# Patient Record
Sex: FEMALE | Race: WHITE | NOT HISPANIC OR LATINO | Employment: OTHER | URBAN - METROPOLITAN AREA
[De-identification: names, ages, dates, MRNs, and addresses within clinical notes are randomized per-mention and may not be internally consistent; named-entity substitution may affect disease eponyms.]

---

## 2017-02-04 ENCOUNTER — HOSPITAL ENCOUNTER (EMERGENCY)
Facility: HOSPITAL | Age: 52
Discharge: HOME | End: 2017-02-04
Attending: EMERGENCY MEDICINE
Payer: MEDICARE

## 2017-02-04 VITALS
TEMPERATURE: 98 F | RESPIRATION RATE: 16 BRPM | OXYGEN SATURATION: 99 % | HEART RATE: 87 BPM | HEIGHT: 68 IN | BODY MASS INDEX: 37.74 KG/M2 | SYSTOLIC BLOOD PRESSURE: 137 MMHG | DIASTOLIC BLOOD PRESSURE: 85 MMHG | WEIGHT: 249 LBS

## 2017-02-04 DIAGNOSIS — F41.9 ANXIETY: Primary | ICD-10-CM

## 2017-02-04 DIAGNOSIS — R00.2 PALPITATIONS: ICD-10-CM

## 2017-02-04 PROCEDURE — 99283 EMERGENCY DEPT VISIT LOW MDM: CPT

## 2017-02-04 PROCEDURE — 25000003 PHARM REV CODE 250: Performed by: EMERGENCY MEDICINE

## 2017-02-04 PROCEDURE — 93010 ELECTROCARDIOGRAM REPORT: CPT | Mod: ,,, | Performed by: INTERNAL MEDICINE

## 2017-02-04 RX ORDER — CLONAZEPAM 1 MG/1
2 TABLET ORAL
Status: COMPLETED | OUTPATIENT
Start: 2017-02-04 | End: 2017-02-04

## 2017-02-04 RX ORDER — CLONAZEPAM 2 MG/1
2 TABLET ORAL 2 TIMES DAILY PRN
Qty: 15 TABLET | Refills: 0 | Status: SHIPPED | OUTPATIENT
Start: 2017-02-04 | End: 2017-02-24 | Stop reason: DRUGHIGH

## 2017-02-04 RX ADMIN — CLONAZEPAM 2 MG: 1 TABLET ORAL at 05:02

## 2017-02-04 NOTE — ED PROVIDER NOTES
"SCRIBE #1 NOTE: I, Nessa Webero, am scribing for, and in the presence of, Gulshan Brown MD. I have scribed the entire note.      History      Chief Complaint   Patient presents with    Chest Pain     reports heart is racing x 1 1/2 hr, reports issues with blood pressure the last several days        Review of patient's allergies indicates:   Allergen Reactions    Morphine Nausea And Vomiting        HPI   HPI    2/4/2017, 5:03 AM   History obtained from the patient      History of Present Illness: Genny Calixto is a 51 y.o. female patient who presents to the Emergency Department for palpitations which onset gradually 3 hours ago. Pt states her blood pressure has been high the past few days. Symptoms are constant and moderate in severity. The patient describes being able to "feel it in her lips and cheeks". No mitigating or exacerbating factors reported. Associated sxs include anxiety. Patient denies any fever, chills, CP, SOB, cough, leg swelling, N/V, and all other sxs at this time. No further complaints or concerns at this time.     Arrival mode: Personal vehicle      PCP: Primary Doctor No       Past Medical History:  Past Medical History   Diagnosis Date    Fibromyalgia     Reflex sympathetic dystrophy     Vertigo        Past Surgical History:  Past Surgical History   Procedure Laterality Date    Cholecystectomy      Hysterectomy      Knee surgery      Bladder surgery      Tonsillectomy           Family History:  Family History   Problem Relation Age of Onset    Stroke Mother     Hypertension Mother     COPD Father     Drug abuse Brother        Social History:  Social History     Social History Main Topics    Smoking status: Former Smoker    Smokeless tobacco: Never Used    Alcohol use No    Drug use: No    Sexual activity: No       ROS   Review of Systems   Constitutional: Negative for chills and fever.   HENT: Negative for sore throat.    Respiratory: Negative for cough and shortness " "of breath.    Cardiovascular: Positive for palpitations. Negative for chest pain and leg swelling.   Gastrointestinal: Negative for nausea and vomiting.   Genitourinary: Negative for dysuria.   Musculoskeletal: Negative for back pain.   Skin: Negative for rash.   Neurological: Negative for weakness.   Hematological: Does not bruise/bleed easily.   Psychiatric/Behavioral: The patient is nervous/anxious.    All other systems reviewed and are negative.      Physical Exam    Initial Vitals   BP Pulse Resp Temp SpO2   02/04/17 0435 02/04/17 0435 02/04/17 0435 02/04/17 0435 02/04/17 0435   140/84 93 20 98.4 °F (36.9 °C) 100 %      Physical Exam  Nursing Notes and Vital Signs Reviewed.  Constitutional: Patient is in no acute distress. Awake and alert. Well-developed and well-nourished.  Head: Atraumatic. Normocephalic.  Eyes: PERRL. EOM intact. Conjunctivae are not pale. No scleral icterus.  ENT: Mucous membranes are moist. Oropharynx is clear and symmetric.    Neck: Supple. Full ROM. No lymphadenopathy.  Cardiovascular: Regular rate. Regular rhythm. No murmurs, rubs, or gallops. Distal pulses are 2+ and symmetric.  Pulmonary/Chest: No respiratory distress. Clear to auscultation bilaterally. No wheezing, rales, or rhonchi.  Abdominal: Soft and non-distended.  There is no tenderness.  No rebound, guarding, or rigidity.  Good bowel sounds.    Genitourinary: No CVA tenderness  Musculoskeletal: Moves all extremities. No obvious deformities. No edema. No calf tenderness.  Skin: Warm and dry.  Neurological:  Alert, awake, and appropriate.  Normal speech.  No acute focal neurological deficits are appreciated.  Psychiatric: Anxious. Good eye contact. Appropriate in content.    ED Course    Procedures  ED Vital Signs:  Vitals:    02/04/17 0435 02/04/17 0520   BP: (!) 140/84 137/85   Pulse: 93 87   Resp: 20 16   Temp: 98.4 °F (36.9 °C)    TempSrc: Oral    SpO2: 100% 99%   Weight: 112.9 kg (249 lb)    Height: 5' 8" (1.727 m)         "   The EKG was ordered, reviewed, and independently interpreted by the ED provider.  Interpretation time: 0445  Rate: 89 BPM  Rhythm: normal sinus rhythm  Interpretation: Normal ECG. No STEMI.      The Emergency Provider reviewed the vital signs and test results, which are outlined above.    ED Discussion     5:16 AM: Reassessed pt at this time. Discussed with pt all pertinent ED information and results. Discussed pt dx of anxiety and plan of tx. Gave pt all f/u and return to the ED instructions. All questions and concerns were addressed at this time. Pt expresses understanding of information and instructions, and is comfortable with plan to discharge. Pt is stable for discharge.    I discussed with patient and/or family/caretaker that evaluation in the ED does not suggest any emergent or life threatening medical conditions requiring immediate intervention beyond what was provided in the ED, and I believe patient is safe for discharge.  Regardless, an unremarkable evaluation in the ED does not preclude the development or presence of a serious of life threatening condition. As such, patient was instructed to return immediately for any worsening or change in current symptoms.    ED Medication(s):  Medications   clonazePAM tablet 2 mg (2 mg Oral Given 2/4/17 0520)       Discharge Medication List as of 2/4/2017  5:16 AM      START taking these medications    Details   clonazePAM (KLONOPIN) 2 MG Tab Take 1 tablet (2 mg total) by mouth 2 (two) times daily as needed for Anxiety., Starting 2/4/2017, Until Sun 2/4/18, Print             Follow-up Information     Follow up with PCP In 2 days.        Follow up with [facility].    Specialty:  Emergency Medicine    Why:  If symptoms worsen    Contact information:    [address]  [facility] [address] [phone]            Medical Decision Making              Scribe Attestation:   Scribe #1: I performed the above scribed service and the  documentation accurately describes the services I performed. I attest to the accuracy of the note.    Attending:   Physician Attestation Statement for Scribe #1: I, Gulshan Brown MD, personally performed the services described in this documentation, as scribed by Nessa Reyes, in my presence, and it is both accurate and complete.          Clinical Impression       ICD-10-CM ICD-9-CM   1. Anxiety F41.9 300.00   2. Palpitations R00.2 785.1       Disposition:   Disposition: Discharged  Condition: Stable         Gulshan Brown MD  02/04/17 0551

## 2017-02-04 NOTE — DISCHARGE INSTRUCTIONS
"  Heart Palpitations    Palpitations are the feeling that your heart is beating hard, fast, or irregular. Some describe it as "pounding" or "skipped beats." Palpitations may occur in someone with heart disease, but can also occur in a healthy person.  Heart-related causes:  · Arrhythmia (a change from the heart's normal rhythm)  · Heart valve disease  · Disease of the heart muscle  · Coronary artery disease  · High blood pressure  Non-heart-related causes:  · Certain medicines (such as asthma inhalers and decongestants)  · Some herbal supplements, energy drinks and pills, and weight loss pills  · Illegal stimulant drugs (such as cocaine, crank, methamphetamine, PCP, bath salts, ecstasy)  · Caffeine, alcohol, and tobacco  · Medical conditions such as thyroid disease, anemia, anxiety, and panic disorder  Sometimes the cause can't be found.  Home care  Follow these home care tips:  · Avoid excess caffeine, alcohol, tobacco, and any stimulant drugs.  · Tell your doctor about any prescription or over-the-counter or herbal medicines you take.  Follow-up care  · Follow up with your doctor, or as advised.  Call 911  This is the fastest and safest way to get to the emergency department. The paramedics can also begin treatment on the way to the hospital, if needed.  Don't wait until your symptoms are severe to call 911. These are reasons to call 911:  · Chest pain  · Shortness of breath  · Feeling lightheaded, faint, or dizzy  · Fainting or loss of consciousness  · Very irregular heartbeat  · Rapid heartbeat that makes you uncomfortable  · Slower than usual heart rate associated with symptoms  · Slower than usual heart rate  · Chest pain with weakness, dizziness, heavy sweating, nausea, or vomiting  · Extreme drowsiness or confusion  · Weakness of an arm or leg, or on 1 side of the face  · Difficulty with speech or vision  When to seek medical advice  Get prompt medical attention if you have palpitations and any of the " following:  · Weakness  · Dizziness  · Lightheadedness  · Fainting  Date Last Reviewed: 4/27/2016  © 3586-4468 [employer] [address] [place] [address], [place] [address]. All rights reserved. This information is not intended as a substitute for professional medical care. Always follow your healthcare professional's instructions.

## 2017-02-16 ENCOUNTER — HOSPITAL ENCOUNTER (EMERGENCY)
Facility: HOSPITAL | Age: 52
Discharge: HOME | End: 2017-02-16
Attending: EMERGENCY MEDICINE
Payer: MEDICARE

## 2017-02-16 VITALS
DIASTOLIC BLOOD PRESSURE: 87 MMHG | TEMPERATURE: 99 F | OXYGEN SATURATION: 100 % | HEART RATE: 72 BPM | RESPIRATION RATE: 20 BRPM | HEIGHT: 68 IN | WEIGHT: 236 LBS | SYSTOLIC BLOOD PRESSURE: 143 MMHG | BODY MASS INDEX: 35.77 KG/M2

## 2017-02-16 DIAGNOSIS — R50.9 FEVER: ICD-10-CM

## 2017-02-16 LAB
ALBUMIN SERPL BCP-MCNC: 4.2 G/DL
ALP SERPL-CCNC: 100 U/L
ALT SERPL W/O P-5'-P-CCNC: 20 U/L
AMORPH CRY URNS QL MICRO: NORMAL
ANION GAP SERPL CALC-SCNC: 14 MMOL/L
AST SERPL-CCNC: 17 U/L
BASOPHILS # BLD AUTO: 0.03 K/UL
BASOPHILS NFR BLD: 0.2 %
BILIRUB SERPL-MCNC: 0.4 MG/DL
BILIRUB UR QL STRIP: NEGATIVE
BUN SERPL-MCNC: 15 MG/DL
CALCIUM SERPL-MCNC: 9.7 MG/DL
CHLORIDE SERPL-SCNC: 105 MMOL/L
CLARITY UR: ABNORMAL
CO2 SERPL-SCNC: 23 MMOL/L
COLOR UR: YELLOW
CREAT SERPL-MCNC: 0.8 MG/DL
DIFFERENTIAL METHOD: ABNORMAL
EOSINOPHIL # BLD AUTO: 0 K/UL
EOSINOPHIL NFR BLD: 0.2 %
ERYTHROCYTE [DISTWIDTH] IN BLOOD BY AUTOMATED COUNT: 13.4 %
EST. GFR  (AFRICAN AMERICAN): >60 ML/MIN/1.73 M^2
EST. GFR  (NON AFRICAN AMERICAN): >60 ML/MIN/1.73 M^2
FLUAV AG SPEC QL IA: NEGATIVE
FLUBV AG SPEC QL IA: NEGATIVE
GLUCOSE SERPL-MCNC: 131 MG/DL
GLUCOSE UR QL STRIP: NEGATIVE
HCT VFR BLD AUTO: 42.1 %
HGB BLD-MCNC: 14.6 G/DL
HGB UR QL STRIP: NEGATIVE
KETONES UR QL STRIP: NEGATIVE
LACTATE SERPL-SCNC: 1.4 MMOL/L
LEUKOCYTE ESTERASE UR QL STRIP: ABNORMAL
LYMPHOCYTES # BLD AUTO: 2.6 K/UL
LYMPHOCYTES NFR BLD: 21.9 %
MCH RBC QN AUTO: 33.6 PG
MCHC RBC AUTO-ENTMCNC: 34.7 %
MCV RBC AUTO: 97 FL
MICROSCOPIC COMMENT: NORMAL
MONOCYTES # BLD AUTO: 0.6 K/UL
MONOCYTES NFR BLD: 5 %
NEUTROPHILS # BLD AUTO: 8.8 K/UL
NEUTROPHILS NFR BLD: 72.7 %
NITRITE UR QL STRIP: NEGATIVE
PH UR STRIP: 7 [PH] (ref 5–8)
PLATELET # BLD AUTO: 257 K/UL
PMV BLD AUTO: 12.2 FL
POTASSIUM SERPL-SCNC: 3.8 MMOL/L
PROT SERPL-MCNC: 8.2 G/DL
PROT UR QL STRIP: NEGATIVE
RBC # BLD AUTO: 4.35 M/UL
SODIUM SERPL-SCNC: 142 MMOL/L
SP GR UR STRIP: 1.02 (ref 1–1.03)
SPECIMEN SOURCE: NORMAL
URN SPEC COLLECT METH UR: ABNORMAL
UROBILINOGEN UR STRIP-ACNC: NEGATIVE EU/DL
WBC # BLD AUTO: 12.07 K/UL
WBC #/AREA URNS HPF: 1 /HPF (ref 0–5)

## 2017-02-16 PROCEDURE — 87040 BLOOD CULTURE FOR BACTERIA: CPT

## 2017-02-16 PROCEDURE — 80053 COMPREHEN METABOLIC PANEL: CPT

## 2017-02-16 PROCEDURE — 96375 TX/PRO/DX INJ NEW DRUG ADDON: CPT

## 2017-02-16 PROCEDURE — 85025 COMPLETE CBC W/AUTO DIFF WBC: CPT

## 2017-02-16 PROCEDURE — 99284 EMERGENCY DEPT VISIT MOD MDM: CPT | Mod: 25

## 2017-02-16 PROCEDURE — 96365 THER/PROPH/DIAG IV INF INIT: CPT

## 2017-02-16 PROCEDURE — 25000003 PHARM REV CODE 250: Performed by: EMERGENCY MEDICINE

## 2017-02-16 PROCEDURE — 87400 INFLUENZA A/B EACH AG IA: CPT | Mod: 59

## 2017-02-16 PROCEDURE — 63600175 PHARM REV CODE 636 W HCPCS: Performed by: EMERGENCY MEDICINE

## 2017-02-16 PROCEDURE — 81000 URINALYSIS NONAUTO W/SCOPE: CPT

## 2017-02-16 PROCEDURE — 83605 ASSAY OF LACTIC ACID: CPT

## 2017-02-16 RX ORDER — SULFAMETHOXAZOLE AND TRIMETHOPRIM 800; 160 MG/1; MG/1
1 TABLET ORAL 3 TIMES DAILY
Qty: 21 TABLET | Refills: 0 | Status: SHIPPED | OUTPATIENT
Start: 2017-02-16 | End: 2017-02-24 | Stop reason: ALTCHOICE

## 2017-02-16 RX ORDER — MEPERIDINE HYDROCHLORIDE 50 MG/ML
25 INJECTION INTRAMUSCULAR; INTRAVENOUS; SUBCUTANEOUS
Status: COMPLETED | OUTPATIENT
Start: 2017-02-16 | End: 2017-02-16

## 2017-02-16 RX ADMIN — MEPERIDINE HYDROCHLORIDE 25 MG: 50 INJECTION INTRAMUSCULAR; INTRAVENOUS; SUBCUTANEOUS at 02:02

## 2017-02-16 RX ADMIN — PROMETHAZINE HYDROCHLORIDE 6.25 MG: 25 INJECTION, SOLUTION INTRAMUSCULAR; INTRAVENOUS at 02:02

## 2017-02-16 NOTE — ED PROVIDER NOTES
SCRIBE #1 NOTE: I, Jung Padgett, am scribing for, and in the presence of, Carmine Whalen MD. I have scribed the entire note.      History      Chief Complaint   Patient presents with    Fever     post op x 1 day, bilateral facet injections, tmax 101.4 alternating motrin and tylernol, reports post op pain similiar to previous injections        Review of patient's allergies indicates:   Allergen Reactions    Morphine Nausea And Vomiting        HPI   HPI    2/16/2017, 1:55 AM   History obtained from the brother and patient       History of Present Illness: Genny Calixto is a 51 y.o. female patient with a PMHx of fibromyalgia, who presents to the Emergency Department for fever (Tmax 101.4) which onset gradually yesterday status post L3-L5 and SI facet injection which occurred 2 days ago. Sxs are constant and moderate in severity. There are no mitigating or exacerbating factors noted. No associated sxs. Pt denies any seizure, numbness, weakness, N/V/D, HA, SOB, abd pain,  and all other sxs at this time. Prior tx includes tylenol and NSAID without relief. No further complaints or concerns at this time.         Arrival mode: Personal vehicle      PCP: Primary Doctor No       Past Medical History:  Past Medical History   Diagnosis Date    Fibromyalgia     Reflex sympathetic dystrophy     Vertigo        Past Surgical History:  Past Surgical History   Procedure Laterality Date    Cholecystectomy      Hysterectomy      Knee surgery      Bladder surgery      Tonsillectomy           Family History:  Family History   Problem Relation Age of Onset    Stroke Mother     Hypertension Mother     COPD Father     Drug abuse Brother        Social History:  Social History     Social History Main Topics    Smoking status: Former Smoker    Smokeless tobacco: Never Used    Alcohol use No    Drug use: No    Sexual activity: No       ROS   Review of Systems   Constitutional: Positive for fever. Negative for  chills and diaphoresis.   HENT: Negative for sore throat.    Respiratory: Negative for shortness of breath.    Cardiovascular: Negative for chest pain.   Gastrointestinal: Negative for abdominal pain, blood in stool, constipation, diarrhea, nausea and vomiting.   Genitourinary: Negative for dysuria.   Musculoskeletal: Negative for back pain.   Skin: Negative for rash.   Neurological: Negative for dizziness, syncope, weakness, numbness and headaches.   Hematological: Does not bruise/bleed easily.       Physical Exam    Initial Vitals   BP Pulse Resp Temp SpO2   02/16/17 0127 02/16/17 0127 02/16/17 0127 02/16/17 0127 02/16/17 0127   184/83 90 16 99.2 °F (37.3 °C) 97 %      Physical Exam  Nursing Notes and Vital Signs Reviewed.  Constitutional: Patient is in no acute distress. Awake and alert. Well-developed and well-nourished.  Head: Atraumatic. Normocephalic.  Eyes: PERRL. EOM intact. Conjunctivae are not pale. No scleral icterus.  ENT: Mucous membranes are moist. Oropharynx is clear and symmetric.    Neck: Supple. Full ROM. No lymphadenopathy.  Cardiovascular: Regular rate. Regular rhythm. No murmurs, rubs, or gallops. Distal pulses are 2+ and symmetric.  Pulmonary/Chest: No respiratory distress. Clear to auscultation bilaterally. No wheezing, rales, or rhonchi.  Abdominal: Soft and non-distended.  There is no tenderness.  No rebound, guarding, or rigidity. Good bowel sounds.  Genitourinary: No CVA tenderness  Musculoskeletal: Moves all extremities. No obvious deformities. No edema. No calf tenderness.  Back: No tenderness. No midline bony tenderness, deformities, or step-offs of the T-spine or L-spine. Skin appears normal without abrasions or bruising. No erythema, induration, or fluctuance.   Skin: Warm and dry.  Neurological:  Alert, awake, and appropriate.  Normal speech.  No acute focal neurological deficits are appreciated.  Psychiatric: Normal affect. Good eye contact. Appropriate in content.    ED Course   "  Procedures  ED Vital Signs:  Vitals:    02/16/17 0127   BP: (!) 184/83   Pulse: 90   Resp: 16   Temp: 99.2 °F (37.3 °C)   TempSrc: Oral   SpO2: 97%   Weight: 107 kg (236 lb)   Height: 5' 8" (1.727 m)       Abnormal Lab Results:  Labs Reviewed   CBC W/ AUTO DIFFERENTIAL - Abnormal; Notable for the following:        Result Value    MCH 33.6 (*)     Gran # 8.8 (*)     All other components within normal limits   COMPREHENSIVE METABOLIC PANEL - Abnormal; Notable for the following:     Glucose 131 (*)     All other components within normal limits   URINALYSIS - Abnormal; Notable for the following:     Appearance, UA Cloudy (*)     Leukocytes, UA Trace (*)     All other components within normal limits   CULTURE, BLOOD   CULTURE, BLOOD   INFLUENZA A AND B ANTIGEN   LACTIC ACID, PLASMA   URINALYSIS MICROSCOPIC        All Lab Results:  Results for orders placed or performed during the hospital encounter of 02/16/17   CBC auto differential   Result Value Ref Range    WBC 12.07 3.90 - 12.70 K/uL    RBC 4.35 4.00 - 5.40 M/uL    Hemoglobin 14.6 12.0 - 16.0 g/dL    Hematocrit 42.1 37.0 - 48.5 %    MCV 97 82 - 98 fL    MCH 33.6 (H) 27.0 - 31.0 pg    MCHC 34.7 32.0 - 36.0 %    RDW 13.4 11.5 - 14.5 %    Platelets 257 150 - 350 K/uL    MPV 12.2 9.2 - 12.9 fL    Gran # 8.8 (H) 1.8 - 7.7 K/uL    Lymph # 2.6 1.0 - 4.8 K/uL    Mono # 0.6 0.3 - 1.0 K/uL    Eos # 0.0 0.0 - 0.5 K/uL    Baso # 0.03 0.00 - 0.20 K/uL    Gran% 72.7 38.0 - 73.0 %    Lymph% 21.9 18.0 - 48.0 %    Mono% 5.0 4.0 - 15.0 %    Eosinophil% 0.2 0.0 - 8.0 %    Basophil% 0.2 0.0 - 1.9 %    Differential Method Automated    Comprehensive metabolic panel   Result Value Ref Range    Sodium 142 136 - 145 mmol/L    Potassium 3.8 3.5 - 5.1 mmol/L    Chloride 105 95 - 110 mmol/L    CO2 23 23 - 29 mmol/L    Glucose 131 (H) 70 - 110 mg/dL    BUN, Bld 15 6 - 20 mg/dL    Creatinine 0.8 0.5 - 1.4 mg/dL    Calcium 9.7 8.7 - 10.5 mg/dL    Total Protein 8.2 6.0 - 8.4 g/dL    Albumin 4.2 " 3.5 - 5.2 g/dL    Total Bilirubin 0.4 0.1 - 1.0 mg/dL    Alkaline Phosphatase 100 55 - 135 U/L    AST 17 10 - 40 U/L    ALT 20 10 - 44 U/L    Anion Gap 14 8 - 16 mmol/L    eGFR if African American >60 >60 mL/min/1.73 m^2    eGFR if non African American >60 >60 mL/min/1.73 m^2   Urinalysis   Result Value Ref Range    Specimen UA Urine, Clean Catch     Color, UA Yellow Yellow, Straw, Hilda    Appearance, UA Cloudy (A) Clear    pH, UA 7.0 5.0 - 8.0    Specific Gravity, UA 1.020 1.005 - 1.030    Protein, UA Negative Negative    Glucose, UA Negative Negative    Ketones, UA Negative Negative    Bilirubin (UA) Negative Negative    Occult Blood UA Negative Negative    Nitrite, UA Negative Negative    Urobilinogen, UA Negative <2.0 EU/dL    Leukocytes, UA Trace (A) Negative   Influenza antigen Nasopharyngeal Swab   Result Value Ref Range    Influenza A Ag, EIA Negative Negative    Influenza B Ag, EIA Negative Negative    Flu A & B Source Nasopharyngeal Swab    Lactic acid, plasma   Result Value Ref Range    Lactate (Lactic Acid) 1.4 0.5 - 2.2 mmol/L   Urinalysis Microscopic   Result Value Ref Range    WBC, UA 1 0 - 5 /hpf    Amorphous, UA Few None-Moderate    Microscopic Comment SEE COMMENT          Imaging Results:  Imaging Results         X-Ray Chest PA And Lateral (In process) Per ED physician, pt's CXR results NAF.                  The Emergency Provider reviewed the vital signs and test results, which are outlined above.    ED Discussion     4:22 AM: Reassessed pt. Discussed with pt all pertinent ED information and results. Discussed plan of treatment with pt. Gave pt all f/u and return to the ED instructions. All questions and concerns were addressed at this time. Pt understands and agrees to plan as discussed. Pt is stable for discharge.     Pre-hypertension/Hypertension: The pt has been informed that they may have pre-hypertension or hypertension based on a blood pressure reading in the ED. I recommend that the pt  call the PCP listed on their discharge instructions or a physician of their choice this week to arrange f/u for further evaluation of possible pre-hypertension or hypertension.       ED Medication(s):  Medications   meperidine 50 mg/mL injection 25 mg (25 mg Intravenous Given 2/16/17 0214)   promethazine (PHENERGAN) 6.25 mg in dextrose 5 % 50 mL IVPB (0 mg Intravenous Stopped 2/16/17 0308)       New Prescriptions    No medications on file       Follow-up Information     Follow up with Camila Kauffman DO In 4 days.    Specialty:  Internal Medicine    Contact information:    [address] [place] [address] [phone]          Follow up with [facility].    Specialty:  Emergency Medicine    Why:  As needed, If symptoms worsen    Contact information:    [address] [facility] [address] [phone]            Medical Decision Making    Medical Decision Making:   Clinical Tests:   Lab Tests: Ordered and Reviewed  Radiological Study: Reviewed and Ordered           Scribe Attestation:   Scribe #1: I performed the above scribed service and the documentation accurately describes the services I performed. I attest to the accuracy of the note.    Attending:   Physician Attestation Statement for Scribe #1: I, Carmine Whalen MD, personally performed the services described in this documentation, as scribed by Jung Padgett, in my presence, and it is both accurate and complete.          Clinical Impression       ICD-10-CM ICD-9-CM   1. Fever R50.9 780.60       Disposition:   Disposition: Discharged  Condition: Stable         Carmine Whalen MD  02/16/17 9173

## 2017-02-16 NOTE — ED NOTES
"Patient had a panic attack, stated she "couldn't breathe", began to hyperventilate, started coughing repeatedly, was rocking back and forth in bed, stating she "wasn't okay".  2L NC oxygen placed on patient.  Continuous pulse ox is reading 98%.  Patient's cough has subsided.  Therapeutic speech used to calm patient.  Patient's breathing has returned back to normal.  Patient states she "feels much better now" and "doesn't know what happened".  I spoke with Bashir in respiratory, he is on standby in case we need him in the future.  Patient is now resting in bed, no acute distress noted at this time.  Side rails up x 2. Call light within reach.  Patient's brother is at the beside with her.  Will continue to monitor.  "

## 2017-02-16 NOTE — DISCHARGE INSTRUCTIONS
Febrile Illness, Uncertain Cause (Adult)  You have a fever, but the cause is not certain. A fever is a natural reaction of the body to an illness such as infection due to a virus or bacteria. In most cases, the temperature itself is not harmful. It actually helps the body fight infections. A fever does not need to be treated unless you feel very uncomfortable.  Sometimes a fever can be an early sign of a more serious infection, so make sure to follow up if your condition worsens.  Home care  Unless given other instructions by your healthcare provider, follow these guidelines when caring for yourself at home.  General care  · If your symptoms are severe, rest at home for the first 2 to 3 days. When you resume activity, don't let yourself get too tired.  · Do not smoke. Also avoid being exposed to secondhand smoke.  · Your appetite may be poor, so a light diet is fine. Avoid dehydration by drinking 6 to 8 glasses of fluids per day (such as water, soft drinks, sports drinks, juices, tea, or soup). Extra fluids will help loosen secretions in the nose and lungs.  Medicines  · You can take acetaminophen or ibuprofen for pain, unless you were given a different fever-reducing/pain medicine to use. (Note: If you have chronic liver or kidney disease or have ever had a stomach ulcer or gastrointestinal bleeding, talk with your healthcare provider before using these medicines. Also talk to your provider if you are taking medicine to prevent blood clots.) Aspirin should never be given to anyone younger than 18 years of age who is ill with a viral infection or fever. It may cause severe liver or brain damage.  · If you were given antibiotics, take them until they are used up, or your healthcare provider tells you to stop. It is important to finish the antibiotics even though you feel better. This is to make sure the infection has cleared. Be aware that antibiotics are not usually given for a fever with an unknown  cause.  · Over-the-counter medicines will not shorten the duration of the illness. However, they may be helpful for the following symptoms: cough, sore throat, or nasal and sinus congestion. Ask your pharmacist for product suggestions. (Note: Do not use decongestants if you have high blood pressure.)  Follow-up care  Follow up with your healthcare provider, or as advised.  · If a culture was done, you will be notified if your treatment needs to be changed. You can call as directed for the results.  · If X-rays, a CT, or an ultrasound were done, a specialist will review them. You will be notified of any findings that may affect your care.  Call 911  Contact emergency services right away if any of these occur:  · Trouble breathing or swallowing, or wheezing  · Chest pain  · Confusion  · Extreme drowsiness or trouble awakening  · Fainting or loss of consciousness  · Rapid heart rate  · Low blood pressure  · Vomiting blood, or large amounts of blood in stool  · Seizure  When to seek medical advice  Call your healthcare provider right away if any of these occur:  · Cough with lots of colored sputum (mucus) or blood in your sputum  · Severe headache  · Face, neck, throat, or ear pain  · Feeling drowsy  · Abdominal pain  · Repeated vomiting or diarrhea  · Joint pain or a new rash  · Burning when urinating  · Fever of 100.4°F (38°C) or higher, that does not get better after taking fever-reducing medicine  · Feeling weak or dizzy  Date Last Reviewed: 7/30/2015  © 8292-4330 [employer]. [address] [place] [address], [place] [address]. All rights reserved. This information is not intended as a substitute for professional medical care. Always follow your healthcare professional's instructions.

## 2017-02-16 NOTE — ED NOTES
Pt resting in bed with brother at bedside. No acute distress, RR equal and non labored, VSS. Bed in low, locked, and call light in reach. Side rails up X 2. Will continue to monitor.

## 2017-02-16 NOTE — ED AVS SNAPSHOT
[facility]  [address] [facility] [address]               Genny Calixto   2017  1:34 AM   ED    Description:  Female : 1965   Department:  [facility]           Your Care was Coordinated By:     Provider Role From To    Carmine Whalen MD Attending Provider 17 0142 --      Reason for Visit     Fever           Diagnoses this Visit        Comments    Fever           ED Disposition     ED Disposition Condition Comment    Discharge             To Do List           Follow-up Information     Follow up with Camila Kauffman DO In 4 days.    Specialty:  Internal Medicine    Contact information:    [address] [place] [address] [address] [place]ina [place] [address]  [phone]          Follow up with [facility].    Specialty:  Emergency Medicine    Why:  As needed, If symptoms worsen    Contact information:    [id number] [place] [address]  [phone]      [facility]     [facility] Nurse Care [facility] -  Assistance  Registered nurses in the [facility] provide clinical advisement, health education, appointment booking, and other advisory services.  Call for this free service at [phone].             Medications           Message regarding Medications     Verify the changes and/or additions to your medication regime listed below are the same as discussed with your clinician today.  If any of these changes or additions are incorrect, please notify your healthcare provider.        These medications were administered today        Dose Freq    meperidine 50 mg/mL injection 25 mg 25 mg ED 1 Time    Sig: Inject 0.5 mLs (25 mg total) into the vein ED 1 Time.    Class: Normal    Route: Intravenous    promethazine (PHENERGAN) 6.25 mg in dextrose 5 % 50 mL IVPB 6.25 mg ED 1 Time    Sig: Inject 6.25 mg into the vein ED 1 Time.    Class: Normal    Route: Intravenous      STOP taking these  "medications     naproxen (NAPROSYN) 500 MG tablet Take 1 tablet (500 mg total) by mouth 2 (two) times daily with meals.    promethazine (PHENERGAN) 12.5 MG Tab Take 1 tablet (12.5 mg total) by mouth every 4 (four) hours as needed.    alprazolam (XANAX) 1 MG tablet Take 1 mg by mouth 4 (four) times daily as needed.    diazepam (VALIUM) 5 MG tablet Take 10 mg by mouth nightly as needed.    dicyclomine (BENTYL) 10 MG capsule Take 10 mg by mouth 3 (three) times daily.    oxycodone (OXYCONTIN) 30 MG Tb12 Take 30 mg by mouth 4 (four) times daily.    pregabalin (LYRICA) 50 MG capsule Take 50 mg by mouth 4 (four) times daily.           Verify that the below list of medications is an accurate representation of the medications you are currently taking.  If none reported, the list may be blank. If incorrect, please contact your healthcare provider. Carry this list with you in case of emergency.           Current Medications     clonazePAM (KLONOPIN) 2 MG Tab Take 1 tablet (2 mg total) by mouth 2 (two) times daily as needed for Anxiety.    ondansetron (ZOFRAN) 8 MG tablet Take 8 mg by mouth every 8 (eight) hours.    sumatriptan (IMITREX) 100 MG tablet Take 100 mg by mouth every 2 (two) hours as needed.           Clinical Reference Information           Your Vitals Were     BP Pulse Temp Resp Height Weight    184/83 (BP Location: Right arm, Patient Position: Sitting) 90 99.2 °F (37.3 °C) (Oral) 16 5' 8" (1.727 m) 107 kg (236 lb)    SpO2 BMI             97% 35.88 kg/m2         Allergies as of 2/16/2017        Reactions    Morphine Nausea And Vomiting      Immunizations Administered on Date of Encounter - 2/16/2017     None      ED Micro, Lab, POCT     Start Ordered       Status Ordering Provider    02/16/17 0159 02/16/17 0158  CBC auto differential  STAT      Final result     02/16/17 0159 02/16/17 0158  Comprehensive metabolic panel  STAT      Final result     02/16/17 0159 02/16/17 0158  Urinalysis  STAT      Final result     " 02/16/17 0159 02/16/17 0158  Influenza antigen Nasopharyngeal Swab  Once      Final result     02/16/17 0159 02/16/17 0158  Lactic acid, plasma  STAT      Final result     02/16/17 0158 02/16/17 0158  Urinalysis Microscopic  Once      Final result       ED Imaging Orders     Start Ordered       Status Ordering Provider    02/16/17 0159 02/16/17 0158  X-Ray Chest PA And Lateral  1 time imaging      In process         Discharge Instructions         Febrile Illness, Uncertain Cause (Adult)  You have a fever, but the cause is not certain. A fever is a natural reaction of the body to an illness such as infection due to a virus or bacteria. In most cases, the temperature itself is not harmful. It actually helps the body fight infections. A fever does not need to be treated unless you feel very uncomfortable.  Sometimes a fever can be an early sign of a more serious infection, so make sure to follow up if your condition worsens.  Home care  Unless given other instructions by your healthcare provider, follow these guidelines when caring for yourself at home.  General care  · If your symptoms are severe, rest at home for the first 2 to 3 days. When you resume activity, don't let yourself get too tired.  · Do not smoke. Also avoid being exposed to secondhand smoke.  · Your appetite may be poor, so a light diet is fine. Avoid dehydration by drinking 6 to 8 glasses of fluids per day (such as water, soft drinks, sports drinks, juices, tea, or soup). Extra fluids will help loosen secretions in the nose and lungs.  Medicines  · You can take acetaminophen or ibuprofen for pain, unless you were given a different fever-reducing/pain medicine to use. (Note: If you have chronic liver or kidney disease or have ever had a stomach ulcer or gastrointestinal bleeding, talk with your healthcare provider before using these medicines. Also talk to your provider if you are taking medicine to prevent blood clots.) Aspirin should never be given  to anyone younger than 18 years of age who is ill with a viral infection or fever. It may cause severe liver or brain damage.  · If you were given antibiotics, take them until they are used up, or your healthcare provider tells you to stop. It is important to finish the antibiotics even though you feel better. This is to make sure the infection has cleared. Be aware that antibiotics are not usually given for a fever with an unknown cause.  · Over-the-counter medicines will not shorten the duration of the illness. However, they may be helpful for the following symptoms: cough, sore throat, or nasal and sinus congestion. Ask your pharmacist for product suggestions. (Note: Do not use decongestants if you have high blood pressure.)  Follow-up care  Follow up with your healthcare provider, or as advised.  · If a culture was done, you will be notified if your treatment needs to be changed. You can call as directed for the results.  · If X-rays, a CT, or an ultrasound were done, a specialist will review them. You will be notified of any findings that may affect your care.  Call 911  Contact emergency services right away if any of these occur:  · Trouble breathing or swallowing, or wheezing  · Chest pain  · Confusion  · Extreme drowsiness or trouble awakening  · Fainting or loss of consciousness  · Rapid heart rate  · Low blood pressure  · Vomiting blood, or large amounts of blood in stool  · Seizure  When to seek medical advice  Call your healthcare provider right away if any of these occur:  · Cough with lots of colored sputum (mucus) or blood in your sputum  · Severe headache  · Face, neck, throat, or ear pain  · Feeling drowsy  · Abdominal pain  · Repeated vomiting or diarrhea  · Joint pain or a new rash  · Burning when urinating  · Fever of 100.4°F (38°C) or higher, that does not get better after taking fever-reducing medicine  · Feeling weak or dizzy  Date Last Reviewed: 7/30/2015  © 8073-9060 [employer],  [employer]. [address] [place] [address]. All rights reserved. This information is not intended as a substitute for professional medical care. Always follow your healthcare professional's instructions.          Your Scheduled Appointments     Feb 22, 2017  1:20 PM CST   New Patient with MD [place] [facility] - Internal Medicine ([place])    [address] [facility] [address]  [place] [address] [phone]              [facility] Sign-Up     Activating your [facility] account is as easy as 1-2-3!     1) Visit [email or web address], select Sign Up Now, enter this activation code and your date of birth, then select Next.  [email or web address]  Expires: 3/21/2017  5:16 AM      2) Create a username and password to use when you visit [facility] in the future and select a security question in case you lose your password and select Next.    3) Enter your e-mail address and click Sign Up!    Additional Information  If you have questions, please e-mail [email or web address] or call [phone] to talk to our [facility] staff. Remember, [facility] is NOT to be used for urgent needs. For medical emergencies, dial 911.         Smoking Cessation     If you would like to quit smoking:   You may be eligible for free services if you are a [place] resident and started smoking cigarettes before September 1, 1988.  Call the Smoking Cessation Trust ([facility]) toll free at (548) 690-3092 or (426) 387-6066.   Call 1-800-QUIT-NOW if you do not meet the above criteria.             [facility] complies with applicable Federal civil rights laws and does not discriminate on the basis of race, color, national origin, age, disability, or sex.        Language Assistance Services     ATTENTION: Language assistance services are available, free of charge. Please call [phone].      ATENCIÓN: Si habla español, tiene a ornelas disposición servicios gratuitos de asistencia lingüística. Llame al [phone].     CHÚ Ý: N?u b?n nói Ti?ng  Vi?t, có các d?ch v? h? tr? ngôn ng? mi?n phí dành cho b?n. G?i s? [phone].

## 2017-02-21 LAB
BACTERIA BLD CULT: NORMAL
BACTERIA BLD CULT: NORMAL

## 2017-02-24 ENCOUNTER — OFFICE VISIT (OUTPATIENT)
Dept: INTERNAL MEDICINE | Facility: CLINIC | Age: 52
End: 2017-02-24
Payer: MEDICARE

## 2017-02-24 VITALS
RESPIRATION RATE: 16 BRPM | SYSTOLIC BLOOD PRESSURE: 140 MMHG | WEIGHT: 239.19 LBS | HEART RATE: 88 BPM | TEMPERATURE: 99 F | HEIGHT: 66 IN | DIASTOLIC BLOOD PRESSURE: 82 MMHG | BODY MASS INDEX: 38.44 KG/M2 | OXYGEN SATURATION: 98 %

## 2017-02-24 DIAGNOSIS — R73.9 HYPERGLYCEMIA: ICD-10-CM

## 2017-02-24 DIAGNOSIS — G89.29 CHRONIC LOW BACK PAIN WITHOUT SCIATICA, UNSPECIFIED BACK PAIN LATERALITY: ICD-10-CM

## 2017-02-24 DIAGNOSIS — G47.00 INSOMNIA, UNSPECIFIED TYPE: ICD-10-CM

## 2017-02-24 DIAGNOSIS — Z71.89 COUNSELING ON HEALTH PROMOTION AND DISEASE PREVENTION: ICD-10-CM

## 2017-02-24 DIAGNOSIS — M54.50 CHRONIC LOW BACK PAIN WITHOUT SCIATICA, UNSPECIFIED BACK PAIN LATERALITY: ICD-10-CM

## 2017-02-24 DIAGNOSIS — E66.9 OBESITY (BMI 35.0-39.9 WITHOUT COMORBIDITY): ICD-10-CM

## 2017-02-24 DIAGNOSIS — F41.9 ANXIETY: Primary | ICD-10-CM

## 2017-02-24 PROCEDURE — 99214 OFFICE O/P EST MOD 30 MIN: CPT | Mod: PBBFAC,PN | Performed by: INTERNAL MEDICINE

## 2017-02-24 PROCEDURE — 99204 OFFICE O/P NEW MOD 45 MIN: CPT | Mod: S$PBB,,, | Performed by: INTERNAL MEDICINE

## 2017-02-24 PROCEDURE — 99999 PR PBB SHADOW E&M-EST. PATIENT-LVL IV: CPT | Mod: PBBFAC,,, | Performed by: INTERNAL MEDICINE

## 2017-02-24 RX ORDER — DIPHENHYDRAMINE HCL 25 MG
CAPSULE ORAL
COMMUNITY
End: 2020-10-20

## 2017-02-24 RX ORDER — CYCLOBENZAPRINE HCL 10 MG
TABLET ORAL
COMMUNITY
End: 2017-03-03 | Stop reason: SINTOL

## 2017-02-24 RX ORDER — DIAZEPAM 10 MG/1
TABLET ORAL
Qty: 30 TABLET | Refills: 3 | Status: SHIPPED | OUTPATIENT
Start: 2017-02-24 | End: 2017-06-29 | Stop reason: SDUPTHER

## 2017-02-24 RX ORDER — DIAZEPAM 10 MG/1
TABLET ORAL
COMMUNITY
End: 2017-02-24 | Stop reason: SDUPTHER

## 2017-02-24 RX ORDER — DOXEPIN HYDROCHLORIDE 10 MG/1
CAPSULE ORAL
COMMUNITY
End: 2017-03-03 | Stop reason: ALTCHOICE

## 2017-02-24 RX ORDER — PSEUDOEPHEDRINE HCL 120 MG/1
TABLET, FILM COATED, EXTENDED RELEASE ORAL
COMMUNITY
End: 2020-10-20

## 2017-02-24 RX ORDER — SERTRALINE HYDROCHLORIDE 25 MG/1
25 TABLET, FILM COATED ORAL DAILY
Qty: 30 TABLET | Refills: 11 | Status: SHIPPED | OUTPATIENT
Start: 2017-02-24 | End: 2017-05-18 | Stop reason: ALTCHOICE

## 2017-02-24 RX ORDER — DICYCLOMINE HYDROCHLORIDE 10 MG/1
CAPSULE ORAL
COMMUNITY
End: 2017-03-03 | Stop reason: ALTCHOICE

## 2017-02-24 RX ORDER — IBUPROFEN 600 MG/1
TABLET ORAL
COMMUNITY
End: 2020-10-20

## 2017-02-24 RX ORDER — OXYCODONE HYDROCHLORIDE 30 MG/1
TABLET ORAL
COMMUNITY
End: 2017-02-24

## 2017-02-24 RX ORDER — DIAZEPAM 10 MG/1
TABLET ORAL
Qty: 30 TABLET | Refills: 3 | Status: SHIPPED | OUTPATIENT
Start: 2017-02-24 | End: 2017-02-24 | Stop reason: SDUPTHER

## 2017-02-24 RX ORDER — MECLIZINE HYDROCHLORIDE 25 MG/1
TABLET ORAL
COMMUNITY
End: 2017-05-18

## 2017-02-24 RX ORDER — LORATADINE 10 MG/1
TABLET ORAL
COMMUNITY
End: 2023-04-30 | Stop reason: ALTCHOICE

## 2017-02-24 RX ORDER — MELATONIN 5 MG
CAPSULE ORAL
COMMUNITY
End: 2017-05-18

## 2017-02-24 NOTE — MR AVS SNAPSHOT
[facility] Internal Medicine  [address] [place] [address] D  [place] [address]  Phone: [phone]                  Genny Calixto   2017 10:20 AM   Office Visit    Description:  Female : 1965   Provider:  Xavier Arellano MD   Department:  [facility] Internal Medicine           Reason for Visit     Annual Exam     Panic Attack     Establish Care     Insomnia           Diagnoses this Visit        Comments    Anxiety    -  Primary     Obesity (BMI 35.0-39.9 without comorbidity)         Insomnia, unspecified type         Hyperglycemia         Counseling on health promotion and disease prevention         Chronic low back pain without sciatica, unspecified back pain laterality                To Do List           Future Appointments        Provider Department Dept Phone    2017 2:40 PM MD Vitaly Gomez - Interventional Pain [phone]    3/15/2017 2:45 PM MD Vitaly Hutchison - OB/ [phone]    3/23/2017 3:00 PM Xavier Arellano MD [facility] Internal Medicine [phone]      Goals (5 Years of Data)     None      Follow-Up and Disposition     Return in about 4 weeks (around 3/24/2017), or if symptoms worsen or fail to improve, for F/U on anxiety.    Follow-up and Disposition History       These Medications        Disp Refills Start End    diazePAM (VALIUM) 10 MG Tab 30 tablet 3 2017     Take 10 mg by mouth every 24 hours at night as needed  for anxiety or insomnia    Pharmacy: [facility]/pharmacy #[id number] [place] - [address] [address] [facility] Ph #: [phone]       sertraline (ZOLOFT) 25 MG tablet 30 tablet 11 2017    Take 1 tablet (25 mg total) by mouth once daily. - Oral    Pharmacy: [facility]/pharmacy #[id number] [facility] [place] - [address] [address] [facility] Ph #: [phone]         [facility]     [facility] -  Assistance  Registered nurses in the [facility] provide clinical advisement, health education, appointment booking, and  other advisory services.  Call for this free service at [phone].             Medications           Message regarding Medications     Verify the changes and/or additions to your medication regime listed below are the same as discussed with your clinician today.  If any of these changes or additions are incorrect, please notify your healthcare provider.        START taking these NEW medications        Refills    diazePAM (VALIUM) 10 MG Tab 3    Sig: Take 10 mg by mouth every 24 hours at night as needed  for anxiety or insomnia    Class: Normal    sertraline (ZOLOFT) 25 MG tablet 11    Sig: Take 1 tablet (25 mg total) by mouth once daily.    Class: Normal    Route: Oral      STOP taking these medications     oxycodone (ROXICODONE) 30 MG Tab Take 30 mg by mouth every 4 (four) hours as needed  for Pain    clonazePAM (KLONOPIN) 2 MG Tab Take 1 tablet (2 mg total) by mouth 2 (two) times daily as needed for Anxiety.    sulfamethoxazole-trimethoprim 800-160mg (BACTRIM DS) 800-160 mg Tab Take 1 tablet by mouth 3 (three) times daily.           Verify that the below list of medications is an accurate representation of the medications you are currently taking.  If none reported, the list may be blank. If incorrect, please contact your healthcare provider. Carry this list with you in case of emergency.           Current Medications     cyclobenzaprine (FLEXERIL) 10 MG tablet Take 10 mg by mouth 2 (two) times daily as needed  for Muscle spasms    diazePAM (VALIUM) 10 MG Tab Take 10 mg by mouth every 24 hours at night as needed  for anxiety or insomnia    dicyclomine (BENTYL) 10 MG capsule Take 10 mg by mouth 3 (three) times daily    diphenhydrAMINE (BENADRYL) 25 mg capsule Take 25 mg by mouth every 6 (six) hours as needed  for Itching    doxepin (SINEQUAN) 10 MG capsule Take 10 mg by mouth nightly    ibuprofen (ADVIL,MOTRIN) 600 MG tablet Take 600 mg by mouth 2 (two) times daily    loratadine (CLARITIN) 10 mg tablet Take 10  mg by mouth daily    meclizine (ANTIVERT) 25 mg tablet Take 25 mg by mouth 3 (three) times daily as needed    melatonin 5 mg Cap Take by mouth    ondansetron (ZOFRAN) 8 MG tablet Take 8 mg by mouth every 8 (eight) hours.    pseudoephedrine (SUDAFED) 120 mg 12 hr tablet Take 120 mg by mouth every 12 (twelve) hours    sennosides 15 mg Tab Take by mouth    sumatriptan (IMITREX) 100 MG tablet Take 100 mg by mouth every 2 (two) hours as needed.    sertraline (ZOLOFT) 25 MG tablet Take 1 tablet (25 mg total) by mouth once daily.           Clinical Reference Information           Your Vitals Were     BP                   140/82 (BP Location: Right arm, Patient Position: Sitting, BP Method: Manual)           Blood Pressure          Most Recent Value    BP  (!)  140/82      Allergies as of 2/24/2017     Erythromycin    Morphine    Opioids - Morphine Analogues      Immunizations Administered on Date of Encounter - 2/24/2017     None      Orders Placed During Today's Visit      Normal Orders This Visit    Ambulatory consult to Obstetrics / Gynecology     Ambulatory consult to Pain Clinic     Case request GI: COLONOSCOPY     Future Labs/Procedures Expected by Expires    Hemoglobin A1c  2/24/2017 4/25/2018    Lipid panel  2/24/2017 4/25/2018    T4, free  2/24/2017 4/25/2018    TSH  2/24/2017 4/25/2018      [facility] Sign-Up     Activating your [facility] account is as easy as 1-2-3!     1) Visit [email or web address], select Sign Up Now, enter this activation code and your date of birth, then select Next.  [email or web address]  Expires: 3/21/2017  5:16 AM      2) Create a username and password to use when you visit [facility] in the future and select a security question in case you lose your password and select Next.    3) Enter your e-mail address and click Sign Up!    Additional Information  If you have questions, please e-mail [email or web address] or call [phone] to talk to our [facility] staff. Remember, [facility] is NOT to be used  for urgent needs. For medical emergencies, dial 911.         Language Assistance Services     ATTENTION: Language assistance services are available, free of charge. Please call [phone].      ATENCIÓN: Si habla jose, tiene a ornelas disposición servicios gratuitos de asistencia lingüística. Llame al [phone].     CHÚ Ý: N?u b?n nói Ti?ng Vi?t, có các d?ch v? h? tr? ngôn ng? mi?n phí dành cho b?n. G?i s? [phone].         [facility] complies with applicable Federal civil rights laws and does not discriminate on the basis of race, color, national origin, age, disability, or sex.

## 2017-02-24 NOTE — PROGRESS NOTES
Subjective:      Patient ID: Genny Calixto is a 51 y.o. female.    Chief Complaint: Annual Exam; Panic Attack; Establish Care; and Insomnia    HPI  Ms. Calixto presents to [facility] primary care.    She reports going to the ER for panic attack 2 weeks ago. She was prescribed clonazepam at that time. She requests only diazepam, which she feels she will be able to wean if off sooner than the clonazepam.     She is concerned about her periodically elevated BP and tachycardia.     She is concerned why her weight is not coming off as soon as fast as expected.     Past Medical History:   Diagnosis Date    Reflex sympathetic dystrophy     Vertigo      Past Surgical History:   Procedure Laterality Date    BLADDER SURGERY      CHOLECYSTECTOMY      facet injections  02/14/2017    L3, L4, L5, S1 Done by Dr. Lara    HYSTERECTOMY      KNEE SURGERY      TONSILLECTOMY       Social History     Social History    Marital status:      Spouse name: N/A    Number of children: N/A    Years of education: N/A     Occupational History    Not on file.     Social History Main Topics    Smoking status: Former Smoker    Smokeless tobacco: Never Used    Alcohol use No    Drug use: No    Sexual activity: No     Other Topics Concern    Not on file     Social History Narrative    No narrative on file     Family History   Problem Relation Age of Onset    Stroke Mother     Hypertension Mother     COPD Father     Drug abuse Brother        Current Outpatient Prescriptions:     cyclobenzaprine (FLEXERIL) 10 MG tablet, Take 10 mg by mouth 2 (two) times daily as needed  for Muscle spasms, Disp: , Rfl:     diazePAM (VALIUM) 10 MG Tab, Take 10 mg by mouth every 24 hours at night as needed  for anxiety or insomnia, Disp: 30 tablet, Rfl: 3    dicyclomine (BENTYL) 10 MG capsule, Take 10 mg by mouth 3 (three) times daily, Disp: , Rfl:     diphenhydrAMINE (BENADRYL) 25 mg capsule, Take 25 mg by mouth every 6  "(six) hours as needed  for Itching, Disp: , Rfl:     doxepin (SINEQUAN) 10 MG capsule, Take 10 mg by mouth nightly, Disp: , Rfl:     ibuprofen (ADVIL,MOTRIN) 600 MG tablet, Take 600 mg by mouth 2 (two) times daily, Disp: , Rfl:     loratadine (CLARITIN) 10 mg tablet, Take 10 mg by mouth daily, Disp: , Rfl:     meclizine (ANTIVERT) 25 mg tablet, Take 25 mg by mouth 3 (three) times daily as needed, Disp: , Rfl:     melatonin 5 mg Cap, Take by mouth, Disp: , Rfl:     ondansetron (ZOFRAN) 8 MG tablet, Take 8 mg by mouth every 8 (eight) hours., Disp: , Rfl:     pseudoephedrine (SUDAFED) 120 mg 12 hr tablet, Take 120 mg by mouth every 12 (twelve) hours, Disp: , Rfl:     sennosides 15 mg Tab, Take by mouth, Disp: , Rfl:     sumatriptan (IMITREX) 100 MG tablet, Take 100 mg by mouth every 2 (two) hours as needed., Disp: , Rfl:     Review of patient's allergies indicates:   Allergen Reactions    Erythromycin     Morphine Nausea And Vomiting    Opioids - morphine analogues      Lab Results   Component Value Date    WBC 12.07 02/16/2017    HGB 14.6 02/16/2017    HCT 42.1 02/16/2017     02/16/2017    CHOL 167 08/07/2009    TRIG 211 (H) 08/07/2009    HDL 35 (L) 08/07/2009    ALT 20 02/16/2017    AST 17 02/16/2017     02/16/2017    K 3.8 02/16/2017     02/16/2017    CREATININE 0.8 02/16/2017    BUN 15 02/16/2017    CO2 23 02/16/2017    TSH 1.41 08/07/2009    INR 1.0 01/12/2015     BP (!) 140/82 (BP Location: Right arm, Patient Position: Sitting, BP Method: Manual)  Pulse 88  Temp 98.6 °F (37 °C) (Tympanic)   Resp 16  Ht 5' 6" (1.676 m)  Wt 108.5 kg (239 lb 3.2 oz)  SpO2 98%  BMI 38.61 kg/m2    Review of Systems   Constitutional: Negative, except for recent viral illness, which has resolved.   Cardiovascular: Negative, except for heart palpitations.   Respiratory: Negative.   Gastrointestinal: Negative.   Genitourinary: Negative.   Musculoskeletal: Negative.   Derm: " Negative.  Allergic/Immunologic: Negative.   Endocrine: Negative.   Hematological: Negative.   Neurological: Negative.   Psychiatric/Behavioral: +Anxiety; as in HPI.     Objective:     Physical Exam  GEN: A&O fully, NAD  PSYC: Normal affect  HEENT: OP: Clear, no LAD, no thyroid masses  CV: RRR, no M/G/R  PULM: CTA bilaterally, no wheezes, rales  GI: S/NT/ND, normal bowel sounds  EXT: No C/C/E    No results found for: HGBA1C    Assessment:      1. Anxiety: Longstanding. Will tx with sertraline with plan to wean off diazepam.   2. Obesity (BMI 35.0-39.9 without comorbidity): Congratualated on recent 20 lb wt loss from 2/4/17-2/24/17, which she attributes to eating more green leafy vegetables.    3. Insomnia, unspecified type    4. Hyperglycemia: Will check A1c.   5.      LBP: s/p L3-L5 & S1 facet injections: LBP improved from 10/10 to 6/10 now. Will refer to Dr. Valladares for f/u.    Plan:   Anxiety  -     T4, free; Future; Expected date: 2/24/17  -     TSH; Future; Expected date: 2/24/17  -     diazePAM (VALIUM) 10 MG Tab; Take 10 mg by mouth every 24 hours at night as needed  for anxiety or insomnia  Dispense: 30 tablet; Refill: 3    Obesity (BMI 35.0-39.9 without comorbidity)  -     Lipid panel; Future; Expected date: 2/24/17    Insomnia, unspecified type  -     diazePAM (VALIUM) 10 MG Tab; Take 10 mg by mouth every 24 hours at night as needed  for anxiety or insomnia  Dispense: 30 tablet; Refill: 3    Hyperglycemia  -     Hemoglobin A1c; Future; Expected date: 2/24/17    Counseling on health promotion and disease prevention  -     Ambulatory consult to Obstetrics / Gynecology  -     Case request GI: COLONOSCOPY    Chronic low back pain without sciatica, unspecified back pain laterality  -     Ambulatory consult to Pain Clinic       RTC in 1 months for f/u on anxiety or sooner as needed.

## 2017-03-02 ENCOUNTER — TELEPHONE (OUTPATIENT)
Dept: INTERNAL MEDICINE | Facility: CLINIC | Age: 52
End: 2017-03-02

## 2017-03-02 NOTE — TELEPHONE ENCOUNTER
----- Message from Daysi White sent at 3/2/2017  3:21 PM CST -----  Contact: Pt  Pt is requesting to speak to the nurse regarding her medication and a referral. Pls call pt back at [phone].

## 2017-03-02 NOTE — TELEPHONE ENCOUNTER
Pt states that she had an apt with Dr Valladares but dr Valladares's staff called to verify apt and informed the pt that he doesn't write prescription medications that they are just just interventional pain management. Pt states that you advised that you would write a pain medication until she gets established with a new pain doctor. Please advise

## 2017-03-03 DIAGNOSIS — G89.29 CHRONIC LOW BACK PAIN WITHOUT SCIATICA, UNSPECIFIED BACK PAIN LATERALITY: Primary | ICD-10-CM

## 2017-03-03 DIAGNOSIS — M54.50 CHRONIC LOW BACK PAIN WITHOUT SCIATICA, UNSPECIFIED BACK PAIN LATERALITY: Primary | ICD-10-CM

## 2017-03-03 RX ORDER — HYDROCODONE BITARTRATE AND ACETAMINOPHEN 7.5; 325 MG/1; MG/1
1 TABLET ORAL EVERY 12 HOURS PRN
Qty: 42 TABLET | Refills: 0 | Status: SHIPPED | OUTPATIENT
Start: 2017-03-03 | End: 2017-03-24

## 2017-03-04 ENCOUNTER — NURSE TRIAGE (OUTPATIENT)
Dept: ADMINISTRATIVE | Facility: CLINIC | Age: 52
End: 2017-03-04

## 2017-03-04 NOTE — TELEPHONE ENCOUNTER
"    Reason for Disposition   [1] SEVERE headache (e.g., excruciating) AND [2] not improved after 2 hours of pain medicine    Answer Assessment - Initial Assessment Questions  1. LOCATION: "Where does it hurt?"       Top of head  2. ONSET: "When did the headache start?" (Minutes, hours or days)   Woke her up at 5am  3. PATTERN: "Does the pain come and go, or has it been constant since it started?"      constant  4. SEVERITY: "How bad is the pain?" and "What does it keep you from doing?"  (e.g., Scale 1-10; mild, moderate, or severe)    - MILD (1-3): doesn't interfere with normal activities     - MODERATE (4-7): interferes with normal activities or awakens from sleep     - SEVERE (8-10): excruciating pain, unable to do any normal activities         9  5. RECURRENT SYMPTOM: "Have you ever had headaches before?" If so, ask: "When was the last time?" and "What happened that time?"       Yes-  Since age 13, able 1 week imitrex  6. CAUSE: "What do you think is causing the headache?"      unknown migraine  7. MIGRAINE: "Have you been diagnosed with migraine headaches?" If so, ask: "Is this headache similar?"       Yes-- similar  8. HEAD INJURY: "Has there been any recent injury to the head?"       no  9. OTHER SYMPTOMS: "Do you have any other symptoms?" (fever, stiff neck, eye pain, sore throat, cold symptoms)      no  10. PREGNANCY: "Is there any chance you are pregnant?" "When was your last menstrual period?"        no    [facility]  request refill on imitrex for migraine.  Has not established care with Xavier Arellano MD yet.  Has an appt this month.  States she spoke with him over the phone yesterday and she thought she had a refill on imitrex.  Advised on call providers do not typically refill medications, zack if not seen and documented,  Will call on call and ask.  Spoke with Dr. Barakat.  He is not able to fill medication if she has not yet established care.  Called patient back and advised to go " to local urgent care.  Voiced understanding.

## 2017-03-06 RX ORDER — SUMATRIPTAN SUCCINATE 100 MG/1
TABLET ORAL
Qty: 10 TABLET | Refills: 2 | Status: SHIPPED | OUTPATIENT
Start: 2017-03-06 | End: 2017-06-15 | Stop reason: SDUPTHER

## 2017-03-07 ENCOUNTER — LAB VISIT (OUTPATIENT)
Dept: LAB | Facility: HOSPITAL | Age: 52
End: 2017-03-07
Attending: INTERNAL MEDICINE
Payer: MEDICARE

## 2017-03-07 DIAGNOSIS — F41.9 ANXIETY: ICD-10-CM

## 2017-03-07 DIAGNOSIS — R73.9 HYPERGLYCEMIA: ICD-10-CM

## 2017-03-07 DIAGNOSIS — E66.9 OBESITY (BMI 35.0-39.9 WITHOUT COMORBIDITY): ICD-10-CM

## 2017-03-07 LAB
CHOLEST/HDLC SERPL: 3.4 {RATIO}
HDL/CHOLESTEROL RATIO: 29.3 %
HDLC SERPL-MCNC: 164 MG/DL
HDLC SERPL-MCNC: 48 MG/DL
LDLC SERPL CALC-MCNC: 94.6 MG/DL
NONHDLC SERPL-MCNC: 116 MG/DL
T4 FREE SERPL-MCNC: 1.11 NG/DL
TRIGL SERPL-MCNC: 107 MG/DL
TSH SERPL DL<=0.005 MIU/L-ACNC: 2.41 UIU/ML

## 2017-03-07 PROCEDURE — 84439 ASSAY OF FREE THYROXINE: CPT

## 2017-03-07 PROCEDURE — 84443 ASSAY THYROID STIM HORMONE: CPT

## 2017-03-07 PROCEDURE — 83036 HEMOGLOBIN GLYCOSYLATED A1C: CPT

## 2017-03-07 PROCEDURE — 80061 LIPID PANEL: CPT

## 2017-03-07 PROCEDURE — 36415 COLL VENOUS BLD VENIPUNCTURE: CPT | Mod: PO

## 2017-03-08 LAB
ESTIMATED AVG GLUCOSE: 120 MG/DL
HBA1C MFR BLD HPLC: 5.8 %

## 2017-03-10 ENCOUNTER — PATIENT OUTREACH (OUTPATIENT)
Dept: ADMINISTRATIVE | Facility: HOSPITAL | Age: 52
End: 2017-03-10

## 2017-03-10 DIAGNOSIS — Z13.6 SCREENING FOR CARDIOVASCULAR CONDITION: Primary | ICD-10-CM

## 2017-03-10 NOTE — LETTER
March 10, 2017    Genny Calixto  [address] [address]             [facility]  [address]  [place] [address]  Phone: [phone] Dear MsJanel Marily:    [facility] is committed to your overall health.  To help you get the most out of each of your visits, we will review your information to make sure you are up to date on all of your recommended tests and/or procedures.      Xavier Arellano MD has found that you may be due for   Health Maintenance Due   Topic    Hepatitis C Screening     TETANUS VACCINE     Pap Smear     Mammogram     Colonoscopy         If you have had any of the above done at another facility, please bring the records or information with you so that your record at [facility] will be complete.    If you are currently taking medication, please bring it with you to your appointment for review.    We will be happy to assist you with scheduling any necessary appointments or you may contact the [facility] appointment desk at [phone] to schedule at your convenience.     Thank you for choosing [facility] for your healthcare needs.  If you have any questions or concerns, please don't hesitate to call.    Sincerely,  Nancy CARVER LPN Care Coordinator  [facility]

## 2017-03-24 ENCOUNTER — TELEPHONE (OUTPATIENT)
Dept: INTERNAL MEDICINE | Facility: CLINIC | Age: 52
End: 2017-03-24

## 2017-03-24 NOTE — TELEPHONE ENCOUNTER
----- Message from Everett Naranjo sent at 3/24/2017 11:34 AM CDT -----  Contact: Patient  Pt wanted to inform that she did not keep her appt yesterday due to family  she had to attend. Pt can be reached @ [phone]. Thank you/NH

## 2017-03-27 ENCOUNTER — TELEPHONE (OUTPATIENT)
Dept: URGENT CARE | Facility: CLINIC | Age: 52
End: 2017-03-27

## 2017-03-27 NOTE — TELEPHONE ENCOUNTER
----- Message from Savannah Mckeon sent at 3/27/2017 11:24 AM CDT -----  Contact: rebel/dr nicholson office  Is returning nurse call. Please call back at [phone] ext [phone]. Thanks//cdb

## 2017-03-27 NOTE — TELEPHONE ENCOUNTER
Spoke with rebel from pain management in regards from pt getting pain meds from us and them informed her that if pt comes for that pain management apt to le us know at the office and we can go from there rebel was also informed that pt is coming for an apt with us tomorrow. Rebel voiced understanding

## 2017-03-27 NOTE — TELEPHONE ENCOUNTER
Returned Sharon from Dr Solorio's office in regards to pt , there was no answer LM to return phone call at the office./db**

## 2017-03-27 NOTE — TELEPHONE ENCOUNTER
----- Message from Valencia Feng sent at 3/27/2017  9:52 AM CDT -----  Contact: Sharon-  Dr. Irving Solorio's office-  [phone]   ext [phone]  Has been a pt of Dr. Solorio's since 2009.  Has a pain contract with him.  Should not be getting pain medications from any other provider.

## 2017-03-28 ENCOUNTER — OFFICE VISIT (OUTPATIENT)
Dept: INTERNAL MEDICINE | Facility: CLINIC | Age: 52
End: 2017-03-28
Payer: MEDICARE

## 2017-03-28 VITALS
BODY MASS INDEX: 35.92 KG/M2 | SYSTOLIC BLOOD PRESSURE: 128 MMHG | TEMPERATURE: 98 F | WEIGHT: 237 LBS | OXYGEN SATURATION: 96 % | RESPIRATION RATE: 16 BRPM | DIASTOLIC BLOOD PRESSURE: 72 MMHG | HEIGHT: 68 IN | HEART RATE: 102 BPM

## 2017-03-28 DIAGNOSIS — F41.9 ANXIETY: ICD-10-CM

## 2017-03-28 DIAGNOSIS — E66.9 OBESITY (BMI 35.0-39.9 WITHOUT COMORBIDITY): Primary | ICD-10-CM

## 2017-03-28 PROCEDURE — 99214 OFFICE O/P EST MOD 30 MIN: CPT | Mod: S$PBB,,, | Performed by: INTERNAL MEDICINE

## 2017-03-28 PROCEDURE — 99213 OFFICE O/P EST LOW 20 MIN: CPT | Mod: PBBFAC,PN | Performed by: INTERNAL MEDICINE

## 2017-03-28 PROCEDURE — 99999 PR PBB SHADOW E&M-EST. PATIENT-LVL III: CPT | Mod: PBBFAC,,, | Performed by: INTERNAL MEDICINE

## 2017-03-28 NOTE — PROGRESS NOTES
Subjective:      Patient ID: Genny Calixto is a 51 y.o. female.    Chief Complaint: Follow-up (1 month ) and Weight Loss    HPI   Ms. Calixto is a patient of mine, who presents for FU on obesity.     She reports losing 3 lbs, which she attributes to improved diet and notes has already noticed an improvement in her fit into clothes and the way she feels despite multiple family-related stresses.      Labs reviewed with Ms. Calixto.    No complaints.  She reports still having 33 of her 45 tablets of her pain medications.     She reports having her migraines every 2 weeks, which is mitigated by Imitrex.    Past Medical History:   Diagnosis Date    Reflex sympathetic dystrophy     Vertigo      Past Surgical History:   Procedure Laterality Date    BLADDER SURGERY      CHOLECYSTECTOMY      facet injections  02/14/2017    L3, L4, L5, S1 Done by Dr. Lara    HYSTERECTOMY      KNEE SURGERY      TONSILLECTOMY       Social History     Social History    Marital status:      Spouse name: N/A    Number of children: N/A    Years of education: N/A     Occupational History    Not on file.     Social History Main Topics    Smoking status: Former Smoker    Smokeless tobacco: Never Used    Alcohol use No    Drug use: No    Sexual activity: No     Other Topics Concern    Not on file     Social History Narrative     Family History   Problem Relation Age of Onset    Stroke Mother     Hypertension Mother     COPD Father     Drug abuse Brother        Current Outpatient Prescriptions:     diazePAM (VALIUM) 10 MG Tab, Take 10 mg by mouth every 24 hours at night as needed  for anxiety or insomnia, Disp: 30 tablet, Rfl: 3    diphenhydrAMINE (BENADRYL) 25 mg capsule, Take 25 mg by mouth every 6 (six) hours as needed  for Itching, Disp: , Rfl:     ibuprofen (ADVIL,MOTRIN) 600 MG tablet, Take 600 mg by mouth 2 (two) times daily, Disp: , Rfl:     loratadine (CLARITIN) 10 mg tablet, Take 10 mg by  "mouth daily, Disp: , Rfl:     meclizine (ANTIVERT) 25 mg tablet, Take 25 mg by mouth 3 (three) times daily as needed, Disp: , Rfl:     melatonin 5 mg Cap, Take by mouth, Disp: , Rfl:     ondansetron (ZOFRAN) 8 MG tablet, Take 8 mg by mouth every 8 (eight) hours., Disp: , Rfl:     pseudoephedrine (SUDAFED) 120 mg 12 hr tablet, Take 120 mg by mouth every 12 (twelve) hours, Disp: , Rfl:     sennosides 15 mg Tab, Take by mouth, Disp: , Rfl:     sertraline (ZOLOFT) 25 MG tablet, Take 1 tablet (25 mg total) by mouth once daily., Disp: 30 tablet, Rfl: 11    sumatriptan (IMITREX) 100 MG tablet, TAKE 1 TABLET IF NEEDED, MAY REPEAT ONCE IN 1 HOUR. MAX 2 TABLETS IN 24 HOURS, Disp: 10 tablet, Rfl: 2    Review of patient's allergies indicates:   Allergen Reactions    Erythromycin     Morphine Nausea And Vomiting    Opioids - morphine analogues      Lab Results   Component Value Date    WBC 12.07 02/16/2017    HGB 14.6 02/16/2017    HCT 42.1 02/16/2017     02/16/2017    CHOL 164 03/07/2017    TRIG 107 03/07/2017    HDL 48 03/07/2017    ALT 20 02/16/2017    AST 17 02/16/2017     02/16/2017    K 3.8 02/16/2017     02/16/2017    CREATININE 0.8 02/16/2017    BUN 15 02/16/2017    CO2 23 02/16/2017    TSH 2.406 03/07/2017    INR 1.0 01/12/2015    HGBA1C 5.8 03/07/2017     /72 (BP Location: Right arm, Patient Position: Sitting, BP Method: Manual)  Pulse (!) 112  Temp 98.1 °F (36.7 °C) (Tympanic)   Resp 16  Ht 5' 8" (1.727 m)  Wt 107.5 kg (236 lb 15.9 oz)  SpO2 96%  BMI 36.03 kg/m2    Review of Systems   Negative    Objective:     Physical Exam  GEN: A&O fully, NAD  PSYC: Normal affect    Lab Results   Component Value Date    HGBA1C 5.8 03/07/2017       Assessment/Plan:   1. Obesity: Congratulated her on her 3 lb weight loss from 2/24/17-3/28/17, which she attributes to       Increasing water to 6-7 17-oz bottles per day, increased yogurt & blue berries. Discussed       strategies for lifestyle " modifications, including systematically increasing water, yogurt, whole fruits,       unsalted nuts, non-starchy vegetables, beans, weight logging and physical activity; and avoiding       potatoes, red/processed meats, sugar sweetened beverages, and fast food.   2. Anxiety: Resolved.     RTC in 3 months on obesity or sooner as needed.

## 2017-03-28 NOTE — MR AVS SNAPSHOT
[facility]  [address]  [place] [address]  Phone: [phone]                  Genny Calixto   3/28/2017 2:00 PM   Office Visit    Description:  Female : 1965   Provider:  Xavier Arellano MD   Department:  [facility]           Reason for Visit     Follow-up     Weight Loss           Diagnoses this Visit        Comments    Obesity (BMI 35.0-39.9 without comorbidity)    -  Primary     Anxiety                To Do List           Future Appointments        Provider Department Dept Phone    2017 2:00 PM Xavier Arellano MD [facility] [phone]      Goals (5 Years of Data)     None      Follow-Up and Disposition     Return in about 3 months (around 2017), or if symptoms worsen or fail to improve, for Annual.    Follow-up and Disposition History      [facility]     [facility] Nurse Care Line -  Assistance  Registered nurses in the [facility] provide clinical advisement, health education, appointment booking, and other advisory services.  Call for this free service at [phone].             Medications           Message regarding Medications     Verify the changes and/or additions to your medication regime listed below are the same as discussed with your clinician today.  If any of these changes or additions are incorrect, please notify your healthcare provider.             Verify that the below list of medications is an accurate representation of the medications you are currently taking.  If none reported, the list may be blank. If incorrect, please contact your healthcare provider. Carry this list with you in case of emergency.           Current Medications     diazePAM (VALIUM) 10 MG Tab Take 10 mg by mouth every 24 hours at night as needed  for anxiety or insomnia    diphenhydrAMINE (BENADRYL) 25 mg capsule Take 25 mg by mouth every 6 (six) hours as needed  for Itching    ibuprofen  "(ADVIL,MOTRIN) 600 MG tablet Take 600 mg by mouth 2 (two) times daily    loratadine (CLARITIN) 10 mg tablet Take 10 mg by mouth daily    meclizine (ANTIVERT) 25 mg tablet Take 25 mg by mouth 3 (three) times daily as needed    melatonin 5 mg Cap Take by mouth    ondansetron (ZOFRAN) 8 MG tablet Take 8 mg by mouth every 8 (eight) hours.    pseudoephedrine (SUDAFED) 120 mg 12 hr tablet Take 120 mg by mouth every 12 (twelve) hours    sennosides 15 mg Tab Take by mouth    sertraline (ZOLOFT) 25 MG tablet Take 1 tablet (25 mg total) by mouth once daily.    sumatriptan (IMITREX) 100 MG tablet TAKE 1 TABLET IF NEEDED, MAY REPEAT ONCE IN 1 HOUR. MAX 2 TABLETS IN 24 HOURS           Clinical Reference Information           Your Vitals Were     BP Pulse Temp Resp    128/72 (BP Location: Right arm, Patient Position: Sitting, BP Method: Manual) 102 98.1 °F (36.7 °C) (Tympanic) 16    Height Weight SpO2 BMI    5' 8" (1.727 m) 107.5 kg (236 lb 15.9 oz) 96% 36.03 kg/m2      Blood Pressure          Most Recent Value    BP  128/72      Allergies as of 3/28/2017     Erythromycin    Morphine    Opioids - Morphine Analogues      Immunizations Administered on Date of Encounter - 3/28/2017     None      [facility] Sign-Up     Activating your [facility] account is as easy as 1-2-3!     1) Visit [email or web address], select Sign Up Now, enter this activation code and your date of birth, then select Next.  [email or web address]  Expires: 5/12/2017  4:31 PM      2) Create a username and password to use when you visit [facility] in the future and select a security question in case you lose your password and select Next.    3) Enter your e-mail address and click Sign Up!    Additional Information  If you have questions, please e-mail [email or web address] or call [phone] to talk to our [facility] staff. Remember, [facility] is NOT to be used for urgent needs. For medical emergencies, dial 911.         Language Assistance Services     ATTENTION: Language " assistance services are available, free of charge. Please call [phone].      ATENCIÓN: Si habla jose, tiene a ornelas disposición servicios gratuitos de asistencia lingüística. Llame al [phone].     CHÚ Ý: N?u b?n nói Ti?ng Vi?t, có các d?ch v? h? tr? ngôn ng? mi?n phí dành cho b?n. G?i s? [phone].         [facility] complies with applicable Federal civil rights laws and does not discriminate on the basis of race, color, national origin, age, disability, or sex.

## 2017-04-06 ENCOUNTER — TELEPHONE (OUTPATIENT)
Dept: INTERNAL MEDICINE | Facility: CLINIC | Age: 52
End: 2017-04-06

## 2017-04-06 NOTE — TELEPHONE ENCOUNTER
----- Message from Liban Vilchis sent at 4/6/2017 10:46 AM CDT -----  Contact: pt  Pt is calling nurse staff regarding some questions about a referred speicalist Doctor to make sure that this is the Doctor that can help patient. Pt call back to be advise [phone] cell thanks asap. Pt does have an appt today at 1:00 pm. Thanks

## 2017-04-06 NOTE — TELEPHONE ENCOUNTER
Pt is wondering if  writes monthly rxs. Let her know I was unsure of this and I would send the message to his staff. Pt stated she has an appt for 1 today. Pt verbalized understanding.

## 2017-04-24 ENCOUNTER — TELEPHONE (OUTPATIENT)
Dept: URGENT CARE | Facility: CLINIC | Age: 52
End: 2017-04-24

## 2017-04-26 ENCOUNTER — TELEPHONE (OUTPATIENT)
Dept: INTERNAL MEDICINE | Facility: CLINIC | Age: 52
End: 2017-04-26

## 2017-04-26 NOTE — TELEPHONE ENCOUNTER
----- Message from Sarai Shine sent at 4/26/2017  2:19 PM CDT -----  Contact: pt  Pt requests Dr Arellano to call her at [phone] or [phone] regarding medication and to let him know she hit her andria for losing weight.

## 2017-04-27 NOTE — TELEPHONE ENCOUNTER
----- Message from Kalli Raygoza sent at 4/27/2017 11:08 AM CDT -----  returned call...[phone]

## 2017-04-28 ENCOUNTER — TELEPHONE (OUTPATIENT)
Dept: INTERNAL MEDICINE | Facility: CLINIC | Age: 52
End: 2017-04-28

## 2017-04-28 NOTE — TELEPHONE ENCOUNTER
----- Message from Riya West sent at 4/28/2017 11:10 AM CDT -----  Pt at [phone]///states she has been trying to get in touch with your office//she has called at least 6 times//and no one has returned her calls//regarding one of her meds//Dr had told her to check with him when she needed a refill//med is Hydrocodone//the Pain Management Dr//Dr Valladares that dr referred her too does not prescribe meds//she has an appt scheduled with another pain med dr//Dr Ledesma//in 2 weeks///would like to know if possible for Dr Arellano to fill again until her appt//she uses//[facility] [address]/[phone]//please call//thanks/sanjay

## 2017-04-28 NOTE — TELEPHONE ENCOUNTER
Called # listed, no answer. LMOM requesting return call. Pt states that she has an appt set up with  in two weeks? Pt requesting refill on hydrocodone until her appt with pain management.     Review and advise. I will try to contact pt again.

## 2017-04-29 RX ORDER — HYDROCODONE BITARTRATE AND ACETAMINOPHEN 7.5; 325 MG/1; MG/1
1 TABLET ORAL EVERY 12 HOURS PRN
Qty: 28 TABLET | Refills: 0 | Status: SHIPPED | OUTPATIENT
Start: 2017-04-29 | End: 2017-05-13

## 2017-04-29 NOTE — PROGRESS NOTES
Refilled a 2 week supply of Norco 7.5 mg po BID prn chronic low back pain without sciatica for a 14 day supply (#28) so she can make it to her next pain management appointment with Dr. Ledesma.

## 2017-05-03 ENCOUNTER — PATIENT OUTREACH (OUTPATIENT)
Dept: ADMINISTRATIVE | Facility: HOSPITAL | Age: 52
End: 2017-05-03
Payer: MEDICARE

## 2017-05-03 DIAGNOSIS — Z12.31 SCREENING MAMMOGRAM, ENCOUNTER FOR: ICD-10-CM

## 2017-05-03 DIAGNOSIS — Z11.59 NEED FOR HEPATITIS C SCREENING TEST: Primary | ICD-10-CM

## 2017-05-16 ENCOUNTER — OFFICE VISIT (OUTPATIENT)
Dept: INTERNAL MEDICINE | Facility: CLINIC | Age: 52
DRG: 603 | End: 2017-05-16
Payer: MEDICARE

## 2017-05-16 VITALS
BODY MASS INDEX: 32.84 KG/M2 | SYSTOLIC BLOOD PRESSURE: 158 MMHG | DIASTOLIC BLOOD PRESSURE: 78 MMHG | WEIGHT: 216 LBS | HEART RATE: 100 BPM | OXYGEN SATURATION: 96 %

## 2017-05-16 DIAGNOSIS — M25.562 ACUTE PAIN OF LEFT KNEE: Primary | ICD-10-CM

## 2017-05-16 PROCEDURE — 20610 DRAIN/INJ JOINT/BURSA W/O US: CPT | Mod: S$PBB,,, | Performed by: INTERNAL MEDICINE

## 2017-05-16 PROCEDURE — 99999 PR PBB SHADOW E&M-EST. PATIENT-LVL III: CPT | Mod: PBBFAC,,, | Performed by: INTERNAL MEDICINE

## 2017-05-16 PROCEDURE — 99213 OFFICE O/P EST LOW 20 MIN: CPT | Mod: S$PBB,25,, | Performed by: INTERNAL MEDICINE

## 2017-05-16 RX ORDER — TRIAMCINOLONE ACETONIDE 40 MG/ML
40 INJECTION, SUSPENSION INTRA-ARTICULAR; INTRAMUSCULAR
Status: DISCONTINUED | OUTPATIENT
Start: 2017-05-16 | End: 2020-10-20

## 2017-05-16 RX ORDER — LIDOCAINE HYDROCHLORIDE 10 MG/ML
1 INJECTION, SOLUTION EPIDURAL; INFILTRATION; INTRACAUDAL; PERINEURAL
Status: DISCONTINUED | OUTPATIENT
Start: 2017-05-16 | End: 2020-10-20

## 2017-05-16 NOTE — PROGRESS NOTES
Lidocaine 1%  NDC#12971-440-80  Lot#[id number]  Exp:08/20    Kenalog 40 mg  NDC#1711-2653-07  Lot#[id number]  Exp:03/18

## 2017-05-16 NOTE — MR AVS SNAPSHOT
[facility]  [address] [place] [address] [address]  [place] [address]  Phone: [phone]                  Genny Calixto   2017 4:00 PM   Office Visit    Description:  Female : 1965   Provider:  Xavier Arellano MD   Department:  [facility]           Reason for Visit     Knee Pain           Diagnoses this Visit        Comments    Acute pain of left knee    -  Primary            To Do List           Future Appointments        Provider Department Dept Phone    2017 2:00 PM Xavier Arellano MD [facility] [phone]      Goals (5 Years of Data)     None      [facility]     [facility] On Call Nurse Care Line -  Assistance  Unless otherwise directed by your provider, please contact [facility], our nurse care line that is available for  assistance.     Registered nurses in the [facility] On Call Center provide: appointment scheduling, clinical advisement, health education, and other advisory services.  Call: [phone] (toll free)               Medications           Message regarding Medications     Verify the changes and/or additions to your medication regime listed below are the same as discussed with your clinician today.  If any of these changes or additions are incorrect, please notify your healthcare provider.        These medications were administered today        Dose Freq    lidocaine (PF) 10 mg/ml (1%) injection 10 mg 1 mL Clinic/HOD 1 time    Si mL (10 mg total) by Other route one time.    Class: Normal    Route: Other    triamcinolone acetonide injection 40 mg 40 mg Clinic/HOD 1 time    Sig: Inject 1 mL (40 mg total) into the articular space one time.    Class: Normal    Route: Intra-articular           Verify that the below list of medications is an accurate representation of the medications you are currently taking.  If none reported, the list may be blank. If incorrect, please contact your healthcare provider.  Carry this list with you in case of emergency.           Current Medications     diazePAM (VALIUM) 10 MG Tab Take 10 mg by mouth every 24 hours at night as needed  for anxiety or insomnia    diphenhydrAMINE (BENADRYL) 25 mg capsule Take 25 mg by mouth every 6 (six) hours as needed  for Itching    ibuprofen (ADVIL,MOTRIN) 600 MG tablet Take 600 mg by mouth 2 (two) times daily    loratadine (CLARITIN) 10 mg tablet Take 10 mg by mouth daily    meclizine (ANTIVERT) 25 mg tablet Take 25 mg by mouth 3 (three) times daily as needed    melatonin 5 mg Cap Take by mouth    ondansetron (ZOFRAN) 8 MG tablet Take 8 mg by mouth every 8 (eight) hours.    pseudoephedrine (SUDAFED) 120 mg 12 hr tablet Take 120 mg by mouth every 12 (twelve) hours    sennosides 15 mg Tab Take by mouth    sertraline (ZOLOFT) 25 MG tablet Take 1 tablet (25 mg total) by mouth once daily.    sumatriptan (IMITREX) 100 MG tablet TAKE 1 TABLET IF NEEDED, MAY REPEAT ONCE IN 1 HOUR. MAX 2 TABLETS IN 24 HOURS           Clinical Reference Information           Your Vitals Were     BP Pulse Weight SpO2 BMI    158/78 (BP Location: Left arm, Patient Position: Sitting, BP Method: Manual) 100 98 kg (216 lb) 96% 32.84 kg/m2      Blood Pressure          Most Recent Value    BP  (!)  158/78      Allergies as of 5/16/2017     Erythromycin    Morphine    Opioids - Morphine Analogues      Immunizations Administered on Date of Encounter - 5/16/2017     None      [facility] Sign-Up     Activating your [facility] account is as easy as 1-2-3!     1) Visit [email or web address], select Sign Up Now, enter this activation code and your date of birth, then select Next.  [email or web address]  Expires: 6/30/2017  5:10 PM      2) Create a username and password to use when you visit [facility] in the future and select a security question in case you lose your password and select Next.    3) Enter your e-mail address and click Sign Up!    Additional Information  If you have questions, please e-mail  [email or web address] or call [phone] to talk to our [facility] staff. Remember, [facility] is NOT to be used for urgent needs. For medical emergencies, dial 911.         Language Assistance Services     ATTENTION: Language assistance services are available, free of charge. Please call [phone].      ATENCIÓN: Si habla español, tiene a ornelas disposición servicios gratuitos de asistencia lingüística. Llame al [phone].     CHÚ Ý: N?u b?n nói Ti?ng Vi?t, có các d?ch v? h? tr? ngôn ng? mi?n phí dành cho b?n. G?i s? [phone].         [facility] complies with applicable Federal civil rights laws and does not discriminate on the basis of race, color, national origin, age, disability, or sex.

## 2017-05-16 NOTE — PROGRESS NOTES
Subjective:      Patient ID: Genny Calixto is a 51 y.o. female.    Chief Complaint: Knee Pain      HPI  Ms. Calixto is a patient of mine, who presents for left knee pain.    She reports noting left knee pain since Sunday, which worsened yesterday. She has tried ibuprofen, which didn't help. She reports having relief from steroid injection in the past. Of note, she reports going to the gym 6 out of 7 days of the week in an effort to bring her weight down. She feels reports having multiple steroid injections and surgeries of her left knee due to traumas/OA.    Past Medical History:   Diagnosis Date    Reflex sympathetic dystrophy     Vertigo      Past Surgical History:   Procedure Laterality Date    BLADDER SURGERY      CHOLECYSTECTOMY      facet injections  02/14/2017    L3, L4, L5, S1 Done by Dr. Lara    HYSTERECTOMY      KNEE SURGERY      TONSILLECTOMY       Social History     Social History    Marital status:      Spouse name: N/A    Number of children: N/A    Years of education: N/A     Occupational History    Not on file.     Social History Main Topics    Smoking status: Former Smoker    Smokeless tobacco: Never Used    Alcohol use No    Drug use: No    Sexual activity: No     Other Topics Concern    Not on file     Social History Narrative     Family History   Problem Relation Age of Onset    Stroke Mother     Hypertension Mother     COPD Father     Drug abuse Brother        Current Outpatient Prescriptions:     diazePAM (VALIUM) 10 MG Tab, Take 10 mg by mouth every 24 hours at night as needed  for anxiety or insomnia, Disp: 30 tablet, Rfl: 3    diphenhydrAMINE (BENADRYL) 25 mg capsule, Take 25 mg by mouth every 6 (six) hours as needed  for Itching, Disp: , Rfl:     ibuprofen (ADVIL,MOTRIN) 600 MG tablet, Take 600 mg by mouth 2 (two) times daily, Disp: , Rfl:     loratadine (CLARITIN) 10 mg tablet, Take 10 mg by mouth daily, Disp: , Rfl:     meclizine  "(ANTIVERT) 25 mg tablet, Take 25 mg by mouth 3 (three) times daily as needed, Disp: , Rfl:     melatonin 5 mg Cap, Take by mouth, Disp: , Rfl:     ondansetron (ZOFRAN) 8 MG tablet, Take 8 mg by mouth every 8 (eight) hours., Disp: , Rfl:     pseudoephedrine (SUDAFED) 120 mg 12 hr tablet, Take 120 mg by mouth every 12 (twelve) hours, Disp: , Rfl:     sennosides 15 mg Tab, Take by mouth, Disp: , Rfl:     sertraline (ZOLOFT) 25 MG tablet, Take 1 tablet (25 mg total) by mouth once daily., Disp: 30 tablet, Rfl: 11    sumatriptan (IMITREX) 100 MG tablet, TAKE 1 TABLET IF NEEDED, MAY REPEAT ONCE IN 1 HOUR. MAX 2 TABLETS IN 24 HOURS, Disp: 10 tablet, Rfl: 2    Current Facility-Administered Medications:     lidocaine (PF) 10 mg/ml (1%) injection 10 mg, 1 mL, Other, 1 time in Clinic/HOD, Xavier Arellano MD    triamcinolone acetonide injection 40 mg, 40 mg, Intra-articular, 1 time in Clinic/HOD, Xavier Arellano MD    Review of patient's allergies indicates:   Allergen Reactions    Erythromycin     Morphine Nausea And Vomiting    Opioids - morphine analogues      Review of Systems   Negative    Objective:     BP (!) 158/78 (BP Location: Left arm, Patient Position: Sitting, BP Method: Manual)  Pulse 100  Wt 98 kg (216 lb)  SpO2 96%  BMI 32.84 kg/m2    Physical Exam  GEN: A&O fully, NAD  PSYC: Normal affect  CV: RRR, no M/G/R  PULM: CTA bilaterally, no wheezes, rales  EXT: No C/C/E, +pain with extension of left knee    Procedure Type: Left knee steroid injection    Risks and benefits of procedure were reviewed with patient, including pain, infection, and death  Time-out was performed prior to procedure with MA, including patient name, site and procedure  Left knee prepped with betadine with sterile technique, including gloves and concentrically larger circles of sterilization  3 cc lidocaine 1% + 40 mg triamcinolone acetonide injected into left knee using a 25 G 1.5" needle in a 10 mL syringe by advancing needle " to the left knee joint 1 cm lateral from the superior-lateral border of the patella  <1 cc blood loss  Dried betadine wiped off with alcohol pad  Band aid applied  Procedure tolerated well  Patient reports less pain with ambulation after procedure.     Lab Results   Component Value Date    WBC 12.07 02/16/2017    HGB 14.6 02/16/2017    HCT 42.1 02/16/2017     02/16/2017    CHOL 164 03/07/2017    TRIG 107 03/07/2017    HDL 48 03/07/2017    ALT 20 02/16/2017    AST 17 02/16/2017     02/16/2017    K 3.8 02/16/2017     02/16/2017    CREATININE 0.8 02/16/2017    BUN 15 02/16/2017    CO2 23 02/16/2017    TSH 2.406 03/07/2017    INR 1.0 01/12/2015    HGBA1C 5.8 03/07/2017     Lab Results   Component Value Date    HGBA1C 5.8 03/07/2017       Assessment:     1. Acute pain of left knee: Likely exacerbated her known OA of her left knee from rigorous resistance training in the gym.     Plan:   Acute pain of left knee  -     lidocaine (PF) 10 mg/ml (1%) injection 10 mg; 1 mL (10 mg total) by Other route one time.  -     triamcinolone acetonide injection 40 mg; Inject 1 mL (40 mg total) into the articular space one time.      She is scheduled to follow up in July 2017

## 2017-05-18 ENCOUNTER — HOSPITAL ENCOUNTER (INPATIENT)
Facility: HOSPITAL | Age: 52
LOS: 4 days | Discharge: HOME | DRG: 603 | End: 2017-05-22
Attending: INTERNAL MEDICINE | Admitting: INTERNAL MEDICINE
Payer: MEDICARE

## 2017-05-18 DIAGNOSIS — L03.116 CELLULITIS OF LEFT KNEE: Primary | ICD-10-CM

## 2017-05-18 LAB
ALBUMIN SERPL BCP-MCNC: 4.3 G/DL
ALP SERPL-CCNC: 86 U/L
ALT SERPL W/O P-5'-P-CCNC: 18 U/L
ANION GAP SERPL CALC-SCNC: 10 MMOL/L
AST SERPL-CCNC: 21 U/L
BASOPHILS # BLD AUTO: 0.04 K/UL
BASOPHILS NFR BLD: 0.4 %
BILIRUB SERPL-MCNC: 0.8 MG/DL
BUN SERPL-MCNC: 14 MG/DL
CALCIUM SERPL-MCNC: 10.1 MG/DL
CHLORIDE SERPL-SCNC: 100 MMOL/L
CO2 SERPL-SCNC: 28 MMOL/L
CREAT SERPL-MCNC: 0.9 MG/DL
DIFFERENTIAL METHOD: ABNORMAL
EOSINOPHIL # BLD AUTO: 0.1 K/UL
EOSINOPHIL NFR BLD: 0.8 %
ERYTHROCYTE [DISTWIDTH] IN BLOOD BY AUTOMATED COUNT: 14.2 %
EST. GFR  (AFRICAN AMERICAN): >60 ML/MIN/1.73 M^2
EST. GFR  (NON AFRICAN AMERICAN): >60 ML/MIN/1.73 M^2
GLUCOSE SERPL-MCNC: 122 MG/DL
HCT VFR BLD AUTO: 44 %
HGB BLD-MCNC: 15.7 G/DL
LYMPHOCYTES # BLD AUTO: 3.2 K/UL
LYMPHOCYTES NFR BLD: 30.3 %
MCH RBC QN AUTO: 34.2 PG
MCHC RBC AUTO-ENTMCNC: 35.7 %
MCV RBC AUTO: 96 FL
MONOCYTES # BLD AUTO: 0.6 K/UL
MONOCYTES NFR BLD: 5.6 %
NEUTROPHILS # BLD AUTO: 6.6 K/UL
NEUTROPHILS NFR BLD: 62.9 %
PLATELET # BLD AUTO: 271 K/UL
PMV BLD AUTO: 12.5 FL
POTASSIUM SERPL-SCNC: 3.9 MMOL/L
PROT SERPL-MCNC: 8.3 G/DL
RBC # BLD AUTO: 4.59 M/UL
SODIUM SERPL-SCNC: 138 MMOL/L
WBC # BLD AUTO: 10.49 K/UL

## 2017-05-18 PROCEDURE — 87040 BLOOD CULTURE FOR BACTERIA: CPT | Mod: 59

## 2017-05-18 PROCEDURE — 85025 COMPLETE CBC W/AUTO DIFF WBC: CPT

## 2017-05-18 PROCEDURE — 96366 THER/PROPH/DIAG IV INF ADDON: CPT

## 2017-05-18 PROCEDURE — 11000001 HC ACUTE MED/SURG PRIVATE ROOM

## 2017-05-18 PROCEDURE — 80053 COMPREHEN METABOLIC PANEL: CPT

## 2017-05-18 PROCEDURE — 25000003 PHARM REV CODE 250: Performed by: INTERNAL MEDICINE

## 2017-05-18 PROCEDURE — 87070 CULTURE OTHR SPECIMN AEROBIC: CPT

## 2017-05-18 PROCEDURE — 96365 THER/PROPH/DIAG IV INF INIT: CPT

## 2017-05-18 PROCEDURE — 25000003 PHARM REV CODE 250: Performed by: PHYSICIAN ASSISTANT

## 2017-05-18 PROCEDURE — 63600175 PHARM REV CODE 636 W HCPCS: Performed by: INTERNAL MEDICINE

## 2017-05-18 PROCEDURE — 99285 EMERGENCY DEPT VISIT HI MDM: CPT | Mod: 25

## 2017-05-18 PROCEDURE — 63600175 PHARM REV CODE 636 W HCPCS: Performed by: PHYSICIAN ASSISTANT

## 2017-05-18 PROCEDURE — 25500020 PHARM REV CODE 255: Performed by: PHYSICIAN ASSISTANT

## 2017-05-18 RX ORDER — DIPHENHYDRAMINE HCL 25 MG
25 CAPSULE ORAL EVERY 6 HOURS PRN
Status: DISCONTINUED | OUTPATIENT
Start: 2017-05-18 | End: 2017-05-22 | Stop reason: HOSPADM

## 2017-05-18 RX ORDER — CEFEPIME HYDROCHLORIDE 1 G/50ML
1 INJECTION, SOLUTION INTRAVENOUS
Status: DISCONTINUED | OUTPATIENT
Start: 2017-05-19 | End: 2017-05-21

## 2017-05-18 RX ORDER — ENOXAPARIN SODIUM 100 MG/ML
40 INJECTION SUBCUTANEOUS EVERY 24 HOURS
Status: DISCONTINUED | OUTPATIENT
Start: 2017-05-18 | End: 2017-05-22 | Stop reason: HOSPADM

## 2017-05-18 RX ORDER — HYDROCODONE BITARTRATE AND ACETAMINOPHEN 7.5; 325 MG/1; MG/1
1 TABLET ORAL EVERY 6 HOURS PRN
COMMUNITY
End: 2017-05-26 | Stop reason: DRUGHIGH

## 2017-05-18 RX ORDER — HYDROCODONE BITARTRATE AND ACETAMINOPHEN 10; 325 MG/1; MG/1
1 TABLET ORAL
Status: COMPLETED | OUTPATIENT
Start: 2017-05-18 | End: 2017-05-18

## 2017-05-18 RX ORDER — ONDANSETRON 2 MG/ML
4 INJECTION INTRAMUSCULAR; INTRAVENOUS EVERY 8 HOURS PRN
Status: DISCONTINUED | OUTPATIENT
Start: 2017-05-18 | End: 2017-05-22 | Stop reason: HOSPADM

## 2017-05-18 RX ORDER — CETIRIZINE HYDROCHLORIDE 10 MG/1
10 TABLET ORAL DAILY
Status: DISCONTINUED | OUTPATIENT
Start: 2017-05-19 | End: 2017-05-22 | Stop reason: HOSPADM

## 2017-05-18 RX ORDER — IPRATROPIUM BROMIDE AND ALBUTEROL SULFATE 2.5; .5 MG/3ML; MG/3ML
3 SOLUTION RESPIRATORY (INHALATION) EVERY 4 HOURS PRN
Status: DISCONTINUED | OUTPATIENT
Start: 2017-05-18 | End: 2017-05-22 | Stop reason: HOSPADM

## 2017-05-18 RX ORDER — HYDROCODONE BITARTRATE AND ACETAMINOPHEN 5; 325 MG/1; MG/1
1 TABLET ORAL EVERY 4 HOURS PRN
Status: DISCONTINUED | OUTPATIENT
Start: 2017-05-18 | End: 2017-05-22 | Stop reason: HOSPADM

## 2017-05-18 RX ORDER — CEFEPIME HYDROCHLORIDE 1 G/50ML
1 INJECTION, SOLUTION INTRAVENOUS
Status: COMPLETED | OUTPATIENT
Start: 2017-05-18 | End: 2017-05-18

## 2017-05-18 RX ORDER — DIAZEPAM 5 MG/1
10 TABLET ORAL NIGHTLY PRN
Status: DISCONTINUED | OUTPATIENT
Start: 2017-05-18 | End: 2017-05-19

## 2017-05-18 RX ORDER — MAG HYDROX/ALUMINUM HYD/SIMETH 200-200-20
30 SUSPENSION, ORAL (FINAL DOSE FORM) ORAL EVERY 6 HOURS PRN
Status: DISCONTINUED | OUTPATIENT
Start: 2017-05-18 | End: 2017-05-22 | Stop reason: HOSPADM

## 2017-05-18 RX ORDER — GUAIFENESIN 100 MG/5ML
200 SOLUTION ORAL EVERY 4 HOURS PRN
Status: DISCONTINUED | OUTPATIENT
Start: 2017-05-18 | End: 2017-05-22 | Stop reason: HOSPADM

## 2017-05-18 RX ORDER — ACETAMINOPHEN 325 MG/1
650 TABLET ORAL EVERY 6 HOURS PRN
Status: DISCONTINUED | OUTPATIENT
Start: 2017-05-18 | End: 2017-05-22 | Stop reason: HOSPADM

## 2017-05-18 RX ORDER — HYDRALAZINE HYDROCHLORIDE 20 MG/ML
10 INJECTION INTRAMUSCULAR; INTRAVENOUS EVERY 6 HOURS PRN
Status: DISCONTINUED | OUTPATIENT
Start: 2017-05-18 | End: 2017-05-22 | Stop reason: HOSPADM

## 2017-05-18 RX ADMIN — HYDROCODONE BITARTRATE AND ACETAMINOPHEN 1 TABLET: 5; 325 TABLET ORAL at 11:05

## 2017-05-18 RX ADMIN — IOHEXOL 75 ML: 350 INJECTION, SOLUTION INTRAVENOUS at 07:05

## 2017-05-18 RX ADMIN — CEFEPIME 1 G: 1 INJECTION, POWDER, FOR SOLUTION INTRAMUSCULAR; INTRAVENOUS at 09:05

## 2017-05-18 RX ADMIN — ENOXAPARIN SODIUM 40 MG: 100 INJECTION SUBCUTANEOUS at 11:05

## 2017-05-18 RX ADMIN — DIAZEPAM 10 MG: 5 TABLET ORAL at 11:05

## 2017-05-18 RX ADMIN — HYDROCODONE BITARTRATE AND ACETAMINOPHEN 1 TABLET: 10; 325 TABLET ORAL at 06:05

## 2017-05-18 RX ADMIN — VANCOMYCIN HYDROCHLORIDE 1000 MG: 1 INJECTION, POWDER, LYOPHILIZED, FOR SOLUTION INTRAVENOUS at 05:05

## 2017-05-18 NOTE — ED PROVIDER NOTES
SCRIBE #1 NOTE: I, Billy Benavidez, am scribing for, and in the presence of, BON Mitchell. I have scribed the entire note.      History      Chief Complaint   Patient presents with    Knee Pain     L knee pain and pt has a reddened rash with a large blister to the knee, hx of RSD       Review of patient's allergies indicates:   Allergen Reactions    Erythromycin     Morphine Nausea And Vomiting    Opioids - morphine analogues         HPI   HPI    5/18/2017, 4:56 PM   History obtained from the patient      History of Present Illness: Genny Calixto is a 51 y.o. female patient who presents to the Emergency Department for an evaluation of left knee pain which onset gradually a few days ago. Pt state she has been suffering from knee pain over the past few days. Pt state she contacted her pain management doctor Sunday after her knee pain increased. This morning pt reports she woke up with a rash to her left knee. Symptoms are constant and moderate in severity. Exacerbated by noting and relieved by nothing. Associated sxs include rash to knee. Patient denies any HA, numbness, weakness, dizziness, back pain, neck pain, hip pain, abd pain, CP, SOB, and all other sxs at this time. No further complaints or concerns at this time.     Arrival mode: Personal vehicle    PCP: Xavier Arellano MD       Past Medical History:  Past Medical History:   Diagnosis Date    Reflex sympathetic dystrophy     Vertigo        Past Surgical History:  Past Surgical History:   Procedure Laterality Date    BLADDER SURGERY      CHOLECYSTECTOMY      facet injections  02/14/2017    L3, L4, L5, S1 Done by Dr. Lara    HYSTERECTOMY      KNEE SURGERY      TONSILLECTOMY           Family History:  Family History   Problem Relation Age of Onset    Stroke Mother     Hypertension Mother     COPD Father     Drug abuse Brother        Social History:  Social History     Social History Main Topics    Smoking status: Former Smoker     Smokeless tobacco: Never Used    Alcohol use No    Drug use: No    Sexual activity: No       ROS   Review of Systems   Constitutional: Negative for chills, diaphoresis and fever.   HENT: Negative for sore throat.    Respiratory: Negative for shortness of breath.    Cardiovascular: Negative for chest pain.   Gastrointestinal: Negative for abdominal pain, nausea and vomiting.   Genitourinary: Negative for dysuria and hematuria.   Musculoskeletal: Negative for back pain, neck pain and neck stiffness.        (+) Left knee pain   Skin: Positive for rash. Negative for wound.   Neurological: Negative for dizziness, weakness, light-headedness and headaches.   Hematological: Does not bruise/bleed easily.     Physical Exam    Initial Vitals   BP Pulse Resp Temp SpO2   05/18/17 1649 05/18/17 1649 05/18/17 1649 05/18/17 1649 05/18/17 1649   164/93 99 18 98.5 °F (36.9 °C) 95 %      Physical Exam  Nursing Notes and Vital Signs Reviewed.  Constitutional: Patient is in no acute distress. Well-developed and well-nourished.  Head: Atraumatic. Normocephalic.  Eyes: PERRL. EOM intact. Conjunctivae are not pale. No scleral icterus.  ENT: Mucous membranes are moist.  Neck: Supple. Full ROM. No lymphadenopathy.  Cardiovascular: Regular rate. Regular rhythm.  Pulmonary/Chest: No respiratory distress.   Abdominal: Soft and non-distended.    Musculoskeletal: Moves all extremities.  Left Knee:  No obvious deformity. [id number] area of erythema and induration to medial left knee with a 2cm bullous lesion filled with serosanguinous fluid. Area is non tender with no fluctuance noted. Full ROM of knee.  Distal sensation is intact.  Skin: Warm and dry.  Neurological:  Alert, awake, and appropriate.  Normal speech.  No acute focal neurological deficits are appreciated.  Psychiatric: Normal affect. Good eye contact. Appropriate in content.              ED Course    Procedures  ED Vital Signs:  Vitals:    05/18/17 1649 05/18/17 1920 05/18/17 2048  "  BP: (!) 164/93 (!) 141/84 (!) 141/93   Pulse: 99 89 86   Resp: 18 18 20   Temp: 98.5 °F (36.9 °C)  99.1 °F (37.3 °C)   TempSrc: Oral     SpO2: 95%     Weight: 98 kg (216 lb)     Height: 5' 8" (1.727 m)         Abnormal Lab Results:  Labs Reviewed   CBC W/ AUTO DIFFERENTIAL - Abnormal; Notable for the following:        Result Value    MCH 34.2 (*)     All other components within normal limits   COMPREHENSIVE METABOLIC PANEL - Abnormal; Notable for the following:     Glucose 122 (*)     All other components within normal limits   CULTURE, AEROBIC  (SPECIFY SOURCE)   CULTURE, BLOOD   CULTURE, BLOOD        All Lab Results:  Results for orders placed or performed during the hospital encounter of 05/18/17   CBC auto differential   Result Value Ref Range    WBC 10.49 3.90 - 12.70 K/uL    RBC 4.59 4.00 - 5.40 M/uL    Hemoglobin 15.7 12.0 - 16.0 g/dL    Hematocrit 44.0 37.0 - 48.5 %    MCV 96 82 - 98 fL    MCH 34.2 (H) 27.0 - 31.0 pg    MCHC 35.7 32.0 - 36.0 %    RDW 14.2 11.5 - 14.5 %    Platelets 271 150 - 350 K/uL    MPV 12.5 9.2 - 12.9 fL    Gran # 6.6 1.8 - 7.7 K/uL    Lymph # 3.2 1.0 - 4.8 K/uL    Mono # 0.6 0.3 - 1.0 K/uL    Eos # 0.1 0.0 - 0.5 K/uL    Baso # 0.04 0.00 - 0.20 K/uL    Gran% 62.9 38.0 - 73.0 %    Lymph% 30.3 18.0 - 48.0 %    Mono% 5.6 4.0 - 15.0 %    Eosinophil% 0.8 0.0 - 8.0 %    Basophil% 0.4 0.0 - 1.9 %    Differential Method Automated    Comprehensive metabolic panel   Result Value Ref Range    Sodium 138 136 - 145 mmol/L    Potassium 3.9 3.5 - 5.1 mmol/L    Chloride 100 95 - 110 mmol/L    CO2 28 23 - 29 mmol/L    Glucose 122 (H) 70 - 110 mg/dL    BUN, Bld 14 6 - 20 mg/dL    Creatinine 0.9 0.5 - 1.4 mg/dL    Calcium 10.1 8.7 - 10.5 mg/dL    Total Protein 8.3 6.0 - 8.4 g/dL    Albumin 4.3 3.5 - 5.2 g/dL    Total Bilirubin 0.8 0.1 - 1.0 mg/dL    Alkaline Phosphatase 86 55 - 135 U/L    AST 21 10 - 40 U/L    ALT 18 10 - 44 U/L    Anion Gap 10 8 - 16 mmol/L    eGFR if African American >60 >60 " mL/min/1.73 m^2    eGFR if non African American >60 >60 mL/min/1.73 m^2         Imaging Results:  Imaging Results         CT Lower Extremity With Contrast Left (Final result) Result time:  05/18/17 20:04:18    Final result by REBA Carranza Sr., MD (05/18/17 20:04:18)    Impression:      1.  There is a mild amount of edema in the subcutaneous fat in the anteromedial aspect of the left lower extremity.  This is consistent with the patient's history and characteristic of cellulitis.  2.  There is a normal amount of fluid within the knee.    3.  There is no evidence of osteomyelitis.  4.  There are surgical changes in the proximal portion of the tibia.       All CT scans at this facility use dose modulation, iterative reconstruction, and/or weight base dosing when appropriate to reduce radiation dose when appropriate to reduce radiation dose to as low as reasonably achievable.      Electronically signed by: REBA CARRANZA MD  Date:     05/18/17  Time:    20:04     Narrative:    CT of the left knee with IV contrast    Clinical History: Cellulitis; possible septic joint    Technique: Standard knee CT protocol with IV contrast material was performed.  75 mL of Omnipaque 350 contrast material was used for this examination.    Finding: There are surgical changes in the proximal portion of the tibia.  There is no acute fracture visualized.  There is no dislocation.  There is a normal amount of fluid within the knee.  There is a mild amount of edema in the subcutaneous fat in the anteromedial aspect of the left lower extremity.                      The Emergency Provider reviewed the vital signs and test results, which are outlined above.    ED Discussion     8:58 PM: BON Mitchell discussed the pt's case with Dr. Monson ([facility]) who recommends admitting pt to med/surg telemetry.    9:21 PM: Discussed case with Antionette ([facility]).  Dr. Monson agrees with current care and management of pt and accepts  admission.   Admitting Service: Hospital medicine   Admitting Physician: Dr. Monson  Admit to: Med/surg    9:21 PM: Re-evaluated pt. I have discussed test results, shared treatment plan, and the need for admission with patient. Pt expresses understanding at this time and agree with all information. All questions answered. Pt has no further questions or concerns at this time. Pt is ready for admit.    ED Medication(s):  Medications   cefepime in dextrose 5 % 1 gram/50 mL IVPB 1 g (not administered)   vancomycin 1 g in dextrose 5 % 250 mL IVPB (ready to mix system) (0 mg Intravenous Stopped 5/18/17 1940)   hydrocodone-acetaminophen 10-325mg per tablet 1 tablet (1 tablet Oral Given 5/18/17 1826)   omnipaque 350 iohexol 75 mL (75 mLs Intravenous Given 5/18/17 1910)       New Prescriptions    No medications on file             Medical Decision Making    Medical Decision Making:   Clinical Tests:   Lab Tests: Ordered and Reviewed  Radiological Study: Reviewed and Ordered           Scribe Attestation:   Scribe #1: I performed the above scribed service and the documentation accurately describes the services I performed. I attest to the accuracy of the note.    Attending:   Physician Attestation Statement for Scribe #1: I, BON Mitchell, personally performed the services described in this documentation, as scribed by Billy Benavidez, in my presence, and it is both accurate and complete.          Clinical Impression       ICD-10-CM ICD-9-CM   1. Cellulitis of left knee L03.116 682.6       Disposition:   Disposition: Admitted  Condition: Fair         BON Trujillo  05/23/17 1251

## 2017-05-19 LAB
ANION GAP SERPL CALC-SCNC: 8 MMOL/L
BASOPHILS # BLD AUTO: 0.05 K/UL
BASOPHILS NFR BLD: 0.5 %
BUN SERPL-MCNC: 15 MG/DL
CALCIUM SERPL-MCNC: 9.9 MG/DL
CHLORIDE SERPL-SCNC: 99 MMOL/L
CO2 SERPL-SCNC: 32 MMOL/L
CREAT SERPL-MCNC: 0.8 MG/DL
DIFFERENTIAL METHOD: ABNORMAL
EOSINOPHIL # BLD AUTO: 0.1 K/UL
EOSINOPHIL NFR BLD: 1.3 %
ERYTHROCYTE [DISTWIDTH] IN BLOOD BY AUTOMATED COUNT: 14.4 %
EST. GFR  (AFRICAN AMERICAN): >60 ML/MIN/1.73 M^2
EST. GFR  (NON AFRICAN AMERICAN): >60 ML/MIN/1.73 M^2
GLUCOSE SERPL-MCNC: 106 MG/DL
HCT VFR BLD AUTO: 42.4 %
HGB BLD-MCNC: 14.4 G/DL
LYMPHOCYTES # BLD AUTO: 4 K/UL
LYMPHOCYTES NFR BLD: 39.4 %
MAGNESIUM SERPL-MCNC: 2.4 MG/DL
MCH RBC QN AUTO: 33.3 PG
MCHC RBC AUTO-ENTMCNC: 34 %
MCV RBC AUTO: 98 FL
MONOCYTES # BLD AUTO: 0.6 K/UL
MONOCYTES NFR BLD: 5.9 %
NEUTROPHILS # BLD AUTO: 5.3 K/UL
NEUTROPHILS NFR BLD: 53.2 %
PHOSPHATE SERPL-MCNC: 4.8 MG/DL
PLATELET # BLD AUTO: 246 K/UL
PMV BLD AUTO: 12.7 FL
POTASSIUM SERPL-SCNC: 4.2 MMOL/L
RBC # BLD AUTO: 4.33 M/UL
SODIUM SERPL-SCNC: 139 MMOL/L
WBC # BLD AUTO: 10.07 K/UL

## 2017-05-19 PROCEDURE — 80048 BASIC METABOLIC PNL TOTAL CA: CPT

## 2017-05-19 PROCEDURE — 96372 THER/PROPH/DIAG INJ SC/IM: CPT

## 2017-05-19 PROCEDURE — 25000003 PHARM REV CODE 250: Performed by: INTERNAL MEDICINE

## 2017-05-19 PROCEDURE — 63600175 PHARM REV CODE 636 W HCPCS: Performed by: PHYSICIAN ASSISTANT

## 2017-05-19 PROCEDURE — 36415 COLL VENOUS BLD VENIPUNCTURE: CPT

## 2017-05-19 PROCEDURE — 63600175 PHARM REV CODE 636 W HCPCS: Performed by: INTERNAL MEDICINE

## 2017-05-19 PROCEDURE — 11000001 HC ACUTE MED/SURG PRIVATE ROOM

## 2017-05-19 PROCEDURE — 25000003 PHARM REV CODE 250: Performed by: PHYSICIAN ASSISTANT

## 2017-05-19 PROCEDURE — 83735 ASSAY OF MAGNESIUM: CPT

## 2017-05-19 PROCEDURE — 84100 ASSAY OF PHOSPHORUS: CPT

## 2017-05-19 PROCEDURE — 85025 COMPLETE CBC W/AUTO DIFF WBC: CPT

## 2017-05-19 RX ORDER — ALPRAZOLAM 0.5 MG/1
0.5 TABLET ORAL 3 TIMES DAILY PRN
Status: DISCONTINUED | OUTPATIENT
Start: 2017-05-19 | End: 2017-05-22 | Stop reason: HOSPADM

## 2017-05-19 RX ADMIN — VANCOMYCIN HYDROCHLORIDE 1250 MG: 100 INJECTION, POWDER, LYOPHILIZED, FOR SOLUTION INTRAVENOUS at 09:05

## 2017-05-19 RX ADMIN — Medication 1000 MG: at 07:05

## 2017-05-19 RX ADMIN — HYDROCODONE BITARTRATE AND ACETAMINOPHEN 1 TABLET: 5; 325 TABLET ORAL at 12:05

## 2017-05-19 RX ADMIN — HYDROCODONE BITARTRATE AND ACETAMINOPHEN 1 TABLET: 5; 325 TABLET ORAL at 03:05

## 2017-05-19 RX ADMIN — CEFEPIME 1 G: 1 INJECTION, POWDER, FOR SOLUTION INTRAMUSCULAR; INTRAVENOUS at 05:05

## 2017-05-19 RX ADMIN — CEFEPIME 1 G: 1 INJECTION, POWDER, FOR SOLUTION INTRAMUSCULAR; INTRAVENOUS at 01:05

## 2017-05-19 RX ADMIN — ALPRAZOLAM 0.5 MG: 0.5 TABLET ORAL at 01:05

## 2017-05-19 RX ADMIN — HYDROCODONE BITARTRATE AND ACETAMINOPHEN 1 TABLET: 5; 325 TABLET ORAL at 06:05

## 2017-05-19 RX ADMIN — CEFEPIME 1 G: 1 INJECTION, POWDER, FOR SOLUTION INTRAMUSCULAR; INTRAVENOUS at 11:05

## 2017-05-19 RX ADMIN — CETIRIZINE HYDROCHLORIDE 10 MG: 10 TABLET, FILM COATED ORAL at 09:05

## 2017-05-19 NOTE — CONSULTS
Clinical Pharmacy Progress Note    Pharmacy consulted to dose vancomycin for dr Beauchamp.     51 y.o. female  admitted for cellulitis.     The patient has the following labs:     Date Creatinine (mg/dl)    BUN WBC Count   5/19/2017 Estimated Creatinine Clearance: 101.8 mL/min (based on Cr of 0.8). Lab Results   Component Value Date    BUN 15 05/19/2017     Lab Results   Component Value Date    WBC 10.07 05/19/2017        Ideal BW: 63.4  Actual BW: 98  Adjusted (Dosing) BW: 77.2    Tmax/24h 99.1  Cultures:   pend    Vancomycin (day 1 of therapy)   Other anti-infectives: Cefepime (day 1 of therapy)    Based on Estimated Creatinine Clearance: 101.8 mL/min (based on Cr of 0.8). and weight of 77.2, pharmacy will initiate vancomycin 1250 mg every 12 hours and draw a trough prior to 4th dose. Trough due 5/21 at 07:00am. Pharmacy will continue to follow patients clinical status, renal function, C&S, and adjust dose as necessary to maintain trough levels between 10 and 15.     Thank you for allowing us to participate in this patient's care.     Yasemin Morel   5/19/2017 11:41 AM

## 2017-05-19 NOTE — ASSESSMENT & PLAN NOTE
- Angry looking cellulitis and due to location, will benefit from IV antibiotics rather than oral meds.  - F/u blood cultures  - IV Vanc and Cefepime empirically  - No evidence of sepsis

## 2017-05-19 NOTE — H&P
[facility] Hospital Medicine  History & Physical    Patient Name: Genny Calixto  MRN: [id number]  Admission Date: 5/18/2017  Attending Physician: Diego Monson MD  Primary Care Provider: Xavier Arellano MD         Patient information was obtained from patient and ER records.     Subjective:     Principal Problem:Cellulitis of left knee    Chief Complaint:   Chief Complaint   Patient presents with    Knee Pain     L knee pain and pt has a reddened rash with a large blister to the knee, hx of RSD        HPI: Ms. Calixto is a 52 y/o obese  female with h/o anxiety,chronic pain syndrome followed by a pain management specialist, presents to the ED c/o left knee redness, pain for past couple of days, worsened since early this morning. Denies fever or chills. CT left leg shows mild amount of edema in the subcutaneous fat in the anteromedial aspect of the left lower extremity consistent with cellulitis. No evidence of spread to the bone or knee joint. See image of left knee in chart. Blood cultures obtained. Patient received Vanc and Cefepime in the ED. Admitted for angry looking left knee cellulitis.    Past Medical History:   Diagnosis Date    Reflex sympathetic dystrophy     Vertigo        Past Surgical History:   Procedure Laterality Date    BLADDER SURGERY      CHOLECYSTECTOMY      facet injections  02/14/2017    L3, L4, L5, S1 Done by Dr. Lara    HYSTERECTOMY      KNEE SURGERY      TONSILLECTOMY         Review of patient's allergies indicates:   Allergen Reactions    Erythromycin     Morphine Nausea And Vomiting    Opioids - morphine analogues        Current Facility-Administered Medications on File Prior to Encounter   Medication    lidocaine (PF) 10 mg/ml (1%) injection 10 mg    triamcinolone acetonide injection 40 mg     Current Outpatient Prescriptions on File Prior to Encounter   Medication Sig    diazePAM (VALIUM) 10 MG Tab Take 10 mg by mouth every 24 hours  at night as needed  for anxiety or insomnia    diphenhydrAMINE (BENADRYL) 25 mg capsule Take 25 mg by mouth every 6 (six) hours as needed  for Itching    ibuprofen (ADVIL,MOTRIN) 600 MG tablet Take 600 mg by mouth 2 (two) times daily    loratadine (CLARITIN) 10 mg tablet Take 10 mg by mouth daily    pseudoephedrine (SUDAFED) 120 mg 12 hr tablet Take 120 mg by mouth every 12 (twelve) hours    sumatriptan (IMITREX) 100 MG tablet TAKE 1 TABLET IF NEEDED, MAY REPEAT ONCE IN 1 HOUR. MAX 2 TABLETS IN 24 HOURS    ondansetron (ZOFRAN) 8 MG tablet Take 8 mg by mouth every 8 (eight) hours.    [DISCONTINUED] meclizine (ANTIVERT) 25 mg tablet Take 25 mg by mouth 3 (three) times daily as needed    [DISCONTINUED] melatonin 5 mg Cap Take by mouth    [DISCONTINUED] sennosides 15 mg Tab Take by mouth    [DISCONTINUED] sertraline (ZOLOFT) 25 MG tablet Take 1 tablet (25 mg total) by mouth once daily.     Family History     Problem Relation (Age of Onset)    COPD Father    Drug abuse Brother    Hypertension Mother    Stroke Mother        Social History Main Topics    Smoking status: Former Smoker    Smokeless tobacco: Never Used    Alcohol use No    Drug use: No    Sexual activity: No     Review of Systems   Constitutional: Negative.  Negative for chills, diaphoresis, fatigue and fever.   HENT: Negative.  Negative for congestion, nosebleeds and sinus pressure.    Eyes: Negative.  Negative for photophobia, redness and visual disturbance.   Respiratory: Negative.  Negative for cough, chest tightness, shortness of breath and wheezing.    Cardiovascular: Negative.  Negative for chest pain, palpitations and leg swelling.   Gastrointestinal: Negative.  Negative for abdominal pain, diarrhea, nausea and vomiting.   Endocrine: Negative.  Negative for polydipsia, polyphagia and polyuria.   Genitourinary: Negative.  Negative for dysuria, flank pain, frequency and urgency.   Musculoskeletal: Positive for back pain (chronic).  Negative for joint swelling and neck stiffness.   Skin: Positive for color change (left knee redness, pain). Negative for pallor and rash.   Allergic/Immunologic: Negative.  Negative for environmental allergies, food allergies and immunocompromised state.   Neurological: Negative.  Negative for dizziness, syncope, speech difficulty, numbness and headaches.   Hematological: Negative.  Negative for adenopathy. Does not bruise/bleed easily.   Psychiatric/Behavioral: Negative.  Negative for confusion, decreased concentration and hallucinations. The patient is not nervous/anxious.    All other systems reviewed and are negative.    Objective:     Vital Signs (Most Recent):  Temp: 99.1 °F (37.3 °C) (05/18/17 2048)  Pulse: 86 (05/18/17 2048)  Resp: 20 (05/18/17 2048)  BP: (!) 141/93 (05/18/17 2048)  SpO2: 95 % (05/18/17 1649) Vital Signs (24h Range):  Temp:  [98.5 °F (36.9 °C)-99.1 °F (37.3 °C)] 99.1 °F (37.3 °C)  Pulse:  [86-99] 86  Resp:  [18-20] 20  SpO2:  [95 %] 95 %  BP: (141-164)/(84-93) 141/93     Weight: 98 kg (216 lb)  Body mass index is 32.84 kg/(m^2).    Physical Exam   Constitutional: She is oriented to person, place, and time. She appears well-developed and well-nourished. No distress.   HENT:   Head: Normocephalic and atraumatic.   Eyes: Conjunctivae and EOM are normal. No scleral icterus.   Neck: Normal range of motion. Neck supple. No JVD present. No tracheal deviation present. No thyromegaly present.   Cardiovascular: Normal rate, regular rhythm, normal heart sounds and intact distal pulses.    No murmur heard.  Pulmonary/Chest: Effort normal and breath sounds normal. No respiratory distress. She has no wheezes. She has no rales. She exhibits no tenderness.   Abdominal: Soft. Bowel sounds are normal. She exhibits no distension. There is no tenderness.   Musculoskeletal: Normal range of motion. She exhibits no edema or tenderness.   Lymphadenopathy:     She has no cervical adenopathy.   Neurological: She is  alert and oriented to person, place, and time. No cranial nerve deficit. She exhibits normal muscle tone. Coordination normal.   Skin: She is not diaphoretic. There is erythema (large area of erythema, with mild blistering and tenderness of left knee, ROM intact).   Psychiatric: She has a normal mood and affect. Her behavior is normal. Judgment and thought content normal.   Nursing note and vitals reviewed.       Significant Labs:   Blood Culture: No results for input(s): LABBLOO in the last 48 hours.  BMP:   Recent Labs  Lab 05/18/17  1720   *      K 3.9      CO2 28   BUN 14   CREATININE 0.9   CALCIUM 10.1     CBC:   Recent Labs  Lab 05/18/17  1720   WBC 10.49   HGB 15.7   HCT 44.0        CMP:   Recent Labs  Lab 05/18/17  1720      K 3.9      CO2 28   *   BUN 14   CREATININE 0.9   CALCIUM 10.1   PROT 8.3   ALBUMIN 4.3   BILITOT 0.8   ALKPHOS 86   AST 21   ALT 18   ANIONGAP 10   EGFRNONAA >60     All pertinent labs within the past 24 hours have been reviewed.    Significant Imaging: I have reviewed and interpreted all pertinent imaging results/findings within the past 24 hours.     Imaging Results         CT Lower Extremity With Contrast Left (Final result) Result time:  05/18/17 20:04:18    Final result by REBA Carranza Sr., MD (05/18/17 20:04:18)    Impression:      1.  There is a mild amount of edema in the subcutaneous fat in the anteromedial aspect of the left lower extremity.  This is consistent with the patient's history and characteristic of cellulitis.  2.  There is a normal amount of fluid within the knee.    3.  There is no evidence of osteomyelitis.  4.  There are surgical changes in the proximal portion of the tibia.       All CT scans at this facility use dose modulation, iterative reconstruction, and/or weight base dosing when appropriate to reduce radiation dose when appropriate to reduce radiation dose to as low as reasonably  achievable.      Electronically signed by: REBA BOYER MD  Date:     05/18/17  Time:    20:04     Narrative:    CT of the left knee with IV contrast    Clinical History: Cellulitis; possible septic joint    Technique: Standard knee CT protocol with IV contrast material was performed.  75 mL of Omnipaque 350 contrast material was used for this examination.    Finding: There are surgical changes in the proximal portion of the tibia.  There is no acute fracture visualized.  There is no dislocation.  There is a normal amount of fluid within the knee.  There is a mild amount of edema in the subcutaneous fat in the anteromedial aspect of the left lower extremity.                Assessment/Plan:     * Cellulitis of left knee  - Angry looking cellulitis and due to location, will benefit from IV antibiotics rather than oral meds.  - F/u blood cultures  - IV Vanc and Cefepime empirically  - No evidence of sepsis      Anxiety  - Resume valium, home medication      Chronic low back pain without sciatica  - Followed by pain management  - Resume home medications      Obesity (BMI 35.0-39.9 without comorbidity)         VTE Risk Mitigation     SCDs        Diego Monson MD  Department of Hospital Medicine   [facility]

## 2017-05-19 NOTE — SUBJECTIVE & OBJECTIVE
Interval History: Pt was seen and examined at bedside . She states feel better today .  No fver overnight . No acute event overnight .     Review of Systems   Constitutional: Negative.  Negative for chills, diaphoresis, fatigue and fever.   HENT: Negative.  Negative for congestion, nosebleeds and sinus pressure.    Eyes: Negative.  Negative for photophobia, redness and visual disturbance.   Respiratory: Negative.  Negative for cough, chest tightness, shortness of breath and wheezing.    Cardiovascular: Negative.  Negative for chest pain, palpitations and leg swelling.   Gastrointestinal: Negative.  Negative for abdominal pain, diarrhea, nausea and vomiting.   Endocrine: Negative.  Negative for polydipsia, polyphagia and polyuria.   Genitourinary: Negative.  Negative for dysuria, flank pain, frequency and urgency.   Musculoskeletal: Positive for back pain (chronic). Negative for joint swelling and neck stiffness.   Skin: Positive for color change (left knee redness, pain). Negative for pallor and rash.   Allergic/Immunologic: Negative.  Negative for environmental allergies, food allergies and immunocompromised state.   Neurological: Negative.  Negative for dizziness, syncope, speech difficulty, numbness and headaches.   Hematological: Negative.  Negative for adenopathy. Does not bruise/bleed easily.   Psychiatric/Behavioral: Negative.  Negative for confusion, decreased concentration and hallucinations. The patient is not nervous/anxious.    All other systems reviewed and are negative.    Objective:     Vital Signs (Most Recent):  Temp: 97.4 °F (36.3 °C) (05/19/17 1604)  Pulse: 76 (05/19/17 1604)  Resp: 18 (05/19/17 1604)  BP: 132/78 (05/19/17 1604)  SpO2: 97 % (05/19/17 1604) Vital Signs (24h Range):  Temp:  [97.4 °F (36.3 °C)-99.1 °F (37.3 °C)] 97.4 °F (36.3 °C)  Pulse:  [74-98] 76  Resp:  [18-20] 18  SpO2:  [93 %-98 %] 97 %  BP: (129-157)/(74-93) 132/78     Weight: 98 kg (216 lb)  Body mass index is 32.84  kg/(m^2).    Intake/Output Summary (Last 24 hours) at 05/19/17 1810  Last data filed at 05/19/17 1410   Gross per 24 hour   Intake              700 ml   Output                0 ml   Net              700 ml      Physical Exam   Constitutional: She is oriented to person, place, and time. She appears well-developed and well-nourished. No distress.   HENT:   Head: Normocephalic and atraumatic.   Eyes: Conjunctivae and EOM are normal. No scleral icterus.   Neck: Normal range of motion. Neck supple. No JVD present. No tracheal deviation present. No thyromegaly present.   Cardiovascular: Normal rate, regular rhythm, normal heart sounds and intact distal pulses.    No murmur heard.  Pulmonary/Chest: Effort normal and breath sounds normal. No respiratory distress. She has no wheezes. She has no rales. She exhibits no tenderness.   Abdominal: Soft. Bowel sounds are normal. She exhibits no distension. There is no tenderness.   Musculoskeletal: Normal range of motion. She exhibits no edema or tenderness.   Lymphadenopathy:     She has no cervical adenopathy.   Neurological: She is alert and oriented to person, place, and time. No cranial nerve deficit. She exhibits normal muscle tone. Coordination normal.   Skin: She is not diaphoretic. There is erythema (large area of erythema, with mild blistering and tenderness of left knee, ROM intact).   Psychiatric: She has a normal mood and affect. Her behavior is normal. Judgment and thought content normal.   Nursing note and vitals reviewed.      Significant Labs:   Blood Culture:   Recent Labs  Lab 05/18/17  1720 05/18/17  1730   LABBLOO No Growth to date No Growth to date     BMP:   Recent Labs  Lab 05/19/17  0501         K 4.2   CL 99   CO2 32*   BUN 15   CREATININE 0.8   CALCIUM 9.9   MG 2.4     CBC:   Recent Labs  Lab 05/18/17  1720 05/19/17  0501   WBC 10.49 10.07   HGB 15.7 14.4   HCT 44.0 42.4    246       Significant Imaging: I have reviewed all pertinent  imaging results/findings within the past 24 hours.

## 2017-05-19 NOTE — PLAN OF CARE
Initial assessment completed. Met with patient and her brother in room. Patient is independent and denies any post hospital needs or services. Discharge Planning Begins on Admission pamphlet along with contact information given to the patient and instructed her to call me with any questions or concerns.       05/19/17 1121   Discharge Assessment   Assessment Type Discharge Planning Assessment   Confirmed/corrected address and phone number on facesheet? Yes   Assessment information obtained from? Patient;Caregiver;Medical Record   Expected Length of Stay (days) (tbd)   Communicated expected length of stay with patient/caregiver yes   Type of Healthcare Directive Received Other (Comment);Advance Directive;Living will  (stated that she has one already on file)   Prior to hospitilization cognitive status: Alert/Oriented   Prior to hospitalization functional status: Independent   Current cognitive status: Alert/Oriented   Current Functional Status: Independent   Arrived From home or self-care   Lives With sibling(s)   Able to Return to Prior Arrangements yes   Is patient able to care for self after discharge? Yes   How many people do you have in your home that can help with your care after discharge? 1   Who are your caregiver(s) and their phone number(s)? Jose Darnell ( brother ) [phone]   Patient's perception of discharge disposition home or selfcare   Readmission Within The Last 30 Days no previous admission in last 30 days   Patient currently being followed by outpatient case management? No   Patient currently receives home health services? No   Does the patient currently use HME? No   Patient currently receives private duty nursing? No   Patient currently receives any other outside agency services? No   Equipment Currently Used at Home none   Do you have any problems affording any of your prescribed medications? No   Is the patient taking medications as prescribed? yes   Do you have any financial concerns  preventing you from receiving the healthcare you need? No   Does the patient have transportation to healthcare appointments? Yes   Transportation Available family or friend will provide;car   On Dialysis? No   Does the patient receive services at the Coumadin Clinic? No   Are there any open cases? No   Discharge Plan A Home;Home with family   Discharge Plan B Home;Home with family;Home Health   Patient/Family In Agreement With Plan yes

## 2017-05-19 NOTE — PROGRESS NOTES
[facility] Hospital Medicine  Progress Note    Patient Name: Genny Calixto  MRN: [id number]  Patient Class: IP- Inpatient   Admission Date: 5/18/2017  Length of Stay: 1 days  Attending Physician: Emmanuel Diaz, *  Primary Care Provider: Xavier Arellano MD        Subjective:     Principal Problem:Cellulitis of left knee    HPI:  Ms. Calixto is a 52 y/o obese  female with h/o anxiety,chronic pain syndrome followed by a pain management specialist, presents to the ED c/o left knee redness, pain for past couple of days, worsened since early this morning. Denies fever or chills. CT left leg shows mild amount of edema in the subcutaneous fat in the anteromedial aspect of the left lower extremity consistent with cellulitis. No evidence of spread to the bone or knee joint. See image of left knee in chart. Blood cultures obtained. Patient received Vanc and Cefepime in the ED. Admitted for angry looking left knee cellulitis.    Hospital Course:       Interval History: Pt was seen and examined at bedside . She states feel better today .  No fver overnight . No acute event overnight .     Review of Systems   Constitutional: Negative.  Negative for chills, diaphoresis, fatigue and fever.   HENT: Negative.  Negative for congestion, nosebleeds and sinus pressure.    Eyes: Negative.  Negative for photophobia, redness and visual disturbance.   Respiratory: Negative.  Negative for cough, chest tightness, shortness of breath and wheezing.    Cardiovascular: Negative.  Negative for chest pain, palpitations and leg swelling.   Gastrointestinal: Negative.  Negative for abdominal pain, diarrhea, nausea and vomiting.   Endocrine: Negative.  Negative for polydipsia, polyphagia and polyuria.   Genitourinary: Negative.  Negative for dysuria, flank pain, frequency and urgency.   Musculoskeletal: Positive for back pain (chronic). Negative for joint swelling and neck stiffness.   Skin: Positive for color  change (left knee redness, pain). Negative for pallor and rash.   Allergic/Immunologic: Negative.  Negative for environmental allergies, food allergies and immunocompromised state.   Neurological: Negative.  Negative for dizziness, syncope, speech difficulty, numbness and headaches.   Hematological: Negative.  Negative for adenopathy. Does not bruise/bleed easily.   Psychiatric/Behavioral: Negative.  Negative for confusion, decreased concentration and hallucinations. The patient is not nervous/anxious.    All other systems reviewed and are negative.    Objective:     Vital Signs (Most Recent):  Temp: 97.4 °F (36.3 °C) (05/19/17 1604)  Pulse: 76 (05/19/17 1604)  Resp: 18 (05/19/17 1604)  BP: 132/78 (05/19/17 1604)  SpO2: 97 % (05/19/17 1604) Vital Signs (24h Range):  Temp:  [97.4 °F (36.3 °C)-99.1 °F (37.3 °C)] 97.4 °F (36.3 °C)  Pulse:  [74-98] 76  Resp:  [18-20] 18  SpO2:  [93 %-98 %] 97 %  BP: (129-157)/(74-93) 132/78     Weight: 98 kg (216 lb)  Body mass index is 32.84 kg/(m^2).    Intake/Output Summary (Last 24 hours) at 05/19/17 1810  Last data filed at 05/19/17 1410   Gross per 24 hour   Intake              700 ml   Output                0 ml   Net              700 ml      Physical Exam   Constitutional: She is oriented to person, place, and time. She appears well-developed and well-nourished. No distress.   HENT:   Head: Normocephalic and atraumatic.   Eyes: Conjunctivae and EOM are normal. No scleral icterus.   Neck: Normal range of motion. Neck supple. No JVD present. No tracheal deviation present. No thyromegaly present.   Cardiovascular: Normal rate, regular rhythm, normal heart sounds and intact distal pulses.    No murmur heard.  Pulmonary/Chest: Effort normal and breath sounds normal. No respiratory distress. She has no wheezes. She has no rales. She exhibits no tenderness.   Abdominal: Soft. Bowel sounds are normal. She exhibits no distension. There is no tenderness.   Musculoskeletal: Normal range of  motion. She exhibits no edema or tenderness.   Lymphadenopathy:     She has no cervical adenopathy.   Neurological: She is alert and oriented to person, place, and time. No cranial nerve deficit. She exhibits normal muscle tone. Coordination normal.   Skin: She is not diaphoretic. There is erythema (large area of erythema, with mild blistering and tenderness of left knee, ROM intact).   Psychiatric: She has a normal mood and affect. Her behavior is normal. Judgment and thought content normal.   Nursing note and vitals reviewed.      Significant Labs:   Blood Culture:   Recent Labs  Lab 05/18/17  1720 05/18/17  1730   LABBLOO No Growth to date No Growth to date     BMP:   Recent Labs  Lab 05/19/17  0501         K 4.2   CL 99   CO2 32*   BUN 15   CREATININE 0.8   CALCIUM 9.9   MG 2.4     CBC:   Recent Labs  Lab 05/18/17  1720 05/19/17  0501   WBC 10.49 10.07   HGB 15.7 14.4   HCT 44.0 42.4    246       Significant Imaging: I have reviewed all pertinent imaging results/findings within the past 24 hours.    Assessment/Plan:      * Cellulitis of left knee  - F/u blood cultures  - IV Vanc and Cefepime empirically  - improving       Anxiety  -  Xanax 0.5 mg po tid prn       Obesity (BMI 35.0-39.9 without comorbidity)  Counseled   About low calorie diet        Chronic low back pain without sciatica  - Followed by pain management  - Resume home medications      VTE Risk Mitigation         Ordered     enoxaparin injection 40 mg  Daily     Route:  Subcutaneous        05/18/17 2256     Medium Risk of VTE  Once      05/18/17 2256     Place sequential compression device  Until discontinued      05/18/17 2256          Emmanuel Diaz MD  Department of Hospital Medicine   [facility]

## 2017-05-20 PROBLEM — R03.0 TRANSIENT ELEVATED BLOOD PRESSURE: Status: ACTIVE | Noted: 2017-05-20

## 2017-05-20 PROCEDURE — 25000003 PHARM REV CODE 250: Performed by: PHYSICIAN ASSISTANT

## 2017-05-20 PROCEDURE — 63600175 PHARM REV CODE 636 W HCPCS: Performed by: INTERNAL MEDICINE

## 2017-05-20 PROCEDURE — 63600175 PHARM REV CODE 636 W HCPCS: Performed by: PHYSICIAN ASSISTANT

## 2017-05-20 PROCEDURE — 11000001 HC ACUTE MED/SURG PRIVATE ROOM

## 2017-05-20 PROCEDURE — 25000003 PHARM REV CODE 250: Performed by: INTERNAL MEDICINE

## 2017-05-20 PROCEDURE — 96372 THER/PROPH/DIAG INJ SC/IM: CPT

## 2017-05-20 RX ADMIN — HYDROCODONE BITARTRATE AND ACETAMINOPHEN 1 TABLET: 5; 325 TABLET ORAL at 01:05

## 2017-05-20 RX ADMIN — HYDROCODONE BITARTRATE AND ACETAMINOPHEN 1 TABLET: 5; 325 TABLET ORAL at 10:05

## 2017-05-20 RX ADMIN — HYDROCODONE BITARTRATE AND ACETAMINOPHEN 1 TABLET: 5; 325 TABLET ORAL at 06:05

## 2017-05-20 RX ADMIN — VANCOMYCIN HYDROCHLORIDE 1250 MG: 100 INJECTION, POWDER, LYOPHILIZED, FOR SOLUTION INTRAVENOUS at 08:05

## 2017-05-20 RX ADMIN — CEFEPIME 1 G: 1 INJECTION, POWDER, FOR SOLUTION INTRAMUSCULAR; INTRAVENOUS at 05:05

## 2017-05-20 RX ADMIN — CEFEPIME 1 G: 1 INJECTION, POWDER, FOR SOLUTION INTRAMUSCULAR; INTRAVENOUS at 01:05

## 2017-05-20 RX ADMIN — ALPRAZOLAM 0.5 MG: 0.5 TABLET ORAL at 01:05

## 2017-05-20 RX ADMIN — CEFEPIME 1 G: 1 INJECTION, POWDER, FOR SOLUTION INTRAMUSCULAR; INTRAVENOUS at 09:05

## 2017-05-20 RX ADMIN — CETIRIZINE HYDROCHLORIDE 10 MG: 10 TABLET, FILM COATED ORAL at 08:05

## 2017-05-20 RX ADMIN — ENOXAPARIN SODIUM 40 MG: 100 INJECTION SUBCUTANEOUS at 04:05

## 2017-05-20 NOTE — PLAN OF CARE
Problem: Patient Care Overview  Goal: Plan of Care Review  Outcome: Ongoing (interventions implemented as appropriate)  Pt verbalized understanding of plan of care. SE of antibiotics reviewed with pt. Pain adequately controlled with PRN pain meds. IVAB admin as ordered. Hand washing/ hand sanitizers encouraged.

## 2017-05-20 NOTE — PROGRESS NOTES
54[facility] Hospital Medicine  Progress Note    Patient Name: Genny Calixto  MRN: [id number]  Patient Class: IP- Inpatient   Admission Date: 5/18/2017  Length of Stay: 2 days  Attending Physician: Jonny Mccloud MD  Primary Care Provider: Xavier Arellano MD        Subjective:     Principal Problem:Cellulitis of left knee    HPI:  Ms. Calixto is a 52 y/o obese  female with h/o anxiety,chronic pain syndrome followed by a pain management specialist, presents to the ED c/o left knee redness, pain for past couple of days, worsened since early this morning. Denies fever or chills. CT left leg shows mild amount of edema in the subcutaneous fat in the anteromedial aspect of the left lower extremity consistent with cellulitis. No evidence of spread to the bone or knee joint. See image of left knee in chart. Blood cultures obtained. Patient received Vanc and Cefepime in the ED. Admitted for angry looking left knee cellulitis.    Hospital Course:  51 year old female admitted with left knee cellulitis. CT with contrast consistent with cellulitis without evidence of increased fluid or osteomyelitis. She was treated with Vancomycin and Cefepime. Blood cultures are currently negative. She remained afebrile. Labs also remained stable.     Interval History: Afebrile. Reports erythema is getting better. Complains of pain 8/10. No problems with ROM.      Review of Systems   Constitutional: Negative.  Negative for chills, diaphoresis, fatigue and fever.   HENT: Negative for nosebleeds and sinus pressure.    Eyes: Negative for photophobia, redness and visual disturbance.   Respiratory: Negative for cough, chest tightness, shortness of breath and wheezing.    Cardiovascular: Negative for chest pain, palpitations and leg swelling.   Gastrointestinal: Negative for abdominal pain, diarrhea, nausea and vomiting.   Genitourinary: Negative.  Negative for dysuria, flank pain, frequency and urgency.    Musculoskeletal: Positive for back pain (chronic). Negative for joint swelling and neck stiffness.   Skin: Positive for color change (left knee redness, pain). Negative for pallor and rash.   Neurological: Negative for dizziness, syncope and headaches.   All other systems reviewed and are negative.    Objective:     Vital Signs (Most Recent):  Temp: 98.7 °F (37.1 °C) (05/20/17 1204)  Pulse: 83 (05/20/17 1204)  Resp: 18 (05/20/17 1204)  BP: (!) 142/77 (05/20/17 1204)  SpO2: 95 % (05/20/17 1204) Vital Signs (24h Range):  Temp:  [97.4 °F (36.3 °C)-99.2 °F (37.3 °C)] 98.7 °F (37.1 °C)  Pulse:  [76-95] 83  Resp:  [16-18] 18  SpO2:  [91 %-98 %] 95 %  BP: (126-148)/(72-92) 142/77     Weight: 98 kg (216 lb)  Body mass index is 32.84 kg/(m^2).    Intake/Output Summary (Last 24 hours) at 05/20/17 1207  Last data filed at 05/20/17 1015   Gross per 24 hour   Intake             1130 ml   Output                0 ml   Net             1130 ml      Physical Exam   Constitutional: She is oriented to person, place, and time. She appears well-developed and well-nourished. No distress.   HENT:   Head: Normocephalic and atraumatic.   Eyes: Conjunctivae and EOM are normal. No scleral icterus.   Neck: Normal range of motion. Neck supple. No JVD present. No tracheal deviation present. No thyromegaly present.   Cardiovascular: Normal rate, regular rhythm, normal heart sounds and intact distal pulses.    No murmur heard.  Pulmonary/Chest: Effort normal and breath sounds normal. No respiratory distress. She has no wheezes. She has no rales. She exhibits no tenderness.   Abdominal: Soft. Bowel sounds are normal. She exhibits no distension. There is no tenderness.   Musculoskeletal: Normal range of motion. She exhibits no edema or tenderness.   Swelling or effusion not appreciated to anterior aspect of knee.    Lymphadenopathy:     She has no cervical adenopathy.   Neurological: She is alert and oriented to person, place, and time. No cranial  nerve deficit. She exhibits normal muscle tone. Coordination normal.   Skin: She is not diaphoretic. There is erythema (large area of erythema,  and tenderness of left knee, ROM intact).   Blister no longer present. Skin without evidence of abscess, or drainage.    Psychiatric: She has a normal mood and affect.   Nursing note and vitals reviewed.      5/18/17 5/20/17    Significant Labs:   CBC:   Recent Labs  Lab 05/18/17  1720 05/19/17  0501   WBC 10.49 10.07   HGB 15.7 14.4   HCT 44.0 42.4    246     CMP:   Recent Labs  Lab 05/18/17  1720 05/19/17  0501    139   K 3.9 4.2    99   CO2 28 32*   * 106   BUN 14 15   CREATININE 0.9 0.8   CALCIUM 10.1 9.9   PROT 8.3  --    ALBUMIN 4.3  --    BILITOT 0.8  --    ALKPHOS 86  --    AST 21  --    ALT 18  --    ANIONGAP 10 8   EGFRNONAA >60 >60       Significant Imaging:   Imaging Results         CT Lower Extremity With Contrast Left (Final result) Result time:  05/18/17 20:04:18    Final result by REBA Carranza Sr., MD (05/18/17 20:04:18)    Impression:      1.  There is a mild amount of edema in the subcutaneous fat in the anteromedial aspect of the left lower extremity.  This is consistent with the patient's history and characteristic of cellulitis.  2.  There is a normal amount of fluid within the knee.    3.  There is no evidence of osteomyelitis.  4.  There are surgical changes in the proximal portion of the tibia.       All CT scans at this facility use dose modulation, iterative reconstruction, and/or weight base dosing when appropriate to reduce radiation dose when appropriate to reduce radiation dose to as low as reasonably achievable.      Electronically signed by: REBA CARRANZA MD  Date:     05/18/17  Time:    20:04     Narrative:    CT of the left knee with IV contrast    Clinical History: Cellulitis; possible septic joint    Technique: Standard knee CT protocol with IV contrast material was performed.  75 mL of Omnipaque 350  contrast material was used for this examination.    Finding: There are surgical changes in the proximal portion of the tibia.  There is no acute fracture visualized.  There is no dislocation.  There is a normal amount of fluid within the knee.  There is a mild amount of edema in the subcutaneous fat in the anteromedial aspect of the left lower extremity.                Assessment/Plan:      * Cellulitis of left knee  -patient reports slowly improving. No effusion or swelling to anterior knee to suspect effusion/septic joint.   -Continue IV Vancomycin and Cefepime  -check CRP and ESR in AM.   -blood cultures are negative.       Transient elevated blood pressure  -systolic 130-150's  -could be related to pain.   -Monitor  -hydralazine if SBP greater than 160.       Anxiety  -  Xanax 0.5 mg po tid prn       Chronic low back pain without sciatica  - Followed by pain management  - Resume home medications      VTE Risk Mitigation         Ordered     enoxaparin injection 40 mg  Daily     Route:  Subcutaneous        05/18/17 2256     Medium Risk of VTE  Once      05/18/17 2256     Place sequential compression device  Until discontinued      05/18/17 2256          Marcos Hernandez NP  Department of Hospital Medicine   [facility]

## 2017-05-20 NOTE — PLAN OF CARE
Pt remained free of injury/ falls. Fall precautions in place. Denies pain at present. Pt tolerating regular diet. IV antibiotics given as ordered. Gauze on left knee clean, dry, and intact. Pt able to verbalize needs and wants. Bed in low, locked position, call light and personal items within reach of pt. No acute distress noted. Brother at bedside. VSS. Will continue to monitor.

## 2017-05-20 NOTE — PLAN OF CARE
Problem: Patient Care Overview  Goal: Plan of Care Review  Outcome: Ongoing (interventions implemented as appropriate)  Pt remained free of injury/ falls. Fall precautions in place. Denies pain at present. Pt tolerating regular diet. IV antibiotics given as ordered. Gauze on left knee clean, dry, and intact. Pt able to verbalize needs and wants. Bed in low, locked position, call light and personal items within reach of pt. No acute distress noted. VSS. Will continue to monitor.

## 2017-05-20 NOTE — ASSESSMENT & PLAN NOTE
-patient reports slowly improving. No effusion or swelling to anterior knee to suspect effusion/septic joint.   -Continue IV Vancomycin and Cefepime  -check CRP and ESR in AM.   -blood cultures are negative.

## 2017-05-20 NOTE — SUBJECTIVE & OBJECTIVE
Interval History: Afebrile. Reports erythema is getting better. Complains of pain 8/10. No problems with ROM.      Review of Systems   Constitutional: Negative.  Negative for chills, diaphoresis, fatigue and fever.   HENT: Negative for nosebleeds and sinus pressure.    Eyes: Negative for photophobia, redness and visual disturbance.   Respiratory: Negative for cough, chest tightness, shortness of breath and wheezing.    Cardiovascular: Negative for chest pain, palpitations and leg swelling.   Gastrointestinal: Negative for abdominal pain, diarrhea, nausea and vomiting.   Genitourinary: Negative.  Negative for dysuria, flank pain, frequency and urgency.   Musculoskeletal: Positive for back pain (chronic). Negative for joint swelling and neck stiffness.   Skin: Positive for color change (left knee redness, pain). Negative for pallor and rash.   Neurological: Negative for dizziness, syncope and headaches.   All other systems reviewed and are negative.    Objective:     Vital Signs (Most Recent):  Temp: 98.7 °F (37.1 °C) (05/20/17 1204)  Pulse: 83 (05/20/17 1204)  Resp: 18 (05/20/17 1204)  BP: (!) 142/77 (05/20/17 1204)  SpO2: 95 % (05/20/17 1204) Vital Signs (24h Range):  Temp:  [97.4 °F (36.3 °C)-99.2 °F (37.3 °C)] 98.7 °F (37.1 °C)  Pulse:  [76-95] 83  Resp:  [16-18] 18  SpO2:  [91 %-98 %] 95 %  BP: (126-148)/(72-92) 142/77     Weight: 98 kg (216 lb)  Body mass index is 32.84 kg/(m^2).    Intake/Output Summary (Last 24 hours) at 05/20/17 1207  Last data filed at 05/20/17 1015   Gross per 24 hour   Intake             1130 ml   Output                0 ml   Net             1130 ml      Physical Exam   Constitutional: She is oriented to person, place, and time. She appears well-developed and well-nourished. No distress.   HENT:   Head: Normocephalic and atraumatic.   Eyes: Conjunctivae and EOM are normal. No scleral icterus.   Neck: Normal range of motion. Neck supple. No JVD present. No tracheal deviation present. No  thyromegaly present.   Cardiovascular: Normal rate, regular rhythm, normal heart sounds and intact distal pulses.    No murmur heard.  Pulmonary/Chest: Effort normal and breath sounds normal. No respiratory distress. She has no wheezes. She has no rales. She exhibits no tenderness.   Abdominal: Soft. Bowel sounds are normal. She exhibits no distension. There is no tenderness.   Musculoskeletal: Normal range of motion. She exhibits no edema or tenderness.   Swelling or effusion not appreciated to anterior aspect of knee.    Lymphadenopathy:     She has no cervical adenopathy.   Neurological: She is alert and oriented to person, place, and time. No cranial nerve deficit. She exhibits normal muscle tone. Coordination normal.   Skin: She is not diaphoretic. There is erythema (large area of erythema,  and tenderness of left knee, ROM intact).   Blister no longer present. Skin without evidence of abscess, or drainage.    Psychiatric: She has a normal mood and affect.   Nursing note and vitals reviewed.      5/18/17 5/20/17    Significant Labs:   CBC:   Recent Labs  Lab 05/18/17  1720 05/19/17  0501   WBC 10.49 10.07   HGB 15.7 14.4   HCT 44.0 42.4    246     CMP:   Recent Labs  Lab 05/18/17  1720 05/19/17  0501    139   K 3.9 4.2    99   CO2 28 32*   * 106   BUN 14 15   CREATININE 0.9 0.8   CALCIUM 10.1 9.9   PROT 8.3  --    ALBUMIN 4.3  --    BILITOT 0.8  --    ALKPHOS 86  --    AST 21  --    ALT 18  --    ANIONGAP 10 8   EGFRNONAA >60 >60       Significant Imaging:   Imaging Results         CT Lower Extremity With Contrast Left (Final result) Result time:  05/18/17 20:04:18    Final result by REBA Carranza Sr., MD (05/18/17 20:04:18)    Impression:      1.  There is a mild amount of edema in the subcutaneous fat in the anteromedial aspect of the left lower extremity.  This is consistent with the patient's history and characteristic of cellulitis.  2.  There is a normal amount of fluid  within the knee.    3.  There is no evidence of osteomyelitis.  4.  There are surgical changes in the proximal portion of the tibia.       All CT scans at this facility use dose modulation, iterative reconstruction, and/or weight base dosing when appropriate to reduce radiation dose when appropriate to reduce radiation dose to as low as reasonably achievable.      Electronically signed by: REBA BOYER MD  Date:     05/18/17  Time:    20:04     Narrative:    CT of the left knee with IV contrast    Clinical History: Cellulitis; possible septic joint    Technique: Standard knee CT protocol with IV contrast material was performed.  75 mL of Omnipaque 350 contrast material was used for this examination.    Finding: There are surgical changes in the proximal portion of the tibia.  There is no acute fracture visualized.  There is no dislocation.  There is a normal amount of fluid within the knee.  There is a mild amount of edema in the subcutaneous fat in the anteromedial aspect of the left lower extremity.

## 2017-05-20 NOTE — ASSESSMENT & PLAN NOTE
-systolic 130-150's  -could be related to pain.   -Monitor  -hydralazine if SBP greater than 160.

## 2017-05-21 LAB
ANION GAP SERPL CALC-SCNC: 9 MMOL/L
BASOPHILS # BLD AUTO: 0.03 K/UL
BASOPHILS NFR BLD: 0.4 %
BUN SERPL-MCNC: 13 MG/DL
CALCIUM SERPL-MCNC: 9.5 MG/DL
CHLORIDE SERPL-SCNC: 105 MMOL/L
CO2 SERPL-SCNC: 25 MMOL/L
CREAT SERPL-MCNC: 0.7 MG/DL
CRP SERPL-MCNC: 28.1 MG/L
DIFFERENTIAL METHOD: ABNORMAL
EOSINOPHIL # BLD AUTO: 0.1 K/UL
EOSINOPHIL NFR BLD: 1.6 %
ERYTHROCYTE [DISTWIDTH] IN BLOOD BY AUTOMATED COUNT: 14.3 %
ERYTHROCYTE [SEDIMENTATION RATE] IN BLOOD BY WESTERGREN METHOD: 28 MM/HR
EST. GFR  (AFRICAN AMERICAN): >60 ML/MIN/1.73 M^2
EST. GFR  (NON AFRICAN AMERICAN): >60 ML/MIN/1.73 M^2
GLUCOSE SERPL-MCNC: 108 MG/DL
HCT VFR BLD AUTO: 38.2 %
HGB BLD-MCNC: 13.2 G/DL
LYMPHOCYTES # BLD AUTO: 3 K/UL
LYMPHOCYTES NFR BLD: 35 %
MCH RBC QN AUTO: 33.4 PG
MCHC RBC AUTO-ENTMCNC: 34.6 %
MCV RBC AUTO: 97 FL
MONOCYTES # BLD AUTO: 0.5 K/UL
MONOCYTES NFR BLD: 5.6 %
NEUTROPHILS # BLD AUTO: 4.9 K/UL
NEUTROPHILS NFR BLD: 57.4 %
PLATELET # BLD AUTO: 216 K/UL
PMV BLD AUTO: 12.3 FL
POTASSIUM SERPL-SCNC: 4.2 MMOL/L
RBC # BLD AUTO: 3.95 M/UL
SODIUM SERPL-SCNC: 139 MMOL/L
VANCOMYCIN TROUGH SERPL-MCNC: 10.7 UG/ML
WBC # BLD AUTO: 8.55 K/UL

## 2017-05-21 PROCEDURE — 86140 C-REACTIVE PROTEIN: CPT

## 2017-05-21 PROCEDURE — 80202 ASSAY OF VANCOMYCIN: CPT

## 2017-05-21 PROCEDURE — 36415 COLL VENOUS BLD VENIPUNCTURE: CPT

## 2017-05-21 PROCEDURE — 11000001 HC ACUTE MED/SURG PRIVATE ROOM

## 2017-05-21 PROCEDURE — 85651 RBC SED RATE NONAUTOMATED: CPT

## 2017-05-21 PROCEDURE — 25000003 PHARM REV CODE 250: Performed by: INTERNAL MEDICINE

## 2017-05-21 PROCEDURE — 80048 BASIC METABOLIC PNL TOTAL CA: CPT

## 2017-05-21 PROCEDURE — 85025 COMPLETE CBC W/AUTO DIFF WBC: CPT

## 2017-05-21 PROCEDURE — 63600175 PHARM REV CODE 636 W HCPCS: Performed by: INTERNAL MEDICINE

## 2017-05-21 PROCEDURE — 25000003 PHARM REV CODE 250: Performed by: PHYSICIAN ASSISTANT

## 2017-05-21 PROCEDURE — 25000003 PHARM REV CODE 250

## 2017-05-21 PROCEDURE — 25000003 PHARM REV CODE 250: Performed by: NURSE PRACTITIONER

## 2017-05-21 PROCEDURE — 96372 THER/PROPH/DIAG INJ SC/IM: CPT

## 2017-05-21 PROCEDURE — 63600175 PHARM REV CODE 636 W HCPCS: Performed by: PHYSICIAN ASSISTANT

## 2017-05-21 RX ORDER — ALPRAZOLAM 1 MG/1
TABLET ORAL
Status: COMPLETED
Start: 2017-05-21 | End: 2017-05-21

## 2017-05-21 RX ORDER — SULFAMETHOXAZOLE AND TRIMETHOPRIM 800; 160 MG/1; MG/1
1 TABLET ORAL 2 TIMES DAILY
Status: DISCONTINUED | OUTPATIENT
Start: 2017-05-21 | End: 2017-05-22 | Stop reason: HOSPADM

## 2017-05-21 RX ADMIN — ALPRAZOLAM 1 MG: 1 TABLET ORAL at 12:05

## 2017-05-21 RX ADMIN — SULFAMETHOXAZOLE AND TRIMETHOPRIM 1 TABLET: 800; 160 TABLET ORAL at 08:05

## 2017-05-21 RX ADMIN — HYDROCODONE BITARTRATE AND ACETAMINOPHEN 1 TABLET: 5; 325 TABLET ORAL at 12:05

## 2017-05-21 RX ADMIN — CETIRIZINE HYDROCHLORIDE 10 MG: 10 TABLET, FILM COATED ORAL at 08:05

## 2017-05-21 RX ADMIN — HYDROCODONE BITARTRATE AND ACETAMINOPHEN 1 TABLET: 5; 325 TABLET ORAL at 11:05

## 2017-05-21 RX ADMIN — CEFEPIME 1 G: 1 INJECTION, POWDER, FOR SOLUTION INTRAMUSCULAR; INTRAVENOUS at 05:05

## 2017-05-21 RX ADMIN — VANCOMYCIN HYDROCHLORIDE 1250 MG: 100 INJECTION, POWDER, LYOPHILIZED, FOR SOLUTION INTRAVENOUS at 12:05

## 2017-05-21 RX ADMIN — HYDROCODONE BITARTRATE AND ACETAMINOPHEN 1 TABLET: 5; 325 TABLET ORAL at 08:05

## 2017-05-21 RX ADMIN — ENOXAPARIN SODIUM 40 MG: 100 INJECTION SUBCUTANEOUS at 04:05

## 2017-05-21 NOTE — PROGRESS NOTES
[facility] Hospital Medicine  Progress Note    Patient Name: Genny Calixto  MRN: [id number]  Patient Class: IP- Inpatient   Admission Date: 5/18/2017  Length of Stay: 3 days  Attending Physician: Antionette Auguste MD  Primary Care Provider: Xavier Arellano MD        Subjective:     Principal Problem:Cellulitis of left knee    HPI:  Ms. Calixto is a 50 y/o obese  female with h/o anxiety,chronic pain syndrome followed by a pain management specialist, presents to the ED c/o left knee redness, pain for past couple of days, worsened since early this morning. Denies fever or chills. CT left leg shows mild amount of edema in the subcutaneous fat in the anteromedial aspect of the left lower extremity consistent with cellulitis. No evidence of spread to the bone or knee joint. See image of left knee in chart. Blood cultures obtained. Patient received Vanc and Cefepime in the ED. Admitted for angry looking left knee cellulitis.      Hospital Course:  51 year old female admitted with left knee cellulitis. CT with contrast consistent with cellulitis without evidence of increased fluid or osteomyelitis. She was treated with Vancomycin and Cefepime. Blood cultures are currently negative. She remained afebrile. Labs also remained stable.       Interval History:Denies complaints. Ambulated around room today.     Review of Systems   Constitutional: Negative.  Negative for chills, diaphoresis, fatigue and fever.   HENT: Negative for nosebleeds and sinus pressure.    Eyes: Negative for photophobia, redness and visual disturbance.   Respiratory: Negative for cough, chest tightness, shortness of breath and wheezing.    Cardiovascular: Negative for chest pain, palpitations and leg swelling.   Gastrointestinal: Negative for abdominal pain, diarrhea, nausea and vomiting.   Genitourinary: Negative.  Negative for dysuria, flank pain, frequency and urgency.   Musculoskeletal: Positive for back pain (chronic).  Negative for joint swelling and neck stiffness.   Skin: Positive for color change (left knee redness, pain). Negative for pallor and rash.   Neurological: Negative for dizziness, syncope and headaches.   All other systems reviewed and are negative.  Objective:     Vital Signs (Most Recent):  Temp: 98.2 °F (36.8 °C) (05/21/17 1230)  Pulse: 90 (05/21/17 1230)  Resp: 18 (05/21/17 1230)  BP: (!) 144/82 (05/21/17 1230)  SpO2: 98 % (05/21/17 1230) Vital Signs (24h Range):  Temp:  [97.7 °F (36.5 °C)-99.2 °F (37.3 °C)] 98.2 °F (36.8 °C)  Pulse:  [73-92] 90  Resp:  [18] 18  SpO2:  [92 %-98 %] 98 %  BP: (111-162)/(66-87) 144/82     Weight: 98 kg (216 lb)  Body mass index is 32.84 kg/m².    Intake/Output Summary (Last 24 hours) at 05/21/17 1619  Last data filed at 05/21/17 1400   Gross per 24 hour   Intake             1450 ml   Output                0 ml   Net             1450 ml      Physical Exam  Constitutional: She is oriented to person, place, and time. She appears well-developed and well-nourished. No distress.   HENT:   Head: Normocephalic and atraumatic.   Eyes: Conjunctivae and EOM are normal. No scleral icterus.   Neck: Normal range of motion. Neck supple. No JVD present. No tracheal deviation present. No thyromegaly present.   Cardiovascular: Normal rate, regular rhythm, normal heart sounds and intact distal pulses.    No murmur heard.  Pulmonary/Chest: Effort normal and breath sounds normal. No respiratory distress. She has no wheezes. She has no rales. She exhibits no tenderness.   Abdominal: Soft. Bowel sounds are normal. She exhibits no distension. There is no tenderness.   Musculoskeletal: Normal range of motion. She exhibits no edema or tenderness.   Swelling or effusion not appreciated to anterior aspect of knee.    Lymphadenopathy:     She has no cervical adenopathy.   Neurological: She is alert and oriented to person, place, and time. No cranial nerve deficit. She exhibits normal muscle tone. Coordination  normal.   Skin: She is not diaphoretic. There is erythema (large area of erythema,  and tenderness of left knee, ROM intact).   Blister no longer present. Skin without evidence of abscess, or drainage.    Psychiatric: She has a normal mood and affect.   Nursing note and vitals reviewed.          Significant Labs:   CBC:   Recent Labs  Lab 05/21/17  0659   WBC 8.55   HGB 13.2   HCT 38.2        CMP:   Recent Labs  Lab 05/21/17  0659      K 4.2      CO2 25      BUN 13   CREATININE 0.7   CALCIUM 9.5   ANIONGAP 9   EGFRNONAA >60       Significant Imaging:  Imaging Results          CT Lower Extremity With Contrast Left (Final result)  Result time 05/18/17 20:04:18    Final result by REBA Carranza Sr., MD (05/18/17 20:04:18)                 Impression:      1.  There is a mild amount of edema in the subcutaneous fat in the anteromedial aspect of the left lower extremity.  This is consistent with the patient's history and characteristic of cellulitis.  2.  There is a normal amount of fluid within the knee.    3.  There is no evidence of osteomyelitis.  4.  There are surgical changes in the proximal portion of the tibia.       All CT scans at this facility use dose modulation, iterative reconstruction, and/or weight base dosing when appropriate to reduce radiation dose when appropriate to reduce radiation dose to as low as reasonably achievable.      Electronically signed by: REBA CARRANZA MD  Date:     05/18/17  Time:    20:04              Narrative:    CT of the left knee with IV contrast    Clinical History: Cellulitis; possible septic joint    Technique: Standard knee CT protocol with IV contrast material was performed.  75 mL of Omnipaque 350 contrast material was used for this examination.    Finding: There are surgical changes in the proximal portion of the tibia.  There is no acute fracture visualized.  There is no dislocation.  There is a normal amount of fluid within the knee.  There is a  mild amount of edema in the subcutaneous fat in the anteromedial aspect of the left lower extremity.                                Assessment/Plan:      * Cellulitis of left knee    -patient reports slowly improving. No effusion or swelling to anterior knee to suspect effusion/septic joint.   -Stop IV abx; transition to oral Bactrim  -ESR and CRP are mildly elevated  -blood cultures are negative.   -will need orthopedic follow up outpatient          Transient elevated blood pressure    -systolic 130-150's  -could be related to pain.   -Monitor  -hydralazine if SBP greater than 160.           Chronic low back pain without sciatica    - Followed by pain management  - Resume home medications          Obesity (BMI 35.0-39.9 without comorbidity)    Counseled   About low calorie diet            Anxiety    -  Xanax 0.5 mg po tid prn             VTE Risk Mitigation         Ordered     enoxaparin injection 40 mg  Daily     Route:  Subcutaneous        05/18/17 2256     Medium Risk of VTE  Once      05/18/17 2256     Place sequential compression device  Until discontinued      05/18/17 2256          Marcos Hernandez NP  Department of Hospital Medicine   [facility]

## 2017-05-21 NOTE — NURSING
Pt remained free of injury/ falls. Fall precautions in place. Pain controlled with PRN pain med. Pt tolerating regular diet. IV antibiotics given as ordered. Gauze on left knee clean, dry, and intact. Pt able to verbalize needs and wants. Bed in low, locked position, call light and personal items within reach of pt. No acute distress noted. VSS. Will continue to monitor.

## 2017-05-21 NOTE — HOSPITAL COURSE
51 year old female admitted with left knee cellulitis. CT with contrast consistent with cellulitis without evidence of increased fluid or osteomyelitis. She was treated with Vancomycin and Cefepime. Blood cultures are currently negative. She remained afebrile. Labs also remained stable. She was seen by Wound care services and recommendations were made for dressing changes. She will continue Bactrim x 7 days. She requested orthopedic evaluation due to acute infection and history of knee surgeries. Appt has been made with Dr. Fontaine. Patient seen and examined on date of discharge and deemed suitable.

## 2017-05-21 NOTE — PLAN OF CARE
Problem: Patient Care Overview  Goal: Plan of Care Review  Outcome: Ongoing (interventions implemented as appropriate)  Pt remained free of injury/ falls. Fall precautions in place. Pain controlled with PRN pain med. Pt tolerating regular diet. IV antibiotics given as ordered. Gauze on left knee clean, dry, and intact. Pt able to verbalize needs and wants. Bed in low, locked position, call light and personal items within reach of pt. No acute distress noted. Wound care consult ordered today by NP. VSS. Will continue to monitor.

## 2017-05-21 NOTE — PLAN OF CARE
Problem: Patient Care Overview  Goal: Plan of Care Review  Outcome: Ongoing (interventions implemented as appropriate)  Pt verbalized understanding of plan of care. Fall precautions maintained.  Bed locked and low.  Call light and personal items within reach. SR up x 2. IVAB admin as ordered. Pain adequately controlled with PRN pain meds.

## 2017-05-21 NOTE — SUBJECTIVE & OBJECTIVE
Interval History:Denies complaints. Ambulated around room today.     Review of Systems   Constitutional: Negative.  Negative for chills, diaphoresis, fatigue and fever.   HENT: Negative for nosebleeds and sinus pressure.    Eyes: Negative for photophobia, redness and visual disturbance.   Respiratory: Negative for cough, chest tightness, shortness of breath and wheezing.    Cardiovascular: Negative for chest pain, palpitations and leg swelling.   Gastrointestinal: Negative for abdominal pain, diarrhea, nausea and vomiting.   Genitourinary: Negative.  Negative for dysuria, flank pain, frequency and urgency.   Musculoskeletal: Positive for back pain (chronic). Negative for joint swelling and neck stiffness.   Skin: Positive for color change (left knee redness, pain). Negative for pallor and rash.   Neurological: Negative for dizziness, syncope and headaches.   All other systems reviewed and are negative.  Objective:     Vital Signs (Most Recent):  Temp: 98.2 °F (36.8 °C) (05/21/17 1230)  Pulse: 90 (05/21/17 1230)  Resp: 18 (05/21/17 1230)  BP: (!) 144/82 (05/21/17 1230)  SpO2: 98 % (05/21/17 1230) Vital Signs (24h Range):  Temp:  [97.7 °F (36.5 °C)-99.2 °F (37.3 °C)] 98.2 °F (36.8 °C)  Pulse:  [73-92] 90  Resp:  [18] 18  SpO2:  [92 %-98 %] 98 %  BP: (111-162)/(66-87) 144/82     Weight: 98 kg (216 lb)  Body mass index is 32.84 kg/m².    Intake/Output Summary (Last 24 hours) at 05/21/17 1619  Last data filed at 05/21/17 1400   Gross per 24 hour   Intake             1450 ml   Output                0 ml   Net             1450 ml      Physical Exam  Constitutional: She is oriented to person, place, and time. She appears well-developed and well-nourished. No distress.   HENT:   Head: Normocephalic and atraumatic.   Eyes: Conjunctivae and EOM are normal. No scleral icterus.   Neck: Normal range of motion. Neck supple. No JVD present. No tracheal deviation present. No thyromegaly present.   Cardiovascular: Normal rate,  regular rhythm, normal heart sounds and intact distal pulses.    No murmur heard.  Pulmonary/Chest: Effort normal and breath sounds normal. No respiratory distress. She has no wheezes. She has no rales. She exhibits no tenderness.   Abdominal: Soft. Bowel sounds are normal. She exhibits no distension. There is no tenderness.   Musculoskeletal: Normal range of motion. She exhibits no edema or tenderness.   Swelling or effusion not appreciated to anterior aspect of knee.    Lymphadenopathy:     She has no cervical adenopathy.   Neurological: She is alert and oriented to person, place, and time. No cranial nerve deficit. She exhibits normal muscle tone. Coordination normal.   Skin: She is not diaphoretic. There is erythema (large area of erythema,  and tenderness of left knee, ROM intact).   Blister no longer present. Skin without evidence of abscess, or drainage.    Psychiatric: She has a normal mood and affect.   Nursing note and vitals reviewed.         Significant Labs:   CBC:   Recent Labs  Lab 05/21/17  0659   WBC 8.55   HGB 13.2   HCT 38.2        CMP:   Recent Labs  Lab 05/21/17  0659      K 4.2      CO2 25      BUN 13   CREATININE 0.7   CALCIUM 9.5   ANIONGAP 9   EGFRNONAA >60       Significant Imaging:  Imaging Results          CT Lower Extremity With Contrast Left (Final result)  Result time 05/18/17 20:04:18    Final result by REBA Carranza Sr., MD (05/18/17 20:04:18)                 Impression:      1.  There is a mild amount of edema in the subcutaneous fat in the anteromedial aspect of the left lower extremity.  This is consistent with the patient's history and characteristic of cellulitis.  2.  There is a normal amount of fluid within the knee.    3.  There is no evidence of osteomyelitis.  4.  There are surgical changes in the proximal portion of the tibia.       All CT scans at this facility use dose modulation, iterative reconstruction, and/or weight base dosing when  appropriate to reduce radiation dose when appropriate to reduce radiation dose to as low as reasonably achievable.      Electronically signed by: REBA BOYER MD  Date:     05/18/17  Time:    20:04              Narrative:    CT of the left knee with IV contrast    Clinical History: Cellulitis; possible septic joint    Technique: Standard knee CT protocol with IV contrast material was performed.  75 mL of Omnipaque 350 contrast material was used for this examination.    Finding: There are surgical changes in the proximal portion of the tibia.  There is no acute fracture visualized.  There is no dislocation.  There is a normal amount of fluid within the knee.  There is a mild amount of edema in the subcutaneous fat in the anteromedial aspect of the left lower extremity.

## 2017-05-21 NOTE — PROGRESS NOTES
Clinical Pharmacy: Vancomycin Progress Note:     WBC=8.55 (trending down)  Tmax=99.2  SCr= 0.7 (stable)    Dx: Cellulitis  Goal Trough: 10-15 mcg/ml  Cultures: NGTD    Trough from 1210= 10.7 mcg/mL   We will continue patient's current dose of 1250mg every 12 hours and draw another trough prior to the 4th dose.   Next trough due: 5/23 @ 0000.    Thank you for allowing us to participate in this patient's care.   Yumiko Vincent, PharmD 5/21/2017 2:41 PM

## 2017-05-21 NOTE — PLAN OF CARE
Problem: Patient Care Overview  Goal: Plan of Care Review  Outcome: Ongoing (interventions implemented as appropriate)  Pt remained free of injury/ falls. Fall precautions in place. Pain controlled with PRN medication. Pt tolerating regular diet. IV ABX given as scheduled. Dressing to left knee clean dry intact. Pt turns herself independently in bed. Pt able to verbalize needs and wants. Bed in low, locked position, call light and personal items within reach of pt. No acute distress noted. Wound care consult ordered. VSS. Will continue to monitor.

## 2017-05-21 NOTE — HPI
Ms. Calixto is a 50 y/o obese  female with h/o anxiety,chronic pain syndrome followed by a pain management specialist, presents to the ED c/o left knee redness, pain for past couple of days, worsened since early this morning. Denies fever or chills. CT left leg shows mild amount of edema in the subcutaneous fat in the anteromedial aspect of the left lower extremity consistent with cellulitis. No evidence of spread to the bone or knee joint. See image of left knee in chart. Blood cultures obtained. Patient received Vanc and Cefepime in the ED. Admitted for angry looking left knee cellulitis.

## 2017-05-21 NOTE — ASSESSMENT & PLAN NOTE
-patient reports slowly improving. No effusion or swelling to anterior knee to suspect effusion/septic joint.   -Stop IV abx; transition to oral Bactrim  -ESR and CRP are mildly elevated  -blood cultures are negative.   -will need orthopedic follow up outpatient

## 2017-05-22 VITALS
HEIGHT: 68 IN | BODY MASS INDEX: 32.74 KG/M2 | OXYGEN SATURATION: 99 % | TEMPERATURE: 98 F | RESPIRATION RATE: 18 BRPM | DIASTOLIC BLOOD PRESSURE: 81 MMHG | SYSTOLIC BLOOD PRESSURE: 141 MMHG | WEIGHT: 216 LBS | HEART RATE: 74 BPM

## 2017-05-22 LAB
BACTERIA SPEC AEROBE CULT: NO GROWTH
BASOPHILS # BLD AUTO: 0.07 K/UL
BASOPHILS NFR BLD: 0.9 %
DIFFERENTIAL METHOD: ABNORMAL
EOSINOPHIL # BLD AUTO: 0.2 K/UL
EOSINOPHIL NFR BLD: 1.9 %
ERYTHROCYTE [DISTWIDTH] IN BLOOD BY AUTOMATED COUNT: 13.9 %
HCT VFR BLD AUTO: 38.4 %
HGB BLD-MCNC: 13.1 G/DL
LYMPHOCYTES # BLD AUTO: 3.2 K/UL
LYMPHOCYTES NFR BLD: 39.5 %
MCH RBC QN AUTO: 32.8 PG
MCHC RBC AUTO-ENTMCNC: 34.1 %
MCV RBC AUTO: 96 FL
MONOCYTES # BLD AUTO: 0.5 K/UL
MONOCYTES NFR BLD: 6.6 %
NEUTROPHILS # BLD AUTO: 4.1 K/UL
NEUTROPHILS NFR BLD: 51.1 %
PLATELET # BLD AUTO: 220 K/UL
PMV BLD AUTO: 12 FL
RBC # BLD AUTO: 3.99 M/UL
WBC # BLD AUTO: 8.05 K/UL

## 2017-05-22 PROCEDURE — 25000003 PHARM REV CODE 250: Performed by: NURSE PRACTITIONER

## 2017-05-22 PROCEDURE — 25000003 PHARM REV CODE 250: Performed by: INTERNAL MEDICINE

## 2017-05-22 PROCEDURE — 36415 COLL VENOUS BLD VENIPUNCTURE: CPT

## 2017-05-22 PROCEDURE — 85025 COMPLETE CBC W/AUTO DIFF WBC: CPT

## 2017-05-22 PROCEDURE — 25000003 PHARM REV CODE 250: Performed by: PHYSICIAN ASSISTANT

## 2017-05-22 RX ORDER — SULFAMETHOXAZOLE AND TRIMETHOPRIM 800; 160 MG/1; MG/1
1 TABLET ORAL 2 TIMES DAILY
Qty: 12 TABLET | Refills: 0 | Status: SHIPPED | OUTPATIENT
Start: 2017-05-22 | End: 2017-05-28

## 2017-05-22 RX ADMIN — SULFAMETHOXAZOLE AND TRIMETHOPRIM 1 TABLET: 800; 160 TABLET ORAL at 09:05

## 2017-05-22 RX ADMIN — HYDROCODONE BITARTRATE AND ACETAMINOPHEN 1 TABLET: 5; 325 TABLET ORAL at 09:05

## 2017-05-22 RX ADMIN — CETIRIZINE HYDROCHLORIDE 10 MG: 10 TABLET, FILM COATED ORAL at 09:05

## 2017-05-22 RX ADMIN — ALPRAZOLAM 0.5 MG: 0.5 TABLET ORAL at 01:05

## 2017-05-22 RX ADMIN — HYDROCODONE BITARTRATE AND ACETAMINOPHEN 1 TABLET: 5; 325 TABLET ORAL at 01:05

## 2017-05-22 NOTE — PLAN OF CARE
Problem: Patient Care Overview  Goal: Plan of Care Review  Outcome: Ongoing (interventions implemented as appropriate)  Wound POC reviewed with patient including home wound care needs

## 2017-05-22 NOTE — DISCHARGE INSTRUCTIONS
Left Knee wound care:    1. Cleanse with gentle soap and water  2. Pat dry  3. Cut Melgisorb AG to size and place over open wound  4. Cover with Mepilex foam  5. Secure with Kerlex gauze wrap.  6. Change every 3 days or when soiled with drainage.  7. Follow up with PCP for further care instructions or if wound worsens and redness increases.

## 2017-05-22 NOTE — PLAN OF CARE
Problem: Patient Care Overview  Goal: Plan of Care Review  Outcome: Ongoing (interventions implemented as appropriate)  Patient discharged. Discharge instructions given. Verbalized understanding. IV removed. Cath tip intact. Off floor via wheel chair accompanied by staff.

## 2017-05-22 NOTE — PLAN OF CARE
Problem: Patient Care Overview  Goal: Plan of Care Review  Outcome: Ongoing (interventions implemented as appropriate)  Educated pt on POC in regards to transitioning from IV ABX to PO ABX.   Fall precautions in place. Side rails up. Bed locked and low in position. Call light and personal items within reach. 24 hour order chart check completed. Pt educated on medication's side effects. Pt verbalized understanding.   Will continue to monitor.

## 2017-05-22 NOTE — CONSULTS
05/22/17 0959   WOCN Assessment   WOCN Total Time (mins) 35   Visit Date 05/22/17   Visit Time 0920   Consult Type New   WOCN Speciality Wound   Wound cellulitis   Number of Wounds 1   Intervention assessed;changed;team conference;orders   Teaching on-going;complication;discharge   Marked yes     Consulted on this 50 y/o F for cellulitis of left knee. PMH includes chronic pain, reflex sypathetic dystophy, vertigo, cholecystectomy, and knee surgery.  Patient states knee pain and redness started Thursday of last week, she was seen in clinic, and within less than 1 hour, large serous fluid filled blister appeared.  Blister has since opened, and knee has been covered with gauze and foam tape dressing over the weekend.  Dressing removed (mod amount serous drainage noted to dressing) and knee cleansed with easi cleanse foam wipes and patted dry.  Large area of erythema receding, currently measures 11x8 cm encompassing open blister partial thickness tissue loss measuring 5x6 cm with moist pink wound bed.  Intact libby wound skin painted with cavilon.  Melgisorb AG cut to size and placed in wound bed.  Covered with Mepilex non bordered foam, and secured with SofForm gauze.  Patient tolerated wound care well.  She received pain medication from primary nurse OMID Jefferson LPN prior to wound care.  JENNIFER Hernandez NP at bedside at end of wound care and patient will be discharged today.  Patient educated using teach back method wound care required at home, and provided with Melgisorb AG, Mepilex, and Kerlex to use for care.  Instructed to follow up with PCP if wound worsens or when supplies run out for further instructions.  Please see below for wound care recommendations and picture:    Left Knee Wound Care:  1. Cleanse with Easi Cleanse foam wipes (or gentle soap and water at home)  2. Pat dry  3. Paint intact periwound skin with cavilon skin barrier  4. Cut Melgisorb AG to size and place on open wound bed  5. Cover with Mepilex non  bordered foam  6. Secure dressing with Kerlex gauze.  7. Change every 3 days and prn if soiled

## 2017-05-22 NOTE — DISCHARGE SUMMARY
[facility] Hospital Medicine  Discharge Summary      Patient Name: Genny Calixto  MRN: [id number]  Admission Date: 5/18/2017  Hospital Length of Stay: 4 days  Discharge Date and Time:  05/22/2017 1:59 PM  Attending Physician: Antionette Auguste MD   Discharging Provider: Marcos Hernandez NP  Primary Care Provider: Xavier Arellano MD      HPI:   Ms. Calixto is a 52 y/o obese  female with h/o anxiety,chronic pain syndrome followed by a pain management specialist, presents to the ED c/o left knee redness, pain for past couple of days, worsened since early this morning. Denies fever or chills. CT left leg shows mild amount of edema in the subcutaneous fat in the anteromedial aspect of the left lower extremity consistent with cellulitis. No evidence of spread to the bone or knee joint. See image of left knee in chart. Blood cultures obtained. Patient received Vanc and Cefepime in the ED. Admitted for angry looking left knee cellulitis.      * No surgery found *      Indwelling Lines/Drains at time of discharge:   Lines/Drains/Airways          No matching active lines, drains, or airways        Hospital Course:   51 year old female admitted with left knee cellulitis. CT with contrast consistent with cellulitis without evidence of increased fluid or osteomyelitis. She was treated with Vancomycin and Cefepime. Blood cultures are currently negative. She remained afebrile. Labs also remained stable. She was seen by Wound care services and recommendations were made for dressing changes. She will continue Bactrim x 7 days. She requested orthopedic evaluation due to acute infection and history of knee surgeries. Appt has been made with Dr. Fontaine. Patient seen and examined on date of discharge and deemed suitable.        Consults:   Consults         Status Ordering Provider     Pharmacy to dose Vancomycin consult  Once     Provider:  (Not yet assigned)    Acknowledged ABDIAZIZ SCHULTZ           Significant Diagnostic Studies: Labs:   CMP   Recent Labs  Lab 05/21/17  0659      K 4.2      CO2 25      BUN 13   CREATININE 0.7   CALCIUM 9.5   ANIONGAP 9   ESTGFRAFRICA >60   EGFRNONAA >60    and CBC   Recent Labs  Lab 05/21/17  0659 05/22/17  0512   WBC 8.55 8.05   HGB 13.2 13.1   HCT 38.2 38.4    220       Pending Diagnostic Studies:     None        Final Active Diagnoses:    Diagnosis Date Noted POA    PRINCIPAL PROBLEM:  Cellulitis of left knee [L03.116] 05/18/2017 Yes    Transient elevated blood pressure [R03.0] 05/20/2017 Yes    Chronic low back pain without sciatica [M54.5, G89.29] 03/03/2017 Yes    Anxiety [F41.9] 02/24/2017 Yes    Obesity (BMI 35.0-39.9 without comorbidity) [E66.9] 02/24/2017 Yes      Problems Resolved During this Admission:    Diagnosis Date Noted Date Resolved POA      No new Assessment & Plan notes have been filed under this hospital service since the last note was generated.  Service: Hospital Medicine      Discharged Condition: stable    Disposition: Home or Self Care    Follow Up:    Follow-up Information     Pepe Fontaine MD. Go on 6/7/2017.    Specialty:  Orthopedic Surgery  Why:  Your appointment with orthopedic surgeon, Dr. Fontaine, has been scheduled for Wednesday, 06/07/17 at 1:00 pm  Contact information:  [address] [place] [address] [address]ina [place] [id number]  [phone]                 Patient Instructions:     Diet general     Activity as tolerated       Medications:  Reconciled Home Medications:   Current Discharge Medication List      START taking these medications    Details   sulfamethoxazole-trimethoprim 800-160mg (BACTRIM DS) 800-160 mg Tab Take 1 tablet by mouth 2 (two) times daily.  Qty: 12 tablet, Refills: 0         CONTINUE these medications which have NOT CHANGED    Details   diazePAM (VALIUM) 10 MG Tab Take 10 mg by mouth every 24 hours at night as needed  for anxiety or insomnia  Qty: 30 tablet, Refills: 3    Associated Diagnoses:  Anxiety; Insomnia, unspecified type      diphenhydrAMINE (BENADRYL) 25 mg capsule Take 25 mg by mouth every 6 (six) hours as needed  for Itching      hydrocodone-acetaminophen 7.5-325mg (NORCO) 7.5-325 mg per tablet Take 1 tablet by mouth every 6 (six) hours as needed for Pain.      ibuprofen (ADVIL,MOTRIN) 600 MG tablet Take 600 mg by mouth 2 (two) times daily      loratadine (CLARITIN) 10 mg tablet Take 10 mg by mouth daily      pseudoephedrine (SUDAFED) 120 mg 12 hr tablet Take 120 mg by mouth every 12 (twelve) hours      sumatriptan (IMITREX) 100 MG tablet TAKE 1 TABLET IF NEEDED, MAY REPEAT ONCE IN 1 HOUR. MAX 2 TABLETS IN 24 HOURS  Qty: 10 tablet, Refills: 2      ondansetron (ZOFRAN) 8 MG tablet Take 8 mg by mouth every 8 (eight) hours.           Time spent on the discharge of patient: >35 minutes    Marcos Hernandez NP  Department of Hospital Medicine  [facility]

## 2017-05-23 LAB
BACTERIA BLD CULT: NORMAL
BACTERIA BLD CULT: NORMAL

## 2017-05-24 ENCOUNTER — PATIENT OUTREACH (OUTPATIENT)
Dept: ADMINISTRATIVE | Facility: CLINIC | Age: 52
End: 2017-05-24
Payer: MEDICARE

## 2017-05-24 NOTE — PATIENT INSTRUCTIONS
Discharge Instructions for Cellulitis  You have been diagnosed with cellulitis. This is an infection in the deepest layer of the skin. In some cases, the infection also affects the muscle. Cellulitis is caused by bacteria. The bacteria can enter the body through broken skin. This can happen with a cut, scratch, animal bite, or an insect bite that has been scratched. You may have been treated in the hospital with antibiotics and fluids. You will likely be given a prescription for antibiotics to take at home. This sheet will help you take care of yourself at home.  Home care  When you are home:  · Take the prescribed antibiotic medicine you are given as directed until it is gone. Take it even if you feel better. It treats the infection and stops it from returning. Not taking all the medicine can make future infections hard to treat.  · Keep the infected area clean.  · When possible, raise the infected area above the level of your heart. This helps keep swelling down.  · Talk with your healthcare provider if you are in pain. Ask what kind of over-the-counter medicine you can take for pain.  · Apply clean bandages as advised.  · Take your temperature once a day for a week.  · Wash your hands often to prevent spreading the infection.  In the future, wash your hands before and after you touch cuts, scratches, or bandages. This will help prevent infection.   When to call your healthcare provider  Call your healthcare provider immediately if you have any of the following:  · Difficulty or pain when moving the joints above or below the infected area  · Discharge or pus draining from the area  · Fever of 100.4°F (38°C) or higher, or as directed by your healthcare provider  · Pain that gets worse in or around the infected   · Redness that gets worse in or around the infected area, particularly if the area of redness expands to a wider area  · Shaking chills  · Swelling of the infected area  · Vomiting   Date Last Reviewed:  8/1/2016  © 2316-9314 [employer]. [address] [place] [address], [place] [address]. All rights reserved. This information is not intended as a substitute for professional medical care. Always follow your healthcare professional's instructions.

## 2017-05-26 ENCOUNTER — OFFICE VISIT (OUTPATIENT)
Dept: INTERNAL MEDICINE | Facility: CLINIC | Age: 52
End: 2017-05-26
Payer: MEDICARE

## 2017-05-26 VITALS
RESPIRATION RATE: 16 BRPM | HEART RATE: 82 BPM | DIASTOLIC BLOOD PRESSURE: 72 MMHG | HEIGHT: 68 IN | SYSTOLIC BLOOD PRESSURE: 106 MMHG | OXYGEN SATURATION: 98 % | TEMPERATURE: 99 F

## 2017-05-26 DIAGNOSIS — M25.562 LEFT KNEE PAIN, UNSPECIFIED CHRONICITY: Primary | ICD-10-CM

## 2017-05-26 DIAGNOSIS — Z71.89 COUNSELING ON HEALTH PROMOTION AND DISEASE PREVENTION: ICD-10-CM

## 2017-05-26 PROCEDURE — 99495 TRANSJ CARE MGMT MOD F2F 14D: CPT | Mod: PBBFAC,PN | Performed by: INTERNAL MEDICINE

## 2017-05-26 PROCEDURE — 99999 PR PBB SHADOW E&M-EST. PATIENT-LVL IV: CPT | Mod: PBBFAC,,, | Performed by: INTERNAL MEDICINE

## 2017-05-26 PROCEDURE — 99214 OFFICE O/P EST MOD 30 MIN: CPT | Mod: PBBFAC,PN | Performed by: INTERNAL MEDICINE

## 2017-05-26 RX ORDER — HYDROCODONE BITARTRATE AND ACETAMINOPHEN 5; 325 MG/1; MG/1
1 TABLET ORAL EVERY 8 HOURS PRN
Qty: 30 TABLET | Refills: 0 | Status: SHIPPED | OUTPATIENT
Start: 2017-05-26 | End: 2017-06-05

## 2017-05-26 NOTE — PROGRESS NOTES
Transitional Care Note  Subjective:       Patient ID: Genny Calixto is a 51 y.o. female.  Chief Complaint: Hospital Follow Up and Recurrent Skin Infections    Family and/or Caretaker present at visit?  Yes  Diagnostic tests reviewed/disposition: I have reviewed all completed as well as pending diagnostic tests at the time of discharge.  Disease/illness education:   Home health/community services discussion/referrals: Patient does not have home health established from hospital visit.  They do not need home health.  If needed, we will set up home health for the patient.   Establishment or re-establishment of referral orders for community resources: No other necessary community resources.   Discussion with other health care providers: No discussion with other health care providers necessary.     HPI   Ms. Calixto is a patient of mine, who presents for follow up from hospitalization.    She was seen here on 5/16/17 for left knee pain, which she attributed to working out daily, including resistance training, at which time she was treated with steroid injection. Subsequently, she presented to the ED on 5/18/17 for worsening left knee pain, at which time she was dx'd with cellulitis of her left knee. There, ESR and CRP were found to be mildly elevated. No effusion or swelling to anterior knee to suspect effusion/septic joint was noted. Her IV abx were transitioned to oral Bactrim. Blood cultures were negative several hours later and she was discharged to home.     Review of Systems    Constitutional: Negative, chills last night.   Cardiovascular: Negative.   Respiratory: Negative.   Gastrointestinal: Negative.   Genitourinary: Negative.   Musculoskeletal: Negative.   Derm: +as in HPI.  Hematological: Negative, except bruises from IV sites.    Objective:      Physical Exam    GEN: A&O fully, NAD  PSYC: Normal affect  DERM: Medial left knee with healing healthy granulation tissue in a 3x4 cm region. No pus. No  bleeding.     LABS:  Blood Culture, Routine  No growth after 5 days.   Resulting Agency [facility]      Specimen Collected: 05/18/17 17:20 Last Resulted: 05/23/17 22:12 Order Details Lab and Collection Details Routing Result History                Lab Inquiry View Complete Results   Blood Culture #2  Order: [id number]   Status:  Final result   Visible to patient:  No (Not Released) Next appt:  06/07/2017 at 01:00 PM in Orthopedics (Pepe Fontaine MD)    8d ago   Blood Culture, Routine  No growth after 5 days.   Resulting Agency [facility]      Specimen Collected: 05/18/17 17:30                Assessment:       1. Left knee pain, unspecified chronicity: Controlled. She is scheduled for evaluation with Dr. Fontaine for her OA.    2.     Cellulitis, left knee: Improving. Counseled to complete Bactrim for full 7 day course, which she is doing.    Plan:   1.     Left knee pain: Will tx with Narco 5 for 10 days.     RTC in 1 month or sooner as needed.

## 2017-06-15 RX ORDER — SUMATRIPTAN SUCCINATE 100 MG/1
TABLET ORAL
Qty: 10 TABLET | Refills: 2 | Status: SHIPPED | OUTPATIENT
Start: 2017-06-15 | End: 2017-09-15 | Stop reason: SDUPTHER

## 2017-06-16 DIAGNOSIS — M25.562 LEFT KNEE PAIN, UNSPECIFIED CHRONICITY: Primary | ICD-10-CM

## 2017-06-20 PROCEDURE — 99495 TRANSJ CARE MGMT MOD F2F 14D: CPT | Mod: S$PBB,,, | Performed by: INTERNAL MEDICINE

## 2017-06-23 ENCOUNTER — TELEPHONE (OUTPATIENT)
Dept: INTERNAL MEDICINE | Facility: CLINIC | Age: 52
End: 2017-06-23

## 2017-06-23 NOTE — TELEPHONE ENCOUNTER
"Pt stated new pain management doctor does not want to prescribe valium or Doxapin- so pt stated she is now looking for a new pain management doctor and wants to know if  will cover the rxs in the mean time since "he's been doing it for me since I left my last one." I let her know  is out until Thursday the 29th, Pt stated that is okay and she would like a phone call back that Thursday or Friday!    "

## 2017-06-23 NOTE — TELEPHONE ENCOUNTER
----- Message from Clotilde Stout sent at 6/22/2017  2:31 PM CDT -----  Contact: pt  Please call pt @ [phone], pt states she have some questions and she will discuss with nurse, pt would like to hear from nurse before tomorrow evening.

## 2017-06-29 DIAGNOSIS — F41.9 ANXIETY: ICD-10-CM

## 2017-06-29 DIAGNOSIS — G47.00 INSOMNIA, UNSPECIFIED TYPE: ICD-10-CM

## 2017-06-29 RX ORDER — DIAZEPAM 10 MG/1
TABLET ORAL
Qty: 30 TABLET | Refills: 0 | Status: SHIPPED | OUTPATIENT
Start: 2017-06-29 | End: 2017-11-22 | Stop reason: SDUPTHER

## 2017-06-29 RX ORDER — DOXEPIN HYDROCHLORIDE 10 MG/1
10 CAPSULE ORAL NIGHTLY
Qty: 30 CAPSULE | Refills: 0 | Status: SHIPPED | OUTPATIENT
Start: 2017-06-29 | End: 2017-08-16 | Stop reason: SDUPTHER

## 2017-07-12 ENCOUNTER — PATIENT OUTREACH (OUTPATIENT)
Dept: ADMINISTRATIVE | Facility: HOSPITAL | Age: 52
End: 2017-07-12

## 2017-07-12 NOTE — LETTER
July 12, 2017    Genny Calixto  [address] [facility] [address]             [facility]  [address]  [place] [address]  Phone: [phone] Dear MsJanel Kennethzekelizett:    [facility] is committed to your overall health.  To help you get the most out of each of your visits, we will review your information to make sure you are up to date on all of your recommended tests and/or procedures.      Xavier Arellano MD has found that you may be due for   Health Maintenance Due   Topic    Hepatitis C Screening     TETANUS VACCINE     Mammogram     Colonoscopy         If you have had any of the above done at another facility, please bring the records or information with you so that your record at [facility] will be complete.    If you are currently taking medication, please bring it with you to your appointment for review.    We will be happy to assist you with scheduling any necessary appointments or you may contact the [facility] appointment desk at [phone] to schedule at your convenience.     Thank you for choosing [facility] for your healthcare needs,    Nancy CARVER LPN Care Coordinator  [facility]  [phone]

## 2017-08-16 RX ORDER — DOXEPIN HYDROCHLORIDE 10 MG/1
10 CAPSULE ORAL NIGHTLY
Qty: 30 CAPSULE | Refills: 0 | Status: SHIPPED | OUTPATIENT
Start: 2017-08-16 | End: 2017-10-05 | Stop reason: SDUPTHER

## 2017-09-15 RX ORDER — SUMATRIPTAN SUCCINATE 100 MG/1
TABLET ORAL
Qty: 10 TABLET | Refills: 2 | Status: SHIPPED | OUTPATIENT
Start: 2017-09-15 | End: 2017-11-21 | Stop reason: SDUPTHER

## 2017-10-05 RX ORDER — DOXEPIN HYDROCHLORIDE 10 MG/1
CAPSULE ORAL
Qty: 30 CAPSULE | Refills: 0 | Status: SHIPPED | OUTPATIENT
Start: 2017-10-05 | End: 2017-11-28 | Stop reason: SDUPTHER

## 2017-10-31 ENCOUNTER — TELEPHONE (OUTPATIENT)
Dept: INTERNAL MEDICINE | Facility: CLINIC | Age: 52
End: 2017-10-31

## 2017-10-31 DIAGNOSIS — G47.00 INSOMNIA, UNSPECIFIED TYPE: ICD-10-CM

## 2017-10-31 DIAGNOSIS — F41.9 ANXIETY: ICD-10-CM

## 2017-10-31 NOTE — TELEPHONE ENCOUNTER
----- Message from Liban Vilchis sent at 10/31/2017  3:57 PM CDT -----  Contact: pt  Pt is calling nurse staff regarding an authorization refill RX Diazepman 10 mg, and a 30 day. Pt call back [phone] to be advised. Thanks    [facility]/pharmacy #[id number] - [place] - [address] [address] [address]  [address] [address] [facility] [address]  Phone: [phone] Fax: [phone]

## 2017-11-01 RX ORDER — DIAZEPAM 10 MG/1
TABLET ORAL
Qty: 30 TABLET | Refills: 0 | OUTPATIENT
Start: 2017-11-01

## 2017-11-02 DIAGNOSIS — F41.9 ANXIETY: ICD-10-CM

## 2017-11-02 DIAGNOSIS — G47.00 INSOMNIA, UNSPECIFIED TYPE: ICD-10-CM

## 2017-11-03 RX ORDER — DIAZEPAM 10 MG/1
TABLET ORAL
Qty: 30 TABLET | Refills: 0 | OUTPATIENT
Start: 2017-11-03

## 2017-11-21 DIAGNOSIS — G47.00 INSOMNIA, UNSPECIFIED TYPE: ICD-10-CM

## 2017-11-21 DIAGNOSIS — F41.9 ANXIETY: ICD-10-CM

## 2017-11-21 RX ORDER — SUMATRIPTAN SUCCINATE 100 MG/1
TABLET ORAL
Qty: 10 TABLET | Refills: 2 | Status: SHIPPED | OUTPATIENT
Start: 2017-11-21 | End: 2018-01-17 | Stop reason: SDUPTHER

## 2017-11-22 RX ORDER — DIAZEPAM 10 MG/1
TABLET ORAL
Qty: 30 TABLET | Refills: 0 | Status: SHIPPED | OUTPATIENT
Start: 2017-11-22 | End: 2017-11-28 | Stop reason: SDUPTHER

## 2017-11-28 ENCOUNTER — LAB VISIT (OUTPATIENT)
Dept: LAB | Facility: HOSPITAL | Age: 52
End: 2017-11-28
Attending: INTERNAL MEDICINE
Payer: MEDICARE

## 2017-11-28 ENCOUNTER — OFFICE VISIT (OUTPATIENT)
Dept: INTERNAL MEDICINE | Facility: CLINIC | Age: 52
End: 2017-11-28
Payer: MEDICARE

## 2017-11-28 VITALS
RESPIRATION RATE: 18 BRPM | TEMPERATURE: 98 F | OXYGEN SATURATION: 99 % | BODY MASS INDEX: 29.77 KG/M2 | SYSTOLIC BLOOD PRESSURE: 132 MMHG | HEART RATE: 97 BPM | WEIGHT: 196.44 LBS | HEIGHT: 68 IN | DIASTOLIC BLOOD PRESSURE: 82 MMHG

## 2017-11-28 DIAGNOSIS — Z71.89 COUNSELING ON HEALTH PROMOTION AND DISEASE PREVENTION: Primary | ICD-10-CM

## 2017-11-28 DIAGNOSIS — Z12.39 BREAST CANCER SCREENING: ICD-10-CM

## 2017-11-28 DIAGNOSIS — Z71.89 COUNSELING ON HEALTH PROMOTION AND DISEASE PREVENTION: ICD-10-CM

## 2017-11-28 DIAGNOSIS — E55.9 VITAMIN D DEFICIENCY: ICD-10-CM

## 2017-11-28 DIAGNOSIS — G47.00 INSOMNIA, UNSPECIFIED TYPE: ICD-10-CM

## 2017-11-28 DIAGNOSIS — F41.9 ANXIETY: ICD-10-CM

## 2017-11-28 DIAGNOSIS — F34.1 DYSTHYMIA: ICD-10-CM

## 2017-11-28 LAB — 25(OH)D3+25(OH)D2 SERPL-MCNC: 16 NG/ML

## 2017-11-28 PROCEDURE — G0008 ADMIN INFLUENZA VIRUS VAC: HCPCS | Mod: PBBFAC,PN

## 2017-11-28 PROCEDURE — 99214 OFFICE O/P EST MOD 30 MIN: CPT | Mod: S$PBB,,, | Performed by: INTERNAL MEDICINE

## 2017-11-28 PROCEDURE — 36415 COLL VENOUS BLD VENIPUNCTURE: CPT | Mod: PO

## 2017-11-28 PROCEDURE — 99999 PR PBB SHADOW E&M-EST. PATIENT-LVL V: CPT | Mod: PBBFAC,,, | Performed by: INTERNAL MEDICINE

## 2017-11-28 PROCEDURE — 86803 HEPATITIS C AB TEST: CPT

## 2017-11-28 PROCEDURE — 82306 VITAMIN D 25 HYDROXY: CPT

## 2017-11-28 PROCEDURE — 99215 OFFICE O/P EST HI 40 MIN: CPT | Mod: PBBFAC,PN,25 | Performed by: INTERNAL MEDICINE

## 2017-11-28 RX ORDER — DIAZEPAM 5 MG/1
TABLET ORAL
Qty: 30 TABLET | Refills: 0 | Status: SHIPPED | OUTPATIENT
Start: 2017-11-28 | End: 2017-11-29 | Stop reason: SDUPTHER

## 2017-11-28 RX ORDER — DOXEPIN HYDROCHLORIDE 10 MG/1
10 CAPSULE ORAL NIGHTLY
Qty: 90 CAPSULE | Refills: 3 | Status: SHIPPED | OUTPATIENT
Start: 2017-11-28 | End: 2018-11-18 | Stop reason: SDUPTHER

## 2017-11-28 NOTE — PROGRESS NOTES
Subjective:      Patient ID: Genny Calixto is a 52 y.o. female.    Chief Complaint: Annual Exam and Medication Refill      HPI  Ms. Calixto is a patient of mine, who presents for annual and rx refill. No problems. She reports continued weight loss, which she attributes to [employer], including gym 5-6 d/wk.    Past Medical History:   Diagnosis Date    Reflex sympathetic dystrophy     Vertigo      Past Surgical History:   Procedure Laterality Date    BLADDER SURGERY      CHOLECYSTECTOMY      facet injections  02/14/2017    L3, L4, L5, S1 Done by Dr. Lara    HYSTERECTOMY      KNEE SURGERY      TONSILLECTOMY       Social History     Social History    Marital status:      Spouse name: N/A    Number of children: N/A    Years of education: N/A     Occupational History    Not on file.     Social History Main Topics    Smoking status: Former Smoker    Smokeless tobacco: Never Used    Alcohol use No    Drug use: No    Sexual activity: No     Other Topics Concern    Not on file     Social History Narrative    Breakfast: Fruit & organic oatmeal; water or tea w/ lemon    Lunch: Salads: spinach leaves, iceberg lettuce, tomatoes, avaccado, light dressing or protein smoothie    Dinner: Grilled or broiled chicken or fish (no pork since 3/2017; red meat only 5 times since 3/2017)    Snacks: Fruit    Eats out: 1x/wk    Water: 96 oz/d    PA: 5-6 d/wk; basketball    Goal weight is 160 lb         Family History   Problem Relation Age of Onset    Stroke Mother     Hypertension Mother     COPD Father     Drug abuse Brother        Current Outpatient Prescriptions:     diazePAM (VALIUM) 5 MG tablet, Take 10 mg by mouth every 24 hours at night as needed  for anxiety or insomnia, Disp: 30 tablet, Rfl: 0    diphenhydrAMINE (BENADRYL) 25 mg capsule, Take 25 mg by mouth every 6 (six) hours as needed  for Itching, Disp: , Rfl:     doxepin (SINEQUAN) 10 MG capsule, TAKE ONE CAPSULE BY MOUTH EVERY EVENING,  "Disp: 30 capsule, Rfl: 0    ibuprofen (ADVIL,MOTRIN) 600 MG tablet, Take 600 mg by mouth 2 (two) times daily, Disp: , Rfl:     LACTOBACILLUS COMBO NO.6 (PROBIOTIC COMPLEX ORAL), Take by mouth once daily at 6am., Disp: , Rfl:     loratadine (CLARITIN) 10 mg tablet, Take 10 mg by mouth daily, Disp: , Rfl:     ondansetron (ZOFRAN) 8 MG tablet, Take 8 mg by mouth every 8 (eight) hours., Disp: , Rfl:     pseudoephedrine (SUDAFED) 120 mg 12 hr tablet, Take 120 mg by mouth every 12 (twelve) hours, Disp: , Rfl:     sumatriptan (IMITREX) 100 MG tablet, TAKE 1 TABLET IF NEEDED, MAY REPEAT ONCE IN 1 HOUR. MAX 2 TABLETS IN 24 HOURS, Disp: 10 tablet, Rfl: 2    Current Facility-Administered Medications:     lidocaine (PF) 10 mg/ml (1%) injection 10 mg, 1 mL, Other, 1 time in Clinic/HOD, Xavier Arellano MD    triamcinolone acetonide injection 40 mg, 40 mg, Intra-articular, 1 time in Clinic/HOD, aXvier Arellano MD    Review of patient's allergies indicates:   Allergen Reactions    Erythromycin     Morphine Nausea And Vomiting    Opioids - morphine analogues         Review of Systems   Constitutional: Negative.   Cardiovascular: Negative.   Respiratory: Negative.   Gastrointestinal: Negative.   Genitourinary: Negative.   Musculoskeletal: Negative.   Derm: Negative.  Allergic/Immunologic: Negative.   Endocrine: Negative.   Hematological: Negative.   Neurological: Negative.   Psychiatric/Behavioral: Negative.     Objective:     /82   Pulse 97   Temp 98.1 °F (36.7 °C) (Tympanic)   Resp 18   Ht 5' 8" (1.727 m)   Wt 89.1 kg (196 lb 6.9 oz)   SpO2 99%   BMI 29.87 kg/m²     Physical Exam  GEN: A&O fully, NAD  PSYC: Normal affect  HEENT: OP: Clear, no LAD, no thyroid masses  CV: RRR, no M/G/R  PULM: CTA bilaterally, no wheezes, rales  GI: S/NT/ND, normal bowel sounds  EXT: No C/C/E  NEURO: CN II-XII intact, 5/5 strength globally, no sensory losses, normal tandem gait, normal Romberg, 2+ DTRs globally    Lab Results "   Component Value Date    WBC 8.05 05/22/2017    HGB 13.1 05/22/2017    HCT 38.4 05/22/2017     05/22/2017    CHOL 164 03/07/2017    TRIG 107 03/07/2017    HDL 48 03/07/2017    ALT 18 05/18/2017    AST 21 05/18/2017     05/21/2017    K 4.2 05/21/2017     05/21/2017    CREATININE 0.7 05/21/2017    BUN 13 05/21/2017    CO2 25 05/21/2017    TSH 2.406 03/07/2017    INR 1.0 01/12/2015    HGBA1C 5.8 03/07/2017       Assessment:     1. Counseling on health promotion and disease prevention    2. Breast cancer screening    3. Anxiety    4. Insomnia, unspecified type      Plan:   Counseling on health promotion and disease prevention  -     Ambulatory consult to Ophthalmology  -     Mammo Digital Screening Bilat with CAD; Future; Expected date: 11/28/2017  -     Case request GI: COLONOSCOPY    Breast cancer screening  -     Mammo Digital Screening Bilat with CAD; Future; Expected date: 11/28/2017    Anxiety  -     diazePAM (VALIUM) 5 MG tablet; Take 10 mg by mouth every 24 hours at night as needed  for anxiety or insomnia  Dispense: 30 tablet; Refill: 0    Insomnia, unspecified type  -     diazePAM (VALIUM) 5 MG tablet; Take 10 mg by mouth every 24 hours at night as needed  for anxiety or insomnia  Dispense: 30 tablet; Refill: 0        RTC in 3 months RE overweight, anxiety, depression or sooner as needed

## 2017-11-29 ENCOUNTER — PATIENT MESSAGE (OUTPATIENT)
Dept: INTERNAL MEDICINE | Facility: CLINIC | Age: 52
End: 2017-11-29

## 2017-11-29 DIAGNOSIS — E55.9 VITAMIN D DEFICIENCY: Primary | ICD-10-CM

## 2017-11-29 LAB — HCV AB SERPL QL IA: NEGATIVE

## 2017-11-29 RX ORDER — ERGOCALCIFEROL 1.25 MG/1
50000 CAPSULE ORAL
Qty: 12 CAPSULE | Refills: 3 | Status: SHIPPED | OUTPATIENT
Start: 2017-11-29 | End: 2022-07-01

## 2017-11-29 RX ORDER — DIAZEPAM 5 MG/1
TABLET ORAL
Qty: 30 TABLET | Refills: 0 | Status: SHIPPED | OUTPATIENT
Start: 2017-11-29 | End: 2017-11-30 | Stop reason: DRUGHIGH

## 2017-11-30 RX ORDER — DIAZEPAM 5 MG/1
5 TABLET ORAL
Qty: 30 TABLET | Refills: 0 | Status: SHIPPED | OUTPATIENT
Start: 2017-11-30 | End: 2017-12-29 | Stop reason: SDUPTHER

## 2017-11-30 NOTE — TELEPHONE ENCOUNTER
Please see pt's my chart message. She stated [facility] in [place] is trying to give her a lower dosed medication then  prescribed so she would like the rx sent to [facility] in [place] and cancel the rx from [facility]. Please review and advise?

## 2017-12-05 ENCOUNTER — PATIENT MESSAGE (OUTPATIENT)
Dept: INTERNAL MEDICINE | Facility: CLINIC | Age: 52
End: 2017-12-05

## 2017-12-12 ENCOUNTER — PATIENT OUTREACH (OUTPATIENT)
Dept: ADMINISTRATIVE | Facility: HOSPITAL | Age: 52
End: 2017-12-12

## 2017-12-12 NOTE — PROGRESS NOTES
Spoke with pt re scheduling annual mammogram. Pt stated will call back to schedule appt. (1ST ATTEMPT)

## 2017-12-29 RX ORDER — DIAZEPAM 5 MG/1
TABLET ORAL
Qty: 30 TABLET | Refills: 0 | Status: SHIPPED | OUTPATIENT
Start: 2017-12-29 | End: 2018-01-02 | Stop reason: SDUPTHER

## 2018-01-02 RX ORDER — DIAZEPAM 5 MG/1
TABLET ORAL
Qty: 30 TABLET | Refills: 0 | Status: SHIPPED | OUTPATIENT
Start: 2018-01-02 | End: 2018-03-01 | Stop reason: SDUPTHER

## 2018-01-17 DIAGNOSIS — G47.00 INSOMNIA, UNSPECIFIED TYPE: ICD-10-CM

## 2018-01-17 DIAGNOSIS — F41.9 ANXIETY: ICD-10-CM

## 2018-01-17 RX ORDER — DIAZEPAM 10 MG/1
TABLET ORAL
Qty: 30 TABLET | Refills: 0 | Status: SHIPPED | OUTPATIENT
Start: 2018-01-17 | End: 2018-04-27 | Stop reason: SDUPTHER

## 2018-01-17 RX ORDER — SUMATRIPTAN SUCCINATE 100 MG/1
TABLET ORAL
Qty: 10 TABLET | Refills: 2 | Status: SHIPPED | OUTPATIENT
Start: 2018-01-17 | End: 2018-03-28 | Stop reason: SDUPTHER

## 2018-03-01 ENCOUNTER — PATIENT OUTREACH (OUTPATIENT)
Dept: ADMINISTRATIVE | Facility: HOSPITAL | Age: 53
End: 2018-03-01

## 2018-03-01 DIAGNOSIS — G47.00 INSOMNIA, UNSPECIFIED TYPE: ICD-10-CM

## 2018-03-01 DIAGNOSIS — F41.9 ANXIETY: ICD-10-CM

## 2018-03-02 NOTE — TELEPHONE ENCOUNTER
Received refill request from [facility] for Diazepam 5mg. DAFNE 11/28/2017. Please review and advise.

## 2018-03-06 RX ORDER — DIAZEPAM 5 MG/1
TABLET ORAL
Qty: 30 TABLET | Refills: 0 | Status: SHIPPED | OUTPATIENT
Start: 2018-03-06 | End: 2018-04-29 | Stop reason: SDUPTHER

## 2018-03-06 RX ORDER — DIAZEPAM 10 MG/1
TABLET ORAL
Qty: 30 TABLET | Refills: 0 | OUTPATIENT
Start: 2018-03-06

## 2018-03-29 DIAGNOSIS — F41.9 ANXIETY: ICD-10-CM

## 2018-03-29 DIAGNOSIS — G47.00 INSOMNIA, UNSPECIFIED TYPE: ICD-10-CM

## 2018-03-29 RX ORDER — DIAZEPAM 10 MG/1
TABLET ORAL
Qty: 30 TABLET | Refills: 0 | OUTPATIENT
Start: 2018-03-29

## 2018-03-29 RX ORDER — SUMATRIPTAN SUCCINATE 100 MG/1
TABLET ORAL
Qty: 10 TABLET | Refills: 11 | Status: SHIPPED | OUTPATIENT
Start: 2018-03-29 | End: 2019-03-19 | Stop reason: SDUPTHER

## 2018-04-27 DIAGNOSIS — G47.00 INSOMNIA, UNSPECIFIED TYPE: ICD-10-CM

## 2018-04-27 DIAGNOSIS — F41.9 ANXIETY: ICD-10-CM

## 2018-04-27 RX ORDER — DIAZEPAM 10 MG/1
TABLET ORAL
Qty: 30 TABLET | Refills: 0 | Status: SHIPPED | OUTPATIENT
Start: 2018-04-27 | End: 2018-05-25 | Stop reason: SDUPTHER

## 2018-04-27 RX ORDER — DIAZEPAM 5 MG/1
TABLET ORAL
Qty: 30 TABLET | Refills: 0 | OUTPATIENT
Start: 2018-04-27

## 2018-04-30 RX ORDER — DIAZEPAM 5 MG/1
TABLET ORAL
Qty: 30 TABLET | Refills: 0 | Status: SHIPPED | OUTPATIENT
Start: 2018-04-30 | End: 2018-06-26 | Stop reason: SDUPTHER

## 2018-05-07 ENCOUNTER — PATIENT OUTREACH (OUTPATIENT)
Dept: ADMINISTRATIVE | Facility: HOSPITAL | Age: 53
End: 2018-05-07

## 2018-05-07 NOTE — PROGRESS NOTES
Spoke with pt re scheduling mammogram. Mammogram scheduled 05/23/18 at 3:45. Instructed pt on appt. Pt verbalized understanding.

## 2018-05-22 DIAGNOSIS — G47.00 INSOMNIA, UNSPECIFIED TYPE: ICD-10-CM

## 2018-05-22 DIAGNOSIS — F41.9 ANXIETY: ICD-10-CM

## 2018-05-22 RX ORDER — DIAZEPAM 10 MG/1
TABLET ORAL
Qty: 30 TABLET | Refills: 0 | OUTPATIENT
Start: 2018-05-22

## 2018-05-22 NOTE — TELEPHONE ENCOUNTER
Ms. Almaguer needs to be seen in clinic for follow up. She usually refills her 30 tabs of diazepam 10 mg 1 po q24h prn anxiety or insomnia every 2-4 months, but this request is only 3 weeks since her last.

## 2018-05-24 ENCOUNTER — HOSPITAL ENCOUNTER (OUTPATIENT)
Dept: RADIOLOGY | Facility: HOSPITAL | Age: 53
Discharge: HOME | End: 2018-05-24
Attending: INTERNAL MEDICINE
Payer: MEDICARE

## 2018-05-24 VITALS — WEIGHT: 196 LBS | HEIGHT: 68 IN | BODY MASS INDEX: 29.7 KG/M2

## 2018-05-24 DIAGNOSIS — Z12.39 BREAST CANCER SCREENING: ICD-10-CM

## 2018-05-24 DIAGNOSIS — Z71.89 COUNSELING ON HEALTH PROMOTION AND DISEASE PREVENTION: ICD-10-CM

## 2018-05-24 PROCEDURE — 77063 BREAST TOMOSYNTHESIS BI: CPT | Mod: 26,,, | Performed by: RADIOLOGY

## 2018-05-24 PROCEDURE — 77067 SCR MAMMO BI INCL CAD: CPT | Mod: 26,,, | Performed by: RADIOLOGY

## 2018-05-24 PROCEDURE — 77067 SCR MAMMO BI INCL CAD: CPT | Mod: TC,PO

## 2018-05-25 DIAGNOSIS — F41.9 ANXIETY: ICD-10-CM

## 2018-05-25 DIAGNOSIS — G47.00 INSOMNIA, UNSPECIFIED TYPE: ICD-10-CM

## 2018-05-25 RX ORDER — DIAZEPAM 10 MG/1
TABLET ORAL
Qty: 30 TABLET | Refills: 0 | Status: SHIPPED | OUTPATIENT
Start: 2018-05-25 | End: 2018-06-26 | Stop reason: SDUPTHER

## 2018-06-26 DIAGNOSIS — F41.9 ANXIETY: ICD-10-CM

## 2018-06-26 DIAGNOSIS — G47.00 INSOMNIA, UNSPECIFIED TYPE: ICD-10-CM

## 2018-06-26 RX ORDER — DIAZEPAM 10 MG/1
TABLET ORAL
Qty: 30 TABLET | Refills: 0 | Status: SHIPPED | OUTPATIENT
Start: 2018-06-26 | End: 2018-07-27 | Stop reason: SDUPTHER

## 2018-07-01 RX ORDER — DIAZEPAM 5 MG/1
TABLET ORAL
Qty: 30 TABLET | Refills: 0 | Status: SHIPPED | OUTPATIENT
Start: 2018-07-01 | End: 2018-08-02 | Stop reason: SDUPTHER

## 2018-07-27 DIAGNOSIS — G47.00 INSOMNIA, UNSPECIFIED TYPE: ICD-10-CM

## 2018-07-27 DIAGNOSIS — F41.9 ANXIETY: ICD-10-CM

## 2018-07-27 RX ORDER — DIAZEPAM 10 MG/1
TABLET ORAL
Qty: 30 TABLET | Refills: 0 | Status: SHIPPED | OUTPATIENT
Start: 2018-07-27 | End: 2018-08-24 | Stop reason: SDUPTHER

## 2018-08-02 RX ORDER — DIAZEPAM 5 MG/1
TABLET ORAL
Qty: 30 TABLET | Refills: 0 | Status: SHIPPED | OUTPATIENT
Start: 2018-08-02 | End: 2018-08-31 | Stop reason: SDUPTHER

## 2018-08-24 DIAGNOSIS — G47.00 INSOMNIA, UNSPECIFIED TYPE: ICD-10-CM

## 2018-08-24 DIAGNOSIS — F41.9 ANXIETY: ICD-10-CM

## 2018-08-24 RX ORDER — DIAZEPAM 10 MG/1
TABLET ORAL
Qty: 30 TABLET | Refills: 0 | Status: SHIPPED | OUTPATIENT
Start: 2018-08-24 | End: 2018-08-31 | Stop reason: SDUPTHER

## 2018-08-30 ENCOUNTER — PATIENT OUTREACH (OUTPATIENT)
Dept: ADMINISTRATIVE | Facility: HOSPITAL | Age: 53
End: 2018-08-30

## 2018-08-31 RX ORDER — DIAZEPAM 5 MG/1
TABLET ORAL
Qty: 30 TABLET | Refills: 0 | Status: SHIPPED | OUTPATIENT
Start: 2018-08-31 | End: 2018-10-02 | Stop reason: SDUPTHER

## 2018-09-24 DIAGNOSIS — G47.00 INSOMNIA, UNSPECIFIED TYPE: ICD-10-CM

## 2018-09-24 DIAGNOSIS — F41.9 ANXIETY: ICD-10-CM

## 2018-09-24 RX ORDER — DIAZEPAM 10 MG/1
TABLET ORAL
Qty: 30 TABLET | Refills: 0 | Status: SHIPPED | OUTPATIENT
Start: 2018-09-24 | End: 2018-10-23 | Stop reason: SDUPTHER

## 2018-10-02 RX ORDER — DIAZEPAM 5 MG/1
TABLET ORAL
Qty: 30 TABLET | Refills: 0 | Status: SHIPPED | OUTPATIENT
Start: 2018-10-02 | End: 2018-11-01 | Stop reason: SDUPTHER

## 2018-10-23 DIAGNOSIS — F41.9 ANXIETY: ICD-10-CM

## 2018-10-23 DIAGNOSIS — G47.00 INSOMNIA, UNSPECIFIED TYPE: ICD-10-CM

## 2018-10-23 RX ORDER — DIAZEPAM 10 MG/1
TABLET ORAL
Qty: 30 TABLET | Refills: 0 | Status: SHIPPED | OUTPATIENT
Start: 2018-10-23 | End: 2018-11-21 | Stop reason: SDUPTHER

## 2018-11-01 RX ORDER — DIAZEPAM 5 MG/1
TABLET ORAL
Qty: 30 TABLET | Refills: 0 | Status: SHIPPED | OUTPATIENT
Start: 2018-11-01 | End: 2018-12-07 | Stop reason: SDUPTHER

## 2018-11-15 ENCOUNTER — PATIENT OUTREACH (OUTPATIENT)
Dept: ADMINISTRATIVE | Facility: HOSPITAL | Age: 53
End: 2018-11-15

## 2018-11-15 NOTE — PROGRESS NOTES
Contacted patient to follow up on overdue Colonoscopy. Patient states her brother has appointment on Tuesday and she will discuss it with the doctor then. The fit kit was also offered.

## 2018-11-18 DIAGNOSIS — F34.1 DYSTHYMIA: ICD-10-CM

## 2018-11-19 RX ORDER — DOXEPIN HYDROCHLORIDE 10 MG/1
10 CAPSULE ORAL NIGHTLY
Qty: 90 CAPSULE | Refills: 3 | Status: SHIPPED | OUTPATIENT
Start: 2018-11-19 | End: 2019-11-14 | Stop reason: SDUPTHER

## 2018-11-21 DIAGNOSIS — F41.9 ANXIETY: ICD-10-CM

## 2018-11-21 DIAGNOSIS — G47.00 INSOMNIA, UNSPECIFIED TYPE: ICD-10-CM

## 2018-11-21 RX ORDER — DIAZEPAM 10 MG/1
TABLET ORAL
Qty: 30 TABLET | Refills: 0 | Status: SHIPPED | OUTPATIENT
Start: 2018-11-21 | End: 2018-12-07 | Stop reason: SDUPTHER

## 2018-12-04 RX ORDER — DIAZEPAM 5 MG/1
TABLET ORAL
Qty: 30 TABLET | Refills: 0 | OUTPATIENT
Start: 2018-12-04

## 2018-12-04 NOTE — TELEPHONE ENCOUNTER
Spoke with patient. She stated she needs to be tested for all possible STD's. Made an appointment for tomorrow at 1:40pm with the patient. Patient verbalized understanding.

## 2018-12-04 NOTE — TELEPHONE ENCOUNTER
----- Message from Eric Hill sent at 12/4/2018 12:40 PM CST -----  Contact: pt  Pt is requesting a call back from the nurse in regards to the pt getting testing for STD's  [phone]

## 2018-12-05 ENCOUNTER — OFFICE VISIT (OUTPATIENT)
Dept: INTERNAL MEDICINE | Facility: CLINIC | Age: 53
End: 2018-12-05
Payer: MEDICARE

## 2018-12-05 ENCOUNTER — LAB VISIT (OUTPATIENT)
Dept: LAB | Facility: HOSPITAL | Age: 53
End: 2018-12-05
Attending: INTERNAL MEDICINE
Payer: MEDICARE

## 2018-12-05 VITALS
OXYGEN SATURATION: 98 % | HEART RATE: 96 BPM | DIASTOLIC BLOOD PRESSURE: 80 MMHG | WEIGHT: 155.44 LBS | BODY MASS INDEX: 23.56 KG/M2 | SYSTOLIC BLOOD PRESSURE: 128 MMHG | RESPIRATION RATE: 18 BRPM | HEIGHT: 68 IN | TEMPERATURE: 99 F

## 2018-12-05 DIAGNOSIS — Z71.89 COUNSELING ON HEALTH PROMOTION AND DISEASE PREVENTION: ICD-10-CM

## 2018-12-05 DIAGNOSIS — Z11.4 ENCOUNTER FOR SCREENING FOR HIV: ICD-10-CM

## 2018-12-05 DIAGNOSIS — R82.90 MALODOROUS URINE: ICD-10-CM

## 2018-12-05 DIAGNOSIS — Z20.2 POSSIBLE EXPOSURE TO STD: Primary | ICD-10-CM

## 2018-12-05 DIAGNOSIS — R39.16 STRAINING TO VOID: ICD-10-CM

## 2018-12-05 DIAGNOSIS — Z12.11 SCREENING FOR COLON CANCER: ICD-10-CM

## 2018-12-05 DIAGNOSIS — Z20.2 POSSIBLE EXPOSURE TO STD: ICD-10-CM

## 2018-12-05 LAB
BASOPHILS # BLD AUTO: 0.06 K/UL
BASOPHILS NFR BLD: 0.7 %
BILIRUB SERPL-MCNC: NEGATIVE MG/DL
BLOOD URINE, POC: 250
COLOR, POC UA: ABNORMAL
DIFFERENTIAL METHOD: ABNORMAL
EOSINOPHIL # BLD AUTO: 0.1 K/UL
EOSINOPHIL NFR BLD: 1 %
ERYTHROCYTE [DISTWIDTH] IN BLOOD BY AUTOMATED COUNT: 12.8 %
GLUCOSE UR QL STRIP: NORMAL
HCT VFR BLD AUTO: 44.8 %
HGB BLD-MCNC: 14.8 G/DL
IMM GRANULOCYTES # BLD AUTO: 0.02 K/UL
IMM GRANULOCYTES NFR BLD AUTO: 0.2 %
KETONES UR QL STRIP: NEGATIVE
LEUKOCYTE ESTERASE URINE, POC: ABNORMAL
LYMPHOCYTES # BLD AUTO: 3.1 K/UL
LYMPHOCYTES NFR BLD: 33.6 %
MCH RBC QN AUTO: 32.8 PG
MCHC RBC AUTO-ENTMCNC: 33 G/DL
MCV RBC AUTO: 99 FL
MONOCYTES # BLD AUTO: 0.5 K/UL
MONOCYTES NFR BLD: 5.8 %
NEUTROPHILS # BLD AUTO: 5.4 K/UL
NEUTROPHILS NFR BLD: 58.7 %
NITRITE, POC UA: POSITIVE
NRBC BLD-RTO: 0 /100 WBC
PH, POC UA: 5
PLATELET # BLD AUTO: 263 K/UL
PMV BLD AUTO: 13.4 FL
PROTEIN, POC: 30
RBC # BLD AUTO: 4.51 M/UL
SPECIFIC GRAVITY, POC UA: 1.02
UROBILINOGEN, POC UA: NORMAL
WBC # BLD AUTO: 9.12 K/UL

## 2018-12-05 PROCEDURE — 99215 OFFICE O/P EST HI 40 MIN: CPT | Mod: PBBFAC,PN | Performed by: INTERNAL MEDICINE

## 2018-12-05 PROCEDURE — 87186 SC STD MICRODIL/AGAR DIL: CPT

## 2018-12-05 PROCEDURE — 87086 URINE CULTURE/COLONY COUNT: CPT

## 2018-12-05 PROCEDURE — 90686 IIV4 VACC NO PRSV 0.5 ML IM: CPT | Mod: PBBFAC,PN

## 2018-12-05 PROCEDURE — 99213 OFFICE O/P EST LOW 20 MIN: CPT | Mod: S$PBB,,, | Performed by: INTERNAL MEDICINE

## 2018-12-05 PROCEDURE — 81001 URINALYSIS AUTO W/SCOPE: CPT | Mod: PBBFAC,PN | Performed by: INTERNAL MEDICINE

## 2018-12-05 PROCEDURE — 87491 CHLMYD TRACH DNA AMP PROBE: CPT

## 2018-12-05 PROCEDURE — 86592 SYPHILIS TEST NON-TREP QUAL: CPT

## 2018-12-05 PROCEDURE — 99999 PR PBB SHADOW E&M-EST. PATIENT-LVL V: CPT | Mod: PBBFAC,,, | Performed by: INTERNAL MEDICINE

## 2018-12-05 PROCEDURE — 87088 URINE BACTERIA CULTURE: CPT

## 2018-12-05 PROCEDURE — 86703 HIV-1/HIV-2 1 RESULT ANTBDY: CPT

## 2018-12-05 PROCEDURE — 85025 COMPLETE CBC W/AUTO DIFF WBC: CPT

## 2018-12-05 PROCEDURE — 87077 CULTURE AEROBIC IDENTIFY: CPT

## 2018-12-05 PROCEDURE — 36415 COLL VENOUS BLD VENIPUNCTURE: CPT | Mod: PO

## 2018-12-05 RX ORDER — SULFAMETHOXAZOLE AND TRIMETHOPRIM 800; 160 MG/1; MG/1
1 TABLET ORAL 2 TIMES DAILY
Qty: 6 TABLET | Refills: 0 | Status: SHIPPED | OUTPATIENT
Start: 2018-12-05 | End: 2018-12-08

## 2018-12-05 NOTE — PROGRESS NOTES
Subjective:      Patient ID: Genny Calixto is a 53 y.o. female.    Chief Complaint: Exposure to STD      HPI     Ms. Genny Calixto is a patient of Xavier Arellano MD, who presents for exposure to STD.     She reports malodorous urine and some straining to urinate over the past 3 weeks. No f/c. +nausea, which she attributes to increased stress from ending of long-term relationship. No rashes.       Past Medical History:   Diagnosis Date    Anxiety     Takes 5-10 mg of valium qd prn anxiety (prescriptions for both on file, one from [facility] & other from )    Insomnia     Takes 5-10 mg of valium qhs prn insomnia (prescriptions for both on file, one from [facility] & other from )    Reflex sympathetic dystrophy     Vertigo      Past Surgical History:   Procedure Laterality Date    BLADDER SURGERY      CHOLECYSTECTOMY      facet injections  02/14/2017    L3, L4, L5, S1 Done by Dr. Lara    HYSTERECTOMY      KNEE SURGERY      TONSILLECTOMY       Social History     Socioeconomic History    Marital status:      Spouse name: Not on file    Number of children: Not on file    Years of education: Not on file    Highest education level: Not on file   Social Needs    Financial resource strain: Not on file    Food insecurity - worry: Not on file    Food insecurity - inability: Not on file    Transportation needs - medical: Not on file    Transportation needs - non-medical: Not on file   Occupational History    Not on file   Tobacco Use    Smoking status: Former Smoker    Smokeless tobacco: Never Used   Substance and Sexual Activity    Alcohol use: No    Drug use: No    Sexual activity: No   Other Topics Concern    Not on file   Social History Narrative    Breakfast: Fruit & organic oatmeal; water or tea w/ lemon    Lunch: Salads: spinach leaves, iceberg lettuce, tomatoes, avaccado, light dressing or protein smoothie    Dinner: Grilled or broiled chicken or fish (no pork since 3/2017;  red meat only 5 times since 3/2017)    Snacks: Fruit    Eats out: 1x/wk    Water: 96 oz/d    PA: 5-6 d/wk; basketball    Goal weight is 160 lb     Family History   Problem Relation Age of Onset    Stroke Mother     Hypertension Mother     COPD Father     Drug abuse Brother        Current Outpatient Medications:     diazePAM (VALIUM) 10 MG Tab, TAKE 1 TABLET BY MOUTH EVERY 24 HOURS AT NIGHT AS NEEDED FOR ANXIETY OR INSOMNIA, Disp: 30 tablet, Rfl: 0    diazePAM (VALIUM) 5 MG tablet, TAKE 1 TABLET BY MOUTH EVERY 24 HOURS AS NEEDED FOR ANXIETY OR INSOMNIA, Disp: 30 tablet, Rfl: 0    diphenhydrAMINE (BENADRYL) 25 mg capsule, Take 25 mg by mouth every 6 (six) hours as needed  for Itching, Disp: , Rfl:     doxepin (SINEQUAN) 10 MG capsule, TAKE 1 CAPSULE (10 MG TOTAL) BY MOUTH EVERY EVENING., Disp: 90 capsule, Rfl: 3    ergocalciferol (ERGOCALCIFEROL) 50,000 unit Cap, Take 1 capsule (50,000 Units total) by mouth every 7 days., Disp: 12 capsule, Rfl: 3    ibuprofen (ADVIL,MOTRIN) 600 MG tablet, Take 600 mg by mouth 2 (two) times daily, Disp: , Rfl:     LACTOBACILLUS COMBO NO.6 (PROBIOTIC COMPLEX ORAL), Take by mouth once daily at 6am., Disp: , Rfl:     loratadine (CLARITIN) 10 mg tablet, Take 10 mg by mouth daily, Disp: , Rfl:     ondansetron (ZOFRAN) 8 MG tablet, Take 8 mg by mouth every 8 (eight) hours., Disp: , Rfl:     pseudoephedrine (SUDAFED) 120 mg 12 hr tablet, Take 120 mg by mouth every 12 (twelve) hours, Disp: , Rfl:     sumatriptan (IMITREX) 100 MG tablet, TAKE 1 TABLET IF NEEDED, MAY REPEAT ONCE IN 1 HOUR. MAX 2 TABLETS IN 24 HOURS, Disp: 10 tablet, Rfl: 11    Current Facility-Administered Medications:     lidocaine (PF) 10 mg/ml (1%) injection 10 mg, 1 mL, Other, 1 time in Clinic/HOD, Xavier Arellano MD    triamcinolone acetonide injection 40 mg, 40 mg, Intra-articular, 1 time in Clinic/HOD, Xavier Arellano MD    Review of patient's allergies indicates:   Allergen Reactions    Erythromycin   "   Morphine Nausea And Vomiting    Opioids - morphine analogues         Review of Systems   All remaining systems negative    Objective:     /80 (BP Location: Left arm, Patient Position: Sitting, BP Method: Medium (Manual))   Pulse 96   Temp 98.5 °F (36.9 °C) (Tympanic)   Resp 18   Ht 5' 8" (1.727 m)   Wt 70.5 kg (155 lb 6.8 oz)   SpO2 98%   BMI 23.63 kg/m²     Physical Exam  GEN: A&O fully, NAD  PSYC: Normal affect      Lab Results   Component Value Date    WBC 8.05 05/22/2017    HGB 13.1 05/22/2017    HCT 38.4 05/22/2017     05/22/2017    CHOL 164 03/07/2017    TRIG 107 03/07/2017    HDL 48 03/07/2017    LDLCALC 94.6 03/07/2017    ALT 18 05/18/2017    AST 21 05/18/2017     05/21/2017    K 4.2 05/21/2017     05/21/2017    CREATININE 0.7 05/21/2017    BUN 13 05/21/2017    CO2 25 05/21/2017    CALCIUM 9.5 05/21/2017    INR 1.0 01/12/2015    HGBA1C 5.8 03/07/2017       Assessment:      1. Possible exposure to STD: She recently found out her male partner has had multiple partners.    2. Encounter for screening for HIV   3. Straining to void     4. Malodorous urine: Will r/o UTI.   5. Screening for colon cancer    6. Counseling on health promotion and disease prevention        Plan:   Possible exposure to STD  -     CBC auto differential; Future; Expected date: 12/05/2018  -     HIV 1/2 Ag/Ab (4th Gen); Future; Expected date: 12/05/2018  -     RPR; Future; Expected date: 12/05/2018  -     C. trachomatis/N. gonorrhoeae by AMP DNA [facility]; Urine    Encounter for screening for HIV   -     HIV 1/2 Ag/Ab (4th Gen); Future; Expected date: 12/05/2018    Straining to void   -     C. trachomatis/N. gonorrhoeae by AMP DNA [facility]; Urine    Malodorous urine  -     POCT URINE DIPSTICK WITH MICROSCOPE, AUTOMATED  -     Urine culture    Screening for colon cancer  -     Case request GI: COLONOSCOPY  -     Fecal Immunochemical Test (iFOBT); Future; Expected date: 12/05/2018    Counseling on health " promotion and disease prevention  -     Influenza - Quadrivalent (3 years & older)        Follow-up in about 7 months (around 7/5/2019), or if symptoms worsen or fail to improve, for annual.    I spent 25 minutes of time with patient 50% or more of which was discussing labs and plans of care.

## 2018-12-06 LAB
C TRACH DNA SPEC QL NAA+PROBE: NOT DETECTED
HIV 1+2 AB+HIV1 P24 AG SERPL QL IA: NEGATIVE
N GONORRHOEA DNA SPEC QL NAA+PROBE: NOT DETECTED
RPR SER QL: NORMAL

## 2018-12-07 RX ORDER — DIAZEPAM 5 MG/1
TABLET ORAL
Qty: 30 TABLET | Refills: 0 | Status: SHIPPED | OUTPATIENT
Start: 2018-12-07 | End: 2019-01-10 | Stop reason: SDUPTHER

## 2018-12-08 LAB — BACTERIA UR CULT: NORMAL

## 2018-12-20 DIAGNOSIS — F41.9 ANXIETY: ICD-10-CM

## 2018-12-20 DIAGNOSIS — G47.00 INSOMNIA, UNSPECIFIED TYPE: ICD-10-CM

## 2018-12-20 RX ORDER — DIAZEPAM 10 MG/1
TABLET ORAL
Qty: 30 TABLET | Refills: 0 | Status: SHIPPED | OUTPATIENT
Start: 2018-12-20 | End: 2019-01-10 | Stop reason: SDUPTHER

## 2018-12-24 ENCOUNTER — DOCUMENTATION ONLY (OUTPATIENT)
Dept: ENDOSCOPY | Facility: HOSPITAL | Age: 53
End: 2018-12-24

## 2018-12-31 ENCOUNTER — PATIENT OUTREACH (OUTPATIENT)
Dept: ADMINISTRATIVE | Facility: HOSPITAL | Age: 53
End: 2018-12-31

## 2018-12-31 NOTE — PROGRESS NOTES
Patient has not returned FITKIT at this time. Portal outreach sent to patient to remind them to return FITKIT.

## 2019-01-03 ENCOUNTER — PATIENT MESSAGE (OUTPATIENT)
Dept: ADMINISTRATIVE | Facility: HOSPITAL | Age: 54
End: 2019-01-03

## 2019-01-10 RX ORDER — DIAZEPAM 5 MG/1
TABLET ORAL
Qty: 30 TABLET | Refills: 0 | Status: SHIPPED | OUTPATIENT
Start: 2019-01-10 | End: 2019-01-23 | Stop reason: SDUPTHER

## 2019-01-23 DIAGNOSIS — G47.00 INSOMNIA, UNSPECIFIED TYPE: ICD-10-CM

## 2019-01-23 DIAGNOSIS — F41.9 ANXIETY: ICD-10-CM

## 2019-01-23 RX ORDER — DIAZEPAM 10 MG/1
TABLET ORAL
Qty: 30 TABLET | Refills: 0 | Status: SHIPPED | OUTPATIENT
Start: 2019-01-23 | End: 2019-02-12 | Stop reason: SDUPTHER

## 2019-01-24 ENCOUNTER — OFFICE VISIT (OUTPATIENT)
Dept: INTERNAL MEDICINE | Facility: CLINIC | Age: 54
End: 2019-01-24
Payer: MEDICARE

## 2019-01-24 VITALS
SYSTOLIC BLOOD PRESSURE: 136 MMHG | OXYGEN SATURATION: 98 % | HEIGHT: 68 IN | TEMPERATURE: 99 F | DIASTOLIC BLOOD PRESSURE: 82 MMHG | HEART RATE: 84 BPM | BODY MASS INDEX: 24.89 KG/M2 | WEIGHT: 164.25 LBS

## 2019-01-24 DIAGNOSIS — L08.9 INFECTED SKIN LESION: Primary | ICD-10-CM

## 2019-01-24 PROCEDURE — 99999 PR PBB SHADOW E&M-EST. PATIENT-LVL IV: ICD-10-PCS | Mod: PBBFAC,,, | Performed by: NURSE PRACTITIONER

## 2019-01-24 PROCEDURE — 99213 OFFICE O/P EST LOW 20 MIN: CPT | Mod: S$PBB,,, | Performed by: NURSE PRACTITIONER

## 2019-01-24 PROCEDURE — 99214 OFFICE O/P EST MOD 30 MIN: CPT | Mod: PBBFAC,PO | Performed by: NURSE PRACTITIONER

## 2019-01-24 PROCEDURE — 99999 PR PBB SHADOW E&M-EST. PATIENT-LVL IV: CPT | Mod: PBBFAC,,, | Performed by: NURSE PRACTITIONER

## 2019-01-24 PROCEDURE — 99213 PR OFFICE/OUTPT VISIT, EST, LEVL III, 20-29 MIN: ICD-10-PCS | Mod: S$PBB,,, | Performed by: NURSE PRACTITIONER

## 2019-01-24 RX ORDER — SULFAMETHOXAZOLE AND TRIMETHOPRIM 800; 160 MG/1; MG/1
1 TABLET ORAL 2 TIMES DAILY
Qty: 20 TABLET | Refills: 0 | Status: SHIPPED | OUTPATIENT
Start: 2019-01-24 | End: 2019-07-01

## 2019-01-24 NOTE — PROGRESS NOTES
"Subjective:      Patient ID: Genny Calixto is a 53 y.o. female.    Chief Complaint: Recurrent Skin Infections (lower back, started last night. Warm to touch and painful HX: staph )    HPI:  Patient states last night she noticed a pea size skin lesion to her right lower back, it has grown some over night.  It is tender, red.  Says she was hospitalized for staph in the past.  She attends a gym regularly, says she meticulously cleans the equipment before using.  No fever     Past Medical History:   Diagnosis Date    Anxiety     Takes 5-10 mg of valium qd prn anxiety (prescriptions for both on file, one from [facility] & other from )    Insomnia     Takes 5-10 mg of valium qhs prn insomnia (prescriptions for both on file, one from [facility] & other from )    Reflex sympathetic dystrophy     Vertigo        Past Surgical History:   Procedure Laterality Date    BLADDER SURGERY      CHOLECYSTECTOMY      facet injections  02/14/2017    L3, L4, L5, S1 Done by Dr. Lara    HYSTERECTOMY      KNEE SURGERY      TONSILLECTOMY         Lab Results   Component Value Date    WBC 9.12 12/05/2018    HGB 14.8 12/05/2018    HCT 44.8 12/05/2018     12/05/2018    CHOL 164 03/07/2017    TRIG 107 03/07/2017    HDL 48 03/07/2017    ALT 18 05/18/2017    AST 21 05/18/2017     05/21/2017    K 4.2 05/21/2017     05/21/2017    CREATININE 0.7 05/21/2017    BUN 13 05/21/2017    CO2 25 05/21/2017    TSH 2.406 03/07/2017    INR 1.0 01/12/2015    HGBA1C 5.8 03/07/2017       /82 (BP Location: Left arm, Patient Position: Sitting, BP Method: Large (Manual))   Pulse 84   Temp 98.6 °F (37 °C) (Tympanic)   Ht 5' 8" (1.727 m)   Wt 74.5 kg (164 lb 3.9 oz)   SpO2 98%   BMI 24.97 kg/m²       Review of Systems   Constitutional: Negative for appetite change, fatigue and unexpected weight change.   HENT: Negative for congestion, ear pain, postnasal drip, rhinorrhea, sinus pressure, sneezing, sore throat, tinnitus, trouble " swallowing and voice change.    Eyes: Negative for pain, discharge, redness, itching and visual disturbance.   Respiratory: Negative for cough, chest tightness, shortness of breath and wheezing.    Cardiovascular: Negative for chest pain, palpitations and leg swelling.   Gastrointestinal: Negative for abdominal distention, abdominal pain, blood in stool, constipation, diarrhea, nausea and vomiting.        No reflux.   Genitourinary: Negative for difficulty urinating, dyspareunia, flank pain, menstrual problem and pelvic pain.   Musculoskeletal: Negative for arthralgias, back pain, myalgias and neck stiffness.   Skin: Negative for color change and rash.   Neurological: Negative for dizziness and headaches.   Psychiatric/Behavioral: Negative for confusion and sleep disturbance. The patient is not nervous/anxious.       Objective:     Physical Exam   Constitutional: She is oriented to person, place, and time. She appears well-developed and well-nourished. No distress.   Musculoskeletal:   Normal gait   Neurological: She is alert and oriented to person, place, and time.   Skin: Skin is warm and dry.   approx 2 cm red, raised, tender, firm skin lesion to the right lower back.     Psychiatric: She has a normal mood and affect. Her behavior is normal.     Assessment:      1. Infected skin lesion      Plan:   Infected skin lesion    Other orders  -     sulfamethoxazole-trimethoprim 800-160mg (BACTRIM DS) 800-160 mg Tab; Take 1 tablet by mouth 2 (two) times daily.  Dispense: 20 tablet; Refill: 0    warm compresses.  If needs to be drained will be seen by Monday. Needs to be seen if gets worse      Current Outpatient Medications:     diazePAM (VALIUM) 10 MG Tab, TAKE 1 TABLET BY MOUTH EVERY 24 HOURS AT NIGHT AS NEEDED FOR ANXIETY OR INSOMNIA, Disp: 30 tablet, Rfl: 0    doxepin (SINEQUAN) 10 MG capsule, TAKE 1 CAPSULE (10 MG TOTAL) BY MOUTH EVERY EVENING., Disp: 90 capsule, Rfl: 3    loratadine (CLARITIN) 10 mg tablet, Take  10 mg by mouth daily, Disp: , Rfl:     pseudoephedrine (SUDAFED) 120 mg 12 hr tablet, Take 120 mg by mouth every 12 (twelve) hours, Disp: , Rfl:     diphenhydrAMINE (BENADRYL) 25 mg capsule, Take 25 mg by mouth every 6 (six) hours as needed  for Itching, Disp: , Rfl:     ergocalciferol (ERGOCALCIFEROL) 50,000 unit Cap, Take 1 capsule (50,000 Units total) by mouth every 7 days., Disp: 12 capsule, Rfl: 3    ibuprofen (ADVIL,MOTRIN) 600 MG tablet, Take 600 mg by mouth 2 (two) times daily, Disp: , Rfl:     LACTOBACILLUS COMBO NO.6 (PROBIOTIC COMPLEX ORAL), Take by mouth once daily at 6am., Disp: , Rfl:     ondansetron (ZOFRAN) 8 MG tablet, Take 8 mg by mouth every 8 (eight) hours., Disp: , Rfl:     sulfamethoxazole-trimethoprim 800-160mg (BACTRIM DS) 800-160 mg Tab, Take 1 tablet by mouth 2 (two) times daily., Disp: 20 tablet, Rfl: 0    sumatriptan (IMITREX) 100 MG tablet, TAKE 1 TABLET IF NEEDED, MAY REPEAT ONCE IN 1 HOUR. MAX 2 TABLETS IN 24 HOURS, Disp: 10 tablet, Rfl: 11    Current Facility-Administered Medications:     lidocaine (PF) 10 mg/ml (1%) injection 10 mg, 1 mL, Other, 1 time in Clinic/HOD, Xavier Arellano MD    triamcinolone acetonide injection 40 mg, 40 mg, Intra-articular, 1 time in Clinic/HOD, Xavier Arellano MD

## 2019-01-25 ENCOUNTER — HOSPITAL ENCOUNTER (EMERGENCY)
Facility: HOSPITAL | Age: 54
Discharge: HOME | End: 2019-01-25
Attending: EMERGENCY MEDICINE
Payer: MEDICARE

## 2019-01-25 VITALS
OXYGEN SATURATION: 97 % | HEART RATE: 83 BPM | SYSTOLIC BLOOD PRESSURE: 150 MMHG | BODY MASS INDEX: 25.16 KG/M2 | HEIGHT: 68 IN | DIASTOLIC BLOOD PRESSURE: 72 MMHG | WEIGHT: 166 LBS | RESPIRATION RATE: 20 BRPM | TEMPERATURE: 99 F

## 2019-01-25 DIAGNOSIS — L02.212 ABSCESS OF LOWER BACK: Primary | ICD-10-CM

## 2019-01-25 PROCEDURE — 10060 I&D ABSCESS SIMPLE/SINGLE: CPT

## 2019-01-25 PROCEDURE — 99283 EMERGENCY DEPT VISIT LOW MDM: CPT | Mod: 25

## 2019-01-25 PROCEDURE — 25000003 PHARM REV CODE 250: Performed by: EMERGENCY MEDICINE

## 2019-01-25 RX ORDER — LIDOCAINE HYDROCHLORIDE 10 MG/ML
10 INJECTION, SOLUTION EPIDURAL; INFILTRATION; INTRACAUDAL; PERINEURAL
Status: COMPLETED | OUTPATIENT
Start: 2019-01-25 | End: 2019-01-25

## 2019-01-25 RX ORDER — KETOROLAC TROMETHAMINE 10 MG/1
10 TABLET, FILM COATED ORAL
Status: COMPLETED | OUTPATIENT
Start: 2019-01-25 | End: 2019-01-25

## 2019-01-25 RX ADMIN — KETOROLAC TROMETHAMINE 10 MG: 10 TABLET, FILM COATED ORAL at 10:01

## 2019-01-25 RX ADMIN — LIDOCAINE HYDROCHLORIDE 50 MG: 10 INJECTION, SOLUTION EPIDURAL; INFILTRATION; INTRACAUDAL; PERINEURAL at 10:01

## 2019-01-26 NOTE — ED PROVIDER NOTES
SCRIBE #1 NOTE: I, Pascual Banda, am scribing for, and in the presence of, Bigg Crews Jr., MD. I have scribed the entire note.      History      Chief Complaint   Patient presents with    Abscess     abscess to R lower back       Review of patient's allergies indicates:   Allergen Reactions    Erythromycin     Morphine Nausea And Vomiting    Opioids - morphine analogues         HPI   HPI    1/25/2019, 10:11 PM   History obtained from the patient      History of Present Illness: Genny Calixto is a 53 y.o. female patient who presents to the Emergency Department for an abscess to the right lower hip which onset 2-3 days ago. Pt was already seen at clinic yesterday and dx with staph and Rx'd bactrim. Symptoms are constant and moderate in severity. No mitigating or exacerbating factors reported. Associated sxs include pain. Patient denies any fever, chills, n/v/d, numbness, weakness, and all other sxs at this time. No further complaints or concerns at this time.         Arrival mode: Personal vehicle     PCP: Xavier Arellano MD       Past Medical History:  Past Medical History:   Diagnosis Date    Anxiety     Takes 5-10 mg of valium qd prn anxiety (prescriptions for both on file, one from [facility] & other from )    Insomnia     Takes 5-10 mg of valium qhs prn insomnia (prescriptions for both on file, one from [facility] & other from )    Reflex sympathetic dystrophy     Vertigo        Past Surgical History:  Past Surgical History:   Procedure Laterality Date    BLADDER SURGERY      CHOLECYSTECTOMY      facet injections  02/14/2017    L3, L4, L5, S1 Done by Dr. Lara    HYSTERECTOMY      KNEE SURGERY      TONSILLECTOMY           Family History:  Family History   Problem Relation Age of Onset    Stroke Mother     Hypertension Mother     COPD Father     Drug abuse Brother        Social History:  Social History     Tobacco Use    Smoking status: Former Smoker    Smokeless tobacco: Never Used    Substance and Sexual Activity    Alcohol use: No    Drug use: No    Sexual activity: No       ROS   Review of Systems   Constitutional: Negative for chills and fever.   HENT: Negative for sore throat.    Respiratory: Negative for shortness of breath.    Cardiovascular: Negative for chest pain.   Gastrointestinal: Negative for abdominal pain, diarrhea, nausea and vomiting.   Genitourinary: Negative for dysuria.   Musculoskeletal: Negative for back pain.   Skin: Negative for rash.        (+) Abscess to right lower back w/ pain   Neurological: Negative for dizziness, weakness, light-headedness, numbness and headaches.   Hematological: Does not bruise/bleed easily.   All other systems reviewed and are negative.    Physical Exam      Initial Vitals [01/25/19 2043]   BP Pulse Resp Temp SpO2   (!) 150/72 83 20 98.7 °F (37.1 °C) 97 %      MAP       --          Physical Exam  Nursing Notes and Vital Signs Reviewed.  Constitutional: Patient is in no acute distress. Well-developed and well-nourished.  Head: Atraumatic. Normocephalic.  Eyes: PERRL. EOM intact. Conjunctivae are not pale. No scleral icterus.  ENT: Mucous membranes are moist. Oropharynx is clear and symmetric.    Neck: Supple. Full ROM. No lymphadenopathy.  Cardiovascular: Regular rate. Regular rhythm. No murmurs, rubs, or gallops. Distal pulses are 2+ and symmetric.  Pulmonary/Chest: No respiratory distress. Clear to auscultation bilaterally. No wheezing or rales.  Abdominal: Soft and non-distended.  There is no tenderness.  No rebound, guarding, or rigidity.   Musculoskeletal: Moves all extremities. No obvious deformities. No edema. No calf tenderness.  Skin: Otherwise warm and dry. 1 cm abscess to the right lower back in a skin fold.  This is tender with mild fluctuance.  No surrounding erythema.  Neurological:  Alert, awake, and appropriate.  Normal speech.  No acute focal neurological deficits are appreciated.  Psychiatric: Normal affect. Good eye  "contact. Appropriate in content.    ED Course    I & D - Incision and Drainage  Date/Time: 2019 10:49 PM  Performed by: Bigg Crews Jr., MD  Authorized by: Bigg Crews Jr., MD   Consent Done: Yes  Consent: Verbal consent obtained.  Risks and benefits: risks, benefits and alternatives were discussed  Consent given by: patient  Patient understanding: patient states understanding of the procedure being performed  Patient identity confirmed: name, MRN, verbally with patient and   Type: abscess  Location: Right lower back/upper hip.  Anesthesia: local infiltration    Anesthesia:  Local Anesthetic: lidocaine 1% without epinephrine  Patient sedated: no  Scalpel size: 11  Incision type: single straight  Complexity: simple  Drainage: pus  Wound treatment: incision and  wound left open  Complications: No  Specimens: No  Implants: No  Patient tolerance: Patient tolerated the procedure well with no immediate complications        ED Vital Signs:  Vitals:    19 2043   BP: (!) 150/72   Pulse: 83   Resp: 20   Temp: 98.7 °F (37.1 °C)   TempSrc: Oral   SpO2: 97%   Weight: 75.3 kg (166 lb 0.1 oz)   Height: 5' 8" (1.727 m)       Abnormal Lab Results:  Labs Reviewed - No data to display     All Lab Results:  None    Imaging Results:  Imaging Results    None                 The Emergency Provider reviewed the vital signs and test results, which are outlined above.    ED Discussion     10:53 PM: Reassessed pt at this time.  Pt states her condition has improved at this time. Discussed with pt all pertinent ED information and results. Discussed pt dx and plan of tx. Gave pt all f/u and return to the ED instructions. All questions and concerns were addressed at this time. Pt expresses understanding of information and instructions, and is comfortable with plan to discharge. Pt is stable for discharge.    I discussed with patient and/or family/caretaker that evaluation in the ED does not suggest any emergent or life " threatening medical conditions requiring immediate intervention beyond what was provided in the ED, and I believe patient is safe for discharge.  Regardless, an unremarkable evaluation in the ED does not preclude the development or presence of a serious of life threatening condition. As such, patient was instructed to return immediately for any worsening or change in current symptoms.          ED Medication(s):  Medications   ketorolac tablet 10 mg (10 mg Oral Given 1/25/19 2242)   lidocaine (PF) 10 mg/ml (1%) injection 100 mg (50 mg Infiltration Given by Other 1/25/19 2241)       Follow-up Information     Xavier Arellano MD In 3 days.    Specialty:  Internal Medicine  Contact information:  [address] [place] [address]  [place] [address] [phone]                     Medical Decision Making              Scribe Attestation:   Scribe #1: I performed the above scribed service and the documentation accurately describes the services I performed. I attest to the accuracy of the note.    Attending:   Physician Attestation Statement for Scribe #1: I, Bigg Crews Jr., MD, personally performed the services described in this documentation, as scribed by Pascual Banda, in my presence, and it is both accurate and complete.          Clinical Impression       ICD-10-CM ICD-9-CM   1. Abscess of lower back L02.212 682.2       Disposition:   Disposition: Discharged  Condition: Stable         Bigg Crews Jr., MD  01/25/19 2183

## 2019-01-26 NOTE — DISCHARGE INSTRUCTIONS
Warm compresses 4 times daily.  Continue her Bactrim at home for infection.  Ibuprofen for pain.  Follow up with her doctor in 2-3 days for re-evaluation.  Return as needed for any worsening symptoms, problems, questions or concerns.

## 2019-01-26 NOTE — ED NOTES
Pt c/o abscess to her back. Pt c/o pain to the abscessed area. No other complaints at this time    Patient identifiers verified and correct for Genny Kirstin [place].    LOC: The patient is awake, alert and aware of environment with an appropriate affect, the patient is oriented x 3 and speaking appropriately.  APPEARANCE: Patient resting comfortably and in no acute distress, patient is clean and well groomed, patient's clothing is properly fastened.  SKIN: The skin is warm and dry, color consistent with ethnicity, patient has normal skin turgor and moist mucus membranes, skin intact, no breakdown or bruising noted.  MUSCULOSKELETAL: Patient moving all extremities spontaneously.  RESPIRATORY: Airway is open and patent, respirations are spontaneous.  CARDIAC: Patient has a normal rate, no periphreal edema noted, capillary refill < 3 seconds.  ABDOMEN: Soft and non tender to palpation.

## 2019-01-30 DIAGNOSIS — M25.511 RIGHT SHOULDER PAIN, UNSPECIFIED CHRONICITY: Primary | ICD-10-CM

## 2019-01-31 ENCOUNTER — OFFICE VISIT (OUTPATIENT)
Dept: ORTHOPEDICS | Facility: CLINIC | Age: 54
End: 2019-01-31
Payer: MEDICARE

## 2019-01-31 ENCOUNTER — HOSPITAL ENCOUNTER (OUTPATIENT)
Dept: RADIOLOGY | Facility: HOSPITAL | Age: 54
Discharge: HOME | End: 2019-01-31
Attending: FAMILY MEDICINE
Payer: MEDICARE

## 2019-01-31 VITALS
HEART RATE: 82 BPM | SYSTOLIC BLOOD PRESSURE: 125 MMHG | WEIGHT: 160 LBS | DIASTOLIC BLOOD PRESSURE: 79 MMHG | BODY MASS INDEX: 24.25 KG/M2 | HEIGHT: 68 IN

## 2019-01-31 DIAGNOSIS — S46.311A STRAIN OF RIGHT TRICEPS, INITIAL ENCOUNTER: Primary | ICD-10-CM

## 2019-01-31 DIAGNOSIS — M25.511 RIGHT SHOULDER PAIN, UNSPECIFIED CHRONICITY: ICD-10-CM

## 2019-01-31 PROCEDURE — 73030 XR SHOULDER COMPLETE 2 OR MORE VIEWS RIGHT: ICD-10-PCS | Mod: 26,RT,, | Performed by: RADIOLOGY

## 2019-01-31 PROCEDURE — 99213 OFFICE O/P EST LOW 20 MIN: CPT | Mod: PBBFAC,25 | Performed by: FAMILY MEDICINE

## 2019-01-31 PROCEDURE — 73030 X-RAY EXAM OF SHOULDER: CPT | Mod: TC,RT

## 2019-01-31 PROCEDURE — 73030 X-RAY EXAM OF SHOULDER: CPT | Mod: 26,RT,, | Performed by: RADIOLOGY

## 2019-01-31 PROCEDURE — 99999 PR PBB SHADOW E&M-EST. PATIENT-LVL III: CPT | Mod: PBBFAC,,, | Performed by: FAMILY MEDICINE

## 2019-01-31 PROCEDURE — 99214 OFFICE O/P EST MOD 30 MIN: CPT | Mod: S$PBB,,, | Performed by: FAMILY MEDICINE

## 2019-01-31 PROCEDURE — 99999 PR PBB SHADOW E&M-EST. PATIENT-LVL III: ICD-10-PCS | Mod: PBBFAC,,, | Performed by: FAMILY MEDICINE

## 2019-01-31 PROCEDURE — 99214 PR OFFICE/OUTPT VISIT, EST, LEVL IV, 30-39 MIN: ICD-10-PCS | Mod: S$PBB,,, | Performed by: FAMILY MEDICINE

## 2019-01-31 NOTE — PROGRESS NOTES
Subjective:     Patient ID: Genny Calixto is a 53 y.o. female.    Chief Complaint: Pain of the Right Shoulder    Patient is a 53-year-old female presents clinic today complaining of right mid forearm pain for the past several weeks.  Patient states that she has recently lost a significant large amount of weight through diet and exercise.  Patient states that she has been having this pain when doing certain lifts and workouts, particularly triceps and bench press.  Patient denies any known injuries, falls, swelling, bruising, skin changes, physical therapy, MRIs, or surgeries.        Past Medical History:   Diagnosis Date    Anxiety     Takes 5-10 mg of valium qd prn anxiety (prescriptions for both on file, one from [facility] & other from )    Insomnia     Takes 5-10 mg of valium qhs prn insomnia (prescriptions for both on file, one from [facility] & other from )    Reflex sympathetic dystrophy     Vertigo      Past Surgical History:   Procedure Laterality Date    BLADDER SURGERY      CHOLECYSTECTOMY      facet injections  02/14/2017    L3, L4, L5, S1 Done by Dr. Lara    HYSTERECTOMY      KNEE SURGERY      TONSILLECTOMY       Family History   Problem Relation Age of Onset    Stroke Mother     Hypertension Mother     COPD Father     Drug abuse Brother      Social History     Socioeconomic History    Marital status:      Spouse name: Not on file    Number of children: Not on file    Years of education: Not on file    Highest education level: Not on file   Social Needs    Financial resource strain: Not on file    Food insecurity - worry: Not on file    Food insecurity - inability: Not on file    Transportation needs - medical: Not on file    Transportation needs - non-medical: Not on file   Occupational History    Not on file   Tobacco Use    Smoking status: Never Smoker    Smokeless tobacco: Never Used   Substance and Sexual Activity    Alcohol use: No    Drug use: No    Sexual  activity: No   Other Topics Concern    Not on file   Social History Narrative    Breakfast: Fruit & organic oatmeal; water or tea w/ lemon    Lunch: Salads: spinach leaves, iceberg lettuce, tomatoes, avaccado, light dressing or protein smoothie    Dinner: Grilled or broiled chicken or fish (no pork since 3/2017; red meat only 5 times since 3/2017)    Snacks: Fruit    Eats out: 1x/wk    Water: 96 oz/d    PA: 5-6 d/wk; basketball    Goal weight is 160 lb        Medication List           Accurate as of 1/31/19  2:49 PM. If you have any questions, ask your nurse or doctor.               CONTINUE taking these medications    diazePAM 10 MG Tab  Commonly known as:  VALIUM  TAKE 1 TABLET BY MOUTH EVERY 24 HOURS AT NIGHT AS NEEDED FOR ANXIETY OR INSOMNIA     diphenhydrAMINE 25 mg capsule  Commonly known as:  BENADRYL     doxepin 10 MG capsule  Commonly known as:  SINEQUAN  TAKE 1 CAPSULE (10 MG TOTAL) BY MOUTH EVERY EVENING.     ergocalciferol 50,000 unit Cap  Commonly known as:  ERGOCALCIFEROL  Take 1 capsule (50,000 Units total) by mouth every 7 days.     ibuprofen 600 MG tablet  Commonly known as:  ADVIL,MOTRIN     loratadine 10 mg tablet  Commonly known as:  CLARITIN     ondansetron 8 MG tablet  Commonly known as:  ZOFRAN     PROBIOTIC COMPLEX ORAL     pseudoephedrine 120 mg 12 hr tablet  Commonly known as:  SUDAFED     sulfamethoxazole-trimethoprim 800-160mg 800-160 mg Tab  Commonly known as:  BACTRIM DS  Take 1 tablet by mouth 2 (two) times daily.     sumatriptan 100 MG tablet  Commonly known as:  IMITREX  TAKE 1 TABLET IF NEEDED, MAY REPEAT ONCE IN 1 HOUR. MAX 2 TABLETS IN 24 HOURS          Review of patient's allergies indicates:   Allergen Reactions    Erythromycin     Morphine Nausea And Vomiting    Opioids - morphine analogues      Review of Systems   Constitutional: Negative for chills and fever.   Cardiovascular: Negative for leg swelling.   Gastrointestinal: Negative for nausea and vomiting.  "  Musculoskeletal: Positive for myalgias. Negative for back pain, falls and joint pain.   Skin: Negative for rash.   Neurological: Negative for tingling, sensory change, focal weakness and weakness.        Objective:   Body mass index is 24.33 kg/m².  Vitals:    01/31/19 1417   BP: 125/79   Pulse: 82   Weight: 72.6 kg (160 lb)   Height: 5' 8" (1.727 m)   PainSc:   4   PainLoc: Shoulder           General    Nursing note and vitals reviewed.  Constitutional: She is oriented to person, place, and time. She appears well-developed and well-nourished. No distress.   Eyes: Conjunctivae are normal. No scleral icterus.   Pulmonary/Chest: Effort normal.   Neurological: She is alert and oriented to person, place, and time.   Psychiatric: She has a normal mood and affect. Her behavior is normal. Judgment and thought content normal.         Right Shoulder Exam   Right shoulder exam is normal.    Inspection/Observation   Swelling: absent  Deformity: absent  Scapular Winging: absent  Scapular Dyskinesia: negative  Atrophy: absent    Tenderness   The patient is experiencing no tenderness.    Range of Motion   Active abduction: normal   Passive abduction: normal   Extension: normal   Forward Flexion: normal   Forward Elevation: normal  Adduction: normal    Tests & Signs   Antunez test: negative  Impingement: negative  Active Compression Test ([place]'s Sign): negative    Other   Sensation: normal    Comments:  Patient localizes her pain to her lateral and posterior right tricep.  No obvious deformities, skin changes, or tenderness to palpation.  Shoulder exam was within normal limits    Left Shoulder Exam   Left shoulder exam is normal.    Inspection/Observation   Swelling: absent  Deformity: absent  Scapular Winging: absent  Scapular Dyskinesia: negative  Atrophy: absent    Tenderness   The patient is experiencing no tenderness.     Range of Motion   Active abduction: normal   Passive abduction: normal   Extension: normal   Forward " Flexion: normal   Forward Elevation: normal  Adduction: normal    Tests & Signs   Antunez test: negative  Impingement: negative  Active Compression test ([place]'s Sign): negative    Other   Sensation: normal       Muscle Strength   Right Upper Extremity   Shoulder Abduction: 5/5   Shoulder Internal Rotation: 5/5   Shoulder External Rotation: 5/5   Supraspinatus: 5/5/5   Subscapularis: 5/5/5   Biceps: 5/5/5   Left Upper Extremity  Shoulder Abduction: 5/5   Shoulder Internal Rotation: 5/5   Shoulder External Rotation: 5/5   Supraspinatus: 5/5/5   Subscapularis: 5/5/5   Biceps: 5/5/5       EXAMINATION:  XR SHOULDER COMPLETE 2 OR MORE VIEWS RIGHT    CLINICAL HISTORY:  Pain in right shoulder    TECHNIQUE:  Two or three views of the right shoulder were performed.    COMPARISON:  None    FINDINGS:  No acute fracture dislocation.  Degenerative changes present the acromioclavicular joint and superolateral humeral head.  Lung parenchyma clear.      Impression       As above      Electronically signed by: Brent Conde MD  Date: 01/31/2019  Time: 14:20           Genny Fulton was seen today for pain.    Diagnoses and all orders for this visit:    Strain of right triceps, initial encounter  -     Ambulatory Referral to Physical Therapy    -no obvious sign to patient's pain.  Likely suspect that this is an overuse or strain of her triceps.  Will do some physical therapy and reassess in 1 month.  -right shoulder Xray images were independently viewed and read by me showing mild degenerative changes noted at the AC joint.  No significant changes noted glenohumeral joint.  No acute fractures or dislocations.  -Formal read by radiologist is as described above  -Discussed findings with patient  -Treatment options and alternatives were discussed with the patient. Patient expressed understanding. Patient was given the opportunity to ask questions and be an active participant in their medical care. Patient had no further questions or  concerns at this time.   -Patient is an overall moderate risk for health complications from their medical conditions.

## 2019-01-31 NOTE — LETTER
February 1, 2019      Xavier Arellano MD  [address] [facility]  [address]   [place] [address]           [facility]  [address] [place] [address]  Phone: [phone]  Fax: [phone]          Patient: Genny Calixto   MR Number: [id number]   YOB: 1965   Date of Visit: 1/31/2019       Dear Dr. Xavier Arellano:    Thank you for referring Genny Calixto to me for evaluation. Attached you will find relevant portions of my assessment and plan of care.    If you have questions, please do not hesitate to call me. I look forward to following Genny Calixto along with you.    Sincerely,    Remigio Rosen MD    Enclosure  CC:  No Recipients    If you would like to receive this communication electronically, please contact [email or web address] or [phone] to request more information on [facility] access.    For providers and/or their staff who would like to refer a patient to [facility], please contact us through our one-stop-shop provider referral line, [facility], at [phone].    If you feel you have received this communication in error or would no longer like to receive these types of communications, please e-mail [email or web address]

## 2019-02-12 RX ORDER — DIAZEPAM 5 MG/1
TABLET ORAL
Qty: 30 TABLET | Refills: 0 | Status: SHIPPED | OUTPATIENT
Start: 2019-02-12 | End: 2019-03-27 | Stop reason: SDUPTHER

## 2019-02-13 ENCOUNTER — PATIENT MESSAGE (OUTPATIENT)
Dept: ORTHOPEDICS | Facility: CLINIC | Age: 54
End: 2019-02-13

## 2019-02-14 PROCEDURE — 99283 EMERGENCY DEPT VISIT LOW MDM: CPT

## 2019-02-15 ENCOUNTER — HOSPITAL ENCOUNTER (EMERGENCY)
Facility: HOSPITAL | Age: 54
Discharge: HOME | End: 2019-02-15
Attending: EMERGENCY MEDICINE
Payer: MEDICARE

## 2019-02-15 VITALS
WEIGHT: 153 LBS | RESPIRATION RATE: 18 BRPM | SYSTOLIC BLOOD PRESSURE: 145 MMHG | DIASTOLIC BLOOD PRESSURE: 80 MMHG | HEIGHT: 68 IN | OXYGEN SATURATION: 100 % | HEART RATE: 78 BPM | TEMPERATURE: 98 F | BODY MASS INDEX: 23.19 KG/M2

## 2019-02-15 DIAGNOSIS — S90.32XA CONTUSION OF LEFT FOOT, INITIAL ENCOUNTER: Primary | ICD-10-CM

## 2019-02-15 DIAGNOSIS — S99.922A FOOT INJURY, LEFT, INITIAL ENCOUNTER: ICD-10-CM

## 2019-02-15 DIAGNOSIS — G47.00 INSOMNIA, UNSPECIFIED TYPE: ICD-10-CM

## 2019-02-15 DIAGNOSIS — R03.0 ELEVATED BLOOD PRESSURE READING: ICD-10-CM

## 2019-02-15 DIAGNOSIS — F41.9 ANXIETY: ICD-10-CM

## 2019-02-15 PROCEDURE — 25000003 PHARM REV CODE 250: Performed by: REGISTERED NURSE

## 2019-02-15 RX ORDER — TRAMADOL HYDROCHLORIDE 50 MG/1
50 TABLET ORAL EVERY 6 HOURS PRN
Qty: 12 TABLET | Refills: 0 | Status: SHIPPED | OUTPATIENT
Start: 2019-02-15 | End: 2020-10-20

## 2019-02-15 RX ORDER — IBUPROFEN 600 MG/1
600 TABLET ORAL EVERY 6 HOURS PRN
Qty: 20 TABLET | Refills: 0 | Status: SHIPPED | OUTPATIENT
Start: 2019-02-15 | End: 2020-10-20

## 2019-02-15 RX ORDER — DIAZEPAM 10 MG/1
TABLET ORAL
Qty: 30 TABLET | Refills: 0 | OUTPATIENT
Start: 2019-02-15

## 2019-02-15 RX ORDER — IBUPROFEN 600 MG/1
600 TABLET ORAL
Status: COMPLETED | OUTPATIENT
Start: 2019-02-15 | End: 2019-02-15

## 2019-02-15 RX ADMIN — IBUPROFEN 600 MG: 600 TABLET ORAL at 01:02

## 2019-02-15 NOTE — ED PROVIDER NOTES
HISTORY     Chief Complaint   Patient presents with    Foot Injury     45lb weight dropped on her left foot      Review of patient's allergies indicates:   Allergen Reactions    Erythromycin     Morphine Nausea And Vomiting    Opioids - morphine analogues         HPI   The history is provided by the patient.   Foot Injury    The incident occurred at the gym. The injury mechanism was a direct blow. The incident occurred just prior to arrival. The pain is present in the left foot and left toes. The quality of the pain is described as aching. The pain is at a severity of 7/10. The pain has been constant since onset. Pertinent negatives include no inability to bear weight. She reports no foreign bodies present. The symptoms are aggravated by activity and bearing weight. She has tried nothing for the symptoms.        PCP: Xavier Arellano MD     Past Medical History:  Past Medical History:   Diagnosis Date    Anxiety     Takes 5-10 mg of valium qd prn anxiety (prescriptions for both on file, one from [facility] & other from )    Insomnia     Takes 5-10 mg of valium qhs prn insomnia (prescriptions for both on file, one from [facility] & other from )    Reflex sympathetic dystrophy     Vertigo         Past Surgical History:  Past Surgical History:   Procedure Laterality Date    BLADDER SURGERY      CHOLECYSTECTOMY      facet injections  02/14/2017    L3, L4, L5, S1 Done by Dr. Lara    HYSTERECTOMY      KNEE SURGERY      TONSILLECTOMY          Family History:  Family History   Problem Relation Age of Onset    Stroke Mother     Hypertension Mother     COPD Father     Drug abuse Brother         Social History:  Social History     Tobacco Use    Smoking status: Never Smoker    Smokeless tobacco: Never Used   Substance and Sexual Activity    Alcohol use: No    Drug use: No    Sexual activity: No         ROS   Review of Systems   Constitutional: Negative for fever.   HENT: Negative for sore throat.     Respiratory: Negative for shortness of breath.    Cardiovascular: Negative for chest pain.   Gastrointestinal: Negative for nausea.   Genitourinary: Negative for dysuria.   Musculoskeletal: Negative for back pain.        + L foot pain and swelling   Skin: Negative for rash.   Neurological: Negative for weakness.   Hematological: Does not bruise/bleed easily.   All other systems reviewed and are negative.      PHYSICAL EXAM     Initial Vitals [02/15/19 0028]   BP Pulse Resp Temp SpO2   (!) 161/79 82 17 98.9 °F (37.2 °C) 99 %      MAP       --           Physical Exam    Constitutional: She appears well-developed and well-nourished. She is not diaphoretic. No distress.   HENT:   Head: Normocephalic and atraumatic.   Eyes: Conjunctivae and EOM are normal. Pupils are equal, round, and reactive to light.   Neck: Normal range of motion. Neck supple.   Cardiovascular: Normal rate, regular rhythm and normal heart sounds.   No murmur heard.  Pulmonary/Chest: Breath sounds normal. No respiratory distress. She has no wheezes. She has no rales.   Abdominal: Soft. Bowel sounds are normal. There is no tenderness. There is no rebound and no guarding.   Musculoskeletal: Normal range of motion. She exhibits no edema.        Left foot: There is tenderness, bony tenderness and swelling. There is no deformity and no laceration.        Feet:    Neurological: She is alert and oriented to person, place, and time. No cranial nerve deficit. GCS score is 15. GCS eye subscore is 4. GCS verbal subscore is 5. GCS motor subscore is 6.   Skin: Skin is warm and dry. Capillary refill takes less than 2 seconds.   Psychiatric: She has a normal mood and affect. Thought content normal.          ED COURSE   Procedures  ED ONGOING VITALS:  Vitals:    02/15/19 0028 02/15/19 0119   BP: (!) 161/79 (!) 145/80   Pulse: 82 78   Resp: 17 18   Temp: 98.9 °F (37.2 °C) 98.2 °F (36.8 °C)   TempSrc: Oral Oral   SpO2: 99% 100%   Weight: 69.4 kg (153 lb)    Height:  "5' 8" (1.727 m)          ABNORMAL LAB VALUES:  Labs Reviewed - No data to display      ALL LAB VALUES:        RADIOLOGY STUDIES:  Imaging Results          X-Ray Foot Complete Left (Final result)  Result time 02/15/19 06:35:55    Final result by Jose Estrada MD (02/15/19 06:35:55)                 Impression:      No acute fracture or dislocation.      Electronically signed by: Jose Estrada MD  Date:    02/15/2019  Time:    06:35             Narrative:    EXAMINATION:  XR FOOT COMPLETE 3 VIEW LEFT    CLINICAL HISTORY:  XR FOOT COMPLETE 3 VIEW LEFTUnspecified injury of left foot, initial encounter    COMPARISON:  None    FINDINGS:  Three views of the left foot were obtained.    No evidence of acute fracture or dislocation.  Mild scattered degenerative changes.  Bony mineralization is normal.  Mild soft tissue swelling.  Small plantar calcaneal spur.                              Type of Interpretation: ED Physician (Independently Interpreted).  Radiology Procedure Done: Left Foot.  Interpretation: No acute fracture identified                The above vital signs and test results have been reviewed by the emergency provider.     ED Medications:  Current Discharge Medication List        Discharge Medications:  Discharge Medication List as of 2/15/2019  1:03 AM      START taking these medications    Details   !! ibuprofen (ADVIL,MOTRIN) 600 MG tablet Take 1 tablet (600 mg total) by mouth every 6 (six) hours as needed for Pain., Starting Fri 2/15/2019, Normal      traMADol (ULTRAM) 50 mg tablet Take 1 tablet (50 mg total) by mouth every 6 (six) hours as needed for Pain., Starting Fri 2/15/2019, Print       !! - Potential duplicate medications found. Please discuss with provider.         Follow-up Information     Xavier Arellano MD.    Specialty:  Internal Medicine  Why:  As needed  Contact information:  [address] [place] [address]  [place] [address]  [phone]                1:01 AM: Reassessed pt at this time.  Pt states " her condition has improved at this time. Discussed with pt all pertinent ED information and results. Discussed pt dx and plan of tx. Gave pt all f/u and return to the ED instructions. All questions and concerns were addressed at this time. Pt expresses understanding of information and instructions, and is comfortable with plan to discharge. Pt is stable for discharge.    I discussed with patient and/or family/caretaker that negative X-ray does not rule out occult fracture or other soft tissue injury.  Persistent pain greater than 7-10 days or increased pain requires follow up, specifically with orthopedics.     Pre-hypertension/Hypertension: The pt has been informed that they may have pre-hypertension or hypertension based on a blood pressure reading in the ED. I recommend that the pt call the PCP listed on their discharge instructions or a physician of their choice this week to arrange f/u for further evaluation of possible pre-hypertension or hypertension.       MEDICAL DECISION MAKING                 CLINICAL IMPRESSION       ICD-10-CM ICD-9-CM   1. Contusion of left foot, initial encounter S90.32XA 924.20   2. Foot injury, left, initial encounter S99.922A 959.7   3. Elevated blood pressure reading R03.0 796.2               Alexi Blackwood Jr., P  02/15/19 5182

## 2019-02-19 ENCOUNTER — CLINICAL SUPPORT (OUTPATIENT)
Dept: REHABILITATION | Facility: HOSPITAL | Age: 54
End: 2019-02-19
Payer: MEDICARE

## 2019-02-19 DIAGNOSIS — S46.311A STRAIN OF RIGHT TRICEPS, INITIAL ENCOUNTER: Primary | ICD-10-CM

## 2019-02-19 DIAGNOSIS — S46.811A STRAIN OF RIGHT DELTOID MUSCLE, INITIAL ENCOUNTER: ICD-10-CM

## 2019-02-19 PROCEDURE — 97161 PT EVAL LOW COMPLEX 20 MIN: CPT

## 2019-02-19 PROCEDURE — 97110 THERAPEUTIC EXERCISES: CPT

## 2019-02-19 NOTE — PROGRESS NOTES
PHYSICAL THERAPY INITIAL OUTPATIENT EVALUATION    Referring Provider:  Dr. Remigio Pulido*    Diagnosis:       ICD-10-CM ICD-9-CM    1. Strain of right triceps, initial encounter S46.311A 840.8    2. 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 Patient states she thinks she injured her arm about 6 weeks ago when she was working out at the gym when she hanging in arm straps to work out calves. Patient states no swelling/redness noted. Patient has history of RSD which flares up mainly in the (L)UE but occasionanly her (R) side. Patient states she was  for 19 years and was injured a lot on the job. Patient had significant weight loss in the last year a half (152lbs) from workouts and diet. Patient has cut down on her weights lifting for triceps/chest by more the 50% of what she would normally perform. Patient can work out back and biceps without pain; chest and triceps causes pain. Patient pain level: 1/10 currently; working triceps 5/10 pain. No numbness/tingling in arm noted. Patient pain is alleviated with ibuprofen/ice/heat; made worse with lifting heavier weight. Painis primarily on the triceps belly and deltoid insertion. Patient has no recent systemic history. Patient takes sudafed/claritin daily but no pain medication. Patient is able to perform all household activities without pain. Patient likes to participate in physical activity, shooting pool, and bowling but has not attempted bowling since injury.  Addition visit to ER for a #45 plate dropped on her (L) foot at the gym but x-rays came back negative for any fractures.     OBJECTIVE:  Posture: Pt noted to present with slight forward head/rounded shoulder posture    Scapular AROM:  WNL     Shoulder ROM:   Active/Passive Joint Range Right Left   Flexion WNL WNL   ABDuction WNL WNL   External Rotation WNL WNL   Internal Rotation WNL WNL   Extension WNL WNL     Elbow ROM:   Flexion:  WNL bilateral   Extension:  WNL bilateral      Strength:     Muscle (Myotome) Right Left   Shoulder Flex 5/5 5/5   Shoulder Abduction 4/5 5/5   Elbow Flexors (C5) 4+/5 5/5   Wrist Extensors (C6) 5/5 5/5   Elbow Extensors (C7) 4/5 5/5   Mid trap 4/5 4/5   Rhomboid 4/5 4/5   Low trap 4/5 4/5         Neuro/Sensation/Reflexes: Intact for all    Special Test:  Antunez -  Neers  -     Empty Can +  Drop Arm -     Speeds -  Yergasons -     Infraspinatus +  Hornblowers -     Biceps LoadII -  Crank  -     Apprehension -  Relocation -     Sulcus  -  AC crossover -     Lift off  -  Belly jean paul +    Function: Patient reports 8.75% disability based on score of the Upper Extremity Functional Scale.        Push up un able to perform without pain.    Tenderness to palpation:  Pt with tenderness along deltoid tuberosity and triceps muscle belly with moderate depth of palpation.    Other:     ASSESSMENT:  The patient is a 53 y.o. year old female referred to physical therapy with complaints of pain along posterolateral (R) upper arm. Patient presents with signs and symptoms of deltoid and/or triceps strain and rotator cuff weakness. Patient's presents with the following objective findings: decreased rotator cuff/scapular stabilizer weakness, pain along posterolateral upper arm, weakness of triceps.  These impairments are limiting patient's ability to complete full workout at the gym pain free.  Patient's prognosis is excellent for stated goals. Patient will benefit from skilled physical therapy intervention to address impairments and be able to return patient to full workout routine with normal resistive weights.    Short Term Goals (3 weeks):    1.I with HEP.  2.Improve strength to 4+/5 for mid trap, rhomboids, low trap to be able to  perform workout routine pain free  3.Decrease pain to 1 on VAS.  4.Patient to score 0% on the UEFS.    Long Term Goals (6 weeks):  1.Pt to improve strength to 5/5 for mid trap, rhomboids, low trap to be able to participate in bowling activity.   2. Patient able to perform 10 push ups on ground pain free  3. Patient able to perform 10 tricep dips with feet on ground pain free using body weight.    TREATMENT PROVIDED:  -Manual Therapy: 0 min  -Therapeutic Exercise: 15 min   Prone rhomboid (2 x10)   Prone mid trap (2 x 10)   Deltoid raise (#0 30x)   Full can (20x)   Tricep extension (G band 20x)  -Modalities: 0 min  -Evaluation: 55 mintues  -Education: Education on condition, home exercise, gym workout and exercises she can/cannot perform or should modify.     PLAN:  Patient will benefit from physical therapy (2) x/week for (5) weeks including manual therapy, therapeutic exercise, functional activities, modalities, and patient education.    Thank you for this referral.    These services are reasonable and necessary for the conditions set forth above while under my care.    Tony Roblero, PT, DPT

## 2019-02-26 DIAGNOSIS — G47.00 INSOMNIA, UNSPECIFIED TYPE: ICD-10-CM

## 2019-02-26 DIAGNOSIS — F41.9 ANXIETY: ICD-10-CM

## 2019-02-26 RX ORDER — DIAZEPAM 10 MG/1
TABLET ORAL
Qty: 30 TABLET | Refills: 0 | Status: SHIPPED | OUTPATIENT
Start: 2019-02-26 | End: 2019-04-04 | Stop reason: SDUPTHER

## 2019-03-19 RX ORDER — SUMATRIPTAN SUCCINATE 100 MG/1
TABLET ORAL
Qty: 10 TABLET | Refills: 5 | Status: SHIPPED | OUTPATIENT
Start: 2019-03-19 | End: 2019-11-21 | Stop reason: SDUPTHER

## 2019-03-27 RX ORDER — DIAZEPAM 5 MG/1
TABLET ORAL
Qty: 30 TABLET | Refills: 0 | Status: SHIPPED | OUTPATIENT
Start: 2019-03-27 | End: 2019-04-26 | Stop reason: SDUPTHER

## 2019-04-04 DIAGNOSIS — F41.9 ANXIETY: ICD-10-CM

## 2019-04-04 DIAGNOSIS — G47.00 INSOMNIA, UNSPECIFIED TYPE: ICD-10-CM

## 2019-04-04 RX ORDER — DIAZEPAM 10 MG/1
TABLET ORAL
Qty: 30 TABLET | Refills: 0 | Status: SHIPPED | OUTPATIENT
Start: 2019-04-04 | End: 2019-05-02 | Stop reason: SDUPTHER

## 2019-04-15 ENCOUNTER — DOCUMENTATION ONLY (OUTPATIENT)
Dept: REHABILITATION | Facility: HOSPITAL | Age: 54
End: 2019-04-15

## 2019-04-26 RX ORDER — DIAZEPAM 5 MG/1
TABLET ORAL
Qty: 30 TABLET | Refills: 0 | Status: SHIPPED | OUTPATIENT
Start: 2019-04-26 | End: 2019-05-30 | Stop reason: SDUPTHER

## 2019-05-02 DIAGNOSIS — F41.9 ANXIETY: ICD-10-CM

## 2019-05-02 DIAGNOSIS — G47.00 INSOMNIA, UNSPECIFIED TYPE: ICD-10-CM

## 2019-05-02 RX ORDER — DIAZEPAM 10 MG/1
TABLET ORAL
Qty: 30 TABLET | Refills: 0 | Status: SHIPPED | OUTPATIENT
Start: 2019-05-02 | End: 2019-06-06 | Stop reason: SDUPTHER

## 2019-05-30 RX ORDER — DIAZEPAM 5 MG/1
TABLET ORAL
Qty: 30 TABLET | Refills: 0 | Status: SHIPPED | OUTPATIENT
Start: 2019-05-30 | End: 2019-06-27 | Stop reason: SDUPTHER

## 2019-05-30 NOTE — TELEPHONE ENCOUNTER
----- Message from Kathy Spears sent at 5/30/2019  8:38 AM CDT -----  Contact: Dominique with [facility]  Caller would like nurse to contact her regarding pt. Caller needs more information on the medications pt is currently taking. Please contact Dominique with [facility] at ([phone]) ext:[phone]

## 2019-06-06 DIAGNOSIS — G47.00 INSOMNIA, UNSPECIFIED TYPE: ICD-10-CM

## 2019-06-06 DIAGNOSIS — F41.9 ANXIETY: ICD-10-CM

## 2019-06-06 RX ORDER — DIAZEPAM 10 MG/1
TABLET ORAL
Qty: 30 TABLET | Refills: 0 | Status: SHIPPED | OUTPATIENT
Start: 2019-06-06 | End: 2019-07-03 | Stop reason: SDUPTHER

## 2019-06-28 RX ORDER — DIAZEPAM 5 MG/1
TABLET ORAL
Qty: 30 TABLET | Refills: 0 | Status: SHIPPED | OUTPATIENT
Start: 2019-06-28 | End: 2019-07-25 | Stop reason: SDUPTHER

## 2019-06-30 ENCOUNTER — HOSPITAL ENCOUNTER (EMERGENCY)
Facility: HOSPITAL | Age: 54
Discharge: HOME | End: 2019-07-01
Attending: EMERGENCY MEDICINE
Payer: MEDICARE

## 2019-06-30 DIAGNOSIS — N12 PYELONEPHRITIS: Primary | ICD-10-CM

## 2019-06-30 LAB
BACTERIA #/AREA URNS HPF: ABNORMAL /HPF
BILIRUB UR QL STRIP: NEGATIVE
CAOX CRY URNS QL MICRO: ABNORMAL
CLARITY UR: CLEAR
COLOR UR: YELLOW
GLUCOSE UR QL STRIP: NEGATIVE
HGB UR QL STRIP: ABNORMAL
HYALINE CASTS #/AREA URNS LPF: 1 /LPF
KETONES UR QL STRIP: NEGATIVE
LEUKOCYTE ESTERASE UR QL STRIP: ABNORMAL
MICROSCOPIC COMMENT: ABNORMAL
NITRITE UR QL STRIP: POSITIVE
PH UR STRIP: 6 [PH] (ref 5–8)
PROT UR QL STRIP: ABNORMAL
RBC #/AREA URNS HPF: 10 /HPF (ref 0–4)
SP GR UR STRIP: 1.02 (ref 1–1.03)
URN SPEC COLLECT METH UR: ABNORMAL
UROBILINOGEN UR STRIP-ACNC: NEGATIVE EU/DL
WBC #/AREA URNS HPF: >100 /HPF (ref 0–5)
WBC CLUMPS URNS QL MICRO: ABNORMAL
YEAST URNS QL MICRO: ABNORMAL

## 2019-06-30 PROCEDURE — 25000003 PHARM REV CODE 250: Performed by: EMERGENCY MEDICINE

## 2019-06-30 PROCEDURE — 81000 URINALYSIS NONAUTO W/SCOPE: CPT

## 2019-06-30 PROCEDURE — 87186 SC STD MICRODIL/AGAR DIL: CPT

## 2019-06-30 PROCEDURE — 87086 URINE CULTURE/COLONY COUNT: CPT

## 2019-06-30 PROCEDURE — 80053 COMPREHEN METABOLIC PANEL: CPT

## 2019-06-30 PROCEDURE — 96361 HYDRATE IV INFUSION ADD-ON: CPT

## 2019-06-30 PROCEDURE — 99285 EMERGENCY DEPT VISIT HI MDM: CPT | Mod: 25

## 2019-06-30 PROCEDURE — 87077 CULTURE AEROBIC IDENTIFY: CPT

## 2019-06-30 PROCEDURE — 87040 BLOOD CULTURE FOR BACTERIA: CPT | Mod: 59

## 2019-06-30 PROCEDURE — 83605 ASSAY OF LACTIC ACID: CPT

## 2019-06-30 PROCEDURE — 87088 URINE BACTERIA CULTURE: CPT

## 2019-06-30 PROCEDURE — 85025 COMPLETE CBC W/AUTO DIFF WBC: CPT

## 2019-06-30 RX ORDER — ONDANSETRON 2 MG/ML
4 INJECTION INTRAMUSCULAR; INTRAVENOUS
Status: COMPLETED | OUTPATIENT
Start: 2019-06-30 | End: 2019-07-01

## 2019-06-30 RX ORDER — ACETAMINOPHEN 325 MG/1
650 TABLET ORAL
Status: COMPLETED | OUTPATIENT
Start: 2019-06-30 | End: 2019-07-01

## 2019-06-30 RX ADMIN — SODIUM CHLORIDE, SODIUM LACTATE, POTASSIUM CHLORIDE, AND CALCIUM CHLORIDE 2250 ML: .6; .31; .03; .02 INJECTION, SOLUTION INTRAVENOUS at 11:06

## 2019-07-01 VITALS
OXYGEN SATURATION: 92 % | HEIGHT: 68 IN | WEIGHT: 164.81 LBS | RESPIRATION RATE: 26 BRPM | DIASTOLIC BLOOD PRESSURE: 51 MMHG | HEART RATE: 97 BPM | BODY MASS INDEX: 24.98 KG/M2 | SYSTOLIC BLOOD PRESSURE: 108 MMHG | TEMPERATURE: 102 F

## 2019-07-01 LAB
ALBUMIN SERPL BCP-MCNC: 3.5 G/DL (ref 3.5–5.2)
ALP SERPL-CCNC: 75 U/L (ref 55–135)
ALT SERPL W/O P-5'-P-CCNC: 25 U/L (ref 10–44)
ANION GAP SERPL CALC-SCNC: 11 MMOL/L (ref 8–16)
AST SERPL-CCNC: 27 U/L (ref 10–40)
BASOPHILS # BLD AUTO: 0.02 K/UL (ref 0–0.2)
BASOPHILS NFR BLD: 0.2 % (ref 0–1.9)
BILIRUB SERPL-MCNC: 1.3 MG/DL (ref 0.1–1)
BUN SERPL-MCNC: 9 MG/DL (ref 6–20)
CALCIUM SERPL-MCNC: 8.9 MG/DL (ref 8.7–10.5)
CHLORIDE SERPL-SCNC: 104 MMOL/L (ref 95–110)
CO2 SERPL-SCNC: 25 MMOL/L (ref 23–29)
CREAT SERPL-MCNC: 0.8 MG/DL (ref 0.5–1.4)
DIFFERENTIAL METHOD: ABNORMAL
EOSINOPHIL # BLD AUTO: 0 K/UL (ref 0–0.5)
EOSINOPHIL NFR BLD: 0.1 % (ref 0–8)
ERYTHROCYTE [DISTWIDTH] IN BLOOD BY AUTOMATED COUNT: 13 % (ref 11.5–14.5)
EST. GFR  (AFRICAN AMERICAN): >60 ML/MIN/1.73 M^2
EST. GFR  (NON AFRICAN AMERICAN): >60 ML/MIN/1.73 M^2
GLUCOSE SERPL-MCNC: 90 MG/DL (ref 70–110)
HCT VFR BLD AUTO: 39.3 % (ref 37–48.5)
HGB BLD-MCNC: 13.2 G/DL (ref 12–16)
LACTATE SERPL-SCNC: 1 MMOL/L (ref 0.5–2.2)
LYMPHOCYTES # BLD AUTO: 1.8 K/UL (ref 1–4.8)
LYMPHOCYTES NFR BLD: 16.3 % (ref 18–48)
MCH RBC QN AUTO: 33.4 PG (ref 27–31)
MCHC RBC AUTO-ENTMCNC: 33.6 G/DL (ref 32–36)
MCV RBC AUTO: 100 FL (ref 82–98)
MONOCYTES # BLD AUTO: 1.4 K/UL (ref 0.3–1)
MONOCYTES NFR BLD: 12.6 % (ref 4–15)
NEUTROPHILS # BLD AUTO: 7.8 K/UL (ref 1.8–7.7)
NEUTROPHILS NFR BLD: 70.8 % (ref 38–73)
PLATELET # BLD AUTO: 144 K/UL (ref 150–350)
PMV BLD AUTO: 11.8 FL (ref 9.2–12.9)
POTASSIUM SERPL-SCNC: 3.9 MMOL/L (ref 3.5–5.1)
PROT SERPL-MCNC: 6.7 G/DL (ref 6–8.4)
RBC # BLD AUTO: 3.95 M/UL (ref 4–5.4)
SODIUM SERPL-SCNC: 140 MMOL/L (ref 136–145)
WBC # BLD AUTO: 11.01 K/UL (ref 3.9–12.7)

## 2019-07-01 PROCEDURE — 63600175 PHARM REV CODE 636 W HCPCS: Performed by: EMERGENCY MEDICINE

## 2019-07-01 PROCEDURE — 96365 THER/PROPH/DIAG IV INF INIT: CPT

## 2019-07-01 PROCEDURE — 25000003 PHARM REV CODE 250: Performed by: EMERGENCY MEDICINE

## 2019-07-01 PROCEDURE — 96375 TX/PRO/DX INJ NEW DRUG ADDON: CPT

## 2019-07-01 RX ORDER — ONDANSETRON 2 MG/ML
4 INJECTION INTRAMUSCULAR; INTRAVENOUS
Status: COMPLETED | OUTPATIENT
Start: 2019-07-01 | End: 2019-07-01

## 2019-07-01 RX ORDER — SULFAMETHOXAZOLE AND TRIMETHOPRIM 800; 160 MG/1; MG/1
1 TABLET ORAL 2 TIMES DAILY
Qty: 14 TABLET | Refills: 0 | Status: SHIPPED | OUTPATIENT
Start: 2019-07-01 | End: 2019-07-08

## 2019-07-01 RX ORDER — ONDANSETRON 4 MG/1
4 TABLET, ORALLY DISINTEGRATING ORAL EVERY 8 HOURS PRN
Qty: 12 TABLET | Refills: 0 | Status: SHIPPED | OUTPATIENT
Start: 2019-07-01 | End: 2020-12-10

## 2019-07-01 RX ORDER — MEPERIDINE HYDROCHLORIDE 25 MG/ML
12.5 INJECTION INTRAMUSCULAR; INTRAVENOUS; SUBCUTANEOUS
Status: COMPLETED | OUTPATIENT
Start: 2019-07-01 | End: 2019-07-01

## 2019-07-01 RX ORDER — SULFAMETHOXAZOLE AND TRIMETHOPRIM 800; 160 MG/1; MG/1
1 TABLET ORAL
Status: COMPLETED | OUTPATIENT
Start: 2019-07-01 | End: 2019-07-01

## 2019-07-01 RX ORDER — IBUPROFEN 400 MG/1
400 TABLET ORAL
Status: COMPLETED | OUTPATIENT
Start: 2019-07-01 | End: 2019-07-01

## 2019-07-01 RX ADMIN — SULFAMETHOXAZOLE AND TRIMETHOPRIM 1 TABLET: 800; 160 TABLET ORAL at 02:07

## 2019-07-01 RX ADMIN — IBUPROFEN 400 MG: 400 TABLET, FILM COATED ORAL at 12:07

## 2019-07-01 RX ADMIN — MEPERIDINE HYDROCHLORIDE 12.5 MG: 25 INJECTION INTRAMUSCULAR; INTRAVENOUS; SUBCUTANEOUS at 12:07

## 2019-07-01 RX ADMIN — ACETAMINOPHEN 650 MG: 325 TABLET ORAL at 12:07

## 2019-07-01 RX ADMIN — ONDANSETRON 4 MG: 2 INJECTION, SOLUTION INTRAMUSCULAR; INTRAVENOUS at 02:07

## 2019-07-01 RX ADMIN — CEFTRIAXONE 1 G: 1 INJECTION, SOLUTION INTRAVENOUS at 12:07

## 2019-07-01 RX ADMIN — ONDANSETRON 4 MG: 2 INJECTION INTRAMUSCULAR; INTRAVENOUS at 12:07

## 2019-07-01 NOTE — ED PROVIDER NOTES
SCRIBE #1 NOTE: I, Irving Howard, am scribing for, and in the presence of, Jose Balbuena MD. I have scribed the entire note.      History      Chief Complaint   Patient presents with    Abdominal Pain     generalized abd pain and nausea with one watery stool today; fever that began around 2am; odorous urine       Review of patient's allergies indicates:   Allergen Reactions    Erythromycin     Morphine Nausea And Vomiting    Opioids - morphine analogues         HPI   HPI    6/30/2019, 11:33 PM   History obtained from the patient      History of Present Illness: Genny Calixto is a 54 y.o. female patient who presents to the Emergency Department for fever which onset gradually at 2 AM. Pt states her temperature fluctuates between 101F - 104F. Pt reports having slight lower back pain which onsets whenever she gets a fever throughout her life. Pt states her abdomen is sore and feels like she did 200 crunches. Pt's urine has a foul odor. Pt reports one watery bowel movement. Symptoms are constant and moderate in severity. No mitigating or exacerbating factors reported. Associated sxs include nausea, abdominal pain, and dry mouth. Patient denies any CP, SOB, vomiting, flank pain, frequency, cough, rhinorrhea, congestion, and all other sxs at this time. No prior Tx reported. No further complaints or concerns at this time.         Arrival mode: Personal vehicle    PCP: Xavier Arellano MD       Past Medical History:  Past Medical History:   Diagnosis Date    Anxiety     Takes 5-10 mg of valium qd prn anxiety (prescriptions for both on file, one from [facility] & other from )    Insomnia     Takes 5-10 mg of valium qhs prn insomnia (prescriptions for both on file, one from [facility] & other from )    Reflex sympathetic dystrophy     Vertigo        Past Surgical History:  Past Surgical History:   Procedure Laterality Date    BLADDER SURGERY      CHOLECYSTECTOMY      facet injections  02/14/2017    L3, L4, L5, S1  Done by Dr. Lara    HYSTERECTOMY      KNEE SURGERY      TONSILLECTOMY           Family History:  Family History   Problem Relation Age of Onset    Stroke Mother     Hypertension Mother     COPD Father     Drug abuse Brother        Social History:  Social History     Tobacco Use    Smoking status: Never Smoker    Smokeless tobacco: Never Used   Substance and Sexual Activity    Alcohol use: No    Drug use: No    Sexual activity: Never       ROS   Review of Systems   Constitutional: Positive for fever (101F - 104F).        (+) dry mouth   HENT: Negative for congestion, ear pain, rhinorrhea and sore throat.    Respiratory: Negative for cough and shortness of breath.    Cardiovascular: Negative for chest pain.   Gastrointestinal: Positive for abdominal pain (soreness), diarrhea and nausea. Negative for abdominal distention, constipation and vomiting.   Genitourinary: Negative for dysuria, flank pain and frequency.        Urinary foul odor   Musculoskeletal: Positive for back pain.   Skin: Negative for rash.   Neurological: Negative for dizziness, syncope, weakness, numbness and headaches.   Hematological: Does not bruise/bleed easily.   All other systems reviewed and are negative.      Physical Exam      Initial Vitals [06/30/19 2155]   BP Pulse Resp Temp SpO2   (!) 150/69 110 18 (!) 103.1 °F (39.5 °C) 96 %      MAP       --          Physical Exam  Nursing Notes and Vital Signs Reviewed.  Constitutional: Patient is in no acute distress. Well-developed and well-nourished.  Head: Atraumatic. Normocephalic.  Eyes: PERRL. EOM intact. Conjunctivae are not pale. No scleral icterus.  ENT: Mucous membranes are dry. Oropharynx is clear and symmetric.    Neck: Supple. Full ROM. No lymphadenopathy.  Cardiovascular: Regular rate. Regular rhythm. No murmurs, rubs, or gallops. Distal pulses are 2+ and symmetric.  Pulmonary/Chest: No respiratory distress. Clear to auscultation bilaterally. No wheezing or  "rales.  Abdominal: Soft and non-distended.  There is no tenderness.  No rebound, guarding, or rigidity.   Genitourinary: Bilateral CVA tenderness.  Musculoskeletal: Moves all extremities. No obvious deformities. No edema. No calf tenderness.  Skin: Warm and dry.  Neurological:  Alert, awake, and appropriate.  Normal speech.  No acute focal neurological deficits are appreciated.  Psychiatric: Normal affect. Good eye contact. Appropriate in content.    ED Course    Procedures  ED Vital Signs:  Vitals:    06/30/19 2155 06/30/19 2307 06/30/19 2341 07/01/19 0046   BP: (!) 150/69  (!) 141/72    Pulse: 110  102    Resp: 18  (!) 35    Temp: (!) 103.1 °F (39.5 °C)   (!) 103.1 °F (39.5 °C)   TempSrc: Oral      SpO2: 96%  96%    Weight:  74.8 kg (164 lb 12.8 oz)     Height: 5' 8" (1.727 m)       07/01/19 0101 07/01/19 0132 07/01/19 0135 07/01/19 0216   BP: (!) 125/59 (!) 119/56     Pulse: 110 109     Resp: (!) 29 (!) 25     Temp:   (!) 103.3 °F (39.6 °C) (!) 101.8 °F (38.8 °C)   TempSrc:   Oral Oral   SpO2: (!) 94% (!) 94%     Weight:       Height:        07/01/19 0232   BP: (!) 108/51   Pulse: 97   Resp: (!) 26   Temp:    TempSrc:    SpO2: (!) 92%   Weight:    Height:        Abnormal Lab Results:  Labs Reviewed   URINALYSIS, REFLEX TO URINE CULTURE - Abnormal; Notable for the following components:       Result Value    Protein, UA 2+ (*)     Occult Blood UA 3+ (*)     Nitrite, UA Positive (*)     Leukocytes, UA 2+ (*)     All other components within normal limits    Narrative:     Preferred Collection Type->Urine, Clean Catch   URINALYSIS MICROSCOPIC - Abnormal; Notable for the following components:    RBC, UA 10 (*)     WBC, UA >100 (*)     Bacteria Many (*)     Yeast, UA Rare (*)     All other components within normal limits    Narrative:     Preferred Collection Type->Urine, Clean Catch   COMPREHENSIVE METABOLIC PANEL - Abnormal; Notable for the following components:    Total Bilirubin 1.3 (*)     All other components " within normal limits   CBC W/ AUTO DIFFERENTIAL - Abnormal; Notable for the following components:    RBC 3.95 (*)     Mean Corpuscular Volume 100 (*)     Mean Corpuscular Hemoglobin 33.4 (*)     Platelets 144 (*)     Gran # (ANC) 7.8 (*)     Mono # 1.4 (*)     Lymph% 16.3 (*)     All other components within normal limits   CULTURE, BLOOD   CULTURE, BLOOD   CULTURE, URINE   LACTIC ACID, PLASMA        All Lab Results:  Results for orders placed or performed during the hospital encounter of 06/30/19   Urinalysis, Reflex to Urine Culture Urine, Clean Catch   Result Value Ref Range    Specimen UA Urine, Clean Catch     Color, UA Yellow Yellow, Straw, Hilda    Appearance, UA Clear Clear    pH, UA 6.0 5.0 - 8.0    Specific Gravity, UA 1.020 1.005 - 1.030    Protein, UA 2+ (A) Negative    Glucose, UA Negative Negative    Ketones, UA Negative Negative    Bilirubin (UA) Negative Negative    Occult Blood UA 3+ (A) Negative    Nitrite, UA Positive (A) Negative    Urobilinogen, UA Negative <2.0 EU/dL    Leukocytes, UA 2+ (A) Negative   Urinalysis Microscopic   Result Value Ref Range    RBC, UA 10 (H) 0 - 4 /hpf    WBC, UA >100 (H) 0 - 5 /hpf    WBC Clumps, UA Rare None-Rare    Bacteria Many (A) None-Occ /hpf    Yeast, UA Rare (A) None    Hyaline Casts, UA 1 0-1/lpf /lpf    Ca Oxalate Mandy, UA Rare None-Moderate    Microscopic Comment SEE COMMENT    Lactic acid, plasma   Result Value Ref Range    Lactate (Lactic Acid) 1.0 0.5 - 2.2 mmol/L   Comprehensive metabolic panel   Result Value Ref Range    Sodium 140 136 - 145 mmol/L    Potassium 3.9 3.5 - 5.1 mmol/L    Chloride 104 95 - 110 mmol/L    CO2 25 23 - 29 mmol/L    Glucose 90 70 - 110 mg/dL    BUN, Bld 9 6 - 20 mg/dL    Creatinine 0.8 0.5 - 1.4 mg/dL    Calcium 8.9 8.7 - 10.5 mg/dL    Total Protein 6.7 6.0 - 8.4 g/dL    Albumin 3.5 3.5 - 5.2 g/dL    Total Bilirubin 1.3 (H) 0.1 - 1.0 mg/dL    Alkaline Phosphatase 75 55 - 135 U/L    AST 27 10 - 40 U/L    ALT 25 10 - 44 U/L     Anion Gap 11 8 - 16 mmol/L    eGFR if African American >60 >60 mL/min/1.73 m^2    eGFR if non African American >60 >60 mL/min/1.73 m^2   CBC auto differential   Result Value Ref Range    WBC 11.01 3.90 - 12.70 K/uL    RBC 3.95 (L) 4.00 - 5.40 M/uL    Hemoglobin 13.2 12.0 - 16.0 g/dL    Hematocrit 39.3 37.0 - 48.5 %    Mean Corpuscular Volume 100 (H) 82 - 98 fL    Mean Corpuscular Hemoglobin 33.4 (H) 27.0 - 31.0 pg    Mean Corpuscular Hemoglobin Conc 33.6 32.0 - 36.0 g/dL    RDW 13.0 11.5 - 14.5 %    Platelets 144 (L) 150 - 350 K/uL    MPV 11.8 9.2 - 12.9 fL    Gran # (ANC) 7.8 (H) 1.8 - 7.7 K/uL    Lymph # 1.8 1.0 - 4.8 K/uL    Mono # 1.4 (H) 0.3 - 1.0 K/uL    Eos # 0.0 0.0 - 0.5 K/uL    Baso # 0.02 0.00 - 0.20 K/uL    Gran% 70.8 38.0 - 73.0 %    Lymph% 16.3 (L) 18.0 - 48.0 %    Mono% 12.6 4.0 - 15.0 %    Eosinophil% 0.1 0.0 - 8.0 %    Basophil% 0.2 0.0 - 1.9 %    Differential Method Automated          Imaging Results:  Imaging Results          X-Ray Chest AP Portable (Final result)  Result time 07/01/19 00:10:22    Final result by Irving Kauffman MD (07/01/19 00:10:22)                 Impression:      Small right pleural effusion.  No acute infiltrates      Electronically signed by: Irving Kauffman MD  Date:    07/01/2019  Time:    00:10             Narrative:    EXAMINATION:  XR CHEST AP PORTABLE    CLINICAL HISTORY:  Sepsis;    TECHNIQUE:  Single frontal view of the chest was performed.    COMPARISON:  February 16, 2017    FINDINGS:  Shallow inspiration.  Heart size accentuated appearing top normal.    The lungs are clear.    Minimal blunting right costophrenic sulcus.                                        The Emergency Provider reviewed the vital signs and test results, which are outlined above.    ED Discussion     12:35 AM: pt requesting Demerol by name, she said she has had it in the past and it has worked. Pt states morphine makes her vomit.    2:24 AM: Reassessed pt at this time.  Pt states her condition has  improved at this time. Discussed with pt all pertinent ED information and results. Discussed pt dx and plan of tx. Gave pt all f/u and return to the ED instructions. All questions and concerns were addressed at this time. Pt expresses understanding of information and instructions, and is comfortable with plan to discharge. Pt is stable for discharge.    I discussed with patient and/or family/caretaker that evaluation in the ED does not suggest any emergent or life threatening medical conditions requiring immediate intervention beyond what was provided in the ED, and I believe patient is safe for discharge.  Regardless, an unremarkable evaluation in the ED does not preclude the development or presence of a serious of life threatening condition. As such, patient was instructed to return immediately for any worsening or change in current symptoms.      ED Medication(s):  Medications   lactated ringers bolus 2,250 mL (0 mLs Intravenous Stopped 7/1/19 0156)   ondansetron injection 4 mg (4 mg Intravenous Given 7/1/19 0046)   acetaminophen tablet 650 mg (650 mg Oral Given 7/1/19 0046)   ibuprofen tablet 400 mg (400 mg Oral Given 7/1/19 0046)   meperidine (PF) injection 12.5 mg (12.5 mg Intravenous Given 7/1/19 0046)   cefTRIAXone (ROCEPHIN) 1 g in dextrose 5 % 50 mL IVPB (0 g Intravenous Stopped 7/1/19 0127)   ondansetron injection 4 mg (4 mg Intravenous Given 7/1/19 0200)   sulfamethoxazole-trimethoprim 800-160mg per tablet 1 tablet (1 tablet Oral Given 7/1/19 0233)     Discharge Medication List as of 7/1/2019  2:24 AM      START taking these medications    Details   ondansetron (ZOFRAN-ODT) 4 MG TbDL Take 1 tablet (4 mg total) by mouth every 8 (eight) hours as needed (nausea)., Starting Mon 7/1/2019, Print      sulfamethoxazole-trimethoprim 800-160mg (BACTRIM DS) 800-160 mg Tab Take 1 tablet by mouth 2 (two) times daily. for 7 days, Starting Mon 7/1/2019, Until Mon 7/8/2019, Print             Follow-up Information      Call  Xavier Arellano MD.    Specialty:  Internal Medicine  Contact information:  [address]  [place] [id number] [phone]             [facility] - .    Specialty:  Emergency Medicine  Why:  As needed, If symptoms worsen  Contact information:  [address]  [facility] [address] [phone]                   Medical Decision Making    Medical Decision Making:   Clinical Tests:   Lab Tests: Ordered and Reviewed  Radiological Study: Ordered and Reviewed  Pyelonephritis; antipyretics, antibiotics, and pain medicine given here in the emergency department with improvement; patient given a prescription for antibiotics and advised close PCP follow-up; ER return precautions provided           Scribe Attestation:   Scribe #1: I performed the above scribed service and the documentation accurately describes the services I performed. I attest to the accuracy of the note.    Attending:   Physician Attestation Statement for Scribe #1: I, Jose Balbuena MD, personally performed the services described in this documentation, as scribed by Irving Howard, in my presence, and it is both accurate and complete.          Clinical Impression       ICD-10-CM ICD-9-CM   1. Pyelonephritis N12 590.80       Disposition:   Disposition: Discharged  Condition: Stable         Jose Balbuena MD  07/01/19 0400

## 2019-07-03 DIAGNOSIS — G47.00 INSOMNIA, UNSPECIFIED TYPE: ICD-10-CM

## 2019-07-03 DIAGNOSIS — F41.9 ANXIETY: ICD-10-CM

## 2019-07-03 LAB — BACTERIA UR CULT: ABNORMAL

## 2019-07-03 RX ORDER — DIAZEPAM 10 MG/1
TABLET ORAL
Qty: 30 TABLET | Refills: 0 | Status: SHIPPED | OUTPATIENT
Start: 2019-07-03 | End: 2019-08-01 | Stop reason: SDUPTHER

## 2019-07-06 LAB
BACTERIA BLD CULT: NORMAL
BACTERIA BLD CULT: NORMAL

## 2019-07-25 RX ORDER — DIAZEPAM 5 MG/1
TABLET ORAL
Qty: 30 TABLET | Refills: 0 | Status: SHIPPED | OUTPATIENT
Start: 2019-07-25 | End: 2019-08-26 | Stop reason: SDUPTHER

## 2019-08-01 DIAGNOSIS — F41.9 ANXIETY: ICD-10-CM

## 2019-08-01 DIAGNOSIS — G47.00 INSOMNIA, UNSPECIFIED TYPE: ICD-10-CM

## 2019-08-01 RX ORDER — DIAZEPAM 10 MG/1
TABLET ORAL
Qty: 30 TABLET | Refills: 0 | Status: SHIPPED | OUTPATIENT
Start: 2019-08-01 | End: 2019-09-03 | Stop reason: SDUPTHER

## 2019-08-03 ENCOUNTER — HOSPITAL ENCOUNTER (EMERGENCY)
Facility: HOSPITAL | Age: 54
Discharge: HOME | End: 2019-08-04
Attending: EMERGENCY MEDICINE
Payer: MEDICARE

## 2019-08-03 DIAGNOSIS — A41.9 SEPSIS, DUE TO UNSPECIFIED ORGANISM: Primary | ICD-10-CM

## 2019-08-03 DIAGNOSIS — E87.6 HYPOKALEMIA: ICD-10-CM

## 2019-08-03 DIAGNOSIS — A41.9 SEPSIS: ICD-10-CM

## 2019-08-03 DIAGNOSIS — N13.2 HYDRONEPHROSIS WITH URINARY OBSTRUCTION DUE TO URETERAL CALCULUS: ICD-10-CM

## 2019-08-03 DIAGNOSIS — N20.1 CALCULUS OF RIGHT URETER: ICD-10-CM

## 2019-08-03 DIAGNOSIS — N10 PYELONEPHRITIS, ACUTE: ICD-10-CM

## 2019-08-03 LAB
ALBUMIN SERPL BCP-MCNC: 3.2 G/DL (ref 3.5–5.2)
ALP SERPL-CCNC: 68 U/L (ref 55–135)
ALT SERPL W/O P-5'-P-CCNC: 25 U/L (ref 10–44)
ANION GAP SERPL CALC-SCNC: 10 MMOL/L (ref 8–16)
AST SERPL-CCNC: 30 U/L (ref 10–40)
BACTERIA #/AREA URNS HPF: ABNORMAL /HPF
BASOPHILS # BLD AUTO: 0.01 K/UL (ref 0–0.2)
BASOPHILS NFR BLD: 0.1 % (ref 0–1.9)
BILIRUB SERPL-MCNC: 1.2 MG/DL (ref 0.1–1)
BILIRUB UR QL STRIP: NEGATIVE
BUN SERPL-MCNC: 10 MG/DL (ref 6–20)
CALCIUM SERPL-MCNC: 8.5 MG/DL (ref 8.7–10.5)
CHLORIDE SERPL-SCNC: 102 MMOL/L (ref 95–110)
CLARITY UR: ABNORMAL
CO2 SERPL-SCNC: 25 MMOL/L (ref 23–29)
COLOR UR: YELLOW
CREAT SERPL-MCNC: 0.8 MG/DL (ref 0.5–1.4)
DIFFERENTIAL METHOD: ABNORMAL
EOSINOPHIL # BLD AUTO: 0 K/UL (ref 0–0.5)
EOSINOPHIL NFR BLD: 0.1 % (ref 0–8)
ERYTHROCYTE [DISTWIDTH] IN BLOOD BY AUTOMATED COUNT: 13.7 % (ref 11.5–14.5)
EST. GFR  (AFRICAN AMERICAN): >60 ML/MIN/1.73 M^2
EST. GFR  (NON AFRICAN AMERICAN): >60 ML/MIN/1.73 M^2
GLUCOSE SERPL-MCNC: 151 MG/DL (ref 70–110)
GLUCOSE UR QL STRIP: NEGATIVE
HCT VFR BLD AUTO: 33.4 % (ref 37–48.5)
HGB BLD-MCNC: 11.8 G/DL (ref 12–16)
HGB UR QL STRIP: ABNORMAL
HYALINE CASTS #/AREA URNS LPF: 0 /LPF
KETONES UR QL STRIP: NEGATIVE
LACTATE SERPL-SCNC: 1.2 MMOL/L (ref 0.5–2.2)
LEUKOCYTE ESTERASE UR QL STRIP: ABNORMAL
LYMPHOCYTES # BLD AUTO: 1.1 K/UL (ref 1–4.8)
LYMPHOCYTES NFR BLD: 12.1 % (ref 18–48)
MCH RBC QN AUTO: 33.1 PG (ref 27–31)
MCHC RBC AUTO-ENTMCNC: 35.3 G/DL (ref 32–36)
MCV RBC AUTO: 94 FL (ref 82–98)
MICROSCOPIC COMMENT: ABNORMAL
MONOCYTES # BLD AUTO: 0.9 K/UL (ref 0.3–1)
MONOCYTES NFR BLD: 9.3 % (ref 4–15)
NEUTROPHILS # BLD AUTO: 7.4 K/UL (ref 1.8–7.7)
NEUTROPHILS NFR BLD: 78.6 % (ref 38–73)
NITRITE UR QL STRIP: POSITIVE
PH UR STRIP: 6 [PH] (ref 5–8)
PLATELET # BLD AUTO: 152 K/UL (ref 150–350)
PMV BLD AUTO: 11.8 FL (ref 9.2–12.9)
POTASSIUM SERPL-SCNC: 2.9 MMOL/L (ref 3.5–5.1)
PROCALCITONIN SERPL IA-MCNC: 0.21 NG/ML
PROT SERPL-MCNC: 6.2 G/DL (ref 6–8.4)
PROT UR QL STRIP: ABNORMAL
RBC # BLD AUTO: 3.56 M/UL (ref 4–5.4)
RBC #/AREA URNS HPF: 0 /HPF (ref 0–4)
SODIUM SERPL-SCNC: 137 MMOL/L (ref 136–145)
SP GR UR STRIP: 1.02 (ref 1–1.03)
URN SPEC COLLECT METH UR: ABNORMAL
UROBILINOGEN UR STRIP-ACNC: NEGATIVE EU/DL
WBC # BLD AUTO: 9.4 K/UL (ref 3.9–12.7)
WBC #/AREA URNS HPF: >100 /HPF (ref 0–5)

## 2019-08-03 PROCEDURE — 87040 BLOOD CULTURE FOR BACTERIA: CPT

## 2019-08-03 PROCEDURE — 87077 CULTURE AEROBIC IDENTIFY: CPT

## 2019-08-03 PROCEDURE — 93010 EKG 12-LEAD: ICD-10-PCS | Mod: ,,, | Performed by: INTERNAL MEDICINE

## 2019-08-03 PROCEDURE — 87186 SC STD MICRODIL/AGAR DIL: CPT

## 2019-08-03 PROCEDURE — 25000003 PHARM REV CODE 250: Performed by: EMERGENCY MEDICINE

## 2019-08-03 PROCEDURE — 93010 ELECTROCARDIOGRAM REPORT: CPT | Mod: ,,, | Performed by: INTERNAL MEDICINE

## 2019-08-03 PROCEDURE — 93005 ELECTROCARDIOGRAM TRACING: CPT

## 2019-08-03 PROCEDURE — 84145 PROCALCITONIN (PCT): CPT

## 2019-08-03 PROCEDURE — 83605 ASSAY OF LACTIC ACID: CPT

## 2019-08-03 PROCEDURE — 80053 COMPREHEN METABOLIC PANEL: CPT

## 2019-08-03 PROCEDURE — 87086 URINE CULTURE/COLONY COUNT: CPT

## 2019-08-03 PROCEDURE — 96375 TX/PRO/DX INJ NEW DRUG ADDON: CPT

## 2019-08-03 PROCEDURE — 63600175 PHARM REV CODE 636 W HCPCS: Performed by: EMERGENCY MEDICINE

## 2019-08-03 PROCEDURE — 85025 COMPLETE CBC W/AUTO DIFF WBC: CPT

## 2019-08-03 PROCEDURE — 81000 URINALYSIS NONAUTO W/SCOPE: CPT

## 2019-08-03 PROCEDURE — 87088 URINE BACTERIA CULTURE: CPT

## 2019-08-03 PROCEDURE — 99291 CRITICAL CARE FIRST HOUR: CPT | Mod: 25

## 2019-08-03 PROCEDURE — 96361 HYDRATE IV INFUSION ADD-ON: CPT

## 2019-08-03 PROCEDURE — 96365 THER/PROPH/DIAG IV INF INIT: CPT

## 2019-08-03 RX ORDER — POTASSIUM CHLORIDE 20 MEQ/1
40 TABLET, EXTENDED RELEASE ORAL
Status: COMPLETED | OUTPATIENT
Start: 2019-08-03 | End: 2019-08-03

## 2019-08-03 RX ORDER — MELATONIN 5 MG
CAPSULE ORAL
COMMUNITY
End: 2022-05-09

## 2019-08-03 RX ORDER — MECLIZINE HYDROCHLORIDE 25 MG/1
25 TABLET ORAL
COMMUNITY
End: 2021-04-08

## 2019-08-03 RX ORDER — KETOROLAC TROMETHAMINE 30 MG/ML
30 INJECTION, SOLUTION INTRAMUSCULAR; INTRAVENOUS
Status: COMPLETED | OUTPATIENT
Start: 2019-08-03 | End: 2019-08-03

## 2019-08-03 RX ADMIN — SODIUM CHLORIDE 2208 ML: 0.9 INJECTION, SOLUTION INTRAVENOUS at 10:08

## 2019-08-03 RX ADMIN — CEFTRIAXONE SODIUM 2 G: 2 INJECTION, POWDER, FOR SOLUTION INTRAMUSCULAR; INTRAVENOUS at 10:08

## 2019-08-03 RX ADMIN — KETOROLAC TROMETHAMINE 30 MG: 30 INJECTION, SOLUTION INTRAMUSCULAR at 11:08

## 2019-08-03 RX ADMIN — POTASSIUM CHLORIDE 40 MEQ: 1500 TABLET, EXTENDED RELEASE ORAL at 11:08

## 2019-08-04 VITALS
SYSTOLIC BLOOD PRESSURE: 101 MMHG | BODY MASS INDEX: 25.47 KG/M2 | DIASTOLIC BLOOD PRESSURE: 57 MMHG | RESPIRATION RATE: 16 BRPM | HEIGHT: 67 IN | TEMPERATURE: 100 F | WEIGHT: 162.25 LBS | OXYGEN SATURATION: 96 % | HEART RATE: 88 BPM

## 2019-08-04 PROCEDURE — 63600175 PHARM REV CODE 636 W HCPCS: Performed by: EMERGENCY MEDICINE

## 2019-08-04 RX ORDER — ONDANSETRON 2 MG/ML
4 INJECTION INTRAMUSCULAR; INTRAVENOUS
Status: COMPLETED | OUTPATIENT
Start: 2019-08-04 | End: 2019-08-04

## 2019-08-04 RX ORDER — FENTANYL CITRATE 50 UG/ML
25 INJECTION, SOLUTION INTRAMUSCULAR; INTRAVENOUS
Status: COMPLETED | OUTPATIENT
Start: 2019-08-04 | End: 2019-08-04

## 2019-08-04 RX ADMIN — FENTANYL CITRATE 25 MCG: 50 INJECTION INTRAMUSCULAR; INTRAVENOUS at 12:08

## 2019-08-04 RX ADMIN — ONDANSETRON 4 MG: 2 INJECTION INTRAMUSCULAR; INTRAVENOUS at 12:08

## 2019-08-04 NOTE — ED PROVIDER NOTES
"SCRIBE #1 NOTE: I, Remigio Dong, am scribing for, and in the presence of, Bigg Crews Jr., MD. I have scribed the entire note.       History     Chief Complaint   Patient presents with    Fever     Pt diagnosed with UTI recently, states that she does not feel like meds helped. Pt states that she has had fever times 2 days (max 105.2) at home.     Flank Pain     pt c/o right sided flank pain/lower abdominal pain.      Review of patient's allergies indicates:   Allergen Reactions    Erythromycin     Morphine Nausea And Vomiting    Opioids - morphine analogues          History of Present Illness     HPI    8/3/2019, 10:12 PM  History obtained from the patient      History of Present Illness: Genny Calixto is a 54 y.o. female patient with a PSHx of cholecystectomy and hysterectomy who presents to the Emergency Department for evaluation of a fever (Tmax 104.6) which onset gradually 2 days ago. Symptoms are constant and moderate in severity. No mitigating or exacerbating factors reported. Associated sxs include right sided abd pain, right flank pain, n/v, SOB, HA, and "almond" smelling urine. Patient denies any dysuria, diarrhea, cough, runny nose, sore throat, CP, chills, hematuria, urinary urgency/frequency, constipation, hematochezia,  and all other sxs at this time. No prior Tx reported. Pt was evaluated for pyelonephritis at this facility on 6/30. No further complaints or concerns at this time.          Arrival mode: Personal vehicle    PCP: Xavier Arellano MD        Past Medical History:  Past Medical History:   Diagnosis Date    Anxiety     Takes 5-10 mg of valium qd prn anxiety (prescriptions for both on file, one from [facility] & other from )    Insomnia     Takes 5-10 mg of valium qhs prn insomnia (prescriptions for both on file, one from [facility] & other from )    Reflex sympathetic dystrophy     Vertigo        Past Surgical History:  Past Surgical History:   Procedure Laterality Date    " "BLADDER SURGERY      CHOLECYSTECTOMY      facet injections  02/14/2017    L3, L4, L5, S1 Done by Dr. Lara    HYSTERECTOMY      KNEE SURGERY      TONSILLECTOMY           Family History:  Family History   Problem Relation Age of Onset    Stroke Mother     Hypertension Mother     COPD Father     Drug abuse Brother        Social History:  Social History     Tobacco Use    Smoking status: Never Smoker    Smokeless tobacco: Never Used   Substance and Sexual Activity    Alcohol use: No     Frequency: Never    Drug use: No    Sexual activity: Never        Review of Systems     Review of Systems   Constitutional: Positive for fever (Tmax 104.6). Negative for chills.   HENT: Negative for rhinorrhea and sore throat.    Respiratory: Positive for shortness of breath. Negative for cough.    Cardiovascular: Negative for chest pain.   Gastrointestinal: Positive for abdominal pain (right sided), nausea and vomiting. Negative for blood in stool, constipation and diarrhea.   Genitourinary: Positive for flank pain (right). Negative for dysuria, frequency, hematuria and urgency.        (+) "almond" smelling urine     Musculoskeletal: Negative for back pain.   Skin: Negative for rash.   Neurological: Positive for headaches. Negative for weakness.   Hematological: Does not bruise/bleed easily.   All other systems reviewed and are negative.       Physical Exam     Initial Vitals [08/03/19 2155]   BP Pulse Resp Temp SpO2   (!) 105/57 107 18 (!) 103.1 °F (39.5 °C) 95 %      MAP       --          Physical Exam  Nursing Notes and Vital Signs Reviewed.  Constitutional: Patient is in no acute distress. Well-developed and well-nourished.  Head: Atraumatic. Normocephalic.  Eyes: PERRL. EOM intact. Conjunctivae are not pale. No scleral icterus.  ENT: Mucous membranes are moist. Oropharynx is clear and symmetric.    Neck: Supple. Full ROM. No lymphadenopathy.  Cardiovascular: Regular rate. Regular rhythm. No murmurs, rubs, or " gallops. Distal pulses are 2+ and symmetric.  Pulmonary/Chest: No respiratory distress. Clear to auscultation bilaterally. No wheezing or rales.  Abdominal:  Mild mid right abdomen tenderness.  There is no guarding or rebound.  Negative Germain sign.   Genitourinary:  Right CVA and right mid abdomen tenderness present.  Mild suprapubic tenderness.  Musculoskeletal: Moves all extremities. No obvious deformities. No edema. No calf tenderness.  Skin: Warm and dry. Non mottled  Neurological:  Alert, awake, and appropriate.  Normal speech.  No acute focal neurological deficits are appreciated.  Psychiatric: Normal affect. Good eye contact. Appropriate in content.     ED Course   Critical Care  Date/Time: 8/4/2019 12:03 AM  Performed by: Bigg Crews Jr., MD  Authorized by: Bigg Crews Jr., MD   Direct patient critical care time: 13 minutes  Additional history critical care time: 8 minutes  Ordering / reviewing critical care time: 15 minutes  Documentation critical care time: 7 minutes  Total critical care time (exclusive of procedural time) : 43 minutes  Critical care time was exclusive of separately billable procedures and treating other patients and teaching time.  Critical care was necessary to treat or prevent imminent or life-threatening deterioration of the following conditions: sepsis.  Critical care was time spent personally by me on the following activities: blood draw for specimens, development of treatment plan with patient or surrogate, interpretation of cardiac output measurements, evaluation of patient's response to treatment, examination of patient, obtaining history from patient or surrogate, ordering and performing treatments and interventions, ordering and review of laboratory studies, ordering and review of radiographic studies, pulse oximetry, re-evaluation of patient's condition and review of old charts.        ED Vital Signs:  Vitals:    08/03/19 2155 08/03/19 2226 08/03/19 2228 08/03/19  "2231   BP: (!) 105/57   (!) 104/53   Pulse: 107  97 97   Resp: 18   17   Temp: (!) 103.1 °F (39.5 °C)      TempSrc: Oral      SpO2: 95%   (!) 93%   Weight:  73.6 kg (162 lb 4.1 oz)     Height: 5' 7" (1.702 m)       08/03/19 2307 08/04/19 0000   BP: (!) 113/56 111/61   Pulse: 91 96   Resp: 16 17   Temp:     TempSrc:     SpO2: 96%    Weight:     Height:         Abnormal Lab Results:  Labs Reviewed   CBC W/ AUTO DIFFERENTIAL - Abnormal; Notable for the following components:       Result Value    RBC 3.56 (*)     Hemoglobin 11.8 (*)     Hematocrit 33.4 (*)     Mean Corpuscular Hemoglobin 33.1 (*)     Gran% 78.6 (*)     Lymph% 12.1 (*)     All other components within normal limits   COMPREHENSIVE METABOLIC PANEL - Abnormal; Notable for the following components:    Potassium 2.9 (*)     Glucose 151 (*)     Calcium 8.5 (*)     Albumin 3.2 (*)     Total Bilirubin 1.2 (*)     All other components within normal limits   URINALYSIS, REFLEX TO URINE CULTURE - Abnormal; Notable for the following components:    Appearance, UA Hazy (*)     Protein, UA 2+ (*)     Occult Blood UA 2+ (*)     Nitrite, UA Positive (*)     Leukocytes, UA 2+ (*)     All other components within normal limits    Narrative:     Preferred Collection Type->Urine, Clean Catch   URINALYSIS MICROSCOPIC - Abnormal; Notable for the following components:    WBC, UA >100 (*)     Bacteria Few (*)     All other components within normal limits    Narrative:     Preferred Collection Type->Urine, Clean Catch   CULTURE, BLOOD   CULTURE, BLOOD   CULTURE, URINE   LACTIC ACID, PLASMA   PROCALCITONIN        All Lab Results:  Results for orders placed or performed during the hospital encounter of 08/03/19   CBC auto differential   Result Value Ref Range    WBC 9.40 3.90 - 12.70 K/uL    RBC 3.56 (L) 4.00 - 5.40 M/uL    Hemoglobin 11.8 (L) 12.0 - 16.0 g/dL    Hematocrit 33.4 (L) 37.0 - 48.5 %    Mean Corpuscular Volume 94 82 - 98 fL    Mean Corpuscular Hemoglobin 33.1 (H) 27.0 - " 31.0 pg    Mean Corpuscular Hemoglobin Conc 35.3 32.0 - 36.0 g/dL    RDW 13.7 11.5 - 14.5 %    Platelets 152 150 - 350 K/uL    MPV 11.8 9.2 - 12.9 fL    Gran # (ANC) 7.4 1.8 - 7.7 K/uL    Lymph # 1.1 1.0 - 4.8 K/uL    Mono # 0.9 0.3 - 1.0 K/uL    Eos # 0.0 0.0 - 0.5 K/uL    Baso # 0.01 0.00 - 0.20 K/uL    Gran% 78.6 (H) 38.0 - 73.0 %    Lymph% 12.1 (L) 18.0 - 48.0 %    Mono% 9.3 4.0 - 15.0 %    Eosinophil% 0.1 0.0 - 8.0 %    Basophil% 0.1 0.0 - 1.9 %    Differential Method Automated    Comprehensive metabolic panel   Result Value Ref Range    Sodium 137 136 - 145 mmol/L    Potassium 2.9 (L) 3.5 - 5.1 mmol/L    Chloride 102 95 - 110 mmol/L    CO2 25 23 - 29 mmol/L    Glucose 151 (H) 70 - 110 mg/dL    BUN, Bld 10 6 - 20 mg/dL    Creatinine 0.8 0.5 - 1.4 mg/dL    Calcium 8.5 (L) 8.7 - 10.5 mg/dL    Total Protein 6.2 6.0 - 8.4 g/dL    Albumin 3.2 (L) 3.5 - 5.2 g/dL    Total Bilirubin 1.2 (H) 0.1 - 1.0 mg/dL    Alkaline Phosphatase 68 55 - 135 U/L    AST 30 10 - 40 U/L    ALT 25 10 - 44 U/L    Anion Gap 10 8 - 16 mmol/L    eGFR if African American >60 >60 mL/min/1.73 m^2    eGFR if non African American >60 >60 mL/min/1.73 m^2   Lactic acid, plasma #1   Result Value Ref Range    Lactate (Lactic Acid) 1.2 0.5 - 2.2 mmol/L   Urinalysis, Reflex to Urine Culture Urine, Clean Catch   Result Value Ref Range    Specimen UA Urine, Clean Catch     Color, UA Yellow Yellow, Straw, Hilda    Appearance, UA Hazy (A) Clear    pH, UA 6.0 5.0 - 8.0    Specific Gravity, UA 1.020 1.005 - 1.030    Protein, UA 2+ (A) Negative    Glucose, UA Negative Negative    Ketones, UA Negative Negative    Bilirubin (UA) Negative Negative    Occult Blood UA 2+ (A) Negative    Nitrite, UA Positive (A) Negative    Urobilinogen, UA Negative <2.0 EU/dL    Leukocytes, UA 2+ (A) Negative   Procalcitonin   Result Value Ref Range    Procalcitonin 0.21 <0.25 ng/mL   Urinalysis Microscopic   Result Value Ref Range    RBC, UA 0 0 - 4 /hpf    WBC, UA >100 (H) 0 - 5  /hpf    Bacteria Few (A) None-Occ /hpf    Hyaline Casts, UA 0 0-1/lpf /lpf    Microscopic Comment SEE COMMENT          Imaging Results:  Imaging Results          CT Renal Stone Study ABD Pelvis WO (Final result)  Result time 08/03/19 23:06:58    Final result by Sarath Bailey MD (08/03/19 23:06:58)                 Impression:      There are 2 right-sided ureteral stones with a 0.8 cm stone at the ureteral vesicular junction on the right.  There is associated moderately severe hydronephrosis.  Nonobstructing stone is seen in the left kidney.    Some patchy consolidation and atelectasis is present in the right lower lobe.    All CT scans at this location or performed using dose modulation techniques as is appropriate to perform the exam including the following: Automated exposure control, adjustment of the mA and/or kv according to patient size, or the use of iterative reconstruction technique.      Electronically signed by: Sarath Bailey  Date:    08/03/2019  Time:    23:06             Narrative:    EXAMINATION:  CT RENAL STONE STUDY ABD PELVIS WO    CLINICAL HISTORY:  Flank pain, recurrent stone disease suspected;Flank pain, stone disease suspected;    TECHNIQUE:  Low dose axial images, sagittal and coronal reformations were obtained from the lung bases to the pubic symphysis.  Contrast was not administered.    COMPARISON:  11/06/2015    FINDINGS:  Heart: Normal in size. No pericardial effusion.    Lung Bases: There is some patchy consolidation atelectasis in the right lower lobe.  Left lung base is clear    Liver: Normal in size and attenuation, with no focal hepatic lesions.    Gallbladder: The gallbladder surgically absent.    Bile Ducts: No evidence of dilated ducts.    Pancreas: No mass or peripancreatic fat stranding.    Spleen: Unremarkable.    Adrenals: Unremarkable.    Kidneys/ Ureters: Nonobstructing stone in the lower pole of the left kidney measures 0.7 cm.  There is moderately severe right-sided  hydronephrosis with distention of the right ureter down to the level of a stone at pelvic inlet which measures 0.8 cm on series 2, image 110.  The more distal portion of the right ureter contains an additional stone at ureterovesicular junction measuring 0.8 cm on series 2, image 130.  Phleboliths are present in the pelvis as well.  Streaky change in the perirenal fat is present on the right.    The left ureter appears free of obstructive change.    Bladder: No evidence of wall thickening.    Reproductive organs: Unremarkable.    GI Tract/Mesentery: There is some diverticulosis of the colon but no acute inflammatory changes demonstrated.  The appendix is normal in appearance.  The small bowel and stomach appear free of acute abnormality.    Peritoneal Space: No ascites. No free air.    Retroperitoneum: No significant adenopathy.    Abdominal wall: Unremarkable.    Vasculature: No significant atherosclerosis or aneurysm.    Bones: No acute fracture.                               X-Ray Chest AP Portable (Final result)  Result time 08/03/19 22:40:56    Final result by Sarath Bailey MD (08/03/19 22:40:56)                 Impression:      Stable chest x-ray.      Electronically signed by: Sarath Bailey  Date:    08/03/2019  Time:    22:40             Narrative:    EXAMINATION:  XR CHEST AP PORTABLE    CLINICAL HISTORY:  Sepsis;    TECHNIQUE:  Single frontal view of the chest was performed.    COMPARISON:  June 30, 2019    FINDINGS:  Heart is normal in size.  The lungs are clear and well expanded.                                 The EKG was ordered, reviewed, and independently interpreted by the ED provider.  Interpretation time: 2230  Rate: 98 BPM  Rhythm: normal sinus rhythm  Interpretation: No acute ST changes. No STEMI.           The Emergency Provider reviewed the vital signs and test results, which are outlined above.     ED Discussion     11:22 PM  Sepsis Focused Exam:  Vital signs have been reviewed.   Patient remains stable nontoxic.  Cardiopulmonary exam is unchanged.  Pulses remain less than 2 sec with normal capillary refill.  Skin remains non mottled.    11:23 PM  I discussed with the patient the findings of 8 mm stones x2 in the right ureter as well as the concern for infected stones.  The patient is an established patient with  with Urology.  The patient states that if she requires transfer that she would prefer to stay in the [facility].  Her urologist status both [facility] and [facility]    12:01 AM: Re-evaluated pt. Informed pt and family that there are no genitourinary services available at this time. I have discussed test results, shared treatment plan, and the need for transfer with patient and family at bedside. All historical, clinical, radiographic, and laboratory findings were reviewed with the patient/family in detail. [facility] required en route. Patient understands that there could be unforeseen motor vehicle accidents or loss of vital signs that could result in potential death or permanent disability. Pt and family express understanding at this time and agree with all information. All questions answered. Pt and family have no further questions or concerns at this time. Pt is ready for transfer.     12:06 AM  Patient meets SIRS criteria, has 103 fever, has greater than 100 white cells in the urine with positive nitrite, and x2 8 mm stones in the right ureter with obstruction.  After [facility] urology services tonight.  The patient is established with .  She is requesting to be be kept in [place] if possible as opposed to transfer to [place].  [facility] facilitated transfer request has been sent.  Patient is aware of the plan of care.    12:33 AM: Consult with Dr. Meadows (Emergency Medicine) at [facility] concerning pt. There are no genitourinary services, which the patient requires, offered  at [facility] at this time. Dr. Meadows expresses understanding and will accept transfer for genitourinary services.  Accepting Facility: [facility]  Accepting Physician: Dr. Meadows    ED Medication(s):  Medications   sodium chloride 0.9% bolus 2,208 mL (2,208 mLs Intravenous New Bag 8/3/19 2255)   cefTRIAXone (ROCEPHIN) 2 g in dextrose 5 % 50 mL IVPB (0 g Intravenous Stopped 8/4/19 0005)   ketorolac injection 30 mg (30 mg Intravenous Given 8/3/19 2300)   potassium chloride SA CR tablet 40 mEq (40 mEq Oral Given 8/3/19 2343)   fentaNYL injection 25 mcg (25 mcg Intravenous Given 8/4/19 0011)   ondansetron injection 4 mg (4 mg Intravenous Given 8/4/19 0011)       New Prescriptions    No medications on file                 Medical Decision Making:   Clinical Tests:   Lab Tests: Ordered and Reviewed  Radiological Study: Reviewed and Ordered  Medical Tests: Ordered and Reviewed             Scribe Attestation:   Scribe #1: I performed the above scribed service and the documentation accurately describes the services I performed. I attest to the accuracy of the note.     Attending:   Physician Attestation Statement for Scribe #1: I, Bigg Crews Jr., MD, personally performed the services described in this documentation, as scribed by Remigio Umana, in my presence, and it is both accurate and complete.           Clinical Impression       ICD-10-CM ICD-9-CM   1. Sepsis, due to unspecified organism A41.9 038.9     995.91   2. Sepsis A41.9 038.9     995.91   3. Pyelonephritis, acute N10 590.10   4. Calculus of right ureter N20.1 592.1   5. Hydronephrosis with urinary obstruction due to ureteral calculus N13.2 592.1     591   6. Hypokalemia E87.6 276.8       Disposition:   Disposition: Transferred  Condition: Stable         Bigg Crews Jr., MD  08/04/19 0037

## 2019-08-06 LAB — BACTERIA UR CULT: ABNORMAL

## 2019-08-08 ENCOUNTER — PATIENT MESSAGE (OUTPATIENT)
Dept: INTERNAL MEDICINE | Facility: CLINIC | Age: 54
End: 2019-08-08

## 2019-08-09 DIAGNOSIS — N20.0 BILATERAL NEPHROLITHIASIS: Primary | ICD-10-CM

## 2019-08-09 LAB
BACTERIA BLD CULT: NORMAL
BACTERIA BLD CULT: NORMAL

## 2019-08-12 ENCOUNTER — PATIENT MESSAGE (OUTPATIENT)
Dept: INTERNAL MEDICINE | Facility: CLINIC | Age: 54
End: 2019-08-12

## 2019-08-12 ENCOUNTER — TELEPHONE (OUTPATIENT)
Dept: UROLOGY | Facility: CLINIC | Age: 54
End: 2019-08-12

## 2019-08-12 NOTE — TELEPHONE ENCOUNTER
Returned call to pt; states she has (2) 10mm stones stuck in her right ureter and she has 3 more in the left kidney.  Went to [facility] and has a stent now.  Pt has Medicare and Medicaid.  Should I schedule an appt?

## 2019-08-13 ENCOUNTER — TELEPHONE (OUTPATIENT)
Dept: UROLOGY | Facility: CLINIC | Age: 54
End: 2019-08-13

## 2019-08-26 RX ORDER — DIAZEPAM 5 MG/1
TABLET ORAL
Qty: 30 TABLET | Refills: 0 | Status: SHIPPED | OUTPATIENT
Start: 2019-08-26 | End: 2019-11-01 | Stop reason: SDUPTHER

## 2019-09-03 DIAGNOSIS — F41.9 ANXIETY: ICD-10-CM

## 2019-09-03 DIAGNOSIS — G47.00 INSOMNIA, UNSPECIFIED TYPE: ICD-10-CM

## 2019-09-03 RX ORDER — DIAZEPAM 10 MG/1
TABLET ORAL
Qty: 30 TABLET | Refills: 0 | Status: SHIPPED | OUTPATIENT
Start: 2019-09-03 | End: 2019-10-07 | Stop reason: SDUPTHER

## 2019-09-04 ENCOUNTER — HOSPITAL ENCOUNTER (EMERGENCY)
Facility: HOSPITAL | Age: 54
Discharge: HOME | End: 2019-09-04
Attending: EMERGENCY MEDICINE
Payer: MEDICARE

## 2019-09-04 VITALS
WEIGHT: 157.88 LBS | HEART RATE: 90 BPM | BODY MASS INDEX: 23.93 KG/M2 | HEIGHT: 68 IN | SYSTOLIC BLOOD PRESSURE: 138 MMHG | DIASTOLIC BLOOD PRESSURE: 82 MMHG | OXYGEN SATURATION: 100 % | TEMPERATURE: 98 F | RESPIRATION RATE: 18 BRPM

## 2019-09-04 DIAGNOSIS — M25.512 LEFT SHOULDER PAIN: ICD-10-CM

## 2019-09-04 DIAGNOSIS — S40.012A CONTUSION OF LEFT SHOULDER, INITIAL ENCOUNTER: Primary | ICD-10-CM

## 2019-09-04 DIAGNOSIS — S41.102A OPEN WOUND OF LEFT UPPER ARM, INITIAL ENCOUNTER: ICD-10-CM

## 2019-09-04 PROCEDURE — 99284 EMERGENCY DEPT VISIT MOD MDM: CPT | Mod: 25

## 2019-09-04 PROCEDURE — 96372 THER/PROPH/DIAG INJ SC/IM: CPT

## 2019-09-04 PROCEDURE — 63600175 PHARM REV CODE 636 W HCPCS: Performed by: NURSE PRACTITIONER

## 2019-09-04 RX ORDER — DEXAMETHASONE 4 MG/1
4 TABLET ORAL DAILY
Qty: 5 TABLET | Refills: 0 | Status: SHIPPED | OUTPATIENT
Start: 2019-09-04 | End: 2019-09-09

## 2019-09-04 RX ORDER — KETOROLAC TROMETHAMINE 30 MG/ML
60 INJECTION, SOLUTION INTRAMUSCULAR; INTRAVENOUS
Status: COMPLETED | OUTPATIENT
Start: 2019-09-04 | End: 2019-09-04

## 2019-09-04 RX ORDER — ORPHENADRINE CITRATE 100 MG/1
100 TABLET, EXTENDED RELEASE ORAL 2 TIMES DAILY PRN
Qty: 20 TABLET | Refills: 0 | Status: SHIPPED | OUTPATIENT
Start: 2019-09-04 | End: 2019-09-14

## 2019-09-04 RX ORDER — DICLOFENAC SODIUM 50 MG/1
50 TABLET, DELAYED RELEASE ORAL 3 TIMES DAILY PRN
Qty: 15 TABLET | Refills: 0 | Status: SHIPPED | OUTPATIENT
Start: 2019-09-04 | End: 2020-10-20

## 2019-09-04 RX ORDER — MUPIROCIN 20 MG/G
OINTMENT TOPICAL 3 TIMES DAILY
Qty: 1 TUBE | Refills: 0 | Status: SHIPPED | OUTPATIENT
Start: 2019-09-04 | End: 2020-10-20

## 2019-09-04 RX ADMIN — KETOROLAC TROMETHAMINE 60 MG: 30 INJECTION, SOLUTION INTRAMUSCULAR at 06:09
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
## 2019-09-05 ENCOUNTER — TELEPHONE (OUTPATIENT)
Dept: ORTHOPEDICS | Facility: CLINIC | Age: 54
End: 2019-09-05

## 2019-09-05 DIAGNOSIS — M25.512 LEFT SHOULDER PAIN, UNSPECIFIED CHRONICITY: Primary | ICD-10-CM

## 2019-09-05 NOTE — TELEPHONE ENCOUNTER
Spoke c pt. Informed pt that x-rays need to be taken prior to appt. Advised pt to arrive for x-ray appt 30 minutes prior to scheduled appt c Dr. Luciano. Pt expressed understanding & was thankful.

## 2019-09-10 ENCOUNTER — TELEPHONE (OUTPATIENT)
Dept: ORTHOPEDICS | Facility: CLINIC | Age: 54
End: 2019-09-10

## 2019-09-10 ENCOUNTER — PATIENT OUTREACH (OUTPATIENT)
Dept: ADMINISTRATIVE | Facility: HOSPITAL | Age: 54
End: 2019-09-10

## 2019-09-10 DIAGNOSIS — Z12.31 VISIT FOR SCREENING MAMMOGRAM: Primary | ICD-10-CM

## 2019-09-10 NOTE — TELEPHONE ENCOUNTER
Spoke c pt. Asked if she was still coming to her appt today. Pt stated that she called & r/s appt for tomorrow due to a family emergency. Informed pt that she was not r/s for appt tomorrow. Scheduled appt c XR 09/11/19. Pt expressed understanding & was thankful.

## 2019-09-18 ENCOUNTER — TELEPHONE (OUTPATIENT)
Dept: ORTHOPEDICS | Facility: CLINIC | Age: 54
End: 2019-09-18

## 2019-09-19 ENCOUNTER — TELEPHONE (OUTPATIENT)
Dept: ORTHOPEDICS | Facility: CLINIC | Age: 54
End: 2019-09-19

## 2019-09-19 NOTE — TELEPHONE ENCOUNTER
I spoke to Ms. Royal regarding her appointment scheduled at 3:40 pm. on 2019.  She had an unexpected death in the family.  She was busy making  arrangements.  She also told me her shoulder was about 90% better than it was when she was seen in the emergency department.  I have advised that we would cancel the appointment and she could call us back to follow-up if needed. She verbalized understanding and agreed.

## 2019-09-24 ENCOUNTER — PES CALL (OUTPATIENT)
Dept: ADMINISTRATIVE | Facility: CLINIC | Age: 54
End: 2019-09-24

## 2019-10-04 RX ORDER — DIAZEPAM 5 MG/1
TABLET ORAL
Qty: 30 TABLET | Refills: 0 | OUTPATIENT
Start: 2019-10-04

## 2019-10-07 DIAGNOSIS — G47.00 INSOMNIA, UNSPECIFIED TYPE: ICD-10-CM

## 2019-10-07 DIAGNOSIS — F41.9 ANXIETY: ICD-10-CM

## 2019-10-07 RX ORDER — DIAZEPAM 10 MG/1
TABLET ORAL
Qty: 30 TABLET | Refills: 1 | Status: SHIPPED | OUTPATIENT
Start: 2019-10-07 | End: 2019-12-03 | Stop reason: SDUPTHER

## 2019-11-04 RX ORDER — DIAZEPAM 5 MG/1
TABLET ORAL
Qty: 15 TABLET | Refills: 0 | Status: SHIPPED | OUTPATIENT
Start: 2019-11-04 | End: 2019-12-06 | Stop reason: SDUPTHER

## 2019-11-14 DIAGNOSIS — F34.1 DYSTHYMIA: ICD-10-CM

## 2019-11-14 RX ORDER — DOXEPIN HYDROCHLORIDE 10 MG/1
10 CAPSULE ORAL NIGHTLY
Qty: 90 CAPSULE | Refills: 3 | Status: SHIPPED | OUTPATIENT
Start: 2019-11-14 | End: 2020-10-20

## 2019-11-21 RX ORDER — SUMATRIPTAN SUCCINATE 100 MG/1
TABLET ORAL
Qty: 10 TABLET | Refills: 5 | Status: SHIPPED | OUTPATIENT
Start: 2019-11-21 | End: 2020-08-05 | Stop reason: SDUPTHER

## 2019-12-03 DIAGNOSIS — F41.9 ANXIETY: ICD-10-CM

## 2019-12-03 DIAGNOSIS — G47.00 INSOMNIA, UNSPECIFIED TYPE: ICD-10-CM

## 2019-12-03 RX ORDER — DIAZEPAM 10 MG/1
TABLET ORAL
Qty: 30 TABLET | Refills: 1 | Status: SHIPPED | OUTPATIENT
Start: 2019-12-03 | End: 2020-02-03

## 2019-12-06 RX ORDER — DIAZEPAM 5 MG/1
TABLET ORAL
Qty: 15 TABLET | Refills: 3 | Status: SHIPPED | OUTPATIENT
Start: 2019-12-06 | End: 2020-02-18

## 2020-02-03 DIAGNOSIS — F41.9 ANXIETY: ICD-10-CM

## 2020-02-03 DIAGNOSIS — G47.00 INSOMNIA, UNSPECIFIED TYPE: ICD-10-CM

## 2020-02-03 RX ORDER — DIAZEPAM 10 MG/1
TABLET ORAL
Qty: 30 TABLET | Refills: 1 | Status: SHIPPED | OUTPATIENT
Start: 2020-02-03 | End: 2020-03-11 | Stop reason: DRUGHIGH

## 2020-02-19 RX ORDER — DIAZEPAM 5 MG/1
TABLET ORAL
Qty: 15 TABLET | Refills: 0 | Status: SHIPPED | OUTPATIENT
Start: 2020-02-19 | End: 2020-03-11

## 2020-03-11 RX ORDER — DIAZEPAM 5 MG/1
TABLET ORAL
Qty: 15 TABLET | Refills: 0 | Status: SHIPPED | OUTPATIENT
Start: 2020-03-11 | End: 2020-03-24

## 2020-03-24 DIAGNOSIS — G47.00 INSOMNIA, UNSPECIFIED TYPE: ICD-10-CM

## 2020-03-24 DIAGNOSIS — F41.9 ANXIETY: ICD-10-CM

## 2020-03-24 RX ORDER — DIAZEPAM 5 MG/1
TABLET ORAL
Qty: 28 TABLET | Refills: 0 | Status: SHIPPED | OUTPATIENT
Start: 2020-03-24 | End: 2020-04-23

## 2020-03-25 RX ORDER — DIAZEPAM 10 MG/1
TABLET ORAL
Qty: 30 TABLET | Refills: 1 | Status: SHIPPED | OUTPATIENT
Start: 2020-03-25 | End: 2020-04-23

## 2020-04-23 RX ORDER — DIAZEPAM 5 MG/1
TABLET ORAL
Qty: 28 TABLET | Refills: 0 | Status: SHIPPED | OUTPATIENT
Start: 2020-04-23 | End: 2020-05-20

## 2020-05-20 RX ORDER — DIAZEPAM 5 MG/1
TABLET ORAL
Qty: 28 TABLET | Refills: 0 | Status: SHIPPED | OUTPATIENT
Start: 2020-05-20 | End: 2020-07-14 | Stop reason: SDUPTHER

## 2020-05-21 NOTE — TELEPHONE ENCOUNTER
Spoke with pt she would like a new RX for sleep medication. Regarding appointments, she stated she does not have the best service for virtual and would rather stay away from the clinics at this time until COVID numbers drop more.   She said her current med just isn't working anymore she first thought it may have been stress but now doesn't think so. And would like to know if a visit is absolutly necessary or if something could be called in for her. Please advise, thank you.

## 2020-05-21 NOTE — TELEPHONE ENCOUNTER
----- Message from Ty Abdul sent at 5/21/2020  1:31 PM CDT -----  Contact: pt [phone]  Would like to consult with nurse regarding a new sleep medication.  Please call patient @ [phone].  Thanks/as

## 2020-05-22 ENCOUNTER — TELEPHONE (OUTPATIENT)
Dept: INTERNAL MEDICINE | Facility: CLINIC | Age: 55
End: 2020-05-22

## 2020-05-22 ENCOUNTER — OFFICE VISIT (OUTPATIENT)
Dept: INTERNAL MEDICINE | Facility: CLINIC | Age: 55
End: 2020-05-22
Payer: MEDICARE

## 2020-05-22 DIAGNOSIS — G47.00 INSOMNIA, UNSPECIFIED TYPE: Primary | ICD-10-CM

## 2020-05-22 PROCEDURE — 99441 PR PHYSICIAN TELEPHONE EVALUATION 5-10 MIN: CPT | Mod: 95,,, | Performed by: NURSE PRACTITIONER

## 2020-05-22 PROCEDURE — 99441 PR PHYSICIAN TELEPHONE EVALUATION 5-10 MIN: ICD-10-PCS | Mod: 95,,, | Performed by: NURSE PRACTITIONER

## 2020-05-22 RX ORDER — ZOLPIDEM TARTRATE 5 MG/1
TABLET ORAL
Qty: 20 TABLET | Refills: 0 | Status: SHIPPED | OUTPATIENT
Start: 2020-05-22 | End: 2020-10-20

## 2020-05-22 RX ORDER — DIAZEPAM 5 MG/1
TABLET ORAL
Qty: 28 TABLET | Refills: 0 | OUTPATIENT
Start: 2020-05-22

## 2020-05-22 NOTE — TELEPHONE ENCOUNTER
----- Message from Shelbie Sumner NP sent at 5/22/2020  3:56 PM CDT -----  Schedule 2 week f/u audio only

## 2020-05-22 NOTE — TELEPHONE ENCOUNTER
"Per Shelbie Sumner: "Last visit with Dr. Arellano was 2018.  Would need a visit.  Can schedule audio only for patient to discuss further"       LM for pt to return call    "

## 2020-05-22 NOTE — PROGRESS NOTES
Established Patient - Audio Only Telehealth Visit     The patient location is: home  The chief complaint leading to consultation is: insomnia  Visit type: Virtual visit with audio only (telephone)  Total time spent with patient: 10 min        The reason for the audio only service rather than synchronous audio and video virtual visit was related to technical difficulties or patient preference/necessity.     Each patient to whom I provide medical services by telemedicine is:  (1) informed of the relationship between the physician and patient and the respective role of any other health care provider with respect to management of the patient; and (2) notified that they may decline to receive medical services by telemedicine and may withdraw from such care at any time. Patient verbally consented to receive this service via voice-only telephone call.       HPI:  Patient presents with concerns of insomnia.  Has been taking doxepin but no longer working.  Her anxiety is controlled with Valium.  Does not help with insomnia.  Tried her daughter's Ambien 10 mg and it helped her one night.      Has tried Restoril melatonin and then moved to Doxepin.       Assessment and plan:      Insomnia.    Will give short term dose/prescription of Ambien 5 mg.  Advised side effects to patient.  Do no recommend to take long term.                          This service was not originating from a related E/M service provided within the previous 7 days nor will  to an E/M service or procedure within the next 24 hours or my soonest available appointment.  Prevailing standard of care was able to be met in this audio-only visit.

## 2020-07-14 DIAGNOSIS — F41.9 ANXIETY: Primary | ICD-10-CM

## 2020-07-17 DIAGNOSIS — Z71.89 COMPLEX CARE COORDINATION: ICD-10-CM

## 2020-07-22 RX ORDER — DIAZEPAM 5 MG/1
5 TABLET ORAL EVERY 12 HOURS PRN
Qty: 28 TABLET | Refills: 0 | Status: SHIPPED | OUTPATIENT
Start: 2020-07-22 | End: 2020-10-20 | Stop reason: SDUPTHER

## 2020-08-05 RX ORDER — SUMATRIPTAN SUCCINATE 100 MG/1
TABLET ORAL
Qty: 10 TABLET | Refills: 5 | Status: SHIPPED | OUTPATIENT
Start: 2020-08-05 | End: 2021-08-18

## 2020-08-05 NOTE — TELEPHONE ENCOUNTER
Last OV: 05/22/2020 with david Johansen. Refill of Sumatriptan [facility]mg [facility]n [facility] [address] [facility]. Please advise.

## 2020-08-28 ENCOUNTER — PATIENT OUTREACH (OUTPATIENT)
Dept: ADMINISTRATIVE | Facility: HOSPITAL | Age: 55
End: 2020-08-28

## 2020-10-07 DIAGNOSIS — Z12.11 COLON CANCER SCREENING: Primary | ICD-10-CM

## 2020-10-20 ENCOUNTER — OFFICE VISIT (OUTPATIENT)
Dept: INTERNAL MEDICINE | Facility: CLINIC | Age: 55
End: 2020-10-20
Payer: MEDICARE

## 2020-10-20 ENCOUNTER — LAB VISIT (OUTPATIENT)
Dept: LAB | Facility: HOSPITAL | Age: 55
End: 2020-10-20
Attending: INTERNAL MEDICINE
Payer: MEDICARE

## 2020-10-20 VITALS
DIASTOLIC BLOOD PRESSURE: 80 MMHG | OXYGEN SATURATION: 98 % | HEIGHT: 67 IN | SYSTOLIC BLOOD PRESSURE: 132 MMHG | BODY MASS INDEX: 23.7 KG/M2 | HEART RATE: 83 BPM | TEMPERATURE: 98 F | WEIGHT: 151 LBS

## 2020-10-20 DIAGNOSIS — F41.9 ANXIETY: ICD-10-CM

## 2020-10-20 DIAGNOSIS — E78.5 HYPERLIPIDEMIA, UNSPECIFIED HYPERLIPIDEMIA TYPE: ICD-10-CM

## 2020-10-20 DIAGNOSIS — Z00.00 ROUTINE GENERAL MEDICAL EXAMINATION AT A HEALTH CARE FACILITY: Primary | ICD-10-CM

## 2020-10-20 DIAGNOSIS — Z00.00 ROUTINE GENERAL MEDICAL EXAMINATION AT A HEALTH CARE FACILITY: ICD-10-CM

## 2020-10-20 DIAGNOSIS — Z12.11 SCREEN FOR COLON CANCER: ICD-10-CM

## 2020-10-20 DIAGNOSIS — R53.83 FATIGUE, UNSPECIFIED TYPE: ICD-10-CM

## 2020-10-20 DIAGNOSIS — Z12.31 BREAST CANCER SCREENING BY MAMMOGRAM: ICD-10-CM

## 2020-10-20 LAB
ALBUMIN SERPL BCP-MCNC: 4.1 G/DL (ref 3.5–5.2)
ALP SERPL-CCNC: 59 U/L (ref 55–135)
ALT SERPL W/O P-5'-P-CCNC: 15 U/L (ref 10–44)
ANION GAP SERPL CALC-SCNC: 9 MMOL/L (ref 8–16)
AST SERPL-CCNC: 20 U/L (ref 10–40)
BASOPHILS # BLD AUTO: 0.06 K/UL (ref 0–0.2)
BASOPHILS NFR BLD: 1 % (ref 0–1.9)
BILIRUB SERPL-MCNC: 1 MG/DL (ref 0.1–1)
BUN SERPL-MCNC: 12 MG/DL (ref 6–20)
CALCIUM SERPL-MCNC: 9.6 MG/DL (ref 8.7–10.5)
CHLORIDE SERPL-SCNC: 104 MMOL/L (ref 95–110)
CHOLEST SERPL-MCNC: 140 MG/DL (ref 120–199)
CHOLEST/HDLC SERPL: 2.5 {RATIO} (ref 2–5)
CO2 SERPL-SCNC: 29 MMOL/L (ref 23–29)
CREAT SERPL-MCNC: 0.8 MG/DL (ref 0.5–1.4)
DIFFERENTIAL METHOD: ABNORMAL
EOSINOPHIL # BLD AUTO: 0.2 K/UL (ref 0–0.5)
EOSINOPHIL NFR BLD: 2.5 % (ref 0–8)
ERYTHROCYTE [DISTWIDTH] IN BLOOD BY AUTOMATED COUNT: 12.5 % (ref 11.5–14.5)
EST. GFR  (AFRICAN AMERICAN): >60 ML/MIN/1.73 M^2
EST. GFR  (NON AFRICAN AMERICAN): >60 ML/MIN/1.73 M^2
GLUCOSE SERPL-MCNC: 91 MG/DL (ref 70–110)
HCT VFR BLD AUTO: 40.8 % (ref 37–48.5)
HDLC SERPL-MCNC: 55 MG/DL (ref 40–75)
HDLC SERPL: 39.3 % (ref 20–50)
HGB BLD-MCNC: 13.7 G/DL (ref 12–16)
IMM GRANULOCYTES # BLD AUTO: 0.01 K/UL (ref 0–0.04)
IMM GRANULOCYTES NFR BLD AUTO: 0.2 % (ref 0–0.5)
LDLC SERPL CALC-MCNC: 64.4 MG/DL (ref 63–159)
LYMPHOCYTES # BLD AUTO: 2.5 K/UL (ref 1–4.8)
LYMPHOCYTES NFR BLD: 41 % (ref 18–48)
MCH RBC QN AUTO: 33.4 PG (ref 27–31)
MCHC RBC AUTO-ENTMCNC: 33.6 G/DL (ref 32–36)
MCV RBC AUTO: 100 FL (ref 82–98)
MONOCYTES # BLD AUTO: 0.5 K/UL (ref 0.3–1)
MONOCYTES NFR BLD: 8.4 % (ref 4–15)
NEUTROPHILS # BLD AUTO: 2.9 K/UL (ref 1.8–7.7)
NEUTROPHILS NFR BLD: 46.9 % (ref 38–73)
NONHDLC SERPL-MCNC: 85 MG/DL
NRBC BLD-RTO: 0 /100 WBC
PLATELET # BLD AUTO: 217 K/UL (ref 150–350)
PMV BLD AUTO: 12.7 FL (ref 9.2–12.9)
POTASSIUM SERPL-SCNC: 4.6 MMOL/L (ref 3.5–5.1)
PROT SERPL-MCNC: 6.9 G/DL (ref 6–8.4)
RBC # BLD AUTO: 4.1 M/UL (ref 4–5.4)
SODIUM SERPL-SCNC: 142 MMOL/L (ref 136–145)
TRIGL SERPL-MCNC: 103 MG/DL (ref 30–150)
TSH SERPL DL<=0.005 MIU/L-ACNC: 1.24 UIU/ML (ref 0.4–4)
WBC # BLD AUTO: 6.07 K/UL (ref 3.9–12.7)

## 2020-10-20 PROCEDURE — 85025 COMPLETE CBC W/AUTO DIFF WBC: CPT

## 2020-10-20 PROCEDURE — 36415 COLL VENOUS BLD VENIPUNCTURE: CPT | Mod: PO

## 2020-10-20 PROCEDURE — 99999 PR PBB SHADOW E&M-EST. PATIENT-LVL III: ICD-10-PCS | Mod: PBBFAC,,, | Performed by: INTERNAL MEDICINE

## 2020-10-20 PROCEDURE — 80053 COMPREHEN METABOLIC PANEL: CPT

## 2020-10-20 PROCEDURE — 99214 PR OFFICE/OUTPT VISIT, EST, LEVL IV, 30-39 MIN: ICD-10-PCS | Mod: S$PBB,,, | Performed by: INTERNAL MEDICINE

## 2020-10-20 PROCEDURE — 80061 LIPID PANEL: CPT

## 2020-10-20 PROCEDURE — 99999 PR PBB SHADOW E&M-EST. PATIENT-LVL III: CPT | Mod: PBBFAC,,, | Performed by: INTERNAL MEDICINE

## 2020-10-20 PROCEDURE — 99213 OFFICE O/P EST LOW 20 MIN: CPT | Mod: PBBFAC,PO | Performed by: INTERNAL MEDICINE

## 2020-10-20 PROCEDURE — 99214 OFFICE O/P EST MOD 30 MIN: CPT | Mod: S$PBB,,, | Performed by: INTERNAL MEDICINE

## 2020-10-20 PROCEDURE — 84443 ASSAY THYROID STIM HORMONE: CPT

## 2020-10-20 RX ORDER — DIAZEPAM 5 MG/1
5 TABLET ORAL EVERY 12 HOURS PRN
Qty: 28 TABLET | Refills: 5 | Status: SHIPPED | OUTPATIENT
Start: 2020-10-20 | End: 2021-01-28

## 2020-10-20 RX ORDER — DOXEPIN HYDROCHLORIDE 10 MG/1
10 CAPSULE ORAL NIGHTLY
Qty: 30 CAPSULE | Refills: 11 | Status: SHIPPED | OUTPATIENT
Start: 2020-10-20 | End: 2021-10-11

## 2020-10-20 RX ORDER — METHYLPREDNISOLONE 4 MG/1
TABLET ORAL
Qty: 1 PACKAGE | Refills: 0 | Status: ON HOLD | OUTPATIENT
Start: 2020-10-20 | End: 2020-11-26 | Stop reason: HOSPADM

## 2020-10-20 RX ORDER — OXYCODONE AND ACETAMINOPHEN 10; 325 MG/1; MG/1
TABLET ORAL
COMMUNITY
Start: 2020-08-13 | End: 2020-12-10

## 2020-10-20 NOTE — PROGRESS NOTES
"HPI:  Patient is a 55-year-old female comes today for her initial visit to establish care.  Patient this time only complains of pain in her right shoulder over the deltoid region for the last 3 days.  She denies any trauma or injury.  She denies any overuse of the arm.  She is not taking anything for this time.  She has no other complaints at this time.  She would like to get refills of her doxepin and Valium that she uses sparingly.    Current MEDS: medcard review, verified and update  Allergies: Per the electronic medical record    Past Medical History:   Diagnosis Date    Anxiety     Takes 5-10 mg of valium qd prn anxiety (prescriptions for both on file, one from [facility] & other from )    Insomnia     Takes 5-10 mg of valium qhs prn insomnia (prescriptions for both on file, one from [facility] & other from )    Nephrolithiasis 08/03/2019    Left ureteral stone -> UTI c/b pyelonephritis and urosepsis w/ hypokalemia -> transfered to [facility] urology service from [facility] after CT abn dx, s/p 2 stents to allow infx to drain, IV Vanc/Rocephin, fever free since yesterday    Reflex sympathetic dystrophy     Vertigo        Past Surgical History:   Procedure Laterality Date    BLADDER SURGERY      CHOLECYSTECTOMY      facet injections  02/14/2017    L3, L4, L5, S1 Done by Dr. Lara    HYSTERECTOMY      KNEE SURGERY      TONSILLECTOMY         SHx: per the electronic medical record    FHx: recorded in the electronic medical record    ROS:    denies any chest pains or shortness of breath. Denies any nausea, vomiting or diarrhea. Denies any fever, chills or sweats. Denies any change in weight, voice, stool, skin or hair. Denies any dysuria, dyspepsia or dysphagia. Denies any change in vision, hearing or headaches. Denies any swollen lymph nodes or loss of memory.    PE:  /80 (BP Location: Left arm)   Pulse 83   Temp 98.1 °F (36.7 °C) (Tympanic)   Ht 5' 7" (1.702 m)   Wt 68.5 kg (151 lb 0.2 oz)   SpO2 " 98%   BMI 23.65 kg/m²   Gen: Well-developed, well-nourished, female, in no acute distress, oriented x3  HEENT: neck is supple, no adenopathy, carotids 2+ equal without bruits, thyroid exam normal size without nodules.  CHEST: clear to auscultation and percussion  CVS: regular rate and rhythm without significant murmur, gallop, or rubs  ABD: soft, benign, no rebound no guarding, no distention. Bowel sounds are normal.     Nontender,  no palpable masses, no organomegaly and no audible bruits.  BREAST: no masses.  No nipple inversion, retraction, or deviation.  EXT: no clubbing, cyanosis, or edema  LYMPH: no cervical, inguinal, or axillary adenopathy  FEET: no loss of sensation.  No ulcers or pressure sores.  NEURO: gait normal.  Cranial nerves II- XII intact. No nystagmus.  Speech normal.   Gross motor and sensory unremarkable.  PELVIC: deferred  Her right deltoid area is slightly warm to touch and tender on palpation.  She has full range of motion of the shoulder.    Lab Results   Component Value Date    WBC 9.40 08/03/2019    HGB 11.8 (L) 08/03/2019    HCT 33.4 (L) 08/03/2019     08/03/2019    CHOL 164 03/07/2017    TRIG 107 03/07/2017    HDL 48 03/07/2017    ALT 25 08/03/2019    AST 30 08/03/2019     08/03/2019    K 2.9 (L) 08/03/2019     08/03/2019    CREATININE 0.8 08/03/2019    BUN 10 08/03/2019    CO2 25 08/03/2019    TSH 2.406 03/07/2017    INR 1.0 01/12/2015    HGBA1C 5.8 03/07/2017       Impression:  Deltoid bursitis  Other medical problems below, stable  Patient Active Problem List   Diagnosis    Anxiety    Insomnia    Chronic low back pain without sciatica    Transient elevated blood pressure    Vertigo    Bilateral nephrolithiasis       Plan:   Orders Placed This Encounter    Mammo Digital Screening Bilat w/ Scott    CBC auto differential    Comprehensive Metabolic Panel    Lipid Panel    TSH    doxepin (SINEQUAN) 10 MG capsule    diazePAM (VALIUM) 5 MG tablet     methylPREDNISolone (MEDROL DOSEPACK) 4 mg tablet    Case request GI: COLONOSCOPY    Was given a Medrol Dosepak and told to take ibuprofen 600 mg 3 times a day for the next 5-7 days.  If it is not resolved she needs to let me know.  She is due for her mammogram, lab work, and colonoscopy.      This note is generated with speech recognition software and is subject to transcription error and sound alike phrases that may be missed by proofreading.

## 2020-10-23 ENCOUNTER — TELEPHONE (OUTPATIENT)
Dept: INTERNAL MEDICINE | Facility: CLINIC | Age: 55
End: 2020-10-23

## 2020-10-23 NOTE — TELEPHONE ENCOUNTER
----- Message from Tashia Chávez sent at 10/23/2020 10:38 AM CDT -----  Pt is requesting a call back from nurse regarding an e-mail about her test results . Please call back at [phone]

## 2020-10-28 ENCOUNTER — OFFICE VISIT (OUTPATIENT)
Dept: INTERNAL MEDICINE | Facility: CLINIC | Age: 55
End: 2020-10-28
Payer: MEDICARE

## 2020-10-28 VITALS
TEMPERATURE: 98 F | HEART RATE: 82 BPM | DIASTOLIC BLOOD PRESSURE: 80 MMHG | OXYGEN SATURATION: 99 % | BODY MASS INDEX: 23.29 KG/M2 | HEIGHT: 67 IN | WEIGHT: 148.38 LBS | SYSTOLIC BLOOD PRESSURE: 124 MMHG

## 2020-10-28 DIAGNOSIS — M79.601 RIGHT ARM PAIN: Primary | ICD-10-CM

## 2020-10-28 PROCEDURE — 99214 OFFICE O/P EST MOD 30 MIN: CPT | Mod: PBBFAC,PO | Performed by: INTERNAL MEDICINE

## 2020-10-28 PROCEDURE — 99999 PR PBB SHADOW E&M-EST. PATIENT-LVL IV: CPT | Mod: PBBFAC,,, | Performed by: INTERNAL MEDICINE

## 2020-10-28 PROCEDURE — 99999 PR PBB SHADOW E&M-EST. PATIENT-LVL IV: ICD-10-PCS | Mod: PBBFAC,,, | Performed by: INTERNAL MEDICINE

## 2020-10-28 PROCEDURE — 99213 PR OFFICE/OUTPT VISIT, EST, LEVL III, 20-29 MIN: ICD-10-PCS | Mod: S$PBB,,, | Performed by: INTERNAL MEDICINE

## 2020-10-28 PROCEDURE — 99213 OFFICE O/P EST LOW 20 MIN: CPT | Mod: S$PBB,,, | Performed by: INTERNAL MEDICINE

## 2020-10-28 RX ORDER — DICLOFENAC SODIUM 75 MG/1
75 TABLET, DELAYED RELEASE ORAL 2 TIMES DAILY
Qty: 60 TABLET | Refills: 1 | Status: SHIPPED | OUTPATIENT
Start: 2020-10-28 | End: 2020-12-21

## 2020-10-28 NOTE — PROGRESS NOTES
"HPI:  Patient is a 55-year-old female comes today with continued problems with pain in her right shoulder.  The pain is located at the very distal in the where the deltoid inserts into the upper arm.  She denies again any trauma or history of trauma.  Is been going on for about 2 weeks.  She has been taking ibuprofen consistently for the last week without benefit    Current meds have been verified and updated per the EMR  Exam:/80 (BP Location: Right arm)   Pulse 82   Temp 97.7 °F (36.5 °C) (Tympanic)   Ht 5' 7" (1.702 m)   Wt 67.3 kg (148 lb 5.9 oz)   SpO2 99%   BMI 23.24 kg/m²   She has point tenderness over the distal insertion of the deltoid muscle on the humerus.  She has full range of motion of the shoulder itself    Lab Results   Component Value Date    WBC 6.07 10/20/2020    HGB 13.7 10/20/2020    HCT 40.8 10/20/2020     10/20/2020    CHOL 140 10/20/2020    TRIG 103 10/20/2020    HDL 55 10/20/2020    ALT 15 10/20/2020    AST 20 10/20/2020     10/20/2020    K 4.6 10/20/2020     10/20/2020    CREATININE 0.8 10/20/2020    BUN 12 10/20/2020    CO2 29 10/20/2020    TSH 1.240 10/20/2020    INR 1.0 01/12/2015    HGBA1C 5.8 03/07/2017       Impression:  Deltoid bursitis  Patient Active Problem List   Diagnosis    Anxiety    Insomnia    Chronic low back pain without sciatica    Transient elevated blood pressure    Vertigo    Bilateral nephrolithiasis       Plan:  Orders Placed This Encounter    Ambulatory referral/consult to Physical/Occupational Therapy    diclofenac (VOLTAREN) 75 MG EC tablet     She will change to diclofenac twice a day and also was referred to see physical therapy    This note is generated with speech recognition software and is subject to transcription error and sound alike phrases that may be missed by proofreading.      "

## 2020-11-10 ENCOUNTER — HOSPITAL ENCOUNTER (INPATIENT)
Facility: HOSPITAL | Age: 55
LOS: 16 days | Discharge: HOME | DRG: 329 | End: 2020-11-26
Attending: EMERGENCY MEDICINE | Admitting: INTERNAL MEDICINE
Payer: MEDICARE

## 2020-11-10 DIAGNOSIS — K56.609 SMALL BOWEL OBSTRUCTION: Primary | ICD-10-CM

## 2020-11-10 DIAGNOSIS — R07.9 CHEST PAIN: ICD-10-CM

## 2020-11-10 LAB
ALBUMIN SERPL BCP-MCNC: 4.4 G/DL (ref 3.5–5.2)
ALP SERPL-CCNC: 81 U/L (ref 55–135)
ALT SERPL W/O P-5'-P-CCNC: 26 U/L (ref 10–44)
ANION GAP SERPL CALC-SCNC: 13 MMOL/L (ref 8–16)
AST SERPL-CCNC: 30 U/L (ref 10–40)
BASOPHILS # BLD AUTO: 0.09 K/UL (ref 0–0.2)
BASOPHILS NFR BLD: 0.4 % (ref 0–1.9)
BILIRUB SERPL-MCNC: 1.1 MG/DL (ref 0.1–1)
BUN SERPL-MCNC: 12 MG/DL (ref 6–20)
CALCIUM SERPL-MCNC: 9.7 MG/DL (ref 8.7–10.5)
CHLORIDE SERPL-SCNC: 102 MMOL/L (ref 95–110)
CO2 SERPL-SCNC: 25 MMOL/L (ref 23–29)
CREAT SERPL-MCNC: 0.8 MG/DL (ref 0.5–1.4)
DIFFERENTIAL METHOD: ABNORMAL
EOSINOPHIL # BLD AUTO: 0 K/UL (ref 0–0.5)
EOSINOPHIL NFR BLD: 0.1 % (ref 0–8)
ERYTHROCYTE [DISTWIDTH] IN BLOOD BY AUTOMATED COUNT: 12.2 % (ref 11.5–14.5)
EST. GFR  (AFRICAN AMERICAN): >60 ML/MIN/1.73 M^2
EST. GFR  (NON AFRICAN AMERICAN): >60 ML/MIN/1.73 M^2
GLUCOSE SERPL-MCNC: 147 MG/DL (ref 70–110)
HCT VFR BLD AUTO: 53.5 % (ref 37–48.5)
HCV AB SERPL QL IA: NEGATIVE
HGB BLD-MCNC: 18.3 G/DL (ref 12–16)
HIV 1+2 AB+HIV1 P24 AG SERPL QL IA: NEGATIVE
IMM GRANULOCYTES # BLD AUTO: 0.12 K/UL (ref 0–0.04)
IMM GRANULOCYTES NFR BLD AUTO: 0.5 % (ref 0–0.5)
LIPASE SERPL-CCNC: 7 U/L (ref 4–60)
LYMPHOCYTES # BLD AUTO: 1.3 K/UL (ref 1–4.8)
LYMPHOCYTES NFR BLD: 6.1 % (ref 18–48)
MCH RBC QN AUTO: 33.3 PG (ref 27–31)
MCHC RBC AUTO-ENTMCNC: 34.2 G/DL (ref 32–36)
MCV RBC AUTO: 97 FL (ref 82–98)
MONOCYTES # BLD AUTO: 1 K/UL (ref 0.3–1)
MONOCYTES NFR BLD: 4.6 % (ref 4–15)
NEUTROPHILS # BLD AUTO: 19.5 K/UL (ref 1.8–7.7)
NEUTROPHILS NFR BLD: 88.3 % (ref 38–73)
NRBC BLD-RTO: 0 /100 WBC
PLATELET # BLD AUTO: 347 K/UL (ref 150–350)
PMV BLD AUTO: 11.9 FL (ref 9.2–12.9)
POTASSIUM SERPL-SCNC: 4.3 MMOL/L (ref 3.5–5.1)
PROT SERPL-MCNC: 7.8 G/DL (ref 6–8.4)
RBC # BLD AUTO: 5.5 M/UL (ref 4–5.4)
SARS-COV-2 RDRP RESP QL NAA+PROBE: NEGATIVE
SODIUM SERPL-SCNC: 140 MMOL/L (ref 136–145)
WBC # BLD AUTO: 22.06 K/UL (ref 3.9–12.7)

## 2020-11-10 PROCEDURE — 63600175 PHARM REV CODE 636 W HCPCS: Performed by: EMERGENCY MEDICINE

## 2020-11-10 PROCEDURE — 25500020 PHARM REV CODE 255: Performed by: EMERGENCY MEDICINE

## 2020-11-10 PROCEDURE — 86803 HEPATITIS C AB TEST: CPT

## 2020-11-10 PROCEDURE — 85730 THROMBOPLASTIN TIME PARTIAL: CPT

## 2020-11-10 PROCEDURE — 96365 THER/PROPH/DIAG IV INF INIT: CPT

## 2020-11-10 PROCEDURE — U0002 COVID-19 LAB TEST NON-CDC: HCPCS

## 2020-11-10 PROCEDURE — 85025 COMPLETE CBC W/AUTO DIFF WBC: CPT

## 2020-11-10 PROCEDURE — 96375 TX/PRO/DX INJ NEW DRUG ADDON: CPT

## 2020-11-10 PROCEDURE — 25000003 PHARM REV CODE 250: Performed by: EMERGENCY MEDICINE

## 2020-11-10 PROCEDURE — 83690 ASSAY OF LIPASE: CPT

## 2020-11-10 PROCEDURE — 85610 PROTHROMBIN TIME: CPT

## 2020-11-10 PROCEDURE — 80053 COMPREHEN METABOLIC PANEL: CPT

## 2020-11-10 PROCEDURE — 93010 EKG 12-LEAD: ICD-10-PCS | Mod: ,,, | Performed by: INTERNAL MEDICINE

## 2020-11-10 PROCEDURE — 93010 ELECTROCARDIOGRAM REPORT: CPT | Mod: ,,, | Performed by: INTERNAL MEDICINE

## 2020-11-10 PROCEDURE — 93005 ELECTROCARDIOGRAM TRACING: CPT

## 2020-11-10 PROCEDURE — 86703 HIV-1/HIV-2 1 RESULT ANTBDY: CPT

## 2020-11-10 PROCEDURE — 99291 CRITICAL CARE FIRST HOUR: CPT | Mod: 25

## 2020-11-10 PROCEDURE — 25000003 PHARM REV CODE 250: Performed by: PHYSICIAN ASSISTANT

## 2020-11-10 PROCEDURE — 63600175 PHARM REV CODE 636 W HCPCS: Performed by: PHYSICIAN ASSISTANT

## 2020-11-10 PROCEDURE — 11000001 HC ACUTE MED/SURG PRIVATE ROOM

## 2020-11-10 PROCEDURE — 36415 COLL VENOUS BLD VENIPUNCTURE: CPT

## 2020-11-10 RX ORDER — LORAZEPAM 2 MG/ML
1 INJECTION INTRAMUSCULAR
Status: COMPLETED | OUTPATIENT
Start: 2020-11-10 | End: 2020-11-10

## 2020-11-10 RX ORDER — HYDROMORPHONE HYDROCHLORIDE 2 MG/ML
1 INJECTION, SOLUTION INTRAMUSCULAR; INTRAVENOUS; SUBCUTANEOUS
Status: COMPLETED | OUTPATIENT
Start: 2020-11-10 | End: 2020-11-10

## 2020-11-10 RX ADMIN — HYDROMORPHONE HYDROCHLORIDE 1 MG: 2 INJECTION INTRAMUSCULAR; INTRAVENOUS; SUBCUTANEOUS at 11:11

## 2020-11-10 RX ADMIN — PIPERACILLIN AND TAZOBACTAM 4.5 G: 4; .5 INJECTION, POWDER, LYOPHILIZED, FOR SOLUTION INTRAVENOUS; PARENTERAL at 11:11

## 2020-11-10 RX ADMIN — IOHEXOL 100 ML: 350 INJECTION, SOLUTION INTRAVENOUS at 10:11

## 2020-11-10 RX ADMIN — PROMETHAZINE HYDROCHLORIDE 12.5 MG: 25 INJECTION INTRAMUSCULAR; INTRAVENOUS at 11:11

## 2020-11-10 RX ADMIN — LORAZEPAM 1 MG: 2 INJECTION INTRAMUSCULAR; INTRAVENOUS at 11:11

## 2020-11-10 RX ADMIN — SODIUM CHLORIDE 1000 ML: 0.9 INJECTION, SOLUTION INTRAVENOUS at 10:11

## 2020-11-11 ENCOUNTER — ANESTHESIA EVENT (OUTPATIENT)
Dept: SURGERY | Facility: HOSPITAL | Age: 55
DRG: 329 | End: 2020-11-11
Payer: MEDICARE

## 2020-11-11 ENCOUNTER — ANESTHESIA (OUTPATIENT)
Dept: SURGERY | Facility: HOSPITAL | Age: 55
DRG: 329 | End: 2020-11-11
Payer: MEDICARE

## 2020-11-11 PROBLEM — D72.829 LEUKOCYTOSIS: Status: ACTIVE | Noted: 2020-11-11

## 2020-11-11 LAB
ALBUMIN SERPL BCP-MCNC: 3.6 G/DL (ref 3.5–5.2)
ALP SERPL-CCNC: 60 U/L (ref 55–135)
ALT SERPL W/O P-5'-P-CCNC: 22 U/L (ref 10–44)
ANION GAP SERPL CALC-SCNC: 9 MMOL/L (ref 8–16)
APTT BLDCRRT: 25.9 SEC (ref 21–32)
AST SERPL-CCNC: 22 U/L (ref 10–40)
BASOPHILS # BLD AUTO: 0.06 K/UL (ref 0–0.2)
BASOPHILS NFR BLD: 0.4 % (ref 0–1.9)
BILIRUB SERPL-MCNC: 1.2 MG/DL (ref 0.1–1)
BUN SERPL-MCNC: 14 MG/DL (ref 6–20)
CALCIUM SERPL-MCNC: 8.9 MG/DL (ref 8.7–10.5)
CHLORIDE SERPL-SCNC: 106 MMOL/L (ref 95–110)
CO2 SERPL-SCNC: 24 MMOL/L (ref 23–29)
CREAT SERPL-MCNC: 0.8 MG/DL (ref 0.5–1.4)
DIFFERENTIAL METHOD: ABNORMAL
EOSINOPHIL # BLD AUTO: 0 K/UL (ref 0–0.5)
EOSINOPHIL NFR BLD: 0.1 % (ref 0–8)
ERYTHROCYTE [DISTWIDTH] IN BLOOD BY AUTOMATED COUNT: 12.7 % (ref 11.5–14.5)
EST. GFR  (AFRICAN AMERICAN): >60 ML/MIN/1.73 M^2
EST. GFR  (NON AFRICAN AMERICAN): >60 ML/MIN/1.73 M^2
GLUCOSE SERPL-MCNC: 110 MG/DL (ref 70–110)
HCT VFR BLD AUTO: 44.1 % (ref 37–48.5)
HGB BLD-MCNC: 15 G/DL (ref 12–16)
IMM GRANULOCYTES # BLD AUTO: 0.06 K/UL (ref 0–0.04)
IMM GRANULOCYTES NFR BLD AUTO: 0.4 % (ref 0–0.5)
INR PPP: 1 (ref 0.8–1.2)
LYMPHOCYTES # BLD AUTO: 2.6 K/UL (ref 1–4.8)
LYMPHOCYTES NFR BLD: 18.6 % (ref 18–48)
MAGNESIUM SERPL-MCNC: 2 MG/DL (ref 1.6–2.6)
MCH RBC QN AUTO: 32.8 PG (ref 27–31)
MCHC RBC AUTO-ENTMCNC: 34 G/DL (ref 32–36)
MCV RBC AUTO: 97 FL (ref 82–98)
MONOCYTES # BLD AUTO: 1 K/UL (ref 0.3–1)
MONOCYTES NFR BLD: 7.3 % (ref 4–15)
NEUTROPHILS # BLD AUTO: 10.4 K/UL (ref 1.8–7.7)
NEUTROPHILS NFR BLD: 73.2 % (ref 38–73)
NRBC BLD-RTO: 0 /100 WBC
PHOSPHATE SERPL-MCNC: 3.6 MG/DL (ref 2.7–4.5)
PLATELET # BLD AUTO: 295 K/UL (ref 150–350)
PMV BLD AUTO: 11.9 FL (ref 9.2–12.9)
POTASSIUM SERPL-SCNC: 4.2 MMOL/L (ref 3.5–5.1)
PROCALCITONIN SERPL IA-MCNC: 0.06 NG/ML
PROT SERPL-MCNC: 6.4 G/DL (ref 6–8.4)
PROTHROMBIN TIME: 10.5 SEC (ref 9–12.5)
RBC # BLD AUTO: 4.57 M/UL (ref 4–5.4)
SODIUM SERPL-SCNC: 139 MMOL/L (ref 136–145)
TROPONIN I SERPL DL<=0.01 NG/ML-MCNC: <0.006 NG/ML (ref 0–0.03)
WBC # BLD AUTO: 14.17 K/UL (ref 3.9–12.7)

## 2020-11-11 PROCEDURE — 63600175 PHARM REV CODE 636 W HCPCS: Performed by: NURSE PRACTITIONER

## 2020-11-11 PROCEDURE — 25000003 PHARM REV CODE 250: Performed by: EMERGENCY MEDICINE

## 2020-11-11 PROCEDURE — 99223 1ST HOSP IP/OBS HIGH 75: CPT | Mod: 57,,, | Performed by: COLON & RECTAL SURGERY

## 2020-11-11 PROCEDURE — 88307 PR  SURG PATH,LEVEL V: ICD-10-PCS | Mod: 26,,, | Performed by: PATHOLOGY

## 2020-11-11 PROCEDURE — 85025 COMPLETE CBC W/AUTO DIFF WBC: CPT

## 2020-11-11 PROCEDURE — C1765 ADHESION BARRIER: HCPCS | Performed by: SURGERY

## 2020-11-11 PROCEDURE — 36415 COLL VENOUS BLD VENIPUNCTURE: CPT

## 2020-11-11 PROCEDURE — 37000008 HC ANESTHESIA 1ST 15 MINUTES: Performed by: SURGERY

## 2020-11-11 PROCEDURE — C9290 INJ, BUPIVACAINE LIPOSOME: HCPCS | Performed by: SURGERY

## 2020-11-11 PROCEDURE — 63600175 PHARM REV CODE 636 W HCPCS: Performed by: SURGERY

## 2020-11-11 PROCEDURE — 83735 ASSAY OF MAGNESIUM: CPT

## 2020-11-11 PROCEDURE — 88307 TISSUE EXAM BY PATHOLOGIST: CPT | Mod: 26,,, | Performed by: PATHOLOGY

## 2020-11-11 PROCEDURE — 99223 PR INITIAL HOSPITAL CARE,LEVL III: ICD-10-PCS | Mod: 57,,, | Performed by: COLON & RECTAL SURGERY

## 2020-11-11 PROCEDURE — 63600175 PHARM REV CODE 636 W HCPCS: Performed by: STUDENT IN AN ORGANIZED HEALTH CARE EDUCATION/TRAINING PROGRAM

## 2020-11-11 PROCEDURE — S0028 INJECTION, FAMOTIDINE, 20 MG: HCPCS | Performed by: NURSE PRACTITIONER

## 2020-11-11 PROCEDURE — 84145 PROCALCITONIN (PCT): CPT

## 2020-11-11 PROCEDURE — 94770 HC EXHALED C02 TEST: CPT

## 2020-11-11 PROCEDURE — 80053 COMPREHEN METABOLIC PANEL: CPT

## 2020-11-11 PROCEDURE — 71000039 HC RECOVERY, EACH ADD'L HOUR: Performed by: SURGERY

## 2020-11-11 PROCEDURE — 71000033 HC RECOVERY, INTIAL HOUR: Performed by: SURGERY

## 2020-11-11 PROCEDURE — 27000221 HC OXYGEN, UP TO 24 HOURS

## 2020-11-11 PROCEDURE — 25000003 PHARM REV CODE 250: Performed by: NURSE PRACTITIONER

## 2020-11-11 PROCEDURE — 37000009 HC ANESTHESIA EA ADD 15 MINS: Performed by: SURGERY

## 2020-11-11 PROCEDURE — 44120 PR RESECT SMALL INTEST,SINGL RESEC/ANAS: ICD-10-PCS | Mod: 80,,, | Performed by: SURGERY

## 2020-11-11 PROCEDURE — 36000709 HC OR TIME LEV III EA ADD 15 MIN: Performed by: SURGERY

## 2020-11-11 PROCEDURE — 63600175 PHARM REV CODE 636 W HCPCS: Performed by: INTERNAL MEDICINE

## 2020-11-11 PROCEDURE — 87040 BLOOD CULTURE FOR BACTERIA: CPT

## 2020-11-11 PROCEDURE — 44120 REMOVAL OF SMALL INTESTINE: CPT | Mod: ,,, | Performed by: SURGERY

## 2020-11-11 PROCEDURE — 11000001 HC ACUTE MED/SURG PRIVATE ROOM

## 2020-11-11 PROCEDURE — 94799 UNLISTED PULMONARY SVC/PX: CPT

## 2020-11-11 PROCEDURE — 25000003 PHARM REV CODE 250: Performed by: SURGERY

## 2020-11-11 PROCEDURE — 99900035 HC TECH TIME PER 15 MIN (STAT)

## 2020-11-11 PROCEDURE — S0030 INJECTION, METRONIDAZOLE: HCPCS | Performed by: EMERGENCY MEDICINE

## 2020-11-11 PROCEDURE — 44120 REMOVAL OF SMALL INTESTINE: CPT | Mod: 80,,, | Performed by: SURGERY

## 2020-11-11 PROCEDURE — 25000003 PHARM REV CODE 250: Performed by: STUDENT IN AN ORGANIZED HEALTH CARE EDUCATION/TRAINING PROGRAM

## 2020-11-11 PROCEDURE — 36000708 HC OR TIME LEV III 1ST 15 MIN: Performed by: SURGERY

## 2020-11-11 PROCEDURE — 63600175 PHARM REV CODE 636 W HCPCS: Performed by: ANESTHESIOLOGY

## 2020-11-11 PROCEDURE — 63600175 PHARM REV CODE 636 W HCPCS: Performed by: EMERGENCY MEDICINE

## 2020-11-11 PROCEDURE — 88307 TISSUE EXAM BY PATHOLOGIST: CPT | Mod: 59 | Performed by: PATHOLOGY

## 2020-11-11 PROCEDURE — 84484 ASSAY OF TROPONIN QUANT: CPT

## 2020-11-11 PROCEDURE — 84100 ASSAY OF PHOSPHORUS: CPT

## 2020-11-11 PROCEDURE — 44120 PR RESECT SMALL INTEST,SINGL RESEC/ANAS: ICD-10-PCS | Mod: ,,, | Performed by: SURGERY

## 2020-11-11 PROCEDURE — 27201423 OPTIME MED/SURG SUP & DEVICES STERILE SUPPLY: Performed by: SURGERY

## 2020-11-11 DEVICE — MEMBRANE SEPRAFILM 5 X 6: Type: IMPLANTABLE DEVICE | Site: ABDOMEN | Status: FUNCTIONAL

## 2020-11-11 RX ORDER — HYDROMORPHONE HYDROCHLORIDE 2 MG/ML
0.2 INJECTION, SOLUTION INTRAMUSCULAR; INTRAVENOUS; SUBCUTANEOUS EVERY 5 MIN PRN
Status: DISCONTINUED | OUTPATIENT
Start: 2020-11-11 | End: 2020-11-11 | Stop reason: HOSPADM

## 2020-11-11 RX ORDER — HYDROMORPHONE HYDROCHLORIDE 2 MG/ML
0.5 INJECTION, SOLUTION INTRAMUSCULAR; INTRAVENOUS; SUBCUTANEOUS EVERY 6 HOURS PRN
Status: DISCONTINUED | OUTPATIENT
Start: 2020-11-11 | End: 2020-11-11

## 2020-11-11 RX ORDER — ONDANSETRON 2 MG/ML
4 INJECTION INTRAMUSCULAR; INTRAVENOUS ONCE
Status: COMPLETED | OUTPATIENT
Start: 2020-11-11 | End: 2020-11-11

## 2020-11-11 RX ORDER — ACETAMINOPHEN 650 MG/20.3ML
650 LIQUID ORAL EVERY 6 HOURS PRN
Status: DISCONTINUED | OUTPATIENT
Start: 2020-11-11 | End: 2020-11-12

## 2020-11-11 RX ORDER — BUPIVACAINE HYDROCHLORIDE 2.5 MG/ML
INJECTION, SOLUTION EPIDURAL; INFILTRATION; INTRACAUDAL
Status: DISCONTINUED | OUTPATIENT
Start: 2020-11-11 | End: 2020-11-11 | Stop reason: HOSPADM

## 2020-11-11 RX ORDER — VANCOMYCIN HCL IN 5 % DEXTROSE 1G/250ML
1000 PLASTIC BAG, INJECTION (ML) INTRAVENOUS
Status: DISCONTINUED | OUTPATIENT
Start: 2020-11-11 | End: 2020-11-11

## 2020-11-11 RX ORDER — FAMOTIDINE 20 MG/50ML
20 INJECTION, SOLUTION INTRAVENOUS 2 TIMES DAILY
Status: DISCONTINUED | OUTPATIENT
Start: 2020-11-11 | End: 2020-11-14

## 2020-11-11 RX ORDER — NEOSTIGMINE METHYLSULFATE 1 MG/ML
INJECTION, SOLUTION INTRAVENOUS
Status: DISCONTINUED | OUTPATIENT
Start: 2020-11-11 | End: 2020-11-11

## 2020-11-11 RX ORDER — HYDROMORPHONE HCL IN 0.9% NACL 6 MG/30 ML
PATIENT CONTROLLED ANALGESIA SYRINGE INTRAVENOUS CONTINUOUS
Status: DISCONTINUED | OUTPATIENT
Start: 2020-11-11 | End: 2020-11-13

## 2020-11-11 RX ORDER — METRONIDAZOLE 500 MG/100ML
500 INJECTION, SOLUTION INTRAVENOUS ONCE
Status: DISCONTINUED | OUTPATIENT
Start: 2020-11-11 | End: 2020-11-11 | Stop reason: HOSPADM

## 2020-11-11 RX ORDER — ONDANSETRON 2 MG/ML
4 INJECTION INTRAMUSCULAR; INTRAVENOUS DAILY PRN
Status: DISCONTINUED | OUTPATIENT
Start: 2020-11-11 | End: 2020-11-11 | Stop reason: HOSPADM

## 2020-11-11 RX ORDER — METRONIDAZOLE 500 MG/100ML
500 INJECTION, SOLUTION INTRAVENOUS
Status: DISCONTINUED | OUTPATIENT
Start: 2020-11-11 | End: 2020-11-11

## 2020-11-11 RX ORDER — PHENYLEPHRINE HYDROCHLORIDE 10 MG/ML
INJECTION INTRAVENOUS
Status: DISCONTINUED | OUTPATIENT
Start: 2020-11-11 | End: 2020-11-11

## 2020-11-11 RX ORDER — ROCURONIUM BROMIDE 10 MG/ML
INJECTION, SOLUTION INTRAVENOUS
Status: DISCONTINUED | OUTPATIENT
Start: 2020-11-11 | End: 2020-11-11

## 2020-11-11 RX ORDER — HYDROMORPHONE HYDROCHLORIDE 2 MG/ML
2 INJECTION, SOLUTION INTRAMUSCULAR; INTRAVENOUS; SUBCUTANEOUS
Status: COMPLETED | OUTPATIENT
Start: 2020-11-11 | End: 2020-11-11

## 2020-11-11 RX ORDER — ONDANSETRON 2 MG/ML
4 INJECTION INTRAMUSCULAR; INTRAVENOUS EVERY 6 HOURS PRN
Status: DISCONTINUED | OUTPATIENT
Start: 2020-11-11 | End: 2020-11-26 | Stop reason: HOSPADM

## 2020-11-11 RX ORDER — PROPOFOL 10 MG/ML
VIAL (ML) INTRAVENOUS
Status: DISCONTINUED | OUTPATIENT
Start: 2020-11-11 | End: 2020-11-11

## 2020-11-11 RX ORDER — HYDROMORPHONE HYDROCHLORIDE 2 MG/ML
1 INJECTION, SOLUTION INTRAMUSCULAR; INTRAVENOUS; SUBCUTANEOUS EVERY 6 HOURS PRN
Status: DISCONTINUED | OUTPATIENT
Start: 2020-11-11 | End: 2020-11-11

## 2020-11-11 RX ORDER — METOCLOPRAMIDE HYDROCHLORIDE 5 MG/ML
5 INJECTION INTRAMUSCULAR; INTRAVENOUS EVERY 6 HOURS PRN
Status: DISCONTINUED | OUTPATIENT
Start: 2020-11-11 | End: 2020-11-18

## 2020-11-11 RX ORDER — FENTANYL CITRATE 50 UG/ML
INJECTION, SOLUTION INTRAMUSCULAR; INTRAVENOUS
Status: DISCONTINUED | OUTPATIENT
Start: 2020-11-11 | End: 2020-11-11

## 2020-11-11 RX ORDER — LORAZEPAM 2 MG/ML
0.5 INJECTION INTRAMUSCULAR EVERY 6 HOURS PRN
Status: DISCONTINUED | OUTPATIENT
Start: 2020-11-11 | End: 2020-11-12 | Stop reason: SDUPTHER

## 2020-11-11 RX ORDER — NALOXONE HCL 0.4 MG/ML
0.02 VIAL (ML) INJECTION
Status: DISCONTINUED | OUTPATIENT
Start: 2020-11-11 | End: 2020-11-23 | Stop reason: SDUPTHER

## 2020-11-11 RX ORDER — MIDAZOLAM HYDROCHLORIDE 1 MG/ML
INJECTION, SOLUTION INTRAMUSCULAR; INTRAVENOUS
Status: DISCONTINUED | OUTPATIENT
Start: 2020-11-11 | End: 2020-11-11

## 2020-11-11 RX ORDER — SUCCINYLCHOLINE CHLORIDE 20 MG/ML
INJECTION INTRAMUSCULAR; INTRAVENOUS
Status: DISCONTINUED | OUTPATIENT
Start: 2020-11-11 | End: 2020-11-11

## 2020-11-11 RX ORDER — SODIUM CHLORIDE, SODIUM LACTATE, POTASSIUM CHLORIDE, CALCIUM CHLORIDE 600; 310; 30; 20 MG/100ML; MG/100ML; MG/100ML; MG/100ML
INJECTION, SOLUTION INTRAVENOUS CONTINUOUS
Status: DISCONTINUED | OUTPATIENT
Start: 2020-11-11 | End: 2020-11-12

## 2020-11-11 RX ORDER — LIDOCAINE HYDROCHLORIDE 10 MG/ML
INJECTION, SOLUTION EPIDURAL; INFILTRATION; INTRACAUDAL; PERINEURAL
Status: DISCONTINUED | OUTPATIENT
Start: 2020-11-11 | End: 2020-11-11

## 2020-11-11 RX ADMIN — FENTANYL CITRATE 50 MCG: 50 INJECTION, SOLUTION INTRAMUSCULAR; INTRAVENOUS at 01:11

## 2020-11-11 RX ADMIN — HYDROMORPHONE HYDROCHLORIDE 2 MG: 2 INJECTION INTRAMUSCULAR; INTRAVENOUS; SUBCUTANEOUS at 02:11

## 2020-11-11 RX ADMIN — SODIUM CHLORIDE, SODIUM LACTATE, POTASSIUM CHLORIDE, AND CALCIUM CHLORIDE 1000 ML: 600; 310; 30; 20 INJECTION, SOLUTION INTRAVENOUS at 04:11

## 2020-11-11 RX ADMIN — PHENYLEPHRINE HYDROCHLORIDE 200 MCG: 10 INJECTION INTRAVENOUS at 12:11

## 2020-11-11 RX ADMIN — ROCURONIUM BROMIDE 45 MG: 10 INJECTION, SOLUTION INTRAVENOUS at 11:11

## 2020-11-11 RX ADMIN — PROPOFOL 150 MG: 10 INJECTION, EMULSION INTRAVENOUS at 11:11

## 2020-11-11 RX ADMIN — GLYCOPYRROLATE 0.8 MG: 0.2 INJECTION, SOLUTION INTRAMUSCULAR; INTRAVENOUS at 01:11

## 2020-11-11 RX ADMIN — CEFTRIAXONE 1 G: 1 INJECTION, SOLUTION INTRAVENOUS at 10:11

## 2020-11-11 RX ADMIN — METRONIDAZOLE 500 MG: 500 SOLUTION INTRAVENOUS at 12:11

## 2020-11-11 RX ADMIN — ONDANSETRON 4 MG: 2 INJECTION INTRAMUSCULAR; INTRAVENOUS at 09:11

## 2020-11-11 RX ADMIN — Medication: at 03:11

## 2020-11-11 RX ADMIN — SODIUM CHLORIDE, SODIUM LACTATE, POTASSIUM CHLORIDE, AND CALCIUM CHLORIDE: 600; 310; 30; 20 INJECTION, SOLUTION INTRAVENOUS at 11:11

## 2020-11-11 RX ADMIN — HYDROMORPHONE HYDROCHLORIDE 1 MG: 2 INJECTION INTRAMUSCULAR; INTRAVENOUS; SUBCUTANEOUS at 08:11

## 2020-11-11 RX ADMIN — LIDOCAINE HYDROCHLORIDE 50 MG: 10 INJECTION, SOLUTION EPIDURAL; INFILTRATION; INTRACAUDAL; PERINEURAL at 11:11

## 2020-11-11 RX ADMIN — PHENYLEPHRINE HYDROCHLORIDE 100 MCG: 10 INJECTION INTRAVENOUS at 12:11

## 2020-11-11 RX ADMIN — FENTANYL CITRATE 50 MCG: 50 INJECTION, SOLUTION INTRAMUSCULAR; INTRAVENOUS at 12:11

## 2020-11-11 RX ADMIN — SODIUM CHLORIDE, SODIUM LACTATE, POTASSIUM CHLORIDE, AND CALCIUM CHLORIDE: 600; 310; 30; 20 INJECTION, SOLUTION INTRAVENOUS at 02:11

## 2020-11-11 RX ADMIN — FENTANYL CITRATE 50 MCG: 50 INJECTION, SOLUTION INTRAMUSCULAR; INTRAVENOUS at 11:11

## 2020-11-11 RX ADMIN — ROCURONIUM BROMIDE 20 MG: 10 INJECTION, SOLUTION INTRAVENOUS at 12:11

## 2020-11-11 RX ADMIN — LORAZEPAM 0.5 MG: 2 INJECTION INTRAMUSCULAR; INTRAVENOUS at 08:11

## 2020-11-11 RX ADMIN — LORAZEPAM 0.5 MG: 2 INJECTION INTRAMUSCULAR; INTRAVENOUS at 10:11

## 2020-11-11 RX ADMIN — SUCCINYLCHOLINE CHLORIDE 160 MG: 20 INJECTION, SOLUTION INTRAMUSCULAR; INTRAVENOUS at 11:11

## 2020-11-11 RX ADMIN — NEOSTIGMINE METHYLSULFATE 4 MG: 1 INJECTION INTRAVENOUS at 01:11

## 2020-11-11 RX ADMIN — ONDANSETRON 4 MG: 2 INJECTION INTRAMUSCULAR; INTRAVENOUS at 01:11

## 2020-11-11 RX ADMIN — CEFTRIAXONE 1 G: 2 INJECTION, SOLUTION INTRAVENOUS at 12:11

## 2020-11-11 RX ADMIN — ACETAMINOPHEN 650 MG: 160 SOLUTION ORAL at 07:11

## 2020-11-11 RX ADMIN — MIDAZOLAM 2 MG: 1 INJECTION INTRAMUSCULAR; INTRAVENOUS at 11:11

## 2020-11-11 RX ADMIN — ONDANSETRON 4 MG: 2 INJECTION INTRAMUSCULAR; INTRAVENOUS at 10:11

## 2020-11-11 RX ADMIN — FAMOTIDINE 20 MG: 20 INJECTION, SOLUTION INTRAVENOUS at 08:11

## 2020-11-11 RX ADMIN — DEXTROSE 1750 MG: 5 SOLUTION INTRAVENOUS at 04:11

## 2020-11-11 RX ADMIN — ROCURONIUM BROMIDE 5 MG: 10 INJECTION, SOLUTION INTRAVENOUS at 11:11

## 2020-11-11 NOTE — CONSULTS
[facility] -   General Surgery  Consult Note    Patient Name: Genny Calixto  MRN: [id number]  Code Status: Full Code  Admission Date: 11/10/2020  Hospital Length of Stay: 1 days  Attending Physician: Nikolai Holguin MD  Primary Care Provider: Tay Duff MD    Patient information was obtained from patient, past medical records and ER records.     Inpatient consult to General Surgery  Consult performed by: Remigio Herron MD  Consult ordered by: Christine Proctor NP        Subjective:     Principal Problem: Small bowel obstruction    History of Present Illness: 54yo F with a PMHx significant for nephrolithiasis who presented to the ED for evaluation of epigastric abdominal pain. Reports a one day history of this pain which is sharp and moderate in nature. Has associated nausea and vomiting x24hrs. Denies any associated fever, chills, diarrhea or constipation. Workup in ED worrisome for small bowel obstruction. Surgery consulted for evaluation and pt admitted to hospital medicine. Pt reports minimal abdominal surgical history, just a hysterectomy and bladder repair. Last BM yesterday. NGT placed in ED and still with moderate abdominal pain. Denies fevers, chills, diarrhea or constipation.    No current facility-administered medications on file prior to encounter.      Current Outpatient Medications on File Prior to Encounter   Medication Sig    diazePAM (VALIUM) 5 MG tablet Take 1 tablet (5 mg total) by mouth every 12 (twelve) hours as needed for Anxiety.    diclofenac (VOLTAREN) 75 MG EC tablet Take 1 tablet (75 mg total) by mouth 2 (two) times daily.    doxepin (SINEQUAN) 10 MG capsule Take 1 capsule (10 mg total) by mouth every evening.    ergocalciferol (ERGOCALCIFEROL) 50,000 unit Cap Take 1 capsule (50,000 Units total) by mouth every 7 days.    LACTOBACILLUS COMBO NO.6 (PROBIOTIC COMPLEX ORAL) Take by mouth once daily at 6am.    loratadine (CLARITIN) 10 mg tablet Take 10 mg by  mouth daily    meclizine (ANTIVERT) 25 mg tablet Take 25 mg by mouth.    melatonin 5 mg Cap Take by mouth.    [] methylPREDNISolone (MEDROL DOSEPACK) 4 mg tablet use as directed    ondansetron (ZOFRAN) 8 MG tablet Take 8 mg by mouth every 8 (eight) hours.    ondansetron (ZOFRAN-ODT) 4 MG TbDL Take 1 tablet (4 mg total) by mouth every 8 (eight) hours as needed (nausea).    oxyCODONE-acetaminophen (PERCOCET)  mg per tablet TK 1 T PO  Q 8 H PRF PAIN    sumatriptan (IMITREX) 100 MG tablet TAKE 1 TABLET IF NEEDED, MAY REPEAT ONCE IN 1 HOUR. MAX 2 TABLETS IN 24 HOURS       Review of patient's allergies indicates:   Allergen Reactions    Erythromycin     Morphine Nausea And Vomiting    Opioids - morphine analogues        Past Medical History:   Diagnosis Date    Anxiety     Takes 5-10 mg of valium qd prn anxiety (prescriptions for both on file, one from [facility] & other from )    Insomnia     Takes 5-10 mg of valium qhs prn insomnia (prescriptions for both on file, one from [facility] & other from )    Nephrolithiasis 2019    Left ureteral stone -> UTI c/b pyelonephritis and urosepsis w/ hypokalemia -> transfered to [facility]rology service from [facility] after CT abn dx, s/p 2 stents to allow infx to drain, IV Vanc/Rocephin, fever free since yesterday    Reflex sympathetic dystrophy     Vertigo      Past Surgical History:   Procedure Laterality Date    BLADDER SURGERY      CHOLECYSTECTOMY      facet injections  2017    L3, L4, L5, S1 Done by Dr. Lara    HYSTERECTOMY      KNEE SURGERY      TONSILLECTOMY       Family History     Problem Relation (Age of Onset)    COPD Father    Drug abuse Brother    Hypertension Mother    Stroke Mother        Tobacco Use    Smoking status: Never Smoker    Smokeless tobacco: Never Used   Substance and Sexual Activity    Alcohol use: No     Frequency: Never    Drug use: No    Sexual activity: Never     Review of Systems   Constitutional:  Negative for appetite change, chills, diaphoresis, fatigue and fever.   HENT: Negative for congestion, postnasal drip, rhinorrhea, sinus pressure and sore throat.    Respiratory: Negative for cough, shortness of breath and wheezing.    Cardiovascular: Negative for chest pain, palpitations and leg swelling.   Gastrointestinal: Positive for abdominal pain, nausea and vomiting. Negative for abdominal distention, anal bleeding, blood in stool, constipation, diarrhea and rectal pain.   Genitourinary: Negative for dysuria, hematuria and urgency.   Musculoskeletal: Negative for arthralgias, back pain and myalgias.   Skin: Negative for pallor and rash.   Neurological: Negative for dizziness, syncope, weakness, light-headedness, numbness and headaches.   Psychiatric/Behavioral: The patient is not nervous/anxious.    All other systems reviewed and are negative.    Objective:     Vital Signs (Most Recent):  Temp: 98.6 °F (37 °C) (11/10/20 2040)  Pulse: 97 (11/10/20 2307)  Resp: 18 (11/11/20 0215)  BP: 118/73 (11/10/20 2307)  SpO2: 100 % (11/10/20 2307) Vital Signs (24h Range):  Temp:  [98.6 °F (37 °C)] 98.6 °F (37 °C)  Pulse:  [76-97] 97  Resp:  [18] 18  SpO2:  [98 %-100 %] 100 %  BP: (107-118)/(64-73) 118/73     Weight: 74.9 kg (165 lb 2 oz)  Body mass index is 25.86 kg/m².    Physical Exam  Constitutional:       Appearance: She is well-developed.   HENT:      Head: Normocephalic and atraumatic.   Eyes:      Conjunctiva/sclera: Conjunctivae normal.   Neck:      Musculoskeletal: Normal range of motion.      Thyroid: No thyromegaly.   Cardiovascular:      Rate and Rhythm: Normal rate and regular rhythm.   Pulmonary:      Effort: Pulmonary effort is normal. No respiratory distress.   Abdominal:      Comments: Soft, mild distention; +global TTP, no rebound or guarding; no large scars   Musculoskeletal: Normal range of motion.         General: No tenderness.   Skin:     General: Skin is warm and dry.      Capillary Refill:  Capillary refill takes less than 2 seconds.      Findings: No rash.   Neurological:      Mental Status: She is alert and oriented to person, place, and time.         Significant Labs:  CBC:   Recent Labs   Lab 11/10/20  2211   WBC 22.06*   RBC 5.50*   HGB 18.3*   HCT 53.5*      MCV 97   MCH 33.3*   MCHC 34.2     BMP:   Recent Labs   Lab 11/10/20  2211   *      K 4.3      CO2 25   BUN 12   CREATININE 0.8   CALCIUM 9.7     CMP:   Recent Labs   Lab 11/10/20  2211   *   CALCIUM 9.7   ALBUMIN 4.4   PROT 7.8      K 4.3   CO2 25      BUN 12   CREATININE 0.8   ALKPHOS 81   ALT 26   AST 30   BILITOT 1.1*       Significant Diagnostics:  I have reviewed all pertinent imaging results/findings within the past 24 hours.     CT:  FINDINGS:  Mild ascites.  Cholecystectomy clips noted.  There is a dilated loop of bowel in the mid hemiabdomen.  Remainder of the small bowel loops and colon are nondilated.  The dilated small bowel loop measures up to 3.8 cm with suggestion of some bowel wall mucosa edema.  Normal renal parenchymal enhancement with left renal cyst and nonobstructing punctate bilateral renal calculi suspected mild sigmoid diverticulosis.  Normal appendix.     Impression:     Mild ascites     Interval development of dilated segment of small bowel in the mid right hemiabdomen with bowel diameter measuring up to 3.8 cm.  This is consistent with at least moderate grade partial small bowel obstruction, worrisome for closed loop bowel obstruction.  Consider surgical consultation    Assessment/Plan:     * Small bowel obstruction  54yo F with small bowel obstruction with history, exam and imaging worrisome for closed loop obstruction    - Discussed with patient that her exam is worrisome for a closed loop bowel obstruction and this would require urgent surgical evaluation with laparoscopy, possible laparotomy and possible bowel resection. Pt is agreeable to this. Discussed that this  may be performed by either myself or one of my surgical partners, whoever has OR availability first and she is okay with this  - to OR today for diagnostic lap, possible laparotomy, possible bowel resection and any other indicated procedures  - NPO  - NGT to cLWS  - All risks, benefits and alternatives fully explained to patient. Risks include, but are not limited to, bleeding, infection, anastomotic leak, damage to ureter, damage to other intra-abdominal organs such as colon, rectum, small bowel, stomach, liver, bladder, reproductive organs, sexual dysfunction, urinary dysfunction, postoperative abscess, conversion to open operation, perioperative MI, CVA and death.  All questions field and appropriately answered to patient's satisfaction.  Consent signed and placed on chart.        VTE Risk Mitigation (From admission, onward)         Ordered     IP VTE LOW RISK PATIENT  Once      11/11/20 0223     Place sequential compression device  Until discontinued      11/11/20 0223                Thank you for your consult. I will follow-up with patient. Please contact us if you have any additional questions.    Remigio Herron MD  General Surgery  [facility]

## 2020-11-11 NOTE — ANESTHESIA PROCEDURE NOTES
Intubation  Performed by: Urbano Parisi CRNA  Authorized by: Urbano Parisi CRNA     Intubation:     Induction:  Intravenous    Intubated:  Postinduction    Mask Ventilation:  Easy mask    Attempts:  1    Attempted By:  CRNA    Method of Intubation:  Direct    Blade:  Eric 3    Laryngeal View Grade: Grade IIA - cords partially seen      Difficult Airway Encountered?: No      Complications:  None    Airway Device:  Oral endotracheal tube    Airway Device Size:  7.0    Style/Cuff Inflation:  Cuffed    Tube secured:  20    Secured at:  The lips    Placement Verified By:  Capnometry    Complicating Factors:  Anterior larynx    Findings Post-Intubation:  BS equal bilateral

## 2020-11-11 NOTE — ASSESSMENT & PLAN NOTE
- General Surgery consult.  - NPO status.  - IV hydration.  - NG tube placed to intermittent low wall suction.  - Analgesics and antiemetics as needed.  - Follow labs.

## 2020-11-11 NOTE — ASSESSMENT & PLAN NOTE
56yo F with small bowel obstruction with history, exam and imaging worrisome for closed loop obstruction    - Discussed with patient that her exam is worrisome for a closed loop bowel obstruction and this would require urgent surgical evaluation with laparoscopy, possible laparotomy and possible bowel resection. Pt is agreeable to this. Discussed that this may be performed by either myself or one of my surgical partners, whoever has OR availability first and she is okay with this  - to OR today for diagnostic lap, possible laparotomy, possible bowel resection and any other indicated procedures  - NPO  - NGT to cLWS  - All risks, benefits and alternatives fully explained to patient. Risks include, but are not limited to, bleeding, infection, anastomotic leak, damage to ureter, damage to other intra-abdominal organs such as colon, rectum, small bowel, stomach, liver, bladder, reproductive organs, sexual dysfunction, urinary dysfunction, postoperative abscess, conversion to open operation, perioperative MI, CVA and death.  All questions field and appropriately answered to patient's satisfaction.  Consent signed and placed on chart.

## 2020-11-11 NOTE — PROGRESS NOTES
Pharmacokinetic Initial Assessment: IV Vancomycin    Assessment/Plan:    Initiate intravenous vancomycin with loading dose of 1750 mg once followed by a maintenance dose of vancomycin 1000 mg IV every 12 hours  Desired empiric serum trough concentration is 15 to 20 mcg/mL  Draw vancomycin trough level 60 min prior to fourth dose on 11/12 at approximately 15:45  Pharmacy will continue to follow and monitor vancomycin.      Please contact pharmacy at extension [phone] with any questions regarding this assessment.     Thank you for the consult,   Andrew Braun       Patient brief summary:  Genny Calixto is a 55 y.o. female initiated on antimicrobial therapy with IV Vancomycin for treatment of suspected bacteremia    Drug Allergies:   Review of patient's allergies indicates:   Allergen Reactions    Erythromycin     Morphine Nausea And Vomiting    Opioids - morphine analogues        Actual Body Weight:   74.9kg    Renal Function:   Estimated Creatinine Clearance: 83.9 mL/min (based on SCr of 0.8 mg/dL).,     Dialysis Method (if applicable):  N/A    CBC (last 72 hours):  Recent Labs   Lab Result Units 11/10/20  2211   WBC K/uL 22.06*   Hemoglobin g/dL 18.3*   Hematocrit % 53.5*   Platelets K/uL 347   Gran % % 88.3*   Lymph % % 6.1*   Mono % % 4.6   Eosinophil % % 0.1   Basophil % % 0.4   Differential Method  Automated       Metabolic Panel (last 72 hours):  Recent Labs   Lab Result Units 11/10/20  2211   Sodium mmol/L 140   Potassium mmol/L 4.3   Chloride mmol/L 102   CO2 mmol/L 25   Glucose mg/dL 147*   BUN mg/dL 12   Creatinine mg/dL 0.8   Albumin g/dL 4.4   Total Bilirubin mg/dL 1.1*   Alkaline Phosphatase U/L 81   AST U/L 30   ALT U/L 26       Drug levels (last 3 results):  No results for input(s): VANCOMYCINRA, VANCOMYCINPE, VANCOMYCINTR in the last 72 hours.    Microbiologic Results:  Microbiology Results (last 7 days)       Procedure Component Value Units Date/Time    Blood culture [[id number]] Collected:  11/11/20 0255    Order Status: Sent Specimen: Blood from Peripheral, Antecubital, Left Updated: 11/11/20 0301    Blood culture [[id number]] Collected: 11/11/20 0257    Order Status: Sent Specimen: Blood from Peripheral, Antecubital, Right Updated: 11/11/20 0301

## 2020-11-11 NOTE — HPI
Genny Calixto is a 55 y.o. female patient with a PMHx of anxiety, depression, and nephrolithiasis who presents to the Emergency Department for evaluation of epigastric abdominal pain which onset this AM. Symptoms are constant and moderate in severity. No mitigating or exacerbating factors reported. Associated sxs include n/v. Patient denies any diarrhea, fever, chills, CP, SOB, cough, congestion, loss of smell or taste, and all other sxs at this time. Work-up in the ED showed: WBC 22, 0% bands, stable CMP, lipase 7, COVID negative. CT of the ABD/pelvis with contrast showed mild ascites, interval development of dilated segment of small bowel in the mid right hemiabdomen with bowel diameter measuring up to 3.8 cm which is consistent with at least moderate grade partial small bowel obstruction but worrisome for closed loop bowel obstruction. Blood cultures were obtained in the ED and 1L IV fluids, vanc, Zosyn, IV Dilaudid and IV Phenergan given. Case discussed with General Surgery, who recommends NG tube placement and will see patient in consult. Hospital Medicine was contacted for admission.   Patient is a Full Code. Her brother is SDM.

## 2020-11-11 NOTE — PLAN OF CARE
"PATIENT UPDATED REGARDING CARE. VERBALIZED "FEELING BETTER" AFTER RECEIVING ZOFRAN. PATIENT OFFERED TO GO THE BATHROOM. WILL CONT TO MONITOR.  "

## 2020-11-11 NOTE — SUBJECTIVE & OBJECTIVE
No current facility-administered medications on file prior to encounter.      Current Outpatient Medications on File Prior to Encounter   Medication Sig    diazePAM (VALIUM) 5 MG tablet Take 1 tablet (5 mg total) by mouth every 12 (twelve) hours as needed for Anxiety.    diclofenac (VOLTAREN) 75 MG EC tablet Take 1 tablet (75 mg total) by mouth 2 (two) times daily.    doxepin (SINEQUAN) 10 MG capsule Take 1 capsule (10 mg total) by mouth every evening.    ergocalciferol (ERGOCALCIFEROL) 50,000 unit Cap Take 1 capsule (50,000 Units total) by mouth every 7 days.    LACTOBACILLUS COMBO NO.6 (PROBIOTIC COMPLEX ORAL) Take by mouth once daily at 6am.    loratadine (CLARITIN) 10 mg tablet Take 10 mg by mouth daily    meclizine (ANTIVERT) 25 mg tablet Take 25 mg by mouth.    melatonin 5 mg Cap Take by mouth.    [] methylPREDNISolone (MEDROL DOSEPACK) 4 mg tablet use as directed    ondansetron (ZOFRAN) 8 MG tablet Take 8 mg by mouth every 8 (eight) hours.    ondansetron (ZOFRAN-ODT) 4 MG TbDL Take 1 tablet (4 mg total) by mouth every 8 (eight) hours as needed (nausea).    oxyCODONE-acetaminophen (PERCOCET)  mg per tablet TK 1 T PO  Q 8 H PRF PAIN    sumatriptan (IMITREX) 100 MG tablet TAKE 1 TABLET IF NEEDED, MAY REPEAT ONCE IN 1 HOUR. MAX 2 TABLETS IN 24 HOURS       Review of patient's allergies indicates:   Allergen Reactions    Erythromycin     Morphine Nausea And Vomiting    Opioids - morphine analogues        Past Medical History:   Diagnosis Date    Anxiety     Takes 5-10 mg of valium qd prn anxiety (prescriptions for both on file, one from [facility] & other from )    Insomnia     Takes 5-10 mg of valium qhs prn insomnia (prescriptions for both on file, one from [facility] & other from )    Nephrolithiasis 2019    Left ureteral stone -> UTI c/b pyelonephritis and urosepsis w/ hypokalemia -> transfered to [facility]rology service from [facility] after CT abn dx, s/p 2 stents to allow infx  to drain, IV Vanc/Rocephin, fever free since yesterday    Reflex sympathetic dystrophy     Vertigo      Past Surgical History:   Procedure Laterality Date    BLADDER SURGERY      CHOLECYSTECTOMY      facet injections  02/14/2017    L3, L4, L5, S1 Done by Dr. Lara    HYSTERECTOMY      KNEE SURGERY      TONSILLECTOMY       Family History     Problem Relation (Age of Onset)    COPD Father    Drug abuse Brother    Hypertension Mother    Stroke Mother        Tobacco Use    Smoking status: Never Smoker    Smokeless tobacco: Never Used   Substance and Sexual Activity    Alcohol use: No     Frequency: Never    Drug use: No    Sexual activity: Never     Review of Systems   Constitutional: Negative for appetite change, chills, diaphoresis, fatigue and fever.   HENT: Negative for congestion, postnasal drip, rhinorrhea, sinus pressure and sore throat.    Respiratory: Negative for cough, shortness of breath and wheezing.    Cardiovascular: Negative for chest pain, palpitations and leg swelling.   Gastrointestinal: Positive for abdominal pain, nausea and vomiting. Negative for abdominal distention, anal bleeding, blood in stool, constipation, diarrhea and rectal pain.   Genitourinary: Negative for dysuria, hematuria and urgency.   Musculoskeletal: Negative for arthralgias, back pain and myalgias.   Skin: Negative for pallor and rash.   Neurological: Negative for dizziness, syncope, weakness, light-headedness, numbness and headaches.   Psychiatric/Behavioral: The patient is not nervous/anxious.    All other systems reviewed and are negative.    Objective:     Vital Signs (Most Recent):  Temp: 98.6 °F (37 °C) (11/10/20 2040)  Pulse: 97 (11/10/20 2307)  Resp: 18 (11/11/20 0215)  BP: 118/73 (11/10/20 2307)  SpO2: 100 % (11/10/20 2307) Vital Signs (24h Range):  Temp:  [98.6 °F (37 °C)] 98.6 °F (37 °C)  Pulse:  [76-97] 97  Resp:  [18] 18  SpO2:  [98 %-100 %] 100 %  BP: (107-118)/(64-73) 118/73     Weight: 74.9 kg (165  lb 2 oz)  Body mass index is 25.86 kg/m².    Physical Exam  Constitutional:       Appearance: She is well-developed.   HENT:      Head: Normocephalic and atraumatic.   Eyes:      Conjunctiva/sclera: Conjunctivae normal.   Neck:      Musculoskeletal: Normal range of motion.      Thyroid: No thyromegaly.   Cardiovascular:      Rate and Rhythm: Normal rate and regular rhythm.   Pulmonary:      Effort: Pulmonary effort is normal. No respiratory distress.   Abdominal:      Comments: Soft, mild distention; +global TTP, no rebound or guarding; no large scars   Musculoskeletal: Normal range of motion.         General: No tenderness.   Skin:     General: Skin is warm and dry.      Capillary Refill: Capillary refill takes less than 2 seconds.      Findings: No rash.   Neurological:      Mental Status: She is alert and oriented to person, place, and time.         Significant Labs:  CBC:   Recent Labs   Lab 11/10/20  2211   WBC 22.06*   RBC 5.50*   HGB 18.3*   HCT 53.5*      MCV 97   MCH 33.3*   MCHC 34.2     BMP:   Recent Labs   Lab 11/10/20  2211   *      K 4.3      CO2 25   BUN 12   CREATININE 0.8   CALCIUM 9.7     CMP:   Recent Labs   Lab 11/10/20  2211   *   CALCIUM 9.7   ALBUMIN 4.4   PROT 7.8      K 4.3   CO2 25      BUN 12   CREATININE 0.8   ALKPHOS 81   ALT 26   AST 30   BILITOT 1.1*       Significant Diagnostics:  I have reviewed all pertinent imaging results/findings within the past 24 hours.     CT:  FINDINGS:  Mild ascites.  Cholecystectomy clips noted.  There is a dilated loop of bowel in the mid hemiabdomen.  Remainder of the small bowel loops and colon are nondilated.  The dilated small bowel loop measures up to 3.8 cm with suggestion of some bowel wall mucosa edema.  Normal renal parenchymal enhancement with left renal cyst and nonobstructing punctate bilateral renal calculi suspected mild sigmoid diverticulosis.  Normal appendix.     Impression:     Mild  ascites     Interval development of dilated segment of small bowel in the mid right hemiabdomen with bowel diameter measuring up to 3.8 cm.  This is consistent with at least moderate grade partial small bowel obstruction, worrisome for closed loop bowel obstruction.  Consider surgical consultation

## 2020-11-11 NOTE — OP NOTE
[facility]  Surgery Department  Operative Note    SUMMARY     Date of Procedure: 11/11/2020     Procedure: Procedure(s) (LRB):  LAPAROSCOPY, DIAGNOSTIC, possible laparotomy, possible bowel resection (N/A)  LYSIS, ADHESIONS, LAPAROSCOPIC (N/A)     Surgeon(s) and Role:     * Tee Segura MD - Primary     * Jasiel Mendez MD - Assisting        Pre-Operative Diagnosis: Small bowel obstruction [K56.609]    Post-Operative Diagnosis: Post-Op Diagnosis Codes:     * Small bowel obstruction [K56.609]    Anesthesia: General    Technical Procedures Used:  Laparoscopic lysis of adhesions converted to open lysis of adhesions, small-bowel resection and abdominal wall tap block      Description of the Findings of the Procedure:     Patient was brought into the operative room placed on the operative table supine position.  General endotracheal anesthesia was induced.  Antibiotics were administered.  She had an NG catheter in place.  A Rosales catheter was inserted.  Pneumatic compression devices on the lower extremity.  The abdomen was prepped and draped in standard manner.    A time-out was performed.    Small vertical supraumbilical incision was made.  The fascia was identified and elevated with Kocher clamps.  A pneumoperitoneum was established 50 mm of mercury.  A 5 mm XL trocar was placed using visualized in technique.  There was no evidence of visceral or vascular injury.  There was noted to be adhesions of the omentum to the abdominal wall in bloody fluid in the right upper quadrant and pelvis.    Two left-sided 5 mm trocars were placed.    We then lysed adhesions laparoscopically for approximately 20 min.  Most of the superior adhesions were lysed a more pelvis adhesions were left in place.    We were able to identify the terminal ileum and traced the bowel in a retrograde fashion.  After tracing the bowel for several minutes there appeared to be an internal hernia where the bowel would not reduced.    At  this point the decision was made to complete the procedure with an open technique.    The laparoscopic instruments were removed.  The abdomen was desufflated.  A midline incision was made.  The fascia was opened for approximately 10 cm.  The perineum was open.    Kocher clamps were then used to elevate the abdominal wall and further lyse adhesions for the pelvis where the omentum was attached really did have to extend the incision inferiorly to a lysed additional adhesions.    Dr. Mendez joined the operation at this point.  We finished lysing adhesions from the omentum from the sigmoid colon and pelvis wall.  Dividing portion of it with the bipolar device.  Once we freed the omentum we could clearly for identify the area of the small bowel that was involved in a closed loop obstruction due to an internal hernia and it was frankly ischemic.    We then  the remainder of the adhesions of the small bowel to itself, the small bowel to the colon, the small bowel to the abdominal wall in the small bowel to the omentum.    The ischemic area of small bowel was removed by dividing it proximally and distally with the ALONZO 75 stapler.  The mesentery was take down with EnSeal bipolar device.  A side-to-side anastomosis was created by opening the anti mesenteric borders of the small intestine approximate with the ALONZO 75 stapler.  The opening created was closed with the ALONZO 75 stapler.  The suture lines were reinforced with 3 0 silk in a Lembert fashion.  The opening the mesentery was closed with 3 0 Vicryl in a running fashion.    The small bowel was then inspected from proximal distal and distal proximal there was no evidence of any serosal tears or enterotomies.    An ischemic portion of omentum was removed with the EnSeal bipolar device.    The abdominal cavity was irrigated with gentle amounts of saline.    Separate for l was placed along the area of the ischial tuberosity and where the omentum was freed from the pelvic  wall.      The abdominal wall was elevated and a mixture of Exparel and Marcaine was infiltrated into the preperitoneal space in between the muscular layers.    Seprafilm was placed along the omentum which had been laid over the bowel.    The midline fascia was closed with looped PDS in a running fashion.  The skin was closed with staples.    Patient tolerated procedure well     counts reported to be correct    Significant Surgical Tasks Conducted by the Assistant(s), if Applicable:  Assistance with lysis of adhesions    Complications: No    Estimated Blood Loss (EBL): 150 mL  IV fluids 1600 mL  Exparel 266 mg  Marcaine 0.25%           Implants:   Implant Name Type Inv. Item Serial No.  Lot No. LRB No. Used Action   MEMBRANE SEPRAFILM 5 X 6 - SN/A  MEMBRANE SEPRAFILM 5 X 6 N/A [employer] [id number] N/A 2 Implanted       Specimens:   Specimen (12h ago, onward)    None                  Condition: Stable    Disposition: PACU - hemodynamically stable.    Attestation: I performed the procedure.

## 2020-11-11 NOTE — PROGRESS NOTES
Pharmacy Vancomycin Consult Note    Therapy with Vancomycin complete and/or consult discontinued by provider.  Pharmacy will sign off, please re-consult as needed.    Benitez Casper D 11/11/2020 4:40 PM

## 2020-11-11 NOTE — TRANSFER OF CARE
"Anesthesia Transfer of Care Note    Patient: Genny Calixto    Procedure(s) Performed: Procedure(s) (LRB):  LAPAROSCOPY, DIAGNOSTIC (N/A)  LYSIS, ADHESIONS, LAPAROSCOPIC (N/A)  LYSIS, ADHESIONS (N/A)  EXCISION, SMALL INTESTINE (N/A)    Patient location: PACU    Anesthesia Type: general    Transport from OR: Transported from OR on room air with adequate spontaneous ventilation    Post pain: adequate analgesia    Post assessment: no apparent anesthetic complications    Post vital signs: stable    Level of consciousness: awake, alert and responds to stimulation    Nausea/Vomiting: no nausea/vomiting    Complications: none    Transfer of care protocol was followed      Last vitals:   Visit Vitals  BP (!) 104/51 (BP Location: Right arm, Patient Position: Lying)   Pulse 86   Temp 36.9 °C (98.5 °F) (Temporal)   Resp 11   Ht 5' 7" (1.702 m)   Wt 74.9 kg (165 lb 2 oz)   SpO2 97%   Breastfeeding No   BMI 25.86 kg/m²     "

## 2020-11-11 NOTE — ED NOTES
Pt asked for assistance, I asked if she could give me one moment as I was assisting another pt in the same room, pt then pilled her IV out and disconnected her NG tube from suction

## 2020-11-11 NOTE — ANESTHESIA PREPROCEDURE EVALUATION
11/11/2020  Genny Calixto is a 55 y.o., female.    Anesthesia Evaluation    I have reviewed the Patient Summary Reports.    I have reviewed the Nursing Notes. I have reviewed the NPO Status.   I have reviewed the Medications.     Review of Systems  Anesthesia Hx:  No problems with previous Anesthesia Denies Hx of Anesthetic complications  Denies Family Hx of Anesthesia complications.   Denies Personal Hx of Anesthesia complications.   Social:  Non-Smoker, No Alcohol Use    Cardiovascular:  Cardiovascular Normal  ECG has been reviewed.    Pulmonary:  Pulmonary Normal    Renal/:   Chronic Renal Disease    Hepatic/GI:  Hepatic/GI Normal    Neurological:   Neuromuscular Disease,    Endocrine:  Endocrine Normal    Psych:  Psychiatric Normal         Patient Active Problem List   Diagnosis    Anxiety    Insomnia    Chronic low back pain without sciatica    Transient elevated blood pressure    Vertigo    Bilateral nephrolithiasis    Small bowel obstruction    Leukocytosis     Past Surgical History:   Procedure Laterality Date    BLADDER SURGERY      CHOLECYSTECTOMY      facet injections  02/14/2017    L3, L4, L5, S1 Done by Dr. Lara    HYSTERECTOMY      KNEE SURGERY      TONSILLECTOMY           Physical Exam  General:  Well nourished    Airway/Jaw/Neck:  Airway Findings: Mouth Opening: Normal Tongue: Normal  General Airway Assessment: Adult  Mallampati: II  TM Distance: 4 - 6 cm  Jaw/Neck Findings:  Neck ROM: Normal ROM      Dental:  Dental Findings: In tact   Chest/Lungs:  Chest/Lungs Findings: Clear to auscultation, Normal Respiratory Rate     Heart/Vascular:  Heart Findings: Rate: Normal  Rhythm: Regular Rhythm  Sounds: Normal        Mental Status:  Mental Status Findings:  Cooperative, Alert and Oriented       Lab Results   Component Value Date    WBC 14.17 (H) 11/11/2020    HGB 15.0  11/11/2020    HCT 44.1 11/11/2020    MCV 97 11/11/2020     11/11/2020         Chemistry        Component Value Date/Time     11/10/2020 2211    K 4.3 11/10/2020 2211     11/10/2020 2211    CO2 25 11/10/2020 2211    BUN 12 11/10/2020 2211    CREATININE 0.8 11/10/2020 2211     (H) 11/10/2020 2211        Component Value Date/Time    CALCIUM 9.7 11/10/2020 2211    ALKPHOS 81 11/10/2020 2211    AST 30 11/10/2020 2211    ALT 26 11/10/2020 2211    BILITOT 1.1 (H) 11/10/2020 2211    ESTGFRAFRICA >60 11/10/2020 2211    EGFRNONAA >60 11/10/2020 2211            Anesthesia Plan  Type of Anesthesia, risks & benefits discussed:  Anesthesia Type:  general  Patient's Preference:   Intra-op Monitoring Plan: standard ASA monitors  Intra-op Monitoring Plan Comments:   Post Op Pain Control Plan: per primary service following discharge from PACU  Post Op Pain Control Plan Comments:   Induction:   IV  Beta Blocker:  Patient is not currently on a Beta-Blocker (No further documentation required).       Informed Consent: Patient understands risks and agrees with Anesthesia plan.  Questions answered. Anesthesia consent signed with patient.  ASA Score: 2     Day of Surgery Review of History & Physical: I have interviewed and examined the patient. I have reviewed the patient's H&P dated:  There are no significant changes.  H&P update referred to the surgeon.         Ready For Surgery From Anesthesia Perspective.

## 2020-11-11 NOTE — HPI
56yo F with a PMHx significant for nephrolithiasis who presented to the ED for evaluation of epigastric abdominal pain. Reports a one day history of this pain which is sharp and moderate in nature. Has associated nausea and vomiting x24hrs. Denies any associated fever, chills, diarrhea or constipation. Workup in ED worrisome for small bowel obstruction. Surgery consulted for evaluation and pt admitted to hospital medicine. Pt reports minimal abdominal surgical history, just a hysterectomy and bladder repair. Last BM yesterday. NGT placed in ED and still with moderate abdominal pain. Denies fevers, chills, diarrhea or constipation.

## 2020-11-11 NOTE — PLAN OF CARE
Patient updated by PAULINA Mosquera. Informed surgery would be in at least an hour and reinforced NPO importance.

## 2020-11-11 NOTE — ED PROVIDER NOTES
SCRIBE #1 NOTE: I, Dian Machado, am scribing for, and in the presence of, Kristie Garcia Do, MD. I have scribed the entire note.       History     Chief Complaint   Patient presents with    Abdominal Pain     States vomiting all day. States epigastric abdominal pain.  States began this AM when she woke up.     Review of patient's allergies indicates:   Allergen Reactions    Erythromycin     Morphine Nausea And Vomiting    Opioids - morphine analogues          History of Present Illness     HPI    11/10/2020, 10:20 PM  History obtained from the patient      History of Present Illness: Genny Calixto is a 55 y.o. female patient with a PMHx of nephrolithiasis who presents to the Emergency Department for evaluation of epigastric abdominal pain which onset this AM. Symptoms are constant and moderate in severity. No mitigating or exacerbating factors reported. Associated sxs include n/v. Patient denies any diarrhea, fever, chills, CP, SOB, cough, congestion, loss of smell or taste, and all other sxs at this time. No prior tx reported. No further complaints or concerns at this time.       Arrival mode: Personal vehicle     PCP: Tay Duff MD        Past Medical History:  Past Medical History:   Diagnosis Date    Anxiety     Takes 5-10 mg of valium qd prn anxiety (prescriptions for both on file, one from [facility] & other from )    Insomnia     Takes 5-10 mg of valium qhs prn insomnia (prescriptions for both on file, one from [facility] & other from )    Nephrolithiasis 08/03/2019    Left ureteral stone -> UTI c/b pyelonephritis and urosepsis w/ hypokalemia -> transfered to [facility] from [facility] after CT abn dx, s/p 2 stents to allow infx to drain, IV Vanc/Rocephin, fever free since yesterday    Reflex sympathetic dystrophy     Vertigo        Past Surgical History:  Past Surgical History:   Procedure Laterality Date    BLADDER SURGERY      CHOLECYSTECTOMY      facet injections   02/14/2017    L3, L4, L5, S1 Done by Dr. Lara    HYSTERECTOMY      KNEE SURGERY      TONSILLECTOMY           Family History:  Family History   Problem Relation Age of Onset    Stroke Mother     Hypertension Mother     COPD Father     Drug abuse Brother        Social History:  Social History     Tobacco Use    Smoking status: Never Smoker    Smokeless tobacco: Never Used   Substance and Sexual Activity    Alcohol use: No     Frequency: Never    Drug use: No    Sexual activity: Never        Review of Systems     Review of Systems   Constitutional: Negative for chills and fever.   HENT: Negative for congestion and sore throat.         (-) loss of taste  (-) loss of smell   Respiratory: Negative for cough and shortness of breath.    Cardiovascular: Negative for chest pain.   Gastrointestinal: Positive for abdominal pain, nausea and vomiting. Negative for diarrhea.   Genitourinary: Negative for dysuria.   Musculoskeletal: Negative for back pain.   Skin: Negative for rash.   Neurological: Negative for weakness.   Hematological: Does not bruise/bleed easily.   All other systems reviewed and are negative.       Physical Exam     Initial Vitals [11/10/20 2040]   BP Pulse Resp Temp SpO2   107/64 76 18 98.6 °F (37 °C) 98 %      MAP       --          Physical Exam  Nursing Notes and Vital Signs Reviewed.  Constitutional: Patient is in mild distress. Well-developed and well-nourished.  Head: Atraumatic. Normocephalic.  Eyes: PERRL. EOM intact. Conjunctivae are not pale. No scleral icterus.  ENT: Mucous membranes are moist. Oropharynx is clear and symmetric.    Neck: Supple. Full ROM. No lymphadenopathy.  Cardiovascular: Regular rate. Regular rhythm. No murmurs, rubs, or gallops. Distal pulses are 2+ and symmetric.  Pulmonary/Chest: No respiratory distress. Clear to auscultation bilaterally. No wheezing or rales.  Abdominal: Soft and non-distended.  There is epigastric tenderness.  Guarding but No rebound. Good  "bowel sounds.  Genitourinary: No CVA tenderness  Musculoskeletal: Moves all extremities. No obvious deformities. No edema. No calf tenderness.  Skin: Warm and dry.  Neurological:  Alert, awake, and appropriate.  Normal speech.  No acute focal neurological deficits are appreciated.  Psychiatric: Normal affect. Good eye contact. Appropriate in content.     ED Course   Critical Care    Date/Time: 11/10/2020 11:19 PM  Performed by: Kristie Garcia Do, MD  Authorized by: Kristie Garcia Do, MD   Direct patient critical care time: 10 minutes  Additional history critical care time: 10 minutes  Ordering / reviewing critical care time: 10 minutes  Documentation critical care time: 5 minutes  Consulting other physicians critical care time: 10 minutes  Total critical care time (exclusive of procedural time) : 45 minutes  Critical care time was exclusive of separately billable procedures and treating other patients and teaching time.  Critical care was necessary to treat or prevent imminent or life-threatening deterioration of the following conditions: small bowel obstruction.  Critical care was time spent personally by me on the following activities: blood draw for specimens, development of treatment plan with patient or surrogate, interpretation of cardiac output measurements, evaluation of patient's response to treatment, examination of patient, obtaining history from patient or surrogate, ordering and performing treatments and interventions, ordering and review of laboratory studies, ordering and review of radiographic studies, pulse oximetry, re-evaluation of patient's condition, review of old charts and discussions with consultants.        ED Vital Signs:  Vitals:    11/10/20 2040 11/10/20 2300 11/10/20 2307   BP: 107/64  118/73   Pulse: 76  97   Resp: 18 18    Temp: 98.6 °F (37 °C)     TempSrc: Oral     SpO2: 98%  100%   Height: 5' 7" (1.702 m)         Abnormal Lab Results:  Labs Reviewed   CBC W/ AUTO DIFFERENTIAL - " Abnormal; Notable for the following components:       Result Value    WBC 22.06 (*)     RBC 5.50 (*)     Hemoglobin 18.3 (*)     Hematocrit 53.5 (*)     MCH 33.3 (*)     Gran # (ANC) 19.5 (*)     Immature Grans (Abs) 0.12 (*)     Gran % 88.3 (*)     Lymph % 6.1 (*)     All other components within normal limits   COMPREHENSIVE METABOLIC PANEL - Abnormal; Notable for the following components:    Glucose 147 (*)     Total Bilirubin 1.1 (*)     All other components within normal limits   HIV 1 / 2 ANTIBODY   HEPATITIS C ANTIBODY   LIPASE   SARS-COV-2 RNA AMPLIFICATION, QUAL   APTT   PROTIME-INR   APTT   PROTIME-INR   URINALYSIS, REFLEX TO URINE CULTURE   TROPONIN I        All Lab Results:  Results for orders placed or performed during the hospital encounter of 11/10/20   HIV 1/2 Ag/Ab (4th Gen)   Result Value Ref Range    HIV 1/2 Ag/Ab Negative Negative   Hepatitis C antibody   Result Value Ref Range    Hepatitis C Ab Negative Negative   CBC W/ AUTO DIFFERENTIAL   Result Value Ref Range    WBC 22.06 (H) 3.90 - 12.70 K/uL    RBC 5.50 (H) 4.00 - 5.40 M/uL    Hemoglobin 18.3 (H) 12.0 - 16.0 g/dL    Hematocrit 53.5 (H) 37.0 - 48.5 %    MCV 97 82 - 98 fL    MCH 33.3 (H) 27.0 - 31.0 pg    MCHC 34.2 32.0 - 36.0 g/dL    RDW 12.2 11.5 - 14.5 %    Platelets 347 150 - 350 K/uL    MPV 11.9 9.2 - 12.9 fL    Immature Granulocytes 0.5 0.0 - 0.5 %    Gran # (ANC) 19.5 (H) 1.8 - 7.7 K/uL    Immature Grans (Abs) 0.12 (H) 0.00 - 0.04 K/uL    Lymph # 1.3 1.0 - 4.8 K/uL    Mono # 1.0 0.3 - 1.0 K/uL    Eos # 0.0 0.0 - 0.5 K/uL    Baso # 0.09 0.00 - 0.20 K/uL    nRBC 0 0 /100 WBC    Gran % 88.3 (H) 38.0 - 73.0 %    Lymph % 6.1 (L) 18.0 - 48.0 %    Mono % 4.6 4.0 - 15.0 %    Eosinophil % 0.1 0.0 - 8.0 %    Basophil % 0.4 0.0 - 1.9 %    Differential Method Automated    Comp. Metabolic Panel   Result Value Ref Range    Sodium 140 136 - 145 mmol/L    Potassium 4.3 3.5 - 5.1 mmol/L    Chloride 102 95 - 110 mmol/L    CO2 25 23 - 29 mmol/L     Glucose 147 (H) 70 - 110 mg/dL    BUN 12 6 - 20 mg/dL    Creatinine 0.8 0.5 - 1.4 mg/dL    Calcium 9.7 8.7 - 10.5 mg/dL    Total Protein 7.8 6.0 - 8.4 g/dL    Albumin 4.4 3.5 - 5.2 g/dL    Total Bilirubin 1.1 (H) 0.1 - 1.0 mg/dL    Alkaline Phosphatase 81 55 - 135 U/L    AST 30 10 - 40 U/L    ALT 26 10 - 44 U/L    Anion Gap 13 8 - 16 mmol/L    eGFR if African American >60 >60 mL/min/1.73 m^2    eGFR if non African American >60 >60 mL/min/1.73 m^2   Lipase   Result Value Ref Range    Lipase 7 4 - 60 U/L   COVID-19 Rapid Screening   Result Value Ref Range    SARS-CoV-2 RNA, Amplification, Qual Negative Negative   APTT   Result Value Ref Range    aPTT 25.9 21.0 - 32.0 sec   Protime-INR   Result Value Ref Range    Prothrombin Time 10.5 9.0 - 12.5 sec    INR 1.0 0.8 - 1.2         Imaging Results:  Imaging Results          X-Ray Chest AP Portable (In process)                CT Abdomen Pelvis With Contrast (Final result)  Result time 11/10/20 22:24:47    Final result by Jacqueline Mukherjee MD (11/10/20 22:24:47)                 Impression:      Mild ascites    Interval development of dilated segment of small bowel in the mid right hemiabdomen with bowel diameter measuring up to 3.8 cm.  This is consistent with at least moderate grade partial small bowel obstruction, worrisome for closed loop bowel obstruction.  Consider surgical consultation      Electronically signed by: Lonny Phillips  Date:    11/10/2020  Time:    22:24             Narrative:    EXAMINATION:  CT ABDOMEN PELVIS WITH CONTRAST    CLINICAL HISTORY:  Epigastric pain;    TECHNIQUE:  Low dose axial images, sagittal and coronal reformations were obtained from the lung bases to the pubic symphysis following the IV administration of 100 mL of Omnipaque 350 .  Oral contrast was not given.    COMPARISON:  Prior    FINDINGS:  Mild ascites.  Cholecystectomy clips noted.  There is a dilated loop of bowel in the mid hemiabdomen.  Remainder of the small bowel loops and colon  are nondilated.  The dilated small bowel loop measures up to 3.8 cm with suggestion of some bowel wall mucosa edema.  Normal renal parenchymal enhancement with left renal cyst and nonobstructing punctate bilateral renal calculi suspected mild sigmoid diverticulosis.  Normal appendix.                               The EKG was ordered, reviewed, and independently interpreted by the ED provider.  Interpretation time: 23:28  Rate: 106 BPM  Rhythm: sinus tachycardia  Interpretation: ST elevation, consider inferolateral injury or acute infarct. ACUTE MI/STEMI. Consider right ventricular involvement in acute inferior infarct.    Repeat EKG #1  The EKG was ordered, reviewed, and independently interpreted by the ED provider.  Interpretation time: 23:48  Rate: 99 BPM  Rhythm: normal sinus rhythm  Interpretation: Inferior infarct, possibly acute. ACUTE MI/STEMI. Consider right ventricular involvement in acute inferior infarct.    Repeat EKG #2  The EKG was ordered, reviewed, and independently interpreted by the ED provider.  Interpretation time: 0:46  Rate: 91 BPM  Rhythm: normal sinus rhythm  Interpretation: No acute ST changes. No STEMI.           The Emergency Provider reviewed the vital signs and test results, which are outlined above.     ED Discussion     10:50 PM: Discussed pt's case with Dr. Herron (Colon and Rectal surgery) who recommends NG tube and admit to HM.    11:19 PM:  Nurse is putting in NG tube at this time.    11:21 PM: Discussed case with Christine Proctor NP (Hospital Medicine). Dr. Cortes agrees with current care and management of pt and accepts admission.   Admitting Service: Hospital Medicine  Admitting Physician: Dr. Cortes  Admit to: Inpatient standard bed    11:21 PM: Re-evaluated pt. I have discussed test results, shared treatment plan, and the need for admission with patient and family at bedside. Pt and family express understanding at this time and agree with all information. All questions  answered. Pt and family have no further questions or concerns at this time. Pt is ready for admit.         Medical Decision Making:   Clinical Tests:   Lab Tests: Ordered and Reviewed  Radiological Study: Ordered and Reviewed  Medical Tests: Ordered and Reviewed           ED Medication(s):  Medications   promethazine (PHENERGAN) 12.5 mg in dextrose 5 % 50 mL IVPB (0 mg Intravenous Stopped 11/10/20 2319)   sodium chloride 0.9% bolus 1,000 mL (1,000 mLs Intravenous New Bag 11/10/20 2258)   iohexoL (OMNIPAQUE 350) injection 100 mL (100 mLs Intravenous Given 11/10/20 2216)   hydromorphone (PF) injection 1 mg (1 mg Intravenous Given 11/10/20 2300)   piperacillin-tazobactam 4.5 g in dextrose 5 % 100 mL IVPB (ready to mix system) (4.5 g Intravenous New Bag 11/10/20 2320)   lorazepam injection 1 mg (1 mg Intravenous Given 11/10/20 2315)       New Prescriptions    No medications on file               Scribe Attestation:   Scribe #1: I performed the above scribed service and the documentation accurately describes the services I performed. I attest to the accuracy of the note.     Attending:   Physician Attestation Statement for Scribe #1: I, Kristie Garcia Do, MD, personally performed the services described in this documentation, as scribed by Dian Machado, in my presence, and it is both accurate and complete.           Clinical Impression       ICD-10-CM ICD-9-CM   1. Small bowel obstruction  K56.609 560.9   2. Chest pain  R07.9 786.50       Disposition:   Disposition: Admitted  Condition: Serious         Kristie Garcia Do, MD  11/11/20 4706

## 2020-11-11 NOTE — PROGRESS NOTES
Patient seen and examined.  Chart reviewed.    Bowel obstruction likely closed loop.    Need for surgery was discussed.    Will proceed with laparoscopy possible open laparotomy for treatment of a bowel obstruction.    The risks of surgery include infection, bleeding, bowel injury, wound infection, hernia, and anastomotic leak or stricture.    Patient has agreed to proceed

## 2020-11-11 NOTE — ANESTHESIA RELEASE NOTE
"Anesthesia Release from PACU Note    Patient: Genny Calixto    Procedure(s) Performed: Procedure(s) (LRB):  LAPAROSCOPY, DIAGNOSTIC (N/A)  LYSIS, ADHESIONS, LAPAROSCOPIC (N/A)  LYSIS, ADHESIONS (N/A)  EXCISION, SMALL INTESTINE (N/A)    Anesthesia type: {PROCEDURES; ANE POST ANESTHESIA TYPE:}    Post pain: {FINDINGS; ANE POST PAIN:}    Post assessment: {ASSESSMENT; ANE POST:}    Last Vitals:   Visit Vitals  BP (!) 146/67   Pulse 83   Temp 36.9 °C (98.5 °F) (Temporal)   Resp 10   Ht 5' 7" (1.702 m)   Wt 74.9 kg (165 lb 2 oz)   SpO2 99%   Breastfeeding No   BMI 25.86 kg/m²       Post vital signs: {DESC; ANE POST VITALS:::"stable"}    Level of consciousness: {FINDINGS; ANE POST LEVEL OF CONSCIOUSNESS:}    Nausea/Vomiting: {Nausea/vomitin}    Complications: {FINDINGS; ANE POST COMPLICATIONS:}    Airway Patency: {OHS FINDINGS; AN POST AIRWAY PATENCY:39298::"patent"}    Respiratory: {OHS ASSESSMENT; AN RESPIRATORY:11813::"unassisted"}    Cardiovascular: {OHS ASSESSMENT; AN CARDIOVASCULAR:32915::"stable","blood pressure at baseline"}    Hydration: {OHS FINDINGS; AN POST HYDRATION:63403::"euvolemic"}  "

## 2020-11-11 NOTE — PLAN OF CARE
PT UPDATED. NG TUBE PLACED ON LOW CONT SUCTION. PT C/O NAUSEA. NOTIFIED ANESTHESIOLOGIST. ORDERS RECEIVED FOR ZOFRAN. DENIES ANY OTHER NEEDS.

## 2020-11-11 NOTE — PLAN OF CARE
AAOx4, VSS, NADN, IV infusing, RA.   NG tube to LIS  Rosales to be removed PO day 2  PCA for pain control  LR @ 100 and IV abx given.  NPO except ice chips  Fall and safety precautions in place, no further complaints.    Will continue to monitor pt

## 2020-11-11 NOTE — ASSESSMENT & PLAN NOTE
- Initial WBC 22, 0% bands. Patient afebrile. No infectious process seen on CT of ABD/pelvis.  - Will obtain CXR and UA.  - Blood cultures obtained in the ED. Empiric vanc and Rocephin. Pharmacy consult for vanc dosing.  - IV hydration.  - Follow labs.

## 2020-11-11 NOTE — SUBJECTIVE & OBJECTIVE
Past Medical History:   Diagnosis Date    Anxiety     Takes 5-10 mg of valium qd prn anxiety (prescriptions for both on file, one from [facility] & other from )    Insomnia     Takes 5-10 mg of valium qhs prn insomnia (prescriptions for both on file, one from [facility] & other from )    Nephrolithiasis 2019    Left ureteral stone -> UTI c/b pyelonephritis and urosepsis w/ hypokalemia -> transfered to [facility] urology service from [facility] after CT abn dx, s/p 2 stents to allow infx to drain, IV Vanc/Rocephin, fever free since yesterday    Reflex sympathetic dystrophy     Vertigo        Past Surgical History:   Procedure Laterality Date    BLADDER SURGERY      CHOLECYSTECTOMY      facet injections  2017    L3, L4, L5, S1 Done by Dr. Lara    HYSTERECTOMY      KNEE SURGERY      TONSILLECTOMY         Review of patient's allergies indicates:   Allergen Reactions    Erythromycin     Morphine Nausea And Vomiting    Opioids - morphine analogues        No current facility-administered medications on file prior to encounter.      Current Outpatient Medications on File Prior to Encounter   Medication Sig    diazePAM (VALIUM) 5 MG tablet Take 1 tablet (5 mg total) by mouth every 12 (twelve) hours as needed for Anxiety.    diclofenac (VOLTAREN) 75 MG EC tablet Take 1 tablet (75 mg total) by mouth 2 (two) times daily.    doxepin (SINEQUAN) 10 MG capsule Take 1 capsule (10 mg total) by mouth every evening.    ergocalciferol (ERGOCALCIFEROL) 50,000 unit Cap Take 1 capsule (50,000 Units total) by mouth every 7 days.    LACTOBACILLUS COMBO NO.6 (PROBIOTIC COMPLEX ORAL) Take by mouth once daily at 6am.    loratadine (CLARITIN) 10 mg tablet Take 10 mg by mouth daily    meclizine (ANTIVERT) 25 mg tablet Take 25 mg by mouth.    melatonin 5 mg Cap Take by mouth.    [] methylPREDNISolone (MEDROL DOSEPACK) 4 mg tablet use as directed    ondansetron (ZOFRAN) 8 MG tablet Take 8 mg by mouth every 8  (eight) hours.    ondansetron (ZOFRAN-ODT) 4 MG TbDL Take 1 tablet (4 mg total) by mouth every 8 (eight) hours as needed (nausea).    oxyCODONE-acetaminophen (PERCOCET)  mg per tablet TK 1 T PO  Q 8 H PRF PAIN    sumatriptan (IMITREX) 100 MG tablet TAKE 1 TABLET IF NEEDED, MAY REPEAT ONCE IN 1 HOUR. MAX 2 TABLETS IN 24 HOURS     Family History     Problem Relation (Age of Onset)    COPD Father    Drug abuse Brother    Hypertension Mother    Stroke Mother        Tobacco Use    Smoking status: Never Smoker    Smokeless tobacco: Never Used   Substance and Sexual Activity    Alcohol use: No     Frequency: Never    Drug use: No    Sexual activity: Never     Review of Systems   Constitutional: Negative for appetite change, chills, diaphoresis, fatigue and fever.   HENT: Negative for congestion, postnasal drip, rhinorrhea, sinus pressure and sore throat.    Respiratory: Negative for cough, shortness of breath and wheezing.    Cardiovascular: Negative for chest pain, palpitations and leg swelling.   Gastrointestinal: Positive for abdominal pain, nausea and vomiting. Negative for abdominal distention, blood in stool, constipation and diarrhea.   Genitourinary: Negative for dysuria, hematuria and urgency.   Musculoskeletal: Negative for arthralgias, back pain and myalgias.   Skin: Negative for pallor and rash.   Neurological: Negative for dizziness, syncope, weakness, light-headedness, numbness and headaches.   Psychiatric/Behavioral: The patient is not nervous/anxious.    All other systems reviewed and are negative.    Objective:     Vital Signs (Most Recent):  Temp: 98.6 °F (37 °C) (11/10/20 2040)  Pulse: 97 (11/10/20 2307)  Resp: 18 (11/10/20 2300)  BP: 118/73 (11/10/20 2307)  SpO2: 100 % (11/10/20 2307) Vital Signs (24h Range):  Temp:  [98.6 °F (37 °C)] 98.6 °F (37 °C)  Pulse:  [76-97] 97  Resp:  [18] 18  SpO2:  [98 %-100 %] 100 %  BP: (107-118)/(64-73) 118/73        Body mass index is 23.24  kg/m².    Physical Exam  Vitals signs and nursing note reviewed.   Constitutional:       General: She is awake. She is not in acute distress.     Appearance: Normal appearance. She is well-developed. She is not diaphoretic.   HENT:      Head: Normocephalic and atraumatic.   Eyes:      Conjunctiva/sclera: Conjunctivae normal.      Comments: PERRL; EOM intact.   Neck:      Musculoskeletal: Normal range of motion and neck supple.   Cardiovascular:      Rate and Rhythm: Normal rate and regular rhythm.  No extrasystoles are present.     Heart sounds: S1 normal and S2 normal. No murmur.   Pulmonary:      Effort: Pulmonary effort is normal. No tachypnea.      Breath sounds: Normal breath sounds and air entry. No wheezing, rhonchi or rales.   Abdominal:      General: There is no distension.      Palpations: Abdomen is soft.      Tenderness: There is abdominal tenderness in the epigastric area. There is no guarding or rebound.   Musculoskeletal: Normal range of motion.         General: No tenderness or deformity.      Right lower leg: No edema.      Left lower leg: No edema.   Skin:     General: Skin is warm and dry.      Capillary Refill: Capillary refill takes less than 2 seconds.      Findings: No erythema or rash.   Neurological:      General: No focal deficit present.      Mental Status: She is alert and oriented to person, place, and time.   Psychiatric:         Mood and Affect: Mood and affect normal.         Behavior: Behavior normal. Behavior is cooperative.             Significant Labs:  Results for orders placed or performed during the hospital encounter of 11/10/20   HIV 1/2 Ag/Ab (4th Gen)   Result Value Ref Range    HIV 1/2 Ag/Ab Negative Negative   Hepatitis C antibody   Result Value Ref Range    Hepatitis C Ab Negative Negative   CBC W/ AUTO DIFFERENTIAL   Result Value Ref Range    WBC 22.06 (H) 3.90 - 12.70 K/uL    RBC 5.50 (H) 4.00 - 5.40 M/uL    Hemoglobin 18.3 (H) 12.0 - 16.0 g/dL    Hematocrit 53.5 (H)  37.0 - 48.5 %    MCV 97 82 - 98 fL    MCH 33.3 (H) 27.0 - 31.0 pg    MCHC 34.2 32.0 - 36.0 g/dL    RDW 12.2 11.5 - 14.5 %    Platelets 347 150 - 350 K/uL    MPV 11.9 9.2 - 12.9 fL    Immature Granulocytes 0.5 0.0 - 0.5 %    Gran # (ANC) 19.5 (H) 1.8 - 7.7 K/uL    Immature Grans (Abs) 0.12 (H) 0.00 - 0.04 K/uL    Lymph # 1.3 1.0 - 4.8 K/uL    Mono # 1.0 0.3 - 1.0 K/uL    Eos # 0.0 0.0 - 0.5 K/uL    Baso # 0.09 0.00 - 0.20 K/uL    nRBC 0 0 /100 WBC    Gran % 88.3 (H) 38.0 - 73.0 %    Lymph % 6.1 (L) 18.0 - 48.0 %    Mono % 4.6 4.0 - 15.0 %    Eosinophil % 0.1 0.0 - 8.0 %    Basophil % 0.4 0.0 - 1.9 %    Differential Method Automated    Comp. Metabolic Panel   Result Value Ref Range    Sodium 140 136 - 145 mmol/L    Potassium 4.3 3.5 - 5.1 mmol/L    Chloride 102 95 - 110 mmol/L    CO2 25 23 - 29 mmol/L    Glucose 147 (H) 70 - 110 mg/dL    BUN 12 6 - 20 mg/dL    Creatinine 0.8 0.5 - 1.4 mg/dL    Calcium 9.7 8.7 - 10.5 mg/dL    Total Protein 7.8 6.0 - 8.4 g/dL    Albumin 4.4 3.5 - 5.2 g/dL    Total Bilirubin 1.1 (H) 0.1 - 1.0 mg/dL    Alkaline Phosphatase 81 55 - 135 U/L    AST 30 10 - 40 U/L    ALT 26 10 - 44 U/L    Anion Gap 13 8 - 16 mmol/L    eGFR if African American >60 >60 mL/min/1.73 m^2    eGFR if non African American >60 >60 mL/min/1.73 m^2   Lipase   Result Value Ref Range    Lipase 7 4 - 60 U/L   COVID-19 Rapid Screening   Result Value Ref Range    SARS-CoV-2 RNA, Amplification, Qual Negative Negative   Troponin I   Result Value Ref Range    Troponin I <0.006 0.000 - 0.026 ng/mL   APTT   Result Value Ref Range    aPTT 25.9 21.0 - 32.0 sec   Protime-INR   Result Value Ref Range    Prothrombin Time 10.5 9.0 - 12.5 sec    INR 1.0 0.8 - 1.2      All pertinent labs within the past 24 hours have been reviewed.    Significant Imaging:  Imaging Results          X-Ray Chest AP Portable (In process)                CT Abdomen Pelvis With Contrast (Final result)  Result time 11/10/20 22:24:47    Final result by Jacqueline  MD Lonny (11/10/20 22:24:47)                 Impression:      Mild ascites    Interval development of dilated segment of small bowel in the mid right hemiabdomen with bowel diameter measuring up to 3.8 cm.  This is consistent with at least moderate grade partial small bowel obstruction, worrisome for closed loop bowel obstruction.  Consider surgical consultation      Electronically signed by: Lonny Phillips  Date:    11/10/2020  Time:    22:24             Narrative:    EXAMINATION:  CT ABDOMEN PELVIS WITH CONTRAST    CLINICAL HISTORY:  Epigastric pain;    TECHNIQUE:  Low dose axial images, sagittal and coronal reformations were obtained from the lung bases to the pubic symphysis following the IV administration of 100 mL of Omnipaque 350 .  Oral contrast was not given.    COMPARISON:  Prior    FINDINGS:  Mild ascites.  Cholecystectomy clips noted.  There is a dilated loop of bowel in the mid hemiabdomen.  Remainder of the small bowel loops and colon are nondilated.  The dilated small bowel loop measures up to 3.8 cm with suggestion of some bowel wall mucosa edema.  Normal renal parenchymal enhancement with left renal cyst and nonobstructing punctate bilateral renal calculi suspected mild sigmoid diverticulosis.  Normal appendix.                               I have reviewed all pertinent imaging results/findings within the past 24 hours.

## 2020-11-11 NOTE — H&P
[facility] Hospital Medicine  History & Physical    Patient Name: Genny Calixto  MRN: [id number]  Admission Date: 11/10/2020  Attending Physician: Nikolai Holguin MD   Primary Care Provider: Tay Duff MD         Patient information was obtained from patient, past medical records and ER records.     Subjective:     Principal Problem:Small bowel obstruction    Chief Complaint:   Chief Complaint   Patient presents with    Abdominal Pain     States vomiting all day. States epigastric abdominal pain.  States began this AM when she woke up.        HPI: Genny Calixto is a 55 y.o. female patient with a PMHx of anxiety, depression, and nephrolithiasis who presents to the Emergency Department for evaluation of epigastric abdominal pain which onset this AM. Symptoms are constant and moderate in severity. No mitigating or exacerbating factors reported. Associated sxs include n/v. Patient denies any diarrhea, fever, chills, CP, SOB, cough, congestion, loss of smell or taste, and all other sxs at this time. Work-up in the ED showed: WBC 22, 0% bands, stable CMP, lipase 7, COVID negative. CT of the ABD/pelvis with contrast showed mild ascites, interval development of dilated segment of small bowel in the mid right hemiabdomen with bowel diameter measuring up to 3.8 cm which is consistent with at least moderate grade partial small bowel obstruction but worrisome for closed loop bowel obstruction. Blood cultures were obtained in the ED and 1L IV fluids, vanc, Zosyn, IV Dilaudid and IV Phenergan given. Case discussed with General Surgery, who recommends NG tube placement and will see patient in consult. Hospital Medicine was contacted for admission.   Patient is a Full Code. Her brother is SDM.      Past Medical History:   Diagnosis Date    Anxiety     Takes 5-10 mg of valium qd prn anxiety (prescriptions for both on file, one from [facility] & other from )    Insomnia     Takes 5-10 mg of valium  qhs prn insomnia (prescriptions for both on file, one from [facility] & other from )    Nephrolithiasis 2019    Left ureteral stone -> UTI c/b pyelonephritis and urosepsis w/ hypokalemia -> transfered to [facility] urology service from [facility] after CT abn dx, s/p 2 stents to allow infx to drain, IV Vanc/Rocephin, fever free since yesterday    Reflex sympathetic dystrophy     Vertigo        Past Surgical History:   Procedure Laterality Date    BLADDER SURGERY      CHOLECYSTECTOMY      facet injections  2017    L3, L4, L5, S1 Done by Dr. Lara    HYSTERECTOMY      KNEE SURGERY      TONSILLECTOMY         Review of patient's allergies indicates:   Allergen Reactions    Erythromycin     Morphine Nausea And Vomiting    Opioids - morphine analogues        No current facility-administered medications on file prior to encounter.      Current Outpatient Medications on File Prior to Encounter   Medication Sig    diazePAM (VALIUM) 5 MG tablet Take 1 tablet (5 mg total) by mouth every 12 (twelve) hours as needed for Anxiety.    diclofenac (VOLTAREN) 75 MG EC tablet Take 1 tablet (75 mg total) by mouth 2 (two) times daily.    doxepin (SINEQUAN) 10 MG capsule Take 1 capsule (10 mg total) by mouth every evening.    ergocalciferol (ERGOCALCIFEROL) 50,000 unit Cap Take 1 capsule (50,000 Units total) by mouth every 7 days.    LACTOBACILLUS COMBO NO.6 (PROBIOTIC COMPLEX ORAL) Take by mouth once daily at 6am.    loratadine (CLARITIN) 10 mg tablet Take 10 mg by mouth daily    meclizine (ANTIVERT) 25 mg tablet Take 25 mg by mouth.    melatonin 5 mg Cap Take by mouth.    [] methylPREDNISolone (MEDROL DOSEPACK) 4 mg tablet use as directed    ondansetron (ZOFRAN) 8 MG tablet Take 8 mg by mouth every 8 (eight) hours.    ondansetron (ZOFRAN-ODT) 4 MG TbDL Take 1 tablet (4 mg total) by mouth every 8 (eight) hours as needed (nausea).    oxyCODONE-acetaminophen (PERCOCET)  mg per tablet TK 1 T PO   Q 8 H PRF PAIN    sumatriptan (IMITREX) 100 MG tablet TAKE 1 TABLET IF NEEDED, MAY REPEAT ONCE IN 1 HOUR. MAX 2 TABLETS IN 24 HOURS     Family History     Problem Relation (Age of Onset)    COPD Father    Drug abuse Brother    Hypertension Mother    Stroke Mother        Tobacco Use    Smoking status: Never Smoker    Smokeless tobacco: Never Used   Substance and Sexual Activity    Alcohol use: No     Frequency: Never    Drug use: No    Sexual activity: Never     Review of Systems   Constitutional: Negative for appetite change, chills, diaphoresis, fatigue and fever.   HENT: Negative for congestion, postnasal drip, rhinorrhea, sinus pressure and sore throat.    Respiratory: Negative for cough, shortness of breath and wheezing.    Cardiovascular: Negative for chest pain, palpitations and leg swelling.   Gastrointestinal: Positive for abdominal pain, nausea and vomiting. Negative for abdominal distention, blood in stool, constipation and diarrhea.   Genitourinary: Negative for dysuria, hematuria and urgency.   Musculoskeletal: Negative for arthralgias, back pain and myalgias.   Skin: Negative for pallor and rash.   Neurological: Negative for dizziness, syncope, weakness, light-headedness, numbness and headaches.   Psychiatric/Behavioral: The patient is not nervous/anxious.    All other systems reviewed and are negative.    Objective:     Vital Signs (Most Recent):  Temp: 98.6 °F (37 °C) (11/10/20 2040)  Pulse: 97 (11/10/20 2307)  Resp: 18 (11/10/20 2300)  BP: 118/73 (11/10/20 2307)  SpO2: 100 % (11/10/20 2307) Vital Signs (24h Range):  Temp:  [98.6 °F (37 °C)] 98.6 °F (37 °C)  Pulse:  [76-97] 97  Resp:  [18] 18  SpO2:  [98 %-100 %] 100 %  BP: (107-118)/(64-73) 118/73        Body mass index is 23.24 kg/m².    Physical Exam  Vitals signs and nursing note reviewed.   Constitutional:       General: She is awake. She is not in acute distress.     Appearance: Normal appearance. She is well-developed. She is not  diaphoretic.   HENT:      Head: Normocephalic and atraumatic.   Eyes:      Conjunctiva/sclera: Conjunctivae normal.      Comments: PERRL; EOM intact.   Neck:      Musculoskeletal: Normal range of motion and neck supple.   Cardiovascular:      Rate and Rhythm: Normal rate and regular rhythm.  No extrasystoles are present.     Heart sounds: S1 normal and S2 normal. No murmur.   Pulmonary:      Effort: Pulmonary effort is normal. No tachypnea.      Breath sounds: Normal breath sounds and air entry. No wheezing, rhonchi or rales.   Abdominal:      General: There is no distension.      Palpations: Abdomen is soft.      Tenderness: There is abdominal tenderness in the epigastric area. There is no guarding or rebound.   Musculoskeletal: Normal range of motion.         General: No tenderness or deformity.      Right lower leg: No edema.      Left lower leg: No edema.   Skin:     General: Skin is warm and dry.      Capillary Refill: Capillary refill takes less than 2 seconds.      Findings: No erythema or rash.   Neurological:      General: No focal deficit present.      Mental Status: She is alert and oriented to person, place, and time.   Psychiatric:         Mood and Affect: Mood and affect normal.         Behavior: Behavior normal. Behavior is cooperative.             Significant Labs:  Results for orders placed or performed during the hospital encounter of 11/10/20   HIV 1/2 Ag/Ab (4th Gen)   Result Value Ref Range    HIV 1/2 Ag/Ab Negative Negative   Hepatitis C antibody   Result Value Ref Range    Hepatitis C Ab Negative Negative   CBC W/ AUTO DIFFERENTIAL   Result Value Ref Range    WBC 22.06 (H) 3.90 - 12.70 K/uL    RBC 5.50 (H) 4.00 - 5.40 M/uL    Hemoglobin 18.3 (H) 12.0 - 16.0 g/dL    Hematocrit 53.5 (H) 37.0 - 48.5 %    MCV 97 82 - 98 fL    MCH 33.3 (H) 27.0 - 31.0 pg    MCHC 34.2 32.0 - 36.0 g/dL    RDW 12.2 11.5 - 14.5 %    Platelets 347 150 - 350 K/uL    MPV 11.9 9.2 - 12.9 fL    Immature Granulocytes 0.5 0.0  - 0.5 %    Gran # (ANC) 19.5 (H) 1.8 - 7.7 K/uL    Immature Grans (Abs) 0.12 (H) 0.00 - 0.04 K/uL    Lymph # 1.3 1.0 - 4.8 K/uL    Mono # 1.0 0.3 - 1.0 K/uL    Eos # 0.0 0.0 - 0.5 K/uL    Baso # 0.09 0.00 - 0.20 K/uL    nRBC 0 0 /100 WBC    Gran % 88.3 (H) 38.0 - 73.0 %    Lymph % 6.1 (L) 18.0 - 48.0 %    Mono % 4.6 4.0 - 15.0 %    Eosinophil % 0.1 0.0 - 8.0 %    Basophil % 0.4 0.0 - 1.9 %    Differential Method Automated    Comp. Metabolic Panel   Result Value Ref Range    Sodium 140 136 - 145 mmol/L    Potassium 4.3 3.5 - 5.1 mmol/L    Chloride 102 95 - 110 mmol/L    CO2 25 23 - 29 mmol/L    Glucose 147 (H) 70 - 110 mg/dL    BUN 12 6 - 20 mg/dL    Creatinine 0.8 0.5 - 1.4 mg/dL    Calcium 9.7 8.7 - 10.5 mg/dL    Total Protein 7.8 6.0 - 8.4 g/dL    Albumin 4.4 3.5 - 5.2 g/dL    Total Bilirubin 1.1 (H) 0.1 - 1.0 mg/dL    Alkaline Phosphatase 81 55 - 135 U/L    AST 30 10 - 40 U/L    ALT 26 10 - 44 U/L    Anion Gap 13 8 - 16 mmol/L    eGFR if African American >60 >60 mL/min/1.73 m^2    eGFR if non African American >60 >60 mL/min/1.73 m^2   Lipase   Result Value Ref Range    Lipase 7 4 - 60 U/L   COVID-19 Rapid Screening   Result Value Ref Range    SARS-CoV-2 RNA, Amplification, Qual Negative Negative   Troponin I   Result Value Ref Range    Troponin I <0.006 0.000 - 0.026 ng/mL   APTT   Result Value Ref Range    aPTT 25.9 21.0 - 32.0 sec   Protime-INR   Result Value Ref Range    Prothrombin Time 10.5 9.0 - 12.5 sec    INR 1.0 0.8 - 1.2      All pertinent labs within the past 24 hours have been reviewed.    Significant Imaging:  Imaging Results          X-Ray Chest AP Portable (In process)                CT Abdomen Pelvis With Contrast (Final result)  Result time 11/10/20 22:24:47    Final result by Jacqueline Mukherjee MD (11/10/20 22:24:47)                 Impression:      Mild ascites    Interval development of dilated segment of small bowel in the mid right hemiabdomen with bowel diameter measuring up to 3.8 cm.  This is  consistent with at least moderate grade partial small bowel obstruction, worrisome for closed loop bowel obstruction.  Consider surgical consultation      Electronically signed by: Lonny Belcherimi  Date:    11/10/2020  Time:    22:24             Narrative:    EXAMINATION:  CT ABDOMEN PELVIS WITH CONTRAST    CLINICAL HISTORY:  Epigastric pain;    TECHNIQUE:  Low dose axial images, sagittal and coronal reformations were obtained from the lung bases to the pubic symphysis following the IV administration of 100 mL of Omnipaque 350 .  Oral contrast was not given.    COMPARISON:  Prior    FINDINGS:  Mild ascites.  Cholecystectomy clips noted.  There is a dilated loop of bowel in the mid hemiabdomen.  Remainder of the small bowel loops and colon are nondilated.  The dilated small bowel loop measures up to 3.8 cm with suggestion of some bowel wall mucosa edema.  Normal renal parenchymal enhancement with left renal cyst and nonobstructing punctate bilateral renal calculi suspected mild sigmoid diverticulosis.  Normal appendix.                               I have reviewed all pertinent imaging results/findings within the past 24 hours.             Assessment/Plan:     * Small bowel obstruction  - General Surgery consult.  - NPO status.  - IV hydration.  - NG tube placed to intermittent low wall suction.  - Analgesics and antiemetics as needed.  - Follow labs.    Leukocytosis  - Initial WBC 22, 0% bands. Patient afebrile. No infectious process seen on CT of ABD/pelvis.  - Will obtain CXR and UA.  - Blood cultures obtained in the ED. Empiric vanc and Rocephin. Pharmacy consult for vanc dosing.  - IV hydration.  - Follow labs.      VTE Risk Mitigation (From admission, onward)         Ordered     IP VTE LOW RISK PATIENT  Once      11/11/20 0223     Place sequential compression device  Until discontinued      11/11/20 0223                   Christine Proctor NP  Department of Hospital Medicine   [facility]

## 2020-11-12 LAB
ALBUMIN SERPL BCP-MCNC: 3 G/DL (ref 3.5–5.2)
ALP SERPL-CCNC: 48 U/L (ref 55–135)
ALT SERPL W/O P-5'-P-CCNC: 15 U/L (ref 10–44)
ANION GAP SERPL CALC-SCNC: 8 MMOL/L (ref 8–16)
AST SERPL-CCNC: 21 U/L (ref 10–40)
BASOPHILS # BLD AUTO: 0.06 K/UL (ref 0–0.2)
BASOPHILS NFR BLD: 0.7 % (ref 0–1.9)
BILIRUB SERPL-MCNC: 1.4 MG/DL (ref 0.1–1)
BUN SERPL-MCNC: 14 MG/DL (ref 6–20)
CALCIUM SERPL-MCNC: 8.3 MG/DL (ref 8.7–10.5)
CHLORIDE SERPL-SCNC: 103 MMOL/L (ref 95–110)
CO2 SERPL-SCNC: 29 MMOL/L (ref 23–29)
CREAT SERPL-MCNC: 0.7 MG/DL (ref 0.5–1.4)
DIFFERENTIAL METHOD: ABNORMAL
EOSINOPHIL # BLD AUTO: 0 K/UL (ref 0–0.5)
EOSINOPHIL NFR BLD: 0.3 % (ref 0–8)
ERYTHROCYTE [DISTWIDTH] IN BLOOD BY AUTOMATED COUNT: 13 % (ref 11.5–14.5)
EST. GFR  (AFRICAN AMERICAN): >60 ML/MIN/1.73 M^2
EST. GFR  (NON AFRICAN AMERICAN): >60 ML/MIN/1.73 M^2
GLUCOSE SERPL-MCNC: 87 MG/DL (ref 70–110)
HCT VFR BLD AUTO: 39.5 % (ref 37–48.5)
HGB BLD-MCNC: 12.9 G/DL (ref 12–16)
IMM GRANULOCYTES # BLD AUTO: 0.03 K/UL (ref 0–0.04)
IMM GRANULOCYTES NFR BLD AUTO: 0.3 % (ref 0–0.5)
LYMPHOCYTES # BLD AUTO: 1.9 K/UL (ref 1–4.8)
LYMPHOCYTES NFR BLD: 21.5 % (ref 18–48)
MAGNESIUM SERPL-MCNC: 1.8 MG/DL (ref 1.6–2.6)
MCH RBC QN AUTO: 33.1 PG (ref 27–31)
MCHC RBC AUTO-ENTMCNC: 32.7 G/DL (ref 32–36)
MCV RBC AUTO: 101 FL (ref 82–98)
MONOCYTES # BLD AUTO: 0.9 K/UL (ref 0.3–1)
MONOCYTES NFR BLD: 10.2 % (ref 4–15)
NEUTROPHILS # BLD AUTO: 6 K/UL (ref 1.8–7.7)
NEUTROPHILS NFR BLD: 67 % (ref 38–73)
NRBC BLD-RTO: 0 /100 WBC
PHOSPHATE SERPL-MCNC: 2.1 MG/DL (ref 2.7–4.5)
PLATELET # BLD AUTO: 213 K/UL (ref 150–350)
PMV BLD AUTO: 11.9 FL (ref 9.2–12.9)
POTASSIUM SERPL-SCNC: 4.2 MMOL/L (ref 3.5–5.1)
PROT SERPL-MCNC: 5.6 G/DL (ref 6–8.4)
RBC # BLD AUTO: 3.9 M/UL (ref 4–5.4)
SODIUM SERPL-SCNC: 140 MMOL/L (ref 136–145)
TROPONIN I SERPL DL<=0.01 NG/ML-MCNC: <0.006 NG/ML (ref 0–0.03)
WBC # BLD AUTO: 8.99 K/UL (ref 3.9–12.7)

## 2020-11-12 PROCEDURE — 94761 N-INVAS EAR/PLS OXIMETRY MLT: CPT

## 2020-11-12 PROCEDURE — 94799 UNLISTED PULMONARY SVC/PX: CPT

## 2020-11-12 PROCEDURE — S0028 INJECTION, FAMOTIDINE, 20 MG: HCPCS | Performed by: NURSE PRACTITIONER

## 2020-11-12 PROCEDURE — 84484 ASSAY OF TROPONIN QUANT: CPT

## 2020-11-12 PROCEDURE — 94770 HC EXHALED C02 TEST: CPT

## 2020-11-12 PROCEDURE — 84100 ASSAY OF PHOSPHORUS: CPT

## 2020-11-12 PROCEDURE — 80053 COMPREHEN METABOLIC PANEL: CPT

## 2020-11-12 PROCEDURE — 83735 ASSAY OF MAGNESIUM: CPT

## 2020-11-12 PROCEDURE — 63600175 PHARM REV CODE 636 W HCPCS: Performed by: SURGERY

## 2020-11-12 PROCEDURE — 85025 COMPLETE CBC W/AUTO DIFF WBC: CPT

## 2020-11-12 PROCEDURE — 25000003 PHARM REV CODE 250: Performed by: SURGERY

## 2020-11-12 PROCEDURE — 25000003 PHARM REV CODE 250: Performed by: NURSE PRACTITIONER

## 2020-11-12 PROCEDURE — 99900035 HC TECH TIME PER 15 MIN (STAT)

## 2020-11-12 PROCEDURE — 27000221 HC OXYGEN, UP TO 24 HOURS

## 2020-11-12 PROCEDURE — 11000001 HC ACUTE MED/SURG PRIVATE ROOM

## 2020-11-12 PROCEDURE — 36415 COLL VENOUS BLD VENIPUNCTURE: CPT

## 2020-11-12 RX ORDER — ACETAMINOPHEN 650 MG/20.3ML
650 LIQUID ORAL EVERY 6 HOURS PRN
Status: DISCONTINUED | OUTPATIENT
Start: 2020-11-12 | End: 2020-11-20

## 2020-11-12 RX ORDER — DEXTROSE MONOHYDRATE, SODIUM CHLORIDE, AND POTASSIUM CHLORIDE 50; 1.49; 4.5 G/1000ML; G/1000ML; G/1000ML
INJECTION, SOLUTION INTRAVENOUS CONTINUOUS
Status: DISCONTINUED | OUTPATIENT
Start: 2020-11-12 | End: 2020-11-14

## 2020-11-12 RX ADMIN — PROMETHAZINE HYDROCHLORIDE 12.5 MG: 25 INJECTION INTRAMUSCULAR; INTRAVENOUS at 01:11

## 2020-11-12 RX ADMIN — DEXTROSE, SODIUM CHLORIDE, AND POTASSIUM CHLORIDE: 5; .45; .15 INJECTION INTRAVENOUS at 09:11

## 2020-11-12 RX ADMIN — ACETAMINOPHEN 650 MG: 160 SOLUTION ORAL at 08:11

## 2020-11-12 RX ADMIN — DEXTROSE, SODIUM CHLORIDE, AND POTASSIUM CHLORIDE: 5; .45; .15 INJECTION INTRAVENOUS at 06:11

## 2020-11-12 RX ADMIN — FAMOTIDINE 20 MG: 20 INJECTION, SOLUTION INTRAVENOUS at 08:11

## 2020-11-12 RX ADMIN — ONDANSETRON 4 MG: 2 INJECTION INTRAMUSCULAR; INTRAVENOUS at 11:11

## 2020-11-12 RX ADMIN — SODIUM CHLORIDE, SODIUM LACTATE, POTASSIUM CHLORIDE, AND CALCIUM CHLORIDE: 600; 310; 30; 20 INJECTION, SOLUTION INTRAVENOUS at 03:11

## 2020-11-12 RX ADMIN — SODIUM PHOSPHATE, MONOBASIC, MONOHYDRATE 30 MMOL: 276; 142 INJECTION, SOLUTION INTRAVENOUS at 06:11

## 2020-11-12 RX ADMIN — ACETAMINOPHEN ORAL SOLUTION 650 MG: 650 SOLUTION ORAL at 11:11

## 2020-11-12 RX ADMIN — LORAZEPAM 0.5 MG: 2 INJECTION INTRAMUSCULAR; INTRAVENOUS at 11:11

## 2020-11-12 RX ADMIN — ONDANSETRON 4 MG: 2 INJECTION INTRAMUSCULAR; INTRAVENOUS at 09:11

## 2020-11-12 RX ADMIN — LORAZEPAM 0.5 MG: 2 INJECTION INTRAMUSCULAR; INTRAVENOUS at 09:11

## 2020-11-12 NOTE — SUBJECTIVE & OBJECTIVE
Interval History:  Postop day 1, pain controlled, clamp NG tube.  Change IV fluids.  Ambulate    Medications:  Continuous Infusions:   hydromorphone in 0.9 % NaCl 6 mg/30 ml      lactated ringers 100 mL/hr at 11/12/20 0326     Scheduled Meds:   famotidine  20 mg Intravenous BID    nozaseptin   Each Nostril BID     PRN Meds:acetaminophen, lorazepam, metoclopramide HCl, naloxone, ondansetron, promethazine (PHENERGAN) IVPB     Review of patient's allergies indicates:   Allergen Reactions    Erythromycin     Morphine Nausea And Vomiting    Opioids - morphine analogues      Objective:     Vital Signs (Most Recent):  Temp: 100.2 °F (37.9 °C) (11/12/20 0739)  Pulse: 97 (11/12/20 0739)  Resp: 20 (11/12/20 0739)  BP: 132/64 (11/12/20 0739)  SpO2: 100 % (11/12/20 0739) Vital Signs (24h Range):  Temp:  [98.2 °F (36.8 °C)-100.2 °F (37.9 °C)] 100.2 °F (37.9 °C)  Pulse:  [] 97  Resp:  [10-20] 20  SpO2:  [93 %-100 %] 100 %  BP: (104-163)/(51-79) 132/64     Weight: 74.9 kg (165 lb 2 oz)  Body mass index is 25.86 kg/m².    Intake/Output - Last 3 Shifts       11/10 0700 - 11/11 0659 11/11 0700 - 11/12 0659 11/12 0700 - 11/13 0659    I.V. (mL/kg)  1129 (15.1)     IV Piggyback 1100 50     Total Intake(mL/kg) 1100 (14.7) 1179 (15.7)     Urine (mL/kg/hr)  215 (0.1)     Drains  175     Blood  150     Total Output  540     Net +1100 +639                  Physical Exam  Vitals signs and nursing note reviewed.   Constitutional:       Appearance: Normal appearance.   HENT:      Head: Normocephalic and atraumatic.   Cardiovascular:      Rate and Rhythm: Normal rate and regular rhythm.   Pulmonary:      Effort: Pulmonary effort is normal.      Breath sounds: Normal breath sounds.   Abdominal:      General: Abdomen is flat. Bowel sounds are normal.      Palpations: Abdomen is soft.      Tenderness: There is abdominal tenderness.      Comments: Incision is dressed   Skin:     General: Skin is warm and dry.      Capillary Refill:  Capillary refill takes less than 2 seconds.   Neurological:      General: No focal deficit present.      Mental Status: She is alert and oriented to person, place, and time. Mental status is at baseline.   Psychiatric:         Mood and Affect: Mood normal.         Behavior: Behavior normal.         Thought Content: Thought content normal.         Judgment: Judgment normal.         Significant Labs:  CBC:   Recent Labs   Lab 11/11/20  0813   WBC 14.17*   RBC 4.57   HGB 15.0   HCT 44.1      MCV 97   MCH 32.8*   MCHC 34.0     BMP:   Recent Labs   Lab 11/11/20  0813         K 4.2      CO2 24   BUN 14   CREATININE 0.8   CALCIUM 8.9   MG 2.0     Morning labs are pending    Significant Diagnostics:  No new

## 2020-11-12 NOTE — ANESTHESIA POSTPROCEDURE EVALUATION
Anesthesia Post Evaluation    Patient: Genny Calixto    Procedure(s) Performed: Procedure(s) (LRB):  LAPAROSCOPY, DIAGNOSTIC (N/A)  LYSIS, ADHESIONS, LAPAROSCOPIC (N/A)  LYSIS, ADHESIONS (N/A)  EXCISION, SMALL INTESTINE (N/A)    Final Anesthesia Type: general    Patient location during evaluation: PACU  Patient participation: Yes- Able to Participate  Level of consciousness: awake and alert  Post-procedure vital signs: reviewed and stable  Pain management: adequate  Airway patency: patent  SANDRO mitigation strategies: Extubation while patient is awake  PONV status at discharge: No PONV  Anesthetic complications: no      Cardiovascular status: hemodynamically stable  Respiratory status: spontaneous ventilation  Hydration status: euvolemic  Follow-up not needed.          Vitals Value Taken Time   /71 11/11/20 1620   Temp 36.8 °C (98.2 °F) 11/11/20 1620   Pulse 97 11/11/20 1620   Resp 18 11/11/20 1620   SpO2 97 % 11/11/20 1620         Event Time   Out of Recovery 11/11/2020 15:57:26         Pain/Pat Score: Pain Rating Prior to Med Admin: 7 (11/11/2020  4:11 PM)  Pat Score: 10 (11/11/2020  3:41 PM)

## 2020-11-12 NOTE — NURSING
PCA started at 1531. Per unit protocol hourly vitals to be completed for first 8 hours. Since change of shift hourly vitals have been completed and will end at 1131. Will continue to monitor.

## 2020-11-12 NOTE — PLAN OF CARE
Chart reviewed, pt resting in bed with call light and personal belongings within reach.  Vitals stable. PRN med given for fever over 100 via NG tube.  NG tube removed today  D5 1/2 NS W/20 KCL @ 100 infusing  Rosales intact and draining, removal on 11/13 post op day 2.  Npo ice chips.  Pain controlled with PCA pump  Surgical incision intact, dressing clean.  Nausea controlled with PRN meds in MAR.  Pt absent of injury throughout shift, will continue to monitor.

## 2020-11-12 NOTE — HOSPITAL COURSE
11/12/2020.  Postop day 1 from exploratory laparotomy and small-bowel resection.  The limited nausea.  Pain is controlled.  Minimal NG output.  Will clamp NG tube and removed the low residual    11/13/2020.  Postop day 2.  NG tube removed.  Phosphorus continues to be low will be replaced.  Clear liquids started.  Pain medicine adjusted in PCA discontinued    11/14/2020: POD 3. Tolerating clears. + flatus but no BM yet. No nausea or emesis. Up with PT this AM.     11/15/2020: POD 4. Tolerating diet with flatus and bowel function.  Reports nausea with regular diet but no emesis.  Reports gas pain.     11/16/120:  POD 5:  Nausea and vomiting this morning, mild incisional pain    11/17/2020.  Postop day 6.  An episode of nausea and vomiting.  Abdominal films continue showed dilated loops of small bowel.  Will plan NPO, IV fluids.  Dulcolax and repeat x-rays.  If there are still dilated loops of small bowel Gastrografin small-bowel follow-through tomorrow    11/18/2020.  Postop day 7.  Patient had episodes of nausea and vomiting small bowel movements.  Abdominal films continued to show dilated loops of small bowel.  Will place an NG tube for decompression and likely obtain a small-bowel follow-through tomorrow    11/19/2020.  Postop day 8.  No nausea and vomiting but NG tube was placed with 500 mL of output.  She has had several bowel movements.  Gastrografin small-bowel follow-through today    11/20/2020.  Postop day 9.  NG with about 1 L output.  Abdominal films show persistently dilated loops of small bowel but contrast as transfer the to the colon.  This likely represents a partial small bowel obstruction or anastomotic narrowing.    11/21/2020.  Postop day 10/1 from exploratory laparotomy and small-bowel resection.  She fired 2 additional small-bowel resections DD postop bowel obstruction.    11/22/2020.  Postop day 11/2.  Passing flatus.  Decreased NG output.  Low-grade fever.  No elevation in white count.  Slowly  improving abdominal pain    11/23/2020.  Postop day 12 and 3.  Passing some flatus.  NG tube was removed.  No fever.  White count is normal.  Mild nausea.  Will start clear liquid    11/24/2020.  Some flatus some bowel movements.  Tolerated clears.  Will advance to full liquids.  Anticipate regular diet and likely discharge tomorrow for Thursday 11/25/2020 Mild nausea with orange juice but otherwise tolerated liquids having bowel.  Will advance to a regular diet    11/26/2020; well enough to be discharged.

## 2020-11-12 NOTE — ASSESSMENT & PLAN NOTE
Postop day 1 exploratory laparotomy small-bowel resection for a closed loop obstruction with ischemia.    Change IV fluids  You PCA  Ambulate  Clamp NG tube and removed if low residual  Await return of bowel function

## 2020-11-12 NOTE — PLAN OF CARE
Chart reviewed, pt resting in bed with call light and personal belongings within reach.  Vitals stable. PRN med given for fever over 100 via NG tube.  NG tube to LIWS, clamped for 45 minutes after med given to prevent suction.  Lr infusing at 100ml/hr.  Rosales intact and draining, removal on 11/13 post op day 2.  Npo ice chips.  Pt ambulated to bedsite, tolerated well.  Pain controlled with PCA pump, every hour for first 8 hours completed per unit policy.  Surgical incision intact, dressing clean.  Nausea controlled with PRN meds in MAR.  4 eyes on skin completed, pt turned every 2 hours independently.  Pt absent of injury throughout shift, will continue to monitor.

## 2020-11-12 NOTE — PROGRESS NOTES
Genny Calixto is a 55 y.o. female patient with a PMHx of anxiety, depression, and nephrolithiasis who presented to the ER w epigastric abdominal pain which started this AM. WBC 22, 0% bands, stable CMP, lipase 7, COVID negative. CT of the ABD/pelvis with contrast showed moderate grade partial small bowel obstruction but worrisome for closed loop bowel obstruction. Blood cultures were obtained in the ED and 1L IV fluids, vanc, Zosyn, IV Dilaudid and IV Phenergan given. Case discussed with General Surgery, who recommends NG tube placement.  Later pt underwent Open Exp Lap with lysis of adhesions and partial colectomy. Seen and examined post op, doing better, AAOX3, speech clear, post op pain present but controlled with analgesics.

## 2020-11-12 NOTE — PROGRESS NOTES
[facility] -   General Surgery  Progress Note    Subjective:     History of Present Illness:  56yo F with a PMHx significant for nephrolithiasis who presented to the ED for evaluation of epigastric abdominal pain. Reports a one day history of this pain which is sharp and moderate in nature. Has associated nausea and vomiting x24hrs. Denies any associated fever, chills, diarrhea or constipation. Workup in ED worrisome for small bowel obstruction. Surgery consulted for evaluation and pt admitted to hospital medicine. Pt reports minimal abdominal surgical history, just a hysterectomy and bladder repair. Last BM yesterday. NGT placed in ED and still with moderate abdominal pain. Denies fevers, chills, diarrhea or constipation.        Post-Op Info:  Procedure(s) (LRB):  LAPAROSCOPY, DIAGNOSTIC (N/A)  LYSIS, ADHESIONS, LAPAROSCOPIC (N/A)  LYSIS, ADHESIONS (N/A)  EXCISION, SMALL INTESTINE (N/A)   1 Day Post-Op     Interval History:  Postop day 1, pain controlled, clamp NG tube.  Change IV fluids.  Ambulate    Medications:  Continuous Infusions:   hydromorphone in 0.9 % NaCl 6 mg/30 ml      lactated ringers 100 mL/hr at 11/12/20 0326     Scheduled Meds:   famotidine  20 mg Intravenous BID    nozaseptin   Each Nostril BID     PRN Meds:acetaminophen, lorazepam, metoclopramide HCl, naloxone, ondansetron, promethazine (PHENERGAN) IVPB     Review of patient's allergies indicates:   Allergen Reactions    Erythromycin     Morphine Nausea And Vomiting    Opioids - morphine analogues      Objective:     Vital Signs (Most Recent):  Temp: 100.2 °F (37.9 °C) (11/12/20 0739)  Pulse: 97 (11/12/20 0739)  Resp: 20 (11/12/20 0739)  BP: 132/64 (11/12/20 0739)  SpO2: 100 % (11/12/20 0739) Vital Signs (24h Range):  Temp:  [98.2 °F (36.8 °C)-100.2 °F (37.9 °C)] 100.2 °F (37.9 °C)  Pulse:  [] 97  Resp:  [10-20] 20  SpO2:  [93 %-100 %] 100 %  BP: (104-163)/(51-79) 132/64     Weight: 74.9 kg (165 lb 2 oz)  Body mass  index is 25.86 kg/m².    Intake/Output - Last 3 Shifts       11/10 0700 - 11/11 0659 11/11 0700 - 11/12 0659 11/12 0700 - 11/13 0659    I.V. (mL/kg)  1129 (15.1)     IV Piggyback 1100 50     Total Intake(mL/kg) 1100 (14.7) 1179 (15.7)     Urine (mL/kg/hr)  215 (0.1)     Drains  175     Blood  150     Total Output  540     Net +1100 +639                  Physical Exam  Vitals signs and nursing note reviewed.   Constitutional:       Appearance: Normal appearance.   HENT:      Head: Normocephalic and atraumatic.   Cardiovascular:      Rate and Rhythm: Normal rate and regular rhythm.   Pulmonary:      Effort: Pulmonary effort is normal.      Breath sounds: Normal breath sounds.   Abdominal:      General: Abdomen is flat. Bowel sounds are normal.      Palpations: Abdomen is soft.      Tenderness: There is abdominal tenderness.      Comments: Incision is dressed   Skin:     General: Skin is warm and dry.      Capillary Refill: Capillary refill takes less than 2 seconds.   Neurological:      General: No focal deficit present.      Mental Status: She is alert and oriented to person, place, and time. Mental status is at baseline.   Psychiatric:         Mood and Affect: Mood normal.         Behavior: Behavior normal.         Thought Content: Thought content normal.         Judgment: Judgment normal.         Significant Labs:  CBC:   Recent Labs   Lab 11/11/20  0813   WBC 14.17*   RBC 4.57   HGB 15.0   HCT 44.1      MCV 97   MCH 32.8*   MCHC 34.0     BMP:   Recent Labs   Lab 11/11/20  0813         K 4.2      CO2 24   BUN 14   CREATININE 0.8   CALCIUM 8.9   MG 2.0     Morning labs are pending    Significant Diagnostics:  No new    Assessment/Plan:     * Small bowel obstruction  Postop day 1 exploratory laparotomy small-bowel resection for a closed loop obstruction with ischemia.    Change IV fluids  You PCA  Ambulate  Clamp NG tube and removed if low residual  Await return of bowel  function        Tee Segura MD  General Surgery  [facility]

## 2020-11-13 PROBLEM — E83.39 HYPOPHOSPHATEMIA: Status: ACTIVE | Noted: 2020-11-13

## 2020-11-13 LAB — PHOSPHATE SERPL-MCNC: 2 MG/DL (ref 2.7–4.5)

## 2020-11-13 PROCEDURE — 63600175 PHARM REV CODE 636 W HCPCS: Performed by: SURGERY

## 2020-11-13 PROCEDURE — 94799 UNLISTED PULMONARY SVC/PX: CPT

## 2020-11-13 PROCEDURE — 84100 ASSAY OF PHOSPHORUS: CPT

## 2020-11-13 PROCEDURE — 11000001 HC ACUTE MED/SURG PRIVATE ROOM

## 2020-11-13 PROCEDURE — S0028 INJECTION, FAMOTIDINE, 20 MG: HCPCS | Performed by: NURSE PRACTITIONER

## 2020-11-13 PROCEDURE — 25000003 PHARM REV CODE 250: Performed by: SURGERY

## 2020-11-13 PROCEDURE — 99900035 HC TECH TIME PER 15 MIN (STAT)

## 2020-11-13 PROCEDURE — 94770 HC EXHALED C02 TEST: CPT

## 2020-11-13 PROCEDURE — 25000003 PHARM REV CODE 250: Performed by: NURSE PRACTITIONER

## 2020-11-13 PROCEDURE — 36415 COLL VENOUS BLD VENIPUNCTURE: CPT

## 2020-11-13 RX ORDER — SODIUM,POTASSIUM PHOSPHATES 280-250MG
1 POWDER IN PACKET (EA) ORAL ONCE
Status: COMPLETED | OUTPATIENT
Start: 2020-11-13 | End: 2020-11-13

## 2020-11-13 RX ORDER — HYDROCODONE BITARTRATE AND ACETAMINOPHEN 7.5; 325 MG/1; MG/1
1 TABLET ORAL EVERY 4 HOURS PRN
Status: DISCONTINUED | OUTPATIENT
Start: 2020-11-13 | End: 2020-11-20

## 2020-11-13 RX ORDER — HYDROMORPHONE HYDROCHLORIDE 1 MG/ML
1 INJECTION, SOLUTION INTRAMUSCULAR; INTRAVENOUS; SUBCUTANEOUS
Status: DISCONTINUED | OUTPATIENT
Start: 2020-11-13 | End: 2020-11-20

## 2020-11-13 RX ORDER — DIAZEPAM 5 MG/1
5 TABLET ORAL EVERY 6 HOURS PRN
Status: DISCONTINUED | OUTPATIENT
Start: 2020-11-13 | End: 2020-11-16

## 2020-11-13 RX ORDER — HYDROCODONE BITARTRATE AND ACETAMINOPHEN 10; 325 MG/1; MG/1
1 TABLET ORAL EVERY 6 HOURS PRN
Status: DISCONTINUED | OUTPATIENT
Start: 2020-11-13 | End: 2020-11-20

## 2020-11-13 RX ADMIN — ONDANSETRON 4 MG: 2 INJECTION INTRAMUSCULAR; INTRAVENOUS at 10:11

## 2020-11-13 RX ADMIN — FAMOTIDINE 20 MG: 20 INJECTION, SOLUTION INTRAVENOUS at 08:11

## 2020-11-13 RX ADMIN — DIAZEPAM 5 MG: 5 TABLET ORAL at 01:11

## 2020-11-13 RX ADMIN — HYDROMORPHONE HYDROCHLORIDE 1 MG: 1 INJECTION, SOLUTION INTRAMUSCULAR; INTRAVENOUS; SUBCUTANEOUS at 04:11

## 2020-11-13 RX ADMIN — HYDROCODONE BITARTRATE AND ACETAMINOPHEN 1 TABLET: 10; 325 TABLET ORAL at 07:11

## 2020-11-13 RX ADMIN — POTASSIUM & SODIUM PHOSPHATES POWDER PACK 280-160-250 MG 1 PACKET: 280-160-250 PACK at 04:11

## 2020-11-13 RX ADMIN — ONDANSETRON 4 MG: 2 INJECTION INTRAMUSCULAR; INTRAVENOUS at 04:11

## 2020-11-13 RX ADMIN — PROMETHAZINE HYDROCHLORIDE 12.5 MG: 25 INJECTION INTRAMUSCULAR; INTRAVENOUS at 07:11

## 2020-11-13 RX ADMIN — HYDROCODONE BITARTRATE AND ACETAMINOPHEN 1 TABLET: 10; 325 TABLET ORAL at 01:11

## 2020-11-13 RX ADMIN — Medication: at 04:11

## 2020-11-13 RX ADMIN — DEXTROSE, SODIUM CHLORIDE, AND POTASSIUM CHLORIDE: 5; .45; .15 INJECTION INTRAVENOUS at 10:11

## 2020-11-13 RX ADMIN — PROMETHAZINE HYDROCHLORIDE 12.5 MG: 25 INJECTION INTRAMUSCULAR; INTRAVENOUS at 03:11

## 2020-11-13 RX ADMIN — HYDROMORPHONE HYDROCHLORIDE 1 MG: 1 INJECTION, SOLUTION INTRAMUSCULAR; INTRAVENOUS; SUBCUTANEOUS at 10:11

## 2020-11-13 NOTE — PLAN OF CARE
11/13/20 1514   Discharge Assessment   Assessment Type Discharge Planning Assessment   Confirmed/corrected address and phone number on facesheet? Yes   Assessment information obtained from? Patient;Medical Record   Prior to hospitilization cognitive status: Alert/Oriented   Prior to hospitalization functional status: Independent   Current cognitive status: Alert/Oriented   Current Functional Status: Independent   Lives With sibling(s)   Able to Return to Prior Arrangements yes   Is patient able to care for self after discharge? Yes   Who are your caregiver(s) and their phone number(s)? Jose Darnell (brother) [phone]   Patient's perception of discharge disposition home or selfcare   Readmission Within the Last 30 Days no previous admission in last 30 days   Patient currently being followed by outpatient case management? No   Patient currently receives any other outside agency services? No   Equipment Currently Used at Home none   Do you have any problems affording any of your prescribed medications? No   Is the patient taking medications as prescribed? yes   Does the patient have transportation home? Yes   Transportation Anticipated family or friend will provide   Does the patient receive services at the [facility]? No   Discharge Plan A Home with family   Discharge Plan B Home with family   Patient/Family in Agreement with Plan yes     Met with pt at bedside for DC assessment. Pt lives at home with her brother and does not use any DME. No CM DC needs identified at this time. SWer provided a transitional care folder, information on advanced directives, information on pharmacy bedside delivery, and discharge planning begins on admission with contact information for any needs/questions. [facility] [address] Information below.      [id number] [place] - [address]  [address] [address] [place] [address]  Phone: [phone] Fax: [phone]    [facility] [address]  [place] - [address] [address]  [address] [address]  [place] [address]  Phone: [phone] Fax: [phone]    [id number] - [place] - [address] [facility]  [address]  [place] [address]  Phone: [phone] Fax: [phone]    PCP: MD Chandana Sheldon LMSW 11/13/2020 3:16 PM

## 2020-11-13 NOTE — PLAN OF CARE
Chart reviewed, pt resting in bed with call light and personal belongings within reach.  D5 1/2 NS W/20 KCL @ 75 infusing  Voiding spontaneously.   Clear liquid diet.  PCA pump discontinued, pain controled with PRN meds.  Surgical incision intact, dressing clean.  Nausea controlled with PRN meds in MAR.  Pt absent of injury throughout shift, will continue to monitor.   no distention/nontender/no masses palpable/soft/bowel sounds normal

## 2020-11-13 NOTE — PHYSICIAN QUERY
PT Name: Genny Calixto  MR #: [id number]     Documentation Clarification      CDS: Susana YARBROUGH RN  Contact information: [email or web address]    This form is a permanent document in the medical record.     Query Date: November 13, 2020    By submitting this query, we are merely seeking further clarification of documentation. Please utilize your independent clinical judgment when addressing the question(s) below.    The Medical Record reflects the following:    Supporting Clinical Findings Location in Medical Record   Principal Problem: Small bowel obstruction  History of Present Illness: 56yo F with a PMHx significant for nephrolithiasis who presented to the ED for evaluation of epigastric abdominal pain. Reports a one day history of this pain which is sharp and moderate in nature. Has associated nausea and vomiting x24hrs. Denies any associated fever, chills, diarrhea or constipation. Workup in ED worrisome for small bowel obstruction    Procedure(s) (LRB):  LAPAROSCOPY, DIAGNOSTIC, possible laparotomy, possible bowel resection (N/A)  LYSIS, ADHESIONS, LAPAROSCOPIC (N/A)     Once we freed the omentum we could clearly for identify the area of the small bowel that was involved in a closed loop obstruction due to an internal hernia and it was frankly ischemic.    The ischemic area of small bowel was removed by dividing it proximally and distally with the ALONZO 75 stapler.  The mesentery was take down with EnSeal bipolar device.  A side-to-side anastomosis was created by opening the anti mesenteric borders of the small intestine approximate with the ALONZO 75 stapler.  The opening created was closed with the ALONZO 75 stapler.  The suture lines were reinforced with 3 0 silk in a Lembert fashion.  The opening the mesentery was closed with 3 0 Vicryl in a running fashion.    An ischemic portion of omentum was removed with the EnSeal bipolar device.    * Small bowel obstruction  Postop day 1 exploratory laparotomy  small-bowel resection for a closed loop obstruction with ischemia. Consults Gen Surg 11/11/2020            Op Note 11/11/2020          Op Note 11/11/2020        Op Note 11/11/2020                Op Note 11/11/2020    Op Note 11/11/2020   x                                                                      Provider, please provide diagnosis or diagnoses associated with above clinical findings.    Doctor, please further specify the documentation of small bowel ischemia    [  x ] Focal Acute Small bowel ischemia   [   ] Diffuse Acute Small bowel ischemia   [   ] Chronic Small bowel ischemia   [   ] Other (please specify): ____________   [  ] Clinically undetermined                                                                                                           Present on admission (POA) status:   [   x] Yes (Y)                          [  ] Clinically Undetermined (W)  [   ] No (N)                            [   ] Documentation insufficient to determine if condition is POA (U)

## 2020-11-13 NOTE — SUBJECTIVE & OBJECTIVE
Interval History:  Postop day 2 small-bowel resection.  DC PCA.  Oral and IV narcotics.  Clear    Medications:  Continuous Infusions:   dextrose 5 % and 0.45 % NaCl with KCl 20 mEq 75 mL/hr at 11/13/20 1151     Scheduled Meds:   famotidine  20 mg Intravenous BID    nozaseptin   Each Nostril BID     PRN Meds:acetaminophen, diazePAM, HYDROcodone-acetaminophen, HYDROcodone-acetaminophen, HYDROmorphone, metoclopramide HCl, naloxone, ondansetron, promethazine (PHENERGAN) IVPB     Review of patient's allergies indicates:   Allergen Reactions    Erythromycin     Morphine Nausea And Vomiting    Opioids - morphine analogues      Objective:     Vital Signs (Most Recent):  Temp: 99 °F (37.2 °C) (11/13/20 0827)  Pulse: 90 (11/13/20 0827)  Resp: 18 (11/13/20 1306)  BP: (!) 165/74 (11/13/20 0827)  SpO2: (!) 93 % (11/13/20 0827) Vital Signs (24h Range):  Temp:  [97.5 °F (36.4 °C)-99.4 °F (37.4 °C)] 99 °F (37.2 °C)  Pulse:  [84-96] 90  Resp:  [13-19] 18  SpO2:  [93 %-98 %] 93 %  BP: (121-165)/(58-74) 165/74     Weight: 74.9 kg (165 lb 2 oz)  Body mass index is 25.86 kg/m².    Intake/Output - Last 3 Shifts       11/11 0700 - 11/12 0659 11/12 0700 - 11/13 0659 11/13 0700 - 11/14 0659    P.O.   0    I.V. (mL/kg) 1129 (15.1) 743.7 (9.9) 608 (8.1)    IV Piggyback 50 50 100    Total Intake(mL/kg) 1179 (15.7) 793.7 (10.6) 708 (9.5)    Urine (mL/kg/hr) 215 (0.1) 1850 (1)     Drains 175 10     Blood 150      Total Output 540 1860     Net +639 -1066.3 +708                 Physical Exam  Vitals signs and nursing note reviewed.   Constitutional:       Appearance: Normal appearance.   Neck:      Musculoskeletal: Normal range of motion and neck supple.   Cardiovascular:      Rate and Rhythm: Normal rate and regular rhythm.   Pulmonary:      Effort: Pulmonary effort is normal.      Breath sounds: Normal breath sounds.   Abdominal:      General: Abdomen is flat. Bowel sounds are normal. There is no distension.      Palpations: Abdomen is  soft.      Tenderness: Tenderness: Incision.      Comments: Incision is dressed and clean   Musculoskeletal:      Right lower leg: No edema.      Left lower leg: No edema.   Skin:     General: Skin is warm and dry.   Neurological:      Mental Status: She is alert and oriented to person, place, and time.   Psychiatric:         Mood and Affect: Mood normal.         Behavior: Behavior normal.         Thought Content: Thought content normal.         Judgment: Judgment normal.         Significant Labs:  CBC:   Recent Labs   Lab 11/12/20  0723   WBC 8.99   RBC 3.90*   HGB 12.9   HCT 39.5      *   MCH 33.1*   MCHC 32.7     CMP:   Recent Labs   Lab 11/12/20  0723   GLU 87   CALCIUM 8.3*   ALBUMIN 3.0*   PROT 5.6*      K 4.2   CO2 29      BUN 14   CREATININE 0.7   ALKPHOS 48*   ALT 15   AST 21   BILITOT 1.4*    Phosphorus was reviewed    Significant Diagnostics:  No new

## 2020-11-13 NOTE — ASSESSMENT & PLAN NOTE
Postop day 2 exploratory laparotomy small-bowel resection for a closed loop obstruction with acute ischemia.    Change IV fluids  Clear liquid diet  DC PCA  IV and oral narcotics

## 2020-11-13 NOTE — PROGRESS NOTES
[facility] -   General Surgery  Progress Note    Subjective:     History of Present Illness:  54yo F with a PMHx significant for nephrolithiasis who presented to the ED for evaluation of epigastric abdominal pain. Reports a one day history of this pain which is sharp and moderate in nature. Has associated nausea and vomiting x24hrs. Denies any associated fever, chills, diarrhea or constipation. Workup in ED worrisome for small bowel obstruction. Surgery consulted for evaluation and pt admitted to hospital medicine. Pt reports minimal abdominal surgical history, just a hysterectomy and bladder repair. Last BM yesterday. NGT placed in ED and still with moderate abdominal pain. Denies fevers, chills, diarrhea or constipation.        Post-Op Info:  Procedure(s) (LRB):  LAPAROSCOPY, DIAGNOSTIC (N/A)  LYSIS, ADHESIONS, LAPAROSCOPIC (N/A)  LYSIS, ADHESIONS (N/A)  EXCISION, SMALL INTESTINE (N/A)  BLOCK, TRANSVERSUS ABDOMINIS PLANE (N/A)   2 Days Post-Op     Interval History:  Postop day 2 small-bowel resection.  DC PCA.  Oral and IV narcotics.  Clear    Medications:  Continuous Infusions:   dextrose 5 % and 0.45 % NaCl with KCl 20 mEq 75 mL/hr at 11/13/20 1151     Scheduled Meds:   famotidine  20 mg Intravenous BID    nozaseptin   Each Nostril BID     PRN Meds:acetaminophen, diazePAM, HYDROcodone-acetaminophen, HYDROcodone-acetaminophen, HYDROmorphone, metoclopramide HCl, naloxone, ondansetron, promethazine (PHENERGAN) IVPB     Review of patient's allergies indicates:   Allergen Reactions    Erythromycin     Morphine Nausea And Vomiting    Opioids - morphine analogues      Objective:     Vital Signs (Most Recent):  Temp: 99 °F (37.2 °C) (11/13/20 0827)  Pulse: 90 (11/13/20 0827)  Resp: 18 (11/13/20 1306)  BP: (!) 165/74 (11/13/20 0827)  SpO2: (!) 93 % (11/13/20 0827) Vital Signs (24h Range):  Temp:  [97.5 °F (36.4 °C)-99.4 °F (37.4 °C)] 99 °F (37.2 °C)  Pulse:  [84-96] 90  Resp:  [13-19] 18  SpO2:  [93  %-98 %] 93 %  BP: (121-165)/(58-74) 165/74     Weight: 74.9 kg (165 lb 2 oz)  Body mass index is 25.86 kg/m².    Intake/Output - Last 3 Shifts       11/11 0700 - 11/12 0659 11/12 0700 - 11/13 0659 11/13 0700 - 11/14 0659    P.O.   0    I.V. (mL/kg) 1129 (15.1) 743.7 (9.9) 608 (8.1)    IV Piggyback 50 50 100    Total Intake(mL/kg) 1179 (15.7) 793.7 (10.6) 708 (9.5)    Urine (mL/kg/hr) 215 (0.1) 1850 (1)     Drains 175 10     Blood 150      Total Output 540 1860     Net +639 -1066.3 +708                 Physical Exam  Vitals signs and nursing note reviewed.   Constitutional:       Appearance: Normal appearance.   Neck:      Musculoskeletal: Normal range of motion and neck supple.   Cardiovascular:      Rate and Rhythm: Normal rate and regular rhythm.   Pulmonary:      Effort: Pulmonary effort is normal.      Breath sounds: Normal breath sounds.   Abdominal:      General: Abdomen is flat. Bowel sounds are normal. There is no distension.      Palpations: Abdomen is soft.      Tenderness: Tenderness: Incision.      Comments: Incision is dressed and clean   Musculoskeletal:      Right lower leg: No edema.      Left lower leg: No edema.   Skin:     General: Skin is warm and dry.   Neurological:      Mental Status: She is alert and oriented to person, place, and time.   Psychiatric:         Mood and Affect: Mood normal.         Behavior: Behavior normal.         Thought Content: Thought content normal.         Judgment: Judgment normal.         Significant Labs:  CBC:   Recent Labs   Lab 11/12/20  0723   WBC 8.99   RBC 3.90*   HGB 12.9   HCT 39.5      *   MCH 33.1*   MCHC 32.7     CMP:   Recent Labs   Lab 11/12/20  0723   GLU 87   CALCIUM 8.3*   ALBUMIN 3.0*   PROT 5.6*      K 4.2   CO2 29      BUN 14   CREATININE 0.7   ALKPHOS 48*   ALT 15   AST 21   BILITOT 1.4*    Phosphorus was reviewed    Significant Diagnostics:  No new    Assessment/Plan:     * Small bowel obstruction  Postop day 2  exploratory laparotomy small-bowel resection for a closed loop obstruction with acute ischemia.    Change IV fluids  Clear liquid diet  DC PCA  IV and oral narcotics    Leukocytosis  Resolved    Anxiety  Valium 5 mg p.o. p.r.n. anxiety     Hypophosphatemia  Replace phosphorus    Tee Segura MD  General Surgery  [facility]

## 2020-11-13 NOTE — NURSING
Removed cooper at 0500, void by 1100.  Pca syringe changed due to 24 hour policy, wasted 6.1 with swetha LPN.

## 2020-11-13 NOTE — PLAN OF CARE
Chart reviewed, pt resting in bed with call light and personal belongings within reach.  Vitals stable.  Fluids infusing at 100ml/hr.  Rosales intact and draining, removal on 11/13 post op day 2.  Npo ice chips.  Pt ambulated to bedside, tolerated well.  Pain controlled with PCA pump.  Nausea controlled with PRN meds in MAR.  Surgical incision intact with no drainage noted on dressing.  4 eyes on skin completed, pt turned every 2 hours independently.  Pt absent of injury throughout shift, will continue to monitor.

## 2020-11-14 PROCEDURE — 99024 POSTOP FOLLOW-UP VISIT: CPT | Mod: POP,,, | Performed by: SURGERY

## 2020-11-14 PROCEDURE — 63600175 PHARM REV CODE 636 W HCPCS: Performed by: SURGERY

## 2020-11-14 PROCEDURE — 94761 N-INVAS EAR/PLS OXIMETRY MLT: CPT

## 2020-11-14 PROCEDURE — 11000001 HC ACUTE MED/SURG PRIVATE ROOM

## 2020-11-14 PROCEDURE — 25000003 PHARM REV CODE 250: Performed by: SURGERY

## 2020-11-14 PROCEDURE — 99024 PR POST-OP FOLLOW-UP VISIT: ICD-10-PCS | Mod: POP,,, | Performed by: SURGERY

## 2020-11-14 PROCEDURE — 25000003 PHARM REV CODE 250: Performed by: NURSE PRACTITIONER

## 2020-11-14 PROCEDURE — S0028 INJECTION, FAMOTIDINE, 20 MG: HCPCS | Performed by: NURSE PRACTITIONER

## 2020-11-14 PROCEDURE — 99900035 HC TECH TIME PER 15 MIN (STAT)

## 2020-11-14 RX ORDER — FAMOTIDINE 20 MG/1
20 TABLET, FILM COATED ORAL 2 TIMES DAILY
Status: DISCONTINUED | OUTPATIENT
Start: 2020-11-14 | End: 2020-11-20

## 2020-11-14 RX ADMIN — DIAZEPAM 5 MG: 5 TABLET ORAL at 10:11

## 2020-11-14 RX ADMIN — HYDROMORPHONE HYDROCHLORIDE 1 MG: 1 INJECTION, SOLUTION INTRAMUSCULAR; INTRAVENOUS; SUBCUTANEOUS at 11:11

## 2020-11-14 RX ADMIN — HYDROCODONE BITARTRATE AND ACETAMINOPHEN 1 TABLET: 10; 325 TABLET ORAL at 04:11

## 2020-11-14 RX ADMIN — FAMOTIDINE 20 MG: 20 TABLET ORAL at 08:11

## 2020-11-14 RX ADMIN — DIAZEPAM 5 MG: 5 TABLET ORAL at 11:11

## 2020-11-14 RX ADMIN — HYDROMORPHONE HYDROCHLORIDE 1 MG: 1 INJECTION, SOLUTION INTRAMUSCULAR; INTRAVENOUS; SUBCUTANEOUS at 07:11

## 2020-11-14 RX ADMIN — DIAZEPAM 5 MG: 5 TABLET ORAL at 02:11

## 2020-11-14 RX ADMIN — HYDROCODONE BITARTRATE AND ACETAMINOPHEN 1 TABLET: 10; 325 TABLET ORAL at 10:11

## 2020-11-14 RX ADMIN — FAMOTIDINE 20 MG: 20 TABLET ORAL at 11:11

## 2020-11-14 RX ADMIN — HYDROCODONE BITARTRATE AND ACETAMINOPHEN 1 TABLET: 10; 325 TABLET ORAL at 02:11

## 2020-11-14 RX ADMIN — FAMOTIDINE 20 MG: 20 INJECTION, SOLUTION INTRAVENOUS at 09:11

## 2020-11-14 NOTE — SUBJECTIVE & OBJECTIVE
Interval History: Tolerating clears. + flatus but no BM yet. No nausea or emesis. Up with PT this AM.       Medications:  Continuous Infusions:  Scheduled Meds:   famotidine  20 mg Oral BID    nozaseptin   Each Nostril BID     PRN Meds:acetaminophen, diazePAM, HYDROcodone-acetaminophen, HYDROcodone-acetaminophen, HYDROmorphone, metoclopramide HCl, naloxone, ondansetron, promethazine (PHENERGAN) IVPB     Review of patient's allergies indicates:   Allergen Reactions    Erythromycin     Morphine Nausea And Vomiting    Opioids - morphine analogues      Objective:     Vital Signs (Most Recent):  Temp: 99.5 °F (37.5 °C) (11/14/20 0718)  Pulse: 87 (11/14/20 0718)  Resp: 16 (11/14/20 0718)  BP: 137/74 (11/14/20 0718)  SpO2: 96 % (11/14/20 0718) Vital Signs (24h Range):  Temp:  [98.6 °F (37 °C)-99.5 °F (37.5 °C)] 99.5 °F (37.5 °C)  Pulse:  [] 87  Resp:  [16-18] 16  SpO2:  [94 %-98 %] 96 %  BP: (133-145)/(72-80) 137/74     Weight: 74.9 kg (165 lb 2 oz)  Body mass index is 25.86 kg/m².    Intake/Output - Last 3 Shifts       11/12 0700 - 11/13 0659 11/13 0700 - 11/14 0659 11/14 0700 - 11/15 0659    P.O.  120     I.V. (mL/kg) 743.7 (9.9) 1948 (26)     IV Piggyback 50 300     Total Intake(mL/kg) 793.7 (10.6) 2368 (31.6)     Urine (mL/kg/hr) 1850 (1)      Drains 10      Blood       Total Output 1860      Net -1066.3 +2368            Urine Occurrence  402 x     Stool Occurrence  0 x           Physical Exam  Vitals signs reviewed.   Constitutional:       General: She is not in acute distress.     Appearance: She is well-developed.   HENT:      Head: Normocephalic and atraumatic.   Neck:      Musculoskeletal: Neck supple.   Cardiovascular:      Rate and Rhythm: Normal rate and regular rhythm.   Pulmonary:      Effort: Pulmonary effort is normal.   Abdominal:      General: There is no distension.      Palpations: Abdomen is soft.      Tenderness: There is abdominal tenderness (appropriate incisional).      Comments: Midline  incision with staples  No signs of infection   Musculoskeletal: Normal range of motion.   Skin:     General: Skin is warm.   Neurological:      Mental Status: She is alert and oriented to person, place, and time.         Significant Labs:  CBC:   Recent Labs   Lab 11/12/20  0723   WBC 8.99   RBC 3.90*   HGB 12.9   HCT 39.5      *   MCH 33.1*   MCHC 32.7     BMP:   Recent Labs   Lab 11/12/20  0723   GLU 87      K 4.2      CO2 29   BUN 14   CREATININE 0.7   CALCIUM 8.3*   MG 1.8       Significant Diagnostics:  I have reviewed all pertinent imaging results/findings within the past 24 hours.

## 2020-11-14 NOTE — PLAN OF CARE
Remains free from injury. States relief of pain with available prn meds. Fluids running as ordered. Vital signs stable. Chart reviewed. Will continue to monitor.

## 2020-11-14 NOTE — PROGRESS NOTES
[facility] -   General Surgery  Progress Note    Subjective:     History of Present Illness:  54yo F with a PMHx significant for nephrolithiasis who presented to the ED for evaluation of epigastric abdominal pain. Reports a one day history of this pain which is sharp and moderate in nature. Has associated nausea and vomiting x24hrs. Denies any associated fever, chills, diarrhea or constipation. Workup in ED worrisome for small bowel obstruction. Surgery consulted for evaluation and pt admitted to hospital medicine. Pt reports minimal abdominal surgical history, just a hysterectomy and bladder repair. Last BM yesterday. NGT placed in ED and still with moderate abdominal pain. Denies fevers, chills, diarrhea or constipation.        Post-Op Info:  Procedure(s) (LRB):  LAPAROSCOPY, DIAGNOSTIC (N/A)  LYSIS, ADHESIONS, LAPAROSCOPIC (N/A)  LYSIS, ADHESIONS (N/A)  EXCISION, SMALL INTESTINE (N/A)  BLOCK, TRANSVERSUS ABDOMINIS PLANE (N/A)   3 Days Post-Op     Interval History: Tolerating clears. + flatus but no BM yet. No nausea or emesis. Up with PT this AM.       Medications:  Continuous Infusions:  Scheduled Meds:   famotidine  20 mg Oral BID    nozaseptin   Each Nostril BID     PRN Meds:acetaminophen, diazePAM, HYDROcodone-acetaminophen, HYDROcodone-acetaminophen, HYDROmorphone, metoclopramide HCl, naloxone, ondansetron, promethazine (PHENERGAN) IVPB     Review of patient's allergies indicates:   Allergen Reactions    Erythromycin     Morphine Nausea And Vomiting    Opioids - morphine analogues      Objective:     Vital Signs (Most Recent):  Temp: 99.5 °F (37.5 °C) (11/14/20 0718)  Pulse: 87 (11/14/20 0718)  Resp: 16 (11/14/20 0718)  BP: 137/74 (11/14/20 0718)  SpO2: 96 % (11/14/20 0718) Vital Signs (24h Range):  Temp:  [98.6 °F (37 °C)-99.5 °F (37.5 °C)] 99.5 °F (37.5 °C)  Pulse:  [] 87  Resp:  [16-18] 16  SpO2:  [94 %-98 %] 96 %  BP: (133-145)/(72-80) 137/74     Weight: 74.9 kg (165 lb 2  oz)  Body mass index is 25.86 kg/m².    Intake/Output - Last 3 Shifts       11/12 0700 - 11/13 0659 11/13 0700 - 11/14 0659 11/14 0700 - 11/15 0659    P.O.  120     I.V. (mL/kg) 743.7 (9.9) 1948 (26)     IV Piggyback 50 300     Total Intake(mL/kg) 793.7 (10.6) 2368 (31.6)     Urine (mL/kg/hr) 1850 (1)      Drains 10      Blood       Total Output 1860      Net -1066.3 +2368            Urine Occurrence  402 x     Stool Occurrence  0 x           Physical Exam  Vitals signs reviewed.   Constitutional:       General: She is not in acute distress.     Appearance: She is well-developed.   HENT:      Head: Normocephalic and atraumatic.   Neck:      Musculoskeletal: Neck supple.   Cardiovascular:      Rate and Rhythm: Normal rate and regular rhythm.   Pulmonary:      Effort: Pulmonary effort is normal.   Abdominal:      General: There is no distension.      Palpations: Abdomen is soft.      Tenderness: There is abdominal tenderness (appropriate incisional).      Comments: Midline incision with staples  No signs of infection   Musculoskeletal: Normal range of motion.   Skin:     General: Skin is warm.   Neurological:      Mental Status: She is alert and oriented to person, place, and time.         Significant Labs:  CBC:   Recent Labs   Lab 11/12/20  0723   WBC 8.99   RBC 3.90*   HGB 12.9   HCT 39.5      *   MCH 33.1*   MCHC 32.7     BMP:   Recent Labs   Lab 11/12/20  0723   GLU 87      K 4.2      CO2 29   BUN 14   CREATININE 0.7   CALCIUM 8.3*   MG 1.8       Significant Diagnostics:  I have reviewed all pertinent imaging results/findings within the past 24 hours.    Assessment/Plan:     * Small bowel obstruction  Postop day 3 exploratory laparotomy small-bowel resection for a closed loop obstruction with acute ischemia.    HLIV  Regular diet  Encouraged ambulation      Leukocytosis  Resolved    Anxiety  Valium 5 mg p.o. p.r.n. anxiety        Bee Willoughby MD  General Surgery  [facility]

## 2020-11-14 NOTE — ASSESSMENT & PLAN NOTE
Postop day 3 exploratory laparotomy small-bowel resection for a closed loop obstruction with acute ischemia.    HLIV  Regular diet  Encouraged ambulation

## 2020-11-15 PROCEDURE — 25000003 PHARM REV CODE 250: Performed by: SURGERY

## 2020-11-15 PROCEDURE — 99024 POSTOP FOLLOW-UP VISIT: CPT | Mod: POP,,, | Performed by: SURGERY

## 2020-11-15 PROCEDURE — 99024 PR POST-OP FOLLOW-UP VISIT: ICD-10-PCS | Mod: POP,,, | Performed by: SURGERY

## 2020-11-15 PROCEDURE — 63600175 PHARM REV CODE 636 W HCPCS: Performed by: SURGERY

## 2020-11-15 PROCEDURE — 11000001 HC ACUTE MED/SURG PRIVATE ROOM

## 2020-11-15 PROCEDURE — 94761 N-INVAS EAR/PLS OXIMETRY MLT: CPT

## 2020-11-15 RX ORDER — SIMETHICONE 80 MG
1 TABLET,CHEWABLE ORAL 3 TIMES DAILY PRN
Status: DISCONTINUED | OUTPATIENT
Start: 2020-11-15 | End: 2020-11-20

## 2020-11-15 RX ADMIN — HYDROCODONE BITARTRATE AND ACETAMINOPHEN 1 TABLET: 10; 325 TABLET ORAL at 06:11

## 2020-11-15 RX ADMIN — HYDROMORPHONE HYDROCHLORIDE 1 MG: 1 INJECTION, SOLUTION INTRAMUSCULAR; INTRAVENOUS; SUBCUTANEOUS at 03:11

## 2020-11-15 RX ADMIN — FAMOTIDINE 20 MG: 20 TABLET ORAL at 08:11

## 2020-11-15 RX ADMIN — DIAZEPAM 5 MG: 5 TABLET ORAL at 10:11

## 2020-11-15 RX ADMIN — HYDROMORPHONE HYDROCHLORIDE 1 MG: 1 INJECTION, SOLUTION INTRAMUSCULAR; INTRAVENOUS; SUBCUTANEOUS at 11:11

## 2020-11-15 RX ADMIN — FAMOTIDINE 20 MG: 20 TABLET ORAL at 09:11

## 2020-11-15 RX ADMIN — SIMETHICONE CHEW TAB 80 MG 80 MG: 80 TABLET ORAL at 09:11

## 2020-11-15 RX ADMIN — HYDROMORPHONE HYDROCHLORIDE 1 MG: 1 INJECTION, SOLUTION INTRAMUSCULAR; INTRAVENOUS; SUBCUTANEOUS at 10:11

## 2020-11-15 RX ADMIN — HYDROMORPHONE HYDROCHLORIDE 1 MG: 1 INJECTION, SOLUTION INTRAMUSCULAR; INTRAVENOUS; SUBCUTANEOUS at 06:11

## 2020-11-15 NOTE — SUBJECTIVE & OBJECTIVE
Interval History:  Tolerated diet with bowel function.  Patient reported nausea without emesis with regular diet.  Also reports gas pain.       Medications:  Continuous Infusions:  Scheduled Meds:   famotidine  20 mg Oral BID    nozaseptin   Each Nostril BID     PRN Meds:acetaminophen, diazePAM, HYDROcodone-acetaminophen, HYDROcodone-acetaminophen, HYDROmorphone, metoclopramide HCl, naloxone, ondansetron, promethazine (PHENERGAN) IVPB, simethicone     Review of patient's allergies indicates:   Allergen Reactions    Erythromycin     Morphine Nausea And Vomiting    Opioids - morphine analogues      Objective:     Vital Signs (Most Recent):  Temp: 99.8 °F (37.7 °C) (11/15/20 0722)  Pulse: 92 (11/15/20 0722)  Resp: 18 (11/15/20 0722)  BP: 110/60 (11/15/20 0722)  SpO2: (!) 92 % (11/15/20 0722) Vital Signs (24h Range):  Temp:  [98.7 °F (37.1 °C)-100.4 °F (38 °C)] 99.8 °F (37.7 °C)  Pulse:  [] 92  Resp:  [17-20] 18  SpO2:  [92 %-94 %] 92 %  BP: (110-155)/(60-79) 110/60     Weight: 74.9 kg (165 lb 2 oz)  Body mass index is 25.86 kg/m².    Intake/Output - Last 3 Shifts       11/13 0700 - 11/14 0659 11/14 0700 - 11/15 0659 11/15 0700 - 11/16 0659    P.O. 120 640     I.V. (mL/kg) 1948 (26)      IV Piggyback 300      Total Intake(mL/kg) 2368 (31.6) 640 (8.5)     Urine (mL/kg/hr)       Drains       Total Output       Net +2368 +640            Urine Occurrence 402 x 2 x     Stool Occurrence 0 x 1 x           Physical Exam  Vitals signs reviewed.   Constitutional:       General: She is not in acute distress.     Appearance: She is well-developed.   HENT:      Head: Normocephalic and atraumatic.   Neck:      Musculoskeletal: Neck supple.   Cardiovascular:      Rate and Rhythm: Normal rate and regular rhythm.   Pulmonary:      Effort: Pulmonary effort is normal.   Abdominal:      General: There is no distension.      Palpations: Abdomen is soft.      Tenderness: There is abdominal tenderness (appropriate incisional).       Comments: Midline incision with staples  No signs of infection   Musculoskeletal: Normal range of motion.   Skin:     General: Skin is warm.   Neurological:      Mental Status: She is alert and oriented to person, place, and time.         Significant Labs:  CBC:   Recent Labs   Lab 11/12/20  0723   WBC 8.99   RBC 3.90*   HGB 12.9   HCT 39.5      *   MCH 33.1*   MCHC 32.7     BMP:   Recent Labs   Lab 11/12/20  0723   GLU 87      K 4.2      CO2 29   BUN 14   CREATININE 0.7   CALCIUM 8.3*   MG 1.8       Significant Diagnostics:  I have reviewed all pertinent imaging results/findings within the past 24 hours.

## 2020-11-15 NOTE — PLAN OF CARE
Pt remained free of injury during shift, stable condition, pain adequately controlled with prn medication, NAD, VSS, tolerating regular diet, passing flatus, ambulating independently, surgical incisions CDI, will continue to monitor. 24hr chart review performed.

## 2020-11-15 NOTE — ASSESSMENT & PLAN NOTE
Postop day 4 exploratory laparotomy small-bowel resection for a closed loop obstruction with acute ischemia.    Regular diet as tolerated  Simethicone added  Anticipate discharge home tomorrow

## 2020-11-15 NOTE — PROGRESS NOTES
[facility] -   General Surgery  Progress Note    Subjective:     History of Present Illness:  56yo F with a PMHx significant for nephrolithiasis who presented to the ED for evaluation of epigastric abdominal pain. Reports a one day history of this pain which is sharp and moderate in nature. Has associated nausea and vomiting x24hrs. Denies any associated fever, chills, diarrhea or constipation. Workup in ED worrisome for small bowel obstruction. Surgery consulted for evaluation and pt admitted to hospital medicine. Pt reports minimal abdominal surgical history, just a hysterectomy and bladder repair. Last BM yesterday. NGT placed in ED and still with moderate abdominal pain. Denies fevers, chills, diarrhea or constipation.        Post-Op Info:  Procedure(s) (LRB):  LAPAROSCOPY, DIAGNOSTIC (N/A)  LYSIS, ADHESIONS, LAPAROSCOPIC (N/A)  LYSIS, ADHESIONS (N/A)  EXCISION, SMALL INTESTINE (N/A)  BLOCK, TRANSVERSUS ABDOMINIS PLANE (N/A)   4 Days Post-Op     Interval History:  Tolerated diet with bowel function.  Patient reported nausea without emesis with regular diet.  Also reports gas pain.       Medications:  Continuous Infusions:  Scheduled Meds:   famotidine  20 mg Oral BID    nozaseptin   Each Nostril BID     PRN Meds:acetaminophen, diazePAM, HYDROcodone-acetaminophen, HYDROcodone-acetaminophen, HYDROmorphone, metoclopramide HCl, naloxone, ondansetron, promethazine (PHENERGAN) IVPB, simethicone     Review of patient's allergies indicates:   Allergen Reactions    Erythromycin     Morphine Nausea And Vomiting    Opioids - morphine analogues      Objective:     Vital Signs (Most Recent):  Temp: 99.8 °F (37.7 °C) (11/15/20 0722)  Pulse: 92 (11/15/20 0722)  Resp: 18 (11/15/20 0722)  BP: 110/60 (11/15/20 0722)  SpO2: (!) 92 % (11/15/20 0722) Vital Signs (24h Range):  Temp:  [98.7 °F (37.1 °C)-100.4 °F (38 °C)] 99.8 °F (37.7 °C)  Pulse:  [] 92  Resp:  [17-20] 18  SpO2:  [92 %-94 %] 92 %  BP:  (110-155)/(60-79) 110/60     Weight: 74.9 kg (165 lb 2 oz)  Body mass index is 25.86 kg/m².    Intake/Output - Last 3 Shifts       11/13 0700 - 11/14 0659 11/14 0700 - 11/15 0659 11/15 0700 - 11/16 0659    P.O. 120 640     I.V. (mL/kg) 1948 (26)      IV Piggyback 300      Total Intake(mL/kg) 2368 (31.6) 640 (8.5)     Urine (mL/kg/hr)       Drains       Total Output       Net +2368 +640            Urine Occurrence 402 x 2 x     Stool Occurrence 0 x 1 x           Physical Exam  Vitals signs reviewed.   Constitutional:       General: She is not in acute distress.     Appearance: She is well-developed.   HENT:      Head: Normocephalic and atraumatic.   Neck:      Musculoskeletal: Neck supple.   Cardiovascular:      Rate and Rhythm: Normal rate and regular rhythm.   Pulmonary:      Effort: Pulmonary effort is normal.   Abdominal:      General: There is no distension.      Palpations: Abdomen is soft.      Tenderness: There is abdominal tenderness (appropriate incisional).      Comments: Midline incision with staples  No signs of infection   Musculoskeletal: Normal range of motion.   Skin:     General: Skin is warm.   Neurological:      Mental Status: She is alert and oriented to person, place, and time.         Significant Labs:  CBC:   Recent Labs   Lab 11/12/20  0723   WBC 8.99   RBC 3.90*   HGB 12.9   HCT 39.5      *   MCH 33.1*   MCHC 32.7     BMP:   Recent Labs   Lab 11/12/20  0723   GLU 87      K 4.2      CO2 29   BUN 14   CREATININE 0.7   CALCIUM 8.3*   MG 1.8       Significant Diagnostics:  I have reviewed all pertinent imaging results/findings within the past 24 hours.    Assessment/Plan:     * Small bowel obstruction  Postop day 4 exploratory laparotomy small-bowel resection for a closed loop obstruction with acute ischemia.    Regular diet as tolerated  Simethicone added  Anticipate discharge home tomorrow      Leukocytosis  Resolved    Anxiety  Valium 5 mg p.o. p.r.n.  anxiety        Bee Willoughby MD  General Surgery  [facility]

## 2020-11-16 LAB
ANION GAP SERPL CALC-SCNC: 9 MMOL/L (ref 8–16)
BACTERIA BLD CULT: NORMAL
BACTERIA BLD CULT: NORMAL
BASOPHILS # BLD AUTO: 0.03 K/UL (ref 0–0.2)
BASOPHILS NFR BLD: 0.5 % (ref 0–1.9)
BUN SERPL-MCNC: 6 MG/DL (ref 6–20)
CALCIUM SERPL-MCNC: 9.1 MG/DL (ref 8.7–10.5)
CHLORIDE SERPL-SCNC: 100 MMOL/L (ref 95–110)
CO2 SERPL-SCNC: 31 MMOL/L (ref 23–29)
CREAT SERPL-MCNC: 0.7 MG/DL (ref 0.5–1.4)
DIFFERENTIAL METHOD: ABNORMAL
EOSINOPHIL # BLD AUTO: 0.2 K/UL (ref 0–0.5)
EOSINOPHIL NFR BLD: 3.1 % (ref 0–8)
ERYTHROCYTE [DISTWIDTH] IN BLOOD BY AUTOMATED COUNT: 12.6 % (ref 11.5–14.5)
EST. GFR  (AFRICAN AMERICAN): >60 ML/MIN/1.73 M^2
EST. GFR  (NON AFRICAN AMERICAN): >60 ML/MIN/1.73 M^2
GLUCOSE SERPL-MCNC: 116 MG/DL (ref 70–110)
HCT VFR BLD AUTO: 37 % (ref 37–48.5)
HGB BLD-MCNC: 12.4 G/DL (ref 12–16)
IMM GRANULOCYTES # BLD AUTO: 0.03 K/UL (ref 0–0.04)
IMM GRANULOCYTES NFR BLD AUTO: 0.5 % (ref 0–0.5)
LYMPHOCYTES # BLD AUTO: 1 K/UL (ref 1–4.8)
LYMPHOCYTES NFR BLD: 17.5 % (ref 18–48)
MAGNESIUM SERPL-MCNC: 1.8 MG/DL (ref 1.6–2.6)
MCH RBC QN AUTO: 33.2 PG (ref 27–31)
MCHC RBC AUTO-ENTMCNC: 33.5 G/DL (ref 32–36)
MCV RBC AUTO: 99 FL (ref 82–98)
MONOCYTES # BLD AUTO: 0.5 K/UL (ref 0.3–1)
MONOCYTES NFR BLD: 9.9 % (ref 4–15)
NEUTROPHILS # BLD AUTO: 3.8 K/UL (ref 1.8–7.7)
NEUTROPHILS NFR BLD: 68.5 % (ref 38–73)
NRBC BLD-RTO: 0 /100 WBC
PHOSPHATE SERPL-MCNC: 3.9 MG/DL (ref 2.7–4.5)
PLATELET # BLD AUTO: 246 K/UL (ref 150–350)
PMV BLD AUTO: 11.2 FL (ref 9.2–12.9)
POTASSIUM SERPL-SCNC: 4 MMOL/L (ref 3.5–5.1)
RBC # BLD AUTO: 3.74 M/UL (ref 4–5.4)
SODIUM SERPL-SCNC: 140 MMOL/L (ref 136–145)
WBC # BLD AUTO: 5.48 K/UL (ref 3.9–12.7)

## 2020-11-16 PROCEDURE — 63600175 PHARM REV CODE 636 W HCPCS: Performed by: SURGERY

## 2020-11-16 PROCEDURE — 11000001 HC ACUTE MED/SURG PRIVATE ROOM

## 2020-11-16 PROCEDURE — 25000003 PHARM REV CODE 250: Performed by: SURGERY

## 2020-11-16 PROCEDURE — 85025 COMPLETE CBC W/AUTO DIFF WBC: CPT

## 2020-11-16 PROCEDURE — 36415 COLL VENOUS BLD VENIPUNCTURE: CPT

## 2020-11-16 PROCEDURE — 80048 BASIC METABOLIC PNL TOTAL CA: CPT

## 2020-11-16 PROCEDURE — 83735 ASSAY OF MAGNESIUM: CPT

## 2020-11-16 PROCEDURE — 84100 ASSAY OF PHOSPHORUS: CPT

## 2020-11-16 RX ORDER — DIAZEPAM 10 MG/2ML
5 INJECTION INTRAMUSCULAR EVERY 6 HOURS PRN
Status: DISCONTINUED | OUTPATIENT
Start: 2020-11-16 | End: 2020-11-26 | Stop reason: HOSPADM

## 2020-11-16 RX ORDER — DEXTROSE MONOHYDRATE, SODIUM CHLORIDE, AND POTASSIUM CHLORIDE 50; 1.49; 4.5 G/1000ML; G/1000ML; G/1000ML
INJECTION, SOLUTION INTRAVENOUS CONTINUOUS
Status: DISCONTINUED | OUTPATIENT
Start: 2020-11-16 | End: 2020-11-20

## 2020-11-16 RX ADMIN — PROMETHAZINE HYDROCHLORIDE 12.5 MG: 25 INJECTION INTRAMUSCULAR; INTRAVENOUS at 11:11

## 2020-11-16 RX ADMIN — HYDROMORPHONE HYDROCHLORIDE 1 MG: 1 INJECTION, SOLUTION INTRAMUSCULAR; INTRAVENOUS; SUBCUTANEOUS at 07:11

## 2020-11-16 RX ADMIN — ONDANSETRON 4 MG: 2 INJECTION INTRAMUSCULAR; INTRAVENOUS at 12:11

## 2020-11-16 RX ADMIN — PROMETHAZINE HYDROCHLORIDE 12.5 MG: 25 INJECTION INTRAMUSCULAR; INTRAVENOUS at 07:11

## 2020-11-16 RX ADMIN — DEXTROSE, SODIUM CHLORIDE, AND POTASSIUM CHLORIDE: 5; .45; .15 INJECTION INTRAVENOUS at 08:11

## 2020-11-16 RX ADMIN — ONDANSETRON 4 MG: 2 INJECTION INTRAMUSCULAR; INTRAVENOUS at 04:11

## 2020-11-16 RX ADMIN — ONDANSETRON 4 MG: 2 INJECTION INTRAMUSCULAR; INTRAVENOUS at 07:11

## 2020-11-16 RX ADMIN — DEXTROSE, SODIUM CHLORIDE, AND POTASSIUM CHLORIDE: 5; .45; .15 INJECTION INTRAVENOUS at 09:11

## 2020-11-16 RX ADMIN — FAMOTIDINE 20 MG: 20 TABLET ORAL at 09:11

## 2020-11-16 RX ADMIN — PROMETHAZINE HYDROCHLORIDE 12.35 MG: 25 INJECTION INTRAMUSCULAR; INTRAVENOUS at 04:11

## 2020-11-16 RX ADMIN — HYDROCODONE BITARTRATE AND ACETAMINOPHEN 1 TABLET: 10; 325 TABLET ORAL at 04:11

## 2020-11-16 RX ADMIN — METOCLOPRAMIDE 5 MG: 5 INJECTION, SOLUTION INTRAMUSCULAR; INTRAVENOUS at 02:11

## 2020-11-16 RX ADMIN — DIAZEPAM 5 MG: 5 TABLET ORAL at 11:11

## 2020-11-16 RX ADMIN — METOCLOPRAMIDE 5 MG: 5 INJECTION, SOLUTION INTRAMUSCULAR; INTRAVENOUS at 06:11

## 2020-11-16 NOTE — PROGRESS NOTES
[facility] -   General Surgery  Progress Note    Subjective:     History of Present Illness:  56yo F with a PMHx significant for nephrolithiasis who presented to the ED for evaluation of epigastric abdominal pain. Reports a one day history of this pain which is sharp and moderate in nature. Has associated nausea and vomiting x24hrs. Denies any associated fever, chills, diarrhea or constipation. Workup in ED worrisome for small bowel obstruction. Surgery consulted for evaluation and pt admitted to hospital medicine. Pt reports minimal abdominal surgical history, just a hysterectomy and bladder repair. Last BM yesterday. NGT placed in ED and still with moderate abdominal pain. Denies fevers, chills, diarrhea or constipation.        Post-Op Info:  Procedure(s) (LRB):  LAPAROSCOPY, DIAGNOSTIC (N/A)  LYSIS, ADHESIONS, LAPAROSCOPIC (N/A)  LYSIS, ADHESIONS (N/A)  EXCISION, SMALL INTESTINE (N/A)  BLOCK, TRANSVERSUS ABDOMINIS PLANE (N/A)   5 Days Post-Op     Interval History: nausea and vomiting this am  Check labs and xray  npo x meds and ivf    Medications:  Continuous Infusions:   dextrose 5 % and 0.45 % NaCl with KCl 20 mEq       Scheduled Meds:   famotidine  20 mg Oral BID    nozaseptin   Each Nostril BID     PRN Meds:acetaminophen, diazePAM, HYDROcodone-acetaminophen, HYDROcodone-acetaminophen, HYDROmorphone, metoclopramide HCl, naloxone, ondansetron, promethazine (PHENERGAN) IVPB, simethicone     Review of patient's allergies indicates:   Allergen Reactions    Erythromycin     Morphine Nausea And Vomiting    Opioids - morphine analogues      Objective:     Vital Signs (Most Recent):  Temp: 99.6 °F (37.6 °C) (11/16/20 0709)  Pulse: 96 (11/16/20 0709)  Resp: 16 (11/16/20 0709)  BP: 136/76 (11/16/20 0709)  SpO2: 95 % (11/16/20 0709) Vital Signs (24h Range):  Temp:  [98.6 °F (37 °C)-99.9 °F (37.7 °C)] 99.6 °F (37.6 °C)  Pulse:  [] 96  Resp:  [16-20] 16  SpO2:  [92 %-96 %] 95 %  BP:  (121-156)/(70-83) 136/76     Weight: 74.9 kg (165 lb 2 oz)  Body mass index is 25.86 kg/m².    Intake/Output - Last 3 Shifts       11/14 0700 - 11/15 0659 11/15 0700 - 11/16 0659 11/16 0700 - 11/17 0659    P.O. 640 1090     I.V. (mL/kg)       IV Piggyback       Total Intake(mL/kg) 640 (8.5) 1090 (14.6)     Net +640 +1090            Urine Occurrence 2 x 7 x     Stool Occurrence 1 x 2 x           Physical Exam  Vitals signs and nursing note reviewed.   Constitutional:       Appearance: Normal appearance.   Neck:      Musculoskeletal: Normal range of motion and neck supple.   Cardiovascular:      Rate and Rhythm: Normal rate and regular rhythm.   Pulmonary:      Effort: Pulmonary effort is normal.      Breath sounds: Normal breath sounds.   Abdominal:      General: Abdomen is flat. Bowel sounds are normal. There is no distension.      Palpations: Abdomen is soft.      Tenderness: Tenderness: mild incisional.      Comments: Incision is clean   Skin:     General: Skin is warm and dry.      Capillary Refill: Capillary refill takes less than 2 seconds.   Neurological:      General: No focal deficit present.      Mental Status: She is alert and oriented to person, place, and time.   Psychiatric:         Mood and Affect: Mood normal.         Behavior: Behavior normal.         Thought Content: Thought content normal.         Judgment: Judgment normal.         Significant Labs:  ordered    Significant Diagnostics:  abdominal films ordered    Assessment/Plan:     * Small bowel obstruction  Postop day 6exploratory laparotomy small-bowel resection for a closed loop obstruction with acute ischemia.      Nausea and vomiting  ivf  Npo  abd xrays    Leukocytosis  Resolved    Anxiety  Valium 5 mg p.o. p.r.n. anxiety        Tee Segura MD  General Surgery  [facility]

## 2020-11-16 NOTE — PLAN OF CARE
Pt remains free of falls. Pt states that she feels a little better after getting Zofran IV for nausea. Pt incision clean, dry, intact. Will continue to monitor. 12 hour chart checks.

## 2020-11-16 NOTE — SUBJECTIVE & OBJECTIVE
Interval History: nausea and vomiting this am  Check labs and xray  npo x meds and ivf    Medications:  Continuous Infusions:   dextrose 5 % and 0.45 % NaCl with KCl 20 mEq       Scheduled Meds:   famotidine  20 mg Oral BID    nozaseptin   Each Nostril BID     PRN Meds:acetaminophen, diazePAM, HYDROcodone-acetaminophen, HYDROcodone-acetaminophen, HYDROmorphone, metoclopramide HCl, naloxone, ondansetron, promethazine (PHENERGAN) IVPB, simethicone     Review of patient's allergies indicates:   Allergen Reactions    Erythromycin     Morphine Nausea And Vomiting    Opioids - morphine analogues      Objective:     Vital Signs (Most Recent):  Temp: 99.6 °F (37.6 °C) (11/16/20 0709)  Pulse: 96 (11/16/20 0709)  Resp: 16 (11/16/20 0709)  BP: 136/76 (11/16/20 0709)  SpO2: 95 % (11/16/20 0709) Vital Signs (24h Range):  Temp:  [98.6 °F (37 °C)-99.9 °F (37.7 °C)] 99.6 °F (37.6 °C)  Pulse:  [] 96  Resp:  [16-20] 16  SpO2:  [92 %-96 %] 95 %  BP: (121-156)/(70-83) 136/76     Weight: 74.9 kg (165 lb 2 oz)  Body mass index is 25.86 kg/m².    Intake/Output - Last 3 Shifts       11/14 0700 - 11/15 0659 11/15 0700 - 11/16 0659 11/16 0700 - 11/17 0659    P.O. 640 1090     I.V. (mL/kg)       IV Piggyback       Total Intake(mL/kg) 640 (8.5) 1090 (14.6)     Net +640 +1090            Urine Occurrence 2 x 7 x     Stool Occurrence 1 x 2 x           Physical Exam  Vitals signs and nursing note reviewed.   Constitutional:       Appearance: Normal appearance.   Neck:      Musculoskeletal: Normal range of motion and neck supple.   Cardiovascular:      Rate and Rhythm: Normal rate and regular rhythm.   Pulmonary:      Effort: Pulmonary effort is normal.      Breath sounds: Normal breath sounds.   Abdominal:      General: Abdomen is flat. Bowel sounds are normal. There is no distension.      Palpations: Abdomen is soft.      Tenderness: Tenderness: mild incisional.      Comments: Incision is clean   Skin:     General: Skin is warm and  dry.      Capillary Refill: Capillary refill takes less than 2 seconds.   Neurological:      General: No focal deficit present.      Mental Status: She is alert and oriented to person, place, and time.   Psychiatric:         Mood and Affect: Mood normal.         Behavior: Behavior normal.         Thought Content: Thought content normal.         Judgment: Judgment normal.         Significant Labs:  ordered    Significant Diagnostics:  abdominal films ordered

## 2020-11-16 NOTE — PROGRESS NOTES
Abdominal film reviewed.  Dilated loops of small bowel.  Air within the colon.  Small amount of free air is not unexpected    Likely postoperative bowel dysfunction.    Continue NPO with ice chips.      Abdominal films in the morning

## 2020-11-16 NOTE — ASSESSMENT & PLAN NOTE
Postop day 6exploratory laparotomy small-bowel resection for a closed loop obstruction with acute ischemia.      Nausea and vomiting  ivf  Npo  abd xrays

## 2020-11-17 PROCEDURE — 11000001 HC ACUTE MED/SURG PRIVATE ROOM

## 2020-11-17 PROCEDURE — 63600175 PHARM REV CODE 636 W HCPCS: Performed by: SURGERY

## 2020-11-17 PROCEDURE — 63600175 PHARM REV CODE 636 W HCPCS: Performed by: NURSE PRACTITIONER

## 2020-11-17 PROCEDURE — 25000003 PHARM REV CODE 250: Performed by: SURGERY

## 2020-11-17 RX ORDER — BISACODYL 5 MG
10 TABLET, DELAYED RELEASE (ENTERIC COATED) ORAL ONCE
Status: COMPLETED | OUTPATIENT
Start: 2020-11-17 | End: 2020-11-17

## 2020-11-17 RX ADMIN — DIAZEPAM 5 MG: 10 INJECTION, SOLUTION INTRAMUSCULAR; INTRAVENOUS at 12:11

## 2020-11-17 RX ADMIN — HYDROMORPHONE HYDROCHLORIDE 1 MG: 1 INJECTION, SOLUTION INTRAMUSCULAR; INTRAVENOUS; SUBCUTANEOUS at 03:11

## 2020-11-17 RX ADMIN — PROMETHAZINE HYDROCHLORIDE 12.5 MG: 25 INJECTION INTRAMUSCULAR; INTRAVENOUS at 07:11

## 2020-11-17 RX ADMIN — ONDANSETRON 4 MG: 2 INJECTION INTRAMUSCULAR; INTRAVENOUS at 04:11

## 2020-11-17 RX ADMIN — DEXTROSE, SODIUM CHLORIDE, AND POTASSIUM CHLORIDE: 5; .45; .15 INJECTION INTRAVENOUS at 05:11

## 2020-11-17 RX ADMIN — FAMOTIDINE 20 MG: 20 TABLET ORAL at 09:11

## 2020-11-17 RX ADMIN — ONDANSETRON 4 MG: 2 INJECTION INTRAMUSCULAR; INTRAVENOUS at 03:11

## 2020-11-17 RX ADMIN — ONDANSETRON 4 MG: 2 INJECTION INTRAMUSCULAR; INTRAVENOUS at 10:11

## 2020-11-17 RX ADMIN — BISACODYL 10 MG: 5 TABLET, COATED ORAL at 09:11

## 2020-11-17 RX ADMIN — DIAZEPAM 5 MG: 10 INJECTION, SOLUTION INTRAMUSCULAR; INTRAVENOUS at 10:11

## 2020-11-17 RX ADMIN — PROMETHAZINE HYDROCHLORIDE 12.5 MG: 25 INJECTION INTRAMUSCULAR; INTRAVENOUS at 08:11

## 2020-11-17 RX ADMIN — HYDROMORPHONE HYDROCHLORIDE 1 MG: 1 INJECTION, SOLUTION INTRAMUSCULAR; INTRAVENOUS; SUBCUTANEOUS at 10:11

## 2020-11-17 RX ADMIN — HYDROMORPHONE HYDROCHLORIDE 1 MG: 1 INJECTION, SOLUTION INTRAMUSCULAR; INTRAVENOUS; SUBCUTANEOUS at 07:11

## 2020-11-17 NOTE — PLAN OF CARE
Discharge re-assessment completed.  Currently there are no discharge needs.  CM will continue to follow.       11/17/20 6849   Discharge Reassessment   Assessment Type Discharge Planning Assessment   Do you have any problems affording any of your prescribed medications? Yes   DME Needed Upon Discharge  none   Patient choice form signed by patient/caregiver N/A   Anticipated Discharge Disposition Home   Can the patient/caregiver answer the patient profile reliably? Yes, cognitively intact

## 2020-11-17 NOTE — PROGRESS NOTES
[facility] -   General Surgery  Progress Note    Subjective:     History of Present Illness:  54yo F with a PMHx significant for nephrolithiasis who presented to the ED for evaluation of epigastric abdominal pain. Reports a one day history of this pain which is sharp and moderate in nature. Has associated nausea and vomiting x24hrs. Denies any associated fever, chills, diarrhea or constipation. Workup in ED worrisome for small bowel obstruction. Surgery consulted for evaluation and pt admitted to hospital medicine. Pt reports minimal abdominal surgical history, just a hysterectomy and bladder repair. Last BM yesterday. NGT placed in ED and still with moderate abdominal pain. Denies fevers, chills, diarrhea or constipation.        Post-Op Info:  Procedure(s) (LRB):  LAPAROSCOPY, DIAGNOSTIC (N/A)  LYSIS, ADHESIONS, LAPAROSCOPIC (N/A)  LYSIS, ADHESIONS (N/A)  EXCISION, SMALL INTESTINE (N/A)  BLOCK, TRANSVERSUS ABDOMINIS PLANE (N/A)   6 Days Post-Op     Interval History:  Patient had an episode of nausea and vomiting this morning.  Abdominal films show persistent loops of dilated small bowel but there is air in her colon.  Free air is not unexpected.  We will continue with ice chips.  Repeat abdominal films tomorrow if there is persistent loops of small bowel that are dilated a Gastrografin small-bowel follow-through may be needed.  There may be some edema at the anastomosis    Medications:  Continuous Infusions:   dextrose 5 % and 0.45 % NaCl with KCl 20 mEq 100 mL/hr at 11/17/20 0559     Scheduled Meds:   bisacodyL  10 mg Oral Once    famotidine  20 mg Oral BID    nozaseptin   Each Nostril BID     PRN Meds:acetaminophen, diazePAM, HYDROcodone-acetaminophen, HYDROcodone-acetaminophen, HYDROmorphone, metoclopramide HCl, naloxone, ondansetron, promethazine (PHENERGAN) IVPB, simethicone     Review of patient's allergies indicates:   Allergen Reactions    Erythromycin     Morphine Nausea And Vomiting     Opioids - morphine analogues      Objective:     Vital Signs (Most Recent):  Temp: 99 °F (37.2 °C) (11/17/20 0708)  Pulse: 98 (11/17/20 0708)  Resp: 17 (11/17/20 0708)  BP: 127/67 (11/17/20 0708)  SpO2: 95 % (11/17/20 0708) Vital Signs (24h Range):  Temp:  [99 °F (37.2 °C)-99.5 °F (37.5 °C)] 99 °F (37.2 °C)  Pulse:  [] 98  Resp:  [16-18] 17  SpO2:  [93 %-95 %] 95 %  BP: (127-141)/(67-82) 127/67     Weight: 74.9 kg (165 lb 2 oz)  Body mass index is 25.86 kg/m².    Intake/Output - Last 3 Shifts       11/15 0700 - 11/16 0659 11/16 0700 - 11/17 0659 11/17 0700 - 11/18 0659    P.O. 1090      I.V. (mL/kg)  1738.3 (23.2)     IV Piggyback  149.4     Total Intake(mL/kg) 1090 (14.6) 1887.7 (25.2)     Net +1090 +1887.7            Urine Occurrence 7 x 1 x     Stool Occurrence 2 x      Emesis Occurrence  3 x           Physical Exam  Vitals signs reviewed.   Constitutional:       Appearance: Normal appearance.   HENT:      Head: Normocephalic and atraumatic.   Neck:      Musculoskeletal: Normal range of motion and neck supple.   Cardiovascular:      Rate and Rhythm: Normal rate and regular rhythm.   Pulmonary:      Effort: Pulmonary effort is normal.      Breath sounds: Normal breath sounds.   Abdominal:      General: Abdomen is flat. Bowel sounds are normal. There is no distension.      Tenderness: There is abdominal tenderness (Incisional).      Comments: Incision is clean   Skin:     Capillary Refill: Capillary refill takes less than 2 seconds.   Neurological:      General: No focal deficit present.      Mental Status: She is alert and oriented to person, place, and time.   Psychiatric:         Mood and Affect: Mood normal.         Behavior: Behavior normal.         Thought Content: Thought content normal.         Judgment: Judgment normal.         Significant Labs:  CBC:   Recent Labs   Lab 11/16/20  0802   WBC 5.48   RBC 3.74*   HGB 12.4   HCT 37.0      MCV 99*   MCH 33.2*   MCHC 33.5     BMP:   Recent Labs    Lab 11/16/20  0802   *      K 4.0      CO2 31*   BUN 6   CREATININE 0.7   CALCIUM 9.1   MG 1.8     CMP:   Recent Labs   Lab 11/12/20  0723 11/16/20  0802   GLU 87 116*   CALCIUM 8.3* 9.1   ALBUMIN 3.0*  --    PROT 5.6*  --     140   K 4.2 4.0   CO2 29 31*    100   BUN 14 6   CREATININE 0.7 0.7   ALKPHOS 48*  --    ALT 15  --    AST 21  --    BILITOT 1.4*  --        Significant Diagnostics:   Abdominal    Assessment/Plan:     * Small bowel obstruction  Postop day  7 exploratory laparotomy small-bowel resection for a closed loop obstruction with acute ischemia.  Patient has dilated loops of small bowel and nausea vomiting this is not unexpected.  It can be related to postop bowel dysfunction or edema at the anastomosis    Nausea and vomiting  ivf  Npo  abd xrays tomorrow.  If the small bowel is still dilated than Gastrografin small-bowel follow-through    Leukocytosis  Resolved    Anxiety  Valium 5 mg p.o. p.r.n. anxiety        Tee Segura MD  General Surgery  [facility]

## 2020-11-17 NOTE — PLAN OF CARE
Pt continues with n/v. NPO. Pain controlled. Abx/IVF infused as ordered. Afebrile. Chart reviewed.

## 2020-11-17 NOTE — SUBJECTIVE & OBJECTIVE
Interval History:  Patient had an episode of nausea and vomiting this morning.  Abdominal films show persistent loops of dilated small bowel but there is air in her colon.  Free air is not unexpected.  We will continue with ice chips.  Repeat abdominal films tomorrow if there is persistent loops of small bowel that are dilated a Gastrografin small-bowel follow-through may be needed.  There may be some edema at the anastomosis    Medications:  Continuous Infusions:   dextrose 5 % and 0.45 % NaCl with KCl 20 mEq 100 mL/hr at 11/17/20 0559     Scheduled Meds:   bisacodyL  10 mg Oral Once    famotidine  20 mg Oral BID    nozaseptin   Each Nostril BID     PRN Meds:acetaminophen, diazePAM, HYDROcodone-acetaminophen, HYDROcodone-acetaminophen, HYDROmorphone, metoclopramide HCl, naloxone, ondansetron, promethazine (PHENERGAN) IVPB, simethicone     Review of patient's allergies indicates:   Allergen Reactions    Erythromycin     Morphine Nausea And Vomiting    Opioids - morphine analogues      Objective:     Vital Signs (Most Recent):  Temp: 99 °F (37.2 °C) (11/17/20 0708)  Pulse: 98 (11/17/20 0708)  Resp: 17 (11/17/20 0708)  BP: 127/67 (11/17/20 0708)  SpO2: 95 % (11/17/20 0708) Vital Signs (24h Range):  Temp:  [99 °F (37.2 °C)-99.5 °F (37.5 °C)] 99 °F (37.2 °C)  Pulse:  [] 98  Resp:  [16-18] 17  SpO2:  [93 %-95 %] 95 %  BP: (127-141)/(67-82) 127/67     Weight: 74.9 kg (165 lb 2 oz)  Body mass index is 25.86 kg/m².    Intake/Output - Last 3 Shifts       11/15 0700 - 11/16 0659 11/16 0700 - 11/17 0659 11/17 0700 - 11/18 0659    P.O. 1090      I.V. (mL/kg)  1738.3 (23.2)     IV Piggyback  149.4     Total Intake(mL/kg) 1090 (14.6) 1887.7 (25.2)     Net +1090 +1887.7            Urine Occurrence 7 x 1 x     Stool Occurrence 2 x      Emesis Occurrence  3 x           Physical Exam  Vitals signs reviewed.   Constitutional:       Appearance: Normal appearance.   HENT:      Head: Normocephalic and atraumatic.   Neck:       Musculoskeletal: Normal range of motion and neck supple.   Cardiovascular:      Rate and Rhythm: Normal rate and regular rhythm.   Pulmonary:      Effort: Pulmonary effort is normal.      Breath sounds: Normal breath sounds.   Abdominal:      General: Abdomen is flat. Bowel sounds are normal. There is no distension.      Tenderness: There is abdominal tenderness (Incisional).      Comments: Incision is clean   Skin:     Capillary Refill: Capillary refill takes less than 2 seconds.   Neurological:      General: No focal deficit present.      Mental Status: She is alert and oriented to person, place, and time.   Psychiatric:         Mood and Affect: Mood normal.         Behavior: Behavior normal.         Thought Content: Thought content normal.         Judgment: Judgment normal.         Significant Labs:  CBC:   Recent Labs   Lab 11/16/20  0802   WBC 5.48   RBC 3.74*   HGB 12.4   HCT 37.0      MCV 99*   MCH 33.2*   MCHC 33.5     BMP:   Recent Labs   Lab 11/16/20  0802   *      K 4.0      CO2 31*   BUN 6   CREATININE 0.7   CALCIUM 9.1   MG 1.8     CMP:   Recent Labs   Lab 11/12/20  0723 11/16/20  0802   GLU 87 116*   CALCIUM 8.3* 9.1   ALBUMIN 3.0*  --    PROT 5.6*  --     140   K 4.2 4.0   CO2 29 31*    100   BUN 14 6   CREATININE 0.7 0.7   ALKPHOS 48*  --    ALT 15  --    AST 21  --    BILITOT 1.4*  --        Significant Diagnostics:   Abdominal

## 2020-11-17 NOTE — PLAN OF CARE
Pt free from falls. Continuous fluids. Pt remains NPO. PRN meds administered for pain and nausea as ordered. Pt incisions clean, dry, intact. Will continue to monitor. 12 hour chart checks.

## 2020-11-17 NOTE — ASSESSMENT & PLAN NOTE
Postop day  7 exploratory laparotomy small-bowel resection for a closed loop obstruction with acute ischemia.  Patient has dilated loops of small bowel and nausea vomiting this is not unexpected.  It can be related to postop bowel dysfunction or edema at the anastomosis    Nausea and vomiting  ivf  Npo  abd xrays tomorrow.  If the small bowel is still dilated than Gastrografin small-bowel follow-through

## 2020-11-18 LAB
ANION GAP SERPL CALC-SCNC: 8 MMOL/L (ref 8–16)
BILIRUB UR QL STRIP: NEGATIVE
BUN SERPL-MCNC: 8 MG/DL (ref 6–20)
CALCIUM SERPL-MCNC: 9 MG/DL (ref 8.7–10.5)
CHLORIDE SERPL-SCNC: 99 MMOL/L (ref 95–110)
CLARITY UR: CLEAR
CO2 SERPL-SCNC: 28 MMOL/L (ref 23–29)
COLOR UR: YELLOW
CREAT SERPL-MCNC: 0.7 MG/DL (ref 0.5–1.4)
EST. GFR  (AFRICAN AMERICAN): >60 ML/MIN/1.73 M^2
EST. GFR  (NON AFRICAN AMERICAN): >60 ML/MIN/1.73 M^2
GLUCOSE SERPL-MCNC: 120 MG/DL (ref 70–110)
GLUCOSE UR QL STRIP: NEGATIVE
HGB UR QL STRIP: NEGATIVE
KETONES UR QL STRIP: NEGATIVE
LEUKOCYTE ESTERASE UR QL STRIP: ABNORMAL
MAGNESIUM SERPL-MCNC: 1.8 MG/DL (ref 1.6–2.6)
MICROSCOPIC COMMENT: NORMAL
NITRITE UR QL STRIP: NEGATIVE
PH UR STRIP: 6 [PH] (ref 5–8)
PHOSPHATE SERPL-MCNC: 3.6 MG/DL (ref 2.7–4.5)
POTASSIUM SERPL-SCNC: 4.5 MMOL/L (ref 3.5–5.1)
PROT UR QL STRIP: NEGATIVE
SODIUM SERPL-SCNC: 135 MMOL/L (ref 136–145)
SP GR UR STRIP: 1.01 (ref 1–1.03)
URN SPEC COLLECT METH UR: ABNORMAL
UROBILINOGEN UR STRIP-ACNC: NEGATIVE EU/DL
WBC #/AREA URNS HPF: 2 /HPF (ref 0–5)

## 2020-11-18 PROCEDURE — 63600175 PHARM REV CODE 636 W HCPCS: Performed by: SURGERY

## 2020-11-18 PROCEDURE — 63600175 PHARM REV CODE 636 W HCPCS: Performed by: NURSE PRACTITIONER

## 2020-11-18 PROCEDURE — 80048 BASIC METABOLIC PNL TOTAL CA: CPT

## 2020-11-18 PROCEDURE — 84100 ASSAY OF PHOSPHORUS: CPT

## 2020-11-18 PROCEDURE — 25000003 PHARM REV CODE 250: Performed by: SURGERY

## 2020-11-18 PROCEDURE — 36415 COLL VENOUS BLD VENIPUNCTURE: CPT

## 2020-11-18 PROCEDURE — 81000 URINALYSIS NONAUTO W/SCOPE: CPT

## 2020-11-18 PROCEDURE — 83735 ASSAY OF MAGNESIUM: CPT

## 2020-11-18 PROCEDURE — 11000001 HC ACUTE MED/SURG PRIVATE ROOM

## 2020-11-18 RX ADMIN — DEXTROSE, SODIUM CHLORIDE, AND POTASSIUM CHLORIDE: 5; .45; .15 INJECTION INTRAVENOUS at 06:11

## 2020-11-18 RX ADMIN — DIAZEPAM 5 MG: 10 INJECTION, SOLUTION INTRAMUSCULAR; INTRAVENOUS at 02:11

## 2020-11-18 RX ADMIN — DIAZEPAM 5 MG: 10 INJECTION, SOLUTION INTRAMUSCULAR; INTRAVENOUS at 09:11

## 2020-11-18 RX ADMIN — DEXTROSE, SODIUM CHLORIDE, AND POTASSIUM CHLORIDE: 5; .45; .15 INJECTION INTRAVENOUS at 02:11

## 2020-11-18 RX ADMIN — PROMETHAZINE HYDROCHLORIDE 12.5 MG: 25 INJECTION INTRAMUSCULAR; INTRAVENOUS at 02:11

## 2020-11-18 RX ADMIN — DIAZEPAM 5 MG: 10 INJECTION, SOLUTION INTRAMUSCULAR; INTRAVENOUS at 01:11

## 2020-11-18 RX ADMIN — HYDROMORPHONE HYDROCHLORIDE 1 MG: 1 INJECTION, SOLUTION INTRAMUSCULAR; INTRAVENOUS; SUBCUTANEOUS at 02:11

## 2020-11-18 RX ADMIN — ONDANSETRON 4 MG: 2 INJECTION INTRAMUSCULAR; INTRAVENOUS at 10:11

## 2020-11-18 RX ADMIN — ONDANSETRON 4 MG: 2 INJECTION INTRAMUSCULAR; INTRAVENOUS at 07:11

## 2020-11-18 RX ADMIN — HYDROMORPHONE HYDROCHLORIDE 1 MG: 1 INJECTION, SOLUTION INTRAMUSCULAR; INTRAVENOUS; SUBCUTANEOUS at 10:11

## 2020-11-18 RX ADMIN — PROMETHAZINE HYDROCHLORIDE 12.5 MG: 25 INJECTION INTRAMUSCULAR; INTRAVENOUS at 08:11

## 2020-11-18 RX ADMIN — HYDROMORPHONE HYDROCHLORIDE 1 MG: 1 INJECTION, SOLUTION INTRAMUSCULAR; INTRAVENOUS; SUBCUTANEOUS at 06:11

## 2020-11-18 RX ADMIN — ONDANSETRON 4 MG: 2 INJECTION INTRAMUSCULAR; INTRAVENOUS at 01:11

## 2020-11-18 RX ADMIN — PROMETHAZINE HYDROCHLORIDE 12.5 MG: 25 INJECTION INTRAMUSCULAR; INTRAVENOUS at 06:11

## 2020-11-18 NOTE — SUBJECTIVE & OBJECTIVE
Interval History:  Patient continues to have episodes of nausea vomiting.  There is more dilation of the small bowel.  Will place NG tube.    Medications:  Continuous Infusions:   dextrose 5 % and 0.45 % NaCl with KCl 20 mEq 100 mL/hr at 11/18/20 0252     Scheduled Meds:   famotidine  20 mg Oral BID     PRN Meds:acetaminophen, diazePAM, HYDROcodone-acetaminophen, HYDROcodone-acetaminophen, HYDROmorphone, metoclopramide HCl, naloxone, ondansetron, promethazine (PHENERGAN) IVPB, simethicone     Review of patient's allergies indicates:   Allergen Reactions    Erythromycin     Morphine Nausea And Vomiting    Opioids - morphine analogues      Objective:     Vital Signs (Most Recent):  Temp: 99.7 °F (37.6 °C) (11/18/20 0739)  Pulse: 97 (11/18/20 0739)  Resp: 19 (11/18/20 0739)  BP: (!) 148/77 (11/18/20 0739)  SpO2: (!) 94 % (11/18/20 0739) Vital Signs (24h Range):  Temp:  [97.8 °F (36.6 °C)-99.7 °F (37.6 °C)] 99.7 °F (37.6 °C)  Pulse:  [] 97  Resp:  [15-19] 19  SpO2:  [92 %-94 %] 94 %  BP: (115-148)/(69-81) 148/77     Weight: 74.9 kg (165 lb 2 oz)  Body mass index is 25.86 kg/m².    Intake/Output - Last 3 Shifts       11/16 0700 - 11/17 0659 11/17 0700 - 11/18 0659 11/18 0700 - 11/19 0659    P.O.  0     I.V. (mL/kg) 1738.3 (23.2) 2403.3 (32.1)     IV Piggyback 149.4 150     Total Intake(mL/kg) 1887.7 (25.2) 2553.3 (34.1)     Net +1887.7 +2553.3            Urine Occurrence 1 x 3 x 1 x    Stool Occurrence  1 x     Emesis Occurrence 3 x 1 x           Physical Exam  Vitals signs and nursing note reviewed.   Constitutional:       Appearance: Normal appearance.   Cardiovascular:      Rate and Rhythm: Normal rate and regular rhythm.   Pulmonary:      Effort: Pulmonary effort is normal.      Breath sounds: Normal breath sounds.   Abdominal:      General: Abdomen is flat. Bowel sounds are normal. Distention:  mild.      Palpations: Abdomen is soft.      Tenderness: Tenderness: Incision.   Skin:     General: Skin is  warm.   Neurological:      General: No focal deficit present.      Mental Status: She is alert and oriented to person, place, and time.   Psychiatric:         Mood and Affect: Mood normal.         Behavior: Behavior normal.         Thought Content: Thought content normal.         Judgment: Judgment normal.         Significant Labs:  CBC:   Recent Labs   Lab 11/16/20  0802   WBC 5.48   RBC 3.74*   HGB 12.4   HCT 37.0      MCV 99*   MCH 33.2*   MCHC 33.5     BMP:   Recent Labs   Lab 11/18/20  0716   *   *   K 4.5   CL 99   CO2 28   BUN 8   CREATININE 0.7   CALCIUM 9.0   MG 1.8     CMP:   Recent Labs   Lab 11/12/20  0723  11/18/20  0716   GLU 87   < > 120*   CALCIUM 8.3*   < > 9.0   ALBUMIN 3.0*  --   --    PROT 5.6*  --   --       < > 135*   K 4.2   < > 4.5   CO2 29   < > 28      < > 99   BUN 14   < > 8   CREATININE 0.7   < > 0.7   ALKPHOS 48*  --   --    ALT 15  --   --    AST 21  --   --    BILITOT 1.4*  --   --     < > = values in this interval not displayed.       Significant Diagnostics:  Abdominal x-ray     EXAMINATION:  XR ABDOMEN FLAT AND ERECT     CLINICAL HISTORY:  Continued follow-up for dilated bowel;     COMPARISON:  Prior radiographs from November 17th and November 16, 2020.     FINDINGS:  Prior cholecystectomy.  There are postoperative findings from recent laparotomy.  Persistent abnormally dilated loops of jejunum now measuring up to 65 mm.  Persistent free air beneath the right hemidiaphragm, similar in amount to the prior study.  There is stool within the ascending colon and rectum.  No abnormal soft tissue calcifications. Regional bones are unremarkable.     Impression:     No significant change with persistent findings of small bowel obstruction versus ileus.  Similar amount of free air beneath the right hemidiaphragm, likely relating to postoperative state.        Electronically signed by: Chaka Freeman Jr., MD  Date:                                             11/18/2020  Time:                                           08:42

## 2020-11-18 NOTE — NURSING
14F NG tube to right nare, pt tolerated well. X-ray verified placement. NG tube connected to continuous suction @ 100mmhg Yellow bile noted in suction canister.

## 2020-11-18 NOTE — NURSING
Pharmacy delivered syringe of pt's prn iv valium per request. Upon opening package in pt's room to draw up, the plunger was dislodged and the drug was spilled. Contacted pharmacy and spoke to Jose who will have a new syringe delivered.and will scan appropriately once received. Charge nurse Sekou LANDON RN notified and witnessed.

## 2020-11-18 NOTE — ASSESSMENT & PLAN NOTE
Postop day  7 exploratory laparotomy small-bowel resection for a closed loop obstruction with acute ischemia.  Patient has dilated loops of small bowel and nausea vomiting this is not unexpected.  It can be related to postop bowel dysfunction or edema at the anastomosis.  Given the lack of improvement on x-rays.    Patient will need an NG tube.  She be decompressed for 24 hr.  Tomorrow will obtain a small-bowel follow-through.  Repeat labs in the morning  Continue IV fluid  Npo  abd xrays tomorrow.  If the small bowel is still dilated than Gastrografin small-bowel follow-through tomorrow

## 2020-11-18 NOTE — PLAN OF CARE
Midline incision OA, staples intact. NG tube to right nare connected to continuous suction. Remains NPO. IV fluids infusing as ordered. PRN nausea meds adm as needed. Remains free from injury/incident. Call light in reach.

## 2020-11-18 NOTE — PROGRESS NOTES
[facility] -   General Surgery  Progress Note    Subjective:     History of Present Illness:  56yo F with a PMHx significant for nephrolithiasis who presented to the ED for evaluation of epigastric abdominal pain. Reports a one day history of this pain which is sharp and moderate in nature. Has associated nausea and vomiting x24hrs. Denies any associated fever, chills, diarrhea or constipation. Workup in ED worrisome for small bowel obstruction. Surgery consulted for evaluation and pt admitted to hospital medicine. Pt reports minimal abdominal surgical history, just a hysterectomy and bladder repair. Last BM yesterday. NGT placed in ED and still with moderate abdominal pain. Denies fevers, chills, diarrhea or constipation.        Post-Op Info:  Procedure(s) (LRB):  LAPAROSCOPY, DIAGNOSTIC (N/A)  LYSIS, ADHESIONS, LAPAROSCOPIC (N/A)  LYSIS, ADHESIONS (N/A)  EXCISION, SMALL INTESTINE (N/A)  BLOCK, TRANSVERSUS ABDOMINIS PLANE (N/A)   7 Days Post-Op     Interval History:  Patient continues to have episodes of nausea vomiting.  There is more dilation of the small bowel.  Will place NG tube.    Medications:  Continuous Infusions:   dextrose 5 % and 0.45 % NaCl with KCl 20 mEq 100 mL/hr at 11/18/20 0252     Scheduled Meds:   famotidine  20 mg Oral BID     PRN Meds:acetaminophen, diazePAM, HYDROcodone-acetaminophen, HYDROcodone-acetaminophen, HYDROmorphone, metoclopramide HCl, naloxone, ondansetron, promethazine (PHENERGAN) IVPB, simethicone     Review of patient's allergies indicates:   Allergen Reactions    Erythromycin     Morphine Nausea And Vomiting    Opioids - morphine analogues      Objective:     Vital Signs (Most Recent):  Temp: 99.7 °F (37.6 °C) (11/18/20 0739)  Pulse: 97 (11/18/20 0739)  Resp: 19 (11/18/20 0739)  BP: (!) 148/77 (11/18/20 0739)  SpO2: (!) 94 % (11/18/20 0739) Vital Signs (24h Range):  Temp:  [97.8 °F (36.6 °C)-99.7 °F (37.6 °C)] 99.7 °F (37.6 °C)  Pulse:  [] 97  Resp:   [15-19] 19  SpO2:  [92 %-94 %] 94 %  BP: (115-148)/(69-81) 148/77     Weight: 74.9 kg (165 lb 2 oz)  Body mass index is 25.86 kg/m².    Intake/Output - Last 3 Shifts       11/16 0700 - 11/17 0659 11/17 0700 - 11/18 0659 11/18 0700 - 11/19 0659    P.O.  0     I.V. (mL/kg) 1738.3 (23.2) 2403.3 (32.1)     IV Piggyback 149.4 150     Total Intake(mL/kg) 1887.7 (25.2) 2553.3 (34.1)     Net +1887.7 +2553.3            Urine Occurrence 1 x 3 x 1 x    Stool Occurrence  1 x     Emesis Occurrence 3 x 1 x           Physical Exam  Vitals signs and nursing note reviewed.   Constitutional:       Appearance: Normal appearance.   Cardiovascular:      Rate and Rhythm: Normal rate and regular rhythm.   Pulmonary:      Effort: Pulmonary effort is normal.      Breath sounds: Normal breath sounds.   Abdominal:      General: Abdomen is flat. Bowel sounds are normal. Distention:  mild.      Palpations: Abdomen is soft.      Tenderness: Tenderness: Incision.   Skin:     General: Skin is warm.   Neurological:      General: No focal deficit present.      Mental Status: She is alert and oriented to person, place, and time.   Psychiatric:         Mood and Affect: Mood normal.         Behavior: Behavior normal.         Thought Content: Thought content normal.         Judgment: Judgment normal.         Significant Labs:  CBC:   Recent Labs   Lab 11/16/20  0802   WBC 5.48   RBC 3.74*   HGB 12.4   HCT 37.0      MCV 99*   MCH 33.2*   MCHC 33.5     BMP:   Recent Labs   Lab 11/18/20  0716   *   *   K 4.5   CL 99   CO2 28   BUN 8   CREATININE 0.7   CALCIUM 9.0   MG 1.8     CMP:   Recent Labs   Lab 11/12/20  0723  11/18/20  0716   GLU 87   < > 120*   CALCIUM 8.3*   < > 9.0   ALBUMIN 3.0*  --   --    PROT 5.6*  --   --       < > 135*   K 4.2   < > 4.5   CO2 29   < > 28      < > 99   BUN 14   < > 8   CREATININE 0.7   < > 0.7   ALKPHOS 48*  --   --    ALT 15  --   --    AST 21  --   --    BILITOT 1.4*  --   --     < > =  values in this interval not displayed.       Significant Diagnostics:  Abdominal x-ray     EXAMINATION:  XR ABDOMEN FLAT AND ERECT     CLINICAL HISTORY:  Continued follow-up for dilated bowel;     COMPARISON:  Prior radiographs from November 17th and November 16, 2020.     FINDINGS:  Prior cholecystectomy.  There are postoperative findings from recent laparotomy.  Persistent abnormally dilated loops of jejunum now measuring up to 65 mm.  Persistent free air beneath the right hemidiaphragm, similar in amount to the prior study.  There is stool within the ascending colon and rectum.  No abnormal soft tissue calcifications. Regional bones are unremarkable.     Impression:     No significant change with persistent findings of small bowel obstruction versus ileus.  Similar amount of free air beneath the right hemidiaphragm, likely relating to postoperative state.        Electronically signed by: Chaka Freeman Jr., MD  Date:                                            11/18/2020  Time:                                           08:42    Assessment/Plan:     * Small bowel obstruction  Postop day  7 exploratory laparotomy small-bowel resection for a closed loop obstruction with acute ischemia.  Patient has dilated loops of small bowel and nausea vomiting this is not unexpected.  It can be related to postop bowel dysfunction or edema at the anastomosis.  Given the lack of improvement on x-rays.    Patient will need an NG tube.  She be decompressed for 24 hr.  Tomorrow will obtain a small-bowel follow-through.  Repeat labs in the morning  Continue IV fluid  Npo  abd xrays tomorrow.  If the small bowel is still dilated than Gastrografin small-bowel follow-through tomorrow    Leukocytosis  Resolved    Anxiety  Valium 5 mg p.o. p.r.n. anxiety        Tee Segura MD  General Surgery  [facility]

## 2020-11-18 NOTE — PLAN OF CARE
No acute events overnight. Still awaiting BM. Pt states she feels like it will happen soon. Still with nausea but less severe tonight. No emesis. NPO with ice chips sparingly. IVF infusing.  Afebrile.

## 2020-11-19 LAB
ANION GAP SERPL CALC-SCNC: 6 MMOL/L (ref 8–16)
BASOPHILS # BLD AUTO: 0.04 K/UL (ref 0–0.2)
BASOPHILS NFR BLD: 0.5 % (ref 0–1.9)
BUN SERPL-MCNC: 7 MG/DL (ref 6–20)
CALCIUM SERPL-MCNC: 8.5 MG/DL (ref 8.7–10.5)
CHLORIDE SERPL-SCNC: 101 MMOL/L (ref 95–110)
CO2 SERPL-SCNC: 28 MMOL/L (ref 23–29)
CREAT SERPL-MCNC: 0.6 MG/DL (ref 0.5–1.4)
DIFFERENTIAL METHOD: ABNORMAL
EOSINOPHIL # BLD AUTO: 0.3 K/UL (ref 0–0.5)
EOSINOPHIL NFR BLD: 4.3 % (ref 0–8)
ERYTHROCYTE [DISTWIDTH] IN BLOOD BY AUTOMATED COUNT: 13.1 % (ref 11.5–14.5)
EST. GFR  (AFRICAN AMERICAN): >60 ML/MIN/1.73 M^2
EST. GFR  (NON AFRICAN AMERICAN): >60 ML/MIN/1.73 M^2
FINAL PATHOLOGIC DIAGNOSIS: NORMAL
GLUCOSE SERPL-MCNC: 105 MG/DL (ref 70–110)
GROSS: NORMAL
HCT VFR BLD AUTO: 35.4 % (ref 37–48.5)
HGB BLD-MCNC: 11.8 G/DL (ref 12–16)
IMM GRANULOCYTES # BLD AUTO: 0.04 K/UL (ref 0–0.04)
IMM GRANULOCYTES NFR BLD AUTO: 0.5 % (ref 0–0.5)
LYMPHOCYTES # BLD AUTO: 1.3 K/UL (ref 1–4.8)
LYMPHOCYTES NFR BLD: 16.7 % (ref 18–48)
Lab: NORMAL
MAGNESIUM SERPL-MCNC: 1.7 MG/DL (ref 1.6–2.6)
MCH RBC QN AUTO: 33.4 PG (ref 27–31)
MCHC RBC AUTO-ENTMCNC: 33.3 G/DL (ref 32–36)
MCV RBC AUTO: 100 FL (ref 82–98)
MONOCYTES # BLD AUTO: 0.8 K/UL (ref 0.3–1)
MONOCYTES NFR BLD: 10.8 % (ref 4–15)
NEUTROPHILS # BLD AUTO: 5.2 K/UL (ref 1.8–7.7)
NEUTROPHILS NFR BLD: 67.2 % (ref 38–73)
NRBC BLD-RTO: 0 /100 WBC
PHOSPHATE SERPL-MCNC: 3.5 MG/DL (ref 2.7–4.5)
PLATELET # BLD AUTO: 304 K/UL (ref 150–350)
PMV BLD AUTO: 11 FL (ref 9.2–12.9)
POTASSIUM SERPL-SCNC: 4.1 MMOL/L (ref 3.5–5.1)
RBC # BLD AUTO: 3.53 M/UL (ref 4–5.4)
SODIUM SERPL-SCNC: 135 MMOL/L (ref 136–145)
WBC # BLD AUTO: 7.71 K/UL (ref 3.9–12.7)

## 2020-11-19 PROCEDURE — 80048 BASIC METABOLIC PNL TOTAL CA: CPT

## 2020-11-19 PROCEDURE — 84100 ASSAY OF PHOSPHORUS: CPT

## 2020-11-19 PROCEDURE — 63600175 PHARM REV CODE 636 W HCPCS: Performed by: NURSE PRACTITIONER

## 2020-11-19 PROCEDURE — 63600175 PHARM REV CODE 636 W HCPCS: Performed by: SURGERY

## 2020-11-19 PROCEDURE — 83735 ASSAY OF MAGNESIUM: CPT

## 2020-11-19 PROCEDURE — 25500020 PHARM REV CODE 255: Performed by: SURGERY

## 2020-11-19 PROCEDURE — 25000003 PHARM REV CODE 250: Performed by: SURGERY

## 2020-11-19 PROCEDURE — 36415 COLL VENOUS BLD VENIPUNCTURE: CPT

## 2020-11-19 PROCEDURE — 11000001 HC ACUTE MED/SURG PRIVATE ROOM

## 2020-11-19 PROCEDURE — 85025 COMPLETE CBC W/AUTO DIFF WBC: CPT

## 2020-11-19 RX ADMIN — PROMETHAZINE HYDROCHLORIDE 12.5 MG: 25 INJECTION INTRAMUSCULAR; INTRAVENOUS at 02:11

## 2020-11-19 RX ADMIN — PROMETHAZINE HYDROCHLORIDE 12.5 MG: 25 INJECTION INTRAMUSCULAR; INTRAVENOUS at 03:11

## 2020-11-19 RX ADMIN — DIATRIZOATE MEGLUMINE AND DIATRIZOATE SODIUM 240 ML: 660; 100 LIQUID ORAL; RECTAL at 12:11

## 2020-11-19 RX ADMIN — HYDROMORPHONE HYDROCHLORIDE 1 MG: 1 INJECTION, SOLUTION INTRAMUSCULAR; INTRAVENOUS; SUBCUTANEOUS at 08:11

## 2020-11-19 RX ADMIN — DIAZEPAM 5 MG: 10 INJECTION, SOLUTION INTRAMUSCULAR; INTRAVENOUS at 09:11

## 2020-11-19 RX ADMIN — DIAZEPAM 5 MG: 10 INJECTION, SOLUTION INTRAMUSCULAR; INTRAVENOUS at 04:11

## 2020-11-19 RX ADMIN — HYDROMORPHONE HYDROCHLORIDE 1 MG: 1 INJECTION, SOLUTION INTRAMUSCULAR; INTRAVENOUS; SUBCUTANEOUS at 02:11

## 2020-11-19 RX ADMIN — DIAZEPAM 5 MG: 10 INJECTION, SOLUTION INTRAMUSCULAR; INTRAVENOUS at 10:11

## 2020-11-19 RX ADMIN — HYDROMORPHONE HYDROCHLORIDE 1 MG: 1 INJECTION, SOLUTION INTRAMUSCULAR; INTRAVENOUS; SUBCUTANEOUS at 05:11

## 2020-11-19 RX ADMIN — DEXTROSE, SODIUM CHLORIDE, AND POTASSIUM CHLORIDE: 5; .45; .15 INJECTION INTRAVENOUS at 06:11

## 2020-11-19 RX ADMIN — DEXTROSE, SODIUM CHLORIDE, AND POTASSIUM CHLORIDE: 5; .45; .15 INJECTION INTRAVENOUS at 02:11

## 2020-11-19 RX ADMIN — ONDANSETRON 4 MG: 2 INJECTION INTRAMUSCULAR; INTRAVENOUS at 08:11

## 2020-11-19 RX ADMIN — DEXTROSE, SODIUM CHLORIDE, AND POTASSIUM CHLORIDE: 5; .45; .15 INJECTION INTRAVENOUS at 09:11

## 2020-11-19 RX ADMIN — HYDROMORPHONE HYDROCHLORIDE 1 MG: 1 INJECTION, SOLUTION INTRAMUSCULAR; INTRAVENOUS; SUBCUTANEOUS at 07:11

## 2020-11-19 NOTE — SUBJECTIVE & OBJECTIVE
Interval History: .  NG tube in place, several bowel movements,  Small-bowel follow-through today    Medications:  Continuous Infusions:   dextrose 5 % and 0.45 % NaCl with KCl 20 mEq 100 mL/hr at 11/19/20 0208     Scheduled Meds:   famotidine  20 mg Oral BID     PRN Meds:acetaminophen, diazePAM, HYDROcodone-acetaminophen, HYDROcodone-acetaminophen, HYDROmorphone, naloxone, ondansetron, promethazine (PHENERGAN) IVPB, simethicone     Review of patient's allergies indicates:   Allergen Reactions    Erythromycin     Morphine Nausea And Vomiting    Opioids - morphine analogues      Objective:     Vital Signs (Most Recent):  Temp: 99.3 °F (37.4 °C) (11/19/20 0749)  Pulse: 94 (11/19/20 0749)  Resp: 19 (11/19/20 0749)  BP: 134/74 (11/19/20 0749)  SpO2: (!) 92 % (11/19/20 0749) Vital Signs (24h Range):  Temp:  [99.3 °F (37.4 °C)-99.6 °F (37.6 °C)] 99.3 °F (37.4 °C)  Pulse:  [94-99] 94  Resp:  [17-19] 19  SpO2:  [92 %-95 %] 92 %  BP: (134-163)/(74-82) 134/74     Weight: 74.9 kg (165 lb 2 oz)  Body mass index is 25.86 kg/m².    Intake/Output - Last 3 Shifts       11/17 0700 - 11/18 0659 11/18 0700 - 11/19 0659 11/19 0700 - 11/20 0659    P.O. 0 0     I.V. (mL/kg) 2403.3 (32.1) 2590 (34.6)     IV Piggyback 150 150     Total Intake(mL/kg) 2553.3 (34.1) 2740 (36.6)     Urine (mL/kg/hr)  0 (0)     Drains  450     Total Output  450     Net +2553.3 +2290            Urine Occurrence 3 x 4 x     Stool Occurrence 1 x 1 x     Emesis Occurrence 1 x            Physical Exam  Vitals signs and nursing note reviewed.   HENT:      Nose:      Comments: Ng tube  Cardiovascular:      Rate and Rhythm: Normal rate.   Pulmonary:      Effort: Pulmonary effort is normal.      Breath sounds: Normal breath sounds.   Abdominal:      General: Abdomen is flat. Bowel sounds are normal. There is no distension.      Palpations: Abdomen is soft.      Tenderness: There is abdominal tenderness.      Comments: Incision is clean   Skin:     Capillary Refill:  Capillary refill takes less than 2 seconds.   Neurological:      General: No focal deficit present.      Mental Status: She is alert and oriented to person, place, and time.   Psychiatric:         Mood and Affect: Mood normal.         Behavior: Behavior normal.         Thought Content: Thought content normal.         Judgment: Judgment normal.         Significant Labs:  BMP:   Recent Labs   Lab 11/18/20  0716   *   *   K 4.5   CL 99   CO2 28   BUN 8   CREATININE 0.7   CALCIUM 9.0   MG 1.8       Significant Diagnostics:  sbft today

## 2020-11-19 NOTE — PROGRESS NOTES
[facility] -   General Surgery  Progress Note    Subjective:     History of Present Illness:  56yo F with a PMHx significant for nephrolithiasis who presented to the ED for evaluation of epigastric abdominal pain. Reports a one day history of this pain which is sharp and moderate in nature. Has associated nausea and vomiting x24hrs. Denies any associated fever, chills, diarrhea or constipation. Workup in ED worrisome for small bowel obstruction. Surgery consulted for evaluation and pt admitted to hospital medicine. Pt reports minimal abdominal surgical history, just a hysterectomy and bladder repair. Last BM yesterday. NGT placed in ED and still with moderate abdominal pain. Denies fevers, chills, diarrhea or constipation.        Post-Op Info:  Procedure(s) (LRB):  LAPAROSCOPY, DIAGNOSTIC (N/A)  LYSIS, ADHESIONS, LAPAROSCOPIC (N/A)  LYSIS, ADHESIONS (N/A)  EXCISION, SMALL INTESTINE (N/A)  BLOCK, TRANSVERSUS ABDOMINIS PLANE (N/A)   8 Days Post-Op     Interval History: .  NG tube in place, several bowel movements,  Small-bowel follow-through today    Medications:  Continuous Infusions:   dextrose 5 % and 0.45 % NaCl with KCl 20 mEq 100 mL/hr at 11/19/20 0208     Scheduled Meds:   famotidine  20 mg Oral BID     PRN Meds:acetaminophen, diazePAM, HYDROcodone-acetaminophen, HYDROcodone-acetaminophen, HYDROmorphone, naloxone, ondansetron, promethazine (PHENERGAN) IVPB, simethicone     Review of patient's allergies indicates:   Allergen Reactions    Erythromycin     Morphine Nausea And Vomiting    Opioids - morphine analogues      Objective:     Vital Signs (Most Recent):  Temp: 99.3 °F (37.4 °C) (11/19/20 0749)  Pulse: 94 (11/19/20 0749)  Resp: 19 (11/19/20 0749)  BP: 134/74 (11/19/20 0749)  SpO2: (!) 92 % (11/19/20 0749) Vital Signs (24h Range):  Temp:  [99.3 °F (37.4 °C)-99.6 °F (37.6 °C)] 99.3 °F (37.4 °C)  Pulse:  [94-99] 94  Resp:  [17-19] 19  SpO2:  [92 %-95 %] 92 %  BP: (134-163)/(74-82) 134/74      Weight: 74.9 kg (165 lb 2 oz)  Body mass index is 25.86 kg/m².    Intake/Output - Last 3 Shifts       11/17 0700 - 11/18 0659 11/18 0700 - 11/19 0659 11/19 0700 - 11/20 0659    P.O. 0 0     I.V. (mL/kg) 2403.3 (32.1) 2590 (34.6)     IV Piggyback 150 150     Total Intake(mL/kg) 2553.3 (34.1) 2740 (36.6)     Urine (mL/kg/hr)  0 (0)     Drains  450     Total Output  450     Net +2553.3 +2290            Urine Occurrence 3 x 4 x     Stool Occurrence 1 x 1 x     Emesis Occurrence 1 x            Physical Exam  Vitals signs and nursing note reviewed.   HENT:      Nose:      Comments: Ng tube  Cardiovascular:      Rate and Rhythm: Normal rate.   Pulmonary:      Effort: Pulmonary effort is normal.      Breath sounds: Normal breath sounds.   Abdominal:      General: Abdomen is flat. Bowel sounds are normal. There is no distension.      Palpations: Abdomen is soft.      Tenderness: There is abdominal tenderness.      Comments: Incision is clean   Skin:     Capillary Refill: Capillary refill takes less than 2 seconds.   Neurological:      General: No focal deficit present.      Mental Status: She is alert and oriented to person, place, and time.   Psychiatric:         Mood and Affect: Mood normal.         Behavior: Behavior normal.         Thought Content: Thought content normal.         Judgment: Judgment normal.         Significant Labs:  BMP:   Recent Labs   Lab 11/18/20  0716   *   *   K 4.5   CL 99   CO2 28   BUN 8   CREATININE 0.7   CALCIUM 9.0   MG 1.8       Significant Diagnostics:  sbft today    Assessment/Plan:     * Small bowel obstruction  Postop day  8 exploratory laparotomy small-bowel resection for a closed loop obstruction with acute ischemia.  Patient has had several bowel movements  Small-bowel follow-through today  Continue IV fluid  Npo  abd xrays tomorrow.   Await results of small-bowel follow-through    Leukocytosis  Resolved    Anxiety  Valium 5 mg p.o. p.r.n. anxiety        Tee  MD [place]  General Surgery  [facility]

## 2020-11-19 NOTE — PLAN OF CARE
Fall prevention precautions maintained, pt remained free of falls throughout shift, call bell and personal items within reach, pt's pain moderately controlled by prn pain medication, NGT in place to moderate @100 continuous suction. 24 hour chart check completed. Will continue to monitor

## 2020-11-19 NOTE — ASSESSMENT & PLAN NOTE
Postop day  8 exploratory laparotomy small-bowel resection for a closed loop obstruction with acute ischemia.  Patient has had several bowel movements  Small-bowel follow-through today  Continue IV fluid  Npo  abd xrays tomorrow.   Await results of small-bowel follow-through

## 2020-11-19 NOTE — PROGRESS NOTES
"[facility]  Adult Nutrition  Progress Note    SUMMARY     Recommendations    1. When medically able ADAT to regular.     2. If EN needed recommend Nutren 2.0 @ 35ml/ hr +250 ml FWF q6hrs.   to provide 1680kcals (89% EEN), 71 g protein (>100%EPN) and 1581 ml water.     3. If PN is needed recommend Clinimix 5/20 @ 50ml/hr+20% lipids.  To provide 1556kcals (83% EEN), 60g AA (100% EPN), and 240 g dextrose.     Goals: 1.Initiate nutrition by RD follow up.  Nutrition Goal Status: new  Communication of RD Recs: (POC)    Reason for Assessment    Reason For Assessment: length of stay  Diagnosis: (SBO)  Relevant Medical History: Reflex sympathetic dystrophy  General Information Comments:  20- Pt presented with epigastric abdominal pain. Pt NPO x 3 days. Previously on regular diet with % of meals consumed.  lbs, no significant change in weight. Remote assessment NFPE not complete.   Nutrition Discharge Planning: Adequate nutrition to meet needs via regular diet.    Nutrition Risk Screen    Nutrition Risk Screen: no indicators present    Nutrition/Diet History    Spiritual, Cultural Beliefs, [employer]ybeterian Practices, Values that Affect Care: no    Anthropometrics    Temp: 99.3 °F (37.4 °C)  Height Method: Stated  Height: 5' 7" (170.2 cm)  Height (inches): 67 in  Weight Method: Bed Scale  Weight: 74.9 kg (165 lb 2 oz)  Weight (lb): 165.13 lb  Ideal Body Weight (IBW), Female: 135 lb  % Ideal Body Weight, Female (lb): 122.32 %  BMI (Calculated): 25.9  BMI Grade: 25 - 29.9 - overweight  Usual Body Weight (UBW), k.5 kg  % Usual Body Weight: 100.75  % Weight Change From Usual Weight: 0.54 %     Lab/Procedures/Meds    Pertinent Labs Reviewed: reviewed  Pertinent Labs Comments: Na 135  Pertinent Medications Reviewed: reviewed    Estimated/Assessed Needs    Weight Used For Calorie Calculations: 74.9 kg (165 lb 2 oz)  Energy Calorie Requirements (kcal): 1,872.5 kcals/day(25 kcals/day)  Energy Need " Method: Kcal/kg  Protein Requirements: 60.05-75.06 g/day(0.8-1.0 g/kg)  Weight Used For Protein Calculations: 74.9 kg (165 lb 2 oz)  Fluid Requirements (mL): 1mL/kcal or per MD  Estimated Fluid Requirement Method: RDA Method  RDA Method (mL): 1    Nutrition Prescription Ordered    Current Diet Order: NPO    Evaluation of Received Nutrient/Fluid Intake    Energy Calories Required: not meeting needs  Protein Required: not meeting needs  Fluid Required: not meeting needs  Comments: LBM 11.18.20  % Intake of Estimated Energy Needs: 0 %  % Meal Intake: NPO    Nutrition Risk    Level of Risk/Frequency of Follow-up: (2x/weekly)     Assessment and Plan    * Small bowel obstruction  Contributing Nutrition Diagnosis  Inadequate energy intake    Related to (etiology):   Inability to consume sufficient nutrients    Signs and Symptoms (as evidenced by):   NPO    Interventions(treatment strategy):  Collaboration with other providers  General healthful diet    Nutrition Diagnosis Status:   New    Monitor and Evaluation    Food and Nutrient Intake: energy intake  Food and Nutrient Adminstration: diet order, enteral and parenteral nutrition administration  Knowledge/Beliefs/Attitudes: food and nutrition knowledge/skill  Physical Activity and Function: nutrition-related ADLs and IADLs  Anthropometric Measurements: weight  Biochemical Data, Medical Tests and Procedures: lipid profile, electrolyte and renal panel, gastrointestinal profile, glucose/endocrine profile  Nutrition-Focused Physical Findings: overall appearance     Malnutrition Assessment     Skin (Micronutrient): (Ras Score: 22)     Nutrition Follow-Up    RD Follow-up?: Yes

## 2020-11-19 NOTE — PLAN OF CARE
Midline incision OA, staples intact. NG tube to right nare connected to continuous suction. Remains NPO. IV fluids infusing as ordered. PRN nausea and pain meds adm as needed. Remains free from injury/incident. Call light in reach.

## 2020-11-19 NOTE — PLAN OF CARE
Recommendations    1. When medically able ADAT to regular.     2. If EN needed recommend Nutren 2.0 @ 35ml/ hr +250 ml FWF q6hrs.   to provide 1680kcals (89% EEN), 71 g protein (>100%EPN) and 1581 ml water.     3. If PN is needed recommend Clinimix 5/20 @ 50ml/hr+20% lipids.  To provide 1556kcals (83% EEN), 60g AA (100% EPN), and 240 g dextrose.     Goals: 1.Initiate nutrition by RD follow up.  Nutrition Goal Status: new  Communication of RD Recs: (POC)

## 2020-11-19 NOTE — PROGRESS NOTES
Pushed Iv valium on pt, Pt states she felt something wet, pulled up pt sleeve and IV is out of vein and medication went all over bed, spoke to GOMEZ King ok to give another dose of valium.

## 2020-11-19 NOTE — ASSESSMENT & PLAN NOTE
Contributing Nutrition Diagnosis  Inadequate energy intake    Related to (etiology):   Inability to consume sufficient nutrients    Signs and Symptoms (as evidenced by):   NPO    Interventions(treatment strategy):  Collaboration with other providers  General healthful diet    Nutrition Diagnosis Status:   New

## 2020-11-20 ENCOUNTER — ANESTHESIA (OUTPATIENT)
Dept: SURGERY | Facility: HOSPITAL | Age: 55
DRG: 329 | End: 2020-11-20
Payer: MEDICARE

## 2020-11-20 ENCOUNTER — ANESTHESIA EVENT (OUTPATIENT)
Dept: SURGERY | Facility: HOSPITAL | Age: 55
DRG: 329 | End: 2020-11-20
Payer: MEDICARE

## 2020-11-20 PROCEDURE — 88307 TISSUE EXAM BY PATHOLOGIST: CPT | Mod: 26,,, | Performed by: PATHOLOGY

## 2020-11-20 PROCEDURE — 71000033 HC RECOVERY, INTIAL HOUR: Performed by: SURGERY

## 2020-11-20 PROCEDURE — S0030 INJECTION, METRONIDAZOLE: HCPCS | Performed by: SURGERY

## 2020-11-20 PROCEDURE — 25000003 PHARM REV CODE 250: Performed by: SURGERY

## 2020-11-20 PROCEDURE — 44120 REMOVAL OF SMALL INTESTINE: CPT | Mod: 80,,, | Performed by: SURGERY

## 2020-11-20 PROCEDURE — 63600175 PHARM REV CODE 636 W HCPCS: Performed by: SURGERY

## 2020-11-20 PROCEDURE — 63600175 PHARM REV CODE 636 W HCPCS: Performed by: NURSE ANESTHETIST, CERTIFIED REGISTERED

## 2020-11-20 PROCEDURE — 27000221 HC OXYGEN, UP TO 24 HOURS

## 2020-11-20 PROCEDURE — 44120 PR RESECT SMALL INTEST,SINGL RESEC/ANAS: ICD-10-PCS | Mod: ,,, | Performed by: SURGERY

## 2020-11-20 PROCEDURE — 88307 PR  SURG PATH,LEVEL V: ICD-10-PCS | Mod: 26,,, | Performed by: PATHOLOGY

## 2020-11-20 PROCEDURE — 25000003 PHARM REV CODE 250: Performed by: NURSE ANESTHETIST, CERTIFIED REGISTERED

## 2020-11-20 PROCEDURE — C1765 ADHESION BARRIER: HCPCS | Performed by: SURGERY

## 2020-11-20 PROCEDURE — 11000001 HC ACUTE MED/SURG PRIVATE ROOM

## 2020-11-20 PROCEDURE — 37000009 HC ANESTHESIA EA ADD 15 MINS: Performed by: SURGERY

## 2020-11-20 PROCEDURE — 27201423 OPTIME MED/SURG SUP & DEVICES STERILE SUPPLY: Performed by: SURGERY

## 2020-11-20 PROCEDURE — 63600175 PHARM REV CODE 636 W HCPCS: Performed by: ANESTHESIOLOGY

## 2020-11-20 PROCEDURE — 71000039 HC RECOVERY, EACH ADD'L HOUR: Performed by: SURGERY

## 2020-11-20 PROCEDURE — 88307 TISSUE EXAM BY PATHOLOGIST: CPT | Performed by: PATHOLOGY

## 2020-11-20 PROCEDURE — 44120 REMOVAL OF SMALL INTESTINE: CPT | Mod: ,,, | Performed by: SURGERY

## 2020-11-20 PROCEDURE — 25000003 PHARM REV CODE 250: Performed by: ANESTHESIOLOGY

## 2020-11-20 PROCEDURE — 36000708 HC OR TIME LEV III 1ST 15 MIN: Performed by: SURGERY

## 2020-11-20 PROCEDURE — 37000008 HC ANESTHESIA 1ST 15 MINUTES: Performed by: SURGERY

## 2020-11-20 PROCEDURE — 94799 UNLISTED PULMONARY SVC/PX: CPT

## 2020-11-20 PROCEDURE — 44120 PR RESECT SMALL INTEST,SINGL RESEC/ANAS: ICD-10-PCS | Mod: 80,,, | Performed by: SURGERY

## 2020-11-20 PROCEDURE — 36000709 HC OR TIME LEV III EA ADD 15 MIN: Performed by: SURGERY

## 2020-11-20 PROCEDURE — C9290 INJ, BUPIVACAINE LIPOSOME: HCPCS | Performed by: SURGERY

## 2020-11-20 RX ORDER — SODIUM CHLORIDE, SODIUM LACTATE, POTASSIUM CHLORIDE, CALCIUM CHLORIDE 600; 310; 30; 20 MG/100ML; MG/100ML; MG/100ML; MG/100ML
INJECTION, SOLUTION INTRAVENOUS CONTINUOUS PRN
Status: DISCONTINUED | OUTPATIENT
Start: 2020-11-20 | End: 2020-11-20

## 2020-11-20 RX ORDER — CHLORHEXIDINE GLUCONATE ORAL RINSE 1.2 MG/ML
10 SOLUTION DENTAL 2 TIMES DAILY
Status: DISPENSED | OUTPATIENT
Start: 2020-11-20 | End: 2020-11-25

## 2020-11-20 RX ORDER — DIPHENHYDRAMINE HYDROCHLORIDE 50 MG/ML
25 INJECTION INTRAMUSCULAR; INTRAVENOUS EVERY 6 HOURS PRN
Status: DISCONTINUED | OUTPATIENT
Start: 2020-11-20 | End: 2020-11-20 | Stop reason: HOSPADM

## 2020-11-20 RX ORDER — METOCLOPRAMIDE HYDROCHLORIDE 5 MG/ML
10 INJECTION INTRAMUSCULAR; INTRAVENOUS EVERY 10 MIN PRN
Status: COMPLETED | OUTPATIENT
Start: 2020-11-20 | End: 2020-11-20

## 2020-11-20 RX ORDER — ENOXAPARIN SODIUM 100 MG/ML
40 INJECTION SUBCUTANEOUS EVERY 24 HOURS
Status: DISCONTINUED | OUTPATIENT
Start: 2020-11-21 | End: 2020-11-26 | Stop reason: HOSPADM

## 2020-11-20 RX ORDER — HYDROMORPHONE HYDROCHLORIDE 2 MG/ML
0.2 INJECTION, SOLUTION INTRAMUSCULAR; INTRAVENOUS; SUBCUTANEOUS EVERY 5 MIN PRN
Status: DISCONTINUED | OUTPATIENT
Start: 2020-11-20 | End: 2020-11-20 | Stop reason: HOSPADM

## 2020-11-20 RX ORDER — NALOXONE HCL 0.4 MG/ML
0.02 VIAL (ML) INJECTION
Status: DISCONTINUED | OUTPATIENT
Start: 2020-11-20 | End: 2020-11-26 | Stop reason: HOSPADM

## 2020-11-20 RX ORDER — SODIUM CHLORIDE 0.9 % (FLUSH) 0.9 %
3 SYRINGE (ML) INJECTION EVERY 8 HOURS
Status: DISCONTINUED | OUTPATIENT
Start: 2020-11-20 | End: 2020-11-20 | Stop reason: HOSPADM

## 2020-11-20 RX ORDER — ACETAMINOPHEN 10 MG/ML
1000 INJECTION, SOLUTION INTRAVENOUS EVERY 8 HOURS
Status: DISCONTINUED | OUTPATIENT
Start: 2020-11-20 | End: 2020-11-21

## 2020-11-20 RX ORDER — MIDAZOLAM HYDROCHLORIDE 1 MG/ML
INJECTION INTRAMUSCULAR; INTRAVENOUS
Status: DISCONTINUED | OUTPATIENT
Start: 2020-11-20 | End: 2020-11-20

## 2020-11-20 RX ORDER — DEXAMETHASONE SODIUM PHOSPHATE 4 MG/ML
INJECTION, SOLUTION INTRA-ARTICULAR; INTRALESIONAL; INTRAMUSCULAR; INTRAVENOUS; SOFT TISSUE
Status: DISCONTINUED | OUTPATIENT
Start: 2020-11-20 | End: 2020-11-20

## 2020-11-20 RX ORDER — LIDOCAINE HCL/PF 100 MG/5ML
SYRINGE (ML) INTRAVENOUS
Status: DISCONTINUED | OUTPATIENT
Start: 2020-11-20 | End: 2020-11-20

## 2020-11-20 RX ORDER — HYDROMORPHONE HCL IN 0.9% NACL 6 MG/30 ML
PATIENT CONTROLLED ANALGESIA SYRINGE INTRAVENOUS CONTINUOUS
Status: DISCONTINUED | OUTPATIENT
Start: 2020-11-20 | End: 2020-11-23

## 2020-11-20 RX ORDER — SODIUM CHLORIDE, SODIUM LACTATE, POTASSIUM CHLORIDE, CALCIUM CHLORIDE 600; 310; 30; 20 MG/100ML; MG/100ML; MG/100ML; MG/100ML
INJECTION, SOLUTION INTRAVENOUS CONTINUOUS
Status: DISCONTINUED | OUTPATIENT
Start: 2020-11-20 | End: 2020-11-21

## 2020-11-20 RX ORDER — FENTANYL CITRATE 50 UG/ML
INJECTION, SOLUTION INTRAMUSCULAR; INTRAVENOUS
Status: DISCONTINUED | OUTPATIENT
Start: 2020-11-20 | End: 2020-11-20

## 2020-11-20 RX ORDER — ONDANSETRON 8 MG/1
8 TABLET, ORALLY DISINTEGRATING ORAL EVERY 8 HOURS PRN
Status: DISCONTINUED | OUTPATIENT
Start: 2020-11-20 | End: 2020-11-20

## 2020-11-20 RX ORDER — BUPIVACAINE HYDROCHLORIDE 2.5 MG/ML
INJECTION, SOLUTION EPIDURAL; INFILTRATION; INTRACAUDAL
Status: DISCONTINUED | OUTPATIENT
Start: 2020-11-20 | End: 2020-11-20 | Stop reason: HOSPADM

## 2020-11-20 RX ORDER — ONDANSETRON 2 MG/ML
INJECTION INTRAMUSCULAR; INTRAVENOUS
Status: DISCONTINUED | OUTPATIENT
Start: 2020-11-20 | End: 2020-11-20

## 2020-11-20 RX ORDER — PROPOFOL 10 MG/ML
VIAL (ML) INTRAVENOUS
Status: DISCONTINUED | OUTPATIENT
Start: 2020-11-20 | End: 2020-11-20

## 2020-11-20 RX ORDER — MEPERIDINE HYDROCHLORIDE 25 MG/ML
12.5 INJECTION INTRAMUSCULAR; INTRAVENOUS; SUBCUTANEOUS ONCE AS NEEDED
Status: COMPLETED | OUTPATIENT
Start: 2020-11-20 | End: 2020-11-20

## 2020-11-20 RX ORDER — METRONIDAZOLE 500 MG/100ML
500 INJECTION, SOLUTION INTRAVENOUS ONCE
Status: COMPLETED | OUTPATIENT
Start: 2020-11-20 | End: 2020-11-20

## 2020-11-20 RX ORDER — SUCCINYLCHOLINE CHLORIDE 20 MG/ML
INJECTION INTRAMUSCULAR; INTRAVENOUS
Status: DISCONTINUED | OUTPATIENT
Start: 2020-11-20 | End: 2020-11-20

## 2020-11-20 RX ORDER — ROCURONIUM BROMIDE 10 MG/ML
INJECTION, SOLUTION INTRAVENOUS
Status: DISCONTINUED | OUTPATIENT
Start: 2020-11-20 | End: 2020-11-20

## 2020-11-20 RX ORDER — ONDANSETRON 8 MG/1
8 TABLET, ORALLY DISINTEGRATING ORAL EVERY 8 HOURS PRN
Status: DISCONTINUED | OUTPATIENT
Start: 2020-11-20 | End: 2020-11-26 | Stop reason: HOSPADM

## 2020-11-20 RX ADMIN — ONDANSETRON 4 MG: 2 INJECTION, SOLUTION INTRAMUSCULAR; INTRAVENOUS at 03:11

## 2020-11-20 RX ADMIN — CHLORHEXIDINE GLUCONATE 0.12% ORAL RINSE 10 ML: 1.2 LIQUID ORAL at 08:11

## 2020-11-20 RX ADMIN — SUCCINYLCHOLINE CHLORIDE 100 MG: 20 INJECTION, SOLUTION INTRAMUSCULAR; INTRAVENOUS at 01:11

## 2020-11-20 RX ADMIN — MEPERIDINE HYDROCHLORIDE 12.5 MG: 25 INJECTION INTRAMUSCULAR; INTRAVENOUS; SUBCUTANEOUS at 03:11

## 2020-11-20 RX ADMIN — METRONIDAZOLE 500 MG: 500 SOLUTION INTRAVENOUS at 01:11

## 2020-11-20 RX ADMIN — METOCLOPRAMIDE 10 MG: 5 INJECTION, SOLUTION INTRAMUSCULAR; INTRAVENOUS at 04:11

## 2020-11-20 RX ADMIN — HYDROMORPHONE HYDROCHLORIDE 1 MG: 1 INJECTION, SOLUTION INTRAMUSCULAR; INTRAVENOUS; SUBCUTANEOUS at 08:11

## 2020-11-20 RX ADMIN — CEFTRIAXONE SODIUM 2 G: 2 INJECTION, SOLUTION INTRAVENOUS at 01:11

## 2020-11-20 RX ADMIN — SODIUM CHLORIDE, SODIUM LACTATE, POTASSIUM CHLORIDE, AND CALCIUM CHLORIDE: .6; .31; .03; .02 INJECTION, SOLUTION INTRAVENOUS at 04:11

## 2020-11-20 RX ADMIN — ONDANSETRON 8 MG: 8 TABLET, ORALLY DISINTEGRATING ORAL at 06:11

## 2020-11-20 RX ADMIN — Medication: at 03:11

## 2020-11-20 RX ADMIN — FENTANYL CITRATE 50 MCG: 50 INJECTION, SOLUTION INTRAMUSCULAR; INTRAVENOUS at 02:11

## 2020-11-20 RX ADMIN — ROCURONIUM BROMIDE 5 MG: 10 INJECTION, SOLUTION INTRAVENOUS at 01:11

## 2020-11-20 RX ADMIN — ROCURONIUM BROMIDE 25 MG: 10 INJECTION, SOLUTION INTRAVENOUS at 01:11

## 2020-11-20 RX ADMIN — MIDAZOLAM HYDROCHLORIDE 2 MG: 1 INJECTION, SOLUTION INTRAMUSCULAR; INTRAVENOUS at 01:11

## 2020-11-20 RX ADMIN — PROMETHAZINE HYDROCHLORIDE 6.25 MG: 25 INJECTION INTRAMUSCULAR; INTRAVENOUS at 08:11

## 2020-11-20 RX ADMIN — HYDROMORPHONE HYDROCHLORIDE 1 MG: 1 INJECTION, SOLUTION INTRAMUSCULAR; INTRAVENOUS; SUBCUTANEOUS at 03:11

## 2020-11-20 RX ADMIN — ONDANSETRON 4 MG: 2 INJECTION INTRAMUSCULAR; INTRAVENOUS at 12:11

## 2020-11-20 RX ADMIN — SODIUM CHLORIDE, SODIUM LACTATE, POTASSIUM CHLORIDE, AND CALCIUM CHLORIDE: 600; 310; 30; 20 INJECTION, SOLUTION INTRAVENOUS at 01:11

## 2020-11-20 RX ADMIN — ACETAMINOPHEN 1000 MG: 10 INJECTION, SOLUTION INTRAVENOUS at 09:11

## 2020-11-20 RX ADMIN — FENTANYL CITRATE 50 MCG: 50 INJECTION, SOLUTION INTRAMUSCULAR; INTRAVENOUS at 01:11

## 2020-11-20 RX ADMIN — PROPOFOL 150 MG: 10 INJECTION, EMULSION INTRAVENOUS at 01:11

## 2020-11-20 RX ADMIN — LIDOCAINE HYDROCHLORIDE 100 MG: 20 INJECTION, SOLUTION INTRAVENOUS at 01:11

## 2020-11-20 RX ADMIN — ONDANSETRON 4 MG: 2 INJECTION INTRAMUSCULAR; INTRAVENOUS at 08:11

## 2020-11-20 RX ADMIN — PROMETHAZINE HYDROCHLORIDE 6.25 MG: 25 INJECTION INTRAMUSCULAR; INTRAVENOUS at 04:11

## 2020-11-20 RX ADMIN — PROMETHAZINE HYDROCHLORIDE 12.5 MG: 25 INJECTION INTRAMUSCULAR; INTRAVENOUS at 02:11

## 2020-11-20 RX ADMIN — SODIUM CHLORIDE, SODIUM LACTATE, POTASSIUM CHLORIDE, AND CALCIUM CHLORIDE: 600; 310; 30; 20 INJECTION, SOLUTION INTRAVENOUS at 02:11

## 2020-11-20 RX ADMIN — DEXAMETHASONE SODIUM PHOSPHATE 4 MG: 4 INJECTION, SOLUTION INTRA-ARTICULAR; INTRALESIONAL; INTRAMUSCULAR; INTRAVENOUS; SOFT TISSUE at 01:11

## 2020-11-20 RX ADMIN — HYDROMORPHONE HYDROCHLORIDE 1 MG: 1 INJECTION, SOLUTION INTRAMUSCULAR; INTRAVENOUS; SUBCUTANEOUS at 12:11

## 2020-11-20 NOTE — TRANSFER OF CARE
"Anesthesia Transfer of Care Note    Patient: Genny Calixto    Procedure(s) Performed: Procedure(s) (LRB):  LAPAROTOMY (N/A)  EXCISION, SMALL INTESTINE (N/A)  LYSIS, ADHESIONS (N/A)    Patient location: PACU    Anesthesia Type: general    Transport from OR: Transported from OR on room air with adequate spontaneous ventilation    Post pain: adequate analgesia    Post assessment: no apparent anesthetic complications    Post vital signs: stable    Level of consciousness: awake, alert and oriented    Nausea/Vomiting: no nausea/vomiting    Complications: none    Transfer of care protocol was followed      Last vitals:   Visit Vitals  BP (!) 162/79 (BP Location: Left arm, Patient Position: Lying)   Pulse 94   Temp 37.6 °C (99.7 °F) (Oral)   Resp 20   Ht 5' 7" (1.702 m)   Wt 74.9 kg (165 lb 2 oz)   SpO2 97%   Breastfeeding No   BMI 25.86 kg/m²     "

## 2020-11-20 NOTE — OP NOTE
[facility]  Surgery Department  Operative Note    SUMMARY     Date of Procedure: 11/20/2020     Procedure: Procedure(s) (LRB):  LAPAROTOMY (N/A)  COLON RESECTION (N/A)  LYSIS, ADHESIONS (N/A)     Surgeon(s) and Role:     * Tee Segura MD - Primary    Assisting Surgeon: None    Pre-Operative Diagnosis: SBO (small bowel obstruction) [K56.609]    Post-Operative Diagnosis: Post-Op Diagnosis Codes:     * SBO (small bowel obstruction) [K56.609]    Anesthesia: General    Technical Procedures Used:     Lysis of adhesions  Small-bowel resection with anastomosis x2    Description of the Findings of the Procedure:     Obstruction of the distal ileum in the area the previous surgery    Patient brought into the operative room placed on the operative table supine position.  IV antibiotics administered.  General endotracheal anesthesia was induced.  She had a Rosales catheter placed.  A nasogastric tube was in place.  The staples were removed from her abdomen.  The abdomen was prepped and draped in the standard fashion.  A time-out was performed.    The old incision was opened with blunt dissection.  The suture was identified and removed.  The abdominal cavity was entered.  There were no adhesions to the abdominal wall.  A wound protector was inserted.  The small bowel was noted to be dilated.    The area of obstruction was essentially the distal 20 cm of small bowel where the small bowel was matted inflamed from the area of the anastomosis and beyond    I then  the small bowel from adhesions in the pelvis and to the sacral promontory.  The small bowel was run in a retrograde manner and additional adhesions between the small bowel itself were .  The small bowel was milked of enteric contents to the stomach and evacuated from the NG tube.    One serosal tear was repaired with 3 0 silk in a Lembert fashion.    The area the small bowel that was adherent in the pelvis was edematous and did not  appear healthy as such it was divided proximally and distally with the ALONZO 75 stapler.  The mesentery was taken down with the EnSeal large bipolar device.      A side-to-side anastomosis was performed by opening the anti mesenteric borders the small bowel.  The limbs of the ALONZO 75 stapler inserted and activated.  The opening in the end of the side-to-side anastomosis was closed with a ALONZO 75 stapler.  The staple lines were oversewn with 3 0 silk in a Lembert fashion.  The opening in the mesentery was closed with 3 0 Vicryl in a running fashion.    The ileum was then divided just proximal to the previous anastomosis and approximately 8 cm distal to the the junction with the cecum.  This was done with the ALONZO 75 stapler.  Once again the mesentery was taken down with the EnSeal bipolar device.    A side-to-side anastomosis was created by opening the anti mesenteric borders of the 2 limbs of small bowel.  The ALONZO 75 stapler was inserted.  The side-to-side anastomosis was performed.  The opening was closed with a 4th firing of the ALONZO 75 stapler.  The staple lines were reinforced with 3 0 silk in a Lembert fashion.  The opening in the mesentery was closed with 3 0 Vicryl in a running fashion.    The abdominal cavity was irrigated.  Hemostasis was ensured.    Abdominal cavity was a irrigated.    The NG tube was confirmed to be in the stomach    Seprafilm was placed in the pelvis in the area of the adhesions.    An abdominal wall block was performed by infiltrating a mixture of Marcaine and Exparel into the layers of the abdominal wall.      A 2nd piece of Seprafilm was placed over the omentum which was brought over the small bowel.    After confirming that the sponge instrument counts were correct the midline fascia was closed with looped PDS in a running fashion.  The skin was closed with staples.    Sponge and instrument counts were correct.  Patient tolerated procedure well        Significant Surgical Tasks Conducted  by the Assistant(s), if Applicable:  None    Complications: No    Estimated Blood Loss (EBL): * 75 mL  IV fluids 1800 mL  NG output 1200 mL  Marcaine 0.25%  Exparel 266 mg             Implants:   Implant Name Type Inv. Item Serial No.  Lot No. LRB No. Used Action   MEMBRANE SEPRAFILM 5 X 6 - SN/A  MEMBRANE SEPRAFILM 5 X 6 N/A [employer] [id number] N/A 2 Implanted       Specimens:   Specimen (12h ago, onward)    None                  Condition: Stable    Disposition: PACU - hemodynamically stable.    Attestation: I performed the procedure.

## 2020-11-20 NOTE — PHYSICIAN QUERY
PT Name: Genny Calixto  MR #: [id number]     Documentation Clarification      CDS: Roula YARBROUGH RN  Contact information: [email or web address]  Phone number: [phone]    This form is a permanent document in the medical record.     Query Date: November 20, 2020    By submitting this query, we are merely seeking further clarification of documentation. Please utilize your independent clinical judgment when addressing the question(s) below.    The Medical Record reflects the following:    Supporting Clinical Findings Location in Medical Record   Genny Calixto is a 55 y.o. female patient with a PMHx of anxiety, depression, and nephrolithiasis who presented to the ER w epigastric abdominal pain which started this AM. WBC 22, 0% bands, stable CMP, lipase 7, COVID negative. CT of the ABD/pelvis with contrast showed moderate grade partial small bowel obstruction but worrisome for closed loop bowel obstruction. Blood cultures were obtained in the ED and 1L IV fluids, vanc, Zosyn, IV Dilaudid and IV Phenergan given. Case discussed with General Surgery, who recommends NG tube placement.    Procedure: Procedure(s) (LRB):  LAPAROSCOPY, DIAGNOSTIC, possible laparotomy, possible bowel resection (N/A)  LYSIS, ADHESIONS, LAPAROSCOPIC (N/A)     Small bowel obstruction  Postop day 6exploratory laparotomy small-bowel resection for a closed loop obstruction with acute ischemia.  Nausea and vomiting  ivf  Npo  abd xrays    Dilated stomach and small bowel loops in this patient who has had interval abdominal surgery with placement of skin staples.  Moderate air and stool in nondilated colon.  Postoperative ileus or small-bowel obstruction must be considered.    Patient has dilated loops of small bowel and nausea vomiting this is not unexpected. It can be related to postop bowel dysfunction or edema at the anastomosis  Nausea and vomiting  ivf  Npo  abd xrays tomorrow. If the small bowel is still dilated than  Gastrografin small-bowel follow-through    Prominent small bowel loops are seen within the left hemiabdomen. Relative paucity gas seen within the right hemiabdomen. Findings could again reflect ileus or resolving obstruction.    Interval History: Patient continues to have episodes of nausea vomiting. There is more dilation of the small bowel. Will place NG tube.    Dilated small bowel loops are seen within the left upper quadrant measuring up to 4.6 cm with some transition to normal diameter bowel loops within the midline and right lower quadrant. Contrast did extend to the colon my 1 hour and 30 minutes which is not significantly delayed.  Findings likely reflect postoperative ileus PN Hosp Med 11/11/2020                  Op Note 11/11/2020          PN Gen Surg 11/16/2020                X-Ray Abd 11/16/2020          PN Gen Surg 11/17/2020                  X-Ray Abd 11/17/2020        PN Gen Surg 11/18/2020      SBFT 11/19/2020                                                                            Provider, please provide diagnosis or diagnoses associated with above clinical findings.    Please select all that apply:    [   ] Expected postoperative delayed return of bowel function   [   ] Ileus occurring in the postoperative period, is not a complication of surgery   [   ] Ileus occurring in the postoperative period, is a complication of surgery   [   ] Paralytic Ileus   [   ] Adynamic Ileus   [ x  ] Other clarification  (please specify): _immmediatete post op bowel obstruction with occurs on occaisio___________   [  ] Clinically undetermined                                                                                                           Present on admission (POA) status:   [   ] Yes (Y)                          [  ] Clinically Undetermined (W)  [x   ] No (N)                            [   ] Documentation insufficient to determine if condition is POA (U)

## 2020-11-20 NOTE — PLAN OF CARE
Remains free from harm, pain controlled via IV  pain meds, positions self, will continue to monitor. 24 hour chart check complete.

## 2020-11-20 NOTE — ASSESSMENT & PLAN NOTE
Postop day 9 exploratory laparotomy small-bowel resection for a closed loop obstruction with acute ischemia.    Patient has passed some contrast to the colon but she has persistently dilated loops of small bowel up to 4-1/2 cm.  This likely resolve presents a postop bowel obstruction stricture and asked Neo.  Patient will need a repeat laparotomy, lysis of any he Bib and revision of the anastomosis.    Need for surgery was discussed.  Risks benefits and complications reviewed.  This includes infection, bleeding, anastomotic leak a stricture, abscess, wound infection hernia.

## 2020-11-20 NOTE — PROGRESS NOTES
[facility]  General Surgery  Progress Note    Subjective:     History of Present Illness:  56yo F with a PMHx significant for nephrolithiasis who presented to the ED for evaluation of epigastric abdominal pain. Reports a one day history of this pain which is sharp and moderate in nature. Has associated nausea and vomiting x24hrs. Denies any associated fever, chills, diarrhea or constipation. Workup in ED worrisome for small bowel obstruction. Surgery consulted for evaluation and pt admitted to hospital medicine. Pt reports minimal abdominal surgical history, just a hysterectomy and bladder repair. Last BM yesterday. NGT placed in ED and still with moderate abdominal pain. Denies fevers, chills, diarrhea or constipation.        Post-Op Info:  Procedure(s) (LRB):  LAPAROSCOPY, DIAGNOSTIC (N/A)  LYSIS, ADHESIONS, LAPAROSCOPIC (N/A)  LYSIS, ADHESIONS (N/A)  EXCISION, SMALL INTESTINE (N/A)  BLOCK, TRANSVERSUS ABDOMINIS PLANE (N/A)   9 Days Post-Op     Interval History:  Patient has a L of NG output.  Abdominal films show persistently dilated loops of small bowel despite some transit of contrast to the colon.  This likely represents a immediate postop bowel obstruction or narrowing of the anastomosis.  I discussed this with the patient.  Will plan to return to the operating room for re-exploration possibly revision of the anastomosis    Medications:  Continuous Infusions:   dextrose 5 % and 0.45 % NaCl with KCl 20 mEq 100 mL/hr at 11/19/20 2131     Scheduled Meds:   bupivacaine liposome (PF)  266 mg Infiltration Once    cefTRIAXone (ROCEPHIN) IVPB  2 g Intravenous Once    famotidine  20 mg Oral BID    metronidazole  500 mg Intravenous Once     PRN Meds:acetaminophen, diazePAM, HYDROmorphone, naloxone, ondansetron, promethazine (PHENERGAN) IVPB, simethicone     Review of patient's allergies indicates:   Allergen Reactions    Erythromycin     Morphine Nausea And Vomiting    Opioids - morphine  analogues      Objective:     Vital Signs (Most Recent):  Temp: 99.7 °F (37.6 °C) (11/20/20 0721)  Pulse: 92 (11/20/20 0721)  Resp: 16 (11/20/20 0834)  BP: 139/72 (11/20/20 0721)  SpO2: 97 % (11/20/20 0721) Vital Signs (24h Range):  Temp:  [98.5 °F (36.9 °C)-100.3 °F (37.9 °C)] 99.7 °F (37.6 °C)  Pulse:  [] 92  Resp:  [16-20] 16  SpO2:  [94 %-99 %] 97 %  BP: (123-152)/(72-84) 139/72     Weight: 74.9 kg (165 lb 2 oz)  Body mass index is 25.86 kg/m².    Intake/Output - Last 3 Shifts       11/18 0700 - 11/19 0659 11/19 0700 - 11/20 0659 11/20 0700 - 11/21 0659    P.O. 0 0     I.V. (mL/kg) 2590 (34.6) 2400 (32)     IV Piggyback 150 100     Total Intake(mL/kg) 2740 (36.6) 2500 (33.4)     Urine (mL/kg/hr) 0 (0) 0 (0)     Drains 450 900     Total Output 450 900     Net +2290 +1600            Urine Occurrence 4 x 3 x     Stool Occurrence 1 x            Physical Exam  Vitals signs and nursing note reviewed.   Constitutional:       Appearance: Normal appearance.   Cardiovascular:      Rate and Rhythm: Normal rate and regular rhythm.      Pulses: Normal pulses.   Pulmonary:      Effort: Pulmonary effort is normal.      Breath sounds: Normal breath sounds.   Abdominal:      General: Abdomen is flat. Bowel sounds are normal. There is no distension.      Palpations: Abdomen is soft.      Tenderness: There is no abdominal tenderness.      Comments: Staples in the midline incision is clean   Skin:     General: Skin is warm and dry.      Capillary Refill: Capillary refill takes less than 2 seconds.   Neurological:      General: No focal deficit present.      Mental Status: She is alert and oriented to person, place, and time.   Psychiatric:         Mood and Affect: Mood normal.         Behavior: Behavior normal.         Thought Content: Thought content normal.         Judgment: Judgment normal.         Significant Labs:  CBC:   Recent Labs   Lab 11/19/20  0719   WBC 7.71   RBC 3.53*   HGB 11.8*   HCT 35.4*      *    MCH 33.4*   MCHC 33.3     BMP:   Recent Labs   Lab 11/19/20  0719      *   K 4.1      CO2 28   BUN 7   CREATININE 0.6   CALCIUM 8.5*   MG 1.7       Significant Diagnostics:  I have reviewed and interpreted all pertinent imaging results/findings within the past 24 hours.  Abdominal film     XR ABDOMEN FLAT AND ERECT     CLINICAL HISTORY:  Follow-up after small-bowel follow-through; Unspecified intestinal obstruction, unspecified as to partial versus complete obstruction     TECHNIQUE:  Single frontal view of the chest was performed.     COMPARISON:  11/18/2020.     FINDINGS:  Again there are prominent small bowel loops within the abdomen measuring up to 4.9 cm predominately within the left hemiabdomen.  Contrast from recent upper GI study is seen within the colon and rectum.  Findings could reflect continued postoperative ileus.  Partial obstruction not entirely excluded.  Continued follow-up is recommended.  No free air or pneumatosis.  No portal venous gas.  Cholecystectomy clips are seen.  Atelectasis or early infiltrate left lower lobe.  Nasogastric tube appears appropriately position.  In comparison to the prior study, there is no adverse interval changes     Impression:     In comparison to the prior study, there is no adverse interval changes.  Continued follow-up is recommended.        Electronically signed by: Jose Estrada MD  Date:                                            11/20/2020  Time:                                           07:58             Last Resulted: 11/20/20 07:58 Order Details View Encounter Lab and Collection Details Routing Result History            External Result Report    External Result Report   Narrative & Impression    EXAMINATION:  XR ABDOMEN FLAT AND ERECT     CLINICAL HISTORY:  Follow-up after small-bowel follow-through; Unspecified intestinal obstruction, unspecified as to partial versus complete obstruction     TECHNIQUE:  Single frontal view of the chest  was performed.     COMPARISON:  11/18/2020.     FINDINGS:  Again there are prominent small bowel loops within the abdomen measuring up to 4.9 cm predominately within the left hemiabdomen.  Contrast from recent upper GI study is seen within the colon and rectum.  Findings could reflect continued postoperative ileus.  Partial obstruction not entirely excluded.  Continued follow-up is recommended.  No free air or pneumatosis.  No portal venous gas.  Cholecystectomy clips are seen.  Atelectasis or early infiltrate left lower lobe.  Nasogastric tube appears appropriately position.  In comparison to the prior study, there is no adverse interval changes     Impression:     In comparison to the prior study, there is no adverse interval changes.  Continued follow-up is recommended.        Electronically signed by: Jose Estrada MD  Date:                                            11/20/2020  Time:                                           07:58    Encounter    View Encounter               Assessment/Plan:     * Small bowel obstruction  Postop day 9 exploratory laparotomy small-bowel resection for a closed loop obstruction with acute ischemia.    Patient has passed some contrast to the colon but she has persistently dilated loops of small bowel up to 4-1/2 cm.  This likely resolve presents a postop bowel obstruction stricture and asked Neo.  Patient will need a repeat laparotomy, lysis of any he Bib and revision of the anastomosis.    Need for surgery was discussed.  Risks benefits and complications reviewed.  This includes infection, bleeding, anastomotic leak a stricture, abscess, wound infection hernia.    Leukocytosis  Resolved    Anxiety  Valium 5 mg p.o. p.r.n. anxiety        Tee Segura MD  General Surgery  [facility]

## 2020-11-20 NOTE — ANESTHESIA POSTPROCEDURE EVALUATION
Anesthesia Post Evaluation    Patient: Genny Calixto    Procedure(s) Performed: Procedure(s) (LRB):  LAPAROTOMY (N/A)  EXCISION, SMALL INTESTINE (N/A)  LYSIS, ADHESIONS (N/A)    Final Anesthesia Type: general    Patient location during evaluation: PACU  Patient participation: Yes- Able to Participate  Level of consciousness: awake and alert, oriented and awake  Post-procedure vital signs: reviewed and stable  Pain management: adequate  Airway patency: patent    PONV status at discharge: No PONV  Anesthetic complications: no      Cardiovascular status: blood pressure returned to baseline  Respiratory status: unassisted and spontaneous ventilation  Hydration status: euvolemic  Follow-up not needed.          Vitals Value Taken Time   /83 11/20/20 1556   Temp  11/20/20 1558   Pulse 95 11/20/20 1557   Resp 33 11/20/20 1557   SpO2 95 % 11/20/20 1557   Vitals shown include unvalidated device data.      No case tracking events are documented in the log.      Pain/Pat Score: Pain Rating Prior to Med Admin: 7 (11/20/2020  3:44 PM)  Pain Rating Post Med Admin: 3 (11/20/2020  9:04 AM)

## 2020-11-20 NOTE — ANESTHESIA PREPROCEDURE EVALUATION
11/20/2020  Genny Calixto is a 55 y.o., female.    Pre-op Assessment    I have reviewed the Patient Summary Reports.     I have reviewed the Nursing Notes. I have reviewed the NPO Status.   I have reviewed the Medications.     Review of Systems  Anesthesia Hx:  No problems with previous Anesthesia Denies Hx of Anesthetic complications  Denies Family Hx of Anesthesia complications.   Denies Personal Hx of Anesthesia complications.   Social:  Non-Smoker, No Alcohol Use    Cardiovascular:  Cardiovascular Normal  ECG has been reviewed.    Pulmonary:  Pulmonary Normal    Renal/:   Chronic Renal Disease    Hepatic/GI:  Hepatic/GI Normal    Neurological:   Neuromuscular Disease,    Endocrine:  Endocrine Normal    Psych:   anxiety        Patient Active Problem List   Diagnosis    Anxiety    Insomnia    Chronic low back pain without sciatica    Transient elevated blood pressure    Vertigo    Bilateral nephrolithiasis    Small bowel obstruction    Leukocytosis    Hypophosphatemia     Past Surgical History:   Procedure Laterality Date    BLADDER SURGERY      CHOLECYSTECTOMY      DIAGNOSTIC LAPAROSCOPY N/A 11/11/2020    Procedure: LAPAROSCOPY, DIAGNOSTIC;  Surgeon: Tee Segura MD;  Location: [facility] OR;  Service: General;  Laterality: N/A;  CONVERTED TO OPEN    facet injections  02/14/2017    L3, L4, L5, S1 Done by Dr. Lara    HYSTERECTOMY      INJECTION OF ANESTHETIC AGENT INTO TISSUE PLANE DEFINED BY TRANSVERSUS ABDOMINIS MUSCLE N/A 11/11/2020    Procedure: BLOCK, TRANSVERSUS ABDOMINIS PLANE;  Surgeon: Tee Segura MD;  Location: [facility] OR;  Service: General;  Laterality: N/A;    KNEE SURGERY      LAPAROSCOPIC LYSIS OF ADHESIONS N/A 11/11/2020    Procedure: LYSIS, ADHESIONS, LAPAROSCOPIC;  Surgeon: Tee Segura MD;  Location: [facility] OR;  Service: General;  Laterality: N/A;   CONVERTED TO OPEN    LYSIS OF ADHESIONS N/A 11/11/2020    Procedure: LYSIS, ADHESIONS;  Surgeon: Tee Segura MD;  Location: [facility];  Service: General;  Laterality: N/A;    TONSILLECTOMY           Physical Exam  General:  Well nourished    Airway/Jaw/Neck:  Airway Findings: Mouth Opening: Normal Tongue: Normal  General Airway Assessment: Adult  Mallampati: II  TM Distance: 4 - 6 cm  Jaw/Neck Findings:  Neck ROM: Normal ROM      Dental:  Dental Findings: In tact   Chest/Lungs:  Chest/Lungs Findings: Clear to auscultation, Normal Respiratory Rate     Heart/Vascular:  Heart Findings: Rate: Normal  Rhythm: Regular Rhythm  Sounds: Normal        Mental Status:  Mental Status Findings:  Cooperative, Alert and Oriented       Lab Results   Component Value Date    WBC 7.71 11/19/2020    HGB 11.8 (L) 11/19/2020    HCT 35.4 (L) 11/19/2020     (H) 11/19/2020     11/19/2020         Chemistry        Component Value Date/Time     (L) 11/19/2020 0719    K 4.1 11/19/2020 0719     11/19/2020 0719    CO2 28 11/19/2020 0719    BUN 7 11/19/2020 0719    CREATININE 0.6 11/19/2020 0719     11/19/2020 0719        Component Value Date/Time    CALCIUM 8.5 (L) 11/19/2020 0719    ALKPHOS 48 (L) 11/12/2020 0723    AST 21 11/12/2020 0723    ALT 15 11/12/2020 0723    BILITOT 1.4 (H) 11/12/2020 0723    ESTGFRAFRICA >60 11/19/2020 0719    EGFRNONAA >60 11/19/2020 0719            Anesthesia Plan  Type of Anesthesia, risks & benefits discussed:  Anesthesia Type:  general  Patient's Preference:   Intra-op Monitoring Plan: standard ASA monitors  Intra-op Monitoring Plan Comments:   Post Op Pain Control Plan: per primary service following discharge from PACU  Post Op Pain Control Plan Comments:   Induction:   IV  Beta Blocker:  Patient is not currently on a Beta-Blocker (No further documentation required).       Informed Consent: Patient understands risks and agrees with Anesthesia plan.  Questions answered.  Anesthesia consent signed with patient.  ASA Score: 2  emergent   Day of Surgery Review of History & Physical: I have interviewed and examined the patient. I have reviewed the patient's H&P dated:  There are no significant changes.  H&P update referred to the surgeon.         Ready For Surgery From Anesthesia Perspective.

## 2020-11-20 NOTE — SUBJECTIVE & OBJECTIVE
Interval History:  Patient has a L of NG output.  Abdominal films show persistently dilated loops of small bowel despite some transit of contrast to the colon.  This likely represents a immediate postop bowel obstruction or narrowing of the anastomosis.  I discussed this with the patient.  Will plan to return to the operating room for re-exploration possibly revision of the anastomosis    Medications:  Continuous Infusions:   dextrose 5 % and 0.45 % NaCl with KCl 20 mEq 100 mL/hr at 11/19/20 2131     Scheduled Meds:   bupivacaine liposome (PF)  266 mg Infiltration Once    cefTRIAXone (ROCEPHIN) IVPB  2 g Intravenous Once    famotidine  20 mg Oral BID    metronidazole  500 mg Intravenous Once     PRN Meds:acetaminophen, diazePAM, HYDROmorphone, naloxone, ondansetron, promethazine (PHENERGAN) IVPB, simethicone     Review of patient's allergies indicates:   Allergen Reactions    Erythromycin     Morphine Nausea And Vomiting    Opioids - morphine analogues      Objective:     Vital Signs (Most Recent):  Temp: 99.7 °F (37.6 °C) (11/20/20 0721)  Pulse: 92 (11/20/20 0721)  Resp: 16 (11/20/20 0834)  BP: 139/72 (11/20/20 0721)  SpO2: 97 % (11/20/20 0721) Vital Signs (24h Range):  Temp:  [98.5 °F (36.9 °C)-100.3 °F (37.9 °C)] 99.7 °F (37.6 °C)  Pulse:  [] 92  Resp:  [16-20] 16  SpO2:  [94 %-99 %] 97 %  BP: (123-152)/(72-84) 139/72     Weight: 74.9 kg (165 lb 2 oz)  Body mass index is 25.86 kg/m².    Intake/Output - Last 3 Shifts       11/18 0700 - 11/19 0659 11/19 0700 - 11/20 0659 11/20 0700 - 11/21 0659    P.O. 0 0     I.V. (mL/kg) 2590 (34.6) 2400 (32)     IV Piggyback 150 100     Total Intake(mL/kg) 2740 (36.6) 2500 (33.4)     Urine (mL/kg/hr) 0 (0) 0 (0)     Drains 450 900     Total Output 450 900     Net +2290 +1600            Urine Occurrence 4 x 3 x     Stool Occurrence 1 x            Physical Exam  Vitals signs and nursing note reviewed.   Constitutional:       Appearance: Normal appearance.    Cardiovascular:      Rate and Rhythm: Normal rate and regular rhythm.      Pulses: Normal pulses.   Pulmonary:      Effort: Pulmonary effort is normal.      Breath sounds: Normal breath sounds.   Abdominal:      General: Abdomen is flat. Bowel sounds are normal. There is no distension.      Palpations: Abdomen is soft.      Tenderness: There is no abdominal tenderness.      Comments: Staples in the midline incision is clean   Skin:     General: Skin is warm and dry.      Capillary Refill: Capillary refill takes less than 2 seconds.   Neurological:      General: No focal deficit present.      Mental Status: She is alert and oriented to person, place, and time.   Psychiatric:         Mood and Affect: Mood normal.         Behavior: Behavior normal.         Thought Content: Thought content normal.         Judgment: Judgment normal.         Significant Labs:  CBC:   Recent Labs   Lab 11/19/20  0719   WBC 7.71   RBC 3.53*   HGB 11.8*   HCT 35.4*      *   MCH 33.4*   MCHC 33.3     BMP:   Recent Labs   Lab 11/19/20  0719      *   K 4.1      CO2 28   BUN 7   CREATININE 0.6   CALCIUM 8.5*   MG 1.7       Significant Diagnostics:  I have reviewed and interpreted all pertinent imaging results/findings within the past 24 hours.  Abdominal film     XR ABDOMEN FLAT AND ERECT     CLINICAL HISTORY:  Follow-up after small-bowel follow-through; Unspecified intestinal obstruction, unspecified as to partial versus complete obstruction     TECHNIQUE:  Single frontal view of the chest was performed.     COMPARISON:  11/18/2020.     FINDINGS:  Again there are prominent small bowel loops within the abdomen measuring up to 4.9 cm predominately within the left hemiabdomen.  Contrast from recent upper GI study is seen within the colon and rectum.  Findings could reflect continued postoperative ileus.  Partial obstruction not entirely excluded.  Continued follow-up is recommended.  No free air or pneumatosis.   No portal venous gas.  Cholecystectomy clips are seen.  Atelectasis or early infiltrate left lower lobe.  Nasogastric tube appears appropriately position.  In comparison to the prior study, there is no adverse interval changes     Impression:     In comparison to the prior study, there is no adverse interval changes.  Continued follow-up is recommended.        Electronically signed by: Jose Estrada MD  Date:                                            11/20/2020  Time:                                           07:58             Last Resulted: 11/20/20 07:58 Order Details View Encounter Lab and Collection Details Routing Result History            External Result Report    External Result Report   Narrative & Impression    EXAMINATION:  XR ABDOMEN FLAT AND ERECT     CLINICAL HISTORY:  Follow-up after small-bowel follow-through; Unspecified intestinal obstruction, unspecified as to partial versus complete obstruction     TECHNIQUE:  Single frontal view of the chest was performed.     COMPARISON:  11/18/2020.     FINDINGS:  Again there are prominent small bowel loops within the abdomen measuring up to 4.9 cm predominately within the left hemiabdomen.  Contrast from recent upper GI study is seen within the colon and rectum.  Findings could reflect continued postoperative ileus.  Partial obstruction not entirely excluded.  Continued follow-up is recommended.  No free air or pneumatosis.  No portal venous gas.  Cholecystectomy clips are seen.  Atelectasis or early infiltrate left lower lobe.  Nasogastric tube appears appropriately position.  In comparison to the prior study, there is no adverse interval changes     Impression:     In comparison to the prior study, there is no adverse interval changes.  Continued follow-up is recommended.        Electronically signed by: Jose Estrada MD  Date:                                            11/20/2020  Time:                                           07:58    Encounter    View  Encounter

## 2020-11-21 LAB
ANION GAP SERPL CALC-SCNC: 10 MMOL/L (ref 8–16)
BASOPHILS # BLD AUTO: 0.07 K/UL (ref 0–0.2)
BASOPHILS NFR BLD: 0.7 % (ref 0–1.9)
BUN SERPL-MCNC: 7 MG/DL (ref 6–20)
CALCIUM SERPL-MCNC: 8.5 MG/DL (ref 8.7–10.5)
CHLORIDE SERPL-SCNC: 98 MMOL/L (ref 95–110)
CO2 SERPL-SCNC: 28 MMOL/L (ref 23–29)
CREAT SERPL-MCNC: 0.7 MG/DL (ref 0.5–1.4)
DIFFERENTIAL METHOD: ABNORMAL
EOSINOPHIL # BLD AUTO: 0.3 K/UL (ref 0–0.5)
EOSINOPHIL NFR BLD: 3.4 % (ref 0–8)
ERYTHROCYTE [DISTWIDTH] IN BLOOD BY AUTOMATED COUNT: 13.3 % (ref 11.5–14.5)
EST. GFR  (AFRICAN AMERICAN): >60 ML/MIN/1.73 M^2
EST. GFR  (NON AFRICAN AMERICAN): >60 ML/MIN/1.73 M^2
GLUCOSE SERPL-MCNC: 102 MG/DL (ref 70–110)
HCT VFR BLD AUTO: 41.5 % (ref 37–48.5)
HGB BLD-MCNC: 13.8 G/DL (ref 12–16)
IMM GRANULOCYTES # BLD AUTO: 0.07 K/UL (ref 0–0.04)
IMM GRANULOCYTES NFR BLD AUTO: 0.7 % (ref 0–0.5)
LYMPHOCYTES # BLD AUTO: 1.7 K/UL (ref 1–4.8)
LYMPHOCYTES NFR BLD: 17.6 % (ref 18–48)
MCH RBC QN AUTO: 33.1 PG (ref 27–31)
MCHC RBC AUTO-ENTMCNC: 33.3 G/DL (ref 32–36)
MCV RBC AUTO: 100 FL (ref 82–98)
MONOCYTES # BLD AUTO: 0.8 K/UL (ref 0.3–1)
MONOCYTES NFR BLD: 8.5 % (ref 4–15)
NEUTROPHILS # BLD AUTO: 6.6 K/UL (ref 1.8–7.7)
NEUTROPHILS NFR BLD: 69.1 % (ref 38–73)
NRBC BLD-RTO: 0 /100 WBC
PLATELET # BLD AUTO: 397 K/UL (ref 150–350)
PMV BLD AUTO: 10.8 FL (ref 9.2–12.9)
POTASSIUM SERPL-SCNC: 4.2 MMOL/L (ref 3.5–5.1)
RBC # BLD AUTO: 4.17 M/UL (ref 4–5.4)
SODIUM SERPL-SCNC: 136 MMOL/L (ref 136–145)
WBC # BLD AUTO: 9.58 K/UL (ref 3.9–12.7)

## 2020-11-21 PROCEDURE — 96372 THER/PROPH/DIAG INJ SC/IM: CPT

## 2020-11-21 PROCEDURE — 63600175 PHARM REV CODE 636 W HCPCS: Performed by: SURGERY

## 2020-11-21 PROCEDURE — 80048 BASIC METABOLIC PNL TOTAL CA: CPT

## 2020-11-21 PROCEDURE — 94799 UNLISTED PULMONARY SVC/PX: CPT

## 2020-11-21 PROCEDURE — 99900035 HC TECH TIME PER 15 MIN (STAT)

## 2020-11-21 PROCEDURE — 11000001 HC ACUTE MED/SURG PRIVATE ROOM

## 2020-11-21 PROCEDURE — 27000221 HC OXYGEN, UP TO 24 HOURS

## 2020-11-21 PROCEDURE — 94770 HC EXHALED C02 TEST: CPT

## 2020-11-21 PROCEDURE — 85025 COMPLETE CBC W/AUTO DIFF WBC: CPT

## 2020-11-21 PROCEDURE — 36415 COLL VENOUS BLD VENIPUNCTURE: CPT

## 2020-11-21 PROCEDURE — 25000003 PHARM REV CODE 250: Performed by: SURGERY

## 2020-11-21 PROCEDURE — 94761 N-INVAS EAR/PLS OXIMETRY MLT: CPT

## 2020-11-21 RX ORDER — DEXTROSE MONOHYDRATE, SODIUM CHLORIDE, AND POTASSIUM CHLORIDE 50; 1.49; 4.5 G/1000ML; G/1000ML; G/1000ML
INJECTION, SOLUTION INTRAVENOUS CONTINUOUS
Status: DISCONTINUED | OUTPATIENT
Start: 2020-11-21 | End: 2020-11-25

## 2020-11-21 RX ORDER — ACETAMINOPHEN 10 MG/ML
1000 INJECTION, SOLUTION INTRAVENOUS EVERY 8 HOURS
Status: COMPLETED | OUTPATIENT
Start: 2020-11-21 | End: 2020-11-21

## 2020-11-21 RX ADMIN — Medication: at 06:11

## 2020-11-21 RX ADMIN — DEXTROSE MONOHYDRATE, SODIUM CHLORIDE, AND POTASSIUM CHLORIDE: 50; 4.5; 1.49 INJECTION, SOLUTION INTRAVENOUS at 12:11

## 2020-11-21 RX ADMIN — ONDANSETRON 4 MG: 2 INJECTION INTRAMUSCULAR; INTRAVENOUS at 06:11

## 2020-11-21 RX ADMIN — CHLORHEXIDINE GLUCONATE 0.12% ORAL RINSE 10 ML: 1.2 LIQUID ORAL at 09:11

## 2020-11-21 RX ADMIN — ACETAMINOPHEN 1000 MG: 10 INJECTION, SOLUTION INTRAVENOUS at 02:11

## 2020-11-21 RX ADMIN — ENOXAPARIN SODIUM 40 MG: 100 INJECTION SUBCUTANEOUS at 11:11

## 2020-11-21 RX ADMIN — ACETAMINOPHEN 1000 MG: 10 INJECTION, SOLUTION INTRAVENOUS at 05:11

## 2020-11-21 RX ADMIN — PROMETHAZINE HYDROCHLORIDE 6.25 MG: 25 INJECTION INTRAMUSCULAR; INTRAVENOUS at 07:11

## 2020-11-21 RX ADMIN — DIAZEPAM 5 MG: 10 INJECTION, SOLUTION INTRAMUSCULAR; INTRAVENOUS at 09:11

## 2020-11-21 RX ADMIN — DIAZEPAM 5 MG: 10 INJECTION, SOLUTION INTRAMUSCULAR; INTRAVENOUS at 02:11

## 2020-11-21 RX ADMIN — DEXTROSE MONOHYDRATE, SODIUM CHLORIDE, AND POTASSIUM CHLORIDE: 50; 4.5; 1.49 INJECTION, SOLUTION INTRAVENOUS at 10:11

## 2020-11-21 NOTE — PLAN OF CARE
Pt remains free from falls. Pt incision clean, dry, intact. No drainage. Pain managed with meds as ordered. Spouse at bedside. Pt has no complaints. Will continue to monitor. 12 hour chart checks.

## 2020-11-21 NOTE — SUBJECTIVE & OBJECTIVE
Interval History:  Post reexploration and small-bowel resection x2 of inflamed/irritated bowel.  Add glucose to IV fluids  Continue PCA  Continue NG tube until bowel function returns  Await return of bowel function      Medications:  Continuous Infusions:   dextrose 5 % and 0.45 % NaCl with KCl 20 mEq      hydromorphone in 0.9 % NaCl 6 mg/30 ml       Scheduled Meds:   acetaminophen  1,000 mg Intravenous Q8H    bupivacaine liposome (PF)  266 mg Infiltration Once    chlorhexidine  10 mL Mouth/Throat BID    enoxaparin  40 mg Subcutaneous Q24H    nozaseptin   Each Nostril BID     PRN Meds:diazePAM, naloxone, naloxone, ondansetron, ondansetron, promethazine (PHENERGAN) IVPB     Review of patient's allergies indicates:   Allergen Reactions    Erythromycin     Morphine Nausea And Vomiting    Opioids - morphine analogues      Objective:     Vital Signs (Most Recent):  Temp: 97.5 °F (36.4 °C) (11/21/20 0722)  Pulse: 103 (11/21/20 0900)  Resp: 18 (11/21/20 0935)  BP: (!) 142/79 (11/21/20 0722)  SpO2: 95 % (11/21/20 0725) Vital Signs (24h Range):  Temp:  [97.5 °F (36.4 °C)-100.1 °F (37.8 °C)] 97.5 °F (36.4 °C)  Pulse:  [] 103  Resp:  [15-37] 18  SpO2:  [91 %-99 %] 95 %  BP: (138-162)/(75-85) 142/79     Weight: 74.9 kg (165 lb 2 oz)  Body mass index is 25.86 kg/m².    Intake/Output - Last 3 Shifts       11/19 0700 - 11/20 0659 11/20 0700 - 11/21 0659 11/21 0700 - 11/22 0659    P.O. 0  0    I.V. (mL/kg) 2400 (32) 3322.8 (44.4) 0 (0)    IV Piggyback 100 0     Total Intake(mL/kg) 2500 (33.4) 3322.8 (44.4) 0 (0)    Urine (mL/kg/hr) 0 (0) 850 (0.5)     Emesis/NG output  1300     Drains 900 450     Total Output 900 2600     Net +1600 +722.8 0           Urine Occurrence 3 x            Physical Exam  Vitals signs and nursing note reviewed.   Constitutional:       Appearance: Normal appearance.   HENT:      Head: Normocephalic and atraumatic.   Neck:      Musculoskeletal: Normal range of motion and neck supple.    Cardiovascular:      Rate and Rhythm: Normal rate and regular rhythm.      Pulses: Normal pulses.   Pulmonary:      Effort: Pulmonary effort is normal.      Breath sounds: Normal breath sounds.   Abdominal:      General: Abdomen is flat. Bowel sounds are normal. Distention: Mild.      Palpations: Abdomen is soft.      Tenderness: Tenderness:  incisional.      Comments: Incision is dressed and dry   Musculoskeletal:      Right lower leg: No edema.      Left lower leg: No edema.   Skin:     General: Skin is warm and dry.      Capillary Refill: Capillary refill takes less than 2 seconds.   Neurological:      General: No focal deficit present.      Mental Status: She is alert and oriented to person, place, and time.   Psychiatric:         Mood and Affect: Mood normal.         Behavior: Behavior normal.         Thought Content: Thought content normal.         Judgment: Judgment normal.         Significant Labs:  CBC:   Recent Labs   Lab 11/21/20  0745   WBC 9.58   RBC 4.17   HGB 13.8   HCT 41.5   *   *   MCH 33.1*   MCHC 33.3     BMP:   Recent Labs   Lab 11/19/20  0719 11/21/20  0745    102   * 136   K 4.1 4.2    98   CO2 28 28   BUN 7 7   CREATININE 0.6 0.7   CALCIUM 8.5* 8.5*   MG 1.7  --        Significant Diagnostics:  No new

## 2020-11-21 NOTE — PLAN OF CARE
Dressing to Midline incision C/D/I. NG tube to right nare connected to continuous suction. Remains NPO. IV fluids infusing as ordered. PCA pump in use for pain management. PRN nausea meds adm as needed. Rosales cath in place. Remains free from injury/incident. Call light in reach

## 2020-11-21 NOTE — ASSESSMENT & PLAN NOTE
Postop day 10/1 from exploratory laparotomy and we explained the recurrent bowel obstruction.    Monitor for signs of complications  Change IV fluids to D5 half-normal saline plus potassium  A.m. labs  Continue NG tube  Weight return of bowel function

## 2020-11-21 NOTE — PROGRESS NOTES
[facility] -   General Surgery  Progress Note    Subjective:     History of Present Illness:  54yo F with a PMHx significant for nephrolithiasis who presented to the ED for evaluation of epigastric abdominal pain. Reports a one day history of this pain which is sharp and moderate in nature. Has associated nausea and vomiting x24hrs. Denies any associated fever, chills, diarrhea or constipation. Workup in ED worrisome for small bowel obstruction. Surgery consulted for evaluation and pt admitted to hospital medicine. Pt reports minimal abdominal surgical history, just a hysterectomy and bladder repair. Last BM yesterday. NGT placed in ED and still with moderate abdominal pain. Denies fevers, chills, diarrhea or constipation.        Post-Op Info:  Procedure(s) (LRB):  LAPAROTOMY (N/A)  EXCISION, SMALL INTESTINE (N/A)  LYSIS, ADHESIONS (N/A)   1 Day Post-Op     Interval History:  Post reexploration and small-bowel resection x2 of inflamed/irritated bowel.  Add glucose to IV fluids  Continue PCA  Continue NG tube until bowel function returns  Await return of bowel function      Medications:  Continuous Infusions:   dextrose 5 % and 0.45 % NaCl with KCl 20 mEq      hydromorphone in 0.9 % NaCl 6 mg/30 ml       Scheduled Meds:   acetaminophen  1,000 mg Intravenous Q8H    bupivacaine liposome (PF)  266 mg Infiltration Once    chlorhexidine  10 mL Mouth/Throat BID    enoxaparin  40 mg Subcutaneous Q24H    nozaseptin   Each Nostril BID     PRN Meds:diazePAM, naloxone, naloxone, ondansetron, ondansetron, promethazine (PHENERGAN) IVPB     Review of patient's allergies indicates:   Allergen Reactions    Erythromycin     Morphine Nausea And Vomiting    Opioids - morphine analogues      Objective:     Vital Signs (Most Recent):  Temp: 97.5 °F (36.4 °C) (11/21/20 0722)  Pulse: 103 (11/21/20 0900)  Resp: 18 (11/21/20 0935)  BP: (!) 142/79 (11/21/20 0722)  SpO2: 95 % (11/21/20 0725) Vital Signs (24h  Range):  Temp:  [97.5 °F (36.4 °C)-100.1 °F (37.8 °C)] 97.5 °F (36.4 °C)  Pulse:  [] 103  Resp:  [15-37] 18  SpO2:  [91 %-99 %] 95 %  BP: (138-162)/(75-85) 142/79     Weight: 74.9 kg (165 lb 2 oz)  Body mass index is 25.86 kg/m².    Intake/Output - Last 3 Shifts       11/19 0700 - 11/20 0659 11/20 0700 - 11/21 0659 11/21 0700 - 11/22 0659    P.O. 0  0    I.V. (mL/kg) 2400 (32) 3322.8 (44.4) 0 (0)    IV Piggyback 100 0     Total Intake(mL/kg) 2500 (33.4) 3322.8 (44.4) 0 (0)    Urine (mL/kg/hr) 0 (0) 850 (0.5)     Emesis/NG output  1300     Drains 900 450     Total Output 900 2600     Net +1600 +722.8 0           Urine Occurrence 3 x            Physical Exam  Vitals signs and nursing note reviewed.   Constitutional:       Appearance: Normal appearance.   HENT:      Head: Normocephalic and atraumatic.   Neck:      Musculoskeletal: Normal range of motion and neck supple.   Cardiovascular:      Rate and Rhythm: Normal rate and regular rhythm.      Pulses: Normal pulses.   Pulmonary:      Effort: Pulmonary effort is normal.      Breath sounds: Normal breath sounds.   Abdominal:      General: Abdomen is flat. Bowel sounds are normal. Distention: Mild.      Palpations: Abdomen is soft.      Tenderness: Tenderness:  incisional.      Comments: Incision is dressed and dry   Musculoskeletal:      Right lower leg: No edema.      Left lower leg: No edema.   Skin:     General: Skin is warm and dry.      Capillary Refill: Capillary refill takes less than 2 seconds.   Neurological:      General: No focal deficit present.      Mental Status: She is alert and oriented to person, place, and time.   Psychiatric:         Mood and Affect: Mood normal.         Behavior: Behavior normal.         Thought Content: Thought content normal.         Judgment: Judgment normal.         Significant Labs:  CBC:   Recent Labs   Lab 11/21/20  0745   WBC 9.58   RBC 4.17   HGB 13.8   HCT 41.5   *   *   MCH 33.1*   MCHC 33.3     BMP:    Recent Labs   Lab 11/19/20  0719 11/21/20  0745    102   * 136   K 4.1 4.2    98   CO2 28 28   BUN 7 7   CREATININE 0.6 0.7   CALCIUM 8.5* 8.5*   MG 1.7  --        Significant Diagnostics:  No new    Assessment/Plan:     * Small bowel obstruction  Postop day 10/1 from exploratory laparotomy and we explained the recurrent bowel obstruction.    Monitor for signs of complications  Change IV fluids to D5 half-normal saline plus potassium  A.m. labs  Continue NG tube  Weight return of bowel function    Leukocytosis  Resolved    Anxiety  Valium 5 mg p.o. p.r.n. anxiety        Tee Segura MD  General Surgery  [facility] -

## 2020-11-21 NOTE — PLAN OF CARE
Pt had no adverse events during shift. Pt free of falls. Call light in reach. Side rails x 2. Pain well controlled w/ pca pump. Nausea controlled well with prn meds. Ng set to moderate, continuous suction per orders. Pt repositions independently. IVF administered as ordered. VTE prophylaxis- . VSS, NADN. Safety promoted. Chart reviewed, will continue to monitor.

## 2020-11-22 LAB
ANION GAP SERPL CALC-SCNC: 10 MMOL/L (ref 8–16)
BASOPHILS # BLD AUTO: 0.05 K/UL (ref 0–0.2)
BASOPHILS NFR BLD: 0.6 % (ref 0–1.9)
BUN SERPL-MCNC: 5 MG/DL (ref 6–20)
CALCIUM SERPL-MCNC: 8.2 MG/DL (ref 8.7–10.5)
CHLORIDE SERPL-SCNC: 99 MMOL/L (ref 95–110)
CO2 SERPL-SCNC: 27 MMOL/L (ref 23–29)
CREAT SERPL-MCNC: 0.5 MG/DL (ref 0.5–1.4)
DIFFERENTIAL METHOD: ABNORMAL
EOSINOPHIL # BLD AUTO: 0.4 K/UL (ref 0–0.5)
EOSINOPHIL NFR BLD: 4.7 % (ref 0–8)
ERYTHROCYTE [DISTWIDTH] IN BLOOD BY AUTOMATED COUNT: 13.2 % (ref 11.5–14.5)
EST. GFR  (AFRICAN AMERICAN): >60 ML/MIN/1.73 M^2
EST. GFR  (NON AFRICAN AMERICAN): >60 ML/MIN/1.73 M^2
GLUCOSE SERPL-MCNC: 99 MG/DL (ref 70–110)
HCT VFR BLD AUTO: 35.9 % (ref 37–48.5)
HGB BLD-MCNC: 11.9 G/DL (ref 12–16)
IMM GRANULOCYTES # BLD AUTO: 0.07 K/UL (ref 0–0.04)
IMM GRANULOCYTES NFR BLD AUTO: 0.9 % (ref 0–0.5)
LYMPHOCYTES # BLD AUTO: 1.4 K/UL (ref 1–4.8)
LYMPHOCYTES NFR BLD: 18 % (ref 18–48)
MAGNESIUM SERPL-MCNC: 1.6 MG/DL (ref 1.6–2.6)
MCH RBC QN AUTO: 32.7 PG (ref 27–31)
MCHC RBC AUTO-ENTMCNC: 33.1 G/DL (ref 32–36)
MCV RBC AUTO: 99 FL (ref 82–98)
MONOCYTES # BLD AUTO: 0.6 K/UL (ref 0.3–1)
MONOCYTES NFR BLD: 7.2 % (ref 4–15)
NEUTROPHILS # BLD AUTO: 5.3 K/UL (ref 1.8–7.7)
NEUTROPHILS NFR BLD: 68.6 % (ref 38–73)
NRBC BLD-RTO: 0 /100 WBC
PHOSPHATE SERPL-MCNC: 2.6 MG/DL (ref 2.7–4.5)
PLATELET # BLD AUTO: 377 K/UL (ref 150–350)
PMV BLD AUTO: 10.8 FL (ref 9.2–12.9)
POTASSIUM SERPL-SCNC: 4 MMOL/L (ref 3.5–5.1)
RBC # BLD AUTO: 3.64 M/UL (ref 4–5.4)
SODIUM SERPL-SCNC: 136 MMOL/L (ref 136–145)
WBC # BLD AUTO: 7.73 K/UL (ref 3.9–12.7)

## 2020-11-22 PROCEDURE — 25000003 PHARM REV CODE 250: Performed by: SURGERY

## 2020-11-22 PROCEDURE — 94761 N-INVAS EAR/PLS OXIMETRY MLT: CPT

## 2020-11-22 PROCEDURE — 83735 ASSAY OF MAGNESIUM: CPT

## 2020-11-22 PROCEDURE — 99900035 HC TECH TIME PER 15 MIN (STAT)

## 2020-11-22 PROCEDURE — 94770 HC EXHALED C02 TEST: CPT

## 2020-11-22 PROCEDURE — 63600175 PHARM REV CODE 636 W HCPCS: Performed by: SURGERY

## 2020-11-22 PROCEDURE — 36415 COLL VENOUS BLD VENIPUNCTURE: CPT

## 2020-11-22 PROCEDURE — 21400001 HC TELEMETRY ROOM

## 2020-11-22 PROCEDURE — 80048 BASIC METABOLIC PNL TOTAL CA: CPT

## 2020-11-22 PROCEDURE — 85025 COMPLETE CBC W/AUTO DIFF WBC: CPT

## 2020-11-22 PROCEDURE — 84100 ASSAY OF PHOSPHORUS: CPT

## 2020-11-22 PROCEDURE — 11000001 HC ACUTE MED/SURG PRIVATE ROOM

## 2020-11-22 RX ADMIN — DIAZEPAM 5 MG: 10 INJECTION, SOLUTION INTRAMUSCULAR; INTRAVENOUS at 09:11

## 2020-11-22 RX ADMIN — DIAZEPAM 5 MG: 10 INJECTION, SOLUTION INTRAMUSCULAR; INTRAVENOUS at 10:11

## 2020-11-22 RX ADMIN — ONDANSETRON 4 MG: 2 INJECTION INTRAMUSCULAR; INTRAVENOUS at 05:11

## 2020-11-22 RX ADMIN — CHLORHEXIDINE GLUCONATE 0.12% ORAL RINSE 10 ML: 1.2 LIQUID ORAL at 08:11

## 2020-11-22 RX ADMIN — PROMETHAZINE HYDROCHLORIDE 6.25 MG: 25 INJECTION INTRAMUSCULAR; INTRAVENOUS at 01:11

## 2020-11-22 RX ADMIN — DEXTROSE MONOHYDRATE, SODIUM CHLORIDE, AND POTASSIUM CHLORIDE: 50; 4.5; 1.49 INJECTION, SOLUTION INTRAVENOUS at 08:11

## 2020-11-22 RX ADMIN — ENOXAPARIN SODIUM 40 MG: 100 INJECTION SUBCUTANEOUS at 05:11

## 2020-11-22 RX ADMIN — SODIUM PHOSPHATE, MONOBASIC, MONOHYDRATE 30 MMOL: 276; 142 INJECTION, SOLUTION INTRAVENOUS at 02:11

## 2020-11-22 RX ADMIN — ONDANSETRON 4 MG: 2 INJECTION INTRAMUSCULAR; INTRAVENOUS at 11:11

## 2020-11-22 RX ADMIN — ONDANSETRON 8 MG: 8 TABLET, ORALLY DISINTEGRATING ORAL at 12:11

## 2020-11-22 NOTE — PLAN OF CARE
Pt had no adverse events during shift. Pt free of falls. Call light in reach. Side rails x 2. Pain well controlled w/ pca pump. Pt reporting less nausea this shift. NG set to moderate, continuous suction. IVF administered as ordered. VTE prophylaxis- . VSS. Safety promoted. Chart reviewed, will continue to monitor.

## 2020-11-22 NOTE — PROGRESS NOTES
[facility] -   General Surgery  Progress Note    Subjective:     History of Present Illness:  56yo F with a PMHx significant for nephrolithiasis who presented to the ED for evaluation of epigastric abdominal pain. Reports a one day history of this pain which is sharp and moderate in nature. Has associated nausea and vomiting x24hrs. Denies any associated fever, chills, diarrhea or constipation. Workup in ED worrisome for small bowel obstruction. Surgery consulted for evaluation and pt admitted to hospital medicine. Pt reports minimal abdominal surgical history, just a hysterectomy and bladder repair. Last BM yesterday. NGT placed in ED and still with moderate abdominal pain. Denies fevers, chills, diarrhea or constipation.        Post-Op Info:  Procedure(s) (LRB):  LAPAROTOMY (N/A)  EXCISION, SMALL INTESTINE (N/A)  LYSIS, ADHESIONS (N/A)   2 Days Post-Op     Interval History:  Has begun passing flatus.  Low-grade temperature.  Will clamp NG tube and hopefully removed.  Await improvement in bowel function be sore starting with liquids    Medications:  Continuous Infusions:   dextrose 5 % and 0.45 % NaCl with KCl 20 mEq 100 mL/hr at 11/21/20 2203    hydromorphone in 0.9 % NaCl 6 mg/30 ml       Scheduled Meds:   bupivacaine liposome (PF)  266 mg Infiltration Once    chlorhexidine  10 mL Mouth/Throat BID    enoxaparin  40 mg Subcutaneous Q24H    nozaseptin   Each Nostril BID     PRN Meds:diazePAM, naloxone, naloxone, ondansetron, ondansetron, promethazine (PHENERGAN) IVPB     Review of patient's allergies indicates:   Allergen Reactions    Erythromycin     Morphine Nausea And Vomiting    Opioids - morphine analogues      Objective:     Vital Signs (Most Recent):  Temp: 98.6 °F (37 °C) (11/22/20 0744)  Pulse: 96 (11/22/20 0744)  Resp: 14 (11/22/20 0744)  BP: 133/72 (11/22/20 0744)  SpO2: (!) 91 % (11/22/20 0744) Vital Signs (24h Range):  Temp:  [97.8 °F (36.6 °C)-100.6 °F (38.1 °C)] 98.6 °F (37  °C)  Pulse:  [] 96  Resp:  [14-20] 14  SpO2:  [91 %-97 %] 91 %  BP: (121-134)/(64-72) 133/72     Weight: 75 kg (165 lb 5.5 oz)  Body mass index is 25.9 kg/m².    Intake/Output - Last 3 Shifts       11/20 0700 - 11/21 0659 11/21 0700 - 11/22 0659 11/22 0700 - 11/23 0659    P.O.  0     I.V. (mL/kg) 3322.8 (44.4) 506.7 (6.8) 4 (0.1)    IV Piggyback 0 400     Total Intake(mL/kg) 3322.8 (44.4) 906.7 (12.1) 4 (0.1)    Urine (mL/kg/hr) 850 (0.5) 1100 (0.6)     Emesis/NG output 1300      Drains 450 500     Total Output 2600 1600     Net +722.8 -693.3 +4                 Physical Exam  Vitals signs and nursing note reviewed.   Constitutional:       Appearance: Normal appearance.   Cardiovascular:      Rate and Rhythm: Normal rate and regular rhythm.   Pulmonary:      Effort: Pulmonary effort is normal.      Breath sounds: Normal breath sounds.   Abdominal:      General: Abdomen is flat. Bowel sounds are normal.      Palpations: Abdomen is soft.      Comments: Incision is clean   Musculoskeletal:      Right lower leg: No edema.      Left lower leg: No edema.   Skin:     General: Skin is warm and dry.      Capillary Refill: Capillary refill takes less than 2 seconds.   Neurological:      General: No focal deficit present.      Mental Status: She is alert and oriented to person, place, and time.   Psychiatric:         Mood and Affect: Mood normal.         Behavior: Behavior normal.         Thought Content: Thought content normal.         Judgment: Judgment normal.         Significant Labs:  CBC:   Recent Labs   Lab 11/21/20  0745   WBC 9.58   RBC 4.17   HGB 13.8   HCT 41.5   *   *   MCH 33.1*   MCHC 33.3     BMP:   Recent Labs   Lab 11/19/20  0719 11/21/20  0745    102   * 136   K 4.1 4.2    98   CO2 28 28   BUN 7 7   CREATININE 0.6 0.7   CALCIUM 8.5* 8.5*   MG 1.7  --        Significant Diagnostics:  No new    Assessment/Plan:     * Small bowel obstruction  Postop day 11/ 2 from  exploratory laparotomy and we explained the recurrent bowel obstruction.    Monitor for signs of complications  Change IV fluids to D5 half-normal saline plus potassium    Clamp NG tube  Weight return of bowel function    Leukocytosis  Resolved    Anxiety  Valium 5 mg p.o. p.r.n. anxiety        Tee Segura MD  General Surgery  [facility] [place]

## 2020-11-22 NOTE — SUBJECTIVE & OBJECTIVE
Interval History:  Has begun passing flatus.  Low-grade temperature.  Will clamp NG tube and hopefully removed.  Await improvement in bowel function be sore starting with liquids    Medications:  Continuous Infusions:   dextrose 5 % and 0.45 % NaCl with KCl 20 mEq 100 mL/hr at 11/21/20 2203    hydromorphone in 0.9 % NaCl 6 mg/30 ml       Scheduled Meds:   bupivacaine liposome (PF)  266 mg Infiltration Once    chlorhexidine  10 mL Mouth/Throat BID    enoxaparin  40 mg Subcutaneous Q24H    nozaseptin   Each Nostril BID     PRN Meds:diazePAM, naloxone, naloxone, ondansetron, ondansetron, promethazine (PHENERGAN) IVPB     Review of patient's allergies indicates:   Allergen Reactions    Erythromycin     Morphine Nausea And Vomiting    Opioids - morphine analogues      Objective:     Vital Signs (Most Recent):  Temp: 98.6 °F (37 °C) (11/22/20 0744)  Pulse: 96 (11/22/20 0744)  Resp: 14 (11/22/20 0744)  BP: 133/72 (11/22/20 0744)  SpO2: (!) 91 % (11/22/20 0744) Vital Signs (24h Range):  Temp:  [97.8 °F (36.6 °C)-100.6 °F (38.1 °C)] 98.6 °F (37 °C)  Pulse:  [] 96  Resp:  [14-20] 14  SpO2:  [91 %-97 %] 91 %  BP: (121-134)/(64-72) 133/72     Weight: 75 kg (165 lb 5.5 oz)  Body mass index is 25.9 kg/m².    Intake/Output - Last 3 Shifts       11/20 0700 - 11/21 0659 11/21 0700 - 11/22 0659 11/22 0700 - 11/23 0659    P.O.  0     I.V. (mL/kg) 3322.8 (44.4) 506.7 (6.8) 4 (0.1)    IV Piggyback 0 400     Total Intake(mL/kg) 3322.8 (44.4) 906.7 (12.1) 4 (0.1)    Urine (mL/kg/hr) 850 (0.5) 1100 (0.6)     Emesis/NG output 1300      Drains 450 500     Total Output 2600 1600     Net +722.8 -693.3 +4                 Physical Exam  Vitals signs and nursing note reviewed.   Constitutional:       Appearance: Normal appearance.   Cardiovascular:      Rate and Rhythm: Normal rate and regular rhythm.   Pulmonary:      Effort: Pulmonary effort is normal.      Breath sounds: Normal breath sounds.   Abdominal:      General: Abdomen  is flat. Bowel sounds are normal.      Palpations: Abdomen is soft.      Comments: Incision is clean   Musculoskeletal:      Right lower leg: No edema.      Left lower leg: No edema.   Skin:     General: Skin is warm and dry.      Capillary Refill: Capillary refill takes less than 2 seconds.   Neurological:      General: No focal deficit present.      Mental Status: She is alert and oriented to person, place, and time.   Psychiatric:         Mood and Affect: Mood normal.         Behavior: Behavior normal.         Thought Content: Thought content normal.         Judgment: Judgment normal.         Significant Labs:  CBC:   Recent Labs   Lab 11/21/20  0745   WBC 9.58   RBC 4.17   HGB 13.8   HCT 41.5   *   *   MCH 33.1*   MCHC 33.3     BMP:   Recent Labs   Lab 11/19/20  0719 11/21/20  0745    102   * 136   K 4.1 4.2    98   CO2 28 28   BUN 7 7   CREATININE 0.6 0.7   CALCIUM 8.5* 8.5*   MG 1.7  --        Significant Diagnostics:  No new

## 2020-11-22 NOTE — ASSESSMENT & PLAN NOTE
Postop day 11/ 2 from exploratory laparotomy and we explained the recurrent bowel obstruction.    Monitor for signs of complications  Change IV fluids to D5 half-normal saline plus potassium    Clamp NG tube  Weight return of bowel function

## 2020-11-22 NOTE — PLAN OF CARE
Patient received on shift x4, in no acute distress or discomfort. Vitals present within stable limits. NGT clamped on shift @ 10a as ordered and to recheck residuals 4 hours after(2p); when assessed no residual volume observed; however pt. Complains of mild nausea and prn meds given. MD made aware and orders to proceed w/ ngt removal given. Dressing to abdomen remains clean and intact. Pt. Ambulated with nurse on shift and tolerated well. PCA remains in place and pt. Tolerated pain management well. Safety precautions wdl. Will continue to monitor progress.

## 2020-11-23 PROBLEM — E43 SEVERE MALNUTRITION: Status: ACTIVE | Noted: 2020-11-23

## 2020-11-23 LAB
ANION GAP SERPL CALC-SCNC: 10 MMOL/L (ref 8–16)
BUN SERPL-MCNC: 4 MG/DL (ref 6–20)
CALCIUM SERPL-MCNC: 8.4 MG/DL (ref 8.7–10.5)
CHLORIDE SERPL-SCNC: 102 MMOL/L (ref 95–110)
CO2 SERPL-SCNC: 26 MMOL/L (ref 23–29)
CREAT SERPL-MCNC: 0.6 MG/DL (ref 0.5–1.4)
EST. GFR  (AFRICAN AMERICAN): >60 ML/MIN/1.73 M^2
EST. GFR  (NON AFRICAN AMERICAN): >60 ML/MIN/1.73 M^2
GLUCOSE SERPL-MCNC: 111 MG/DL (ref 70–110)
PHOSPHATE SERPL-MCNC: 3.3 MG/DL (ref 2.7–4.5)
POTASSIUM SERPL-SCNC: 3.7 MMOL/L (ref 3.5–5.1)
SODIUM SERPL-SCNC: 138 MMOL/L (ref 136–145)

## 2020-11-23 PROCEDURE — 80048 BASIC METABOLIC PNL TOTAL CA: CPT

## 2020-11-23 PROCEDURE — 25000003 PHARM REV CODE 250: Performed by: SURGERY

## 2020-11-23 PROCEDURE — 84100 ASSAY OF PHOSPHORUS: CPT

## 2020-11-23 PROCEDURE — 63600175 PHARM REV CODE 636 W HCPCS: Performed by: SURGERY

## 2020-11-23 PROCEDURE — 99900035 HC TECH TIME PER 15 MIN (STAT)

## 2020-11-23 PROCEDURE — 36415 COLL VENOUS BLD VENIPUNCTURE: CPT

## 2020-11-23 PROCEDURE — 21400001 HC TELEMETRY ROOM

## 2020-11-23 PROCEDURE — 94761 N-INVAS EAR/PLS OXIMETRY MLT: CPT

## 2020-11-23 PROCEDURE — 11000001 HC ACUTE MED/SURG PRIVATE ROOM

## 2020-11-23 RX ORDER — HYDROCODONE BITARTRATE AND ACETAMINOPHEN 5; 325 MG/1; MG/1
1 TABLET ORAL EVERY 4 HOURS PRN
Status: DISCONTINUED | OUTPATIENT
Start: 2020-11-23 | End: 2020-11-26 | Stop reason: HOSPADM

## 2020-11-23 RX ORDER — HYDROMORPHONE HYDROCHLORIDE 1 MG/ML
1 INJECTION, SOLUTION INTRAMUSCULAR; INTRAVENOUS; SUBCUTANEOUS
Status: DISCONTINUED | OUTPATIENT
Start: 2020-11-23 | End: 2020-11-26 | Stop reason: HOSPADM

## 2020-11-23 RX ORDER — HYDROCODONE BITARTRATE AND ACETAMINOPHEN 7.5; 325 MG/1; MG/1
1 TABLET ORAL EVERY 6 HOURS PRN
Status: DISCONTINUED | OUTPATIENT
Start: 2020-11-23 | End: 2020-11-26 | Stop reason: HOSPADM

## 2020-11-23 RX ADMIN — PROMETHAZINE HYDROCHLORIDE 6.25 MG: 25 INJECTION INTRAMUSCULAR; INTRAVENOUS at 04:11

## 2020-11-23 RX ADMIN — DIAZEPAM 5 MG: 10 INJECTION, SOLUTION INTRAMUSCULAR; INTRAVENOUS at 06:11

## 2020-11-23 RX ADMIN — Medication: at 05:11

## 2020-11-23 RX ADMIN — DEXTROSE MONOHYDRATE, SODIUM CHLORIDE, AND POTASSIUM CHLORIDE: 50; 4.5; 1.49 INJECTION, SOLUTION INTRAVENOUS at 01:11

## 2020-11-23 RX ADMIN — HYDROCODONE BITARTRATE AND ACETAMINOPHEN 1 TABLET: 7.5; 325 TABLET ORAL at 01:11

## 2020-11-23 RX ADMIN — ONDANSETRON 4 MG: 2 INJECTION INTRAMUSCULAR; INTRAVENOUS at 08:11

## 2020-11-23 RX ADMIN — CHLORHEXIDINE GLUCONATE 0.12% ORAL RINSE 10 ML: 1.2 LIQUID ORAL at 09:11

## 2020-11-23 RX ADMIN — HYDROMORPHONE HYDROCHLORIDE 1 MG: 1 INJECTION, SOLUTION INTRAMUSCULAR; INTRAVENOUS; SUBCUTANEOUS at 08:11

## 2020-11-23 RX ADMIN — PROMETHAZINE HYDROCHLORIDE 6.25 MG: 25 INJECTION INTRAMUSCULAR; INTRAVENOUS at 01:11

## 2020-11-23 RX ADMIN — DIAZEPAM 5 MG: 10 INJECTION, SOLUTION INTRAMUSCULAR; INTRAVENOUS at 05:11

## 2020-11-23 RX ADMIN — DEXTROSE MONOHYDRATE, SODIUM CHLORIDE, AND POTASSIUM CHLORIDE: 50; 4.5; 1.49 INJECTION, SOLUTION INTRAVENOUS at 02:11

## 2020-11-23 RX ADMIN — ENOXAPARIN SODIUM 40 MG: 100 INJECTION SUBCUTANEOUS at 05:11

## 2020-11-23 RX ADMIN — CHLORHEXIDINE GLUCONATE 0.12% ORAL RINSE 10 ML: 1.2 LIQUID ORAL at 08:11

## 2020-11-23 NOTE — ASSESSMENT & PLAN NOTE
Postop day 12/3 from exploratory laparotomy and we explained the recurrent bowel obstruction.    Monitor for signs of complications  Change IV fluids to D5 half-normal saline plus potassium    NG tube removed  Clear liquids  DC PCA  IV and oral narcotics

## 2020-11-23 NOTE — PROGRESS NOTES
"[facility] -   Adult Nutrition  Progress Note    SUMMARY       Recommendations    Recommendation/Intervention:   1. Advance diet as tolerated to clear liquid>full liquid> soft, bland >regular diet.   2. Recommend Boost Breeze TID while on clear liquids.   3. Recommend Boost Plus while on full liquids and continuation of diet advancement.   4. Re weigh patient  5. RD to follow up.    Goals: 1. Po diet advanced to Soft, bland within the next 3-5 days.   Nutrition Goal Status: new  Communication of RD Recs: (POC)    Reason for Assessment    Reason For Assessment: length of stay  Diagnosis: (SBO)  Relevant Medical History: Reflex sympathetic dystrophy    General Information Comments:  11.19.20- Pt presented with epigastric abdominal pain. Pt NPO x 3 days. Previously on regular diet with % of meals consumed.  lbs, no significant change in weight. Remote assessment NFPE not complete.   Nutrition Discharge Planning: Adequate nutrition to meet needs via regular diet.  11.23.20: Patient NGT removed this morning and po clear liquid diet started today. She stated that she is tolerating clears well with no complaints of nvcd. LBM this morning per patient. Per NFPE, pt meets criteria for malnutrition. She reports prior to hospitalization, she was working out/actively moving 5x/week. Reported UBW @ 370# 3 years ago with a slow decline. Evident signs of weight loss per RD observation.     Nutrition Discharge Planning: Pending medical course and diet advancement.     Nutrition Risk Screen    Nutrition Risk Screen: no indicators present    Nutrition/Diet History    Spiritual, Cultural Beliefs, [employer] Practices, Values that Affect Care: no    Anthropometrics    Temp: 98.6 °F (37 °C)  Height Method: Stated  Height: 5' 7" (170.2 cm)  Height (inches): 67 in  Weight Method: Bed Scale  Weight: 75 kg (165 lb 5.5 oz)  Weight (lb): 165.35 lb  Ideal Body Weight (IBW), Female: 135 lb  % Ideal Body Weight, Female " (lb): 122.32 %  BMI (Calculated): 25.9  BMI Grade: 25 - 29.9 - overweight  Usual Body Weight (UBW), k.5 kg  % Usual Body Weight: 100.75  % Weight Change From Usual Weight: 0.54 %       Lab/Procedures/Meds    Pertinent Labs Reviewed: reviewed  Pertinent Labs Comments: Na 135  Pertinent Medications Reviewed: reviewed    Physical Findings/Assessment     NGT removed this morning (); Wounds: incision site abdomen.     Estimated/Assessed Needs    Weight Used For Calorie Calculations: 74.9 kg (165 lb 2 oz)  Energy Calorie Requirements (kcal): 1,872.5 kcals/day(25 kcals/day)  Energy Need Method: Kcal/kg  Protein Requirements: 60.05-75.06 g/day(0.8-1.0 g/kg)  Weight Used For Protein Calculations: 74.9 kg (165 lb 2 oz)  Fluid Requirements (mL): 1mL/kcal or per MD  Estimated Fluid Requirement Method: RDA Method  RDA Method (mL): 1         Nutrition Prescription Ordered    Current Diet Order: Clear liquids  Nutrition Order Comments: NPO/Clear liquids intermittently for 13 days    Evaluation of Received Nutrient/Fluid Intake    Energy Calories Required: not meeting needs  Protein Required: not meeting needs  Fluid Required: not meeting needs       Intake/Output Summary (Last 24 hours) at 2020 1328  Last data filed at 2020 1200  Gross per 24 hour   Intake 373 ml   Output --   Net 373 ml         % Intake of Estimated Energy Needs: 0 - 25 %  % Meal Intake: Other: Clear Liquid Diet    Nutrition Risk    Level of Risk/Frequency of Follow-up: (2x/weekly)     Assessment and Plan    Severe malnutrition  Nutrition Problem:  (Severe) Protein-Calorie Malnutrition  Malnutrition in the context of Chronic Illness/Injury and Acute Illness/Injury    Related to (etiology):  Altered GI function;structure; NPO/Clear liquid x13 days    Signs and Symptoms (as evidenced by):  Energy Intake: <75% of estimated energy requirement for 13 days  Body Fat Depletion: moderate depletion of orbitals and triceps   Muscle Mass Depletion:  moderate depletion of temples, clavicle region, scapular region and lower extremities   Weight Loss: ROXY @ this time; need current weight  Fluid Accumulation: UTD    Interventions(treatment strategy):  Nutrition Prescription   Medical food supplement  Collaboration with other providers.     Nutrition Diagnosis Status:  New             Monitor and Evaluation    Food and Nutrient Intake: energy intake  Food and Nutrient Adminstration: diet order, enteral and parenteral nutrition administration  Knowledge/Beliefs/Attitudes: food and nutrition knowledge/skill  Physical Activity and Function: nutrition-related ADLs and IADLs  Anthropometric Measurements: weight  Biochemical Data, Medical Tests and Procedures: lipid profile, electrolyte and renal panel, gastrointestinal profile, glucose/endocrine profile  Nutrition-Focused Physical Findings: overall appearance     Malnutrition Assessment  Malnutrition Type: acute illness or injury, chronic illness  Skin (Micronutrient): (Ras Score: 22)       Energy Intake (Malnutrition): less than 75% for greater than 7 days  Subcutaneous Fat (Malnutrition): moderate depletion  Muscle Mass (Malnutrition): moderate depletion   Orbital Region (Subcutaneous Fat Loss): moderate depletion   [place] Region (Muscle Loss): moderate depletion  Clavicle Bone Region (Muscle Loss): moderate depletion  Clavicle and Acromion Bone Region (Muscle Loss): moderate depletion  Anterior Thigh Region (Muscle Loss): mild depletion  Posterior Calf Region (Muscle Loss): mild depletion       Subcutaneous Fat Loss (Final Summary): moderate protein-calorie malnutrition  Muscle Loss Evaluation (Final Summary): moderate protein-calorie malnutrition         Nutrition Follow-Up    RD Follow-up?: Yes     Elizabeth Pimnetel RD, CARTER

## 2020-11-23 NOTE — ASSESSMENT & PLAN NOTE
Nutrition Problem:  (Severe) Protein-Calorie Malnutrition  Malnutrition in the context of Chronic Illness/Injury and Acute Illness/Injury    Related to (etiology):  Altered GI function;structure; NPO/Clear liquid x13 days    Signs and Symptoms (as evidenced by):  Energy Intake: <75% of estimated energy requirement for 13 days  Body Fat Depletion: moderate depletion of orbitals and triceps   Muscle Mass Depletion: moderate depletion of temples, clavicle region, scapular region and lower extremities   Weight Loss: ROXY @ this time; need current weight  Fluid Accumulation: UTD    Interventions(treatment strategy):  Nutrition Prescription   Medical food supplement  Collaboration with other providers.     Nutrition Diagnosis Status:  New

## 2020-11-23 NOTE — PLAN OF CARE
Patient received on shift x4, in no acute distress or discomfort. Vitals present within stable limits. Medications tolerated well.  Dressing to abdomen remains clean and intact. Pt. Ambulated with nurse on shift and tolerated well. PCA D/C'd on shift as ordered with PRN oral replacement. Safety precautions wdl. Will continue to monitor progress.

## 2020-11-23 NOTE — PROGRESS NOTES
[facility] -   General Surgery  Progress Note    Subjective:     History of Present Illness:  56yo F with a PMHx significant for nephrolithiasis who presented to the ED for evaluation of epigastric abdominal pain. Reports a one day history of this pain which is sharp and moderate in nature. Has associated nausea and vomiting x24hrs. Denies any associated fever, chills, diarrhea or constipation. Workup in ED worrisome for small bowel obstruction. Surgery consulted for evaluation and pt admitted to hospital medicine. Pt reports minimal abdominal surgical history, just a hysterectomy and bladder repair. Last BM yesterday. NGT placed in ED and still with moderate abdominal pain. Denies fevers, chills, diarrhea or constipation.        Post-Op Info:  Procedure(s) (LRB):  LAPAROTOMY (N/A)  EXCISION, SMALL INTESTINE (N/A)  LYSIS, ADHESIONS (N/A)   3 Days Post-Op     Interval History:  NG tube removed.  Passing flatus.  Mild nausea.  DC PCA, oral and IV narcotics start clear liquid diet    Medications:  Continuous Infusions:   dextrose 5 % and 0.45 % NaCl with KCl 20 mEq 80 mL/hr at 11/23/20 0758     Scheduled Meds:   bupivacaine liposome (PF)  266 mg Infiltration Once    chlorhexidine  10 mL Mouth/Throat BID    enoxaparin  40 mg Subcutaneous Q24H    nozaseptin   Each Nostril BID     PRN Meds:diazePAM, HYDROcodone-acetaminophen, HYDROcodone-acetaminophen, HYDROmorphone, naloxone, naloxone, ondansetron, ondansetron, promethazine (PHENERGAN) IVPB     Review of patient's allergies indicates:   Allergen Reactions    Erythromycin     Morphine Nausea And Vomiting    Opioids - morphine analogues      Objective:     Vital Signs (Most Recent):  Temp: 97.3 °F (36.3 °C) (11/23/20 0812)  Pulse: 91 (11/23/20 0812)  Resp: 14 (11/23/20 0812)  BP: 134/75 (11/23/20 0812)  SpO2: (!) 94 % (11/23/20 0812) Vital Signs (24h Range):  Temp:  [97.3 °F (36.3 °C)-98.8 °F (37.1 °C)] 97.3 °F (36.3 °C)  Pulse:  [] 91  Resp:   [14-20] 14  SpO2:  [93 %-99 %] 94 %  BP: (119-139)/(67-82) 134/75     Weight: 75 kg (165 lb 5.5 oz)  Body mass index is 25.9 kg/m².    Intake/Output - Last 3 Shifts       11/21 0700 - 11/22 0659 11/22 0700 - 11/23 0659 11/23 0700 - 11/24 0659    P.O. 0 0 0    I.V. (mL/kg) 506.7 (6.8) 11 (0.1)     IV Piggyback 400      Total Intake(mL/kg) 906.7 (12.1) 11 (0.1) 0 (0)    Urine (mL/kg/hr) 1100 (0.6) 0 (0)     Emesis/NG output       Drains 500 450     Total Output 1600 450     Net -693.3 -439 0           Urine Occurrence  4 x           Physical Exam  Vitals signs and nursing note reviewed.   Constitutional:       Appearance: Normal appearance.   Cardiovascular:      Rate and Rhythm: Normal rate and regular rhythm.      Pulses: Normal pulses.   Pulmonary:      Effort: Pulmonary effort is normal.      Breath sounds: Normal breath sounds.   Abdominal:      General: Abdomen is flat. Bowel sounds are normal.      Palpations: Abdomen is soft.      Comments: Incision is clean   Musculoskeletal:      Right lower leg: No edema.      Left lower leg: No edema.   Skin:     General: Skin is warm and dry.      Capillary Refill: Capillary refill takes less than 2 seconds.   Neurological:      General: No focal deficit present.      Mental Status: She is alert and oriented to person, place, and time.   Psychiatric:         Mood and Affect: Mood normal.         Behavior: Behavior normal.         Thought Content: Thought content normal.         Judgment: Judgment normal.         Significant Labs:  CBC:   Recent Labs   Lab 11/22/20  0807   WBC 7.73   RBC 3.64*   HGB 11.9*   HCT 35.9*   *   MCV 99*   MCH 32.7*   MCHC 33.1     BMP:   Recent Labs   Lab 11/22/20  0807 11/23/20  0738   GLU 99 111*    138   K 4.0 3.7   CL 99 102   CO2 27 26   BUN 5* 4*   CREATININE 0.5 0.6   CALCIUM 8.2* 8.4*   MG 1.6  --     Phosphorus reviewed    Significant Diagnostics:  No new    Assessment/Plan:     * Small bowel obstruction  Postop day 12/3  from exploratory laparotomy and we explained the recurrent bowel obstruction.    Monitor for signs of complications  Change IV fluids to D5 half-normal saline plus potassium    NG tube removed  Clear liquids  DC PCA  IV and oral narcotics    Hypophosphatemia  Resolved    Leukocytosis  Resolved    Anxiety  Valium 5 mg p.o. p.r.n. anxiety        Tee Segura MD  General Surgery  [facility]

## 2020-11-23 NOTE — PLAN OF CARE
Recommendations     Recommendation:   1. Advance diet as tolerated to clear liquid>full liquid> soft, bland >regular diet.   2. Recommend Boost Breeze TID while on clear liquids.   3. Recommend Boost Plus while on full liquids and continuation of diet advancement.   4. Re weigh patient  5. RD to follow up.     Goals: 1. Po diet advanced to Soft, bland within the next 3-5 days.   Nutrition Goal Status: new  Communication of RD Recs: (POC)    Elizabeth Pimentel RD, LDN

## 2020-11-23 NOTE — PLAN OF CARE
Pt had no adverse events during shift. Pt free of falls. Call light in reach. Side rails x 2. Pain well controlled w/ pca pump and pt reporting less pain than previous shifts. Minimal nausea reported this shift and pt reporting passing of flatulence. Pt repositions independently. IVF administered as ordered. VTE prophylaxis- . VSS. Safety promoted. Chart reviewed, will continue to monitor.

## 2020-11-23 NOTE — SUBJECTIVE & OBJECTIVE
Interval History:  NG tube removed.  Passing flatus.  Mild nausea.  DC PCA, oral and IV narcotics start clear liquid diet    Medications:  Continuous Infusions:   dextrose 5 % and 0.45 % NaCl with KCl 20 mEq 80 mL/hr at 11/23/20 0758     Scheduled Meds:   bupivacaine liposome (PF)  266 mg Infiltration Once    chlorhexidine  10 mL Mouth/Throat BID    enoxaparin  40 mg Subcutaneous Q24H    nozaseptin   Each Nostril BID     PRN Meds:diazePAM, HYDROcodone-acetaminophen, HYDROcodone-acetaminophen, HYDROmorphone, naloxone, naloxone, ondansetron, ondansetron, promethazine (PHENERGAN) IVPB     Review of patient's allergies indicates:   Allergen Reactions    Erythromycin     Morphine Nausea And Vomiting    Opioids - morphine analogues      Objective:     Vital Signs (Most Recent):  Temp: 97.3 °F (36.3 °C) (11/23/20 0812)  Pulse: 91 (11/23/20 0812)  Resp: 14 (11/23/20 0812)  BP: 134/75 (11/23/20 0812)  SpO2: (!) 94 % (11/23/20 0812) Vital Signs (24h Range):  Temp:  [97.3 °F (36.3 °C)-98.8 °F (37.1 °C)] 97.3 °F (36.3 °C)  Pulse:  [] 91  Resp:  [14-20] 14  SpO2:  [93 %-99 %] 94 %  BP: (119-139)/(67-82) 134/75     Weight: 75 kg (165 lb 5.5 oz)  Body mass index is 25.9 kg/m².    Intake/Output - Last 3 Shifts       11/21 0700 - 11/22 0659 11/22 0700 - 11/23 0659 11/23 0700 - 11/24 0659    P.O. 0 0 0    I.V. (mL/kg) 506.7 (6.8) 11 (0.1)     IV Piggyback 400      Total Intake(mL/kg) 906.7 (12.1) 11 (0.1) 0 (0)    Urine (mL/kg/hr) 1100 (0.6) 0 (0)     Emesis/NG output       Drains 500 450     Total Output 1600 450     Net -693.3 -439 0           Urine Occurrence  4 x           Physical Exam  Vitals signs and nursing note reviewed.   Constitutional:       Appearance: Normal appearance.   Cardiovascular:      Rate and Rhythm: Normal rate and regular rhythm.      Pulses: Normal pulses.   Pulmonary:      Effort: Pulmonary effort is normal.      Breath sounds: Normal breath sounds.   Abdominal:      General: Abdomen is flat.  Bowel sounds are normal.      Palpations: Abdomen is soft.      Comments: Incision is clean   Musculoskeletal:      Right lower leg: No edema.      Left lower leg: No edema.   Skin:     General: Skin is warm and dry.      Capillary Refill: Capillary refill takes less than 2 seconds.   Neurological:      General: No focal deficit present.      Mental Status: She is alert and oriented to person, place, and time.   Psychiatric:         Mood and Affect: Mood normal.         Behavior: Behavior normal.         Thought Content: Thought content normal.         Judgment: Judgment normal.         Significant Labs:  CBC:   Recent Labs   Lab 11/22/20  0807   WBC 7.73   RBC 3.64*   HGB 11.9*   HCT 35.9*   *   MCV 99*   MCH 32.7*   MCHC 33.1     BMP:   Recent Labs   Lab 11/22/20  0807 11/23/20  0738   GLU 99 111*    138   K 4.0 3.7   CL 99 102   CO2 27 26   BUN 5* 4*   CREATININE 0.5 0.6   CALCIUM 8.2* 8.4*   MG 1.6  --     Phosphorus reviewed    Significant Diagnostics:  No new

## 2020-11-24 PROCEDURE — 94761 N-INVAS EAR/PLS OXIMETRY MLT: CPT

## 2020-11-24 PROCEDURE — 11000001 HC ACUTE MED/SURG PRIVATE ROOM

## 2020-11-24 PROCEDURE — 25000003 PHARM REV CODE 250: Performed by: SURGERY

## 2020-11-24 PROCEDURE — 63600175 PHARM REV CODE 636 W HCPCS: Performed by: SURGERY

## 2020-11-24 RX ORDER — BISACODYL 5 MG
10 TABLET, DELAYED RELEASE (ENTERIC COATED) ORAL ONCE
Status: DISCONTINUED | OUTPATIENT
Start: 2020-11-24 | End: 2020-11-26 | Stop reason: HOSPADM

## 2020-11-24 RX ADMIN — HYDROMORPHONE HYDROCHLORIDE 1 MG: 1 INJECTION, SOLUTION INTRAMUSCULAR; INTRAVENOUS; SUBCUTANEOUS at 04:11

## 2020-11-24 RX ADMIN — HYDROMORPHONE HYDROCHLORIDE 1 MG: 1 INJECTION, SOLUTION INTRAMUSCULAR; INTRAVENOUS; SUBCUTANEOUS at 08:11

## 2020-11-24 RX ADMIN — PROMETHAZINE HYDROCHLORIDE 6.25 MG: 25 INJECTION INTRAMUSCULAR; INTRAVENOUS at 04:11

## 2020-11-24 RX ADMIN — DEXTROSE MONOHYDRATE, SODIUM CHLORIDE, AND POTASSIUM CHLORIDE: 50; 4.5; 1.49 INJECTION, SOLUTION INTRAVENOUS at 04:11

## 2020-11-24 RX ADMIN — ENOXAPARIN SODIUM 40 MG: 100 INJECTION SUBCUTANEOUS at 04:11

## 2020-11-24 RX ADMIN — CHLORHEXIDINE GLUCONATE 0.12% ORAL RINSE 10 ML: 1.2 LIQUID ORAL at 08:11

## 2020-11-24 RX ADMIN — HYDROMORPHONE HYDROCHLORIDE 1 MG: 1 INJECTION, SOLUTION INTRAMUSCULAR; INTRAVENOUS; SUBCUTANEOUS at 09:11

## 2020-11-24 RX ADMIN — DIAZEPAM 5 MG: 10 INJECTION, SOLUTION INTRAMUSCULAR; INTRAVENOUS at 11:11

## 2020-11-24 RX ADMIN — HYDROCODONE BITARTRATE AND ACETAMINOPHEN 1 TABLET: 7.5; 325 TABLET ORAL at 08:11

## 2020-11-24 RX ADMIN — HYDROMORPHONE HYDROCHLORIDE 1 MG: 1 INJECTION, SOLUTION INTRAMUSCULAR; INTRAVENOUS; SUBCUTANEOUS at 12:11

## 2020-11-24 RX ADMIN — ONDANSETRON 4 MG: 2 INJECTION INTRAMUSCULAR; INTRAVENOUS at 09:11

## 2020-11-24 RX ADMIN — DIAZEPAM 5 MG: 10 INJECTION, SOLUTION INTRAMUSCULAR; INTRAVENOUS at 12:11

## 2020-11-24 RX ADMIN — DEXTROSE MONOHYDRATE, SODIUM CHLORIDE, AND POTASSIUM CHLORIDE: 50; 4.5; 1.49 INJECTION, SOLUTION INTRAVENOUS at 12:11

## 2020-11-24 RX ADMIN — ONDANSETRON 4 MG: 2 INJECTION INTRAMUSCULAR; INTRAVENOUS at 08:11

## 2020-11-24 NOTE — PROGRESS NOTES
[facility] -   General Surgery  Progress Note    Subjective:     History of Present Illness:  54yo F with a PMHx significant for nephrolithiasis who presented to the ED for evaluation of epigastric abdominal pain. Reports a one day history of this pain which is sharp and moderate in nature. Has associated nausea and vomiting x24hrs. Denies any associated fever, chills, diarrhea or constipation. Workup in ED worrisome for small bowel obstruction. Surgery consulted for evaluation and pt admitted to hospital medicine. Pt reports minimal abdominal surgical history, just a hysterectomy and bladder repair. Last BM yesterday. NGT placed in ED and still with moderate abdominal pain. Denies fevers, chills, diarrhea or constipation.        Post-Op Info:  Procedure(s) (LRB):  LAPAROTOMY (N/A)  EXCISION, SMALL INTESTINE (N/A)  LYSIS, ADHESIONS (N/A)   4 Days Post-Op     Interval History:  Patient tolerated clear liquids will advance to full liquids.  Anticipate regular diet and discharged in the next 24-48    Medications:  Continuous Infusions:   dextrose 5 % and 0.45 % NaCl with KCl 20 mEq 80 mL/hr at 11/24/20 0046     Scheduled Meds:   bisacodyL  10 mg Oral Once    bupivacaine liposome (PF)  266 mg Infiltration Once    chlorhexidine  10 mL Mouth/Throat BID    enoxaparin  40 mg Subcutaneous Q24H    nozaseptin   Each Nostril BID     PRN Meds:diazePAM, HYDROcodone-acetaminophen, HYDROcodone-acetaminophen, HYDROmorphone, naloxone, ondansetron, ondansetron, promethazine (PHENERGAN) IVPB     Review of patient's allergies indicates:   Allergen Reactions    Erythromycin     Morphine Nausea And Vomiting    Opioids - morphine analogues      Objective:     Vital Signs (Most Recent):  Temp: 98.9 °F (37.2 °C) (11/24/20 0737)  Pulse: 86 (11/24/20 0737)  Resp: 14 (11/24/20 0846)  BP: 127/75 (11/24/20 0737)  SpO2: (!) 92 % (11/24/20 0737) Vital Signs (24h Range):  Temp:  [97.8 °F (36.6 °C)-99.1 °F (37.3 °C)] 98.9 °F  (37.2 °C)  Pulse:  [86-94] 86  Resp:  [14-18] 14  SpO2:  [92 %-97 %] 92 %  BP: (121-140)/(68-82) 127/75     Weight: 75 kg (165 lb 5.5 oz)  Body mass index is 25.9 kg/m².    Intake/Output - Last 3 Shifts       11/22 0700 - 11/23 0659 11/23 0700 - 11/24 0659 11/24 0700 - 11/25 0659    P.O. 0 360 240    I.V. (mL/kg) 11 (0.1) 7 (0.1)     IV Piggyback       Total Intake(mL/kg) 11 (0.1) 367 (4.9) 240 (3.2)    Urine (mL/kg/hr) 0 (0)      Drains 450      Total Output 450      Net -439 +367 +240           Urine Occurrence 4 x 2 x 1 x          Physical Exam  Vitals signs and nursing note reviewed.   Constitutional:       Appearance: Normal appearance.   Cardiovascular:      Rate and Rhythm: Normal rate and regular rhythm.   Pulmonary:      Effort: Pulmonary effort is normal.      Breath sounds: Normal breath sounds.   Abdominal:      General: Abdomen is flat. Bowel sounds are normal.      Palpations: Abdomen is soft.      Comments: Incision is clean   Musculoskeletal:         General: No swelling.   Skin:     General: Skin is warm and dry.      Capillary Refill: Capillary refill takes less than 2 seconds.   Neurological:      General: No focal deficit present.      Mental Status: She is alert and oriented to person, place, and time.   Psychiatric:         Mood and Affect: Mood normal.         Behavior: Behavior normal.         Thought Content: Thought content normal.         Judgment: Judgment normal.         Significant Labs:  CBC:   Recent Labs   Lab 11/22/20  0807   WBC 7.73   RBC 3.64*   HGB 11.9*   HCT 35.9*   *   MCV 99*   MCH 32.7*   MCHC 33.1     CMP:   Recent Labs   Lab 11/23/20  0738   *   CALCIUM 8.4*      K 3.7   CO2 26      BUN 4*   CREATININE 0.6       Significant Diagnostics:  No new    Assessment/Plan:     * Small bowel obstruction  Postop day 13/for from exploratory laparotomy and we explained the recurrent bowel obstruction.    Monitor for signs of complications  Change IV fluids to  D5 half-normal saline plus potassium    Full liquid diet  Advance to regular diet tomorrow full liquids or tolerated.  Anticipate discharge in 24-48    Hypophosphatemia  Resolved    Leukocytosis  Resolved    Anxiety  Valium 5 mg p.o. p.r.n. anxiety        Tee Segura MD  General Surgery  [facility]

## 2020-11-24 NOTE — PHYSICIAN QUERY
PT Name: Genny Calixto  MR #: [id number]     Documentation Clarification      CDS: Susana YARBROUGH RN  Contact information: [email or web address]    This form is a permanent document in the medical record.     Query Date: November 24, 2020    By submitting this query, we are merely seeking further clarification of documentation. Please utilize your independent clinical judgment when addressing the question(s) below.    The Medical Record reflects the following:    Supporting Clinical Findings Location in Medical Record   * Small bowel obstruction  Postop day  7 exploratory laparotomy small-bowel resection for a closed loop obstruction with acute ischemia.  Patient has dilated loops of small bowel and nausea vomiting this is not unexpected.  It can be related to postop bowel dysfunction or edema at the anastomosis.  Given the lack of improvement on x-rays.    Interval History:  Patient has a L of NG output.  Abdominal films show persistently dilated loops of small bowel despite some transit of contrast to the colon.  This likely represents a immediate postop bowel obstruction or narrowing of the anastomosis.  I discussed this with the patient.  Will plan to return to the operating room for re-exploration possibly revision of the anastomosis    Patient has passed some contrast to the colon but she has persistently dilated loops of small bowel up to 4-1/2 cm.  This likely resolve presents a postop bowel obstruction stricture and asked Neo.  Patient will need a repeat laparotomy, lysis of any he Bib and revision of the anastomosis.    Procedure: Procedure(s) (LRB):  LAPAROTOMY (N/A)  COLON RESECTION (N/A)  LYSIS, ADHESIONS (N/A)     Technical Procedures Used:   Lysis of adhesions  Small-bowel resection with anastomosis x2    Obstruction of the distal ileum in the area the previous surgery    The area of obstruction was essentially the distal 20 cm of small bowel where the small bowel was matted inflamed from the  area of the anastomosis and beyond     I then  the small bowel from adhesions in the pelvis and to the sacral promontory.  The small bowel was run in a retrograde manner and additional adhesions between the small bowel itself were .  The small bowel was milked of enteric contents to the stomach and evacuated from the NG tube.    The area the small bowel that was adherent in the pelvis was edematous and did not appear healthy as such it was divided proximally and distally with the ALONZO 75 stapler.  The mesentery was taken down with the EnSeal large bipolar device.    Interval History:  Post reexploration and small-bowel resection x2 of inflamed/irritated bowel. PN Gen Surg 11/18/2020              PN Gen Surg 11/20/2020            PN Gen Surg 11/20/2020          Op Note 11/20/2020        Op Note 11/20/2020          Op Note 11/20/2020    Op Note 11/20/2020      Op Note 11/20/2020          Op Note 11/20/2020        PN Gen Surg 11/21/2020                                                                            Provider, please provide diagnosis or diagnoses associated with above clinical findings.    Doctor, please further clarify the documentation of post op bowel obstruction     [  x ] Partial or Incomplete bowel obstruction due to adhesions   [   ] Partial or Incomplete bowel obstruction due to anastomosis stricture   [   ] Partial or Incomplete bowel obstruction due to ischemia    [   ] Partial or Incomplete bowel obstruction due to other etiology , please specify ________________________     [   ] Partial or Incomplete bowel obstruction, unknown or unspecified etiology   [ x  ] Complete bowel obstruction due to adhesions   [   ] Complete bowel obstruction due to anastomosis stricture   [   ] Complete bowel obstruction due to ischemia   [   ] Complete bowel obstruction due to other etiology, please specify _______________________________     [   ] Complete bowel obstruction, unknown or unspecified  etiology   [  ] Clinically undetermined                                                                                                           Present on admission (POA) status:   [   ] Yes (Y)                          [  ] Clinically Undetermined (W)  [   ] No (N)                            [   ] Documentation insufficient to determine if condition is POA (U)

## 2020-11-24 NOTE — PLAN OF CARE
Patient received on shift x4, in no acute distress or discomfort. Vitals present within stable limits. Medications tolerated well.  Surgical site remains clean, dry and intact.  Safety precautions wdl. Will continue to monitor progress.

## 2020-11-24 NOTE — SUBJECTIVE & OBJECTIVE
Interval History:  Patient tolerated clear liquids will advance to full liquids.  Anticipate regular diet and discharged in the next 24-48    Medications:  Continuous Infusions:   dextrose 5 % and 0.45 % NaCl with KCl 20 mEq 80 mL/hr at 11/24/20 0046     Scheduled Meds:   bisacodyL  10 mg Oral Once    bupivacaine liposome (PF)  266 mg Infiltration Once    chlorhexidine  10 mL Mouth/Throat BID    enoxaparin  40 mg Subcutaneous Q24H    nozaseptin   Each Nostril BID     PRN Meds:diazePAM, HYDROcodone-acetaminophen, HYDROcodone-acetaminophen, HYDROmorphone, naloxone, ondansetron, ondansetron, promethazine (PHENERGAN) IVPB     Review of patient's allergies indicates:   Allergen Reactions    Erythromycin     Morphine Nausea And Vomiting    Opioids - morphine analogues      Objective:     Vital Signs (Most Recent):  Temp: 98.9 °F (37.2 °C) (11/24/20 0737)  Pulse: 86 (11/24/20 0737)  Resp: 14 (11/24/20 0846)  BP: 127/75 (11/24/20 0737)  SpO2: (!) 92 % (11/24/20 0737) Vital Signs (24h Range):  Temp:  [97.8 °F (36.6 °C)-99.1 °F (37.3 °C)] 98.9 °F (37.2 °C)  Pulse:  [86-94] 86  Resp:  [14-18] 14  SpO2:  [92 %-97 %] 92 %  BP: (121-140)/(68-82) 127/75     Weight: 75 kg (165 lb 5.5 oz)  Body mass index is 25.9 kg/m².    Intake/Output - Last 3 Shifts       11/22 0700 - 11/23 0659 11/23 0700 - 11/24 0659 11/24 0700 - 11/25 0659    P.O. 0 360 240    I.V. (mL/kg) 11 (0.1) 7 (0.1)     IV Piggyback       Total Intake(mL/kg) 11 (0.1) 367 (4.9) 240 (3.2)    Urine (mL/kg/hr) 0 (0)      Drains 450      Total Output 450      Net -439 +367 +240           Urine Occurrence 4 x 2 x 1 x          Physical Exam  Vitals signs and nursing note reviewed.   Constitutional:       Appearance: Normal appearance.   Cardiovascular:      Rate and Rhythm: Normal rate and regular rhythm.   Pulmonary:      Effort: Pulmonary effort is normal.      Breath sounds: Normal breath sounds.   Abdominal:      General: Abdomen is flat. Bowel sounds are normal.       Palpations: Abdomen is soft.      Comments: Incision is clean   Musculoskeletal:         General: No swelling.   Skin:     General: Skin is warm and dry.      Capillary Refill: Capillary refill takes less than 2 seconds.   Neurological:      General: No focal deficit present.      Mental Status: She is alert and oriented to person, place, and time.   Psychiatric:         Mood and Affect: Mood normal.         Behavior: Behavior normal.         Thought Content: Thought content normal.         Judgment: Judgment normal.         Significant Labs:  CBC:   Recent Labs   Lab 11/22/20  0807   WBC 7.73   RBC 3.64*   HGB 11.9*   HCT 35.9*   *   MCV 99*   MCH 32.7*   MCHC 33.1     CMP:   Recent Labs   Lab 11/23/20  0738   *   CALCIUM 8.4*      K 3.7   CO2 26      BUN 4*   CREATININE 0.6       Significant Diagnostics:  No new

## 2020-11-24 NOTE — PLAN OF CARE
Patient remains free from injury/fall.  IVFs infusing as ordered. Pain/anxiety controlled at this time.  Will continue to monitor, administer meds as ordered, provide comfort/safety measures and notify MD of any changes in condition.

## 2020-11-24 NOTE — PHYSICIAN QUERY
PT Name: Genny Calixto  MR #: [id number]    Consultant Diagnosis Clarification     CDS: Susana YARBROUGH RN  Contact information: [email or web address]    This form is a permanent document in the medical record.    Query Date: November 24, 2020      By submitting this query, we are merely seeking further clarification of documentation.  Please utilize your independent clinical judgment when addressing the question(s) below.    The Medical Record reflects the following:    Clinical Information Location in Medical Record   HPI: Genny Calixto is a 55 y.o. female patient with a PMHx of anxiety, depression, and nephrolithiasis who presents to the Emergency Department for evaluation of epigastric abdominal pain which onset this AM. Symptoms are constant and moderate in severity. No mitigating or exacerbating factors reported. Associated sxs include n/v. Patient denies any diarrhea, fever, chills, CP, SOB, cough, congestion, loss of smell or taste, and all other sxs at this time. Work-up in the ED showed: WBC 22, 0% bands, stable CMP, lipase 7, COVID negative. CT of the ABD/pelvis with contrast showed mild ascites, interval development of dilated segment of small bowel in the mid right hemiabdomen with bowel diameter measuring up to 3.8 cm which is consistent with at least moderate grade partial small bowel obstruction but worrisome for closed loop bowel obstruction.     11.23.20: Patient NGT removed this morning and po clear liquid diet started today. She stated that she is tolerating clears well with no complaints of nvcd. LBM this morning per patient. Per NFPE, pt meets criteria for malnutrition. She reports prior to hospitalization, she was working out/actively moving 5x/week. Reported UBW @ 370# 3 years ago with a slow decline. Evident signs of weight loss per RD observation.     Weight: 75 kg (165 lb 5.5 oz)  Weight (lb): 165.35 lb  Ideal Body Weight (IBW), Female: 135 lb  % Ideal Body Weight, Female  (lb): 122.32 %  BMI (Calculated): 25.9  BMI Grade: 25 - 29.9 - overweight  Usual Body Weight (UBW), k.5 kg  % Usual Body Weight: 100.75  % Weight Change From Usual Weight: 0.54 %    Severe malnutrition  Nutrition Problem:  (Severe) Protein-Calorie Malnutrition  Malnutrition in the context of Chronic Illness/Injury and Acute Illness/Injury  Related to (etiology):  Altered GI function;structure; NPO/Clear liquid x13 days  Signs and Symptoms (as evidenced by):  Energy Intake: <75% of estimated energy requirement for 13 days  Body Fat Depletion: moderate depletion of orbitals and triceps   Muscle Mass Depletion: moderate depletion of temples, clavicle region, scapular region and lower extremities   Weight Loss: ROXY @ this time; need current weight  Fluid Accumulation: UTD    Malnutrition Assessment  Malnutrition Type: acute illness or injury, chronic illness  Skin (Micronutrient): (Ras Score: 22)       Energy Intake (Malnutrition): less than 75% for greater than 7 days  Subcutaneous Fat (Malnutrition): moderate depletion  Muscle Mass (Malnutrition): moderate depletion   Orbital Region (Subcutaneous Fat Loss): moderate depletion   Yarsanism Region (Muscle Loss): moderate depletion  Clavicle Bone Region (Muscle Loss): moderate depletion  Clavicle and Acromion Bone Region (Muscle Loss): moderate depletion  Anterior Thigh Region (Muscle Loss): mild depletion  Posterior Calf Region (Muscle Loss): mild depletion       Subcutaneous Fat Loss (Final Summary): moderate protein-calorie malnutrition  Muscle Loss Evaluation (Final Summary): moderate protein-calorie malnutrition      Small bowel obstruction  Postop day 9 exploratory laparotomy small-bowel resection for a closed loop obstruction with acute ischemia.  Patient has passed some contrast to the colon but she has persistently dilated loops of small bowel up to 4-1/2 cm. This likely resolve presents a postop bowel obstruction stricture and asked Neo. Patient will need  a repeat laparotomy, lysis of any he Bib and revision of the anastomosis. H&P Gen Surg 11/11/2020                        PN Nutrition 11/23/2020              PN Nutrition 11/23/2020                    PN Nutrition 11/23/2020                            PN Nutrition 11/23/2020        PN Nutrition 11/23/2020                    PN Nutrition 11/23/2020      PN Gen Surg 11/20/2020               AND / ASPEN Clinical Characteristics (October 2011)  A minimum of two characteristics are recommended for diagnosing either moderate or severe malnutrition   Severe Malnutrition   Energy Intake  from p.o., TF or  TPN. < 50% intake of estimated energy needs  for > 5 days   Weight Loss > 2% in 1 week  > 5% in 1 month  > 7.5% in 3 months  > 10% in 6 months  > 20% in 1 year   Physical  Findings *Mod/severe subcutaneous fat and/or muscle loss  *Mod/severe fluid accumulation  *Stage III or IV decubitus  *Non-healing surgical wound         Please clarify/confirm the Consultants diagnosis of ___Severe malnutrition_________:     [  ] Diagnosis ruled in   [  ] Diagnosis ruled out   [ x  ] Other diagnosis (please specify): _____________________________   [  ] Clinically undetermined    Patient presented to the hospital with normal nutrition.  The decreased in lab values are related to IV fluids.  Patient is expected to eat a regular diet and 24 hr     Present on admission (POA) status:   [   ] Yes (Y)                          [  ] Clinically Undetermined (W)  [x   ] No (N)                            [   ] Documentation insufficient to determine if condition is POA (U)

## 2020-11-24 NOTE — ASSESSMENT & PLAN NOTE
Postop day 13/for from exploratory laparotomy and we explained the recurrent bowel obstruction.    Monitor for signs of complications  Change IV fluids to D5 half-normal saline plus potassium    Full liquid diet  Advance to regular diet tomorrow full liquids or tolerated.  Anticipate discharge in 24-48

## 2020-11-25 PROCEDURE — 94761 N-INVAS EAR/PLS OXIMETRY MLT: CPT

## 2020-11-25 PROCEDURE — 63600175 PHARM REV CODE 636 W HCPCS: Performed by: SURGERY

## 2020-11-25 PROCEDURE — 25000003 PHARM REV CODE 250: Performed by: SURGERY

## 2020-11-25 PROCEDURE — 11000001 HC ACUTE MED/SURG PRIVATE ROOM

## 2020-11-25 RX ORDER — DEXTROSE MONOHYDRATE, SODIUM CHLORIDE, AND POTASSIUM CHLORIDE 50; 1.49; 4.5 G/1000ML; G/1000ML; G/1000ML
INJECTION, SOLUTION INTRAVENOUS CONTINUOUS
Status: DISCONTINUED | OUTPATIENT
Start: 2020-11-25 | End: 2020-11-26 | Stop reason: HOSPADM

## 2020-11-25 RX ORDER — PROMETHAZINE HYDROCHLORIDE 12.5 MG/1
12.5 TABLET ORAL EVERY 6 HOURS PRN
Status: DISCONTINUED | OUTPATIENT
Start: 2020-11-25 | End: 2020-11-26 | Stop reason: HOSPADM

## 2020-11-25 RX ADMIN — DIAZEPAM 5 MG: 10 INJECTION, SOLUTION INTRAMUSCULAR; INTRAVENOUS at 11:11

## 2020-11-25 RX ADMIN — HYDROMORPHONE HYDROCHLORIDE 1 MG: 1 INJECTION, SOLUTION INTRAMUSCULAR; INTRAVENOUS; SUBCUTANEOUS at 08:11

## 2020-11-25 RX ADMIN — ONDANSETRON 4 MG: 2 INJECTION INTRAMUSCULAR; INTRAVENOUS at 08:11

## 2020-11-25 RX ADMIN — DEXTROSE MONOHYDRATE, SODIUM CHLORIDE, AND POTASSIUM CHLORIDE: 50; 4.5; 1.49 INJECTION, SOLUTION INTRAVENOUS at 02:11

## 2020-11-25 RX ADMIN — DEXTROSE, SODIUM CHLORIDE, AND POTASSIUM CHLORIDE: 5; .45; .15 INJECTION INTRAVENOUS at 08:11

## 2020-11-25 RX ADMIN — PROMETHAZINE HYDROCHLORIDE 12.5 MG: 12.5 TABLET ORAL at 04:11

## 2020-11-25 RX ADMIN — ONDANSETRON 4 MG: 2 INJECTION INTRAMUSCULAR; INTRAVENOUS at 04:11

## 2020-11-25 RX ADMIN — HYDROMORPHONE HYDROCHLORIDE 1 MG: 1 INJECTION, SOLUTION INTRAMUSCULAR; INTRAVENOUS; SUBCUTANEOUS at 03:11

## 2020-11-25 RX ADMIN — ENOXAPARIN SODIUM 40 MG: 100 INJECTION SUBCUTANEOUS at 04:11

## 2020-11-25 RX ADMIN — PROMETHAZINE HYDROCHLORIDE 6.25 MG: 25 INJECTION INTRAMUSCULAR; INTRAVENOUS at 09:11

## 2020-11-25 RX ADMIN — HYDROMORPHONE HYDROCHLORIDE 1 MG: 1 INJECTION, SOLUTION INTRAMUSCULAR; INTRAVENOUS; SUBCUTANEOUS at 04:11

## 2020-11-25 RX ADMIN — DEXTROSE, SODIUM CHLORIDE, AND POTASSIUM CHLORIDE: 5; .45; .15 INJECTION INTRAVENOUS at 11:11

## 2020-11-25 RX ADMIN — ONDANSETRON 4 MG: 2 INJECTION INTRAMUSCULAR; INTRAVENOUS at 11:11

## 2020-11-25 NOTE — SUBJECTIVE & OBJECTIVE
Interval History:  Bowel function is improving after a 2nd exploration small-bowel resection x2.    Patient will be advanced to a regular diet.    If a regular diet is tolerated she be discharged this afternoon or tomorrow    Medications:  Continuous Infusions:  Scheduled Meds:   bisacodyL  10 mg Oral Once    bupivacaine liposome (PF)  266 mg Infiltration Once    chlorhexidine  10 mL Mouth/Throat BID    enoxaparin  40 mg Subcutaneous Q24H    nozaseptin   Each Nostril BID     PRN Meds:diazePAM, HYDROcodone-acetaminophen, HYDROcodone-acetaminophen, HYDROmorphone, naloxone, ondansetron, ondansetron, promethazine (PHENERGAN) IVPB     Review of patient's allergies indicates:   Allergen Reactions    Erythromycin     Morphine Nausea And Vomiting    Opioids - morphine analogues      Objective:     Vital Signs (Most Recent):  Temp: 98.6 °F (37 °C) (11/25/20 0730)  Pulse: 88 (11/25/20 0730)  Resp: 14 (11/25/20 0952)  BP: 129/75 (11/25/20 0730)  SpO2: (!) 92 % (11/25/20 0952) Vital Signs (24h Range):  Temp:  [97.2 °F (36.2 °C)-99.7 °F (37.6 °C)] 98.6 °F (37 °C)  Pulse:  [88-94] 88  Resp:  [14-18] 14  SpO2:  [90 %-97 %] 92 %  BP: (129-147)/(69-82) 129/75     Weight: 75 kg (165 lb 5.5 oz)  Body mass index is 25.9 kg/m².    Intake/Output - Last 3 Shifts       11/23 0700 - 11/24 0659 11/24 0700 - 11/25 0659 11/25 0700 - 11/26 0659    P.O. 360 420     I.V. (mL/kg) 7 (0.1)      Total Intake(mL/kg) 367 (4.9) 420 (5.6)     Urine (mL/kg/hr)       Drains       Total Output       Net +367 +420            Urine Occurrence 2 x 4 x     Stool Occurrence  2 x           Physical Exam  Vitals signs and nursing note reviewed.   Constitutional:       Appearance: Normal appearance.   Cardiovascular:      Rate and Rhythm: Normal rate and regular rhythm.   Pulmonary:      Effort: Pulmonary effort is normal.      Breath sounds: Normal breath sounds.   Abdominal:      General: Abdomen is flat. Bowel sounds are normal. There is no distension.       Palpations: Abdomen is soft.      Tenderness: Tenderness: Minimal.      Comments: Incision is clean   Skin:     General: Skin is warm.      Capillary Refill: Capillary refill takes less than 2 seconds.   Neurological:      General: No focal deficit present.      Mental Status: She is alert.   Psychiatric:         Mood and Affect: Mood normal.         Behavior: Behavior normal.         Thought Content: Thought content normal.         Judgment: Judgment normal.         Significant Labs:  CBC:   Recent Labs   Lab 11/22/20  0807   WBC 7.73   RBC 3.64*   HGB 11.9*   HCT 35.9*   *   MCV 99*   MCH 32.7*   MCHC 33.1     BMP:   Recent Labs   Lab 11/22/20  0807 11/23/20  0738   GLU 99 111*    138   K 4.0 3.7   CL 99 102   CO2 27 26   BUN 5* 4*   CREATININE 0.5 0.6   CALCIUM 8.2* 8.4*   MG 1.6  --        Significant Diagnostics:  No new

## 2020-11-25 NOTE — PLAN OF CARE
No adverse events this shift, safety measures in place, IVFs infusing as ordered, pain/anxiety controlled with IV meds, no distress or changes in condition, will continue to monitor.

## 2020-11-25 NOTE — PROGRESS NOTES
[facility] -   General Surgery  Progress Note    Subjective:     History of Present Illness:  54yo F with a PMHx significant for nephrolithiasis who presented to the ED for evaluation of epigastric abdominal pain. Reports a one day history of this pain which is sharp and moderate in nature. Has associated nausea and vomiting x24hrs. Denies any associated fever, chills, diarrhea or constipation. Workup in ED worrisome for small bowel obstruction. Surgery consulted for evaluation and pt admitted to hospital medicine. Pt reports minimal abdominal surgical history, just a hysterectomy and bladder repair. Last BM yesterday. NGT placed in ED and still with moderate abdominal pain. Denies fevers, chills, diarrhea or constipation.        Post-Op Info:  Procedure(s) (LRB):  LAPAROTOMY (N/A)  EXCISION, SMALL INTESTINE (N/A)  LYSIS, ADHESIONS (N/A)   5 Days Post-Op     Interval History:  Bowel function is improving after a 2nd exploration small-bowel resection x2.    Patient will be advanced to a regular diet.    If a regular diet is tolerated she be discharged this afternoon or tomorrow    Medications:  Continuous Infusions:  Scheduled Meds:   bisacodyL  10 mg Oral Once    bupivacaine liposome (PF)  266 mg Infiltration Once    chlorhexidine  10 mL Mouth/Throat BID    enoxaparin  40 mg Subcutaneous Q24H    nozaseptin   Each Nostril BID     PRN Meds:diazePAM, HYDROcodone-acetaminophen, HYDROcodone-acetaminophen, HYDROmorphone, naloxone, ondansetron, ondansetron, promethazine (PHENERGAN) IVPB     Review of patient's allergies indicates:   Allergen Reactions    Erythromycin     Morphine Nausea And Vomiting    Opioids - morphine analogues      Objective:     Vital Signs (Most Recent):  Temp: 98.6 °F (37 °C) (11/25/20 0730)  Pulse: 88 (11/25/20 0730)  Resp: 14 (11/25/20 0952)  BP: 129/75 (11/25/20 0730)  SpO2: (!) 92 % (11/25/20 0952) Vital Signs (24h Range):  Temp:  [97.2 °F (36.2 °C)-99.7 °F (37.6 °C)] 98.6  °F (37 °C)  Pulse:  [88-94] 88  Resp:  [14-18] 14  SpO2:  [90 %-97 %] 92 %  BP: (129-147)/(69-82) 129/75     Weight: 75 kg (165 lb 5.5 oz)  Body mass index is 25.9 kg/m².    Intake/Output - Last 3 Shifts       11/23 0700 - 11/24 0659 11/24 0700 - 11/25 0659 11/25 0700 - 11/26 0659    P.O. 360 420     I.V. (mL/kg) 7 (0.1)      Total Intake(mL/kg) 367 (4.9) 420 (5.6)     Urine (mL/kg/hr)       Drains       Total Output       Net +367 +420            Urine Occurrence 2 x 4 x     Stool Occurrence  2 x           Physical Exam  Vitals signs and nursing note reviewed.   Constitutional:       Appearance: Normal appearance.   Cardiovascular:      Rate and Rhythm: Normal rate and regular rhythm.   Pulmonary:      Effort: Pulmonary effort is normal.      Breath sounds: Normal breath sounds.   Abdominal:      General: Abdomen is flat. Bowel sounds are normal. There is no distension.      Palpations: Abdomen is soft.      Tenderness: Tenderness: Minimal.      Comments: Incision is clean   Skin:     General: Skin is warm.      Capillary Refill: Capillary refill takes less than 2 seconds.   Neurological:      General: No focal deficit present.      Mental Status: She is alert.   Psychiatric:         Mood and Affect: Mood normal.         Behavior: Behavior normal.         Thought Content: Thought content normal.         Judgment: Judgment normal.         Significant Labs:  CBC:   Recent Labs   Lab 11/22/20  0807   WBC 7.73   RBC 3.64*   HGB 11.9*   HCT 35.9*   *   MCV 99*   MCH 32.7*   MCHC 33.1     BMP:   Recent Labs   Lab 11/22/20  0807 11/23/20  0738   GLU 99 111*    138   K 4.0 3.7   CL 99 102   CO2 27 26   BUN 5* 4*   CREATININE 0.5 0.6   CALCIUM 8.2* 8.4*   MG 1.6  --        Significant Diagnostics:  No new    Assessment/Plan:     * Small bowel obstruction  Postop day 14/5 from exploratory laparotomy and we explained the recurrent bowel obstruction.    Monitor for signs of complications  Change IV fluids to D5  half-normal saline plus potassium    Full liquid diet was tolerated  Regular diet and discharged this afternoon or tomorrow depending on how a diet is tolerated    Hypophosphatemia  Resolved    Leukocytosis  Resolved    Anxiety  Valium 5 mg p.o. p.r.n. anxiety        Tee Segura MD  General Surgery  [facility]

## 2020-11-25 NOTE — PLAN OF CARE
Patient received laying in bed awake, x4, in no acute distress or discomfort. Vitals present within stable limits. Medications tolerated well. Safety precautions wdl. Will continue to monitor progress.

## 2020-11-25 NOTE — ASSESSMENT & PLAN NOTE
Postop day 14/5 from exploratory laparotomy and we explained the recurrent bowel obstruction.    Monitor for signs of complications  Change IV fluids to D5 half-normal saline plus potassium    Full liquid diet was tolerated  Regular diet and discharged this afternoon or tomorrow depending on how a diet is tolerated

## 2020-11-26 VITALS
BODY MASS INDEX: 25.96 KG/M2 | RESPIRATION RATE: 16 BRPM | HEIGHT: 67 IN | TEMPERATURE: 99 F | WEIGHT: 165.38 LBS | OXYGEN SATURATION: 96 % | HEART RATE: 81 BPM | DIASTOLIC BLOOD PRESSURE: 66 MMHG | SYSTOLIC BLOOD PRESSURE: 122 MMHG

## 2020-11-26 PROCEDURE — 63600175 PHARM REV CODE 636 W HCPCS: Performed by: SURGERY

## 2020-11-26 PROCEDURE — 94761 N-INVAS EAR/PLS OXIMETRY MLT: CPT

## 2020-11-26 PROCEDURE — 25000003 PHARM REV CODE 250: Performed by: SURGERY

## 2020-11-26 RX ORDER — ACETAMINOPHEN AND CODEINE PHOSPHATE 300; 30 MG/1; MG/1
1 TABLET ORAL EVERY 6 HOURS PRN
Qty: 30 TABLET | Refills: 0 | Status: SHIPPED | OUTPATIENT
Start: 2020-11-26 | End: 2020-12-06

## 2020-11-26 RX ORDER — PROMETHAZINE HYDROCHLORIDE 12.5 MG/1
12.5 TABLET ORAL EVERY 6 HOURS PRN
Qty: 20 TABLET | Refills: 0 | Status: SHIPPED | OUTPATIENT
Start: 2020-11-26 | End: 2020-12-10

## 2020-11-26 RX ADMIN — DEXTROSE, SODIUM CHLORIDE, AND POTASSIUM CHLORIDE 1000 ML: 5; .45; .15 INJECTION INTRAVENOUS at 09:11

## 2020-11-26 RX ADMIN — HYDROMORPHONE HYDROCHLORIDE 1 MG: 1 INJECTION, SOLUTION INTRAMUSCULAR; INTRAVENOUS; SUBCUTANEOUS at 04:11

## 2020-11-26 RX ADMIN — ONDANSETRON 4 MG: 2 INJECTION INTRAMUSCULAR; INTRAVENOUS at 04:11

## 2020-11-26 RX ADMIN — HYDROCODONE BITARTRATE AND ACETAMINOPHEN 1 TABLET: 7.5; 325 TABLET ORAL at 09:11

## 2020-11-26 NOTE — DISCHARGE INSTRUCTIONS
Shower daily including the wound, leave the incision open to air, and keep well hydrated. Ambulation as frequently as possible.

## 2020-11-26 NOTE — PLAN OF CARE
Discharge re-assessment complete.  No discharge needs determined at this time.  CM will continue to follow.       11/26/20 0811   Discharge Reassessment   Assessment Type Discharge Planning Reassessment   Provided patient/caregiver education on the expected discharge date and the discharge plan Yes   Do you have any problems affording any of your prescribed medications? No   Discharge Plan A Home with family   DME Needed Upon Discharge  none   Patient choice form signed by patient/caregiver N/A   Anticipated Discharge Disposition Home   Can the patient/caregiver answer the patient profile reliably? Yes, cognitively intact

## 2020-11-26 NOTE — PLAN OF CARE
Remains free from injury. States relief of pain with available prn meds. Fluids administered per nursing communication. Ambulation encouraged. Intermittent nausea. Vital signs stable. Chart reviewed. Will continue to monitor.

## 2020-11-26 NOTE — PLAN OF CARE
Patient without physical injuries, WBC wnl, afebrile, pain controlled per pain med and nausea controlled per nausea medications.

## 2020-11-26 NOTE — PLAN OF CARE
Discharge Planning:    Discharge orders reviewed by CM.  Patient ready for discharge from CM standpoint.      Patient will discharge home with no needs.      11/26/20 1318   Final Note   Assessment Type Final Discharge Note   Anticipated Discharge Disposition Home   Right Care Referral Info   Post Acute Recommendation No Care

## 2020-11-26 NOTE — DISCHARGE SUMMARY
[facility] -   General Surgery  Discharge Summary      Patient Name: Genny Calixto  MRN: [id number]  Admission Date: 11/10/2020  Hospital Length of Stay: 16 days  Discharge Date and Time: No discharge date for patient encounter.  Attending Physician: Tee Segura MD   Discharging Provider: Jasiel Mendez MD  Primary Care Provider: Tay Duff MD    HPI:   56yo F with a PMHx significant for nephrolithiasis who presented to the ED for evaluation of epigastric abdominal pain. Reports a one day history of this pain which is sharp and moderate in nature. Has associated nausea and vomiting x24hrs. Denies any associated fever, chills, diarrhea or constipation. Workup in ED worrisome for small bowel obstruction. Surgery consulted for evaluation and pt admitted to hospital medicine. Pt reports minimal abdominal surgical history, just a hysterectomy and bladder repair. Last BM yesterday. NGT placed in ED and still with moderate abdominal pain. Denies fevers, chills, diarrhea or constipation.        Procedure(s) (LRB):  LAPAROTOMY (N/A)  EXCISION, SMALL INTESTINE (N/A)  LYSIS, ADHESIONS (N/A)      Indwelling Lines/Drains at time of discharge:   Lines/Drains/Airways     None               Hospital Course: 11/12/2020.  Postop day 1 from exploratory laparotomy and small-bowel resection.  The limited nausea.  Pain is controlled.  Minimal NG output.  Will clamp NG tube and removed the low residual    11/13/2020.  Postop day 2.  NG tube removed.  Phosphorus continues to be low will be replaced.  Clear liquids started.  Pain medicine adjusted in PCA discontinued    11/14/2020: POD 3. Tolerating clears. + flatus but no BM yet. No nausea or emesis. Up with PT this AM.     11/15/2020: POD 4. Tolerating diet with flatus and bowel function.  Reports nausea with regular diet but no emesis.  Reports gas pain.     11/16/120:  POD 5:  Nausea and vomiting this morning, mild incisional pain    11/17/2020.  Postop day 6.   An episode of nausea and vomiting.  Abdominal films continue showed dilated loops of small bowel.  Will plan NPO, IV fluids.  Dulcolax and repeat x-rays.  If there are still dilated loops of small bowel Gastrografin small-bowel follow-through tomorrow    11/18/2020.  Postop day 7.  Patient had episodes of nausea and vomiting small bowel movements.  Abdominal films continued to show dilated loops of small bowel.  Will place an NG tube for decompression and likely obtain a small-bowel follow-through tomorrow    11/19/2020.  Postop day 8.  No nausea and vomiting but NG tube was placed with 500 mL of output.  She has had several bowel movements.  Gastrografin small-bowel follow-through today    11/20/2020.  Postop day 9.  NG with about 1 L output.  Abdominal films show persistently dilated loops of small bowel but contrast as transfer the to the colon.  This likely represents a partial small bowel obstruction or anastomotic narrowing.    11/21/2020.  Postop day 10/1 from exploratory laparotomy and small-bowel resection.  She fired 2 additional small-bowel resections DD postop bowel obstruction.    11/22/2020.  Postop day 11/2.  Passing flatus.  Decreased NG output.  Low-grade fever.  No elevation in white count.  Slowly improving abdominal pain    11/23/2020.  Postop day 12 and 3.  Passing some flatus.  NG tube was removed.  No fever.  White count is normal.  Mild nausea.  Will start clear liquid    11/24/2020.  Some flatus some bowel movements.  Tolerated clears.  Will advance to full liquids.  Anticipate regular diet and likely discharge tomorrow for Thursday 11/25/2020 Mild nausea with orange juice but otherwise tolerated liquids having bowel.  Will advance to a regular diet    11/26/2020; well enough to be discharged.      Consults:   Consults (From admission, onward)        Status Ordering Provider     Inpatient consult to General Surgery  Once     Provider:  (Not yet assigned)    Completed LOUISA SANDERS           Significant Diagnostic Studies: Labs: BMP: No results for input(s): GLU, NA, K, CL, CO2, BUN, CREATININE, CALCIUM, MG in the last 48 hours. and CBC No results for input(s): WBC, HGB, HCT, PLT in the last 48 hours.    Pending Diagnostic Studies:     Procedure Component Value Units Date/Time    Specimen to Pathology, Surgery General Surgery [[id number]] Collected: 11/20/20 1511    Order Status: Sent Lab Status: In process Updated: 11/24/20 1013        Final Active Diagnoses:    Diagnosis Date Noted POA    PRINCIPAL PROBLEM:  Small bowel obstruction [K56.609] 11/10/2020 Yes    Severe malnutrition [E43] 11/23/2020 Unknown    Hypophosphatemia [E83.39] 11/13/2020 No    Leukocytosis [D72.829] 11/11/2020 Yes    Anxiety [F41.9] 02/24/2017 Yes      Problems Resolved During this Admission:      Discharged Condition: good    Disposition: Home or Self Care    Follow Up:  Follow-up Information     Tee Segura MD. Schedule an appointment as soon as possible for a visit on 12/10/2020.    Specialty: General Surgery  Why: For wound re-check  Contact information:  [address]  [place] [address] [phone]                 Patient Instructions:   No discharge procedures on file.  Medications:  Reconciled Home Medications:      Medication List      START taking these medications    acetaminophen-codeine 300-30mg 300-30 mg Tab  Commonly known as: TYLENOL #3  Take 1 tablet by mouth every 6 (six) hours as needed.        CONTINUE taking these medications    diazePAM 5 MG tablet  Commonly known as: VALIUM  Take 1 tablet (5 mg total) by mouth every 12 (twelve) hours as needed for Anxiety.     diclofenac 75 MG EC tablet  Commonly known as: VOLTAREN  Take 1 tablet (75 mg total) by mouth 2 (two) times daily.     doxepin 10 MG capsule  Commonly known as: SINEQUAN  Take 1 capsule (10 mg total) by mouth every evening.     ergocalciferol 50,000 unit Cap  Commonly known as: ERGOCALCIFEROL  Take 1 capsule (50,000 Units  total) by mouth every 7 days.     loratadine 10 mg tablet  Commonly known as: CLARITIN  Take 10 mg by mouth daily     meclizine 25 mg tablet  Commonly known as: ANTIVERT  Take 25 mg by mouth.     melatonin 5 mg Cap  Take by mouth.     ondansetron 4 MG Tbdl  Commonly known as: ZOFRAN-ODT  Take 1 tablet (4 mg total) by mouth every 8 (eight) hours as needed (nausea).     ondansetron 8 MG tablet  Commonly known as: ZOFRAN  Take 8 mg by mouth every 8 (eight) hours.     oxyCODONE-acetaminophen  mg per tablet  Commonly known as: PERCOCET  TK 1 T PO  Q 8 H PRF PAIN     PROBIOTIC COMPLEX ORAL  Take by mouth once daily at 6am.     sumatriptan 100 MG tablet  Commonly known as: IMITREX  TAKE 1 TABLET IF NEEDED, MAY REPEAT ONCE IN 1 HOUR. MAX 2 TABLETS IN 24 HOURS        STOP taking these medications    methylPREDNISolone 4 mg tablet  Commonly known as: MEDROL DOSEPACK          Time spent on the discharge of patient: 10 minutes    Jasiel Mendez MD  General Surgery  [facility]

## 2020-11-26 NOTE — NURSING
Patient became nauseous overnight requesting antiemetic. Per nursing communication IV fluids restarted and prn antiemetic administered.

## 2020-11-27 ENCOUNTER — PATIENT OUTREACH (OUTPATIENT)
Dept: ADMINISTRATIVE | Facility: OTHER | Age: 55
End: 2020-11-27

## 2020-11-27 ENCOUNTER — PATIENT OUTREACH (OUTPATIENT)
Dept: ADMINISTRATIVE | Facility: CLINIC | Age: 55
End: 2020-11-27

## 2020-11-27 NOTE — PROGRESS NOTES
C3 nurse attempted to contact patient. No answer. The following message was left for the patient to return the call:  Good morning I am a nurse calling on behalf of [facility] from the [facility].  This is a Transitional Care Call for [facility]. When you have a moment please contact us at [phone] or [phone] Monday through Friday, between the hours of 8 am to 4 pm. We look forward to speaking with you. On behalf of [facility] have a nice day.    The patient does not have a scheduled HOSFU appointment within 7-14 days post hospital discharge date 11/26/20. Non [facility] PCP.

## 2020-12-04 LAB
FINAL PATHOLOGIC DIAGNOSIS: NORMAL
GROSS: NORMAL
Lab: NORMAL

## 2020-12-07 ENCOUNTER — TELEPHONE (OUTPATIENT)
Dept: ADMINISTRATIVE | Facility: HOSPITAL | Age: 55
End: 2020-12-07

## 2020-12-07 NOTE — TELEPHONE ENCOUNTER
Contacted patient to schedule overdue mammogram. Patient states that she has just got out of the hospital. Patient declines to schedule at the moment.

## 2020-12-08 ENCOUNTER — TELEPHONE (OUTPATIENT)
Dept: SURGERY | Facility: CLINIC | Age: 55
End: 2020-12-08

## 2020-12-08 NOTE — TELEPHONE ENCOUNTER
----- Message from Maye Renteria sent at 12/8/2020  2:15 PM CST -----  Regarding: Requesting a later time  Pt called to request to come in on the same date but later time like 1:00 p.m. please    Pt can be reached at [phone]    Thank you!

## 2020-12-10 ENCOUNTER — OFFICE VISIT (OUTPATIENT)
Dept: SURGERY | Facility: CLINIC | Age: 55
End: 2020-12-10
Payer: MEDICARE

## 2020-12-10 VITALS
BODY MASS INDEX: 22.49 KG/M2 | TEMPERATURE: 99 F | HEIGHT: 67 IN | DIASTOLIC BLOOD PRESSURE: 89 MMHG | HEART RATE: 87 BPM | SYSTOLIC BLOOD PRESSURE: 151 MMHG | WEIGHT: 143.31 LBS

## 2020-12-10 DIAGNOSIS — Z09 POSTOP CHECK: Primary | ICD-10-CM

## 2020-12-10 PROCEDURE — 99999 PR PBB SHADOW E&M-EST. PATIENT-LVL III: ICD-10-PCS | Mod: PBBFAC,,, | Performed by: SURGERY

## 2020-12-10 PROCEDURE — 99024 PR POST-OP FOLLOW-UP VISIT: ICD-10-PCS | Mod: POP,,, | Performed by: SURGERY

## 2020-12-10 PROCEDURE — 99213 OFFICE O/P EST LOW 20 MIN: CPT | Mod: PBBFAC | Performed by: SURGERY

## 2020-12-10 PROCEDURE — 99999 PR PBB SHADOW E&M-EST. PATIENT-LVL III: CPT | Mod: PBBFAC,,, | Performed by: SURGERY

## 2020-12-10 PROCEDURE — 99024 POSTOP FOLLOW-UP VISIT: CPT | Mod: POP,,, | Performed by: SURGERY

## 2020-12-10 NOTE — PATIENT INSTRUCTIONS
Your small-bowel is going to take a little while to adapt to having less of it present.  You should start to absorb your food a little better over time.    Please remember to 2 your food well.    No lifting more than 20 lb until next year.    You can return to the gym and do cardiac activities.    Please use the my chart application a call the office to make an appointment to see us in mid February    Our office phone numbers are  [phone] and

## 2020-12-10 NOTE — PROGRESS NOTES
"Subjective:       Patient ID: Genny Calixto is a 55 y.o. female.    Exploratory laparotomy x2 for bowel obstruction    Chief Complaint: Post-op Evaluation    Patient presented to the emergency room with a small-bowel obstruction secondary to an internal hernia.  She return to the operating room when she developed an early postoperative bowel obstruction.    Patient states she is eating well she does notice that some of her food is and I just did.  She has minimal pain.  She is returning to her normal activity    Review of Systems   Gastrointestinal:        Food tends to go through rapid       Objective:      Physical Exam  Vitals signs reviewed.   Constitutional:       Appearance: Normal appearance.   Cardiovascular:      Rate and Rhythm: Normal rate and regular rhythm.   Pulmonary:      Effort: Pulmonary effort is normal.      Breath sounds: Normal breath sounds.   Abdominal:      General: Abdomen is flat. Bowel sounds are normal.      Palpations: Abdomen is soft.      Comments: Midline incision is healing well.  Staples removed   Skin:     General: Skin is warm and dry.   Neurological:      General: No focal deficit present.      Mental Status: She is alert.   Psychiatric:         Mood and Affect: Mood normal.         Behavior: Behavior normal.         Final Pathologic Diagnosis 1. Small bowel, ileum, resection:   - Small bowel mucosa with vascular congestion and edema   - Serosal adhesions and focal acute serositis present   - Negative for dysplasia and malignancy   2. Omentum, resection:   - Fibroadipose tissue with vascular congestion and hemorrhage    Comment: Interp By MEHUL Jordan MD, Signed on 11/19/2020 at 15:29   Gross 1:  Surgery ID:  [id number];  Pathology ID:  [id number]   1.  Received in formalin labeled "segment ileum" is a 28.5 x 2.7 cm segment      Final Pathologic Diagnosis 1. JEJUNUM, RESECTION:   - Negative for dysplasia or malignancy.   - Small intestinal tissue with edema, injected " "vessels, inflamed serosa   including giant cells and adhesions.   - Margins appear viable.   2. ILEUM, RESECTION:   - Negative for dysplasia or malignancy.   - Small intestinal tissue with edema, injected vessels, inflamed serosa   including giant cells and adhesions.   - Margins appear viable.    Comment: Interp By Sarah Aguilar M.D., Signed on 12/04/2020 at 12:53   Gross 1:  Surgery ID:  [id number];  Pathology ID:  [id number]   1.  Received in formalin labeled "jejunum" is a 20 x 3 cm segment of small   bowel with both ends stapled.  The serosa is roughened, tan purple, with   scattered adhesions.  Sectioning reveals a tan, well folded, slightly   edematous mucosa no distinct mass or lesions.  Representative sections are   embedded cassettes 5692, 1A-1F.   1A:  Margin   1B:  Opposite margin   1C-1F:  Representative sections submitted sequentially   Grossed by: Chace Amos    2: Surgery ID:  [id number];  Pathology ID:  [id number]   2.  Received in formalin labeled "ileum" is a 27 x 3 cm segment small bowel   with both ends stapled.  The serosa is roughened, tan purple, with scattered   serosal adhesions.  Sectioning reveals a stapled line, candidate anastomosis,   6 cm from the closest margin.  The remaining mucosa is tan, slightly   edematous and well folded with no distinct mass or lesions.  Representative   sections are embedded cassettes 5692, 2A-2F.   2A:  Margin   2B:  Opposite margin   2C-2E:  Representative sections submitted sequentially   2F:  Tissue surrounding        Assessment:      recovering as expected with some loose stool after removing a significant amount of small bowel.  Approximately 65 cm of small bowel was removed  Plan:       No lifting more than 20 lb for an additional for 3-4 weeks.    Follow-up in 6 to 8 weeks.  " Normal for race

## 2021-02-02 ENCOUNTER — TELEPHONE (OUTPATIENT)
Dept: INTERNAL MEDICINE | Facility: CLINIC | Age: 56
End: 2021-02-02

## 2021-02-02 ENCOUNTER — HOSPITAL ENCOUNTER (EMERGENCY)
Facility: HOSPITAL | Age: 56
Discharge: HOME | End: 2021-02-02
Attending: FAMILY MEDICINE
Payer: MEDICARE

## 2021-02-02 VITALS
SYSTOLIC BLOOD PRESSURE: 139 MMHG | OXYGEN SATURATION: 97 % | BODY MASS INDEX: 22.2 KG/M2 | WEIGHT: 141.75 LBS | DIASTOLIC BLOOD PRESSURE: 77 MMHG | RESPIRATION RATE: 20 BRPM | TEMPERATURE: 100 F | HEART RATE: 109 BPM

## 2021-02-02 DIAGNOSIS — R00.0 TACHYCARDIA: ICD-10-CM

## 2021-02-02 DIAGNOSIS — N20.0 KIDNEY STONE: Primary | ICD-10-CM

## 2021-02-02 LAB
ALBUMIN SERPL BCP-MCNC: 4 G/DL (ref 3.5–5.2)
ALP SERPL-CCNC: 65 U/L (ref 55–135)
ALT SERPL W/O P-5'-P-CCNC: 10 U/L (ref 10–44)
ANION GAP SERPL CALC-SCNC: 12 MMOL/L (ref 8–16)
APTT BLDCRRT: 27.9 SEC (ref 21–32)
AST SERPL-CCNC: 13 U/L (ref 10–40)
BASOPHILS # BLD AUTO: 0.03 K/UL (ref 0–0.2)
BASOPHILS NFR BLD: 0.3 % (ref 0–1.9)
BILIRUB SERPL-MCNC: 1.6 MG/DL (ref 0.1–1)
BNP SERPL-MCNC: <10 PG/ML (ref 0–99)
BUN SERPL-MCNC: 11 MG/DL (ref 6–20)
CALCIUM SERPL-MCNC: 8.6 MG/DL (ref 8.7–10.5)
CHLORIDE SERPL-SCNC: 101 MMOL/L (ref 95–110)
CO2 SERPL-SCNC: 25 MMOL/L (ref 23–29)
CREAT SERPL-MCNC: 0.7 MG/DL (ref 0.5–1.4)
CTP QC/QA: YES
DIFFERENTIAL METHOD: ABNORMAL
EOSINOPHIL # BLD AUTO: 0 K/UL (ref 0–0.5)
EOSINOPHIL NFR BLD: 0.1 % (ref 0–8)
ERYTHROCYTE [DISTWIDTH] IN BLOOD BY AUTOMATED COUNT: 12.6 % (ref 11.5–14.5)
EST. GFR  (AFRICAN AMERICAN): >60 ML/MIN/1.73 M^2
EST. GFR  (NON AFRICAN AMERICAN): >60 ML/MIN/1.73 M^2
GLUCOSE SERPL-MCNC: 108 MG/DL (ref 70–110)
HCT VFR BLD AUTO: 40.5 % (ref 37–48.5)
HGB BLD-MCNC: 13.4 G/DL (ref 12–16)
IMM GRANULOCYTES # BLD AUTO: 0.05 K/UL (ref 0–0.04)
IMM GRANULOCYTES NFR BLD AUTO: 0.5 % (ref 0–0.5)
INR PPP: 1 (ref 0.8–1.2)
LACTATE SERPL-SCNC: 0.9 MMOL/L (ref 0.5–2.2)
LIPASE SERPL-CCNC: 8 U/L (ref 4–60)
LYMPHOCYTES # BLD AUTO: 0.8 K/UL (ref 1–4.8)
LYMPHOCYTES NFR BLD: 7.6 % (ref 18–48)
MCH RBC QN AUTO: 31.7 PG (ref 27–31)
MCHC RBC AUTO-ENTMCNC: 33.1 G/DL (ref 32–36)
MCV RBC AUTO: 96 FL (ref 82–98)
MONOCYTES # BLD AUTO: 0.7 K/UL (ref 0.3–1)
MONOCYTES NFR BLD: 6.7 % (ref 4–15)
NEUTROPHILS # BLD AUTO: 8.9 K/UL (ref 1.8–7.7)
NEUTROPHILS NFR BLD: 84.8 % (ref 38–73)
NRBC BLD-RTO: 0 /100 WBC
PLATELET # BLD AUTO: 189 K/UL (ref 150–350)
PMV BLD AUTO: 12 FL (ref 9.2–12.9)
POTASSIUM SERPL-SCNC: 3.2 MMOL/L (ref 3.5–5.1)
PROT SERPL-MCNC: 6.8 G/DL (ref 6–8.4)
PROTHROMBIN TIME: 11 SEC (ref 9–12.5)
RBC # BLD AUTO: 4.23 M/UL (ref 4–5.4)
SARS-COV-2 RDRP RESP QL NAA+PROBE: NEGATIVE
SODIUM SERPL-SCNC: 138 MMOL/L (ref 136–145)
WBC # BLD AUTO: 10.52 K/UL (ref 3.9–12.7)

## 2021-02-02 PROCEDURE — 93010 EKG 12-LEAD: ICD-10-PCS | Mod: ,,, | Performed by: INTERNAL MEDICINE

## 2021-02-02 PROCEDURE — 93010 ELECTROCARDIOGRAM REPORT: CPT | Mod: ,,, | Performed by: INTERNAL MEDICINE

## 2021-02-02 PROCEDURE — 96365 THER/PROPH/DIAG IV INF INIT: CPT

## 2021-02-02 PROCEDURE — 83605 ASSAY OF LACTIC ACID: CPT

## 2021-02-02 PROCEDURE — 87040 BLOOD CULTURE FOR BACTERIA: CPT

## 2021-02-02 PROCEDURE — 36415 COLL VENOUS BLD VENIPUNCTURE: CPT

## 2021-02-02 PROCEDURE — U0002 COVID-19 LAB TEST NON-CDC: HCPCS | Performed by: EMERGENCY MEDICINE

## 2021-02-02 PROCEDURE — 63600175 PHARM REV CODE 636 W HCPCS: Performed by: FAMILY MEDICINE

## 2021-02-02 PROCEDURE — 83880 ASSAY OF NATRIURETIC PEPTIDE: CPT

## 2021-02-02 PROCEDURE — 99285 EMERGENCY DEPT VISIT HI MDM: CPT | Mod: 25

## 2021-02-02 PROCEDURE — 80053 COMPREHEN METABOLIC PANEL: CPT

## 2021-02-02 PROCEDURE — 96361 HYDRATE IV INFUSION ADD-ON: CPT | Mod: 59

## 2021-02-02 PROCEDURE — 25000003 PHARM REV CODE 250: Performed by: FAMILY MEDICINE

## 2021-02-02 PROCEDURE — 96374 THER/PROPH/DIAG INJ IV PUSH: CPT | Mod: 59

## 2021-02-02 PROCEDURE — 93005 ELECTROCARDIOGRAM TRACING: CPT

## 2021-02-02 PROCEDURE — 96372 THER/PROPH/DIAG INJ SC/IM: CPT | Mod: 59

## 2021-02-02 PROCEDURE — 85610 PROTHROMBIN TIME: CPT

## 2021-02-02 PROCEDURE — 85025 COMPLETE CBC W/AUTO DIFF WBC: CPT

## 2021-02-02 PROCEDURE — 83690 ASSAY OF LIPASE: CPT

## 2021-02-02 PROCEDURE — 96367 TX/PROPH/DG ADDL SEQ IV INF: CPT

## 2021-02-02 PROCEDURE — 85730 THROMBOPLASTIN TIME PARTIAL: CPT

## 2021-02-02 RX ORDER — HYDROCODONE BITARTRATE AND ACETAMINOPHEN 10; 325 MG/1; MG/1
1 TABLET ORAL EVERY 4 HOURS PRN
Qty: 18 TABLET | Refills: 0 | Status: ON HOLD | OUTPATIENT
Start: 2021-02-02 | End: 2021-02-09 | Stop reason: SDUPTHER

## 2021-02-02 RX ORDER — CIPROFLOXACIN 500 MG/1
500 TABLET ORAL 2 TIMES DAILY
Qty: 20 TABLET | Refills: 0 | Status: SHIPPED | OUTPATIENT
Start: 2021-02-02 | End: 2021-02-02 | Stop reason: SDUPTHER

## 2021-02-02 RX ORDER — ACETAMINOPHEN 500 MG
1000 TABLET ORAL
Status: DISCONTINUED | OUTPATIENT
Start: 2021-02-02 | End: 2021-02-02

## 2021-02-02 RX ORDER — TAMSULOSIN HYDROCHLORIDE 0.4 MG/1
0.4 CAPSULE ORAL DAILY
Qty: 10 CAPSULE | Refills: 0 | Status: SHIPPED | OUTPATIENT
Start: 2021-02-02 | End: 2021-09-28

## 2021-02-02 RX ORDER — IBUPROFEN 800 MG/1
800 TABLET ORAL
Status: COMPLETED | OUTPATIENT
Start: 2021-02-02 | End: 2021-02-02

## 2021-02-02 RX ORDER — ONDANSETRON 4 MG/1
4 TABLET, FILM COATED ORAL EVERY 6 HOURS
Qty: 12 TABLET | Refills: 0 | Status: SHIPPED | OUTPATIENT
Start: 2021-02-02 | End: 2021-02-02 | Stop reason: SDUPTHER

## 2021-02-02 RX ORDER — ONDANSETRON 4 MG/1
4 TABLET, FILM COATED ORAL EVERY 6 HOURS
Qty: 12 TABLET | Refills: 0 | Status: SHIPPED | OUTPATIENT
Start: 2021-02-02 | End: 2022-04-27 | Stop reason: SDUPTHER

## 2021-02-02 RX ORDER — KETOROLAC TROMETHAMINE 30 MG/ML
30 INJECTION, SOLUTION INTRAMUSCULAR; INTRAVENOUS
Status: DISCONTINUED | OUTPATIENT
Start: 2021-02-02 | End: 2021-02-02

## 2021-02-02 RX ORDER — DICLOFENAC SODIUM 75 MG/1
75 TABLET, DELAYED RELEASE ORAL 2 TIMES DAILY
Qty: 60 TABLET | Refills: 11 | Status: SHIPPED | OUTPATIENT
Start: 2021-02-02 | End: 2021-04-14

## 2021-02-02 RX ORDER — KETOROLAC TROMETHAMINE 30 MG/ML
60 INJECTION, SOLUTION INTRAMUSCULAR; INTRAVENOUS
Status: COMPLETED | OUTPATIENT
Start: 2021-02-02 | End: 2021-02-02

## 2021-02-02 RX ORDER — HYDROCODONE BITARTRATE AND ACETAMINOPHEN 5; 325 MG/1; MG/1
2 TABLET ORAL
Status: COMPLETED | OUTPATIENT
Start: 2021-02-02 | End: 2021-02-02

## 2021-02-02 RX ORDER — IBUPROFEN 600 MG/1
600 TABLET ORAL
Status: DISCONTINUED | OUTPATIENT
Start: 2021-02-02 | End: 2021-02-02

## 2021-02-02 RX ORDER — ONDANSETRON 2 MG/ML
4 INJECTION INTRAMUSCULAR; INTRAVENOUS ONCE
Status: COMPLETED | OUTPATIENT
Start: 2021-02-02 | End: 2021-02-02

## 2021-02-02 RX ORDER — CIPROFLOXACIN 500 MG/1
500 TABLET ORAL 2 TIMES DAILY
Qty: 20 TABLET | Refills: 0 | Status: ON HOLD | OUTPATIENT
Start: 2021-02-02 | End: 2021-02-09 | Stop reason: HOSPADM

## 2021-02-02 RX ADMIN — ONDANSETRON 4 MG: 2 INJECTION INTRAMUSCULAR; INTRAVENOUS at 06:02

## 2021-02-02 RX ADMIN — HYDROCODONE BITARTRATE AND ACETAMINOPHEN 2 TABLET: 5; 325 TABLET ORAL at 07:02

## 2021-02-02 RX ADMIN — IBUPROFEN 800 MG: 800 TABLET ORAL at 08:02

## 2021-02-02 RX ADMIN — CEFTRIAXONE 1 G: 1 INJECTION, POWDER, FOR SOLUTION INTRAMUSCULAR; INTRAVENOUS at 08:02

## 2021-02-02 RX ADMIN — SODIUM CHLORIDE 1000 ML: 0.9 INJECTION, SOLUTION INTRAVENOUS at 07:02

## 2021-02-02 RX ADMIN — KETOROLAC TROMETHAMINE 60 MG: 30 INJECTION, SOLUTION INTRAMUSCULAR at 06:02

## 2021-02-02 RX ADMIN — PROMETHAZINE HYDROCHLORIDE 25 MG: 25 INJECTION INTRAMUSCULAR; INTRAVENOUS at 07:02

## 2021-02-05 ENCOUNTER — HOSPITAL ENCOUNTER (INPATIENT)
Facility: HOSPITAL | Age: 56
LOS: 2 days | Discharge: HOME | DRG: 661 | End: 2021-02-09
Attending: EMERGENCY MEDICINE | Admitting: INTERNAL MEDICINE
Payer: MEDICARE

## 2021-02-05 DIAGNOSIS — R10.9 ABDOMINAL PAIN: ICD-10-CM

## 2021-02-05 DIAGNOSIS — E87.6 HYPOKALEMIA: Primary | ICD-10-CM

## 2021-02-05 DIAGNOSIS — R50.9 FEVER: ICD-10-CM

## 2021-02-05 DIAGNOSIS — N20.1 CALCULUS OF URETER: ICD-10-CM

## 2021-02-05 DIAGNOSIS — A41.9 SEPSIS: ICD-10-CM

## 2021-02-05 LAB
ALBUMIN SERPL BCP-MCNC: 3.1 G/DL (ref 3.5–5.2)
ALP SERPL-CCNC: 86 U/L (ref 55–135)
ALT SERPL W/O P-5'-P-CCNC: 10 U/L (ref 10–44)
ANION GAP SERPL CALC-SCNC: 10 MMOL/L (ref 8–16)
AST SERPL-CCNC: 14 U/L (ref 10–40)
BASOPHILS # BLD AUTO: 0.02 K/UL (ref 0–0.2)
BASOPHILS NFR BLD: 0.2 % (ref 0–1.9)
BILIRUB SERPL-MCNC: 1 MG/DL (ref 0.1–1)
BILIRUB UR QL STRIP: NEGATIVE
BUN SERPL-MCNC: 14 MG/DL (ref 6–20)
CALCIUM SERPL-MCNC: 8.4 MG/DL (ref 8.7–10.5)
CHLORIDE SERPL-SCNC: 103 MMOL/L (ref 95–110)
CLARITY UR: CLEAR
CO2 SERPL-SCNC: 23 MMOL/L (ref 23–29)
COLOR UR: YELLOW
CREAT SERPL-MCNC: 0.7 MG/DL (ref 0.5–1.4)
CTP QC/QA: YES
DIFFERENTIAL METHOD: ABNORMAL
EOSINOPHIL # BLD AUTO: 0 K/UL (ref 0–0.5)
EOSINOPHIL NFR BLD: 0.1 % (ref 0–8)
ERYTHROCYTE [DISTWIDTH] IN BLOOD BY AUTOMATED COUNT: 13 % (ref 11.5–14.5)
EST. GFR  (AFRICAN AMERICAN): >60 ML/MIN/1.73 M^2
EST. GFR  (NON AFRICAN AMERICAN): >60 ML/MIN/1.73 M^2
GLUCOSE SERPL-MCNC: 109 MG/DL (ref 70–110)
GLUCOSE UR QL STRIP: NEGATIVE
HCT VFR BLD AUTO: 34.1 % (ref 37–48.5)
HGB BLD-MCNC: 11.6 G/DL (ref 12–16)
HGB UR QL STRIP: ABNORMAL
IMM GRANULOCYTES # BLD AUTO: 0.05 K/UL (ref 0–0.04)
IMM GRANULOCYTES NFR BLD AUTO: 0.6 % (ref 0–0.5)
INFLUENZA A, MOLECULAR: NEGATIVE
INFLUENZA B, MOLECULAR: NEGATIVE
KETONES UR QL STRIP: NEGATIVE
LACTATE SERPL-SCNC: 0.9 MMOL/L (ref 0.5–2.2)
LACTATE SERPL-SCNC: 1.8 MMOL/L (ref 0.5–2.2)
LEUKOCYTE ESTERASE UR QL STRIP: NEGATIVE
LYMPHOCYTES # BLD AUTO: 0.7 K/UL (ref 1–4.8)
LYMPHOCYTES NFR BLD: 7.7 % (ref 18–48)
MCH RBC QN AUTO: 32 PG (ref 27–31)
MCHC RBC AUTO-ENTMCNC: 34 G/DL (ref 32–36)
MCV RBC AUTO: 94 FL (ref 82–98)
MONOCYTES # BLD AUTO: 0.4 K/UL (ref 0.3–1)
MONOCYTES NFR BLD: 4.9 % (ref 4–15)
NEUTROPHILS # BLD AUTO: 7.8 K/UL (ref 1.8–7.7)
NEUTROPHILS NFR BLD: 86.5 % (ref 38–73)
NITRITE UR QL STRIP: NEGATIVE
NRBC BLD-RTO: 0 /100 WBC
PH UR STRIP: 6 [PH] (ref 5–8)
PLATELET # BLD AUTO: 188 K/UL (ref 150–350)
PMV BLD AUTO: 11.9 FL (ref 9.2–12.9)
POTASSIUM SERPL-SCNC: 2.8 MMOL/L (ref 3.5–5.1)
PROCALCITONIN SERPL IA-MCNC: 3.79 NG/ML
PROT SERPL-MCNC: 6.6 G/DL (ref 6–8.4)
PROT UR QL STRIP: NEGATIVE
RBC # BLD AUTO: 3.62 M/UL (ref 4–5.4)
SARS-COV-2 RDRP RESP QL NAA+PROBE: NEGATIVE
SODIUM SERPL-SCNC: 136 MMOL/L (ref 136–145)
SP GR UR STRIP: 1.01 (ref 1–1.03)
SPECIMEN SOURCE: NORMAL
URN SPEC COLLECT METH UR: ABNORMAL
UROBILINOGEN UR STRIP-ACNC: NEGATIVE EU/DL
WBC # BLD AUTO: 9.01 K/UL (ref 3.9–12.7)

## 2021-02-05 PROCEDURE — 25000003 PHARM REV CODE 250: Performed by: EMERGENCY MEDICINE

## 2021-02-05 PROCEDURE — 63600175 PHARM REV CODE 636 W HCPCS: Performed by: NURSE PRACTITIONER

## 2021-02-05 PROCEDURE — 96365 THER/PROPH/DIAG IV INF INIT: CPT

## 2021-02-05 PROCEDURE — 81003 URINALYSIS AUTO W/O SCOPE: CPT

## 2021-02-05 PROCEDURE — U0002 COVID-19 LAB TEST NON-CDC: HCPCS | Performed by: NURSE PRACTITIONER

## 2021-02-05 PROCEDURE — 63600175 PHARM REV CODE 636 W HCPCS: Performed by: EMERGENCY MEDICINE

## 2021-02-05 PROCEDURE — 93010 EKG 12-LEAD: ICD-10-PCS | Mod: ,,, | Performed by: INTERNAL MEDICINE

## 2021-02-05 PROCEDURE — 80053 COMPREHEN METABOLIC PANEL: CPT

## 2021-02-05 PROCEDURE — G0378 HOSPITAL OBSERVATION PER HR: HCPCS

## 2021-02-05 PROCEDURE — 25000003 PHARM REV CODE 250: Performed by: NURSE PRACTITIONER

## 2021-02-05 PROCEDURE — 96375 TX/PRO/DX INJ NEW DRUG ADDON: CPT

## 2021-02-05 PROCEDURE — 85025 COMPLETE CBC W/AUTO DIFF WBC: CPT

## 2021-02-05 PROCEDURE — 96366 THER/PROPH/DIAG IV INF ADDON: CPT

## 2021-02-05 PROCEDURE — 87040 BLOOD CULTURE FOR BACTERIA: CPT

## 2021-02-05 PROCEDURE — 83605 ASSAY OF LACTIC ACID: CPT | Mod: 91

## 2021-02-05 PROCEDURE — 83605 ASSAY OF LACTIC ACID: CPT

## 2021-02-05 PROCEDURE — 87502 INFLUENZA DNA AMP PROBE: CPT

## 2021-02-05 PROCEDURE — 99291 CRITICAL CARE FIRST HOUR: CPT | Mod: 25

## 2021-02-05 PROCEDURE — 84145 PROCALCITONIN (PCT): CPT

## 2021-02-05 PROCEDURE — 93010 ELECTROCARDIOGRAM REPORT: CPT | Mod: ,,, | Performed by: INTERNAL MEDICINE

## 2021-02-05 PROCEDURE — 83735 ASSAY OF MAGNESIUM: CPT

## 2021-02-05 PROCEDURE — 36415 COLL VENOUS BLD VENIPUNCTURE: CPT

## 2021-02-05 PROCEDURE — 93005 ELECTROCARDIOGRAM TRACING: CPT

## 2021-02-05 RX ORDER — ACETAMINOPHEN 325 MG/1
650 TABLET ORAL
Status: COMPLETED | OUTPATIENT
Start: 2021-02-05 | End: 2021-02-05

## 2021-02-05 RX ORDER — KETOROLAC TROMETHAMINE 30 MG/ML
30 INJECTION, SOLUTION INTRAMUSCULAR; INTRAVENOUS
Status: COMPLETED | OUTPATIENT
Start: 2021-02-05 | End: 2021-02-05

## 2021-02-05 RX ORDER — HYDROCODONE BITARTRATE AND ACETAMINOPHEN 5; 325 MG/1; MG/1
1 TABLET ORAL
Status: COMPLETED | OUTPATIENT
Start: 2021-02-05 | End: 2021-02-05

## 2021-02-05 RX ORDER — POTASSIUM CHLORIDE 20 MEQ/1
40 TABLET, EXTENDED RELEASE ORAL
Status: COMPLETED | OUTPATIENT
Start: 2021-02-05 | End: 2021-02-05

## 2021-02-05 RX ORDER — IBUPROFEN 200 MG
800 TABLET ORAL
Status: DISCONTINUED | OUTPATIENT
Start: 2021-02-05 | End: 2021-02-05

## 2021-02-05 RX ADMIN — POTASSIUM CHLORIDE 40 MEQ: 1500 TABLET, EXTENDED RELEASE ORAL at 06:02

## 2021-02-05 RX ADMIN — SODIUM CHLORIDE, SODIUM LACTATE, POTASSIUM CHLORIDE, AND CALCIUM CHLORIDE 1920 ML: .6; .31; .03; .02 INJECTION, SOLUTION INTRAVENOUS at 05:02

## 2021-02-05 RX ADMIN — CEFTRIAXONE SODIUM 2 G: 2 INJECTION, POWDER, FOR SOLUTION INTRAMUSCULAR; INTRAVENOUS at 05:02

## 2021-02-05 RX ADMIN — ACETAMINOPHEN 650 MG: 325 TABLET ORAL at 04:02

## 2021-02-05 RX ADMIN — HYDROCODONE BITARTRATE AND ACETAMINOPHEN 1 TABLET: 5; 325 TABLET ORAL at 10:02

## 2021-02-05 RX ADMIN — KETOROLAC TROMETHAMINE 30 MG: 30 INJECTION, SOLUTION INTRAMUSCULAR; INTRAVENOUS at 06:02

## 2021-02-06 PROBLEM — E87.6 HYPOKALEMIA: Status: ACTIVE | Noted: 2021-02-06

## 2021-02-06 PROBLEM — R50.9 FEVER: Status: ACTIVE | Noted: 2021-02-05

## 2021-02-06 LAB
ANION GAP SERPL CALC-SCNC: 4 MMOL/L (ref 8–16)
BASOPHILS # BLD AUTO: 0.02 K/UL (ref 0–0.2)
BASOPHILS NFR BLD: 0.3 % (ref 0–1.9)
BUN SERPL-MCNC: 12 MG/DL (ref 6–20)
CALCIUM SERPL-MCNC: 8 MG/DL (ref 8.7–10.5)
CHLORIDE SERPL-SCNC: 110 MMOL/L (ref 95–110)
CO2 SERPL-SCNC: 25 MMOL/L (ref 23–29)
CREAT SERPL-MCNC: 0.6 MG/DL (ref 0.5–1.4)
DIFFERENTIAL METHOD: ABNORMAL
EOSINOPHIL # BLD AUTO: 0.1 K/UL (ref 0–0.5)
EOSINOPHIL NFR BLD: 1.3 % (ref 0–8)
ERYTHROCYTE [DISTWIDTH] IN BLOOD BY AUTOMATED COUNT: 13.2 % (ref 11.5–14.5)
EST. GFR  (AFRICAN AMERICAN): >60 ML/MIN/1.73 M^2
EST. GFR  (NON AFRICAN AMERICAN): >60 ML/MIN/1.73 M^2
GLUCOSE SERPL-MCNC: 83 MG/DL (ref 70–110)
HCT VFR BLD AUTO: 28.4 % (ref 37–48.5)
HGB BLD-MCNC: 9.3 G/DL (ref 12–16)
IMM GRANULOCYTES # BLD AUTO: 0.03 K/UL (ref 0–0.04)
IMM GRANULOCYTES NFR BLD AUTO: 0.5 % (ref 0–0.5)
LYMPHOCYTES # BLD AUTO: 1.2 K/UL (ref 1–4.8)
LYMPHOCYTES NFR BLD: 18.6 % (ref 18–48)
MAGNESIUM SERPL-MCNC: 1.4 MG/DL (ref 1.6–2.6)
MAGNESIUM SERPL-MCNC: 1.5 MG/DL (ref 1.6–2.6)
MCH RBC QN AUTO: 31.5 PG (ref 27–31)
MCHC RBC AUTO-ENTMCNC: 32.7 G/DL (ref 32–36)
MCV RBC AUTO: 96 FL (ref 82–98)
MONOCYTES # BLD AUTO: 0.7 K/UL (ref 0.3–1)
MONOCYTES NFR BLD: 10.4 % (ref 4–15)
NEUTROPHILS # BLD AUTO: 4.3 K/UL (ref 1.8–7.7)
NEUTROPHILS NFR BLD: 68.9 % (ref 38–73)
NRBC BLD-RTO: 0 /100 WBC
PLATELET # BLD AUTO: 171 K/UL (ref 150–350)
PMV BLD AUTO: 11.7 FL (ref 9.2–12.9)
POTASSIUM SERPL-SCNC: 3.7 MMOL/L (ref 3.5–5.1)
RBC # BLD AUTO: 2.95 M/UL (ref 4–5.4)
SODIUM SERPL-SCNC: 139 MMOL/L (ref 136–145)
WBC # BLD AUTO: 6.25 K/UL (ref 3.9–12.7)

## 2021-02-06 PROCEDURE — 36415 COLL VENOUS BLD VENIPUNCTURE: CPT

## 2021-02-06 PROCEDURE — 99223 1ST HOSP IP/OBS HIGH 75: CPT | Mod: ,,, | Performed by: UROLOGY

## 2021-02-06 PROCEDURE — 99223 PR INITIAL HOSPITAL CARE,LEVL III: ICD-10-PCS | Mod: ,,, | Performed by: UROLOGY

## 2021-02-06 PROCEDURE — 94761 N-INVAS EAR/PLS OXIMETRY MLT: CPT

## 2021-02-06 PROCEDURE — 87086 URINE CULTURE/COLONY COUNT: CPT

## 2021-02-06 PROCEDURE — 96376 TX/PRO/DX INJ SAME DRUG ADON: CPT

## 2021-02-06 PROCEDURE — 25000003 PHARM REV CODE 250: Performed by: NURSE PRACTITIONER

## 2021-02-06 PROCEDURE — 63600175 PHARM REV CODE 636 W HCPCS: Performed by: NURSE PRACTITIONER

## 2021-02-06 PROCEDURE — 83735 ASSAY OF MAGNESIUM: CPT

## 2021-02-06 PROCEDURE — G0378 HOSPITAL OBSERVATION PER HR: HCPCS

## 2021-02-06 PROCEDURE — 96361 HYDRATE IV INFUSION ADD-ON: CPT

## 2021-02-06 PROCEDURE — 85025 COMPLETE CBC W/AUTO DIFF WBC: CPT

## 2021-02-06 PROCEDURE — 80048 BASIC METABOLIC PNL TOTAL CA: CPT

## 2021-02-06 RX ORDER — ONDANSETRON 2 MG/ML
4 INJECTION INTRAMUSCULAR; INTRAVENOUS EVERY 6 HOURS PRN
Status: DISCONTINUED | OUTPATIENT
Start: 2021-02-06 | End: 2021-02-09 | Stop reason: HOSPADM

## 2021-02-06 RX ORDER — ACETAMINOPHEN 325 MG/1
650 TABLET ORAL EVERY 6 HOURS PRN
Status: DISCONTINUED | OUTPATIENT
Start: 2021-02-06 | End: 2021-02-09 | Stop reason: HOSPADM

## 2021-02-06 RX ORDER — DOXEPIN HYDROCHLORIDE 10 MG/1
10 CAPSULE ORAL NIGHTLY
Status: DISCONTINUED | OUTPATIENT
Start: 2021-02-06 | End: 2021-02-09 | Stop reason: HOSPADM

## 2021-02-06 RX ORDER — IBUPROFEN 600 MG/1
600 TABLET ORAL EVERY 6 HOURS PRN
Status: DISCONTINUED | OUTPATIENT
Start: 2021-02-06 | End: 2021-02-09 | Stop reason: HOSPADM

## 2021-02-06 RX ORDER — CETIRIZINE HYDROCHLORIDE 10 MG/1
10 TABLET ORAL DAILY
Status: DISCONTINUED | OUTPATIENT
Start: 2021-02-06 | End: 2021-02-09 | Stop reason: HOSPADM

## 2021-02-06 RX ORDER — DIAZEPAM 5 MG/1
5 TABLET ORAL EVERY 12 HOURS PRN
Status: DISCONTINUED | OUTPATIENT
Start: 2021-02-06 | End: 2021-02-09 | Stop reason: HOSPADM

## 2021-02-06 RX ORDER — TALC
9 POWDER (GRAM) TOPICAL NIGHTLY PRN
Status: DISCONTINUED | OUTPATIENT
Start: 2021-02-06 | End: 2021-02-09 | Stop reason: HOSPADM

## 2021-02-06 RX ORDER — TAMSULOSIN HYDROCHLORIDE 0.4 MG/1
0.4 CAPSULE ORAL DAILY
Status: DISCONTINUED | OUTPATIENT
Start: 2021-02-06 | End: 2021-02-09 | Stop reason: HOSPADM

## 2021-02-06 RX ORDER — HYDROCODONE BITARTRATE AND ACETAMINOPHEN 10; 325 MG/1; MG/1
1 TABLET ORAL EVERY 4 HOURS PRN
Status: DISCONTINUED | OUTPATIENT
Start: 2021-02-06 | End: 2021-02-09 | Stop reason: HOSPADM

## 2021-02-06 RX ORDER — SODIUM CHLORIDE 9 MG/ML
INJECTION, SOLUTION INTRAVENOUS CONTINUOUS
Status: DISCONTINUED | OUTPATIENT
Start: 2021-02-06 | End: 2021-02-08

## 2021-02-06 RX ORDER — POTASSIUM CHLORIDE 20 MEQ/1
40 TABLET, EXTENDED RELEASE ORAL EVERY 4 HOURS
Status: COMPLETED | OUTPATIENT
Start: 2021-02-06 | End: 2021-02-06

## 2021-02-06 RX ADMIN — DOXEPIN HYDROCHLORIDE 10 MG: 10 CAPSULE ORAL at 12:02

## 2021-02-06 RX ADMIN — POTASSIUM CHLORIDE 40 MEQ: 1500 TABLET, EXTENDED RELEASE ORAL at 08:02

## 2021-02-06 RX ADMIN — CETIRIZINE HYDROCHLORIDE 10 MG: 10 TABLET, FILM COATED ORAL at 08:02

## 2021-02-06 RX ADMIN — POTASSIUM CHLORIDE 40 MEQ: 1500 TABLET, EXTENDED RELEASE ORAL at 04:02

## 2021-02-06 RX ADMIN — HYDROCODONE BITARTRATE AND ACETAMINOPHEN 1 TABLET: 10; 325 TABLET ORAL at 09:02

## 2021-02-06 RX ADMIN — POTASSIUM CHLORIDE 40 MEQ: 1500 TABLET, EXTENDED RELEASE ORAL at 12:02

## 2021-02-06 RX ADMIN — DIAZEPAM 5 MG: 5 TABLET ORAL at 12:02

## 2021-02-06 RX ADMIN — TAMSULOSIN HYDROCHLORIDE 0.4 MG: 0.4 CAPSULE ORAL at 08:02

## 2021-02-06 RX ADMIN — DOXEPIN HYDROCHLORIDE 10 MG: 10 CAPSULE ORAL at 09:02

## 2021-02-06 RX ADMIN — IBUPROFEN 600 MG: 600 TABLET, FILM COATED ORAL at 05:02

## 2021-02-06 RX ADMIN — LACTOBACILLUS TAB 1 TABLET: TAB at 08:02

## 2021-02-06 RX ADMIN — SODIUM CHLORIDE: 0.9 INJECTION, SOLUTION INTRAVENOUS at 12:02

## 2021-02-06 RX ADMIN — IBUPROFEN 600 MG: 600 TABLET, FILM COATED ORAL at 04:02

## 2021-02-06 RX ADMIN — DIAZEPAM 5 MG: 5 TABLET ORAL at 02:02

## 2021-02-06 RX ADMIN — CEFTRIAXONE SODIUM 2 G: 2 INJECTION, POWDER, FOR SOLUTION INTRAMUSCULAR; INTRAVENOUS at 05:02

## 2021-02-07 PROBLEM — E83.42 HYPOMAGNESEMIA: Status: ACTIVE | Noted: 2021-02-07

## 2021-02-07 LAB
ANION GAP SERPL CALC-SCNC: 6 MMOL/L (ref 8–16)
BASOPHILS # BLD AUTO: 0.04 K/UL (ref 0–0.2)
BASOPHILS NFR BLD: 0.7 % (ref 0–1.9)
BUN SERPL-MCNC: 8 MG/DL (ref 6–20)
CALCIUM SERPL-MCNC: 8.4 MG/DL (ref 8.7–10.5)
CHLORIDE SERPL-SCNC: 110 MMOL/L (ref 95–110)
CO2 SERPL-SCNC: 27 MMOL/L (ref 23–29)
CREAT SERPL-MCNC: 0.6 MG/DL (ref 0.5–1.4)
DIFFERENTIAL METHOD: ABNORMAL
EOSINOPHIL # BLD AUTO: 0.1 K/UL (ref 0–0.5)
EOSINOPHIL NFR BLD: 1.8 % (ref 0–8)
ERYTHROCYTE [DISTWIDTH] IN BLOOD BY AUTOMATED COUNT: 13.2 % (ref 11.5–14.5)
EST. GFR  (AFRICAN AMERICAN): >60 ML/MIN/1.73 M^2
EST. GFR  (NON AFRICAN AMERICAN): >60 ML/MIN/1.73 M^2
GLUCOSE SERPL-MCNC: 76 MG/DL (ref 70–110)
HCT VFR BLD AUTO: 29 % (ref 37–48.5)
HGB BLD-MCNC: 9.4 G/DL (ref 12–16)
IMM GRANULOCYTES # BLD AUTO: 0.02 K/UL (ref 0–0.04)
IMM GRANULOCYTES NFR BLD AUTO: 0.3 % (ref 0–0.5)
LYMPHOCYTES # BLD AUTO: 1.5 K/UL (ref 1–4.8)
LYMPHOCYTES NFR BLD: 25.5 % (ref 18–48)
MAGNESIUM SERPL-MCNC: 1.3 MG/DL (ref 1.6–2.6)
MCH RBC QN AUTO: 31.5 PG (ref 27–31)
MCHC RBC AUTO-ENTMCNC: 32.4 G/DL (ref 32–36)
MCV RBC AUTO: 97 FL (ref 82–98)
MONOCYTES # BLD AUTO: 0.9 K/UL (ref 0.3–1)
MONOCYTES NFR BLD: 14.3 % (ref 4–15)
NEUTROPHILS # BLD AUTO: 3.5 K/UL (ref 1.8–7.7)
NEUTROPHILS NFR BLD: 57.4 % (ref 38–73)
NRBC BLD-RTO: 0 /100 WBC
PLATELET # BLD AUTO: 184 K/UL (ref 150–350)
PMV BLD AUTO: 10.8 FL (ref 9.2–12.9)
POTASSIUM SERPL-SCNC: 3.6 MMOL/L (ref 3.5–5.1)
RBC # BLD AUTO: 2.98 M/UL (ref 4–5.4)
SODIUM SERPL-SCNC: 143 MMOL/L (ref 136–145)
WBC # BLD AUTO: 6.01 K/UL (ref 3.9–12.7)

## 2021-02-07 PROCEDURE — 80048 BASIC METABOLIC PNL TOTAL CA: CPT

## 2021-02-07 PROCEDURE — 96376 TX/PRO/DX INJ SAME DRUG ADON: CPT

## 2021-02-07 PROCEDURE — 11000001 HC ACUTE MED/SURG PRIVATE ROOM

## 2021-02-07 PROCEDURE — 36415 COLL VENOUS BLD VENIPUNCTURE: CPT

## 2021-02-07 PROCEDURE — 25000003 PHARM REV CODE 250: Performed by: NURSE PRACTITIONER

## 2021-02-07 PROCEDURE — 63600175 PHARM REV CODE 636 W HCPCS: Performed by: NURSE PRACTITIONER

## 2021-02-07 PROCEDURE — 85025 COMPLETE CBC W/AUTO DIFF WBC: CPT

## 2021-02-07 PROCEDURE — 83735 ASSAY OF MAGNESIUM: CPT

## 2021-02-07 PROCEDURE — 94761 N-INVAS EAR/PLS OXIMETRY MLT: CPT

## 2021-02-07 RX ORDER — LANOLIN ALCOHOL/MO/W.PET/CERES
400 CREAM (GRAM) TOPICAL ONCE
Status: COMPLETED | OUTPATIENT
Start: 2021-02-07 | End: 2021-02-07

## 2021-02-07 RX ADMIN — DIAZEPAM 5 MG: 5 TABLET ORAL at 03:02

## 2021-02-07 RX ADMIN — HYDROCODONE BITARTRATE AND ACETAMINOPHEN 1 TABLET: 10; 325 TABLET ORAL at 06:02

## 2021-02-07 RX ADMIN — DOXEPIN HYDROCHLORIDE 10 MG: 10 CAPSULE ORAL at 09:02

## 2021-02-07 RX ADMIN — CEFTRIAXONE SODIUM 2 G: 2 INJECTION, POWDER, FOR SOLUTION INTRAMUSCULAR; INTRAVENOUS at 04:02

## 2021-02-07 RX ADMIN — Medication 400 MG: at 04:02

## 2021-02-07 RX ADMIN — IBUPROFEN 600 MG: 600 TABLET, FILM COATED ORAL at 03:02

## 2021-02-07 RX ADMIN — ACETAMINOPHEN 650 MG: 325 TABLET ORAL at 02:02

## 2021-02-07 RX ADMIN — LACTOBACILLUS TAB 1 TABLET: TAB at 08:02

## 2021-02-07 RX ADMIN — Medication 400 MG: at 06:02

## 2021-02-07 RX ADMIN — TAMSULOSIN HYDROCHLORIDE 0.4 MG: 0.4 CAPSULE ORAL at 08:02

## 2021-02-07 RX ADMIN — CETIRIZINE HYDROCHLORIDE 10 MG: 10 TABLET, FILM COATED ORAL at 08:02

## 2021-02-08 ENCOUNTER — ANESTHESIA EVENT (OUTPATIENT)
Dept: SURGERY | Facility: HOSPITAL | Age: 56
DRG: 661 | End: 2021-02-08
Payer: MEDICARE

## 2021-02-08 ENCOUNTER — TELEPHONE (OUTPATIENT)
Dept: UROLOGY | Facility: CLINIC | Age: 56
End: 2021-02-08

## 2021-02-08 ENCOUNTER — ANESTHESIA (OUTPATIENT)
Dept: SURGERY | Facility: HOSPITAL | Age: 56
DRG: 661 | End: 2021-02-08
Payer: MEDICARE

## 2021-02-08 DIAGNOSIS — N20.0 KIDNEY STONES: Primary | ICD-10-CM

## 2021-02-08 PROBLEM — Z98.890 STATUS POST LASER LITHOTRIPSY OF URETERAL CALCULUS: Status: ACTIVE | Noted: 2021-02-08

## 2021-02-08 LAB
ANION GAP SERPL CALC-SCNC: 9 MMOL/L (ref 8–16)
BACTERIA BLD CULT: NORMAL
BACTERIA BLD CULT: NORMAL
BACTERIA UR CULT: NORMAL
BASOPHILS # BLD AUTO: 0.03 K/UL (ref 0–0.2)
BASOPHILS NFR BLD: 0.5 % (ref 0–1.9)
BILIRUB UR QL STRIP: NEGATIVE
BUN SERPL-MCNC: 5 MG/DL (ref 6–20)
CALCIUM SERPL-MCNC: 8.2 MG/DL (ref 8.7–10.5)
CHLORIDE SERPL-SCNC: 105 MMOL/L (ref 95–110)
CLARITY UR: CLEAR
CO2 SERPL-SCNC: 29 MMOL/L (ref 23–29)
COLOR UR: YELLOW
CREAT SERPL-MCNC: 0.6 MG/DL (ref 0.5–1.4)
DIFFERENTIAL METHOD: ABNORMAL
EOSINOPHIL # BLD AUTO: 0.1 K/UL (ref 0–0.5)
EOSINOPHIL NFR BLD: 1.6 % (ref 0–8)
ERYTHROCYTE [DISTWIDTH] IN BLOOD BY AUTOMATED COUNT: 12.8 % (ref 11.5–14.5)
EST. GFR  (AFRICAN AMERICAN): >60 ML/MIN/1.73 M^2
EST. GFR  (NON AFRICAN AMERICAN): >60 ML/MIN/1.73 M^2
FINAL PATHOLOGIC DIAGNOSIS: NORMAL
GLUCOSE SERPL-MCNC: 77 MG/DL (ref 70–110)
GLUCOSE UR QL STRIP: NEGATIVE
GROSS: NORMAL
HCT VFR BLD AUTO: 31 % (ref 37–48.5)
HGB BLD-MCNC: 10.2 G/DL (ref 12–16)
HGB UR QL STRIP: ABNORMAL
IMM GRANULOCYTES # BLD AUTO: 0.03 K/UL (ref 0–0.04)
IMM GRANULOCYTES NFR BLD AUTO: 0.5 % (ref 0–0.5)
KETONES UR QL STRIP: NEGATIVE
LEUKOCYTE ESTERASE UR QL STRIP: NEGATIVE
LYMPHOCYTES # BLD AUTO: 1.4 K/UL (ref 1–4.8)
LYMPHOCYTES NFR BLD: 25.9 % (ref 18–48)
Lab: NORMAL
MAGNESIUM SERPL-MCNC: 1.5 MG/DL (ref 1.6–2.6)
MCH RBC QN AUTO: 31.5 PG (ref 27–31)
MCHC RBC AUTO-ENTMCNC: 32.9 G/DL (ref 32–36)
MCV RBC AUTO: 96 FL (ref 82–98)
MICROSCOPIC COMMENT: ABNORMAL
MONOCYTES # BLD AUTO: 0.8 K/UL (ref 0.3–1)
MONOCYTES NFR BLD: 15.1 % (ref 4–15)
NEUTROPHILS # BLD AUTO: 3.1 K/UL (ref 1.8–7.7)
NEUTROPHILS NFR BLD: 56.4 % (ref 38–73)
NITRITE UR QL STRIP: NEGATIVE
NRBC BLD-RTO: 0 /100 WBC
PH UR STRIP: 7 [PH] (ref 5–8)
PLATELET # BLD AUTO: 233 K/UL (ref 150–350)
PMV BLD AUTO: 10.6 FL (ref 9.2–12.9)
POTASSIUM SERPL-SCNC: 3.3 MMOL/L (ref 3.5–5.1)
PROT UR QL STRIP: NEGATIVE
RBC # BLD AUTO: 3.24 M/UL (ref 4–5.4)
RBC #/AREA URNS HPF: 5 /HPF (ref 0–4)
SODIUM SERPL-SCNC: 143 MMOL/L (ref 136–145)
SP GR UR STRIP: 1.01 (ref 1–1.03)
URN SPEC COLLECT METH UR: ABNORMAL
UROBILINOGEN UR STRIP-ACNC: NEGATIVE EU/DL
WBC # BLD AUTO: 5.55 K/UL (ref 3.9–12.7)

## 2021-02-08 PROCEDURE — 37000009 HC ANESTHESIA EA ADD 15 MINS: Performed by: UROLOGY

## 2021-02-08 PROCEDURE — 52356 PR CYSTO/URETERO W/LITHOTRIPSY: ICD-10-PCS | Mod: LT,,, | Performed by: UROLOGY

## 2021-02-08 PROCEDURE — 36000707: Performed by: UROLOGY

## 2021-02-08 PROCEDURE — 36415 COLL VENOUS BLD VENIPUNCTURE: CPT

## 2021-02-08 PROCEDURE — 81000 URINALYSIS NONAUTO W/SCOPE: CPT

## 2021-02-08 PROCEDURE — 25000003 PHARM REV CODE 250: Performed by: NURSE ANESTHETIST, CERTIFIED REGISTERED

## 2021-02-08 PROCEDURE — 82365 CALCULUS SPECTROSCOPY: CPT

## 2021-02-08 PROCEDURE — 36000706: Performed by: UROLOGY

## 2021-02-08 PROCEDURE — 11000001 HC ACUTE MED/SURG PRIVATE ROOM

## 2021-02-08 PROCEDURE — 63600175 PHARM REV CODE 636 W HCPCS: Performed by: NURSE ANESTHETIST, CERTIFIED REGISTERED

## 2021-02-08 PROCEDURE — 37000008 HC ANESTHESIA 1ST 15 MINUTES: Performed by: UROLOGY

## 2021-02-08 PROCEDURE — 71000033 HC RECOVERY, INTIAL HOUR: Performed by: UROLOGY

## 2021-02-08 PROCEDURE — 88300 PR  SURG PATH,GROSS,LEVEL I: ICD-10-PCS | Mod: 26,,, | Performed by: PATHOLOGY

## 2021-02-08 PROCEDURE — 88300 SURGICAL PATH GROSS: CPT | Performed by: PATHOLOGY

## 2021-02-08 PROCEDURE — 83735 ASSAY OF MAGNESIUM: CPT

## 2021-02-08 PROCEDURE — 85025 COMPLETE CBC W/AUTO DIFF WBC: CPT

## 2021-02-08 PROCEDURE — C2617 STENT, NON-COR, TEM W/O DEL: HCPCS | Performed by: UROLOGY

## 2021-02-08 PROCEDURE — 27201423 OPTIME MED/SURG SUP & DEVICES STERILE SUPPLY: Performed by: UROLOGY

## 2021-02-08 PROCEDURE — C1769 GUIDE WIRE: HCPCS | Performed by: UROLOGY

## 2021-02-08 PROCEDURE — 80048 BASIC METABOLIC PNL TOTAL CA: CPT

## 2021-02-08 PROCEDURE — 94761 N-INVAS EAR/PLS OXIMETRY MLT: CPT

## 2021-02-08 PROCEDURE — 52356 CYSTO/URETERO W/LITHOTRIPSY: CPT | Mod: LT,,, | Performed by: UROLOGY

## 2021-02-08 PROCEDURE — 25000003 PHARM REV CODE 250: Performed by: NURSE PRACTITIONER

## 2021-02-08 PROCEDURE — 63600175 PHARM REV CODE 636 W HCPCS: Performed by: NURSE PRACTITIONER

## 2021-02-08 PROCEDURE — 88300 SURGICAL PATH GROSS: CPT | Mod: 26,,, | Performed by: PATHOLOGY

## 2021-02-08 DEVICE — STENT URETERAL UNIV 6FR 22CM
Type: IMPLANTABLE DEVICE | Site: URETER | Status: NON-FUNCTIONAL
Removed: 2021-09-30

## 2021-02-08 RX ORDER — MIDAZOLAM HYDROCHLORIDE 1 MG/ML
INJECTION, SOLUTION INTRAMUSCULAR; INTRAVENOUS
Status: DISCONTINUED | OUTPATIENT
Start: 2021-02-08 | End: 2021-02-08

## 2021-02-08 RX ORDER — PROPOFOL 10 MG/ML
VIAL (ML) INTRAVENOUS
Status: DISCONTINUED | OUTPATIENT
Start: 2021-02-08 | End: 2021-02-08

## 2021-02-08 RX ORDER — HYDROMORPHONE HYDROCHLORIDE 2 MG/ML
0.2 INJECTION, SOLUTION INTRAMUSCULAR; INTRAVENOUS; SUBCUTANEOUS EVERY 5 MIN PRN
Status: DISCONTINUED | OUTPATIENT
Start: 2021-02-08 | End: 2021-02-08 | Stop reason: HOSPADM

## 2021-02-08 RX ORDER — ONDANSETRON 2 MG/ML
4 INJECTION INTRAMUSCULAR; INTRAVENOUS DAILY PRN
Status: DISCONTINUED | OUTPATIENT
Start: 2021-02-08 | End: 2021-02-08 | Stop reason: HOSPADM

## 2021-02-08 RX ORDER — LIDOCAINE HYDROCHLORIDE 10 MG/ML
INJECTION, SOLUTION EPIDURAL; INFILTRATION; INTRACAUDAL; PERINEURAL
Status: DISCONTINUED | OUTPATIENT
Start: 2021-02-08 | End: 2021-02-08

## 2021-02-08 RX ORDER — ONDANSETRON 2 MG/ML
INJECTION INTRAMUSCULAR; INTRAVENOUS
Status: DISCONTINUED | OUTPATIENT
Start: 2021-02-08 | End: 2021-02-08

## 2021-02-08 RX ADMIN — HYDROCODONE BITARTRATE AND ACETAMINOPHEN 1 TABLET: 10; 325 TABLET ORAL at 07:02

## 2021-02-08 RX ADMIN — LIDOCAINE HYDROCHLORIDE 50 MG: 10 INJECTION, SOLUTION EPIDURAL; INFILTRATION; INTRACAUDAL; PERINEURAL at 09:02

## 2021-02-08 RX ADMIN — HYDROCODONE BITARTRATE AND ACETAMINOPHEN 1 TABLET: 10; 325 TABLET ORAL at 03:02

## 2021-02-08 RX ADMIN — TAMSULOSIN HYDROCHLORIDE 0.4 MG: 0.4 CAPSULE ORAL at 08:02

## 2021-02-08 RX ADMIN — ACETAMINOPHEN 650 MG: 325 TABLET ORAL at 11:02

## 2021-02-08 RX ADMIN — CETIRIZINE HYDROCHLORIDE 10 MG: 10 TABLET, FILM COATED ORAL at 08:02

## 2021-02-08 RX ADMIN — ONDANSETRON 4 MG: 2 INJECTION INTRAMUSCULAR; INTRAVENOUS at 05:02

## 2021-02-08 RX ADMIN — ACETAMINOPHEN 650 MG: 325 TABLET ORAL at 12:02

## 2021-02-08 RX ADMIN — MIDAZOLAM HYDROCHLORIDE 2 MG: 1 INJECTION, SOLUTION INTRAMUSCULAR; INTRAVENOUS at 09:02

## 2021-02-08 RX ADMIN — ONDANSETRON 4 MG: 2 INJECTION INTRAMUSCULAR; INTRAVENOUS at 11:02

## 2021-02-08 RX ADMIN — LACTOBACILLUS TAB 1 TABLET: TAB at 08:02

## 2021-02-08 RX ADMIN — PROPOFOL 200 MG: 10 INJECTION, EMULSION INTRAVENOUS at 09:02

## 2021-02-08 RX ADMIN — CEFTRIAXONE SODIUM 2 G: 2 INJECTION, POWDER, FOR SOLUTION INTRAMUSCULAR; INTRAVENOUS at 05:02

## 2021-02-08 RX ADMIN — HYDROCODONE BITARTRATE AND ACETAMINOPHEN 1 TABLET: 10; 325 TABLET ORAL at 08:02

## 2021-02-08 RX ADMIN — HYDROCODONE BITARTRATE AND ACETAMINOPHEN 1 TABLET: 10; 325 TABLET ORAL at 12:02

## 2021-02-08 RX ADMIN — DIAZEPAM 5 MG: 5 TABLET ORAL at 11:02

## 2021-02-08 RX ADMIN — ONDANSETRON 4 MG: 2 INJECTION, SOLUTION INTRAMUSCULAR; INTRAVENOUS at 10:02

## 2021-02-08 RX ADMIN — DOXEPIN HYDROCHLORIDE 10 MG: 10 CAPSULE ORAL at 09:02

## 2021-02-08 RX ADMIN — DIAZEPAM 5 MG: 5 TABLET ORAL at 04:02

## 2021-02-09 VITALS
HEART RATE: 94 BPM | SYSTOLIC BLOOD PRESSURE: 159 MMHG | WEIGHT: 141.13 LBS | TEMPERATURE: 100 F | DIASTOLIC BLOOD PRESSURE: 69 MMHG | BODY MASS INDEX: 22.15 KG/M2 | RESPIRATION RATE: 18 BRPM | OXYGEN SATURATION: 94 % | HEIGHT: 67 IN

## 2021-02-09 PROBLEM — N20.0 BILATERAL NEPHROLITHIASIS: Status: RESOLVED | Noted: 2019-08-09 | Resolved: 2021-02-09

## 2021-02-09 PROBLEM — E87.6 HYPOKALEMIA: Status: RESOLVED | Noted: 2021-02-06 | Resolved: 2021-02-09

## 2021-02-09 PROBLEM — R50.9 FEVER: Status: RESOLVED | Noted: 2021-02-05 | Resolved: 2021-02-09

## 2021-02-09 PROBLEM — E83.42 HYPOMAGNESEMIA: Status: RESOLVED | Noted: 2021-02-07 | Resolved: 2021-02-09

## 2021-02-09 LAB
ANION GAP SERPL CALC-SCNC: 9 MMOL/L (ref 8–16)
BASOPHILS # BLD AUTO: 0.03 K/UL (ref 0–0.2)
BASOPHILS NFR BLD: 0.6 % (ref 0–1.9)
BUN SERPL-MCNC: 8 MG/DL (ref 6–20)
CALCIUM SERPL-MCNC: 8.6 MG/DL (ref 8.7–10.5)
CHLORIDE SERPL-SCNC: 104 MMOL/L (ref 95–110)
CO2 SERPL-SCNC: 28 MMOL/L (ref 23–29)
CREAT SERPL-MCNC: 0.6 MG/DL (ref 0.5–1.4)
DIFFERENTIAL METHOD: ABNORMAL
EOSINOPHIL # BLD AUTO: 0.1 K/UL (ref 0–0.5)
EOSINOPHIL NFR BLD: 1.9 % (ref 0–8)
ERYTHROCYTE [DISTWIDTH] IN BLOOD BY AUTOMATED COUNT: 13.1 % (ref 11.5–14.5)
EST. GFR  (AFRICAN AMERICAN): >60 ML/MIN/1.73 M^2
EST. GFR  (NON AFRICAN AMERICAN): >60 ML/MIN/1.73 M^2
GLUCOSE SERPL-MCNC: 80 MG/DL (ref 70–110)
HCT VFR BLD AUTO: 32 % (ref 37–48.5)
HGB BLD-MCNC: 10.1 G/DL (ref 12–16)
IMM GRANULOCYTES # BLD AUTO: 0.03 K/UL (ref 0–0.04)
IMM GRANULOCYTES NFR BLD AUTO: 0.6 % (ref 0–0.5)
LYMPHOCYTES # BLD AUTO: 1.4 K/UL (ref 1–4.8)
LYMPHOCYTES NFR BLD: 27.1 % (ref 18–48)
MAGNESIUM SERPL-MCNC: 1.6 MG/DL (ref 1.6–2.6)
MCH RBC QN AUTO: 30.7 PG (ref 27–31)
MCHC RBC AUTO-ENTMCNC: 31.6 G/DL (ref 32–36)
MCV RBC AUTO: 97 FL (ref 82–98)
MONOCYTES # BLD AUTO: 0.7 K/UL (ref 0.3–1)
MONOCYTES NFR BLD: 12.9 % (ref 4–15)
NEUTROPHILS # BLD AUTO: 3 K/UL (ref 1.8–7.7)
NEUTROPHILS NFR BLD: 56.9 % (ref 38–73)
NRBC BLD-RTO: 0 /100 WBC
PLATELET # BLD AUTO: 291 K/UL (ref 150–350)
PMV BLD AUTO: 10.1 FL (ref 9.2–12.9)
POTASSIUM SERPL-SCNC: 3.4 MMOL/L (ref 3.5–5.1)
RBC # BLD AUTO: 3.29 M/UL (ref 4–5.4)
SODIUM SERPL-SCNC: 141 MMOL/L (ref 136–145)
WBC # BLD AUTO: 5.27 K/UL (ref 3.9–12.7)

## 2021-02-09 PROCEDURE — 25000003 PHARM REV CODE 250: Performed by: NURSE PRACTITIONER

## 2021-02-09 PROCEDURE — 36415 COLL VENOUS BLD VENIPUNCTURE: CPT

## 2021-02-09 PROCEDURE — 63600175 PHARM REV CODE 636 W HCPCS: Performed by: NURSE PRACTITIONER

## 2021-02-09 PROCEDURE — 99231 SBSQ HOSP IP/OBS SF/LOW 25: CPT | Mod: ,,, | Performed by: UROLOGY

## 2021-02-09 PROCEDURE — 99231 PR SUBSEQUENT HOSPITAL CARE,LEVL I: ICD-10-PCS | Mod: ,,, | Performed by: UROLOGY

## 2021-02-09 PROCEDURE — 85025 COMPLETE CBC W/AUTO DIFF WBC: CPT

## 2021-02-09 PROCEDURE — 94761 N-INVAS EAR/PLS OXIMETRY MLT: CPT

## 2021-02-09 PROCEDURE — 80048 BASIC METABOLIC PNL TOTAL CA: CPT

## 2021-02-09 PROCEDURE — 83735 ASSAY OF MAGNESIUM: CPT

## 2021-02-09 RX ORDER — HYDROCODONE BITARTRATE AND ACETAMINOPHEN 10; 325 MG/1; MG/1
1 TABLET ORAL EVERY 6 HOURS PRN
Qty: 12 TABLET | Refills: 0 | Status: SHIPPED | OUTPATIENT
Start: 2021-02-09 | End: 2021-02-09 | Stop reason: SDUPTHER

## 2021-02-09 RX ORDER — HYDROCODONE BITARTRATE AND ACETAMINOPHEN 10; 325 MG/1; MG/1
1 TABLET ORAL EVERY 6 HOURS PRN
Qty: 12 TABLET | Refills: 0 | Status: SHIPPED | OUTPATIENT
Start: 2021-02-09 | End: 2021-02-12

## 2021-02-09 RX ORDER — LEVOFLOXACIN 500 MG/1
500 TABLET, FILM COATED ORAL DAILY
Qty: 7 TABLET | Refills: 0 | Status: SHIPPED | OUTPATIENT
Start: 2021-02-09 | End: 2021-02-16

## 2021-02-09 RX ORDER — SUMATRIPTAN 50 MG/1
50 TABLET, FILM COATED ORAL ONCE
Status: COMPLETED | OUTPATIENT
Start: 2021-02-09 | End: 2021-02-09

## 2021-02-09 RX ADMIN — CETIRIZINE HYDROCHLORIDE 10 MG: 10 TABLET, FILM COATED ORAL at 08:02

## 2021-02-09 RX ADMIN — ONDANSETRON 4 MG: 2 INJECTION INTRAMUSCULAR; INTRAVENOUS at 08:02

## 2021-02-09 RX ADMIN — SUMATRIPTAN SUCCINATE 50 MG: 50 TABLET ORAL at 10:02

## 2021-02-09 RX ADMIN — HYDROCODONE BITARTRATE AND ACETAMINOPHEN 1 TABLET: 10; 325 TABLET ORAL at 08:02

## 2021-02-09 RX ADMIN — TAMSULOSIN HYDROCHLORIDE 0.4 MG: 0.4 CAPSULE ORAL at 08:02

## 2021-02-09 RX ADMIN — LACTOBACILLUS TAB 1 TABLET: TAB at 08:02

## 2021-02-10 ENCOUNTER — TELEPHONE (OUTPATIENT)
Dept: UROLOGY | Facility: CLINIC | Age: 56
End: 2021-02-10

## 2021-02-10 ENCOUNTER — PATIENT OUTREACH (OUTPATIENT)
Dept: ADMINISTRATIVE | Facility: CLINIC | Age: 56
End: 2021-02-10

## 2021-02-11 LAB
BACTERIA BLD CULT: NORMAL
BACTERIA BLD CULT: NORMAL

## 2021-02-12 ENCOUNTER — PATIENT OUTREACH (OUTPATIENT)
Dept: ADMINISTRATIVE | Facility: HOSPITAL | Age: 56
End: 2021-02-12

## 2021-02-13 LAB
COMPN STONE: NORMAL
SPECIMEN SOURCE: NORMAL
STONE ANALYSIS IR-IMP: NORMAL

## 2021-02-19 ENCOUNTER — CLINICAL SUPPORT (OUTPATIENT)
Dept: UROLOGY | Facility: CLINIC | Age: 56
End: 2021-02-19
Payer: MEDICARE

## 2021-02-19 DIAGNOSIS — N20.0 KIDNEY STONES: Primary | ICD-10-CM

## 2021-03-16 ENCOUNTER — LAB VISIT (OUTPATIENT)
Dept: LAB | Facility: HOSPITAL | Age: 56
End: 2021-03-16
Attending: DERMATOLOGY
Payer: MEDICARE

## 2021-03-16 ENCOUNTER — OFFICE VISIT (OUTPATIENT)
Dept: DERMATOLOGY | Facility: CLINIC | Age: 56
End: 2021-03-16
Payer: MEDICARE

## 2021-03-16 DIAGNOSIS — K91.2 POSTSURGICAL MALABSORPTION, NOT ELSEWHERE CLASSIFIED: ICD-10-CM

## 2021-03-16 DIAGNOSIS — L65.9 ALOPECIA: Primary | ICD-10-CM

## 2021-03-16 DIAGNOSIS — L65.9 ALOPECIA: ICD-10-CM

## 2021-03-16 PROCEDURE — 99204 OFFICE O/P NEW MOD 45 MIN: CPT | Mod: S$PBB,,, | Performed by: DERMATOLOGY

## 2021-03-16 PROCEDURE — 82728 ASSAY OF FERRITIN: CPT | Performed by: DERMATOLOGY

## 2021-03-16 PROCEDURE — 99204 PR OFFICE/OUTPT VISIT, NEW, LEVL IV, 45-59 MIN: ICD-10-PCS | Mod: S$PBB,,, | Performed by: DERMATOLOGY

## 2021-03-16 PROCEDURE — 99213 OFFICE O/P EST LOW 20 MIN: CPT | Mod: PBBFAC,PO | Performed by: DERMATOLOGY

## 2021-03-16 PROCEDURE — 84630 ASSAY OF ZINC: CPT | Performed by: DERMATOLOGY

## 2021-03-16 PROCEDURE — 99999 PR PBB SHADOW E&M-EST. PATIENT-LVL III: CPT | Mod: PBBFAC,,, | Performed by: DERMATOLOGY

## 2021-03-16 PROCEDURE — 99999 PR PBB SHADOW E&M-EST. PATIENT-LVL III: ICD-10-PCS | Mod: PBBFAC,,, | Performed by: DERMATOLOGY

## 2021-03-16 PROCEDURE — 84443 ASSAY THYROID STIM HORMONE: CPT | Performed by: DERMATOLOGY

## 2021-03-16 PROCEDURE — 82306 VITAMIN D 25 HYDROXY: CPT | Performed by: DERMATOLOGY

## 2021-03-16 RX ORDER — SPIRONOLACTONE 50 MG/1
TABLET, FILM COATED ORAL
Qty: 60 TABLET | Refills: 3 | Status: SHIPPED | OUTPATIENT
Start: 2021-03-16 | End: 2021-09-30

## 2021-03-17 LAB
25(OH)D3+25(OH)D2 SERPL-MCNC: 35 NG/ML (ref 30–96)
FERRITIN SERPL-MCNC: 86 NG/ML (ref 20–300)
TSH SERPL DL<=0.005 MIU/L-ACNC: 1.01 UIU/ML (ref 0.4–4)

## 2021-03-19 ENCOUNTER — TELEPHONE (OUTPATIENT)
Dept: RADIOLOGY | Facility: HOSPITAL | Age: 56
End: 2021-03-19

## 2021-03-19 LAB — ZINC SERPL-MCNC: 54 UG/DL (ref 60–130)

## 2021-03-22 ENCOUNTER — PATIENT OUTREACH (OUTPATIENT)
Dept: ADMINISTRATIVE | Facility: OTHER | Age: 56
End: 2021-03-22

## 2021-03-24 ENCOUNTER — TELEPHONE (OUTPATIENT)
Dept: RADIOLOGY | Facility: HOSPITAL | Age: 56
End: 2021-03-24

## 2021-03-25 ENCOUNTER — HOSPITAL ENCOUNTER (EMERGENCY)
Facility: HOSPITAL | Age: 56
Discharge: HOME | End: 2021-03-26
Attending: EMERGENCY MEDICINE
Payer: MEDICARE

## 2021-03-25 DIAGNOSIS — R10.13 EPIGASTRIC ABDOMINAL PAIN: Primary | ICD-10-CM

## 2021-03-25 LAB
ALBUMIN SERPL BCP-MCNC: 4.1 G/DL (ref 3.5–5.2)
ALP SERPL-CCNC: 65 U/L (ref 55–135)
ALT SERPL W/O P-5'-P-CCNC: 10 U/L (ref 10–44)
ANION GAP SERPL CALC-SCNC: 12 MMOL/L (ref 8–16)
AST SERPL-CCNC: 21 U/L (ref 10–40)
BASOPHILS # BLD AUTO: 0.07 K/UL (ref 0–0.2)
BASOPHILS NFR BLD: 0.8 % (ref 0–1.9)
BILIRUB SERPL-MCNC: 1 MG/DL (ref 0.1–1)
BUN SERPL-MCNC: 9 MG/DL (ref 6–20)
CALCIUM SERPL-MCNC: 9 MG/DL (ref 8.7–10.5)
CHLORIDE SERPL-SCNC: 108 MMOL/L (ref 95–110)
CO2 SERPL-SCNC: 18 MMOL/L (ref 23–29)
CREAT SERPL-MCNC: 0.7 MG/DL (ref 0.5–1.4)
DIFFERENTIAL METHOD: ABNORMAL
EOSINOPHIL # BLD AUTO: 0.1 K/UL (ref 0–0.5)
EOSINOPHIL NFR BLD: 1.3 % (ref 0–8)
ERYTHROCYTE [DISTWIDTH] IN BLOOD BY AUTOMATED COUNT: 13.2 % (ref 11.5–14.5)
EST. GFR  (AFRICAN AMERICAN): >60 ML/MIN/1.73 M^2
EST. GFR  (NON AFRICAN AMERICAN): >60 ML/MIN/1.73 M^2
GLUCOSE SERPL-MCNC: 102 MG/DL (ref 70–110)
HCT VFR BLD AUTO: 44 % (ref 37–48.5)
HGB BLD-MCNC: 14.7 G/DL (ref 12–16)
IMM GRANULOCYTES # BLD AUTO: 0.03 K/UL (ref 0–0.04)
IMM GRANULOCYTES NFR BLD AUTO: 0.3 % (ref 0–0.5)
LIPASE SERPL-CCNC: 18 U/L (ref 4–60)
LYMPHOCYTES # BLD AUTO: 2.1 K/UL (ref 1–4.8)
LYMPHOCYTES NFR BLD: 22.8 % (ref 18–48)
MCH RBC QN AUTO: 32.1 PG (ref 27–31)
MCHC RBC AUTO-ENTMCNC: 33.4 G/DL (ref 32–36)
MCV RBC AUTO: 96 FL (ref 82–98)
MONOCYTES # BLD AUTO: 0.5 K/UL (ref 0.3–1)
MONOCYTES NFR BLD: 5.1 % (ref 4–15)
NEUTROPHILS # BLD AUTO: 6.4 K/UL (ref 1.8–7.7)
NEUTROPHILS NFR BLD: 69.7 % (ref 38–73)
NRBC BLD-RTO: 0 /100 WBC
PLATELET # BLD AUTO: 268 K/UL (ref 150–350)
PMV BLD AUTO: 10.7 FL (ref 9.2–12.9)
POTASSIUM SERPL-SCNC: 4 MMOL/L (ref 3.5–5.1)
PROT SERPL-MCNC: 7 G/DL (ref 6–8.4)
RBC # BLD AUTO: 4.58 M/UL (ref 4–5.4)
SODIUM SERPL-SCNC: 138 MMOL/L (ref 136–145)
WBC # BLD AUTO: 9.2 K/UL (ref 3.9–12.7)

## 2021-03-25 PROCEDURE — 96361 HYDRATE IV INFUSION ADD-ON: CPT

## 2021-03-25 PROCEDURE — 83690 ASSAY OF LIPASE: CPT | Performed by: NURSE PRACTITIONER

## 2021-03-25 PROCEDURE — 99285 EMERGENCY DEPT VISIT HI MDM: CPT | Mod: 25

## 2021-03-25 PROCEDURE — 63600175 PHARM REV CODE 636 W HCPCS: Performed by: EMERGENCY MEDICINE

## 2021-03-25 PROCEDURE — 96374 THER/PROPH/DIAG INJ IV PUSH: CPT | Mod: 59

## 2021-03-25 PROCEDURE — 80053 COMPREHEN METABOLIC PANEL: CPT | Performed by: NURSE PRACTITIONER

## 2021-03-25 PROCEDURE — 85025 COMPLETE CBC W/AUTO DIFF WBC: CPT | Performed by: NURSE PRACTITIONER

## 2021-03-25 PROCEDURE — 25000003 PHARM REV CODE 250: Performed by: EMERGENCY MEDICINE

## 2021-03-25 PROCEDURE — 96375 TX/PRO/DX INJ NEW DRUG ADDON: CPT

## 2021-03-25 RX ORDER — ONDANSETRON 2 MG/ML
4 INJECTION INTRAMUSCULAR; INTRAVENOUS
Status: DISCONTINUED | OUTPATIENT
Start: 2021-03-25 | End: 2021-03-25

## 2021-03-25 RX ORDER — HYDROMORPHONE HYDROCHLORIDE 2 MG/ML
1 INJECTION, SOLUTION INTRAMUSCULAR; INTRAVENOUS; SUBCUTANEOUS
Status: COMPLETED | OUTPATIENT
Start: 2021-03-25 | End: 2021-03-25

## 2021-03-25 RX ORDER — ONDANSETRON 2 MG/ML
4 INJECTION INTRAMUSCULAR; INTRAVENOUS
Status: COMPLETED | OUTPATIENT
Start: 2021-03-25 | End: 2021-03-25

## 2021-03-25 RX ORDER — KETOROLAC TROMETHAMINE 30 MG/ML
10 INJECTION, SOLUTION INTRAMUSCULAR; INTRAVENOUS
Status: COMPLETED | OUTPATIENT
Start: 2021-03-26 | End: 2021-03-26

## 2021-03-25 RX ADMIN — SODIUM CHLORIDE 1000 ML: 0.9 INJECTION, SOLUTION INTRAVENOUS at 10:03

## 2021-03-25 RX ADMIN — HYDROMORPHONE HYDROCHLORIDE 1 MG: 2 INJECTION INTRAMUSCULAR; INTRAVENOUS; SUBCUTANEOUS at 10:03

## 2021-03-25 RX ADMIN — IOHEXOL 100 ML: 350 INJECTION, SOLUTION INTRAVENOUS at 11:03

## 2021-03-25 RX ADMIN — ONDANSETRON 4 MG: 2 INJECTION INTRAMUSCULAR; INTRAVENOUS at 10:03

## 2021-03-26 ENCOUNTER — TELEPHONE (OUTPATIENT)
Dept: RADIOLOGY | Facility: HOSPITAL | Age: 56
End: 2021-03-26

## 2021-03-26 ENCOUNTER — TELEPHONE (OUTPATIENT)
Dept: UROLOGY | Facility: CLINIC | Age: 56
End: 2021-03-26

## 2021-03-26 VITALS
TEMPERATURE: 99 F | DIASTOLIC BLOOD PRESSURE: 84 MMHG | RESPIRATION RATE: 18 BRPM | HEART RATE: 95 BPM | HEIGHT: 67 IN | BODY MASS INDEX: 21.35 KG/M2 | OXYGEN SATURATION: 99 % | SYSTOLIC BLOOD PRESSURE: 140 MMHG | WEIGHT: 136 LBS

## 2021-03-26 LAB
BACTERIA #/AREA URNS HPF: NORMAL /HPF
BILIRUB UR QL STRIP: NEGATIVE
CLARITY UR: CLEAR
COLOR UR: YELLOW
GLUCOSE UR QL STRIP: NEGATIVE
HGB UR QL STRIP: NEGATIVE
HYALINE CASTS #/AREA URNS LPF: 0 /LPF
KETONES UR QL STRIP: NEGATIVE
LEUKOCYTE ESTERASE UR QL STRIP: NEGATIVE
MICROSCOPIC COMMENT: NORMAL
NITRITE UR QL STRIP: NEGATIVE
PH UR STRIP: 6 [PH] (ref 5–8)
PROT UR QL STRIP: ABNORMAL
RBC #/AREA URNS HPF: 0 /HPF (ref 0–4)
SP GR UR STRIP: <=1.005 (ref 1–1.03)
URN SPEC COLLECT METH UR: ABNORMAL
UROBILINOGEN UR STRIP-ACNC: NEGATIVE EU/DL
WBC #/AREA URNS HPF: 2 /HPF (ref 0–5)

## 2021-03-26 PROCEDURE — 81000 URINALYSIS NONAUTO W/SCOPE: CPT | Performed by: NURSE PRACTITIONER

## 2021-03-26 PROCEDURE — 25500020 PHARM REV CODE 255: Performed by: EMERGENCY MEDICINE

## 2021-03-26 PROCEDURE — 63600175 PHARM REV CODE 636 W HCPCS: Performed by: EMERGENCY MEDICINE

## 2021-03-26 RX ORDER — DICYCLOMINE HYDROCHLORIDE 20 MG/1
20 TABLET ORAL 2 TIMES DAILY
Qty: 20 TABLET | Refills: 0 | Status: SHIPPED | OUTPATIENT
Start: 2021-03-26 | End: 2021-04-07 | Stop reason: SDUPTHER

## 2021-03-26 RX ADMIN — KETOROLAC TROMETHAMINE 10 MG: 30 INJECTION, SOLUTION INTRAMUSCULAR at 12:03

## 2021-03-31 ENCOUNTER — PATIENT MESSAGE (OUTPATIENT)
Dept: UROLOGY | Facility: CLINIC | Age: 56
End: 2021-03-31

## 2021-04-01 ENCOUNTER — TELEPHONE (OUTPATIENT)
Dept: UROLOGY | Facility: CLINIC | Age: 56
End: 2021-04-01

## 2021-04-05 ENCOUNTER — TELEPHONE (OUTPATIENT)
Dept: SURGERY | Facility: CLINIC | Age: 56
End: 2021-04-05

## 2021-04-07 ENCOUNTER — OFFICE VISIT (OUTPATIENT)
Dept: INTERNAL MEDICINE | Facility: CLINIC | Age: 56
End: 2021-04-07
Payer: MEDICARE

## 2021-04-07 VITALS
BODY MASS INDEX: 21.93 KG/M2 | HEIGHT: 67 IN | HEART RATE: 76 BPM | DIASTOLIC BLOOD PRESSURE: 80 MMHG | WEIGHT: 139.75 LBS | SYSTOLIC BLOOD PRESSURE: 130 MMHG | OXYGEN SATURATION: 99 %

## 2021-04-07 DIAGNOSIS — F41.9 ANXIETY: ICD-10-CM

## 2021-04-07 DIAGNOSIS — Z12.31 BREAST CANCER SCREENING BY MAMMOGRAM: Primary | ICD-10-CM

## 2021-04-07 PROCEDURE — 99213 OFFICE O/P EST LOW 20 MIN: CPT | Mod: PBBFAC,PO | Performed by: INTERNAL MEDICINE

## 2021-04-07 PROCEDURE — 99213 OFFICE O/P EST LOW 20 MIN: CPT | Mod: S$PBB,,, | Performed by: INTERNAL MEDICINE

## 2021-04-07 PROCEDURE — 99213 PR OFFICE/OUTPT VISIT, EST, LEVL III, 20-29 MIN: ICD-10-PCS | Mod: S$PBB,,, | Performed by: INTERNAL MEDICINE

## 2021-04-07 PROCEDURE — 99999 PR PBB SHADOW E&M-EST. PATIENT-LVL III: ICD-10-PCS | Mod: PBBFAC,,, | Performed by: INTERNAL MEDICINE

## 2021-04-07 PROCEDURE — 99999 PR PBB SHADOW E&M-EST. PATIENT-LVL III: CPT | Mod: PBBFAC,,, | Performed by: INTERNAL MEDICINE

## 2021-04-07 RX ORDER — DIAZEPAM 10 MG/1
10 TABLET ORAL DAILY PRN
Qty: 90 TABLET | Refills: 1 | Status: SHIPPED | OUTPATIENT
Start: 2021-04-07 | End: 2021-10-07

## 2021-04-07 RX ORDER — DICYCLOMINE HYDROCHLORIDE 20 MG/1
20 TABLET ORAL 3 TIMES DAILY
Qty: 270 TABLET | Refills: 3 | Status: SHIPPED | OUTPATIENT
Start: 2021-04-07 | End: 2022-03-28

## 2021-04-08 ENCOUNTER — OFFICE VISIT (OUTPATIENT)
Dept: SURGERY | Facility: CLINIC | Age: 56
End: 2021-04-08
Payer: MEDICARE

## 2021-04-08 VITALS
WEIGHT: 137.38 LBS | DIASTOLIC BLOOD PRESSURE: 88 MMHG | SYSTOLIC BLOOD PRESSURE: 145 MMHG | BODY MASS INDEX: 21.51 KG/M2 | HEART RATE: 91 BPM

## 2021-04-08 DIAGNOSIS — R14.0 ABDOMINAL BLOATING: ICD-10-CM

## 2021-04-08 DIAGNOSIS — R10.9 ABDOMINAL CRAMPING: Primary | ICD-10-CM

## 2021-04-08 PROCEDURE — 99214 PR OFFICE/OUTPT VISIT, EST, LEVL IV, 30-39 MIN: ICD-10-PCS | Mod: S$PBB,,, | Performed by: SURGERY

## 2021-04-08 PROCEDURE — 99214 OFFICE O/P EST MOD 30 MIN: CPT | Mod: S$PBB,,, | Performed by: SURGERY

## 2021-04-08 PROCEDURE — 99999 PR PBB SHADOW E&M-EST. PATIENT-LVL III: CPT | Mod: PBBFAC,,, | Performed by: SURGERY

## 2021-04-08 PROCEDURE — 99999 PR PBB SHADOW E&M-EST. PATIENT-LVL III: ICD-10-PCS | Mod: PBBFAC,,, | Performed by: SURGERY

## 2021-04-08 PROCEDURE — 99213 OFFICE O/P EST LOW 20 MIN: CPT | Mod: PBBFAC | Performed by: SURGERY

## 2021-04-09 ENCOUNTER — TELEPHONE (OUTPATIENT)
Dept: SURGERY | Facility: CLINIC | Age: 56
End: 2021-04-09

## 2021-04-13 ENCOUNTER — HOSPITAL ENCOUNTER (OUTPATIENT)
Dept: RADIOLOGY | Facility: HOSPITAL | Age: 56
Discharge: HOME | End: 2021-04-13
Attending: SURGERY
Payer: MEDICARE

## 2021-04-13 ENCOUNTER — PATIENT MESSAGE (OUTPATIENT)
Dept: GASTROENTEROLOGY | Facility: CLINIC | Age: 56
End: 2021-04-13

## 2021-04-13 ENCOUNTER — TELEPHONE (OUTPATIENT)
Dept: SURGERY | Facility: CLINIC | Age: 56
End: 2021-04-13

## 2021-04-13 ENCOUNTER — TELEPHONE (OUTPATIENT)
Dept: SURGERY | Facility: HOSPITAL | Age: 56
End: 2021-04-13

## 2021-04-13 DIAGNOSIS — R19.5 LOOSE STOOLS: ICD-10-CM

## 2021-04-13 DIAGNOSIS — R10.84 GENERALIZED ABDOMINAL PAIN: Primary | ICD-10-CM

## 2021-04-13 DIAGNOSIS — R10.9 ABDOMINAL CRAMPING: ICD-10-CM

## 2021-04-13 PROCEDURE — 74250 X-RAY XM SM INT 1CNTRST STD: CPT | Mod: TC

## 2021-04-13 PROCEDURE — 25500020 PHARM REV CODE 255: Performed by: SURGERY

## 2021-04-13 RX ADMIN — DIATRIZOATE MEGLUMINE AND DIATRIZOATE SODIUM 1 ML: 660; 100 LIQUID ORAL; RECTAL at 10:04

## 2021-04-14 ENCOUNTER — TELEPHONE (OUTPATIENT)
Dept: GASTROENTEROLOGY | Facility: CLINIC | Age: 56
End: 2021-04-14

## 2021-04-14 ENCOUNTER — OFFICE VISIT (OUTPATIENT)
Dept: GASTROENTEROLOGY | Facility: CLINIC | Age: 56
End: 2021-04-14
Payer: MEDICARE

## 2021-04-14 VITALS
SYSTOLIC BLOOD PRESSURE: 134 MMHG | DIASTOLIC BLOOD PRESSURE: 82 MMHG | WEIGHT: 138.44 LBS | HEART RATE: 98 BPM | BODY MASS INDEX: 21.73 KG/M2 | HEIGHT: 67 IN

## 2021-04-14 DIAGNOSIS — K56.51 INTESTINAL ADHESIONS WITH PARTIAL OBSTRUCTION: Primary | ICD-10-CM

## 2021-04-14 DIAGNOSIS — R19.5 LOOSE STOOLS: ICD-10-CM

## 2021-04-14 DIAGNOSIS — R10.84 GENERALIZED ABDOMINAL PAIN: ICD-10-CM

## 2021-04-14 PROCEDURE — 99213 OFFICE O/P EST LOW 20 MIN: CPT | Mod: PBBFAC | Performed by: INTERNAL MEDICINE

## 2021-04-14 PROCEDURE — 99999 PR PBB SHADOW E&M-EST. PATIENT-LVL III: ICD-10-PCS | Mod: PBBFAC,,, | Performed by: INTERNAL MEDICINE

## 2021-04-14 PROCEDURE — 99999 PR PBB SHADOW E&M-EST. PATIENT-LVL III: CPT | Mod: PBBFAC,,, | Performed by: INTERNAL MEDICINE

## 2021-04-14 PROCEDURE — 99203 PR OFFICE/OUTPT VISIT, NEW, LEVL III, 30-44 MIN: ICD-10-PCS | Mod: S$PBB,,, | Performed by: INTERNAL MEDICINE

## 2021-04-14 PROCEDURE — 99203 OFFICE O/P NEW LOW 30 MIN: CPT | Mod: S$PBB,,, | Performed by: INTERNAL MEDICINE

## 2021-04-14 RX ORDER — ONDANSETRON 8 MG/1
8 TABLET, ORALLY DISINTEGRATING ORAL ONCE
Qty: 1 TABLET | Refills: 0 | Status: SHIPPED | OUTPATIENT
Start: 2021-04-14 | End: 2021-04-14

## 2021-04-16 ENCOUNTER — TELEPHONE (OUTPATIENT)
Dept: SURGERY | Facility: HOSPITAL | Age: 56
End: 2021-04-16

## 2021-04-16 ENCOUNTER — HOSPITAL ENCOUNTER (OUTPATIENT)
Dept: RADIOLOGY | Facility: HOSPITAL | Age: 56
Discharge: HOME | End: 2021-04-16
Attending: INTERNAL MEDICINE
Payer: MEDICARE

## 2021-04-16 ENCOUNTER — TELEPHONE (OUTPATIENT)
Dept: GASTROENTEROLOGY | Facility: CLINIC | Age: 56
End: 2021-04-16

## 2021-04-16 ENCOUNTER — TELEPHONE (OUTPATIENT)
Dept: SURGERY | Facility: CLINIC | Age: 56
End: 2021-04-16

## 2021-04-16 DIAGNOSIS — R19.5 LOOSE STOOLS: ICD-10-CM

## 2021-04-16 DIAGNOSIS — K56.51 INTESTINAL ADHESIONS WITH PARTIAL OBSTRUCTION: ICD-10-CM

## 2021-04-16 DIAGNOSIS — R10.84 GENERALIZED ABDOMINAL PAIN: ICD-10-CM

## 2021-04-16 PROCEDURE — 43752 NASAL/OROGASTRIC W/TUBE PLMT: CPT

## 2021-04-16 PROCEDURE — A9698 NON-RAD CONTRAST MATERIALNOC: HCPCS | Performed by: INTERNAL MEDICINE

## 2021-04-16 PROCEDURE — 74250 X-RAY XM SM INT 1CNTRST STD: CPT | Mod: TC

## 2021-04-16 PROCEDURE — 25500020 PHARM REV CODE 255: Performed by: INTERNAL MEDICINE

## 2021-04-16 RX ADMIN — BARIUM SULFATE 352 G: 960 POWDER, FOR SUSPENSION ORAL at 09:04

## 2021-04-19 ENCOUNTER — OFFICE VISIT (OUTPATIENT)
Dept: INTERNAL MEDICINE | Facility: CLINIC | Age: 56
End: 2021-04-19
Payer: MEDICARE

## 2021-04-19 VITALS
WEIGHT: 139.75 LBS | HEIGHT: 67 IN | SYSTOLIC BLOOD PRESSURE: 150 MMHG | BODY MASS INDEX: 21.93 KG/M2 | OXYGEN SATURATION: 99 % | HEART RATE: 87 BPM | DIASTOLIC BLOOD PRESSURE: 80 MMHG

## 2021-04-19 DIAGNOSIS — R14.0 ABDOMINAL BLOATING WITH CRAMPS: ICD-10-CM

## 2021-04-19 DIAGNOSIS — R10.9 ABDOMINAL BLOATING WITH CRAMPS: ICD-10-CM

## 2021-04-19 DIAGNOSIS — M79.672 FOOT PAIN, LEFT: Primary | ICD-10-CM

## 2021-04-19 PROCEDURE — 99213 OFFICE O/P EST LOW 20 MIN: CPT | Mod: S$PBB,,, | Performed by: INTERNAL MEDICINE

## 2021-04-19 PROCEDURE — 99999 PR PBB SHADOW E&M-EST. PATIENT-LVL III: ICD-10-PCS | Mod: PBBFAC,,, | Performed by: INTERNAL MEDICINE

## 2021-04-19 PROCEDURE — 99999 PR PBB SHADOW E&M-EST. PATIENT-LVL III: CPT | Mod: PBBFAC,,, | Performed by: INTERNAL MEDICINE

## 2021-04-19 PROCEDURE — 99213 PR OFFICE/OUTPT VISIT, EST, LEVL III, 20-29 MIN: ICD-10-PCS | Mod: S$PBB,,, | Performed by: INTERNAL MEDICINE

## 2021-04-19 PROCEDURE — 99213 OFFICE O/P EST LOW 20 MIN: CPT | Mod: PBBFAC,PO | Performed by: INTERNAL MEDICINE

## 2021-04-20 ENCOUNTER — HOSPITAL ENCOUNTER (OUTPATIENT)
Dept: RADIOLOGY | Facility: HOSPITAL | Age: 56
Discharge: HOME | End: 2021-04-20
Attending: INTERNAL MEDICINE
Payer: MEDICARE

## 2021-04-20 ENCOUNTER — PATIENT MESSAGE (OUTPATIENT)
Dept: INTERNAL MEDICINE | Facility: CLINIC | Age: 56
End: 2021-04-20

## 2021-04-20 ENCOUNTER — TELEPHONE (OUTPATIENT)
Dept: ENDOSCOPY | Facility: HOSPITAL | Age: 56
End: 2021-04-20

## 2021-04-20 DIAGNOSIS — M79.672 FOOT PAIN, LEFT: ICD-10-CM

## 2021-04-20 PROCEDURE — 73630 X-RAY EXAM OF FOOT: CPT | Mod: 26,LT,, | Performed by: RADIOLOGY

## 2021-04-20 PROCEDURE — 73630 X-RAY EXAM OF FOOT: CPT | Mod: TC,PO,LT

## 2021-04-20 PROCEDURE — 73630 XR FOOT COMPLETE 3 VIEW LEFT: ICD-10-PCS | Mod: 26,LT,, | Performed by: RADIOLOGY

## 2021-04-27 ENCOUNTER — TELEPHONE (OUTPATIENT)
Dept: INTERNAL MEDICINE | Facility: CLINIC | Age: 56
End: 2021-04-27

## 2021-04-27 ENCOUNTER — PATIENT MESSAGE (OUTPATIENT)
Dept: INTERNAL MEDICINE | Facility: CLINIC | Age: 56
End: 2021-04-27

## 2021-05-27 ENCOUNTER — TELEPHONE (OUTPATIENT)
Dept: ADMINISTRATIVE | Facility: HOSPITAL | Age: 56
End: 2021-05-27

## 2021-06-18 ENCOUNTER — PATIENT OUTREACH (OUTPATIENT)
Dept: ADMINISTRATIVE | Facility: HOSPITAL | Age: 56
End: 2021-06-18

## 2021-07-19 ENCOUNTER — TELEPHONE (OUTPATIENT)
Dept: INTERNAL MEDICINE | Facility: CLINIC | Age: 56
End: 2021-07-19

## 2021-07-26 ENCOUNTER — TELEPHONE (OUTPATIENT)
Dept: UROLOGY | Facility: CLINIC | Age: 56
End: 2021-07-26

## 2021-09-15 ENCOUNTER — TELEPHONE (OUTPATIENT)
Dept: INTERNAL MEDICINE | Facility: CLINIC | Age: 56
End: 2021-09-15

## 2021-09-27 ENCOUNTER — TELEPHONE (OUTPATIENT)
Dept: INTERNAL MEDICINE | Facility: CLINIC | Age: 56
End: 2021-09-27

## 2021-09-28 ENCOUNTER — HOSPITAL ENCOUNTER (EMERGENCY)
Facility: HOSPITAL | Age: 56
Discharge: HOME | End: 2021-09-28
Attending: FAMILY MEDICINE
Payer: MEDICARE

## 2021-09-28 ENCOUNTER — TELEPHONE (OUTPATIENT)
Dept: SURGERY | Facility: CLINIC | Age: 56
End: 2021-09-28

## 2021-09-28 ENCOUNTER — HOSPITAL ENCOUNTER (OUTPATIENT)
Dept: RADIOLOGY | Facility: HOSPITAL | Age: 56
Discharge: HOME | End: 2021-09-28
Attending: INTERNAL MEDICINE
Payer: MEDICARE

## 2021-09-28 ENCOUNTER — OFFICE VISIT (OUTPATIENT)
Dept: INTERNAL MEDICINE | Facility: CLINIC | Age: 56
End: 2021-09-28
Payer: MEDICARE

## 2021-09-28 ENCOUNTER — PATIENT MESSAGE (OUTPATIENT)
Dept: INTERNAL MEDICINE | Facility: CLINIC | Age: 56
End: 2021-09-28

## 2021-09-28 VITALS
DIASTOLIC BLOOD PRESSURE: 86 MMHG | HEIGHT: 67 IN | SYSTOLIC BLOOD PRESSURE: 138 MMHG | BODY MASS INDEX: 23.43 KG/M2 | OXYGEN SATURATION: 99 % | HEART RATE: 92 BPM | WEIGHT: 149.25 LBS

## 2021-09-28 VITALS
RESPIRATION RATE: 16 BRPM | SYSTOLIC BLOOD PRESSURE: 168 MMHG | HEART RATE: 85 BPM | WEIGHT: 145.06 LBS | OXYGEN SATURATION: 100 % | BODY MASS INDEX: 22.72 KG/M2 | DIASTOLIC BLOOD PRESSURE: 90 MMHG | TEMPERATURE: 99 F

## 2021-09-28 DIAGNOSIS — F41.9 ANXIETY: ICD-10-CM

## 2021-09-28 DIAGNOSIS — R10.9 ABDOMINAL PAIN, UNSPECIFIED ABDOMINAL LOCATION: Primary | ICD-10-CM

## 2021-09-28 DIAGNOSIS — R14.0 ABDOMINAL DISTENSION (GASEOUS): ICD-10-CM

## 2021-09-28 DIAGNOSIS — N30.00 ACUTE CYSTITIS WITHOUT HEMATURIA: Primary | ICD-10-CM

## 2021-09-28 DIAGNOSIS — K90.829 SHORT GUT SYNDROME: ICD-10-CM

## 2021-09-28 LAB
ALBUMIN SERPL BCP-MCNC: 4.2 G/DL (ref 3.5–5.2)
ALP SERPL-CCNC: 64 U/L (ref 55–135)
ALT SERPL W/O P-5'-P-CCNC: 22 U/L (ref 10–44)
ANION GAP SERPL CALC-SCNC: 14 MMOL/L (ref 8–16)
AST SERPL-CCNC: 31 U/L (ref 10–40)
BACTERIA #/AREA URNS HPF: NORMAL /HPF
BASOPHILS # BLD AUTO: 0.05 K/UL (ref 0–0.2)
BASOPHILS NFR BLD: 1 % (ref 0–1.9)
BILIRUB SERPL-MCNC: 1 MG/DL (ref 0.1–1)
BILIRUB UR QL STRIP: NEGATIVE
BUN SERPL-MCNC: 9 MG/DL (ref 6–20)
CALCIUM SERPL-MCNC: 10.1 MG/DL (ref 8.7–10.5)
CHLORIDE SERPL-SCNC: 107 MMOL/L (ref 95–110)
CLARITY UR: CLEAR
CO2 SERPL-SCNC: 22 MMOL/L (ref 23–29)
COLOR UR: YELLOW
CREAT SERPL-MCNC: 0.7 MG/DL (ref 0.5–1.4)
DIFFERENTIAL METHOD: ABNORMAL
EOSINOPHIL # BLD AUTO: 0.1 K/UL (ref 0–0.5)
EOSINOPHIL NFR BLD: 1.2 % (ref 0–8)
ERYTHROCYTE [DISTWIDTH] IN BLOOD BY AUTOMATED COUNT: 12.4 % (ref 11.5–14.5)
EST. GFR  (AFRICAN AMERICAN): >60 ML/MIN/1.73 M^2
EST. GFR  (NON AFRICAN AMERICAN): >60 ML/MIN/1.73 M^2
GLUCOSE SERPL-MCNC: 96 MG/DL (ref 70–110)
GLUCOSE UR QL STRIP: NEGATIVE
HCT VFR BLD AUTO: 41.3 % (ref 37–48.5)
HGB BLD-MCNC: 13.9 G/DL (ref 12–16)
HGB UR QL STRIP: NEGATIVE
IMM GRANULOCYTES # BLD AUTO: 0.01 K/UL (ref 0–0.04)
IMM GRANULOCYTES NFR BLD AUTO: 0.2 % (ref 0–0.5)
KETONES UR QL STRIP: NEGATIVE
LACTATE SERPL-SCNC: 0.7 MMOL/L (ref 0.5–2.2)
LEUKOCYTE ESTERASE UR QL STRIP: NEGATIVE
LIPASE SERPL-CCNC: 29 U/L (ref 4–60)
LYMPHOCYTES # BLD AUTO: 2 K/UL (ref 1–4.8)
LYMPHOCYTES NFR BLD: 40.1 % (ref 18–48)
MCH RBC QN AUTO: 33.3 PG (ref 27–31)
MCHC RBC AUTO-ENTMCNC: 33.7 G/DL (ref 32–36)
MCV RBC AUTO: 99 FL (ref 82–98)
MICROSCOPIC COMMENT: NORMAL
MONOCYTES # BLD AUTO: 0.3 K/UL (ref 0.3–1)
MONOCYTES NFR BLD: 6.3 % (ref 4–15)
NEUTROPHILS # BLD AUTO: 2.5 K/UL (ref 1.8–7.7)
NEUTROPHILS NFR BLD: 51.2 % (ref 38–73)
NITRITE UR QL STRIP: POSITIVE
NRBC BLD-RTO: 0 /100 WBC
PH UR STRIP: 6 [PH] (ref 5–8)
PLATELET # BLD AUTO: 225 K/UL (ref 150–450)
PMV BLD AUTO: 10.9 FL (ref 9.2–12.9)
POTASSIUM SERPL-SCNC: 4.1 MMOL/L (ref 3.5–5.1)
PROT SERPL-MCNC: 7.3 G/DL (ref 6–8.4)
PROT UR QL STRIP: NEGATIVE
RBC # BLD AUTO: 4.17 M/UL (ref 4–5.4)
SODIUM SERPL-SCNC: 143 MMOL/L (ref 136–145)
SP GR UR STRIP: 1.01 (ref 1–1.03)
SQUAMOUS #/AREA URNS HPF: 1 /HPF
URN SPEC COLLECT METH UR: ABNORMAL
UROBILINOGEN UR STRIP-ACNC: NEGATIVE EU/DL
WBC # BLD AUTO: 4.91 K/UL (ref 3.9–12.7)
WBC #/AREA URNS HPF: 1 /HPF (ref 0–5)

## 2021-09-28 PROCEDURE — 99999 PR PBB SHADOW E&M-EST. PATIENT-LVL IV: CPT | Mod: PBBFAC,,, | Performed by: INTERNAL MEDICINE

## 2021-09-28 PROCEDURE — 83690 ASSAY OF LIPASE: CPT | Performed by: FAMILY MEDICINE

## 2021-09-28 PROCEDURE — 96374 THER/PROPH/DIAG INJ IV PUSH: CPT | Mod: 59

## 2021-09-28 PROCEDURE — A9698 NON-RAD CONTRAST MATERIALNOC: HCPCS | Performed by: INTERNAL MEDICINE

## 2021-09-28 PROCEDURE — 74178 CT ABD&PLV WO CNTR FLWD CNTR: CPT | Mod: TC

## 2021-09-28 PROCEDURE — 99214 PR OFFICE/OUTPT VISIT, EST, LEVL IV, 30-39 MIN: ICD-10-PCS | Mod: S$PBB,,, | Performed by: INTERNAL MEDICINE

## 2021-09-28 PROCEDURE — 25500020 PHARM REV CODE 255: Performed by: INTERNAL MEDICINE

## 2021-09-28 PROCEDURE — 80053 COMPREHEN METABOLIC PANEL: CPT | Performed by: FAMILY MEDICINE

## 2021-09-28 PROCEDURE — 87040 BLOOD CULTURE FOR BACTERIA: CPT | Mod: 59 | Performed by: FAMILY MEDICINE

## 2021-09-28 PROCEDURE — 74178 CT ABD&PLV WO CNTR FLWD CNTR: CPT | Mod: 26,,, | Performed by: RADIOLOGY

## 2021-09-28 PROCEDURE — 83605 ASSAY OF LACTIC ACID: CPT | Performed by: FAMILY MEDICINE

## 2021-09-28 PROCEDURE — 99999 PR PBB SHADOW E&M-EST. PATIENT-LVL IV: ICD-10-PCS | Mod: PBBFAC,,, | Performed by: INTERNAL MEDICINE

## 2021-09-28 PROCEDURE — 81000 URINALYSIS NONAUTO W/SCOPE: CPT | Performed by: FAMILY MEDICINE

## 2021-09-28 PROCEDURE — 99214 OFFICE O/P EST MOD 30 MIN: CPT | Mod: PBBFAC,PO,25 | Performed by: INTERNAL MEDICINE

## 2021-09-28 PROCEDURE — 99284 EMERGENCY DEPT VISIT MOD MDM: CPT | Mod: 25,27

## 2021-09-28 PROCEDURE — 63600175 PHARM REV CODE 636 W HCPCS: Performed by: FAMILY MEDICINE

## 2021-09-28 PROCEDURE — 85025 COMPLETE CBC W/AUTO DIFF WBC: CPT | Performed by: FAMILY MEDICINE

## 2021-09-28 PROCEDURE — 74178 CT ABDOMEN PELVIS W WO CONTRAST: ICD-10-PCS | Mod: 26,,, | Performed by: RADIOLOGY

## 2021-09-28 PROCEDURE — 99214 OFFICE O/P EST MOD 30 MIN: CPT | Mod: S$PBB,,, | Performed by: INTERNAL MEDICINE

## 2021-09-28 RX ORDER — KETOROLAC TROMETHAMINE 30 MG/ML
30 INJECTION, SOLUTION INTRAMUSCULAR; INTRAVENOUS
Status: COMPLETED | OUTPATIENT
Start: 2021-09-28 | End: 2021-09-28

## 2021-09-28 RX ORDER — SULFAMETHOXAZOLE AND TRIMETHOPRIM 800; 160 MG/1; MG/1
1 TABLET ORAL
Status: DISCONTINUED | OUTPATIENT
Start: 2021-09-28 | End: 2021-09-28 | Stop reason: HOSPADM

## 2021-09-28 RX ORDER — SULFAMETHOXAZOLE AND TRIMETHOPRIM 800; 160 MG/1; MG/1
1 TABLET ORAL 2 TIMES DAILY
Qty: 14 TABLET | Refills: 0 | Status: SHIPPED | OUTPATIENT
Start: 2021-09-28 | End: 2021-09-30

## 2021-09-28 RX ADMIN — IOHEXOL 500 ML: 12 SOLUTION ORAL at 09:09

## 2021-09-28 RX ADMIN — IOHEXOL 75 ML: 350 INJECTION, SOLUTION INTRAVENOUS at 10:09

## 2021-09-28 RX ADMIN — KETOROLAC TROMETHAMINE 30 MG: 30 INJECTION, SOLUTION INTRAMUSCULAR at 06:09

## 2021-09-29 ENCOUNTER — TELEPHONE (OUTPATIENT)
Dept: INTERNAL MEDICINE | Facility: CLINIC | Age: 56
End: 2021-09-29

## 2021-09-29 ENCOUNTER — TELEPHONE (OUTPATIENT)
Dept: SURGERY | Facility: CLINIC | Age: 56
End: 2021-09-29

## 2021-09-30 ENCOUNTER — ANESTHESIA EVENT (OUTPATIENT)
Dept: SURGERY | Facility: HOSPITAL | Age: 56
End: 2021-09-30
Payer: MEDICARE

## 2021-09-30 ENCOUNTER — TELEPHONE (OUTPATIENT)
Dept: EMERGENCY MEDICINE | Facility: HOSPITAL | Age: 56
End: 2021-09-30

## 2021-09-30 ENCOUNTER — HOSPITAL ENCOUNTER (OUTPATIENT)
Facility: HOSPITAL | Age: 56
Discharge: HOME | End: 2021-10-02
Attending: EMERGENCY MEDICINE | Admitting: EMERGENCY MEDICINE
Payer: MEDICARE

## 2021-09-30 ENCOUNTER — ANESTHESIA (OUTPATIENT)
Dept: SURGERY | Facility: HOSPITAL | Age: 56
End: 2021-09-30
Payer: MEDICARE

## 2021-09-30 DIAGNOSIS — N20.1 URETERAL STONE: ICD-10-CM

## 2021-09-30 DIAGNOSIS — N20.0 RIGHT NEPHROLITHIASIS: ICD-10-CM

## 2021-09-30 DIAGNOSIS — N20.0 KIDNEY STONE: Primary | ICD-10-CM

## 2021-09-30 LAB
ALBUMIN SERPL BCP-MCNC: 4.1 G/DL (ref 3.5–5.2)
ALP SERPL-CCNC: 55 U/L (ref 55–135)
ALT SERPL W/O P-5'-P-CCNC: 23 U/L (ref 10–44)
ANION GAP SERPL CALC-SCNC: 15 MMOL/L (ref 8–16)
AST SERPL-CCNC: 33 U/L (ref 10–40)
BASOPHILS # BLD AUTO: 0.04 K/UL (ref 0–0.2)
BASOPHILS NFR BLD: 1 % (ref 0–1.9)
BILIRUB SERPL-MCNC: 1.3 MG/DL (ref 0.1–1)
BILIRUB UR QL STRIP: NEGATIVE
BUN SERPL-MCNC: 12 MG/DL (ref 6–20)
CALCIUM SERPL-MCNC: 10.1 MG/DL (ref 8.7–10.5)
CHLORIDE SERPL-SCNC: 105 MMOL/L (ref 95–110)
CLARITY UR: CLEAR
CO2 SERPL-SCNC: 23 MMOL/L (ref 23–29)
COLOR UR: YELLOW
CREAT SERPL-MCNC: 0.7 MG/DL (ref 0.5–1.4)
CTP QC/QA: YES
DIFFERENTIAL METHOD: ABNORMAL
EOSINOPHIL # BLD AUTO: 0 K/UL (ref 0–0.5)
EOSINOPHIL NFR BLD: 1 % (ref 0–8)
ERYTHROCYTE [DISTWIDTH] IN BLOOD BY AUTOMATED COUNT: 12.7 % (ref 11.5–14.5)
EST. GFR  (AFRICAN AMERICAN): >60 ML/MIN/1.73 M^2
EST. GFR  (NON AFRICAN AMERICAN): >60 ML/MIN/1.73 M^2
GLUCOSE SERPL-MCNC: 91 MG/DL (ref 70–110)
GLUCOSE UR QL STRIP: NEGATIVE
HCT VFR BLD AUTO: 42.3 % (ref 37–48.5)
HGB BLD-MCNC: 14.1 G/DL (ref 12–16)
HGB UR QL STRIP: NEGATIVE
IMM GRANULOCYTES # BLD AUTO: 0.01 K/UL (ref 0–0.04)
IMM GRANULOCYTES NFR BLD AUTO: 0.2 % (ref 0–0.5)
KETONES UR QL STRIP: NEGATIVE
LACTATE SERPL-SCNC: 1 MMOL/L (ref 0.5–2.2)
LEUKOCYTE ESTERASE UR QL STRIP: NEGATIVE
LIPASE SERPL-CCNC: 32 U/L (ref 4–60)
LYMPHOCYTES # BLD AUTO: 1.5 K/UL (ref 1–4.8)
LYMPHOCYTES NFR BLD: 36.9 % (ref 18–48)
MCH RBC QN AUTO: 32.9 PG (ref 27–31)
MCHC RBC AUTO-ENTMCNC: 33.3 G/DL (ref 32–36)
MCV RBC AUTO: 99 FL (ref 82–98)
MONOCYTES # BLD AUTO: 0.3 K/UL (ref 0.3–1)
MONOCYTES NFR BLD: 6.3 % (ref 4–15)
NEUTROPHILS # BLD AUTO: 2.3 K/UL (ref 1.8–7.7)
NEUTROPHILS NFR BLD: 54.6 % (ref 38–73)
NITRITE UR QL STRIP: NEGATIVE
NRBC BLD-RTO: 0 /100 WBC
PH UR STRIP: 6 [PH] (ref 5–8)
PLATELET # BLD AUTO: 218 K/UL (ref 150–450)
PMV BLD AUTO: 10.7 FL (ref 9.2–12.9)
POTASSIUM SERPL-SCNC: 4.2 MMOL/L (ref 3.5–5.1)
PROT SERPL-MCNC: 7.1 G/DL (ref 6–8.4)
PROT UR QL STRIP: NEGATIVE
RBC # BLD AUTO: 4.29 M/UL (ref 4–5.4)
SARS-COV-2 RDRP RESP QL NAA+PROBE: NEGATIVE
SODIUM SERPL-SCNC: 143 MMOL/L (ref 136–145)
SP GR UR STRIP: 1.01 (ref 1–1.03)
URN SPEC COLLECT METH UR: NORMAL
UROBILINOGEN UR STRIP-ACNC: NEGATIVE EU/DL
WBC # BLD AUTO: 4.12 K/UL (ref 3.9–12.7)

## 2021-09-30 PROCEDURE — 37000009 HC ANESTHESIA EA ADD 15 MINS: Performed by: UROLOGY

## 2021-09-30 PROCEDURE — 25500020 PHARM REV CODE 255: Performed by: EMERGENCY MEDICINE

## 2021-09-30 PROCEDURE — 74420 UROGRAPHY RTRGR +-KUB: CPT | Mod: 26,,, | Performed by: UROLOGY

## 2021-09-30 PROCEDURE — G0378 HOSPITAL OBSERVATION PER HR: HCPCS

## 2021-09-30 PROCEDURE — 96375 TX/PRO/DX INJ NEW DRUG ADDON: CPT | Mod: 59

## 2021-09-30 PROCEDURE — 99285 EMERGENCY DEPT VISIT HI MDM: CPT | Mod: 25

## 2021-09-30 PROCEDURE — 63600175 PHARM REV CODE 636 W HCPCS: Performed by: NURSE ANESTHETIST, CERTIFIED REGISTERED

## 2021-09-30 PROCEDURE — 63600175 PHARM REV CODE 636 W HCPCS: Performed by: UROLOGY

## 2021-09-30 PROCEDURE — 52332 PR CYSTOSCOPY,INSERT URETERAL STENT: ICD-10-PCS | Mod: RT,,, | Performed by: UROLOGY

## 2021-09-30 PROCEDURE — 85025 COMPLETE CBC W/AUTO DIFF WBC: CPT | Performed by: NURSE PRACTITIONER

## 2021-09-30 PROCEDURE — 96361 HYDRATE IV INFUSION ADD-ON: CPT | Mod: 59

## 2021-09-30 PROCEDURE — 83690 ASSAY OF LIPASE: CPT | Performed by: NURSE PRACTITIONER

## 2021-09-30 PROCEDURE — 96376 TX/PRO/DX INJ SAME DRUG ADON: CPT

## 2021-09-30 PROCEDURE — 87086 URINE CULTURE/COLONY COUNT: CPT | Performed by: EMERGENCY MEDICINE

## 2021-09-30 PROCEDURE — 71000033 HC RECOVERY, INTIAL HOUR: Performed by: UROLOGY

## 2021-09-30 PROCEDURE — 63600175 PHARM REV CODE 636 W HCPCS: Performed by: NURSE PRACTITIONER

## 2021-09-30 PROCEDURE — 25500020 PHARM REV CODE 255: Performed by: UROLOGY

## 2021-09-30 PROCEDURE — 37000008 HC ANESTHESIA 1ST 15 MINUTES: Performed by: UROLOGY

## 2021-09-30 PROCEDURE — U0002 COVID-19 LAB TEST NON-CDC: HCPCS | Performed by: EMERGENCY MEDICINE

## 2021-09-30 PROCEDURE — 25000003 PHARM REV CODE 250: Performed by: NURSE ANESTHETIST, CERTIFIED REGISTERED

## 2021-09-30 PROCEDURE — C2617 STENT, NON-COR, TEM W/O DEL: HCPCS | Performed by: UROLOGY

## 2021-09-30 PROCEDURE — C1769 GUIDE WIRE: HCPCS | Performed by: UROLOGY

## 2021-09-30 PROCEDURE — 99214 PR OFFICE/OUTPT VISIT, EST, LEVL IV, 30-39 MIN: ICD-10-PCS | Mod: ,,, | Performed by: NURSE PRACTITIONER

## 2021-09-30 PROCEDURE — 25000003 PHARM REV CODE 250: Performed by: EMERGENCY MEDICINE

## 2021-09-30 PROCEDURE — 87040 BLOOD CULTURE FOR BACTERIA: CPT | Mod: 59 | Performed by: NURSE PRACTITIONER

## 2021-09-30 PROCEDURE — 80053 COMPREHEN METABOLIC PANEL: CPT | Performed by: NURSE PRACTITIONER

## 2021-09-30 PROCEDURE — A4216 STERILE WATER/SALINE, 10 ML: HCPCS | Performed by: NURSE PRACTITIONER

## 2021-09-30 PROCEDURE — C1758 CATHETER, URETERAL: HCPCS | Performed by: UROLOGY

## 2021-09-30 PROCEDURE — 81003 URINALYSIS AUTO W/O SCOPE: CPT | Performed by: EMERGENCY MEDICINE

## 2021-09-30 PROCEDURE — 25000003 PHARM REV CODE 250: Performed by: NURSE PRACTITIONER

## 2021-09-30 PROCEDURE — 99214 OFFICE O/P EST MOD 30 MIN: CPT | Mod: ,,, | Performed by: NURSE PRACTITIONER

## 2021-09-30 PROCEDURE — 83605 ASSAY OF LACTIC ACID: CPT | Performed by: EMERGENCY MEDICINE

## 2021-09-30 PROCEDURE — 52332 CYSTOSCOPY AND TREATMENT: CPT | Mod: RT,,, | Performed by: UROLOGY

## 2021-09-30 PROCEDURE — 63600175 PHARM REV CODE 636 W HCPCS: Performed by: EMERGENCY MEDICINE

## 2021-09-30 PROCEDURE — 96374 THER/PROPH/DIAG INJ IV PUSH: CPT

## 2021-09-30 PROCEDURE — 63600175 PHARM REV CODE 636 W HCPCS: Performed by: ANESTHESIOLOGY

## 2021-09-30 PROCEDURE — 36000706: Performed by: UROLOGY

## 2021-09-30 PROCEDURE — 36000707: Performed by: UROLOGY

## 2021-09-30 PROCEDURE — 74420 PR  X-RAY RETROGRADE PYELOGRAM: ICD-10-PCS | Mod: 26,,, | Performed by: UROLOGY

## 2021-09-30 DEVICE — STENT URETERAL UNIV 6FR 28CM: Type: IMPLANTABLE DEVICE | Site: URETER | Status: FUNCTIONAL

## 2021-09-30 RX ORDER — PROPOFOL 10 MG/ML
VIAL (ML) INTRAVENOUS
Status: DISCONTINUED | OUTPATIENT
Start: 2021-09-30 | End: 2021-09-30

## 2021-09-30 RX ORDER — LIDOCAINE HYDROCHLORIDE 10 MG/ML
INJECTION, SOLUTION EPIDURAL; INFILTRATION; INTRACAUDAL; PERINEURAL
Status: DISCONTINUED | OUTPATIENT
Start: 2021-09-30 | End: 2021-09-30

## 2021-09-30 RX ORDER — ONDANSETRON 2 MG/ML
4 INJECTION INTRAMUSCULAR; INTRAVENOUS DAILY PRN
Status: DISCONTINUED | OUTPATIENT
Start: 2021-09-30 | End: 2021-10-01

## 2021-09-30 RX ORDER — HYDROMORPHONE HYDROCHLORIDE 2 MG/ML
1 INJECTION, SOLUTION INTRAMUSCULAR; INTRAVENOUS; SUBCUTANEOUS
Status: COMPLETED | OUTPATIENT
Start: 2021-09-30 | End: 2021-09-30

## 2021-09-30 RX ORDER — FENTANYL CITRATE 50 UG/ML
25 INJECTION, SOLUTION INTRAMUSCULAR; INTRAVENOUS EVERY 5 MIN PRN
Status: DISCONTINUED | OUTPATIENT
Start: 2021-09-30 | End: 2021-10-01

## 2021-09-30 RX ORDER — KETOROLAC TROMETHAMINE 30 MG/ML
15 INJECTION, SOLUTION INTRAMUSCULAR; INTRAVENOUS
Status: ACTIVE | OUTPATIENT
Start: 2021-09-30 | End: 2021-10-01

## 2021-09-30 RX ORDER — MIDAZOLAM HYDROCHLORIDE 1 MG/ML
INJECTION INTRAMUSCULAR; INTRAVENOUS
Status: DISCONTINUED | OUTPATIENT
Start: 2021-09-30 | End: 2021-09-30

## 2021-09-30 RX ORDER — ACETAMINOPHEN 325 MG/1
650 TABLET ORAL EVERY 4 HOURS PRN
Status: DISCONTINUED | OUTPATIENT
Start: 2021-09-30 | End: 2021-10-02 | Stop reason: HOSPADM

## 2021-09-30 RX ORDER — DIAZEPAM 5 MG/1
5 TABLET ORAL EVERY 12 HOURS PRN
Status: DISCONTINUED | OUTPATIENT
Start: 2021-09-30 | End: 2021-10-02 | Stop reason: HOSPADM

## 2021-09-30 RX ORDER — HYDROMORPHONE HYDROCHLORIDE 2 MG/ML
0.2 INJECTION, SOLUTION INTRAMUSCULAR; INTRAVENOUS; SUBCUTANEOUS EVERY 5 MIN PRN
Status: DISCONTINUED | OUTPATIENT
Start: 2021-09-30 | End: 2021-10-01

## 2021-09-30 RX ORDER — HYDROCODONE BITARTRATE AND ACETAMINOPHEN 5; 325 MG/1; MG/1
1 TABLET ORAL EVERY 6 HOURS PRN
Status: DISCONTINUED | OUTPATIENT
Start: 2021-09-30 | End: 2021-10-01

## 2021-09-30 RX ORDER — SODIUM CHLORIDE, SODIUM LACTATE, POTASSIUM CHLORIDE, CALCIUM CHLORIDE 600; 310; 30; 20 MG/100ML; MG/100ML; MG/100ML; MG/100ML
INJECTION, SOLUTION INTRAVENOUS CONTINUOUS PRN
Status: DISCONTINUED | OUTPATIENT
Start: 2021-09-30 | End: 2021-09-30

## 2021-09-30 RX ORDER — FENTANYL CITRATE 50 UG/ML
INJECTION, SOLUTION INTRAMUSCULAR; INTRAVENOUS
Status: DISCONTINUED | OUTPATIENT
Start: 2021-09-30 | End: 2021-09-30

## 2021-09-30 RX ORDER — SODIUM CHLORIDE 9 MG/ML
INJECTION, SOLUTION INTRAVENOUS CONTINUOUS
Status: DISCONTINUED | OUTPATIENT
Start: 2021-09-30 | End: 2021-10-02 | Stop reason: HOSPADM

## 2021-09-30 RX ORDER — CEFAZOLIN SODIUM 1 G/3ML
INJECTION, POWDER, FOR SOLUTION INTRAMUSCULAR; INTRAVENOUS
Status: DISCONTINUED | OUTPATIENT
Start: 2021-09-30 | End: 2021-09-30

## 2021-09-30 RX ORDER — SODIUM CHLORIDE 0.9 % (FLUSH) 0.9 %
10 SYRINGE (ML) INJECTION EVERY 8 HOURS
Status: DISCONTINUED | OUTPATIENT
Start: 2021-09-30 | End: 2021-10-02 | Stop reason: HOSPADM

## 2021-09-30 RX ORDER — TALC
6 POWDER (GRAM) TOPICAL NIGHTLY PRN
Status: DISCONTINUED | OUTPATIENT
Start: 2021-09-30 | End: 2021-10-02 | Stop reason: HOSPADM

## 2021-09-30 RX ORDER — CEFAZOLIN SODIUM 2 G/50ML
2 SOLUTION INTRAVENOUS ONCE
Status: COMPLETED | OUTPATIENT
Start: 2021-09-30 | End: 2021-09-30

## 2021-09-30 RX ORDER — DOXEPIN HYDROCHLORIDE 10 MG/1
10 CAPSULE ORAL NIGHTLY
Status: DISCONTINUED | OUTPATIENT
Start: 2021-09-30 | End: 2021-10-02 | Stop reason: HOSPADM

## 2021-09-30 RX ORDER — ONDANSETRON 2 MG/ML
INJECTION INTRAMUSCULAR; INTRAVENOUS
Status: DISCONTINUED | OUTPATIENT
Start: 2021-09-30 | End: 2021-09-30

## 2021-09-30 RX ORDER — ONDANSETRON 2 MG/ML
4 INJECTION INTRAMUSCULAR; INTRAVENOUS EVERY 6 HOURS PRN
Status: DISCONTINUED | OUTPATIENT
Start: 2021-09-30 | End: 2021-10-02 | Stop reason: HOSPADM

## 2021-09-30 RX ORDER — ONDANSETRON 2 MG/ML
4 INJECTION INTRAMUSCULAR; INTRAVENOUS
Status: COMPLETED | OUTPATIENT
Start: 2021-09-30 | End: 2021-09-30

## 2021-09-30 RX ORDER — NALOXONE HCL 0.4 MG/ML
0.02 VIAL (ML) INJECTION
Status: DISCONTINUED | OUTPATIENT
Start: 2021-09-30 | End: 2021-10-02 | Stop reason: HOSPADM

## 2021-09-30 RX ORDER — HYDROMORPHONE HYDROCHLORIDE 2 MG/ML
0.5 INJECTION, SOLUTION INTRAMUSCULAR; INTRAVENOUS; SUBCUTANEOUS EVERY 6 HOURS PRN
Status: DISPENSED | OUTPATIENT
Start: 2021-09-30 | End: 2021-10-01

## 2021-09-30 RX ORDER — POLYETHYLENE GLYCOL 3350 17 G/17G
17 POWDER, FOR SOLUTION ORAL DAILY PRN
Status: DISCONTINUED | OUTPATIENT
Start: 2021-09-30 | End: 2021-10-02 | Stop reason: HOSPADM

## 2021-09-30 RX ADMIN — PROPOFOL 50 MG: 10 INJECTION, EMULSION INTRAVENOUS at 06:09

## 2021-09-30 RX ADMIN — DIAZEPAM 5 MG: 5 TABLET ORAL at 10:09

## 2021-09-30 RX ADMIN — MIDAZOLAM HYDROCHLORIDE 2 MG: 1 INJECTION, SOLUTION INTRAMUSCULAR; INTRAVENOUS at 06:09

## 2021-09-30 RX ADMIN — HYDROMORPHONE HYDROCHLORIDE 1 MG: 2 INJECTION INTRAMUSCULAR; INTRAVENOUS; SUBCUTANEOUS at 01:09

## 2021-09-30 RX ADMIN — HYDROCODONE BITARTRATE AND ACETAMINOPHEN 1 TABLET: 5; 325 TABLET ORAL at 10:09

## 2021-09-30 RX ADMIN — LIDOCAINE HYDROCHLORIDE 50 MG: 10 INJECTION, SOLUTION EPIDURAL; INFILTRATION; INTRACAUDAL; PERINEURAL at 06:09

## 2021-09-30 RX ADMIN — PROPOFOL 20 MG: 10 INJECTION, EMULSION INTRAVENOUS at 06:09

## 2021-09-30 RX ADMIN — SODIUM CHLORIDE 1000 ML: 0.9 INJECTION, SOLUTION INTRAVENOUS at 12:09

## 2021-09-30 RX ADMIN — CEFAZOLIN SODIUM 2 G: 2 SOLUTION INTRAVENOUS at 10:09

## 2021-09-30 RX ADMIN — HYDROMORPHONE HYDROCHLORIDE 0.2 MG: 2 INJECTION INTRAMUSCULAR; INTRAVENOUS; SUBCUTANEOUS at 07:09

## 2021-09-30 RX ADMIN — ONDANSETRON 4 MG: 2 INJECTION, SOLUTION INTRAMUSCULAR; INTRAVENOUS at 06:09

## 2021-09-30 RX ADMIN — FENTANYL CITRATE 50 MCG: 50 INJECTION, SOLUTION INTRAMUSCULAR; INTRAVENOUS at 07:09

## 2021-09-30 RX ADMIN — IOHEXOL 75 ML: 350 INJECTION, SOLUTION INTRAVENOUS at 12:09

## 2021-09-30 RX ADMIN — FENTANYL CITRATE 50 MCG: 50 INJECTION, SOLUTION INTRAMUSCULAR; INTRAVENOUS at 06:09

## 2021-09-30 RX ADMIN — CEFAZOLIN 2 G: 1 INJECTION, POWDER, FOR SOLUTION INTRAMUSCULAR; INTRAVENOUS at 07:09

## 2021-09-30 RX ADMIN — HYDROMORPHONE HYDROCHLORIDE 0.5 MG: 2 INJECTION INTRAMUSCULAR; INTRAVENOUS; SUBCUTANEOUS at 05:09

## 2021-09-30 RX ADMIN — PROPOFOL 10 MG: 10 INJECTION, EMULSION INTRAVENOUS at 07:09

## 2021-09-30 RX ADMIN — Medication 10 ML: at 10:09

## 2021-09-30 RX ADMIN — HYDROMORPHONE HYDROCHLORIDE 1 MG: 2 INJECTION INTRAMUSCULAR; INTRAVENOUS; SUBCUTANEOUS at 12:09

## 2021-09-30 RX ADMIN — SODIUM CHLORIDE, SODIUM LACTATE, POTASSIUM CHLORIDE, AND CALCIUM CHLORIDE: 600; 310; 30; 20 INJECTION, SOLUTION INTRAVENOUS at 06:09

## 2021-09-30 RX ADMIN — HYDROMORPHONE HYDROCHLORIDE 1 MG: 2 INJECTION INTRAMUSCULAR; INTRAVENOUS; SUBCUTANEOUS at 03:09

## 2021-09-30 RX ADMIN — ONDANSETRON HYDROCHLORIDE 4 MG: 2 SOLUTION INTRAMUSCULAR; INTRAVENOUS at 12:09

## 2021-09-30 RX ADMIN — ONDANSETRON 4 MG: 2 INJECTION INTRAMUSCULAR; INTRAVENOUS at 05:09

## 2021-09-30 RX ADMIN — SODIUM CHLORIDE: 0.9 INJECTION, SOLUTION INTRAVENOUS at 04:09

## 2021-10-01 ENCOUNTER — TELEPHONE (OUTPATIENT)
Dept: UROLOGY | Facility: CLINIC | Age: 56
End: 2021-10-01

## 2021-10-01 PROBLEM — R78.81 COAGULASE NEGATIVE STAPHYLOCOCCUS BACTEREMIA: Status: ACTIVE | Noted: 2021-09-30

## 2021-10-01 PROBLEM — B95.7 COAGULASE NEGATIVE STAPHYLOCOCCUS BACTEREMIA: Status: ACTIVE | Noted: 2021-09-30

## 2021-10-01 LAB
ALBUMIN SERPL BCP-MCNC: 3 G/DL (ref 3.5–5.2)
ALP SERPL-CCNC: 41 U/L (ref 55–135)
ALT SERPL W/O P-5'-P-CCNC: 14 U/L (ref 10–44)
ANION GAP SERPL CALC-SCNC: 10 MMOL/L (ref 8–16)
AST SERPL-CCNC: 18 U/L (ref 10–40)
BASOPHILS # BLD AUTO: 0.03 K/UL (ref 0–0.2)
BASOPHILS NFR BLD: 0.7 % (ref 0–1.9)
BILIRUB SERPL-MCNC: 1 MG/DL (ref 0.1–1)
BUN SERPL-MCNC: 8 MG/DL (ref 6–20)
CALCIUM SERPL-MCNC: 7.6 MG/DL (ref 8.7–10.5)
CHLORIDE SERPL-SCNC: 110 MMOL/L (ref 95–110)
CO2 SERPL-SCNC: 23 MMOL/L (ref 23–29)
CREAT SERPL-MCNC: 0.6 MG/DL (ref 0.5–1.4)
DIFFERENTIAL METHOD: ABNORMAL
EOSINOPHIL # BLD AUTO: 0.1 K/UL (ref 0–0.5)
EOSINOPHIL NFR BLD: 1.6 % (ref 0–8)
ERYTHROCYTE [DISTWIDTH] IN BLOOD BY AUTOMATED COUNT: 12.6 % (ref 11.5–14.5)
EST. GFR  (AFRICAN AMERICAN): >60 ML/MIN/1.73 M^2
EST. GFR  (NON AFRICAN AMERICAN): >60 ML/MIN/1.73 M^2
GLUCOSE SERPL-MCNC: 64 MG/DL (ref 70–110)
HCT VFR BLD AUTO: 34 % (ref 37–48.5)
HGB BLD-MCNC: 11.1 G/DL (ref 12–16)
IMM GRANULOCYTES # BLD AUTO: 0.01 K/UL (ref 0–0.04)
IMM GRANULOCYTES NFR BLD AUTO: 0.2 % (ref 0–0.5)
LYMPHOCYTES # BLD AUTO: 1.9 K/UL (ref 1–4.8)
LYMPHOCYTES NFR BLD: 43.4 % (ref 18–48)
MAGNESIUM SERPL-MCNC: 1.3 MG/DL (ref 1.6–2.6)
MCH RBC QN AUTO: 33.1 PG (ref 27–31)
MCHC RBC AUTO-ENTMCNC: 32.6 G/DL (ref 32–36)
MCV RBC AUTO: 102 FL (ref 82–98)
MONOCYTES # BLD AUTO: 0.4 K/UL (ref 0.3–1)
MONOCYTES NFR BLD: 7.8 % (ref 4–15)
NEUTROPHILS # BLD AUTO: 2.1 K/UL (ref 1.8–7.7)
NEUTROPHILS NFR BLD: 46.3 % (ref 38–73)
NRBC BLD-RTO: 0 /100 WBC
PHOSPHATE SERPL-MCNC: 3.4 MG/DL (ref 2.7–4.5)
PLATELET # BLD AUTO: 187 K/UL (ref 150–450)
PMV BLD AUTO: 11 FL (ref 9.2–12.9)
POTASSIUM SERPL-SCNC: 3 MMOL/L (ref 3.5–5.1)
PROT SERPL-MCNC: 5 G/DL (ref 6–8.4)
RBC # BLD AUTO: 3.35 M/UL (ref 4–5.4)
SODIUM SERPL-SCNC: 143 MMOL/L (ref 136–145)
WBC # BLD AUTO: 4.47 K/UL (ref 3.9–12.7)

## 2021-10-01 PROCEDURE — 99213 PR OFFICE/OUTPT VISIT, EST, LEVL III, 20-29 MIN: ICD-10-PCS | Mod: ,,, | Performed by: UROLOGY

## 2021-10-01 PROCEDURE — 36415 COLL VENOUS BLD VENIPUNCTURE: CPT | Performed by: NURSE PRACTITIONER

## 2021-10-01 PROCEDURE — 25000003 PHARM REV CODE 250: Performed by: FAMILY MEDICINE

## 2021-10-01 PROCEDURE — 97161 PT EVAL LOW COMPLEX 20 MIN: CPT

## 2021-10-01 PROCEDURE — 83735 ASSAY OF MAGNESIUM: CPT | Performed by: NURSE PRACTITIONER

## 2021-10-01 PROCEDURE — G0378 HOSPITAL OBSERVATION PER HR: HCPCS

## 2021-10-01 PROCEDURE — 25000003 PHARM REV CODE 250: Performed by: UROLOGY

## 2021-10-01 PROCEDURE — 63600175 PHARM REV CODE 636 W HCPCS: Performed by: NURSE PRACTITIONER

## 2021-10-01 PROCEDURE — 99213 OFFICE O/P EST LOW 20 MIN: CPT | Mod: ,,, | Performed by: UROLOGY

## 2021-10-01 PROCEDURE — 25000003 PHARM REV CODE 250: Performed by: NURSE PRACTITIONER

## 2021-10-01 PROCEDURE — 96361 HYDRATE IV INFUSION ADD-ON: CPT

## 2021-10-01 PROCEDURE — 85025 COMPLETE CBC W/AUTO DIFF WBC: CPT | Performed by: NURSE PRACTITIONER

## 2021-10-01 PROCEDURE — 96376 TX/PRO/DX INJ SAME DRUG ADON: CPT

## 2021-10-01 PROCEDURE — 97165 OT EVAL LOW COMPLEX 30 MIN: CPT

## 2021-10-01 PROCEDURE — A4216 STERILE WATER/SALINE, 10 ML: HCPCS | Performed by: UROLOGY

## 2021-10-01 PROCEDURE — 80053 COMPREHEN METABOLIC PANEL: CPT | Performed by: NURSE PRACTITIONER

## 2021-10-01 PROCEDURE — 84100 ASSAY OF PHOSPHORUS: CPT | Performed by: NURSE PRACTITIONER

## 2021-10-01 RX ORDER — PHENAZOPYRIDINE HYDROCHLORIDE 100 MG/1
100 TABLET, FILM COATED ORAL
Status: DISCONTINUED | OUTPATIENT
Start: 2021-10-01 | End: 2021-10-02 | Stop reason: HOSPADM

## 2021-10-01 RX ORDER — HYOSCYAMINE SULFATE 0.12 MG/1
0.12 TABLET SUBLINGUAL EVERY 4 HOURS PRN
Status: DISCONTINUED | OUTPATIENT
Start: 2021-10-01 | End: 2021-10-02 | Stop reason: HOSPADM

## 2021-10-01 RX ORDER — DOCUSATE SODIUM 100 MG/1
100 CAPSULE, LIQUID FILLED ORAL 2 TIMES DAILY
Status: DISCONTINUED | OUTPATIENT
Start: 2021-10-01 | End: 2021-10-02 | Stop reason: HOSPADM

## 2021-10-01 RX ORDER — HYDROCODONE BITARTRATE AND ACETAMINOPHEN 7.5; 325 MG/1; MG/1
1 TABLET ORAL EVERY 6 HOURS PRN
Status: DISCONTINUED | OUTPATIENT
Start: 2021-10-01 | End: 2021-10-02 | Stop reason: HOSPADM

## 2021-10-01 RX ADMIN — Medication 10 ML: at 09:10

## 2021-10-01 RX ADMIN — HYDROMORPHONE HYDROCHLORIDE 0.5 MG: 2 INJECTION INTRAMUSCULAR; INTRAVENOUS; SUBCUTANEOUS at 12:10

## 2021-10-01 RX ADMIN — PHENAZOPYRIDINE HYDROCHLORIDE 100 MG: 100 TABLET ORAL at 01:10

## 2021-10-01 RX ADMIN — SODIUM CHLORIDE: 0.9 INJECTION, SOLUTION INTRAVENOUS at 05:10

## 2021-10-01 RX ADMIN — DIAZEPAM 5 MG: 5 TABLET ORAL at 01:10

## 2021-10-01 RX ADMIN — HYDROCODONE BITARTRATE AND ACETAMINOPHEN 1 TABLET: 7.5; 325 TABLET ORAL at 06:10

## 2021-10-01 RX ADMIN — DOXEPIN HYDROCHLORIDE 10 MG: 10 CAPSULE ORAL at 09:10

## 2021-10-01 RX ADMIN — PHENAZOPYRIDINE HYDROCHLORIDE 100 MG: 100 TABLET ORAL at 06:10

## 2021-10-01 RX ADMIN — HYDROCODONE BITARTRATE AND ACETAMINOPHEN 1 TABLET: 7.5; 325 TABLET ORAL at 12:10

## 2021-10-02 VITALS
BODY MASS INDEX: 25.37 KG/M2 | DIASTOLIC BLOOD PRESSURE: 80 MMHG | RESPIRATION RATE: 16 BRPM | SYSTOLIC BLOOD PRESSURE: 140 MMHG | HEIGHT: 67 IN | TEMPERATURE: 98 F | OXYGEN SATURATION: 91 % | HEART RATE: 72 BPM | WEIGHT: 161.63 LBS

## 2021-10-02 PROBLEM — B95.7 COAGULASE NEGATIVE STAPHYLOCOCCUS BACTEREMIA: Status: RESOLVED | Noted: 2021-09-30 | Resolved: 2021-10-02

## 2021-10-02 PROBLEM — R78.81 COAGULASE NEGATIVE STAPHYLOCOCCUS BACTEREMIA: Status: RESOLVED | Noted: 2021-09-30 | Resolved: 2021-10-02

## 2021-10-02 PROBLEM — G47.00 INSOMNIA: Status: RESOLVED | Noted: 2017-02-24 | Resolved: 2021-10-02

## 2021-10-02 LAB
ALBUMIN SERPL BCP-MCNC: 3.3 G/DL (ref 3.5–5.2)
ALP SERPL-CCNC: 51 U/L (ref 55–135)
ALT SERPL W/O P-5'-P-CCNC: 16 U/L (ref 10–44)
ANION GAP SERPL CALC-SCNC: 9 MMOL/L (ref 8–16)
AST SERPL-CCNC: 18 U/L (ref 10–40)
BACTERIA BLD CULT: ABNORMAL
BACTERIA UR CULT: NORMAL
BASOPHILS # BLD AUTO: 0.05 K/UL (ref 0–0.2)
BASOPHILS NFR BLD: 1.2 % (ref 0–1.9)
BILIRUB SERPL-MCNC: 0.8 MG/DL (ref 0.1–1)
BUN SERPL-MCNC: 12 MG/DL (ref 6–20)
CALCIUM SERPL-MCNC: 9.6 MG/DL (ref 8.7–10.5)
CHLORIDE SERPL-SCNC: 107 MMOL/L (ref 95–110)
CO2 SERPL-SCNC: 27 MMOL/L (ref 23–29)
CREAT SERPL-MCNC: 0.7 MG/DL (ref 0.5–1.4)
DIFFERENTIAL METHOD: ABNORMAL
EOSINOPHIL # BLD AUTO: 0.1 K/UL (ref 0–0.5)
EOSINOPHIL NFR BLD: 2.8 % (ref 0–8)
ERYTHROCYTE [DISTWIDTH] IN BLOOD BY AUTOMATED COUNT: 12.5 % (ref 11.5–14.5)
EST. GFR  (AFRICAN AMERICAN): >60 ML/MIN/1.73 M^2
EST. GFR  (NON AFRICAN AMERICAN): >60 ML/MIN/1.73 M^2
GLUCOSE SERPL-MCNC: 68 MG/DL (ref 70–110)
HCT VFR BLD AUTO: 37.7 % (ref 37–48.5)
HGB BLD-MCNC: 12.3 G/DL (ref 12–16)
IMM GRANULOCYTES # BLD AUTO: 0.01 K/UL (ref 0–0.04)
IMM GRANULOCYTES NFR BLD AUTO: 0.2 % (ref 0–0.5)
LYMPHOCYTES # BLD AUTO: 1.9 K/UL (ref 1–4.8)
LYMPHOCYTES NFR BLD: 43.4 % (ref 18–48)
MAGNESIUM SERPL-MCNC: 1.7 MG/DL (ref 1.6–2.6)
MCH RBC QN AUTO: 33.5 PG (ref 27–31)
MCHC RBC AUTO-ENTMCNC: 32.6 G/DL (ref 32–36)
MCV RBC AUTO: 103 FL (ref 82–98)
MONOCYTES # BLD AUTO: 0.4 K/UL (ref 0.3–1)
MONOCYTES NFR BLD: 9.1 % (ref 4–15)
NEUTROPHILS # BLD AUTO: 1.9 K/UL (ref 1.8–7.7)
NEUTROPHILS NFR BLD: 43.3 % (ref 38–73)
NRBC BLD-RTO: 0 /100 WBC
PHOSPHATE SERPL-MCNC: 3.4 MG/DL (ref 2.7–4.5)
PLATELET # BLD AUTO: 198 K/UL (ref 150–450)
PMV BLD AUTO: 11.1 FL (ref 9.2–12.9)
POTASSIUM SERPL-SCNC: 3.9 MMOL/L (ref 3.5–5.1)
PROT SERPL-MCNC: 5.6 G/DL (ref 6–8.4)
RBC # BLD AUTO: 3.67 M/UL (ref 4–5.4)
SODIUM SERPL-SCNC: 143 MMOL/L (ref 136–145)
WBC # BLD AUTO: 4.29 K/UL (ref 3.9–12.7)

## 2021-10-02 PROCEDURE — 85025 COMPLETE CBC W/AUTO DIFF WBC: CPT | Performed by: UROLOGY

## 2021-10-02 PROCEDURE — 80053 COMPREHEN METABOLIC PANEL: CPT | Performed by: UROLOGY

## 2021-10-02 PROCEDURE — 25000003 PHARM REV CODE 250: Performed by: UROLOGY

## 2021-10-02 PROCEDURE — 25000003 PHARM REV CODE 250: Performed by: FAMILY MEDICINE

## 2021-10-02 PROCEDURE — 36415 COLL VENOUS BLD VENIPUNCTURE: CPT | Performed by: UROLOGY

## 2021-10-02 PROCEDURE — 83735 ASSAY OF MAGNESIUM: CPT | Performed by: UROLOGY

## 2021-10-02 PROCEDURE — 84100 ASSAY OF PHOSPHORUS: CPT | Performed by: UROLOGY

## 2021-10-02 RX ORDER — HYDROCODONE BITARTRATE AND ACETAMINOPHEN 7.5; 325 MG/1; MG/1
1 TABLET ORAL EVERY 8 HOURS PRN
Qty: 10 TABLET | Refills: 0 | Status: ON HOLD | OUTPATIENT
Start: 2021-10-02 | End: 2021-10-13 | Stop reason: HOSPADM

## 2021-10-02 RX ADMIN — SODIUM CHLORIDE: 0.9 INJECTION, SOLUTION INTRAVENOUS at 01:10

## 2021-10-02 RX ADMIN — HYDROCODONE BITARTRATE AND ACETAMINOPHEN 1 TABLET: 7.5; 325 TABLET ORAL at 01:10

## 2021-10-02 RX ADMIN — HYDROCODONE BITARTRATE AND ACETAMINOPHEN 1 TABLET: 7.5; 325 TABLET ORAL at 09:10

## 2021-10-02 RX ADMIN — PHENAZOPYRIDINE HYDROCHLORIDE 100 MG: 100 TABLET ORAL at 09:10

## 2021-10-04 ENCOUNTER — OFFICE VISIT (OUTPATIENT)
Dept: SURGERY | Facility: CLINIC | Age: 56
End: 2021-10-04
Payer: MEDICARE

## 2021-10-04 VITALS
DIASTOLIC BLOOD PRESSURE: 81 MMHG | TEMPERATURE: 98 F | BODY MASS INDEX: 22.58 KG/M2 | WEIGHT: 144.19 LBS | SYSTOLIC BLOOD PRESSURE: 126 MMHG | HEART RATE: 107 BPM

## 2021-10-04 DIAGNOSIS — R10.9 ABDOMINAL CRAMPING: Primary | ICD-10-CM

## 2021-10-04 LAB — BACTERIA BLD CULT: NORMAL

## 2021-10-04 PROCEDURE — 99214 OFFICE O/P EST MOD 30 MIN: CPT | Mod: S$PBB,,, | Performed by: SURGERY

## 2021-10-04 PROCEDURE — 99999 PR PBB SHADOW E&M-EST. PATIENT-LVL III: ICD-10-PCS | Mod: PBBFAC,,, | Performed by: SURGERY

## 2021-10-04 PROCEDURE — 99214 PR OFFICE/OUTPT VISIT, EST, LEVL IV, 30-39 MIN: ICD-10-PCS | Mod: S$PBB,,, | Performed by: SURGERY

## 2021-10-04 PROCEDURE — 99213 OFFICE O/P EST LOW 20 MIN: CPT | Mod: PBBFAC | Performed by: SURGERY

## 2021-10-04 PROCEDURE — 99999 PR PBB SHADOW E&M-EST. PATIENT-LVL III: CPT | Mod: PBBFAC,,, | Performed by: SURGERY

## 2021-10-06 LAB
BACTERIA BLD CULT: NORMAL
BACTERIA BLD CULT: NORMAL

## 2021-10-07 ENCOUNTER — OFFICE VISIT (OUTPATIENT)
Dept: UROLOGY | Facility: CLINIC | Age: 56
End: 2021-10-07
Payer: MEDICARE

## 2021-10-07 VITALS
BODY MASS INDEX: 21.99 KG/M2 | WEIGHT: 140.44 LBS | SYSTOLIC BLOOD PRESSURE: 110 MMHG | DIASTOLIC BLOOD PRESSURE: 70 MMHG

## 2021-10-07 DIAGNOSIS — Z01.818 PRE-OP TESTING: ICD-10-CM

## 2021-10-07 DIAGNOSIS — N20.1 URETERAL STONE: Primary | ICD-10-CM

## 2021-10-07 LAB
BILIRUB SERPL-MCNC: NORMAL MG/DL
BLOOD URINE, POC: 250
CLARITY, POC UA: CLEAR
COLOR, POC UA: COLORLESS
GLUCOSE UR QL STRIP: NORMAL
KETONES UR QL STRIP: NORMAL
LEUKOCYTE ESTERASE URINE, POC: NORMAL
NITRITE, POC UA: NORMAL
PH, POC UA: 7
PROTEIN, POC: NORMAL
SPECIFIC GRAVITY, POC UA: 1.01
UROBILINOGEN, POC UA: NORMAL

## 2021-10-07 PROCEDURE — 99214 OFFICE O/P EST MOD 30 MIN: CPT | Mod: PBBFAC,PO | Performed by: UROLOGY

## 2021-10-07 PROCEDURE — 99213 OFFICE O/P EST LOW 20 MIN: CPT | Mod: S$PBB,,, | Performed by: UROLOGY

## 2021-10-07 PROCEDURE — 87086 URINE CULTURE/COLONY COUNT: CPT | Performed by: UROLOGY

## 2021-10-07 PROCEDURE — 99213 PR OFFICE/OUTPT VISIT, EST, LEVL III, 20-29 MIN: ICD-10-PCS | Mod: S$PBB,,, | Performed by: UROLOGY

## 2021-10-07 PROCEDURE — 99999 PR PBB SHADOW E&M-EST. PATIENT-LVL IV: CPT | Mod: PBBFAC,,, | Performed by: UROLOGY

## 2021-10-07 PROCEDURE — 99999 PR PBB SHADOW E&M-EST. PATIENT-LVL IV: ICD-10-PCS | Mod: PBBFAC,,, | Performed by: UROLOGY

## 2021-10-07 PROCEDURE — 81002 URINALYSIS NONAUTO W/O SCOPE: CPT | Mod: PBBFAC,PO | Performed by: UROLOGY

## 2021-10-07 RX ORDER — CIPROFLOXACIN 2 MG/ML
400 INJECTION, SOLUTION INTRAVENOUS
Status: CANCELLED | OUTPATIENT
Start: 2021-10-07

## 2021-10-09 LAB — BACTERIA UR CULT: NO GROWTH

## 2021-10-11 ENCOUNTER — HOSPITAL ENCOUNTER (OUTPATIENT)
Dept: CARDIOLOGY | Facility: HOSPITAL | Age: 56
Discharge: HOME | End: 2021-10-11
Attending: UROLOGY
Payer: MEDICARE

## 2021-10-11 ENCOUNTER — LAB VISIT (OUTPATIENT)
Dept: PRIMARY CARE CLINIC | Facility: CLINIC | Age: 56
End: 2021-10-11
Payer: MEDICARE

## 2021-10-11 DIAGNOSIS — Z01.818 PRE-OP TESTING: ICD-10-CM

## 2021-10-11 PROCEDURE — 93010 EKG 12-LEAD: ICD-10-PCS | Mod: ,,, | Performed by: INTERNAL MEDICINE

## 2021-10-11 PROCEDURE — U0005 INFEC AGEN DETEC AMPLI PROBE: HCPCS | Performed by: UROLOGY

## 2021-10-11 PROCEDURE — 93005 ELECTROCARDIOGRAM TRACING: CPT

## 2021-10-11 PROCEDURE — 93010 ELECTROCARDIOGRAM REPORT: CPT | Mod: ,,, | Performed by: INTERNAL MEDICINE

## 2021-10-11 PROCEDURE — U0003 INFECTIOUS AGENT DETECTION BY NUCLEIC ACID (DNA OR RNA); SEVERE ACUTE RESPIRATORY SYNDROME CORONAVIRUS 2 (SARS-COV-2) (CORONAVIRUS DISEASE [COVID-19]), AMPLIFIED PROBE TECHNIQUE, MAKING USE OF HIGH THROUGHPUT TECHNOLOGIES AS DESCRIBED BY CMS-2020-01-R: HCPCS | Performed by: UROLOGY

## 2021-10-12 ENCOUNTER — TELEPHONE (OUTPATIENT)
Dept: ADMINISTRATIVE | Facility: HOSPITAL | Age: 56
End: 2021-10-12

## 2021-10-12 ENCOUNTER — TELEPHONE (OUTPATIENT)
Dept: GASTROENTEROLOGY | Facility: CLINIC | Age: 56
End: 2021-10-12

## 2021-10-12 LAB
SARS-COV-2 RNA RESP QL NAA+PROBE: NOT DETECTED
SARS-COV-2- CYCLE NUMBER: NORMAL

## 2021-10-13 ENCOUNTER — HOSPITAL ENCOUNTER (OUTPATIENT)
Facility: HOSPITAL | Age: 56
Discharge: HOME | End: 2021-10-13
Attending: UROLOGY | Admitting: UROLOGY
Payer: MEDICARE

## 2021-10-13 ENCOUNTER — ANESTHESIA EVENT (OUTPATIENT)
Dept: SURGERY | Facility: HOSPITAL | Age: 56
End: 2021-10-13
Payer: MEDICARE

## 2021-10-13 ENCOUNTER — ANESTHESIA (OUTPATIENT)
Dept: SURGERY | Facility: HOSPITAL | Age: 56
End: 2021-10-13
Payer: MEDICARE

## 2021-10-13 DIAGNOSIS — N20.1 URETERAL STONE: ICD-10-CM

## 2021-10-13 PROCEDURE — 74420 UROGRAPHY RTRGR +-KUB: CPT | Mod: 26,,, | Performed by: UROLOGY

## 2021-10-13 PROCEDURE — 25000003 PHARM REV CODE 250: Performed by: ANESTHESIOLOGY

## 2021-10-13 PROCEDURE — 37000008 HC ANESTHESIA 1ST 15 MINUTES: Performed by: UROLOGY

## 2021-10-13 PROCEDURE — 25000003 PHARM REV CODE 250: Performed by: NURSE ANESTHETIST, CERTIFIED REGISTERED

## 2021-10-13 PROCEDURE — 71000033 HC RECOVERY, INTIAL HOUR: Performed by: UROLOGY

## 2021-10-13 PROCEDURE — 36000706: Performed by: UROLOGY

## 2021-10-13 PROCEDURE — 36000707: Performed by: UROLOGY

## 2021-10-13 PROCEDURE — 71000015 HC POSTOP RECOV 1ST HR: Performed by: UROLOGY

## 2021-10-13 PROCEDURE — 27201423 OPTIME MED/SURG SUP & DEVICES STERILE SUPPLY: Performed by: UROLOGY

## 2021-10-13 PROCEDURE — 63600175 PHARM REV CODE 636 W HCPCS: Performed by: NURSE ANESTHETIST, CERTIFIED REGISTERED

## 2021-10-13 PROCEDURE — 88300 PR  SURG PATH,GROSS,LEVEL I: ICD-10-PCS | Mod: 26,,, | Performed by: PATHOLOGY

## 2021-10-13 PROCEDURE — C1758 CATHETER, URETERAL: HCPCS | Performed by: UROLOGY

## 2021-10-13 PROCEDURE — C1769 GUIDE WIRE: HCPCS | Performed by: UROLOGY

## 2021-10-13 PROCEDURE — 52356 CYSTO/URETERO W/LITHOTRIPSY: CPT | Mod: RT,,, | Performed by: UROLOGY

## 2021-10-13 PROCEDURE — 88300 SURGICAL PATH GROSS: CPT | Mod: 26,,, | Performed by: PATHOLOGY

## 2021-10-13 PROCEDURE — 63600175 PHARM REV CODE 636 W HCPCS: Performed by: UROLOGY

## 2021-10-13 PROCEDURE — 37000009 HC ANESTHESIA EA ADD 15 MINS: Performed by: UROLOGY

## 2021-10-13 PROCEDURE — C2617 STENT, NON-COR, TEM W/O DEL: HCPCS | Performed by: UROLOGY

## 2021-10-13 PROCEDURE — 74420 PR  X-RAY RETROGRADE PYELOGRAM: ICD-10-PCS | Mod: 26,,, | Performed by: UROLOGY

## 2021-10-13 PROCEDURE — 25500020 PHARM REV CODE 255: Performed by: UROLOGY

## 2021-10-13 PROCEDURE — 88300 SURGICAL PATH GROSS: CPT | Performed by: PATHOLOGY

## 2021-10-13 PROCEDURE — 82365 CALCULUS SPECTROSCOPY: CPT | Performed by: UROLOGY

## 2021-10-13 PROCEDURE — 52356 PR CYSTO/URETERO W/LITHOTRIPSY: ICD-10-PCS | Mod: RT,,, | Performed by: UROLOGY

## 2021-10-13 PROCEDURE — S0028 INJECTION, FAMOTIDINE, 20 MG: HCPCS | Performed by: NURSE ANESTHETIST, CERTIFIED REGISTERED

## 2021-10-13 DEVICE — STENT URETERAL UNIV 6FR 26CM: Type: IMPLANTABLE DEVICE | Site: URETER | Status: FUNCTIONAL

## 2021-10-13 RX ORDER — SODIUM CHLORIDE 0.9 % (FLUSH) 0.9 %
3 SYRINGE (ML) INJECTION EVERY 8 HOURS
Status: DISCONTINUED | OUTPATIENT
Start: 2021-10-13 | End: 2021-10-13 | Stop reason: HOSPADM

## 2021-10-13 RX ORDER — HYDROMORPHONE HYDROCHLORIDE 2 MG/1
2 TABLET ORAL EVERY 4 HOURS PRN
Qty: 20 TABLET | Refills: 0 | Status: SHIPPED | OUTPATIENT
Start: 2021-10-13 | End: 2022-05-09 | Stop reason: ALTCHOICE

## 2021-10-13 RX ORDER — PHENAZOPYRIDINE HYDROCHLORIDE 200 MG/1
200 TABLET, FILM COATED ORAL 3 TIMES DAILY PRN
Qty: 10 TABLET | Refills: 0 | Status: SHIPPED | OUTPATIENT
Start: 2021-10-13 | End: 2021-10-23

## 2021-10-13 RX ORDER — HYDROMORPHONE HYDROCHLORIDE 2 MG/ML
INJECTION, SOLUTION INTRAMUSCULAR; INTRAVENOUS; SUBCUTANEOUS
Status: DISCONTINUED | OUTPATIENT
Start: 2021-10-13 | End: 2021-10-13

## 2021-10-13 RX ORDER — FAMOTIDINE 20 MG/50ML
INJECTION, SOLUTION INTRAVENOUS
Status: DISCONTINUED | OUTPATIENT
Start: 2021-10-13 | End: 2021-10-13

## 2021-10-13 RX ORDER — DEXAMETHASONE SODIUM PHOSPHATE 4 MG/ML
INJECTION, SOLUTION INTRA-ARTICULAR; INTRALESIONAL; INTRAMUSCULAR; INTRAVENOUS; SOFT TISSUE
Status: DISCONTINUED | OUTPATIENT
Start: 2021-10-13 | End: 2021-10-13

## 2021-10-13 RX ORDER — CIPROFLOXACIN 2 MG/ML
400 INJECTION, SOLUTION INTRAVENOUS
Status: COMPLETED | OUTPATIENT
Start: 2021-10-13 | End: 2021-10-13

## 2021-10-13 RX ORDER — MEPERIDINE HYDROCHLORIDE 25 MG/ML
12.5 INJECTION INTRAMUSCULAR; INTRAVENOUS; SUBCUTANEOUS ONCE AS NEEDED
Status: DISCONTINUED | OUTPATIENT
Start: 2021-10-13 | End: 2021-10-13 | Stop reason: HOSPADM

## 2021-10-13 RX ORDER — SUCCINYLCHOLINE CHLORIDE 20 MG/ML
INJECTION INTRAMUSCULAR; INTRAVENOUS
Status: DISCONTINUED | OUTPATIENT
Start: 2021-10-13 | End: 2021-10-13

## 2021-10-13 RX ORDER — ONDANSETRON 2 MG/ML
INJECTION INTRAMUSCULAR; INTRAVENOUS
Status: DISCONTINUED | OUTPATIENT
Start: 2021-10-13 | End: 2021-10-13

## 2021-10-13 RX ORDER — METOCLOPRAMIDE HYDROCHLORIDE 5 MG/ML
10 INJECTION INTRAMUSCULAR; INTRAVENOUS EVERY 10 MIN PRN
Status: DISCONTINUED | OUTPATIENT
Start: 2021-10-13 | End: 2021-10-13 | Stop reason: HOSPADM

## 2021-10-13 RX ORDER — LIDOCAINE HYDROCHLORIDE 20 MG/ML
INJECTION INTRAVENOUS
Status: DISCONTINUED | OUTPATIENT
Start: 2021-10-13 | End: 2021-10-13

## 2021-10-13 RX ORDER — HYDROMORPHONE HYDROCHLORIDE 2 MG/ML
0.2 INJECTION, SOLUTION INTRAMUSCULAR; INTRAVENOUS; SUBCUTANEOUS EVERY 5 MIN PRN
Status: DISCONTINUED | OUTPATIENT
Start: 2021-10-13 | End: 2021-10-13 | Stop reason: HOSPADM

## 2021-10-13 RX ORDER — DIPHENHYDRAMINE HYDROCHLORIDE 50 MG/ML
25 INJECTION INTRAMUSCULAR; INTRAVENOUS EVERY 6 HOURS PRN
Status: DISCONTINUED | OUTPATIENT
Start: 2021-10-13 | End: 2021-10-13 | Stop reason: HOSPADM

## 2021-10-13 RX ORDER — MIDAZOLAM HYDROCHLORIDE 1 MG/ML
INJECTION, SOLUTION INTRAMUSCULAR; INTRAVENOUS
Status: DISCONTINUED | OUTPATIENT
Start: 2021-10-13 | End: 2021-10-13

## 2021-10-13 RX ORDER — KETOROLAC TROMETHAMINE 30 MG/ML
INJECTION, SOLUTION INTRAMUSCULAR; INTRAVENOUS
Status: DISCONTINUED | OUTPATIENT
Start: 2021-10-13 | End: 2021-10-13

## 2021-10-13 RX ORDER — SCOLOPAMINE TRANSDERMAL SYSTEM 1 MG/1
1 PATCH, EXTENDED RELEASE TRANSDERMAL
Status: DISCONTINUED | OUTPATIENT
Start: 2021-10-13 | End: 2021-10-13 | Stop reason: HOSPADM

## 2021-10-13 RX ORDER — ROCURONIUM BROMIDE 10 MG/ML
INJECTION, SOLUTION INTRAVENOUS
Status: DISCONTINUED | OUTPATIENT
Start: 2021-10-13 | End: 2021-10-13

## 2021-10-13 RX ORDER — PROPOFOL 10 MG/ML
VIAL (ML) INTRAVENOUS
Status: DISCONTINUED | OUTPATIENT
Start: 2021-10-13 | End: 2021-10-13

## 2021-10-13 RX ADMIN — MIDAZOLAM HYDROCHLORIDE 2 MG: 1 INJECTION, SOLUTION INTRAMUSCULAR; INTRAVENOUS at 03:10

## 2021-10-13 RX ADMIN — FAMOTIDINE 20 MG: 20 INJECTION, SOLUTION INTRAVENOUS at 03:10

## 2021-10-13 RX ADMIN — SUCCINYLCHOLINE CHLORIDE 120 MG: 20 INJECTION, SOLUTION INTRAMUSCULAR; INTRAVENOUS at 03:10

## 2021-10-13 RX ADMIN — LIDOCAINE HYDROCHLORIDE 100 MG: 20 INJECTION, SOLUTION INTRAVENOUS at 03:10

## 2021-10-13 RX ADMIN — SODIUM CHLORIDE, SODIUM LACTATE, POTASSIUM CHLORIDE, AND CALCIUM CHLORIDE: .6; .31; .03; .02 INJECTION, SOLUTION INTRAVENOUS at 03:10

## 2021-10-13 RX ADMIN — ONDANSETRON 4 MG: 2 INJECTION, SOLUTION INTRAMUSCULAR; INTRAVENOUS at 04:10

## 2021-10-13 RX ADMIN — ONDANSETRON 4 MG: 2 INJECTION, SOLUTION INTRAMUSCULAR; INTRAVENOUS at 03:10

## 2021-10-13 RX ADMIN — KETOROLAC TROMETHAMINE 30 MG: 30 INJECTION, SOLUTION INTRAMUSCULAR at 04:10

## 2021-10-13 RX ADMIN — SCOLOPAMINE TRANSDERMAL SYSTEM 1 PATCH: 1 PATCH, EXTENDED RELEASE TRANSDERMAL at 10:10

## 2021-10-13 RX ADMIN — HYDROMORPHONE HYDROCHLORIDE 0.5 MG: 2 INJECTION INTRAMUSCULAR; INTRAVENOUS; SUBCUTANEOUS at 04:10

## 2021-10-13 RX ADMIN — PROPOFOL 150 MG: 10 INJECTION, EMULSION INTRAVENOUS at 03:10

## 2021-10-13 RX ADMIN — CIPROFLOXACIN 400 MG: 2 INJECTION INTRAVENOUS at 03:10

## 2021-10-13 RX ADMIN — ROCURONIUM BROMIDE 5 MG: 10 INJECTION, SOLUTION INTRAVENOUS at 03:10

## 2021-10-13 RX ADMIN — DEXAMETHASONE SODIUM PHOSPHATE 8 MG: 4 INJECTION, SOLUTION INTRAMUSCULAR; INTRAVENOUS at 03:10

## 2021-10-14 ENCOUNTER — PATIENT MESSAGE (OUTPATIENT)
Dept: UROLOGY | Facility: CLINIC | Age: 56
End: 2021-10-14
Payer: MEDICARE

## 2021-10-14 ENCOUNTER — TELEPHONE (OUTPATIENT)
Dept: UROLOGY | Facility: CLINIC | Age: 56
End: 2021-10-14

## 2021-10-19 ENCOUNTER — PATIENT OUTREACH (OUTPATIENT)
Dept: ADMINISTRATIVE | Facility: OTHER | Age: 56
End: 2021-10-19

## 2021-10-19 LAB
COMPN STONE: NORMAL
SPECIMEN SOURCE: NORMAL
STONE ANALYSIS IR-IMP: NORMAL

## 2021-10-21 ENCOUNTER — OFFICE VISIT (OUTPATIENT)
Dept: GASTROENTEROLOGY | Facility: CLINIC | Age: 56
End: 2021-10-21
Payer: MEDICARE

## 2021-10-21 VITALS
HEART RATE: 106 BPM | BODY MASS INDEX: 22.54 KG/M2 | HEIGHT: 67 IN | WEIGHT: 143.63 LBS | DIASTOLIC BLOOD PRESSURE: 80 MMHG | OXYGEN SATURATION: 96 % | SYSTOLIC BLOOD PRESSURE: 118 MMHG

## 2021-10-21 DIAGNOSIS — R19.4 CHANGE IN BOWEL HABITS: ICD-10-CM

## 2021-10-21 DIAGNOSIS — Z98.890 HISTORY OF ABDOMINAL SURGERY: ICD-10-CM

## 2021-10-21 DIAGNOSIS — K66.0 ABDOMINAL ADHESIONS: ICD-10-CM

## 2021-10-21 DIAGNOSIS — R10.9 ABDOMINAL PAIN, UNSPECIFIED ABDOMINAL LOCATION: Primary | ICD-10-CM

## 2021-10-21 PROCEDURE — 99213 OFFICE O/P EST LOW 20 MIN: CPT | Mod: PBBFAC | Performed by: INTERNAL MEDICINE

## 2021-10-21 PROCEDURE — 99214 PR OFFICE/OUTPT VISIT, EST, LEVL IV, 30-39 MIN: ICD-10-PCS | Mod: S$PBB,,, | Performed by: INTERNAL MEDICINE

## 2021-10-21 PROCEDURE — 99999 PR PBB SHADOW E&M-EST. PATIENT-LVL III: ICD-10-PCS | Mod: PBBFAC,,, | Performed by: INTERNAL MEDICINE

## 2021-10-21 PROCEDURE — 99214 OFFICE O/P EST MOD 30 MIN: CPT | Mod: S$PBB,,, | Performed by: INTERNAL MEDICINE

## 2021-10-21 PROCEDURE — 99999 PR PBB SHADOW E&M-EST. PATIENT-LVL III: CPT | Mod: PBBFAC,,, | Performed by: INTERNAL MEDICINE

## 2021-10-21 RX ORDER — SOD SULF/POT CHLORIDE/MAG SULF 1.479 G
12 TABLET ORAL DAILY
Qty: 24 TABLET | Refills: 0 | Status: SHIPPED | OUTPATIENT
Start: 2021-10-21 | End: 2021-10-23

## 2021-11-03 LAB
FINAL PATHOLOGIC DIAGNOSIS: NORMAL
GROSS: NORMAL
Lab: NORMAL

## 2021-11-08 ENCOUNTER — PATIENT MESSAGE (OUTPATIENT)
Dept: ENDOSCOPY | Facility: HOSPITAL | Age: 56
End: 2021-11-08
Payer: MEDICARE

## 2021-11-10 ENCOUNTER — HOSPITAL ENCOUNTER (OUTPATIENT)
Facility: HOSPITAL | Age: 56
Discharge: HOME | End: 2021-11-10
Attending: INTERNAL MEDICINE | Admitting: INTERNAL MEDICINE
Payer: MEDICARE

## 2021-11-10 ENCOUNTER — ANESTHESIA (OUTPATIENT)
Dept: ENDOSCOPY | Facility: HOSPITAL | Age: 56
End: 2021-11-10
Payer: MEDICARE

## 2021-11-10 ENCOUNTER — ANESTHESIA EVENT (OUTPATIENT)
Dept: ENDOSCOPY | Facility: HOSPITAL | Age: 56
End: 2021-11-10
Payer: MEDICARE

## 2021-11-10 VITALS
WEIGHT: 139 LBS | BODY MASS INDEX: 21.82 KG/M2 | DIASTOLIC BLOOD PRESSURE: 78 MMHG | RESPIRATION RATE: 18 BRPM | HEIGHT: 67 IN | SYSTOLIC BLOOD PRESSURE: 113 MMHG | TEMPERATURE: 97 F | OXYGEN SATURATION: 98 % | HEART RATE: 78 BPM

## 2021-11-10 VITALS
HEART RATE: 67 BPM | HEIGHT: 67 IN | WEIGHT: 143.63 LBS | TEMPERATURE: 97 F | SYSTOLIC BLOOD PRESSURE: 127 MMHG | RESPIRATION RATE: 15 BRPM | BODY MASS INDEX: 22.54 KG/M2 | DIASTOLIC BLOOD PRESSURE: 70 MMHG | OXYGEN SATURATION: 98 %

## 2021-11-10 DIAGNOSIS — R19.4 CHANGE IN BOWEL HABITS: ICD-10-CM

## 2021-11-10 DIAGNOSIS — R10.9 ABDOMINAL PAIN, UNSPECIFIED ABDOMINAL LOCATION: Primary | ICD-10-CM

## 2021-11-10 DIAGNOSIS — R10.9 ABDOMINAL PAIN: ICD-10-CM

## 2021-11-10 PROCEDURE — 25000003 PHARM REV CODE 250: Performed by: NURSE ANESTHETIST, CERTIFIED REGISTERED

## 2021-11-10 PROCEDURE — 43239 PR EGD, FLEX, W/BIOPSY, SGL/MULTI: ICD-10-PCS | Mod: 51,,, | Performed by: INTERNAL MEDICINE

## 2021-11-10 PROCEDURE — 88305 TISSUE EXAM BY PATHOLOGIST: ICD-10-PCS | Mod: 26,,, | Performed by: PATHOLOGY

## 2021-11-10 PROCEDURE — 88305 TISSUE EXAM BY PATHOLOGIST: CPT | Mod: 26,,, | Performed by: PATHOLOGY

## 2021-11-10 PROCEDURE — 43239 EGD BIOPSY SINGLE/MULTIPLE: CPT | Mod: 51,,, | Performed by: INTERNAL MEDICINE

## 2021-11-10 PROCEDURE — 37000008 HC ANESTHESIA 1ST 15 MINUTES: Performed by: INTERNAL MEDICINE

## 2021-11-10 PROCEDURE — 43239 EGD BIOPSY SINGLE/MULTIPLE: CPT | Performed by: INTERNAL MEDICINE

## 2021-11-10 PROCEDURE — 27201012 HC FORCEPS, HOT/COLD, DISP: Performed by: INTERNAL MEDICINE

## 2021-11-10 PROCEDURE — 63600175 PHARM REV CODE 636 W HCPCS: Performed by: ANESTHESIOLOGY

## 2021-11-10 PROCEDURE — 63600175 PHARM REV CODE 636 W HCPCS: Performed by: NURSE ANESTHETIST, CERTIFIED REGISTERED

## 2021-11-10 PROCEDURE — 45380 PR COLONOSCOPY,BIOPSY: ICD-10-PCS | Mod: ,,, | Performed by: INTERNAL MEDICINE

## 2021-11-10 PROCEDURE — 45380 COLONOSCOPY AND BIOPSY: CPT | Performed by: INTERNAL MEDICINE

## 2021-11-10 PROCEDURE — 37000009 HC ANESTHESIA EA ADD 15 MINS: Performed by: INTERNAL MEDICINE

## 2021-11-10 PROCEDURE — 45380 COLONOSCOPY AND BIOPSY: CPT | Mod: ,,, | Performed by: INTERNAL MEDICINE

## 2021-11-10 PROCEDURE — 88305 TISSUE EXAM BY PATHOLOGIST: CPT | Performed by: PATHOLOGY

## 2021-11-10 RX ORDER — MIDAZOLAM HYDROCHLORIDE 1 MG/ML
INJECTION, SOLUTION INTRAMUSCULAR; INTRAVENOUS
Status: DISCONTINUED | OUTPATIENT
Start: 2021-11-10 | End: 2021-11-10

## 2021-11-10 RX ORDER — PANTOPRAZOLE SODIUM 40 MG/1
40 TABLET, DELAYED RELEASE ORAL DAILY
Qty: 30 TABLET | Refills: 6 | Status: SHIPPED | OUTPATIENT
Start: 2021-11-10 | End: 2022-04-26

## 2021-11-10 RX ORDER — SODIUM CHLORIDE, SODIUM LACTATE, POTASSIUM CHLORIDE, CALCIUM CHLORIDE 600; 310; 30; 20 MG/100ML; MG/100ML; MG/100ML; MG/100ML
INJECTION, SOLUTION INTRAVENOUS CONTINUOUS
Status: DISCONTINUED | OUTPATIENT
Start: 2021-11-10 | End: 2021-11-10 | Stop reason: HOSPADM

## 2021-11-10 RX ORDER — MIDAZOLAM HYDROCHLORIDE 5 MG/ML
INJECTION INTRAMUSCULAR; INTRAVENOUS
Status: DISCONTINUED | OUTPATIENT
Start: 2021-11-10 | End: 2021-11-10

## 2021-11-10 RX ORDER — SODIUM CHLORIDE, SODIUM LACTATE, POTASSIUM CHLORIDE, CALCIUM CHLORIDE 600; 310; 30; 20 MG/100ML; MG/100ML; MG/100ML; MG/100ML
INJECTION, SOLUTION INTRAVENOUS CONTINUOUS PRN
Status: DISCONTINUED | OUTPATIENT
Start: 2021-11-10 | End: 2021-11-10

## 2021-11-10 RX ORDER — SODIUM CHLORIDE 0.9 % (FLUSH) 0.9 %
10 SYRINGE (ML) INJECTION
Status: DISCONTINUED | OUTPATIENT
Start: 2021-11-10 | End: 2021-11-10 | Stop reason: HOSPADM

## 2021-11-10 RX ORDER — PROPOFOL 10 MG/ML
VIAL (ML) INTRAVENOUS
Status: DISCONTINUED | OUTPATIENT
Start: 2021-11-10 | End: 2021-11-10

## 2021-11-10 RX ORDER — LIDOCAINE HCL/PF 100 MG/5ML
SYRINGE (ML) INTRAVENOUS
Status: DISCONTINUED | OUTPATIENT
Start: 2021-11-10 | End: 2021-11-10

## 2021-11-10 RX ADMIN — PROPOFOL 50 MG: 10 INJECTION, EMULSION INTRAVENOUS at 12:11

## 2021-11-10 RX ADMIN — SODIUM CHLORIDE, SODIUM LACTATE, POTASSIUM CHLORIDE, AND CALCIUM CHLORIDE: .6; .31; .03; .02 INJECTION, SOLUTION INTRAVENOUS at 12:11

## 2021-11-10 RX ADMIN — PROPOFOL 20 MG: 10 INJECTION, EMULSION INTRAVENOUS at 12:11

## 2021-11-10 RX ADMIN — PROPOFOL 200 MG: 10 INJECTION, EMULSION INTRAVENOUS at 12:11

## 2021-11-10 RX ADMIN — LIDOCAINE HYDROCHLORIDE 100 MG: 20 INJECTION, SOLUTION INTRAVENOUS at 12:11

## 2021-11-10 RX ADMIN — MIDAZOLAM HYDROCHLORIDE 5 MG: 1 INJECTION, SOLUTION INTRAMUSCULAR; INTRAVENOUS at 12:11

## 2021-11-17 ENCOUNTER — PATIENT MESSAGE (OUTPATIENT)
Dept: INTERNAL MEDICINE | Facility: CLINIC | Age: 56
End: 2021-11-17
Payer: MEDICARE

## 2021-11-17 LAB
FINAL PATHOLOGIC DIAGNOSIS: NORMAL
GROSS: NORMAL
Lab: NORMAL

## 2021-11-18 ENCOUNTER — PATIENT MESSAGE (OUTPATIENT)
Dept: GASTROENTEROLOGY | Facility: CLINIC | Age: 56
End: 2021-11-18
Payer: MEDICARE

## 2021-11-29 ENCOUNTER — PATIENT MESSAGE (OUTPATIENT)
Dept: DERMATOLOGY | Facility: CLINIC | Age: 56
End: 2021-11-29
Payer: MEDICARE

## 2021-11-29 ENCOUNTER — PATIENT MESSAGE (OUTPATIENT)
Dept: UROLOGY | Facility: CLINIC | Age: 56
End: 2021-11-29
Payer: MEDICARE

## 2021-11-30 ENCOUNTER — PATIENT MESSAGE (OUTPATIENT)
Dept: UROLOGY | Facility: CLINIC | Age: 56
End: 2021-11-30
Payer: MEDICARE

## 2021-11-30 RX ORDER — SPIRONOLACTONE 50 MG/1
TABLET, FILM COATED ORAL
Qty: 60 TABLET | Refills: 2 | Status: SHIPPED | OUTPATIENT
Start: 2021-11-30 | End: 2022-03-03 | Stop reason: SDUPTHER

## 2021-11-30 RX ORDER — TAMSULOSIN HYDROCHLORIDE 0.4 MG/1
0.8 CAPSULE ORAL DAILY
Qty: 60 CAPSULE | Refills: 3 | Status: SHIPPED | OUTPATIENT
Start: 2021-11-30 | End: 2022-05-09

## 2021-12-14 ENCOUNTER — PATIENT MESSAGE (OUTPATIENT)
Dept: INTERNAL MEDICINE | Facility: CLINIC | Age: 56
End: 2021-12-14
Payer: MEDICARE

## 2021-12-14 RX ORDER — SUMATRIPTAN SUCCINATE 100 MG/1
TABLET ORAL
Qty: 10 TABLET | Refills: 2 | Status: SHIPPED | OUTPATIENT
Start: 2021-12-14 | End: 2022-03-21

## 2022-01-10 ENCOUNTER — PATIENT MESSAGE (OUTPATIENT)
Dept: UROLOGY | Facility: CLINIC | Age: 57
End: 2022-01-10
Payer: MEDICARE

## 2022-01-31 ENCOUNTER — PATIENT MESSAGE (OUTPATIENT)
Dept: INTERNAL MEDICINE | Facility: CLINIC | Age: 57
End: 2022-01-31
Payer: MEDICARE

## 2022-01-31 DIAGNOSIS — F41.9 ANXIETY: ICD-10-CM

## 2022-01-31 DIAGNOSIS — F41.1 GAD (GENERALIZED ANXIETY DISORDER): ICD-10-CM

## 2022-01-31 RX ORDER — DIAZEPAM 5 MG/1
TABLET ORAL
Qty: 60 TABLET | Refills: 1 | Status: SHIPPED | OUTPATIENT
Start: 2022-01-31 | End: 2022-02-02 | Stop reason: SDUPTHER

## 2022-01-31 NOTE — TELEPHONE ENCOUNTER
No new care gaps identified.  Powered by [employer]. Reference number: [id number].   1/31/2022 2:25:06 PM CST

## 2022-02-02 ENCOUNTER — PATIENT MESSAGE (OUTPATIENT)
Dept: INTERNAL MEDICINE | Facility: CLINIC | Age: 57
End: 2022-02-02
Payer: MEDICARE

## 2022-02-02 RX ORDER — DIAZEPAM 5 MG/1
5 TABLET ORAL EVERY 12 HOURS PRN
Qty: 60 TABLET | Refills: 5 | Status: SHIPPED | OUTPATIENT
Start: 2022-02-02 | End: 2022-02-03 | Stop reason: SDUPTHER

## 2022-02-03 ENCOUNTER — TELEPHONE (OUTPATIENT)
Dept: INTERNAL MEDICINE | Facility: CLINIC | Age: 57
End: 2022-02-03
Payer: MEDICARE

## 2022-02-03 DIAGNOSIS — F41.9 ANXIETY: ICD-10-CM

## 2022-02-03 DIAGNOSIS — G43.909 MIGRAINE WITHOUT STATUS MIGRAINOSUS, NOT INTRACTABLE, UNSPECIFIED MIGRAINE TYPE: ICD-10-CM

## 2022-02-03 RX ORDER — DIAZEPAM 5 MG/1
TABLET ORAL
Qty: 20 TABLET | Refills: 5 | Status: SHIPPED | OUTPATIENT
Start: 2022-02-03 | End: 2022-09-05 | Stop reason: SDUPTHER

## 2022-02-03 NOTE — TELEPHONE ENCOUNTER
Called patient pharmacy spoke with pharmacist junie about the valium 5 mg she requested to speak with dr gonzalez. Dr gonzalez spoke with the pharmacist to help resolve filling patients valium 5 mg . Called patient educated the issue with the pharmacy filling the valium 5 mg should be resolved today. Patient stated okay thank you

## 2022-02-16 ENCOUNTER — PATIENT MESSAGE (OUTPATIENT)
Dept: INTERNAL MEDICINE | Facility: CLINIC | Age: 57
End: 2022-02-16
Payer: MEDICARE

## 2022-02-24 ENCOUNTER — PATIENT MESSAGE (OUTPATIENT)
Dept: INTERNAL MEDICINE | Facility: CLINIC | Age: 57
End: 2022-02-24
Payer: MEDICARE

## 2022-02-24 RX ORDER — METHYLPREDNISOLONE 4 MG/1
TABLET ORAL
Qty: 1 EACH | Refills: 0 | Status: SHIPPED | OUTPATIENT
Start: 2022-02-24 | End: 2022-03-03 | Stop reason: ALTCHOICE

## 2022-03-02 ENCOUNTER — PATIENT MESSAGE (OUTPATIENT)
Dept: DERMATOLOGY | Facility: CLINIC | Age: 57
End: 2022-03-02
Payer: MEDICARE

## 2022-03-03 ENCOUNTER — OFFICE VISIT (OUTPATIENT)
Dept: INTERNAL MEDICINE | Facility: CLINIC | Age: 57
End: 2022-03-03
Payer: MEDICARE

## 2022-03-03 ENCOUNTER — PATIENT MESSAGE (OUTPATIENT)
Dept: INTERNAL MEDICINE | Facility: CLINIC | Age: 57
End: 2022-03-03

## 2022-03-03 VITALS
TEMPERATURE: 98 F | WEIGHT: 150.81 LBS | BODY MASS INDEX: 23.67 KG/M2 | HEART RATE: 96 BPM | OXYGEN SATURATION: 100 % | HEIGHT: 67 IN | DIASTOLIC BLOOD PRESSURE: 86 MMHG | SYSTOLIC BLOOD PRESSURE: 118 MMHG

## 2022-03-03 DIAGNOSIS — J01.00 ACUTE NON-RECURRENT MAXILLARY SINUSITIS: Primary | ICD-10-CM

## 2022-03-03 PROCEDURE — 99213 OFFICE O/P EST LOW 20 MIN: CPT | Mod: PBBFAC,PO | Performed by: INTERNAL MEDICINE

## 2022-03-03 PROCEDURE — 99999 PR PBB SHADOW E&M-EST. PATIENT-LVL III: CPT | Mod: PBBFAC,,, | Performed by: INTERNAL MEDICINE

## 2022-03-03 PROCEDURE — 99213 PR OFFICE/OUTPT VISIT, EST, LEVL III, 20-29 MIN: ICD-10-PCS | Mod: S$PBB,,, | Performed by: INTERNAL MEDICINE

## 2022-03-03 PROCEDURE — 99999 PR PBB SHADOW E&M-EST. PATIENT-LVL III: ICD-10-PCS | Mod: PBBFAC,,, | Performed by: INTERNAL MEDICINE

## 2022-03-03 PROCEDURE — 99213 OFFICE O/P EST LOW 20 MIN: CPT | Mod: S$PBB,,, | Performed by: INTERNAL MEDICINE

## 2022-03-03 RX ORDER — SPIRONOLACTONE 50 MG/1
TABLET, FILM COATED ORAL
Qty: 60 TABLET | Refills: 0 | Status: SHIPPED | OUTPATIENT
Start: 2022-03-03 | End: 2022-03-25

## 2022-03-03 RX ORDER — AMOXICILLIN AND CLAVULANATE POTASSIUM 875; 125 MG/1; MG/1
1 TABLET, FILM COATED ORAL EVERY 12 HOURS
Qty: 20 TABLET | Refills: 0 | Status: SHIPPED | OUTPATIENT
Start: 2022-03-03 | End: 2022-05-09 | Stop reason: ALTCHOICE

## 2022-03-03 RX ORDER — FLUTICASONE PROPIONATE 50 MCG
2 SPRAY, SUSPENSION (ML) NASAL DAILY
Qty: 16 G | Refills: 11 | Status: SHIPPED | OUTPATIENT
Start: 2022-03-03 | End: 2022-05-09

## 2022-03-03 NOTE — PROGRESS NOTES
"HPI:  Patient is a 56-year-old female who comes today with complaints of sinus pressure congestion in discharge over the last 1-2 weeks.  She denies any fever.  She states she has will out green mucus.  She complains of lot of popping in her ears.  She has had 1 course of Medrol Dosepak and currently is taking Claritin and over-the-counter Sudafed.    Current meds have been verified and updated per the EMR  Exam:/86   Pulse 96   Temp 98.1 °F (36.7 °C) (Temporal)   Ht 5' 7" (1.702 m)   Wt 68.4 kg (150 lb 12.7 oz)   SpO2 100%   BMI 23.62 kg/m²   TMs are both normal.  Nasal mucosa is hyperemic and edematous.  Throat is normal neck is supple without adenopathy  Chest clear    Lab Results   Component Value Date    WBC 4.29 10/02/2021    HGB 12.3 10/02/2021    HCT 37.7 10/02/2021     10/02/2021    CHOL 140 10/20/2020    TRIG 103 10/20/2020    HDL 55 10/20/2020    ALT 16 10/02/2021    AST 18 10/02/2021     10/02/2021    K 3.9 10/02/2021     10/02/2021    CREATININE 0.7 10/02/2021    BUN 12 10/02/2021    CO2 27 10/02/2021    TSH 1.007 03/16/2021    INR 1.0 02/02/2021    HGBA1C 5.8 03/07/2017       Impression:  Sinusitis  Patient Active Problem List   Diagnosis    Anxiety    Bilateral low back pain with right-sided sciatica    Vertigo    Right nephrolithiasis    Short gut syndrome    Ureteral stone    Abdominal pain    Change in bowel habits    KENN (generalized anxiety disorder)    Migraine headache       Plan:  Orders Placed This Encounter    amoxicillin-clavulanate 875-125mg (AUGMENTIN) 875-125 mg per tablet    fluticasone propionate (FLONASE) 50 mcg/actuation nasal spray     She was given Augmentin and Flonase to use.  She should continue with the over-the-counter Claritin and Sudafed.    This note is generated with speech recognition software and is subject to transcription error and sound alike phrases that may be missed by proofreading.      "

## 2022-03-08 ENCOUNTER — PATIENT OUTREACH (OUTPATIENT)
Dept: ADMINISTRATIVE | Facility: HOSPITAL | Age: 57
End: 2022-03-08
Payer: MEDICARE

## 2022-03-18 ENCOUNTER — PES CALL (OUTPATIENT)
Dept: ADMINISTRATIVE | Facility: CLINIC | Age: 57
End: 2022-03-18
Payer: MEDICARE

## 2022-03-21 ENCOUNTER — PATIENT MESSAGE (OUTPATIENT)
Dept: INTERNAL MEDICINE | Facility: CLINIC | Age: 57
End: 2022-03-21
Payer: MEDICARE

## 2022-03-22 ENCOUNTER — PATIENT OUTREACH (OUTPATIENT)
Dept: ADMINISTRATIVE | Facility: OTHER | Age: 57
End: 2022-03-22
Payer: MEDICARE

## 2022-03-25 ENCOUNTER — TELEPHONE (OUTPATIENT)
Dept: UROLOGY | Facility: CLINIC | Age: 57
End: 2022-03-25
Payer: MEDICARE

## 2022-03-25 RX ORDER — SPIRONOLACTONE 50 MG/1
TABLET, FILM COATED ORAL
Qty: 180 TABLET | Refills: 1 | Status: SHIPPED | OUTPATIENT
Start: 2022-03-25 | End: 2022-10-03

## 2022-03-25 NOTE — TELEPHONE ENCOUNTER
No new care gaps identified.  Powered by [employer]. Reference number: [id number].   3/25/2022 12:34:55 PM CDT

## 2022-03-25 NOTE — TELEPHONE ENCOUNTER
Refill Authorization Note   Genny Calixto  is requesting a refill authorization.  Brief Assessment and Rationale for Refill:  Approve     Medication Therapy Plan:       Medication Reconciliation Completed: No   Comments:   --->Care Gap information included below if applicable.       Requested Prescriptions   Pending Prescriptions Disp Refills    spironolactone (ALDACTONE) 50 MG tablet [Pharmacy Med Name: SPIRONOLACTONE 50 MG TABLET] 180 tablet 1     Sig: TAKE 1 TABLET BY MOUTH ONCE DAILY THEN INCREASE TO 2 TABLETS DAILY AS TOLERATED       Cardiovascular: Diuretics - Aldosterone Antagonist Failed - 3/25/2022 12:34 PM        Failed - Matches previous order       Previous Authorizing Provider: Tay Duff MD (spironolactone (ALDACTONE) 50 MG tablet)  [id number] [address]            Passed - Patient is at least 18 years old        Passed - Last BP in normal range within 360 days     BP Readings from Last 3 Encounters:   03/03/22 118/86   11/10/21 113/78   10/21/21 118/80               Passed - Valid encounter within last 15 months     Recent Visits  Date Type Provider Dept   03/03/22 Office Visit Tay Duff MD [facility] Internal Medicine   09/28/21 Office Visit Tay Duff MD [facility] Internal Medicine   04/19/21 Office Visit Tay Duff MD [facility] Internal Medicine   04/07/21 Office Visit Tay Duff MD [facility] Internal Medicine   10/28/20 Office Visit Tay Duff MD [facility] Internal Medicine   10/20/20 Office Visit Tay Duff MD [facility] Internal Medicine   Showing recent visits within past 720 days and meeting all other requirements  Future Appointments  No visits were found meeting these conditions.  Showing future appointments within next 150 days and meeting all other requirements      Future Appointments              In 3 weeks Irving Kidd MD [facility] - [facility], [facility]                Passed - No ED/Hospital  visits since last PCP visit     Last PCP Visit: 3/3/2022 Last Admission: 11/10/2021 Last ED Visit: 9/30/2021          Passed - Cr is 1.4 or below and within 360 days     Lab Results   Component Value Date    CREATININE 0.7 10/02/2021    CREATININE 0.6 10/01/2021    CREATININE 0.7 09/30/2021              Passed - K in normal range and within 360 days     Potassium   Date Value Ref Range Status   10/02/2021 3.9 3.5 - 5.1 mmol/L Final   10/01/2021 3.0 (L) 3.5 - 5.1 mmol/L Final   09/30/2021 4.2 3.5 - 5.1 mmol/L Final              Passed - Na is between 130 and 148 and within 360 days     Sodium   Date Value Ref Range Status   10/02/2021 143 136 - 145 mmol/L Final   10/01/2021 143 136 - 145 mmol/L Final   09/30/2021 143 136 - 145 mmol/L Final              Passed - eGFR within 360 days     Lab Results   Component Value Date    EGFRNONAA >60 10/02/2021    EGFRNONAA >60 10/01/2021    EGFRNONAA >60 09/30/2021                    Appointments  past 12m or future 3m with PCP    Date Provider   Last Visit   3/3/2022 Tay Duff MD   Next Visit   Visit date not found Tay Duff MD   ED visits in past 90 days: 0     Note composed:3:21 PM 03/25/2022

## 2022-03-25 NOTE — TELEPHONE ENCOUNTER
Called patient twice got VM both times. Left a VM letting the patient know that the appt she had with Dr Kidd on 3/29/22 has been rescheduled due to Dr Kidd not being here.

## 2022-04-04 ENCOUNTER — PATIENT MESSAGE (OUTPATIENT)
Dept: UROLOGY | Facility: CLINIC | Age: 57
End: 2022-04-04
Payer: MEDICARE

## 2022-04-18 ENCOUNTER — PATIENT MESSAGE (OUTPATIENT)
Dept: UROLOGY | Facility: CLINIC | Age: 57
End: 2022-04-18
Payer: MEDICARE

## 2022-04-19 ENCOUNTER — PATIENT MESSAGE (OUTPATIENT)
Dept: UROLOGY | Facility: CLINIC | Age: 57
End: 2022-04-19

## 2022-04-19 ENCOUNTER — OFFICE VISIT (OUTPATIENT)
Dept: UROLOGY | Facility: CLINIC | Age: 57
End: 2022-04-19
Payer: MEDICARE

## 2022-04-19 ENCOUNTER — HOSPITAL ENCOUNTER (OUTPATIENT)
Dept: RADIOLOGY | Facility: HOSPITAL | Age: 57
Discharge: HOME | End: 2022-04-19
Attending: UROLOGY
Payer: MEDICARE

## 2022-04-19 VITALS
SYSTOLIC BLOOD PRESSURE: 135 MMHG | DIASTOLIC BLOOD PRESSURE: 86 MMHG | TEMPERATURE: 97 F | HEART RATE: 86 BPM | WEIGHT: 153 LBS | BODY MASS INDEX: 23.96 KG/M2

## 2022-04-19 DIAGNOSIS — M54.50 CHRONIC MIDLINE LOW BACK PAIN WITHOUT SCIATICA: ICD-10-CM

## 2022-04-19 DIAGNOSIS — R10.9 ABDOMINAL PAIN, UNSPECIFIED ABDOMINAL LOCATION: ICD-10-CM

## 2022-04-19 DIAGNOSIS — G89.29 CHRONIC MIDLINE LOW BACK PAIN WITHOUT SCIATICA: ICD-10-CM

## 2022-04-19 PROCEDURE — 74176 CT ABD & PELVIS W/O CONTRAST: CPT | Mod: TC

## 2022-04-19 PROCEDURE — 99214 PR OFFICE/OUTPT VISIT, EST, LEVL IV, 30-39 MIN: ICD-10-PCS | Mod: S$PBB,,, | Performed by: UROLOGY

## 2022-04-19 PROCEDURE — 99214 OFFICE O/P EST MOD 30 MIN: CPT | Mod: S$PBB,,, | Performed by: UROLOGY

## 2022-04-19 PROCEDURE — 99999 PR PBB SHADOW E&M-EST. PATIENT-LVL IV: CPT | Mod: PBBFAC,,, | Performed by: UROLOGY

## 2022-04-19 PROCEDURE — 99999 PR PBB SHADOW E&M-EST. PATIENT-LVL IV: ICD-10-PCS | Mod: PBBFAC,,, | Performed by: UROLOGY

## 2022-04-19 PROCEDURE — 99214 OFFICE O/P EST MOD 30 MIN: CPT | Mod: PBBFAC,25 | Performed by: UROLOGY

## 2022-04-20 ENCOUNTER — PATIENT MESSAGE (OUTPATIENT)
Dept: GASTROENTEROLOGY | Facility: CLINIC | Age: 57
End: 2022-04-20
Payer: MEDICARE

## 2022-04-20 ENCOUNTER — OFFICE VISIT (OUTPATIENT)
Dept: UROLOGY | Facility: CLINIC | Age: 57
End: 2022-04-20
Payer: MEDICARE

## 2022-04-20 ENCOUNTER — PATIENT MESSAGE (OUTPATIENT)
Dept: UROLOGY | Facility: CLINIC | Age: 57
End: 2022-04-20

## 2022-04-20 VITALS
TEMPERATURE: 98 F | HEART RATE: 85 BPM | WEIGHT: 153.69 LBS | SYSTOLIC BLOOD PRESSURE: 113 MMHG | DIASTOLIC BLOOD PRESSURE: 76 MMHG | BODY MASS INDEX: 24.07 KG/M2

## 2022-04-20 DIAGNOSIS — M54.50 CHRONIC MIDLINE LOW BACK PAIN WITHOUT SCIATICA: Primary | ICD-10-CM

## 2022-04-20 DIAGNOSIS — G89.29 CHRONIC MIDLINE LOW BACK PAIN WITHOUT SCIATICA: Primary | ICD-10-CM

## 2022-04-20 PROCEDURE — 99999 PR PBB SHADOW E&M-EST. PATIENT-LVL IV: CPT | Mod: PBBFAC,,, | Performed by: UROLOGY

## 2022-04-20 PROCEDURE — 99213 OFFICE O/P EST LOW 20 MIN: CPT | Mod: S$PBB,,, | Performed by: UROLOGY

## 2022-04-20 PROCEDURE — 99213 PR OFFICE/OUTPT VISIT, EST, LEVL III, 20-29 MIN: ICD-10-PCS | Mod: S$PBB,,, | Performed by: UROLOGY

## 2022-04-20 PROCEDURE — 99214 OFFICE O/P EST MOD 30 MIN: CPT | Mod: PBBFAC | Performed by: UROLOGY

## 2022-04-20 PROCEDURE — 99999 PR PBB SHADOW E&M-EST. PATIENT-LVL IV: ICD-10-PCS | Mod: PBBFAC,,, | Performed by: UROLOGY

## 2022-04-20 RX ORDER — METHOCARBAMOL 500 MG/1
500 TABLET, FILM COATED ORAL 4 TIMES DAILY
Qty: 40 TABLET | Refills: 0 | Status: SHIPPED | OUTPATIENT
Start: 2022-04-20 | End: 2022-06-13

## 2022-04-20 NOTE — PROGRESS NOTES
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, states that she was doing fine post-op from hysterectomy when she had "like a pregnant woman's water breaking" a few weeks after, this was eventually repaired by Dr Rivas several years ago, is wondering if this has returned    04/19/2022 - returns today after an absence, notes issues with low back pain for the last 3-4 weeks "like I'm passing a big stone", notes that her pain is worse when she stands from a seated position, also notes recurrent gross hematuria and intermittent fevers from 100.6 - 101.4F as well as chills     10/13/2021 - right URS/LL for right ureteral stone  09/30/2021 - right stent placement for right ureteral stone  02/07/2021 - URS/LL for left ureteral stone  PMH:       Past Medical History:   Diagnosis Date    Encounter for blood transfusion      KENN (generalized anxiety disorder)       Takes 5-10 mg of valium qd prn anxiety (prescriptions for both on file, one from [facility] & other from )    Insomnia       Takes 5-10 mg of valium qhs prn insomnia (prescriptions for both on file, one from [facility] & other from )    Migraine headache      Nephrolithiasis 08/03/2019     Left ureteral stone -> UTI c/b pyelonephritis and urosepsis w/ hypokalemia -> transfered to [facility] from [facility] after CT abn dx, s/p 2 stents to allow infx to drain, IV Vanc/Rocephin, fever free since yesterday    Reflex sympathetic dystrophy      UTI (urinary tract infection)      Vertigo           PSH:        Past Surgical History:   Procedure Laterality Date    BLADDER SURGERY        CHOLECYSTECTOMY        COLONOSCOPY N/A 11/10/2021     Procedure: COLONOSCOPY;  Surgeon: Eryn Rothman MD;  [facility];  Service: Endoscopy;  Laterality: N/A;    CYSTOSCOPY WITH URETEROSCOPY, RETROGRADE " PYELOGRAPHY, AND INSERTION OF STENT Right 9/30/2021     Procedure: CYSTOSCOPY, WITH RETROGRADE PYELOGRAM AND URETERAL STENT INSERTION;  Surgeon: Annita Gamino MD;  Location: [facility];  Service: Urology;  Laterality: Right;    DIAGNOSTIC LAPAROSCOPY N/A 11/11/2020     Procedure: LAPAROSCOPY, DIAGNOSTIC;  Surgeon: Tee Segura MD;  Location: [facility] OR;  Service: General;  Laterality: N/A;  CONVERTED TO OPEN    ESOPHAGOGASTRODUODENOSCOPY N/A 11/10/2021     Procedure: EGD (ESOPHAGOGASTRODUODENOSCOPY);  Surgeon: Eryn Rothman MD;  Location: [facility];  Service: Endoscopy;  Laterality: N/A;    facet injections   02/14/2017     L3, L4, L5, S1 Done by Dr. Lara    HYSTERECTOMY        INJECTION OF ANESTHETIC AGENT INTO TISSUE PLANE DEFINED BY TRANSVERSUS ABDOMINIS MUSCLE N/A 11/11/2020     Procedure: BLOCK, TRANSVERSUS ABDOMINIS PLANE;  Surgeon: Tee Segura MD;  Location: [facility];  Service: General;  Laterality: N/A;    KNEE SURGERY        LAPAROSCOPIC LYSIS OF ADHESIONS N/A 11/11/2020     Procedure: LYSIS, ADHESIONS, LAPAROSCOPIC;  Surgeon: Tee Segura MD;  Location: [facility];  Service: General;  Laterality: N/A;  CONVERTED TO OPEN    LAPAROTOMY N/A 11/20/2020     Procedure: LAPAROTOMY;  Surgeon: Tee Segura MD;  Location: [facility];  Service: General;  Laterality: N/A;    LASER LITHOTRIPSY Left 2/8/2021     Procedure: LITHOTRIPSY, USING LASER;  Surgeon: Irving Kidd MD;  Location: [facility];  Service: Urology;  Laterality: Left;    LASER LITHOTRIPSY Right 10/13/2021     Procedure: LITHOTRIPSY, USING LASER;  Surgeon: Annita Gamino MD;  Location: [facility];  Service: Urology;  Laterality: Right;    LYSIS OF ADHESIONS N/A 11/11/2020     Procedure: LYSIS, ADHESIONS;  Surgeon: Tee Segura MD;  Location: [facility];  Service: General;  Laterality: N/A;    LYSIS OF ADHESIONS N/A 11/20/2020     Procedure: LYSIS, ADHESIONS;  Surgeon: Tee Segura MD;  Location: [facility];   Service: General;  Laterality: N/A;    TONSILLECTOMY        URETEROSCOPY Left 2/8/2021     Procedure: URETEROSCOPY;  Surgeon: Irving Kidd MD;  Location: [facility] OR;  Service: Urology;  Laterality: Left;  stent placement    URETEROSCOPY Right 10/13/2021     Procedure: URETEROSCOPY;  Surgeon: Annita Gamino MD;  Location: [facility] OR;  Service: Urology;  Laterality: Right;         Family History:        Family History   Problem Relation Age of Onset    Stroke Mother      Hypertension Mother      COPD Father      Drug abuse Brother           Social History:  Social History            Tobacco Use    Smoking status: Never Smoker    Smokeless tobacco: Never Used   Substance Use Topics    Alcohol use: Yes       Comment: rarely    Drug use: No         Review of Systems:  General: No fever, chills  Skin: No rashes  Chest:  Denies cough and sputum production  Heart: Denies chest pain  Resp: Denies dyspnea  Abdomen: Denies diarrhea, abdominal pain, hematemesis, or blood in stool.  Musculoskeletal: No joint stiffness or swelling. Denies back pain.  : see HPI  Neuro: no dizziness or weakness     Allergies:  Erythromycin, Morphine, and Opioids - morphine analogues     Medications:     Current Outpatient Medications:     amoxicillin-clavulanate 875-125mg (AUGMENTIN) 875-125 mg per tablet, Take 1 tablet by mouth every 12 (twelve) hours., Disp: 20 tablet, Rfl: 0    diazePAM (VALIUM) 10 MG Tab, TAKE 1 TABLET BY MOUTH EVERY DAY AS NEEDED, Disp: 90 tablet, Rfl: 1    diazePAM (VALIUM) 5 MG tablet, Take one with onset of migraine, Disp: 20 tablet, Rfl: 5    dicyclomine (BENTYL) 20 mg tablet, TAKE 1 TABLET BY MOUTH THREE TIMES A DAY, Disp: 270 tablet, Rfl: 3    doxepin (SINEQUAN) 10 MG capsule, TAKE 1 CAPSULE (10 MG TOTAL) BY MOUTH EVERY EVENING., Disp: 30 capsule, Rfl: 11    ergocalciferol (ERGOCALCIFEROL) 50,000 unit Cap, Take 1 capsule (50,000 Units total) by mouth every 7 days., Disp: 12 capsule, Rfl: 3     fluticasone propionate (FLONASE) 50 mcg/actuation nasal spray, 2 sprays (100 mcg total) by Each Nostril route once daily., Disp: 16 g, Rfl: 11    HYDROmorphone (DILAUDID) 2 MG tablet, Take 1 tablet (2 mg total) by mouth every 4 (four) hours as needed for Pain., Disp: 20 tablet, Rfl: 0    LACTOBACILLUS COMBO NO.6 (PROBIOTIC COMPLEX ORAL), Take by mouth once daily at 6am., Disp: , Rfl:     loratadine (CLARITIN) 10 mg tablet, Take 10 mg by mouth daily, Disp: , Rfl:     melatonin 5 mg Cap, Take by mouth., Disp: , Rfl:     multivitamin capsule, Take 1 capsule by mouth once daily., Disp: , Rfl:     ondansetron (ZOFRAN) 4 MG tablet, Take 1 tablet (4 mg total) by mouth every 6 (six) hours., Disp: 12 tablet, Rfl: 0    pantoprazole (PROTONIX) 40 MG tablet, Take 1 tablet (40 mg total) by mouth once daily., Disp: 30 tablet, Rfl: 6    spironolactone (ALDACTONE) 50 MG tablet, TAKE 1 TABLET BY MOUTH ONCE DAILY THEN INCREASE TO 2 TABLETS DAILY AS TOLERATED, Disp: 180 tablet, Rfl: 1    sumatriptan (IMITREX) 100 MG tablet, TAKE 1 TABLET BY MOUTH IF NEEDED, MAY REPEAT ONCE IN 1 HOUR. MAX OF 2 TABLETS IN 24 HOURS, Disp: 10 tablet, Rfl: 2    tamsulosin (FLOMAX) 0.4 mg Cap, Take 2 capsules (0.8 mg total) by mouth once daily., Disp: 60 capsule, Rfl: 3    methocarbamoL (ROBAXIN) 500 MG Tab, Take 1 tablet (500 mg total) by mouth 4 (four) times daily. for 10 days, Disp: 40 tablet, Rfl: 0     Physical Exam:      Vitals:     04/19/22 1309   BP: 135/86   Pulse: 86   Temp: 97.2 °F (36.2 °C)      Body mass index is 23.96 kg/m².  General: awake, alert, cooperative  Head: NC/AT  Ears: external ears normal  Eyes: sclera normal  Lungs: normal inspiration, NAD  Heart: well-perfused  Skin: The skin is warm and dry  Ext: No c/c/e.  Neuro: grossly intact, AOx3     RADIOLOGY:  No recent relevant imaging available for review.     LABS:  I personally reviewed the following lab values:        Lab Results   Component Value Date     WBC 4.29  10/02/2021     HGB 12.3 10/02/2021     HCT 37.7 10/02/2021      10/02/2021      10/02/2021     K 3.9 10/02/2021      10/02/2021     CREATININE 0.7 10/02/2021     BUN 12 10/02/2021     CO2 27 10/02/2021     TSH 1.007 03/16/2021     INR 1.0 02/02/2021     HGBA1C 5.8 03/07/2017     CHOL 140 10/20/2020     TRIG 103 10/20/2020     HDL 55 10/20/2020     ALT 16 10/02/2021     AST 18 10/02/2021         URINALYSIS:  Urinalysis obtained in clinic today specific gravity 1.005 pH six trace protein trace blood, negative for all other parameters    PVR = 32mL     Assessment/Plan:   Genny Calixto is a 56 y.o. female with history of kidney stones, no stone on CT. Reviewed the CT with the patient and her boyfriend. Explained that her full bladder is likely the reason for the fullness of her right collecting system. Unclear etiology of her pain, will try a course of robaxin. Patient also requests an appt with GI to discuss her bloating, will send a message to them to schedule follow-up. F/u 6 months     Irving Kidd MD  Urology

## 2022-04-20 NOTE — PROGRESS NOTES
"  Chief Complaint: kidney stones    HPI:   04/19/2022 - returns today after an absence, notes issues with low back pain for the last 3-4 weeks "like I'm passing a big stone", notes that her pain is worse when she stands from a seated position, also notes recurrent gross hematuria and intermittent fevers from 100.6 - 101.4F as well as chills    10/13/2021 - right URS/LL for right ureteral stone  09/30/2021 - right stent placement for right ureteral stone  02/07/2021 - URS/LL for left ureteral stone  PMH:  Past Medical History:   Diagnosis Date    Encounter for blood transfusion     KENN (generalized anxiety disorder)     Takes 5-10 mg of valium qd prn anxiety (prescriptions for both on file, one from [facility] & other from )    Insomnia     Takes 5-10 mg of valium qhs prn insomnia (prescriptions for both on file, one from [facility] & other from )    Migraine headache     Nephrolithiasis 08/03/2019    Left ureteral stone -> UTI c/b pyelonephritis and urosepsis w/ hypokalemia -> transfered to [facility] urology service from [facility] after CT abn dx, s/p 2 stents to allow infx to drain, IV Vanc/Rocephin, fever free since yesterday    Reflex sympathetic dystrophy     UTI (urinary tract infection)     Vertigo        PSH:  Past Surgical History:   Procedure Laterality Date    BLADDER SURGERY      CHOLECYSTECTOMY      COLONOSCOPY N/A 11/10/2021    Procedure: COLONOSCOPY;  Surgeon: Eryn Rothman MD;  Location: [facility];  Service: Endoscopy;  Laterality: N/A;    CYSTOSCOPY WITH URETEROSCOPY, RETROGRADE PYELOGRAPHY, AND INSERTION OF STENT Right 9/30/2021    Procedure: CYSTOSCOPY, WITH RETROGRADE PYELOGRAM AND URETERAL STENT INSERTION;  Surgeon: Annita Gamino MD;  Location: [facility] OR;  Service: Urology;  Laterality: Right;    DIAGNOSTIC LAPAROSCOPY N/A 11/11/2020    Procedure: LAPAROSCOPY, DIAGNOSTIC;  Surgeon: Tee Segura MD;  Location: [facility];  Service: General;  Laterality: N/A;  CONVERTED TO OPEN    " ESOPHAGOGASTRODUODENOSCOPY N/A 11/10/2021    Procedure: EGD (ESOPHAGOGASTRODUODENOSCOPY);  Surgeon: Eryn Rothman MD;  Location: [facility] ENDO;  Service: Endoscopy;  Laterality: N/A;    facet injections  02/14/2017    L3, L4, L5, S1 Done by Dr. Lara    HYSTERECTOMY      INJECTION OF ANESTHETIC AGENT INTO TISSUE PLANE DEFINED BY TRANSVERSUS ABDOMINIS MUSCLE N/A 11/11/2020    Procedure: BLOCK, TRANSVERSUS ABDOMINIS PLANE;  Surgeon: Tee Segura MD;  Location: [facility] OR;  Service: General;  Laterality: N/A;    KNEE SURGERY      LAPAROSCOPIC LYSIS OF ADHESIONS N/A 11/11/2020    Procedure: LYSIS, ADHESIONS, LAPAROSCOPIC;  Surgeon: Tee Segura MD;  Location: [facility] OR;  Service: General;  Laterality: N/A;  CONVERTED TO OPEN    LAPAROTOMY N/A 11/20/2020    Procedure: LAPAROTOMY;  Surgeon: Tee Segura MD;  Location: [facility] OR;  Service: General;  Laterality: N/A;    LASER LITHOTRIPSY Left 2/8/2021    Procedure: LITHOTRIPSY, USING LASER;  Surgeon: Irving Kidd MD;  Location: [facility] OR;  Service: Urology;  Laterality: Left;    LASER LITHOTRIPSY Right 10/13/2021    Procedure: LITHOTRIPSY, USING LASER;  Surgeon: Annita Gamino MD;  Location: [facility] OR;  Service: Urology;  Laterality: Right;    LYSIS OF ADHESIONS N/A 11/11/2020    Procedure: LYSIS, ADHESIONS;  Surgeon: Tee Segura MD;  Location: [facility] OR;  Service: General;  Laterality: N/A;    LYSIS OF ADHESIONS N/A 11/20/2020    Procedure: LYSIS, ADHESIONS;  Surgeon: Tee Segura MD;  Location: [facility] OR;  Service: General;  Laterality: N/A;    TONSILLECTOMY      URETEROSCOPY Left 2/8/2021    Procedure: URETEROSCOPY;  Surgeon: Irving Kidd MD;  Location: [facility] OR;  Service: Urology;  Laterality: Left;  stent placement    URETEROSCOPY Right 10/13/2021    Procedure: URETEROSCOPY;  Surgeon: Annita Gamino MD;  Location: [facility] OR;  Service: Urology;  Laterality: Right;       Family History:  Family History   Problem  Relation Age of Onset    Stroke Mother     Hypertension Mother     COPD Father     Drug abuse Brother        Social History:  Social History     Tobacco Use    Smoking status: Never Smoker    Smokeless tobacco: Never Used   Substance Use Topics    Alcohol use: Yes     Comment: rarely    Drug use: No        Review of Systems:  General: No fever, chills  Skin: No rashes  Chest:  Denies cough and sputum production  Heart: Denies chest pain  Resp: Denies dyspnea  Abdomen: Denies diarrhea, abdominal pain, hematemesis, or blood in stool.  Musculoskeletal: No joint stiffness or swelling. Denies back pain.  : see HPI  Neuro: no dizziness or weakness    Allergies:  Erythromycin, Morphine, and Opioids - morphine analogues    Medications:    Current Outpatient Medications:     amoxicillin-clavulanate 875-125mg (AUGMENTIN) 875-125 mg per tablet, Take 1 tablet by mouth every 12 (twelve) hours., Disp: 20 tablet, Rfl: 0    diazePAM (VALIUM) 10 MG Tab, TAKE 1 TABLET BY MOUTH EVERY DAY AS NEEDED, Disp: 90 tablet, Rfl: 1    diazePAM (VALIUM) 5 MG tablet, Take one with onset of migraine, Disp: 20 tablet, Rfl: 5    dicyclomine (BENTYL) 20 mg tablet, TAKE 1 TABLET BY MOUTH THREE TIMES A DAY, Disp: 270 tablet, Rfl: 3    doxepin (SINEQUAN) 10 MG capsule, TAKE 1 CAPSULE (10 MG TOTAL) BY MOUTH EVERY EVENING., Disp: 30 capsule, Rfl: 11    ergocalciferol (ERGOCALCIFEROL) 50,000 unit Cap, Take 1 capsule (50,000 Units total) by mouth every 7 days., Disp: 12 capsule, Rfl: 3    fluticasone propionate (FLONASE) 50 mcg/actuation nasal spray, 2 sprays (100 mcg total) by Each Nostril route once daily., Disp: 16 g, Rfl: 11    HYDROmorphone (DILAUDID) 2 MG tablet, Take 1 tablet (2 mg total) by mouth every 4 (four) hours as needed for Pain., Disp: 20 tablet, Rfl: 0    LACTOBACILLUS COMBO NO.6 (PROBIOTIC COMPLEX ORAL), Take by mouth once daily at 6am., Disp: , Rfl:     loratadine (CLARITIN) 10 mg tablet, Take 10 mg by mouth daily,  Disp: , Rfl:     melatonin 5 mg Cap, Take by mouth., Disp: , Rfl:     multivitamin capsule, Take 1 capsule by mouth once daily., Disp: , Rfl:     ondansetron (ZOFRAN) 4 MG tablet, Take 1 tablet (4 mg total) by mouth every 6 (six) hours., Disp: 12 tablet, Rfl: 0    pantoprazole (PROTONIX) 40 MG tablet, Take 1 tablet (40 mg total) by mouth once daily., Disp: 30 tablet, Rfl: 6    spironolactone (ALDACTONE) 50 MG tablet, TAKE 1 TABLET BY MOUTH ONCE DAILY THEN INCREASE TO 2 TABLETS DAILY AS TOLERATED, Disp: 180 tablet, Rfl: 1    sumatriptan (IMITREX) 100 MG tablet, TAKE 1 TABLET BY MOUTH IF NEEDED, MAY REPEAT ONCE IN 1 HOUR. MAX OF 2 TABLETS IN 24 HOURS, Disp: 10 tablet, Rfl: 2    tamsulosin (FLOMAX) 0.4 mg Cap, Take 2 capsules (0.8 mg total) by mouth once daily., Disp: 60 capsule, Rfl: 3    methocarbamoL (ROBAXIN) 500 MG Tab, Take 1 tablet (500 mg total) by mouth 4 (four) times daily. for 10 days, Disp: 40 tablet, Rfl: 0    Physical Exam:  Vitals:    04/19/22 1309   BP: 135/86   Pulse: 86   Temp: 97.2 °F (36.2 °C)     Body mass index is 23.96 kg/m².  General: awake, alert, cooperative  Head: NC/AT  Ears: external ears normal  Eyes: sclera normal  Lungs: normal inspiration, NAD  Heart: well-perfused  Skin: The skin is warm and dry  Ext: No c/c/e.  Neuro: grossly intact, AOx3    RADIOLOGY:  No recent relevant imaging available for review.    LABS:  I personally reviewed the following lab values:  Lab Results   Component Value Date    WBC 4.29 10/02/2021    HGB 12.3 10/02/2021    HCT 37.7 10/02/2021     10/02/2021     10/02/2021    K 3.9 10/02/2021     10/02/2021    CREATININE 0.7 10/02/2021    BUN 12 10/02/2021    CO2 27 10/02/2021    TSH 1.007 03/16/2021    INR 1.0 02/02/2021    HGBA1C 5.8 03/07/2017    CHOL 140 10/20/2020    TRIG 103 10/20/2020    HDL 55 10/20/2020    ALT 16 10/02/2021    AST 18 10/02/2021       URINALYSIS:  Urinalysis obtained in clinic today specific gravity 1.005 pH six  trace protein trace blood, negative for all other parameters    Assessment/Plan:   Genny Calixto is a 56 y.o. female with history of kidney stones feels like she is passing a stone. Will obtain CT and call her with results and will schedule f/u at that time.    Addendum - called patient and reviewed CT which notes a very full bladder, patient notes that she voided right before her CT, also shows some mild fullness of the right collecting system without a stone present, will have her f/u in clinic tomorrow for review and PVR    Thank you for allowing me the opportunity to participate in this patient's care.     Irving Kidd MD  Urology

## 2022-04-21 ENCOUNTER — PATIENT MESSAGE (OUTPATIENT)
Dept: UROLOGY | Facility: CLINIC | Age: 57
End: 2022-04-21
Payer: MEDICARE

## 2022-04-26 ENCOUNTER — PATIENT MESSAGE (OUTPATIENT)
Dept: ADMINISTRATIVE | Facility: HOSPITAL | Age: 57
End: 2022-04-26
Payer: MEDICARE

## 2022-04-26 ENCOUNTER — OFFICE VISIT (OUTPATIENT)
Dept: GASTROENTEROLOGY | Facility: CLINIC | Age: 57
End: 2022-04-26
Payer: MEDICARE

## 2022-04-26 VITALS
HEART RATE: 80 BPM | WEIGHT: 149.06 LBS | SYSTOLIC BLOOD PRESSURE: 138 MMHG | HEIGHT: 67 IN | BODY MASS INDEX: 23.39 KG/M2 | DIASTOLIC BLOOD PRESSURE: 88 MMHG

## 2022-04-26 DIAGNOSIS — R14.0 ABDOMINAL BLOATING: Primary | ICD-10-CM

## 2022-04-26 DIAGNOSIS — R10.9 ABDOMINAL CRAMPING: ICD-10-CM

## 2022-04-26 DIAGNOSIS — Z98.890 H/O ABDOMINAL SURGERY: ICD-10-CM

## 2022-04-26 PROCEDURE — 99214 PR OFFICE/OUTPT VISIT, EST, LEVL IV, 30-39 MIN: ICD-10-PCS | Mod: S$PBB,,, | Performed by: INTERNAL MEDICINE

## 2022-04-26 PROCEDURE — 99214 OFFICE O/P EST MOD 30 MIN: CPT | Mod: PBBFAC,PN | Performed by: INTERNAL MEDICINE

## 2022-04-26 PROCEDURE — 99999 PR PBB SHADOW E&M-EST. PATIENT-LVL IV: CPT | Mod: PBBFAC,,, | Performed by: INTERNAL MEDICINE

## 2022-04-26 PROCEDURE — 99999 PR PBB SHADOW E&M-EST. PATIENT-LVL IV: ICD-10-PCS | Mod: PBBFAC,,, | Performed by: INTERNAL MEDICINE

## 2022-04-26 PROCEDURE — 99214 OFFICE O/P EST MOD 30 MIN: CPT | Mod: S$PBB,,, | Performed by: INTERNAL MEDICINE

## 2022-04-26 NOTE — PROGRESS NOTES
"[facility]  Gastroenterology  PCP: Tay Duff MD    4/26/22    HPI     Bloated      Additional comments: "Extreme" bloating.           Last edited by Rasheeda Jamison LPN on 4/26/2022  3:38 PM. (History)      Reason for Visit: Abdominal bloating, abdominal cramping    Subjective:   Genny Calixto is a 56 y.o. female with PMH of SBO s/p diagnostic lap, lysis of adhesions, multiple abdominal surgeries, kidney stones here for evaluation of recurrent abdominal bloating and cramping. Previously, she had a contrasted CT A/P in 09/2021 which showed 6 mm right ureteral stone with mild hydronephrosis for which she has had stent placed with urology. There were surgical changes noted throughout the GI tract but no acute abnormality of the GI tract was noted. She saw surgery who did not feel any surgical intervention was warranted given the normal CT scan and she was referred to GI. She then underwent EGD and colonoscopy in 11/2021 which showed small hiatal hernia and gastritis. Path was negative for H pylori. Colonoscopy showed diverticulosis with normal colon biopsies. It was felt her symptoms were likely related to IBS and possibly a combination of adhesive disease/pain.      Lately, she reintroduced refined sugar and varela into her diet and has had more abdominal bloating, sometimes it gets severe. She reports her bowel movements are regular now, solid and much improved from prior. She continues to take Protonix. Recent CT renal protocol showed moderately increased stool burden.       Past Medical History:   Diagnosis Date    Encounter for blood transfusion     KENN (generalized anxiety disorder)     Takes 5-10 mg of valium qd prn anxiety (prescriptions for both on file, one from [facility] & other from )    Insomnia     Takes 5-10 mg of valium qhs prn insomnia (prescriptions for both on file, one from [facility] & other from )    Migraine headache     Nephrolithiasis 08/03/2019    Left ureteral stone -> " UTI c/b pyelonephritis and urosepsis w/ hypokalemia -> transfered to [facility] urology service from [facility] after CT abn dx, s/p 2 stents to allow infx to drain, IV Vanc/Rocephin, fever free since yesterday    Reflex sympathetic dystrophy     UTI (urinary tract infection)     Vertigo        Past Surgical History:   Procedure Laterality Date    BLADDER SURGERY      CHOLECYSTECTOMY      COLONOSCOPY N/A 11/10/2021    Procedure: COLONOSCOPY;  Surgeon: Eryn Rothman MD;  Location: [facility];  Service: Endoscopy;  Laterality: N/A;    CYSTOSCOPY WITH URETEROSCOPY, RETROGRADE PYELOGRAPHY, AND INSERTION OF STENT Right 9/30/2021    Procedure: CYSTOSCOPY, WITH RETROGRADE PYELOGRAM AND URETERAL STENT INSERTION;  Surgeon: Annita Gamino MD;  Location: [facility];  Service: Urology;  Laterality: Right;    DIAGNOSTIC LAPAROSCOPY N/A 11/11/2020    Procedure: LAPAROSCOPY, DIAGNOSTIC;  Surgeon: Tee Segura MD;  Location: [facility] OR;  Service: General;  Laterality: N/A;  CONVERTED TO OPEN    ESOPHAGOGASTRODUODENOSCOPY N/A 11/10/2021    Procedure: EGD (ESOPHAGOGASTRODUODENOSCOPY);  Surgeon: Eryn Rothman MD;  Location: [facility];  Service: Endoscopy;  Laterality: N/A;    facet injections  02/14/2017    L3, L4, L5, S1 Done by Dr. Lara    HYSTERECTOMY      INJECTION OF ANESTHETIC AGENT INTO TISSUE PLANE DEFINED BY TRANSVERSUS ABDOMINIS MUSCLE N/A 11/11/2020    Procedure: BLOCK, TRANSVERSUS ABDOMINIS PLANE;  Surgeon: Tee Segura MD;  Location: [facility] OR;  Service: General;  Laterality: N/A;    KNEE SURGERY      LAPAROSCOPIC LYSIS OF ADHESIONS N/A 11/11/2020    Procedure: LYSIS, ADHESIONS, LAPAROSCOPIC;  Surgeon: Tee Segura MD;  Location: [facility] OR;  Service: General;  Laterality: N/A;  CONVERTED TO OPEN    LAPAROTOMY N/A 11/20/2020    Procedure: LAPAROTOMY;  Surgeon: Tee Segura MD;  Location: [facility];  Service: General;  Laterality: N/A;    LASER LITHOTRIPSY Left 2/8/2021     Procedure: LITHOTRIPSY, USING LASER;  Surgeon: Irving Kidd MD;  Location: [facility] OR;  Service: Urology;  Laterality: Left;    LASER LITHOTRIPSY Right 10/13/2021    Procedure: LITHOTRIPSY, USING LASER;  Surgeon: Annita Gamino MD;  Location: [facility] OR;  Service: Urology;  Laterality: Right;    LYSIS OF ADHESIONS N/A 11/11/2020    Procedure: LYSIS, ADHESIONS;  Surgeon: Tee Segura MD;  Location: [facility] OR;  Service: General;  Laterality: N/A;    LYSIS OF ADHESIONS N/A 11/20/2020    Procedure: LYSIS, ADHESIONS;  Surgeon: Tee Segura MD;  Location: [facility] OR;  Service: General;  Laterality: N/A;    TONSILLECTOMY      URETEROSCOPY Left 2/8/2021    Procedure: URETEROSCOPY;  Surgeon: Irving Kidd MD;  Location: [facility] OR;  Service: Urology;  Laterality: Left;  stent placement    URETEROSCOPY Right 10/13/2021    Procedure: URETEROSCOPY;  Surgeon: Annita Gamino MD;  Location: [facility] OR;  Service: Urology;  Laterality: Right;       Current Outpatient Medications on File Prior to Visit   Medication Sig Dispense Refill    amoxicillin-clavulanate 875-125mg (AUGMENTIN) 875-125 mg per tablet Take 1 tablet by mouth every 12 (twelve) hours. 20 tablet 0    diazePAM (VALIUM) 10 MG Tab TAKE 1 TABLET BY MOUTH EVERY DAY AS NEEDED 90 tablet 1    diazePAM (VALIUM) 5 MG tablet Take one with onset of migraine 20 tablet 5    dicyclomine (BENTYL) 20 mg tablet TAKE 1 TABLET BY MOUTH THREE TIMES A  tablet 3    doxepin (SINEQUAN) 10 MG capsule TAKE 1 CAPSULE (10 MG TOTAL) BY MOUTH EVERY EVENING. 30 capsule 11    ergocalciferol (ERGOCALCIFEROL) 50,000 unit Cap Take 1 capsule (50,000 Units total) by mouth every 7 days. 12 capsule 3    fluticasone propionate (FLONASE) 50 mcg/actuation nasal spray 2 sprays (100 mcg total) by Each Nostril route once daily. 16 g 11    HYDROmorphone (DILAUDID) 2 MG tablet Take 1 tablet (2 mg total) by mouth every 4 (four) hours as needed for Pain. 20 tablet 0     LACTOBACILLUS COMBO NO.6 (PROBIOTIC COMPLEX ORAL) Take by mouth once daily at 6am.      loratadine (CLARITIN) 10 mg tablet Take 10 mg by mouth daily      melatonin 5 mg Cap Take by mouth.      methocarbamoL (ROBAXIN) 500 MG Tab Take 1 tablet (500 mg total) by mouth 4 (four) times daily. for 10 days 40 tablet 0    multivitamin capsule Take 1 capsule by mouth once daily.      ondansetron (ZOFRAN) 4 MG tablet Take 1 tablet (4 mg total) by mouth every 6 (six) hours. 12 tablet 0    pantoprazole (PROTONIX) 40 MG tablet TAKE 1 TABLET BY MOUTH EVERY DAY 90 tablet 2    spironolactone (ALDACTONE) 50 MG tablet TAKE 1 TABLET BY MOUTH ONCE DAILY THEN INCREASE TO 2 TABLETS DAILY AS TOLERATED 180 tablet 1    sumatriptan (IMITREX) 100 MG tablet TAKE 1 TABLET BY MOUTH IF NEEDED, MAY REPEAT ONCE IN 1 HOUR. MAX OF 2 TABLETS IN 24 HOURS 10 tablet 2    tamsulosin (FLOMAX) 0.4 mg Cap Take 2 capsules (0.8 mg total) by mouth once daily. 60 capsule 3    [DISCONTINUED] pantoprazole (PROTONIX) 40 MG tablet Take 1 tablet (40 mg total) by mouth once daily. 30 tablet 6     No current facility-administered medications on file prior to visit.       Review of patient's allergies indicates:   Allergen Reactions    Erythromycin     Morphine Nausea And Vomiting    Opioids - morphine analogues        Social History     Socioeconomic History    Marital status:    Tobacco Use    Smoking status: Never Smoker    Smokeless tobacco: Never Used   Substance and Sexual Activity    Alcohol use: Yes     Comment: rarely    Drug use: No    Sexual activity: Never   Social History Narrative    Breakfast: Fruit & organic oatmeal; water or tea w/ lemon    Lunch: Salads: spinach leaves, iceberg lettuce, tomatoes, avaccado, light dressing or protein smoothie    Dinner: Grilled or broiled chicken or fish (no pork since 3/2017; red meat only 5 times since 3/2017)    Snacks: Fruit    Eats out: 1x/wk    Water: 96 oz/d    PA: 5-6 d/wk; basketball     Goal weight is 160 lb       Family History   Problem Relation Age of Onset    Stroke Mother     Hypertension Mother     COPD Father     Drug abuse Brother        Review of Systems   Constitutional: Negative for appetite change, fever and unexpected weight change.   HENT: Negative for postnasal drip, rhinorrhea, sneezing, sore throat and trouble swallowing.    Eyes: Negative for visual disturbance.   Respiratory: Negative for cough, shortness of breath and wheezing.    Cardiovascular: Negative for chest pain, palpitations and leg swelling.   Gastrointestinal: Positive for abdominal distention and abdominal pain. Negative for blood in stool, constipation, diarrhea, nausea and vomiting.   Genitourinary: Negative for dysuria.   Musculoskeletal: Negative for arthralgias, joint swelling and myalgias.   Skin: Negative for color change, pallor and rash.   Neurological: Negative for weakness, light-headedness, numbness and headaches.   Hematological: Negative for adenopathy. Does not bruise/bleed easily.   Psychiatric/Behavioral: Negative for agitation.       Objective:   Vitals:   Vitals:    04/26/22 1536   BP: 138/88   Pulse: 80       Physical Exam  Vitals reviewed.   Constitutional:       General: She is not in acute distress.     Appearance: She is not diaphoretic.   HENT:      Head: Normocephalic and atraumatic.      Mouth/Throat:      Pharynx: No oropharyngeal exudate.   Eyes:      General: No scleral icterus.        Right eye: No discharge.         Left eye: No discharge.      Conjunctiva/sclera: Conjunctivae normal.      Pupils: Pupils are equal, round, and reactive to light.   Cardiovascular:      Rate and Rhythm: Normal rate and regular rhythm.      Heart sounds: Normal heart sounds. No murmur heard.    No friction rub. No gallop.   Pulmonary:      Effort: Pulmonary effort is normal. No respiratory distress.      Breath sounds: Normal breath sounds. No stridor. No wheezing or rales.   Abdominal:      General:  Bowel sounds are normal. There is no distension.      Palpations: Abdomen is soft. There is no mass.      Tenderness: There is no abdominal tenderness. There is no guarding.   Musculoskeletal:         General: Normal range of motion.      Cervical back: Normal range of motion.   Skin:     General: Skin is warm and dry.      Coloration: Skin is not pale.      Findings: No erythema or rash.   Neurological:      Mental Status: She is alert and oriented to person, place, and time.             CT Renal Stone Study ABD Pelvis WO    Result Date: 4/19/2022  EXAMINATION: CT RENAL STONE STUDY ABD PELVIS WO, multiplanar reconstructions CLINICAL HISTORY: Unspecified abdominal painFlank pain, kidney stone suspected; TECHNIQUE: Axial images through the abdomen and pelvis were obtained without the use of IV contrast.  Sagittal and coronal reconstructions are provided for review.  Oral contrast was not utilized. COMPARISON: September 30, 2021 FINDINGS: LUNG BASES: Minimal ground-glass opacities are seen throughout the lung bases.  Lung bases are otherwise clear.  Negative for pleural effusions. The distal esophagus is normal.  Trace pericardial effusion again seen ABDOMEN: There is mild right hydronephrosis and hydroureter.  The distal right ureteral stone that was seen on the most recent comparison study has passed.  There are no current right renal or ureteral stones seen.  Negative for left hydronephrosis.  Punctate nonobstructing inferior pole left renal stone seen.  Stable 1.3 cm inferior pole left renal cyst.  Stable tiny superior pole right renal cyst. Cholecystectomy clips again seen.  Hepatomegaly versus Riedel's lobe again seen.  The liver and spleen otherwise appear normal.  The pancreas is unremarkable.  The adrenal glands are normal. Negative for adenopathy, free fluid or inflammatory change noted within the abdomen or pelvis.  Minor vascular calcifications are present without aneurysmal changes. Moderately increased  stool throughout the colon.  There is some mild wall thickening of the ascending colon.  High density material within the ascending colon is likely something ingested such as Pepto-Bismol.  I do not see a normal or abnormal appendix.  Stable postoperative changes to the sigmoid colon at multiple levels.  Small bowel loops appear normal.  Negative for free air. PELVIS: The urinary bladder is unremarkable.  The uterus is absent.  The rest of the female pelvic organs are normal. There are pelvic phleboliths. Stable postoperative changes to the anterior abdominal wall.  The abdominal wall is currently intact. No significant osseous abnormality is identified.  Negative for significant spinal canal stenosis. Stable multilevel marginal spondylosis as well as vacuum disc phenomenon at L5/S1 and T10/T11.  Interval development of vacuum disc phenomenon at L1/L2 with stable disc height reduction and nearly all levels imaged with the exception of L4/L5. stable convex left curvature of the lumbar spine.  Mild degenerative changes of the pelvis again seen. Negative for groin adenopathy.     1.  Moderately increased stool throughout most of the colon.  There is mild wall thickening of the ascending colon.  Mild colitis of the ascending colon difficult to exclude in the right clinical setting. 2. There is mild prominence of the right renal collecting system, without obstructing stone seen at this time.  Interval passage of distal right ureteral stone seen on the comparison study.  Negative for left hydronephrosis.  Punctate nonobstructing inferior pole left renal stones seen. 3.  Negative for acute process otherwise. 4.  Mildly progressive degenerative changes of the lumbar spine as detailed above. 5.  Stable findings as noted above. All CT scans at this facility are performed  using dose modulation techniques as appropriate to performed exam including the following:  automated exposure control; adjustment of mA and/or kV according  to the patients size (this includes techniques or standardized protocols for targeted exams where dose is matched to indication/reason for exam: i.e. extremities or head);  iterative reconstruction technique. Electronically signed by: Andrew Mccormack MD Date:    04/19/2022 Time:    15:34      IMPRESSION     Problem List Items Addressed This Visit    None     Visit Diagnoses     Abdominal bloating    -  Primary    Abdominal cramping        H/O abdominal surgery              PLANS:    - Overall, patient has been improved GI wise since her last visit  - Continue with PPI  - Will advise a low FODMAP diet. Handout given in the clinic today  - CT results reviewed. As patient reports regular bowel movements, uncertain of significance of findings of stool burden  - For now, will have her watch her diet as symptoms began after reintroduction of certain foods. If symptoms persist despite this, will plan for KUB to re-assess stool burden  - EGD/colon done and findings as mentioned in HPI    Abdominal bloating    Abdominal cramping    H/O abdominal surgery      Eryn Rothman MD  Gastroenterology and Hepatology

## 2022-04-27 ENCOUNTER — PATIENT MESSAGE (OUTPATIENT)
Dept: GASTROENTEROLOGY | Facility: CLINIC | Age: 57
End: 2022-04-27
Payer: MEDICARE

## 2022-04-27 DIAGNOSIS — R11.0 NAUSEA: Primary | ICD-10-CM

## 2022-04-28 DIAGNOSIS — R11.0 NAUSEA: Primary | ICD-10-CM

## 2022-04-28 RX ORDER — ONDANSETRON 4 MG/1
4 TABLET, ORALLY DISINTEGRATING ORAL EVERY 8 HOURS PRN
Qty: 20 TABLET | Refills: 0 | Status: SHIPPED | OUTPATIENT
Start: 2022-04-28 | End: 2022-05-28

## 2022-04-28 RX ORDER — ONDANSETRON 4 MG/1
4 TABLET, FILM COATED ORAL EVERY 6 HOURS
Qty: 12 TABLET | Refills: 0 | Status: SHIPPED | OUTPATIENT
Start: 2022-04-28 | End: 2022-05-09

## 2022-05-02 ENCOUNTER — PATIENT MESSAGE (OUTPATIENT)
Dept: GASTROENTEROLOGY | Facility: CLINIC | Age: 57
End: 2022-05-02
Payer: MEDICARE

## 2022-05-08 PROCEDURE — 99285 EMERGENCY DEPT VISIT HI MDM: CPT | Mod: 25,CS

## 2022-05-09 ENCOUNTER — HOSPITAL ENCOUNTER (OUTPATIENT)
Facility: HOSPITAL | Age: 57
LOS: 1 days | Discharge: HOME | End: 2022-05-10
Attending: EMERGENCY MEDICINE | Admitting: FAMILY MEDICINE
Payer: MEDICARE

## 2022-05-09 DIAGNOSIS — K55.069 MESENTERIC VEIN THROMBOSIS: ICD-10-CM

## 2022-05-09 DIAGNOSIS — R10.13 EPIGASTRIC PAIN: Primary | ICD-10-CM

## 2022-05-09 PROBLEM — N13.30 HYDRONEPHROSIS: Status: ACTIVE | Noted: 2022-05-09

## 2022-05-09 LAB
ALBUMIN SERPL BCP-MCNC: 4 G/DL (ref 3.5–5.2)
ALP SERPL-CCNC: 63 U/L (ref 55–135)
ALT SERPL W/O P-5'-P-CCNC: 13 U/L (ref 10–44)
ANION GAP SERPL CALC-SCNC: 14 MMOL/L (ref 8–16)
APTT BLDCRRT: 26.5 SEC (ref 21–32)
APTT BLDCRRT: 60.9 SEC (ref 21–32)
AST SERPL-CCNC: 14 U/L (ref 10–40)
BASOPHILS # BLD AUTO: 0.06 K/UL (ref 0–0.2)
BASOPHILS NFR BLD: 1 % (ref 0–1.9)
BILIRUB SERPL-MCNC: 0.6 MG/DL (ref 0.1–1)
BILIRUB UR QL STRIP: NEGATIVE
BUN SERPL-MCNC: 14 MG/DL (ref 6–20)
CALCIUM SERPL-MCNC: 9.4 MG/DL (ref 8.7–10.5)
CHLORIDE SERPL-SCNC: 103 MMOL/L (ref 95–110)
CLARITY UR: CLEAR
CO2 SERPL-SCNC: 25 MMOL/L (ref 23–29)
COLOR UR: YELLOW
CREAT SERPL-MCNC: 1 MG/DL (ref 0.5–1.4)
DIFFERENTIAL METHOD: ABNORMAL
EOSINOPHIL # BLD AUTO: 0.2 K/UL (ref 0–0.5)
EOSINOPHIL NFR BLD: 2.4 % (ref 0–8)
ERYTHROCYTE [DISTWIDTH] IN BLOOD BY AUTOMATED COUNT: 12.2 % (ref 11.5–14.5)
EST. GFR  (AFRICAN AMERICAN): >60 ML/MIN/1.73 M^2
EST. GFR  (NON AFRICAN AMERICAN): >60 ML/MIN/1.73 M^2
GLUCOSE SERPL-MCNC: 94 MG/DL (ref 70–110)
GLUCOSE UR QL STRIP: NEGATIVE
HCT VFR BLD AUTO: 38.8 % (ref 37–48.5)
HGB BLD-MCNC: 13.6 G/DL (ref 12–16)
HGB UR QL STRIP: NEGATIVE
IMM GRANULOCYTES # BLD AUTO: 0.02 K/UL (ref 0–0.04)
IMM GRANULOCYTES NFR BLD AUTO: 0.3 % (ref 0–0.5)
INR PPP: 1 (ref 0.8–1.2)
KETONES UR QL STRIP: NEGATIVE
LEUKOCYTE ESTERASE UR QL STRIP: NEGATIVE
LIPASE SERPL-CCNC: 38 U/L (ref 4–60)
LYMPHOCYTES # BLD AUTO: 2.6 K/UL (ref 1–4.8)
LYMPHOCYTES NFR BLD: 41.3 % (ref 18–48)
MCH RBC QN AUTO: 34.5 PG (ref 27–31)
MCHC RBC AUTO-ENTMCNC: 35.1 G/DL (ref 32–36)
MCV RBC AUTO: 99 FL (ref 82–98)
MONOCYTES # BLD AUTO: 0.4 K/UL (ref 0.3–1)
MONOCYTES NFR BLD: 6.2 % (ref 4–15)
NEUTROPHILS # BLD AUTO: 3.1 K/UL (ref 1.8–7.7)
NEUTROPHILS NFR BLD: 48.8 % (ref 38–73)
NITRITE UR QL STRIP: NEGATIVE
NRBC BLD-RTO: 0 /100 WBC
PH UR STRIP: 6 [PH] (ref 5–8)
PLATELET # BLD AUTO: 209 K/UL (ref 150–450)
PMV BLD AUTO: 11.5 FL (ref 9.2–12.9)
POTASSIUM SERPL-SCNC: 3.8 MMOL/L (ref 3.5–5.1)
PROT SERPL-MCNC: 6.4 G/DL (ref 6–8.4)
PROT UR QL STRIP: NEGATIVE
PROTHROMBIN TIME: 10.5 SEC (ref 9–12.5)
RBC # BLD AUTO: 3.94 M/UL (ref 4–5.4)
SARS-COV-2 RDRP RESP QL NAA+PROBE: NEGATIVE
SODIUM SERPL-SCNC: 142 MMOL/L (ref 136–145)
SP GR UR STRIP: 1.02 (ref 1–1.03)
TROPONIN I SERPL DL<=0.01 NG/ML-MCNC: <0.006 NG/ML (ref 0–0.03)
URN SPEC COLLECT METH UR: NORMAL
UROBILINOGEN UR STRIP-ACNC: NEGATIVE EU/DL
WBC # BLD AUTO: 6.27 K/UL (ref 3.9–12.7)

## 2022-05-09 PROCEDURE — 84484 ASSAY OF TROPONIN QUANT: CPT | Performed by: EMERGENCY MEDICINE

## 2022-05-09 PROCEDURE — 96376 TX/PRO/DX INJ SAME DRUG ADON: CPT

## 2022-05-09 PROCEDURE — 85610 PROTHROMBIN TIME: CPT | Performed by: EMERGENCY MEDICINE

## 2022-05-09 PROCEDURE — 25000003 PHARM REV CODE 250: Performed by: FAMILY MEDICINE

## 2022-05-09 PROCEDURE — 85025 COMPLETE CBC W/AUTO DIFF WBC: CPT | Performed by: NURSE PRACTITIONER

## 2022-05-09 PROCEDURE — 96375 TX/PRO/DX INJ NEW DRUG ADDON: CPT

## 2022-05-09 PROCEDURE — 96366 THER/PROPH/DIAG IV INF ADDON: CPT

## 2022-05-09 PROCEDURE — 63600175 PHARM REV CODE 636 W HCPCS: Performed by: EMERGENCY MEDICINE

## 2022-05-09 PROCEDURE — 85730 THROMBOPLASTIN TIME PARTIAL: CPT | Mod: 91 | Performed by: FAMILY MEDICINE

## 2022-05-09 PROCEDURE — 63600175 PHARM REV CODE 636 W HCPCS: Performed by: FAMILY MEDICINE

## 2022-05-09 PROCEDURE — U0002 COVID-19 LAB TEST NON-CDC: HCPCS | Performed by: EMERGENCY MEDICINE

## 2022-05-09 PROCEDURE — 80053 COMPREHEN METABOLIC PANEL: CPT | Performed by: NURSE PRACTITIONER

## 2022-05-09 PROCEDURE — G0378 HOSPITAL OBSERVATION PER HR: HCPCS

## 2022-05-09 PROCEDURE — 85730 THROMBOPLASTIN TIME PARTIAL: CPT | Performed by: EMERGENCY MEDICINE

## 2022-05-09 PROCEDURE — 96365 THER/PROPH/DIAG IV INF INIT: CPT | Mod: 59

## 2022-05-09 PROCEDURE — 93010 ELECTROCARDIOGRAM REPORT: CPT | Mod: ,,, | Performed by: INTERNAL MEDICINE

## 2022-05-09 PROCEDURE — 81003 URINALYSIS AUTO W/O SCOPE: CPT | Performed by: NURSE PRACTITIONER

## 2022-05-09 PROCEDURE — 83690 ASSAY OF LIPASE: CPT | Performed by: NURSE PRACTITIONER

## 2022-05-09 PROCEDURE — 93005 ELECTROCARDIOGRAM TRACING: CPT

## 2022-05-09 PROCEDURE — 25500020 PHARM REV CODE 255: Performed by: EMERGENCY MEDICINE

## 2022-05-09 PROCEDURE — 25000003 PHARM REV CODE 250: Performed by: INTERNAL MEDICINE

## 2022-05-09 PROCEDURE — 36415 COLL VENOUS BLD VENIPUNCTURE: CPT | Performed by: EMERGENCY MEDICINE

## 2022-05-09 PROCEDURE — 93010 EKG 12-LEAD: ICD-10-PCS | Mod: ,,, | Performed by: INTERNAL MEDICINE

## 2022-05-09 PROCEDURE — 96361 HYDRATE IV INFUSION ADD-ON: CPT

## 2022-05-09 PROCEDURE — 25000003 PHARM REV CODE 250: Performed by: EMERGENCY MEDICINE

## 2022-05-09 RX ORDER — ONDANSETRON 2 MG/ML
4 INJECTION INTRAMUSCULAR; INTRAVENOUS ONCE
Status: COMPLETED | OUTPATIENT
Start: 2022-05-09 | End: 2022-05-09

## 2022-05-09 RX ORDER — SPIRONOLACTONE 25 MG/1
25 TABLET ORAL DAILY
Status: DISCONTINUED | OUTPATIENT
Start: 2022-05-09 | End: 2022-05-10 | Stop reason: HOSPADM

## 2022-05-09 RX ORDER — SCOLOPAMINE TRANSDERMAL SYSTEM 1 MG/1
1 PATCH, EXTENDED RELEASE TRANSDERMAL
Status: DISCONTINUED | OUTPATIENT
Start: 2022-05-09 | End: 2022-05-10 | Stop reason: HOSPADM

## 2022-05-09 RX ORDER — SODIUM CHLORIDE 0.9 % (FLUSH) 0.9 %
10 SYRINGE (ML) INJECTION
Status: DISCONTINUED | OUTPATIENT
Start: 2022-05-09 | End: 2022-05-10 | Stop reason: HOSPADM

## 2022-05-09 RX ORDER — SYRING-NEEDL,DISP,INSUL,0.3 ML 29 G X1/2"
296 SYRINGE, EMPTY DISPOSABLE MISCELLANEOUS ONCE
Status: DISCONTINUED | OUTPATIENT
Start: 2022-05-09 | End: 2022-05-09

## 2022-05-09 RX ORDER — KETOROLAC TROMETHAMINE 30 MG/ML
15 INJECTION, SOLUTION INTRAMUSCULAR; INTRAVENOUS EVERY 6 HOURS PRN
Status: DISCONTINUED | OUTPATIENT
Start: 2022-05-09 | End: 2022-05-10 | Stop reason: HOSPADM

## 2022-05-09 RX ORDER — MAG HYDROX/ALUMINUM HYD/SIMETH 200-200-20
30 SUSPENSION, ORAL (FINAL DOSE FORM) ORAL ONCE
Status: DISCONTINUED | OUTPATIENT
Start: 2022-05-09 | End: 2022-05-10 | Stop reason: HOSPADM

## 2022-05-09 RX ORDER — HEPARIN SODIUM,PORCINE/D5W 25000/250
0-40 INTRAVENOUS SOLUTION INTRAVENOUS CONTINUOUS
Status: DISCONTINUED | OUTPATIENT
Start: 2022-05-09 | End: 2022-05-09

## 2022-05-09 RX ORDER — LIDOCAINE HYDROCHLORIDE 20 MG/ML
10 SOLUTION OROPHARYNGEAL ONCE
Status: DISCONTINUED | OUTPATIENT
Start: 2022-05-09 | End: 2022-05-10 | Stop reason: HOSPADM

## 2022-05-09 RX ORDER — OMEPRAZOLE 20 MG/1
20 TABLET, DELAYED RELEASE ORAL DAILY
COMMUNITY
End: 2022-07-01

## 2022-05-09 RX ORDER — HYDROMORPHONE HYDROCHLORIDE 2 MG/ML
0.2 INJECTION, SOLUTION INTRAMUSCULAR; INTRAVENOUS; SUBCUTANEOUS
Status: COMPLETED | OUTPATIENT
Start: 2022-05-09 | End: 2022-05-09

## 2022-05-09 RX ORDER — TAMSULOSIN HYDROCHLORIDE 0.4 MG/1
0.8 CAPSULE ORAL DAILY
Status: DISCONTINUED | OUTPATIENT
Start: 2022-05-09 | End: 2022-05-09

## 2022-05-09 RX ORDER — ONDANSETRON 8 MG/1
8 TABLET, ORALLY DISINTEGRATING ORAL EVERY 8 HOURS PRN
Status: DISCONTINUED | OUTPATIENT
Start: 2022-05-09 | End: 2022-05-10 | Stop reason: HOSPADM

## 2022-05-09 RX ORDER — SYRING-NEEDL,DISP,INSUL,0.3 ML 29 G X1/2"
296 SYRINGE, EMPTY DISPOSABLE MISCELLANEOUS ONCE AS NEEDED
Status: DISCONTINUED | OUTPATIENT
Start: 2022-05-09 | End: 2022-05-10 | Stop reason: HOSPADM

## 2022-05-09 RX ORDER — DIAZEPAM 5 MG/1
10 TABLET ORAL NIGHTLY PRN
Status: DISCONTINUED | OUTPATIENT
Start: 2022-05-09 | End: 2022-05-10 | Stop reason: HOSPADM

## 2022-05-09 RX ORDER — BISACODYL 10 MG
10 SUPPOSITORY, RECTAL RECTAL DAILY PRN
Status: DISCONTINUED | OUTPATIENT
Start: 2022-05-09 | End: 2022-05-10 | Stop reason: HOSPADM

## 2022-05-09 RX ORDER — PANTOPRAZOLE SODIUM 40 MG/1
40 TABLET, DELAYED RELEASE ORAL DAILY
Status: DISCONTINUED | OUTPATIENT
Start: 2022-05-09 | End: 2022-05-10 | Stop reason: HOSPADM

## 2022-05-09 RX ORDER — SUMATRIPTAN 50 MG/1
100 TABLET, FILM COATED ORAL ONCE
Status: COMPLETED | OUTPATIENT
Start: 2022-05-09 | End: 2022-05-09

## 2022-05-09 RX ORDER — BISACODYL 10 MG
10 SUPPOSITORY, RECTAL RECTAL ONCE
Status: DISCONTINUED | OUTPATIENT
Start: 2022-05-09 | End: 2022-05-09

## 2022-05-09 RX ADMIN — SPIRONOLACTONE 25 MG: 25 TABLET ORAL at 09:05

## 2022-05-09 RX ADMIN — HYDROMORPHONE HYDROCHLORIDE 0.2 MG: 2 INJECTION INTRAMUSCULAR; INTRAVENOUS; SUBCUTANEOUS at 09:05

## 2022-05-09 RX ADMIN — ONDANSETRON 4 MG: 2 INJECTION INTRAMUSCULAR; INTRAVENOUS at 03:05

## 2022-05-09 RX ADMIN — KETOROLAC TROMETHAMINE 15 MG: 30 INJECTION, SOLUTION INTRAMUSCULAR at 10:05

## 2022-05-09 RX ADMIN — SODIUM CHLORIDE 1000 ML: 0.9 INJECTION, SOLUTION INTRAVENOUS at 06:05

## 2022-05-09 RX ADMIN — Medication 1 TABLET: at 12:05

## 2022-05-09 RX ADMIN — LINACLOTIDE 290 MCG: 145 CAPSULE, GELATIN COATED ORAL at 04:05

## 2022-05-09 RX ADMIN — ONDANSETRON 4 MG: 2 INJECTION INTRAMUSCULAR; INTRAVENOUS at 09:05

## 2022-05-09 RX ADMIN — PANTOPRAZOLE SODIUM 40 MG: 40 TABLET, DELAYED RELEASE ORAL at 09:05

## 2022-05-09 RX ADMIN — HEPARIN SODIUM AND DEXTROSE 18 UNITS/KG/HR: 10000; 5 INJECTION INTRAVENOUS at 09:05

## 2022-05-09 RX ADMIN — DIAZEPAM 10 MG: 5 TABLET ORAL at 10:05

## 2022-05-09 RX ADMIN — SCOPOLAMINE 1 PATCH: 1.5 PATCH, EXTENDED RELEASE TRANSDERMAL at 12:05

## 2022-05-09 RX ADMIN — IOHEXOL 100 ML: 350 INJECTION, SOLUTION INTRAVENOUS at 05:05

## 2022-05-09 RX ADMIN — KETOROLAC TROMETHAMINE 15 MG: 30 INJECTION, SOLUTION INTRAMUSCULAR at 12:05

## 2022-05-09 RX ADMIN — SUMATRIPTAN SUCCINATE 100 MG: 50 TABLET ORAL at 06:05

## 2022-05-09 RX ADMIN — TAMSULOSIN HYDROCHLORIDE 0.8 MG: 0.4 CAPSULE ORAL at 09:05

## 2022-05-09 NOTE — PHARMACY MED REC
"Admission Medication History     The home medication history was taken by Rudi Alejandra.    You may go to "Admission" then "Reconcile Home Medications" tabs to review and/or act upon these items.      The home medication list has been updated by the Pharmacy department.    Please read ALL comments highlighted in yellow.    Please address this information as you see fit.     Feel free to contact us if you have any questions or require assistance.      The medications listed below were removed from the home medication list. Please reorder if appropriate:  Patient reports no longer taking the following medication(s):   AMOXICILLIN-CLAVULANATE 875-125 MG PER TABLET   FLUTICASONE PROPIONATE 50 MCG/ACTUATION NASAL SPRAY   HYDROMORPHONE 2 MG TABLET   ONDANSETRON 4 MG TABLET   TAMSULOSIN 0.4 MG CAPSULE    Medications listed below were obtained from: Patient/family, Analytic software- [employer] and Medical records  (Not in a hospital admission)      Potential issues to be addressed PRIOR TO DISCHARGE: NONE      Rudi Alejandra, [facility]  Spectralink [phone]      Current Outpatient Medications on File Prior to Encounter   Medication Sig Dispense Refill Last Dose    diazePAM (VALIUM) 10 MG Tab TAKE 1 TABLET BY MOUTH EVERY DAY AS NEEDED 90 tablet 1 5/6/2022    diazePAM (VALIUM) 5 MG tablet Take one with onset of migraine 20 tablet 5 5/5/2022    dicyclomine (BENTYL) 20 mg tablet TAKE 1 TABLET BY MOUTH THREE TIMES A  tablet 3 5/8/2022 at Unknown time    doxepin (SINEQUAN) 10 MG capsule TAKE 1 CAPSULE (10 MG TOTAL) BY MOUTH EVERY EVENING. 30 capsule 11 5/8/2022 at Unknown time    ergocalciferol (ERGOCALCIFEROL) 50,000 unit Cap Take 1 capsule (50,000 Units total) by mouth every 7 days. 12 capsule 3 5/4/2022    LACTOBACILLUS COMBO NO.6 (PROBIOTIC COMPLEX ORAL) Take by mouth once daily at 6am.   5/8/2022 at Unknown time    loratadine (CLARITIN) 10 mg tablet Take 10 mg by mouth daily   5/8/2022 at Unknown time    " multivitamin capsule Take 1 capsule by mouth once daily.   5/8/2022 at Unknown time    omeprazole (PRILOSEC OTC) 20 MG tablet Take 20 mg by mouth once daily.   5/8/2022 at Unknown time    ondansetron (ZOFRAN-ODT) 4 MG TbDL Take 1 tablet (4 mg total) by mouth every 8 (eight) hours as needed (nausea). 20 tablet 0 5/8/2022 at Unknown time    pantoprazole (PROTONIX) 40 MG tablet TAKE 1 TABLET BY MOUTH EVERY DAY 90 tablet 2 5/8/2022 at Unknown time    spironolactone (ALDACTONE) 50 MG tablet TAKE 1 TABLET BY MOUTH ONCE DAILY THEN INCREASE TO 2 TABLETS DAILY AS TOLERATED (Patient taking differently: TAKE 1 TABLET BY MOUTH ONCE DAILY) 180 tablet 1 5/8/2022 at Unknown time    sumatriptan (IMITREX) 100 MG tablet TAKE 1 TABLET BY MOUTH IF NEEDED, MAY REPEAT ONCE IN 1 HOUR. MAX OF 2 TABLETS IN 24 HOURS 10 tablet 2 5/5/2022    [DISCONTINUED] amoxicillin-clavulanate 875-125mg (AUGMENTIN) 875-125 mg per tablet Take 1 tablet by mouth every 12 (twelve) hours. 20 tablet 0     [DISCONTINUED] fluticasone propionate (FLONASE) 50 mcg/actuation nasal spray 2 sprays (100 mcg total) by Each Nostril route once daily. 16 g 11     [DISCONTINUED] HYDROmorphone (DILAUDID) 2 MG tablet Take 1 tablet (2 mg total) by mouth every 4 (four) hours as needed for Pain. 20 tablet 0     [DISCONTINUED] melatonin 5 mg Cap Take by mouth.       [DISCONTINUED] ondansetron (ZOFRAN) 4 MG tablet Take 1 tablet (4 mg total) by mouth every 6 (six) hours. 12 tablet 0     [DISCONTINUED] tamsulosin (FLOMAX) 0.4 mg Cap Take 2 capsules (0.8 mg total) by mouth once daily. 60 capsule 3                              .

## 2022-05-09 NOTE — ASSESSMENT & PLAN NOTE
Lipase normal  Ct imaging with constipation  Negative for mesenteric vein thrombosis  psh multiple abdominal surgeries  pmh ibs, constipation  Gastroenterology consulted  Npo   Symptomatic treatment with antiemetics and analgesias prn

## 2022-05-09 NOTE — ED PROVIDER NOTES
SCRIBE #1 NOTE: I, Thea Mirza, am scribing for, and in the presence of, Natalie James DO. I have scribed the entire note.      History      Chief Complaint   Patient presents with    Abdominal Pain     Pt CO epigastric pain & N x3 days. No V/D. Last BM approx 2 hrs pta. Pt has GI hx including IBS. Thinks she has bowel obstruction       Review of patient's allergies indicates:   Allergen Reactions    Morphine Nausea And Vomiting    Erythromycin Other (See Comments)     Stomach cramps        HPI   HPI    5/9/2022, 6:37 AM   History obtained from the patient      History of Present Illness: Genny Calixto is a 56 y.o. female patient with a PMHx of KENN, IBS, SBO s/p lysis of adhesions, and peritonitis who presents to the Emergency Department for epigastric abdominal pain which onset gradually 3 days PTA. The pt describes her pain as a cramping. Symptoms are constant and moderate in severity. The pt reports that her pain is worse when she moves her torso and with walking. No mitigating factors reported. Associated sxs include nausea, abdominal distention, and a fever (T max 100.3). The pt also reports that she has been having hematuria. Patient denies any blood in stool, melena, CP, SOB, weakness, diaphoresis, V/D, constipation, and all other sxs at this time. Prior Tx includes Tylenol last taken at 1600 yesterday. The pt reports that her last bowel movement was yesterday afternoon. She reports passing minimal gas. She states that she drinks 1 roberto every 2 weeks. She denies smoking. She denies a history of blood clots. No further complaints or concerns at this time.         Arrival mode: Personal vehicle      PCP: Tay Duff MD       Past Medical History:  Past Medical History:   Diagnosis Date    Encounter for blood transfusion     KENN (generalized anxiety disorder)     Takes 5-10 mg of valium qd prn anxiety (prescriptions for both on file, one from [facility] & other from )    Insomnia      Takes 5-10 mg of valium qhs prn insomnia (prescriptions for both on file, one from [facility] & other from )    Migraine headache     Nephrolithiasis 08/03/2019    Left ureteral stone -> UTI c/b pyelonephritis and urosepsis w/ hypokalemia -> transfered to [facility] urology service from [facility] after CT abn dx, s/p 2 stents to allow infx to drain, IV Vanc/Rocephin, fever free since yesterday    Reflex sympathetic dystrophy     UTI (urinary tract infection)     Vertigo        Past Surgical History:  Past Surgical History:   Procedure Laterality Date    BLADDER SURGERY      CHOLECYSTECTOMY      COLONOSCOPY N/A 11/10/2021    Procedure: COLONOSCOPY;  Surgeon: Eryn Rothman MD;  [facility];  Service: Endoscopy;  Laterality: N/A;    CYSTOSCOPY WITH URETEROSCOPY, RETROGRADE PYELOGRAPHY, AND INSERTION OF STENT Right 9/30/2021    Procedure: CYSTOSCOPY, WITH RETROGRADE PYELOGRAM AND URETERAL STENT INSERTION;  Surgeon: Annita Gamino MD;  Location: [facility];  Service: Urology;  Laterality: Right;    DIAGNOSTIC LAPAROSCOPY N/A 11/11/2020    Procedure: LAPAROSCOPY, DIAGNOSTIC;  Surgeon: Tee Segura MD;  Location: [facility];  Service: General;  Laterality: N/A;  CONVERTED TO OPEN    ESOPHAGOGASTRODUODENOSCOPY N/A 11/10/2021    Procedure: EGD (ESOPHAGOGASTRODUODENOSCOPY);  Surgeon: Eryn Rothman MD;  Location: [facility];  Service: Endoscopy;  Laterality: N/A;    facet injections  02/14/2017    L3, L4, L5, S1 Done by Dr. Lara    HYSTERECTOMY      INJECTION OF ANESTHETIC AGENT INTO TISSUE PLANE DEFINED BY TRANSVERSUS ABDOMINIS MUSCLE N/A 11/11/2020    Procedure: BLOCK, TRANSVERSUS ABDOMINIS PLANE;  Surgeon: Tee Segura MD;  Location: [facility] OR;  Service: General;  Laterality: N/A;    KNEE SURGERY      LAPAROSCOPIC LYSIS OF ADHESIONS N/A 11/11/2020    Procedure: LYSIS, ADHESIONS, LAPAROSCOPIC;  Surgeon: Tee Segura MD;  Location: [facility];  Service: General;  Laterality: N/A;   CONVERTED TO OPEN    LAPAROTOMY N/A 11/20/2020    Procedure: LAPAROTOMY;  Surgeon: Tee Segura MD;  Location: [facility];  Service: General;  Laterality: N/A;    LASER LITHOTRIPSY Left 2/8/2021    Procedure: LITHOTRIPSY, USING LASER;  Surgeon: Irving Kidd MD;  Location: [facility];  Service: Urology;  Laterality: Left;    LASER LITHOTRIPSY Right 10/13/2021    Procedure: LITHOTRIPSY, USING LASER;  Surgeon: Annita Gamino MD;  Location: [facility];  Service: Urology;  Laterality: Right;    LYSIS OF ADHESIONS N/A 11/11/2020    Procedure: LYSIS, ADHESIONS;  Surgeon: Tee Segura MD;  Location: [facility];  Service: General;  Laterality: N/A;    LYSIS OF ADHESIONS N/A 11/20/2020    Procedure: LYSIS, ADHESIONS;  Surgeon: Tee Segura MD;  Location: [facility];  Service: General;  Laterality: N/A;    TONSILLECTOMY      URETEROSCOPY Left 2/8/2021    Procedure: URETEROSCOPY;  Surgeon: Irving Kidd MD;  Location: [facility];  Service: Urology;  Laterality: Left;  stent placement    URETEROSCOPY Right 10/13/2021    Procedure: URETEROSCOPY;  Surgeon: Annita Gamino MD;  Location: [facility];  Service: Urology;  Laterality: Right;         Family History:  Family History   Problem Relation Age of Onset    Stroke Mother     Hypertension Mother     COPD Father     Drug abuse Brother        Social History:  Social History     Tobacco Use    Smoking status: Never Smoker    Smokeless tobacco: Never Used   Substance and Sexual Activity    Alcohol use: Yes     Comment: rarely    Drug use: No    Sexual activity: Never       ROS   Review of Systems   Constitutional: Positive for fever (T max 100.3). Negative for diaphoresis.   HENT: Negative for sore throat.    Respiratory: Negative for shortness of breath.    Cardiovascular: Negative for chest pain.   Gastrointestinal: Positive for abdominal distention, abdominal pain (epigastric) and nausea. Negative for blood in stool, constipation, diarrhea and  "vomiting.        (-) melena   Genitourinary: Positive for hematuria. Negative for dysuria.   Musculoskeletal: Negative for back pain.   Skin: Negative for rash.   Neurological: Negative for weakness.   Hematological: Does not bruise/bleed easily.   All other systems reviewed and are negative.    Physical Exam      Initial Vitals [05/08/22 2336]   BP Pulse Resp Temp SpO2   (!) 140/77 93 16 99.2 °F (37.3 °C) 98 %      MAP       --          Physical Exam  Nursing Notes and Vital Signs Reviewed.  Constitutional: Patient is in no acute distress. Well-developed and well-nourished. Non-toxic appearing.  Head: Atraumatic. Normocephalic.  Eyes: PERRL. EOM intact. Conjunctivae are not pale. No scleral icterus.  ENT: Mucous membranes are moist. Oropharynx is clear and symmetric.    Neck: Supple. Full ROM. No lymphadenopathy.  Cardiovascular: Regular rate. Regular rhythm. No murmurs, rubs, or gallops. Distal pulses are 2+ and symmetric.  Pulmonary/Chest: No respiratory distress. Clear to auscultation bilaterally. No wheezing or rales.  Abdominal: Soft and non-distended.  There is epigastric tenderness.  No rebound, guarding, or rigidity. Good bowel sounds.  Musculoskeletal: Moves all extremities. No obvious deformities. No edema.   Skin: Warm and dry.  Neurological:  Alert, awake, and appropriate.  Normal speech.  No acute focal neurological deficits are appreciated.  Psychiatric: Normal affect. Good eye contact. Appropriate in content.    ED Course    Procedures  ED Vital Signs:  Vitals:    05/08/22 2336 05/09/22 0231 05/09/22 0420 05/09/22 0430   BP: (!) 140/77 (!) 141/81  116/69   Pulse: 93 77 69 70   Resp: 16 16  16   Temp: 99.2 °F (37.3 °C)      TempSrc: Oral      SpO2: 98% 97% 95% 95%   Weight: 67.9 kg (149 lb 11.1 oz)      Height: 5' 7" (1.702 m)       05/09/22 0700 05/09/22 0903 05/09/22 0930 05/09/22 1100   BP: 112/69  130/70 (!) 128/56   Pulse: 76  81 79   Resp: 16 16 16 18   Temp:       TempSrc:       SpO2: 95%  (!) " 94% (!) 94%   Weight:       Height:        05/09/22 1146 05/09/22 1513   BP: (!) 115/58 109/62   Pulse: 87 63   Resp: 17 16   Temp: 98.6 °F (37 °C) 97.9 °F (36.6 °C)   TempSrc: Oral Oral   SpO2: 95% 99%   Weight:     Height:         Abnormal Lab Results:  Labs Reviewed   CBC W/ AUTO DIFFERENTIAL - Abnormal; Notable for the following components:       Result Value    RBC 3.94 (*)     MCV 99 (*)     MCH 34.5 (*)     All other components within normal limits   COMPREHENSIVE METABOLIC PANEL   LIPASE   URINALYSIS, REFLEX TO URINE CULTURE    Narrative:     Specimen Source->Urine   TROPONIN I   PROTIME-INR   APTT   SARS-COV-2 RNA AMPLIFICATION, QUAL        All Lab Results:  Results for orders placed or performed during the hospital encounter of 05/09/22   CBC W/ AUTO DIFFERENTIAL   Result Value Ref Range    WBC 6.27 3.90 - 12.70 K/uL    RBC 3.94 (L) 4.00 - 5.40 M/uL    Hemoglobin 13.6 12.0 - 16.0 g/dL    Hematocrit 38.8 37.0 - 48.5 %    MCV 99 (H) 82 - 98 fL    MCH 34.5 (H) 27.0 - 31.0 pg    MCHC 35.1 32.0 - 36.0 g/dL    RDW 12.2 11.5 - 14.5 %    Platelets 209 150 - 450 K/uL    MPV 11.5 9.2 - 12.9 fL    Immature Granulocytes 0.3 0.0 - 0.5 %    Gran # (ANC) 3.1 1.8 - 7.7 K/uL    Immature Grans (Abs) 0.02 0.00 - 0.04 K/uL    Lymph # 2.6 1.0 - 4.8 K/uL    Mono # 0.4 0.3 - 1.0 K/uL    Eos # 0.2 0.0 - 0.5 K/uL    Baso # 0.06 0.00 - 0.20 K/uL    nRBC 0 0 /100 WBC    Gran % 48.8 38.0 - 73.0 %    Lymph % 41.3 18.0 - 48.0 %    Mono % 6.2 4.0 - 15.0 %    Eosinophil % 2.4 0.0 - 8.0 %    Basophil % 1.0 0.0 - 1.9 %    Differential Method Automated    Comp. Metabolic Panel   Result Value Ref Range    Sodium 142 136 - 145 mmol/L    Potassium 3.8 3.5 - 5.1 mmol/L    Chloride 103 95 - 110 mmol/L    CO2 25 23 - 29 mmol/L    Glucose 94 70 - 110 mg/dL    BUN 14 6 - 20 mg/dL    Creatinine 1.0 0.5 - 1.4 mg/dL    Calcium 9.4 8.7 - 10.5 mg/dL    Total Protein 6.4 6.0 - 8.4 g/dL    Albumin 4.0 3.5 - 5.2 g/dL    Total Bilirubin 0.6 0.1 - 1.0 mg/dL     Alkaline Phosphatase 63 55 - 135 U/L    AST 14 10 - 40 U/L    ALT 13 10 - 44 U/L    Anion Gap 14 8 - 16 mmol/L    eGFR if African American >60 >60 mL/min/1.73 m^2    eGFR if non African American >60 >60 mL/min/1.73 m^2   Lipase   Result Value Ref Range    Lipase 38 4 - 60 U/L   Urinalysis, Reflex to Urine Culture Urine, Clean Catch    Specimen: Urine   Result Value Ref Range    Specimen UA Urine, Clean Catch     Color, UA Yellow Yellow, Straw, Hilda    Appearance, UA Clear Clear    pH, UA 6.0 5.0 - 8.0    Specific Gravity, UA 1.020 1.005 - 1.030    Protein, UA Negative Negative    Glucose, UA Negative Negative    Ketones, UA Negative Negative    Bilirubin (UA) Negative Negative    Occult Blood UA Negative Negative    Nitrite, UA Negative Negative    Urobilinogen, UA Negative <2.0 EU/dL    Leukocytes, UA Negative Negative   Protime-INR   Result Value Ref Range    Prothrombin Time 10.5 9.0 - 12.5 sec    INR 1.0 0.8 - 1.2   APTT   Result Value Ref Range    aPTT 26.5 21.0 - 32.0 sec   COVID-19 Rapid Screening   Result Value Ref Range    SARS-CoV-2 RNA, Amplification, Qual Negative Negative   Troponin I   Result Value Ref Range    Troponin I <0.006 0.000 - 0.026 ng/mL   APTT   Result Value Ref Range    aPTT 60.9 (H) 21.0 - 32.0 sec         Imaging Results:  Imaging Results          CT Abdomen Pelvis With Contrast (Final result)  Result time 05/09/22 07:42:38    Final result by Jose Estrada MD (05/09/22 07:42:38)                 Impression:      Constipation and slow transit time suspected.  No obstruction is identified.  Consider follow-up small bowel follow-through.    All CT scans at this facility use dose modulation, iterative reconstruction, and/or weight based dosing when appropriate to reduce radiation dose to as low as reasonable achievable.      Electronically signed by: Jose Estrada MD  Date:    05/09/2022  Time:    07:42             Narrative:    EXAMINATION:  CT ABDOMEN PELVIS WITH CONTRAST    CLINICAL  HISTORY:  Bowel obstruction suspected;Abdominal pain, acute, nonlocalized;    TECHNIQUE:  Low dose axial images, sagittal and coronal reformations were obtained from the lung bases to the pubic symphysis following the IV administration of 100 mL of Omnipaque 350.  Oral contrast was not administered.    COMPARISON:  04/19/2022    FINDINGS:  Heart: Normal size. No effusion.    Lung Bases: Clear.    Liver: Normal size and attenuation. No focal lesions.  No evidence of mesenteric vein thrombosis.  Portal vein is patent.  Suspect flow artifact from the ELIZABETH into the splenic vein.    Gallbladder: Surgically absent.    Bile Ducts: No dilatation.    Pancreas: No mass. No peripancreatic fat stranding.    Spleen: Normal.    Adrenals: Normal.    Kidneys/Ureters: Normal enhancement.  No mass or  hydroureteronephrosis.  Small left renal cyst.    Bladder: No wall thickening.    Reproductive organs: Not seen    GI Tract/Mesentery: No evidence of bowel obstruction.  Fecalization of the small bowel without significant dilatation or obstruction could reflect slow transit time.  Anastomosis seen within the left lower quadrant and upper pelvis.  Moderate constipation within the large bowel.    Peritoneal Space: No ascites or free air.    Retroperitoneum: Shotty adenopathy.    Abdominal wall: Normal.  Abdominal wall scar from prior surgery.    Vasculature: No aneurysm.    Bones: No acute fracture.  Moderate degenerative changes lower lumbar spine.  No suspicious lytic or sclerotic lesions.                               RADIOLOGY REPORT (Final result)  Result time 05/09/22 14:17:04    Final result by Unknown User (05/09/22 14:17:04)                                         The EKG was ordered, reviewed, and independently interpreted by the ED provider.  Interpretation time: 7:11  Rate: 70 BPM  Rhythm: normal sinus rhythm  Interpretation: No acute ST changes. No STEMI.             The Emergency Provider reviewed the vital signs and test  results, which are outlined above.    ED Discussion     6:47 AM: Discussed pt's case with Dr. Rothman (Gastroenterology) who recommends a Hematology and Oncology consult.    7:29 AM: Discussed pt's case with Dr. Encarnacion (Hematology and Oncology) who recommends heparin and admission for 24 hours.    8:13 AM: Discussed case with Mariel Leonard NP (Hospital Medicine). Dr. Moon agrees with current care and management of pt and accepts admission.   Admitting Service: Hospital Medicine  Admitting Physician: Dr. Moon  Admit to: Obs    8:14 AM: Re-evaluated pt. I have discussed test results, shared treatment plan, and the need for admission with patient and family at bedside. Pt and family express understanding at this time and agree with all information. All questions answered. Pt and family have no further questions or concerns at this time. Pt is ready for admit.      ED Course as of 05/09/22 1736   Mon May 09, 2022   0638 Genny Calixto is a 56 y.o. female with PMH of SBO s/p diagnostic lap, lysis of adhesions, multiple abdominal surgeries, kidney stones here for evaluation of recurrent abdominal bloating and cramping. Previously, she had a contrasted CT A/P in 09/2021 which showed 6 mm right ureteral stone with mild hydronephrosis for which she has had stent placed with urology. There were surgical changes noted throughout the GI tract but no acute abnormality of the GI tract was noted. She saw surgery who did not feel any surgical intervention was warranted given the normal CT scan and she was referred to GI. She then underwent EGD and colonoscopy in 11/2021 which showed small hiatal hernia and gastritis. Path was negative for H pylori. Colonoscopy showed diverticulosis with normal colon biopsies. It was felt her symptoms were likely related to IBS and possibly a combination of adhesive disease/pain. [LB]      ED Course User Index  [LB] Natalie James,        ED Medication(s):  Medications    aluminum-magnesium hydroxide-simethicone 200-200-20 mg/5 mL suspension 30 mL (30 mLs Oral Not Given 5/9/22 0654)     And   LIDOcaine HCl 2% oral solution 10 mL (10 mLs Oral Not Given 5/9/22 0654)   Lactobacillus acidoph-L.bulgar 1 million cell tablet 1 tablet (1 tablet Oral Not Given 5/9/22 1715)   pantoprazole EC tablet 40 mg (40 mg Oral Given 5/9/22 0952)   spironolactone tablet 25 mg (25 mg Oral Given 5/9/22 0952)   sodium chloride 0.9% flush 10 mL (has no administration in time range)   ondansetron disintegrating tablet 8 mg (has no administration in time range)   ketorolac injection 15 mg (15 mg Intravenous Given 5/9/22 1232)   scopolamine 1.3-1.5 mg (1 mg over 3 days) 1 patch (1 patch Transdermal Patch Applied 5/9/22 1237)   ondansetron injection 4 mg (4 mg Intravenous Given 5/9/22 0339)   iohexoL (OMNIPAQUE 350) injection 100 mL (100 mLs Intravenous Given 5/9/22 0500)   sodium chloride 0.9% bolus 1,000 mL (0 mLs Intravenous Stopped 5/9/22 0800)   heparin 25,000 units in dextrose 5% (100 units/ml) IV bolus from bag INITIAL BOLUS (5,128 Units Intravenous Bolus from Bag 5/9/22 0922)   HYDROmorphone (PF) injection 0.2 mg (0.2 mg Intravenous Given 5/9/22 0903)   ondansetron injection 4 mg (4 mg Intravenous Given 5/9/22 0903)   linaCLOtide capsule 290 mcg (290 mcg Oral Given 5/9/22 1615)           Current Discharge Medication List            Medical Decision Making    Medical Decision Making:   Clinical Tests:   Lab Tests: Ordered and Reviewed  Radiological Study: Ordered and Reviewed  Medical Tests: Ordered and Reviewed           Scribe Attestation:   Scribe #1: I performed the above scribed service and the documentation accurately describes the services I performed. I attest to the accuracy of the note.    Attending:   Physician Attestation Statement for Scribe #1: I, Natalie James DO, personally performed the services described in this documentation, as scribed by Thea Mirza, in my presence, and it is  both accurate and complete.          Clinical Impression       ICD-10-CM ICD-9-CM   1. Mesenteric vein thrombosis  K55.069 557.0   2. Epigastric pain  R10.13 789.06       Disposition:   Disposition: Placed in Observation  Condition: Yony James DO  05/09/22 1736

## 2022-05-09 NOTE — CARE UPDATE
Discussed with gastroenterology and vascular surgery. No findings for mesenteric vein thrombosis to explain epigastric pain.    linzess added to medications.   Monitor for response.  Discontinue heparin gtt.    Labs within normal limits  Anticipate discharge in am with follow up outpatient     Inpatient order on admission placed in error

## 2022-05-09 NOTE — Clinical Note
Diagnosis: Epigastric pain [792101]  Admitting Provider:: PRIYA MILTON [[id number]]  Future Attending Provider: PRIYA MILTON [[id number]]  Reason for IP Medical Treatment  (Clinical interventions that can only be accomplished in the IP setting? ) :: treatment  of mesenteric vein thrombosis  Estimated Length of Stay:: 2 midnights  I certify that Inpatient services for greater than or equal to 2 midnights are medically necessary:: Yes  Plans for Post-Acute care--if anticipated (pick the single best option)::  A. No post acute care anticipated at this time

## 2022-05-09 NOTE — HPI
56F PMH KENN, IBS, SBO s/p lysis of adhesions, and multiple abdominal surgeries in past requiring lysis of adhesions, presents to the Emergency Department for epigastric abdominal pain which onset gradually 3 days PTA. Characterized as abdominal cramping. Pain with positional changes in her torso and with walking. Associated sxs include nausea, abdominal distention, and a fever (T max 100.3). Patient denies any blood in stool, melena, CP, SOB, weakness, diaphoresis, V/D, constipation, and trauma. Prior Tx includes Tylenol last taken at 1600 yesterday. The pt reports that her last bowel movement was yesterday afternoon. She reports passing minimal gas. She states that she drinks 1 roberto every 2 weeks. She denies smoking. She denies a history of blood clots.    Initial workup in emergency department, VSS, labs within normal limits. Lipase within normal limits. Urinalysis negative. CTA abdomen pelvis with constipation and no evidence of mesenteric vein thrombosis. CTA renal with right hydronephrosis and stool in colon.    Hospital medicine consulted for admission under observation for potential mesenteric vein thrombosis. Gastroenterology and hem/onc recommended admission for observation with heparin gtt.

## 2022-05-09 NOTE — H&P
[place] - Emergency Dept.  [facility] Medicine  History & Physical    Patient Name: Genny Calixto  MRN: [id number]  Patient Class: OP- Observation  Admission Date: 5/9/2022  Attending Physician: aNtalie James DO   Primary Care Provider: Tay Duff MD         Patient information was obtained from patient and ER records.     Subjective:     Principal Problem:<principal problem not specified>    Chief Complaint:   Chief Complaint   Patient presents with    Abdominal Pain     Pt CO epigastric pain & N x3 days. No V/D. Last BM approx 2 hrs pta. Pt has GI hx including IBS. Thinks she has bowel obstruction        HPI: 56F PMH KENN, IBS, SBO s/p lysis of adhesions, and multiple abdominal surgeries in past requiring lysis of adhesions, presents to the Emergency Department for epigastric abdominal pain which onset gradually 3 days PTA. Characterized as abdominal cramping. Pain with positional changes in her torso and with walking. Associated sxs include nausea, abdominal distention, and a fever (T max 100.3). Patient denies any blood in stool, melena, CP, SOB, weakness, diaphoresis, V/D, constipation, and trauma. Prior Tx includes Tylenol last taken at 1600 yesterday. The pt reports that her last bowel movement was yesterday afternoon. She reports passing minimal gas. She states that she drinks 1 roberto every 2 weeks. She denies smoking. She denies a history of blood clots.    Initial workup in emergency department, VSS, labs within normal limits. Lipase within normal limits. Urinalysis negative. CTA abdomen pelvis with constipation and no evidence of mesenteric vein thrombosis. CTA renal with right hydronephrosis and stool in colon.    Hospital medicine consulted for admission under observation for potential mesenteric vein thrombosis. Gastroenterology and hem/onc recommended admission for observation with heparin gtt.       Past Medical History:   Diagnosis Date    Encounter for blood transfusion     KENN  (generalized anxiety disorder)     Takes 5-10 mg of valium qd prn anxiety (prescriptions for both on file, one from [facility] & other from )    Insomnia     Takes 5-10 mg of valium qhs prn insomnia (prescriptions for both on file, one from [facility] & other from )    Migraine headache     Nephrolithiasis 08/03/2019    Left ureteral stone -> UTI c/b pyelonephritis and urosepsis w/ hypokalemia -> transfered to [facility] urology service from [facility] after CT abn dx, s/p 2 stents to allow infx to drain, IV Vanc/Rocephin, fever free since yesterday    Reflex sympathetic dystrophy     UTI (urinary tract infection)     Vertigo        Past Surgical History:   Procedure Laterality Date    BLADDER SURGERY      CHOLECYSTECTOMY      COLONOSCOPY N/A 11/10/2021    Procedure: COLONOSCOPY;  Surgeon: Eryn Rothman MD;  Location: [facility];  Service: Endoscopy;  Laterality: N/A;    CYSTOSCOPY WITH URETEROSCOPY, RETROGRADE PYELOGRAPHY, AND INSERTION OF STENT Right 9/30/2021    Procedure: CYSTOSCOPY, WITH RETROGRADE PYELOGRAM AND URETERAL STENT INSERTION;  Surgeon: Annita Gamino MD;  Location: [facility];  Service: Urology;  Laterality: Right;    DIAGNOSTIC LAPAROSCOPY N/A 11/11/2020    Procedure: LAPAROSCOPY, DIAGNOSTIC;  Surgeon: Tee Segura MD;  Location: [facility];  Service: General;  Laterality: N/A;  CONVERTED TO OPEN    ESOPHAGOGASTRODUODENOSCOPY N/A 11/10/2021    Procedure: EGD (ESOPHAGOGASTRODUODENOSCOPY);  Surgeon: Eryn Rothman MD;  Location: [facility];  Service: Endoscopy;  Laterality: N/A;    facet injections  02/14/2017    L3, L4, L5, S1 Done by Dr. Lara    HYSTERECTOMY      INJECTION OF ANESTHETIC AGENT INTO TISSUE PLANE DEFINED BY TRANSVERSUS ABDOMINIS MUSCLE N/A 11/11/2020    Procedure: BLOCK, TRANSVERSUS ABDOMINIS PLANE;  Surgeon: Tee Segura MD;  Location: [facility];  Service: General;  Laterality: N/A;    KNEE SURGERY      LAPAROSCOPIC LYSIS OF ADHESIONS N/A 11/11/2020     Procedure: LYSIS, ADHESIONS, LAPAROSCOPIC;  Surgeon: Tee Segura MD;  Location: [facility] OR;  Service: General;  Laterality: N/A;  CONVERTED TO OPEN    LAPAROTOMY N/A 11/20/2020    Procedure: LAPAROTOMY;  Surgeon: Tee Segura MD;  Location: [facility] OR;  Service: General;  Laterality: N/A;    LASER LITHOTRIPSY Left 2/8/2021    Procedure: LITHOTRIPSY, USING LASER;  Surgeon: Irving Kidd MD;  Location: [facility] OR;  Service: Urology;  Laterality: Left;    LASER LITHOTRIPSY Right 10/13/2021    Procedure: LITHOTRIPSY, USING LASER;  Surgeon: Annita Gamino MD;  Location: [facility] OR;  Service: Urology;  Laterality: Right;    LYSIS OF ADHESIONS N/A 11/11/2020    Procedure: LYSIS, ADHESIONS;  Surgeon: Tee Segura MD;  Location: [facility] OR;  Service: General;  Laterality: N/A;    LYSIS OF ADHESIONS N/A 11/20/2020    Procedure: LYSIS, ADHESIONS;  Surgeon: Tee Segura MD;  Location: [facility] OR;  Service: General;  Laterality: N/A;    TONSILLECTOMY      URETEROSCOPY Left 2/8/2021    Procedure: URETEROSCOPY;  Surgeon: Irving Kidd MD;  Location: [facility] OR;  Service: Urology;  Laterality: Left;  stent placement    URETEROSCOPY Right 10/13/2021    Procedure: URETEROSCOPY;  Surgeon: Annita Gamino MD;  Location: [facility] OR;  Service: Urology;  Laterality: Right;       Review of patient's allergies indicates:   Allergen Reactions    Erythromycin     Morphine Nausea And Vomiting    Opioids - morphine analogues        No current facility-administered medications on file prior to encounter.     Current Outpatient Medications on File Prior to Encounter   Medication Sig    amoxicillin-clavulanate 875-125mg (AUGMENTIN) 875-125 mg per tablet Take 1 tablet by mouth every 12 (twelve) hours.    diazePAM (VALIUM) 10 MG Tab TAKE 1 TABLET BY MOUTH EVERY DAY AS NEEDED    diazePAM (VALIUM) 5 MG tablet Take one with onset of migraine    dicyclomine (BENTYL) 20 mg tablet TAKE 1 TABLET BY MOUTH THREE TIMES A  DAY    doxepin (SINEQUAN) 10 MG capsule TAKE 1 CAPSULE (10 MG TOTAL) BY MOUTH EVERY EVENING.    ergocalciferol (ERGOCALCIFEROL) 50,000 unit Cap Take 1 capsule (50,000 Units total) by mouth every 7 days.    fluticasone propionate (FLONASE) 50 mcg/actuation nasal spray 2 sprays (100 mcg total) by Each Nostril route once daily.    HYDROmorphone (DILAUDID) 2 MG tablet Take 1 tablet (2 mg total) by mouth every 4 (four) hours as needed for Pain.    LACTOBACILLUS COMBO NO.6 (PROBIOTIC COMPLEX ORAL) Take by mouth once daily at 6am.    loratadine (CLARITIN) 10 mg tablet Take 10 mg by mouth daily    melatonin 5 mg Cap Take by mouth.    multivitamin capsule Take 1 capsule by mouth once daily.    ondansetron (ZOFRAN) 4 MG tablet Take 1 tablet (4 mg total) by mouth every 6 (six) hours.    ondansetron (ZOFRAN-ODT) 4 MG TbDL Take 1 tablet (4 mg total) by mouth every 8 (eight) hours as needed (nausea).    pantoprazole (PROTONIX) 40 MG tablet TAKE 1 TABLET BY MOUTH EVERY DAY    spironolactone (ALDACTONE) 50 MG tablet TAKE 1 TABLET BY MOUTH ONCE DAILY THEN INCREASE TO 2 TABLETS DAILY AS TOLERATED    sumatriptan (IMITREX) 100 MG tablet TAKE 1 TABLET BY MOUTH IF NEEDED, MAY REPEAT ONCE IN 1 HOUR. MAX OF 2 TABLETS IN 24 HOURS    tamsulosin (FLOMAX) 0.4 mg Cap Take 2 capsules (0.8 mg total) by mouth once daily.     Family History       Problem Relation (Age of Onset)    COPD Father    Drug abuse Brother    Hypertension Mother    Stroke Mother          Tobacco Use    Smoking status: Never Smoker    Smokeless tobacco: Never Used   Substance and Sexual Activity    Alcohol use: Yes     Comment: rarely    Drug use: No    Sexual activity: Never     Review of Systems   Constitutional:  Positive for activity change, appetite change and fever. Negative for unexpected weight change.   Respiratory:  Negative for shortness of breath.    Gastrointestinal:  Positive for abdominal pain and nausea. Negative for constipation, diarrhea  and vomiting.   Neurological:  Positive for weakness.   Psychiatric/Behavioral:  The patient is nervous/anxious.    Objective:     Vital Signs (Most Recent):  Temp: 99.2 °F (37.3 °C) (05/08/22 2336)  Pulse: 76 (05/09/22 0700)  Resp: 16 (05/09/22 0903)  BP: 112/69 (05/09/22 0700)  SpO2: 95 % (05/09/22 0700) Vital Signs (24h Range):  Temp:  [99.2 °F (37.3 °C)] 99.2 °F (37.3 °C)  Pulse:  [69-93] 76  Resp:  [16] 16  SpO2:  [95 %-98 %] 95 %  BP: (112-141)/(69-81) 112/69     Weight: 67.9 kg (149 lb 11.1 oz)  Body mass index is 23.45 kg/m².    Physical Exam  Vitals and nursing note reviewed. Exam conducted with a chaperone present (boyfriend).   Constitutional:       General: She is not in acute distress.     Appearance: She is not ill-appearing or toxic-appearing.   HENT:      Head: Normocephalic and atraumatic.   Cardiovascular:      Rate and Rhythm: Normal rate.   Pulmonary:      Effort: Pulmonary effort is normal. No respiratory distress.   Abdominal:      General: There is no distension.      Palpations: Abdomen is soft.      Tenderness: There is no abdominal tenderness. There is no guarding or rebound.   Musculoskeletal:      Right lower leg: No edema.      Left lower leg: No edema.   Skin:     General: Skin is warm.      Coloration: Skin is pale.      Findings: No bruising.      Comments: Abdominal scarring with no signs or symptoms of infection   Neurological:      Mental Status: She is alert and oriented to person, place, and time.   Psychiatric:         Mood and Affect: Mood is anxious.         Speech: Speech normal.         Behavior: Behavior is cooperative.         Cognition and Memory: Cognition and memory normal.           Significant Labs: All pertinent labs within the past 24 hours have been reviewed.  CBC:   Recent Labs   Lab 05/09/22  0234   WBC 6.27   HGB 13.6   HCT 38.8        CMP:   Recent Labs   Lab 05/09/22  0234      K 3.8      CO2 25   GLU 94   BUN 14   CREATININE 1.0   CALCIUM  9.4   PROT 6.4   ALBUMIN 4.0   BILITOT 0.6   ALKPHOS 63   AST 14   ALT 13   ANIONGAP 14   EGFRNONAA >60     Lipase:   Recent Labs   Lab 05/09/22  0234   LIPASE 38       Significant Imaging: I have reviewed all pertinent imaging results/findings within the past 24 hours.  CT: I have reviewed all pertinent results/findings within the past 24 hours and my personal findings are:  negative for mesenteric vein thrombosis    Assessment/Plan:     Hydronephrosis  Seen on imaging  rfp within normal limits  Makes urine  Monitor   pmh kidney stone that has passed      Epigastric pain  Lipase normal  Ct imaging with constipation  Negative for mesenteric vein thrombosis  psh multiple abdominal surgeries  pmh ibs, constipation  Gastroenterology consulted  Npo   Symptomatic treatment with antiemetics and analgesias prn      Migraine headache  Hold sumatriptan for possible mesenteric vein thrombosis      KENN (generalized anxiety disorder)   reviewed  Use of benzos  Monitor       Anxiety   reviewed  Hold home medication(s) and titrate as needed       VTE Risk Mitigation (From admission, onward)         Ordered     heparin 25,000 units in dextrose 5% (100 units/ml) IV bolus from bag - ADDITIONAL PRN BOLUS - 60 units/kg  As needed (PRN)        Question:  Heparin Infusion Adjustment (DO NOT MODIFY ANSWER)  Answer:  \\[email or web address]\epic\Images\Pharmacy\HeparinInfusions\heparin HIGH INTENSITY nomogram for [facility] [id number].pdf    05/09/22 0733     heparin 25,000 units in dextrose 5% (100 units/ml) IV bolus from bag - ADDITIONAL PRN BOLUS - 30 units/kg  As needed (PRN)        Question:  Heparin Infusion Adjustment (DO NOT MODIFY ANSWER)  Answer:  \\[email or web address]\epic\Images\Pharmacy\HeparinInfusions\heparin HIGH INTENSITY nomogram for [facility] [id number].pdf    05/09/22 0733     heparin 25,000 units in dextrose 5% 250 mL (100 units/mL) infusion HIGH INTENSITY nomogram - [facility]  Continuous        Question Answer Comment   Heparin Infusion Adjustment (DO NOT  MODIFY ANSWER) [email or web address]\epic\Images\Pharmacy\HeparinInfusions\heparin HIGH INTENSITY nomogram for [facility] [id number].pdf    Begin at (in units/kg/hr) 18        05/09/22 0733                   Sanjay Moon MD  Department of Hospital Medicine   '[place] - Emergency Dept.

## 2022-05-09 NOTE — ASSESSMENT & PLAN NOTE
Seen on imaging  rfp within normal limits  Makes urine  Monitor   pmh kidney stone that has passed

## 2022-05-09 NOTE — PLAN OF CARE
Pt remains free from falls/injuries this shift. Safety precautions maintained. Pain managed with pain medication. No s/s of acute distress noted. VSS. Heparin discontinued per orders. Will continue to monitor. Chart check completed.

## 2022-05-09 NOTE — SUBJECTIVE & OBJECTIVE
Past Medical History:   Diagnosis Date    Encounter for blood transfusion     KENN (generalized anxiety disorder)     Takes 5-10 mg of valium qd prn anxiety (prescriptions for both on file, one from [facility] & other from )    Insomnia     Takes 5-10 mg of valium qhs prn insomnia (prescriptions for both on file, one from [facility] & other from )    Migraine headache     Nephrolithiasis 08/03/2019    Left ureteral stone -> UTI c/b pyelonephritis and urosepsis w/ hypokalemia -> transfered to [facility] urology service from [facility] after CT abn dx, s/p 2 stents to allow infx to drain, IV Vanc/Rocephin, fever free since yesterday    Reflex sympathetic dystrophy     UTI (urinary tract infection)     Vertigo        Past Surgical History:   Procedure Laterality Date    BLADDER SURGERY      CHOLECYSTECTOMY      COLONOSCOPY N/A 11/10/2021    Procedure: COLONOSCOPY;  Surgeon: Eryn Rothman MD;  Location: [facility];  Service: Endoscopy;  Laterality: N/A;    CYSTOSCOPY WITH URETEROSCOPY, RETROGRADE PYELOGRAPHY, AND INSERTION OF STENT Right 9/30/2021    Procedure: CYSTOSCOPY, WITH RETROGRADE PYELOGRAM AND URETERAL STENT INSERTION;  Surgeon: Annita Gamino MD;  Location: [facility];  Service: Urology;  Laterality: Right;    DIAGNOSTIC LAPAROSCOPY N/A 11/11/2020    Procedure: LAPAROSCOPY, DIAGNOSTIC;  Surgeon: Tee Segura MD;  Location: [facility];  Service: General;  Laterality: N/A;  CONVERTED TO OPEN    ESOPHAGOGASTRODUODENOSCOPY N/A 11/10/2021    Procedure: EGD (ESOPHAGOGASTRODUODENOSCOPY);  Surgeon: Eryn Rothman MD;  Location: [facility];  Service: Endoscopy;  Laterality: N/A;    facet injections  02/14/2017    L3, L4, L5, S1 Done by Dr. Lara    HYSTERECTOMY      INJECTION OF ANESTHETIC AGENT INTO TISSUE PLANE DEFINED BY TRANSVERSUS ABDOMINIS MUSCLE N/A 11/11/2020    Procedure: BLOCK, TRANSVERSUS ABDOMINIS PLANE;  Surgeon: Tee Segura MD;  Location: [facility];  Service: General;  Laterality: N/A;    KNEE  SURGERY      LAPAROSCOPIC LYSIS OF ADHESIONS N/A 11/11/2020    Procedure: LYSIS, ADHESIONS, LAPAROSCOPIC;  Surgeon: Tee Segura MD;  Location: [facility] OR;  Service: General;  Laterality: N/A;  CONVERTED TO OPEN    LAPAROTOMY N/A 11/20/2020    Procedure: LAPAROTOMY;  Surgeon: Tee Segura MD;  Location: [facility] OR;  Service: General;  Laterality: N/A;    LASER LITHOTRIPSY Left 2/8/2021    Procedure: LITHOTRIPSY, USING LASER;  Surgeon: Irving Kidd MD;  Location: [facility] OR;  Service: Urology;  Laterality: Left;    LASER LITHOTRIPSY Right 10/13/2021    Procedure: LITHOTRIPSY, USING LASER;  Surgeon: Annita Gamino MD;  Location: [facility] OR;  Service: Urology;  Laterality: Right;    LYSIS OF ADHESIONS N/A 11/11/2020    Procedure: LYSIS, ADHESIONS;  Surgeon: Tee Segura MD;  Location: [facility] OR;  Service: General;  Laterality: N/A;    LYSIS OF ADHESIONS N/A 11/20/2020    Procedure: LYSIS, ADHESIONS;  Surgeon: Tee Segura MD;  Location: [facility] OR;  Service: General;  Laterality: N/A;    TONSILLECTOMY      URETEROSCOPY Left 2/8/2021    Procedure: URETEROSCOPY;  Surgeon: Irving Kidd MD;  Location: [facility] OR;  Service: Urology;  Laterality: Left;  stent placement    URETEROSCOPY Right 10/13/2021    Procedure: URETEROSCOPY;  Surgeon: Annita Gamino MD;  Location: [facility] OR;  Service: Urology;  Laterality: Right;       Review of patient's allergies indicates:   Allergen Reactions    Erythromycin     Morphine Nausea And Vomiting    Opioids - morphine analogues        No current facility-administered medications on file prior to encounter.     Current Outpatient Medications on File Prior to Encounter   Medication Sig    amoxicillin-clavulanate 875-125mg (AUGMENTIN) 875-125 mg per tablet Take 1 tablet by mouth every 12 (twelve) hours.    diazePAM (VALIUM) 10 MG Tab TAKE 1 TABLET BY MOUTH EVERY DAY AS NEEDED    diazePAM (VALIUM) 5 MG tablet Take one with onset of migraine    dicyclomine (BENTYL) 20  mg tablet TAKE 1 TABLET BY MOUTH THREE TIMES A DAY    doxepin (SINEQUAN) 10 MG capsule TAKE 1 CAPSULE (10 MG TOTAL) BY MOUTH EVERY EVENING.    ergocalciferol (ERGOCALCIFEROL) 50,000 unit Cap Take 1 capsule (50,000 Units total) by mouth every 7 days.    fluticasone propionate (FLONASE) 50 mcg/actuation nasal spray 2 sprays (100 mcg total) by Each Nostril route once daily.    HYDROmorphone (DILAUDID) 2 MG tablet Take 1 tablet (2 mg total) by mouth every 4 (four) hours as needed for Pain.    LACTOBACILLUS COMBO NO.6 (PROBIOTIC COMPLEX ORAL) Take by mouth once daily at 6am.    loratadine (CLARITIN) 10 mg tablet Take 10 mg by mouth daily    melatonin 5 mg Cap Take by mouth.    multivitamin capsule Take 1 capsule by mouth once daily.    ondansetron (ZOFRAN) 4 MG tablet Take 1 tablet (4 mg total) by mouth every 6 (six) hours.    ondansetron (ZOFRAN-ODT) 4 MG TbDL Take 1 tablet (4 mg total) by mouth every 8 (eight) hours as needed (nausea).    pantoprazole (PROTONIX) 40 MG tablet TAKE 1 TABLET BY MOUTH EVERY DAY    spironolactone (ALDACTONE) 50 MG tablet TAKE 1 TABLET BY MOUTH ONCE DAILY THEN INCREASE TO 2 TABLETS DAILY AS TOLERATED    sumatriptan (IMITREX) 100 MG tablet TAKE 1 TABLET BY MOUTH IF NEEDED, MAY REPEAT ONCE IN 1 HOUR. MAX OF 2 TABLETS IN 24 HOURS    tamsulosin (FLOMAX) 0.4 mg Cap Take 2 capsules (0.8 mg total) by mouth once daily.     Family History       Problem Relation (Age of Onset)    COPD Father    Drug abuse Brother    Hypertension Mother    Stroke Mother          Tobacco Use    Smoking status: Never Smoker    Smokeless tobacco: Never Used   Substance and Sexual Activity    Alcohol use: Yes     Comment: rarely    Drug use: No    Sexual activity: Never     Review of Systems   Constitutional:  Positive for activity change, appetite change and fever. Negative for unexpected weight change.   Respiratory:  Negative for shortness of breath.    Gastrointestinal:  Positive for abdominal pain and nausea. Negative  for constipation, diarrhea and vomiting.   Neurological:  Positive for weakness.   Psychiatric/Behavioral:  The patient is nervous/anxious.    Objective:     Vital Signs (Most Recent):  Temp: 99.2 °F (37.3 °C) (05/08/22 2336)  Pulse: 76 (05/09/22 0700)  Resp: 16 (05/09/22 0903)  BP: 112/69 (05/09/22 0700)  SpO2: 95 % (05/09/22 0700) Vital Signs (24h Range):  Temp:  [99.2 °F (37.3 °C)] 99.2 °F (37.3 °C)  Pulse:  [69-93] 76  Resp:  [16] 16  SpO2:  [95 %-98 %] 95 %  BP: (112-141)/(69-81) 112/69     Weight: 67.9 kg (149 lb 11.1 oz)  Body mass index is 23.45 kg/m².    Physical Exam  Vitals and nursing note reviewed. Exam conducted with a chaperone present (boyfriend).   Constitutional:       General: She is not in acute distress.     Appearance: She is not ill-appearing or toxic-appearing.   HENT:      Head: Normocephalic and atraumatic.   Cardiovascular:      Rate and Rhythm: Normal rate.   Pulmonary:      Effort: Pulmonary effort is normal. No respiratory distress.   Abdominal:      General: There is no distension.      Palpations: Abdomen is soft.      Tenderness: There is no abdominal tenderness. There is no guarding or rebound.   Musculoskeletal:      Right lower leg: No edema.      Left lower leg: No edema.   Skin:     General: Skin is warm.      Coloration: Skin is pale.      Findings: No bruising.      Comments: Abdominal scarring with no signs or symptoms of infection   Neurological:      Mental Status: She is alert and oriented to person, place, and time.   Psychiatric:         Mood and Affect: Mood is anxious.         Speech: Speech normal.         Behavior: Behavior is cooperative.         Cognition and Memory: Cognition and memory normal.           Significant Labs: All pertinent labs within the past 24 hours have been reviewed.  CBC:   Recent Labs   Lab 05/09/22  0234   WBC 6.27   HGB 13.6   HCT 38.8        CMP:   Recent Labs   Lab 05/09/22 0234      K 3.8      CO2 25   GLU 94   BUN 14    CREATININE 1.0   CALCIUM 9.4   PROT 6.4   ALBUMIN 4.0   BILITOT 0.6   ALKPHOS 63   AST 14   ALT 13   ANIONGAP 14   EGFRNONAA >60     Lipase:   Recent Labs   Lab 05/09/22  0234   LIPASE 38       Significant Imaging: I have reviewed all pertinent imaging results/findings within the past 24 hours.  CT: I have reviewed all pertinent results/findings within the past 24 hours and my personal findings are:  negative for mesenteric vein thrombosis

## 2022-05-10 VITALS
BODY MASS INDEX: 23.53 KG/M2 | HEART RATE: 66 BPM | TEMPERATURE: 99 F | SYSTOLIC BLOOD PRESSURE: 118 MMHG | RESPIRATION RATE: 16 BRPM | HEIGHT: 67 IN | WEIGHT: 149.94 LBS | DIASTOLIC BLOOD PRESSURE: 76 MMHG | OXYGEN SATURATION: 99 %

## 2022-05-10 LAB
ALBUMIN SERPL BCP-MCNC: 3.3 G/DL (ref 3.5–5.2)
ALP SERPL-CCNC: 51 U/L (ref 55–135)
ALT SERPL W/O P-5'-P-CCNC: 9 U/L (ref 10–44)
ANION GAP SERPL CALC-SCNC: 5 MMOL/L (ref 8–16)
AST SERPL-CCNC: 14 U/L (ref 10–40)
BASOPHILS # BLD AUTO: 0.06 K/UL (ref 0–0.2)
BASOPHILS NFR BLD: 1.4 % (ref 0–1.9)
BILIRUB SERPL-MCNC: 0.5 MG/DL (ref 0.1–1)
BUN SERPL-MCNC: 16 MG/DL (ref 6–20)
CALCIUM SERPL-MCNC: 9.4 MG/DL (ref 8.7–10.5)
CHLORIDE SERPL-SCNC: 107 MMOL/L (ref 95–110)
CO2 SERPL-SCNC: 31 MMOL/L (ref 23–29)
CREAT SERPL-MCNC: 0.8 MG/DL (ref 0.5–1.4)
DIFFERENTIAL METHOD: ABNORMAL
EOSINOPHIL # BLD AUTO: 0.2 K/UL (ref 0–0.5)
EOSINOPHIL NFR BLD: 3.5 % (ref 0–8)
ERYTHROCYTE [DISTWIDTH] IN BLOOD BY AUTOMATED COUNT: 12.2 % (ref 11.5–14.5)
EST. GFR  (AFRICAN AMERICAN): >60 ML/MIN/1.73 M^2
EST. GFR  (NON AFRICAN AMERICAN): >60 ML/MIN/1.73 M^2
GLUCOSE SERPL-MCNC: 86 MG/DL (ref 70–110)
HCT VFR BLD AUTO: 35.2 % (ref 37–48.5)
HGB BLD-MCNC: 12 G/DL (ref 12–16)
IMM GRANULOCYTES # BLD AUTO: 0.01 K/UL (ref 0–0.04)
IMM GRANULOCYTES NFR BLD AUTO: 0.2 % (ref 0–0.5)
LYMPHOCYTES # BLD AUTO: 2.1 K/UL (ref 1–4.8)
LYMPHOCYTES NFR BLD: 49.9 % (ref 18–48)
MCH RBC QN AUTO: 33.9 PG (ref 27–31)
MCHC RBC AUTO-ENTMCNC: 34.1 G/DL (ref 32–36)
MCV RBC AUTO: 99 FL (ref 82–98)
MONOCYTES # BLD AUTO: 0.4 K/UL (ref 0.3–1)
MONOCYTES NFR BLD: 8.7 % (ref 4–15)
NEUTROPHILS # BLD AUTO: 1.5 K/UL (ref 1.8–7.7)
NEUTROPHILS NFR BLD: 36.3 % (ref 38–73)
NRBC BLD-RTO: 0 /100 WBC
PLATELET # BLD AUTO: 169 K/UL (ref 150–450)
PMV BLD AUTO: 11.3 FL (ref 9.2–12.9)
POTASSIUM SERPL-SCNC: 4.2 MMOL/L (ref 3.5–5.1)
PROT SERPL-MCNC: 5.3 G/DL (ref 6–8.4)
RBC # BLD AUTO: 3.54 M/UL (ref 4–5.4)
SODIUM SERPL-SCNC: 143 MMOL/L (ref 136–145)
WBC # BLD AUTO: 4.23 K/UL (ref 3.9–12.7)

## 2022-05-10 PROCEDURE — 36415 COLL VENOUS BLD VENIPUNCTURE: CPT | Performed by: FAMILY MEDICINE

## 2022-05-10 PROCEDURE — 85025 COMPLETE CBC W/AUTO DIFF WBC: CPT | Performed by: FAMILY MEDICINE

## 2022-05-10 PROCEDURE — 99213 PR OFFICE/OUTPT VISIT, EST, LEVL III, 20-29 MIN: ICD-10-PCS | Mod: ,,, | Performed by: INTERNAL MEDICINE

## 2022-05-10 PROCEDURE — 25000003 PHARM REV CODE 250: Performed by: FAMILY MEDICINE

## 2022-05-10 PROCEDURE — 80053 COMPREHEN METABOLIC PANEL: CPT | Performed by: FAMILY MEDICINE

## 2022-05-10 PROCEDURE — G0378 HOSPITAL OBSERVATION PER HR: HCPCS

## 2022-05-10 PROCEDURE — 63600175 PHARM REV CODE 636 W HCPCS: Performed by: FAMILY MEDICINE

## 2022-05-10 PROCEDURE — 96376 TX/PRO/DX INJ SAME DRUG ADON: CPT

## 2022-05-10 PROCEDURE — 99213 OFFICE O/P EST LOW 20 MIN: CPT | Mod: ,,, | Performed by: INTERNAL MEDICINE

## 2022-05-10 RX ADMIN — KETOROLAC TROMETHAMINE 15 MG: 30 INJECTION, SOLUTION INTRAMUSCULAR at 09:05

## 2022-05-10 RX ADMIN — PANTOPRAZOLE SODIUM 40 MG: 40 TABLET, DELAYED RELEASE ORAL at 08:05

## 2022-05-10 RX ADMIN — SPIRONOLACTONE 25 MG: 25 TABLET ORAL at 08:05

## 2022-05-10 NOTE — PLAN OF CARE
[facility] - Med Surg  Discharge Final Note    Primary Care Provider: Tay Duff MD    Expected Discharge Date: 5/10/2022    Final Discharge Note (most recent)     Final Note - 05/10/22 0909        Final Note    Assessment Type Final Discharge Note     Anticipated Discharge Disposition Home or Self Care     Hospital Resources/Appts/Education Provided Provided patient/caregiver with written discharge plan information;Appointments scheduled and added to AVS        Post-Acute Status    Discharge Delays None known at this time                 Important Message from Medicare

## 2022-05-10 NOTE — HPI
56 y.o female presented to [facility] ER with c/o abdominal pain. Patient underwent CTA as part of workup. Initially concerning for possible mesenteric vein thrombosis. Upon further review by Vascular team no evidence of mesenteric vein thrombosis    Hematology/Oncology consulted for initial concerns of mesenteric vein thrombosis. This has now been ruled out.

## 2022-05-10 NOTE — HOSPITAL COURSE
57yo female admitted for epigastric pain. Patient has a logn standing GI history and followed by Dr Rothman as an outpatient. CT abdomen pelvis with contrast performed and confirmed that there was no evidence of mesenteric vein thrombosis or ischemia by vascular surgery. Report was thoroughly explained by Dr Moon and GI. She was reassured that there was no evidence of lesion or mass. Patient initiated on Linzess and was able to have several large BMs while in the hospital. Epigastric pain markedly improved since admit and patient comfortable to d/c home. Discharged with Linzess to assist with bowel movements. Outpatient follow ups with GI and PCP. In stable condition at time of discharge and patient agreeable with plan. Referred to NP at home program as well.

## 2022-05-10 NOTE — CONSULTS
[facility]  Hematology/Oncology  Consult Note    Patient Name: Genny Calixto  MRN: [id number]  Admission Date: 5/9/2022  Hospital Length of Stay: 1 days  Code Status: Full Code   Attending Provider: Sanjay Moon MD  Consulting Provider: Ramya Powell NP  Primary Care Physician: Tay Duff MD  Principal Problem:Epigastric pain    Inpatient consult to Hematology/Oncology  Consult performed by: Ramya Powell NP  Consult ordered by: Sanjay Moon MD        Subjective:     HPI:  56 y.o female presented to [facility] ER with c/o abdominal pain. Patient underwent CTA as part of workup. Initially concerning for possible mesenteric vein thrombosis. Upon further review by Vascular team no evidence of mesenteric vein thrombosis    Hematology/Oncology consulted for initial concerns of mesenteric vein thrombosis. This has now been ruled out.       Oncology Treatment Plan:   [Could not find a treatment plan. This SmartLink may be configured incorrectly. Contact a  for help.]    Medications:  Continuous Infusions:  Scheduled Meds:   aluminum-magnesium hydroxide-simethicone  30 mL Oral Once    And    LIDOcaine HCl 2%  10 mL Oral Once    Lactobacillus acidoph-L.bulgar  1 tablet Oral TID     pantoprazole  40 mg Oral Daily    scopolamine  1 patch Transdermal Q3 Days    spironolactone  25 mg Oral Daily     PRN Meds:bisacodyL, diazePAM, ketorolac, magnesium citrate, ondansetron, sodium chloride 0.9%     Review of patient's allergies indicates:   Allergen Reactions    Morphine Nausea And Vomiting    Erythromycin Other (See Comments)     Stomach cramps        Past Medical History:   Diagnosis Date    Encounter for blood transfusion     KENN (generalized anxiety disorder)     Takes 5-10 mg of valium qd prn anxiety (prescriptions for both on file, one from [facility] & other from )    Insomnia     Takes 5-10 mg of valium qhs prn insomnia (prescriptions for both on file, one from [facility] & other  from )    Migraine headache     Nephrolithiasis 08/03/2019    Left ureteral stone -> UTI c/b pyelonephritis and urosepsis w/ hypokalemia -> transfered to [facility] urology service from [facility] after CT abn dx, s/p 2 stents to allow infx to drain, IV Vanc/Rocephin, fever free since yesterday    Reflex sympathetic dystrophy     UTI (urinary tract infection)     Vertigo      Past Surgical History:   Procedure Laterality Date    BLADDER SURGERY      CHOLECYSTECTOMY      COLONOSCOPY N/A 11/10/2021    Procedure: COLONOSCOPY;  Surgeon: Eryn Rothman MD;  Location: [facility];  Service: Endoscopy;  Laterality: N/A;    CYSTOSCOPY WITH URETEROSCOPY, RETROGRADE PYELOGRAPHY, AND INSERTION OF STENT Right 9/30/2021    Procedure: CYSTOSCOPY, WITH RETROGRADE PYELOGRAM AND URETERAL STENT INSERTION;  Surgeon: Annita Gamino MD;  Location: [facility];  Service: Urology;  Laterality: Right;    DIAGNOSTIC LAPAROSCOPY N/A 11/11/2020    Procedure: LAPAROSCOPY, DIAGNOSTIC;  Surgeon: Tee Segura MD;  Location: [facility];  Service: General;  Laterality: N/A;  CONVERTED TO OPEN    ESOPHAGOGASTRODUODENOSCOPY N/A 11/10/2021    Procedure: EGD (ESOPHAGOGASTRODUODENOSCOPY);  Surgeon: Eryn Rothman MD;  Location: [facility];  Service: Endoscopy;  Laterality: N/A;    facet injections  02/14/2017    L3, L4, L5, S1 Done by Dr. Lara    HYSTERECTOMY      INJECTION OF ANESTHETIC AGENT INTO TISSUE PLANE DEFINED BY TRANSVERSUS ABDOMINIS MUSCLE N/A 11/11/2020    Procedure: BLOCK, TRANSVERSUS ABDOMINIS PLANE;  Surgeon: Tee Segura MD;  Location: [facility];  Service: General;  Laterality: N/A;    KNEE SURGERY      LAPAROSCOPIC LYSIS OF ADHESIONS N/A 11/11/2020    Procedure: LYSIS, ADHESIONS, LAPAROSCOPIC;  Surgeon: Tee Segura MD;  Location: [facility] OR;  Service: General;  Laterality: N/A;  CONVERTED TO OPEN    LAPAROTOMY N/A 11/20/2020    Procedure: LAPAROTOMY;  Surgeon: Tee Segura MD;  Location: [facility]  OR;  Service: General;  Laterality: N/A;    LASER LITHOTRIPSY Left 2/8/2021    Procedure: LITHOTRIPSY, USING LASER;  Surgeon: Irving Kidd MD;  Location: [facility] OR;  Service: Urology;  Laterality: Left;    LASER LITHOTRIPSY Right 10/13/2021    Procedure: LITHOTRIPSY, USING LASER;  Surgeon: Annita Gamino MD;  Location: [facility] OR;  Service: Urology;  Laterality: Right;    LYSIS OF ADHESIONS N/A 11/11/2020    Procedure: LYSIS, ADHESIONS;  Surgeon: Tee Segura MD;  Location: [facility] OR;  Service: General;  Laterality: N/A;    LYSIS OF ADHESIONS N/A 11/20/2020    Procedure: LYSIS, ADHESIONS;  Surgeon: Tee Segura MD;  Location: [facility] OR;  Service: General;  Laterality: N/A;    TONSILLECTOMY      URETEROSCOPY Left 2/8/2021    Procedure: URETEROSCOPY;  Surgeon: Irving Kidd MD;  Location: [facility] OR;  Service: Urology;  Laterality: Left;  stent placement    URETEROSCOPY Right 10/13/2021    Procedure: URETEROSCOPY;  Surgeon: Annita Gamino MD;  Location: [facility] OR;  Service: Urology;  Laterality: Right;     Family History       Problem Relation (Age of Onset)    COPD Father    Drug abuse Brother    Hypertension Mother    Stroke Mother          Tobacco Use    Smoking status: Never Smoker    Smokeless tobacco: Never Used   Substance and Sexual Activity    Alcohol use: Yes     Comment: rarely    Drug use: No    Sexual activity: Never       Review of Systems   Constitutional:  Positive for appetite change. Negative for chills, fatigue, fever and unexpected weight change.   HENT:  Negative for congestion, mouth sores, nosebleeds, sore throat, trouble swallowing and voice change.    Respiratory:  Negative for cough, chest tightness, shortness of breath and wheezing.    Cardiovascular:  Negative for chest pain and leg swelling.   Gastrointestinal:  Positive for abdominal pain. Negative for abdominal distention, blood in stool, constipation, diarrhea, nausea and vomiting.   Genitourinary:   Negative for difficulty urinating, dysuria and hematuria.   Musculoskeletal:  Negative for arthralgias, back pain and myalgias.   Skin:  Negative for pallor, rash and wound.   Neurological:  Negative for dizziness, syncope, weakness and headaches.   Hematological:  Negative for adenopathy. Does not bruise/bleed easily.   Psychiatric/Behavioral:  The patient is nervous/anxious.    Objective:     Vital Signs (Most Recent):  Temp: 97.8 °F (36.6 °C) (05/10/22 0704)  Pulse: (!) 58 (05/10/22 0704)  Resp: 17 (05/10/22 0704)  BP: 110/61 (05/10/22 0704)  SpO2: 96 % (05/10/22 0704)   Vital Signs (24h Range):  Temp:  [97.4 °F (36.3 °C)-98.6 °F (37 °C)] 97.8 °F (36.6 °C)  Pulse:  [58-87] 58  Resp:  [16-18] 17  SpO2:  [94 %-99 %] 96 %  BP: (102-130)/(56-68) 110/61     Weight: 68 kg (149 lb 14.6 oz)  Body mass index is 23.48 kg/m².  Body surface area is 1.79 meters squared.      Intake/Output Summary (Last 24 hours) at 5/10/2022 1009  Last data filed at 5/9/2022 1556  Gross per 24 hour   Intake 122.55 ml   Output --   Net 122.55 ml       Physical Exam  Pulmonary:      Effort: Pulmonary effort is normal. No respiratory distress.   Abdominal:      Tenderness: There is abdominal tenderness.   Neurological:      Mental Status: She is alert and oriented to person, place, and time.       Significant Labs:   BMP:   Recent Labs   Lab 05/09/22  0234 05/10/22  0552   GLU 94 86    143   K 3.8 4.2    107   CO2 25 31*   BUN 14 16   CREATININE 1.0 0.8   CALCIUM 9.4 9.4   , CBC:   Recent Labs   Lab 05/09/22  0234 05/10/22  0552   WBC 6.27 4.23   HGB 13.6 12.0   HCT 38.8 35.2*    169   , LDH: No results for input(s): LDHCSF, BFSOURCE in the last 48 hours., LFTs:   Recent Labs   Lab 05/09/22  0234 05/10/22  0552   ALT 13 9*   AST 14 14   ALKPHOS 63 51*   BILITOT 0.6 0.5   PROT 6.4 5.3*   ALBUMIN 4.0 3.3*   , and All pertinent labs from the last 24 hours have been reviewed.    Diagnostic Results:  I have reviewed all pertinent  imaging results/findings within the past 24 hours.    Assessment/Plan:     * Epigastric pain  -No evidence of mesenteric vein thrombosis  management per Primary team  -We will s/o. Please re consult as needed         Thank you for your consult. I will sign off. Please contact us if you have any additional questions.    Ramya Powell NP  Hematology/Oncology  [facility]

## 2022-05-10 NOTE — SUBJECTIVE & OBJECTIVE
Oncology Treatment Plan:   [Could not find a treatment plan. This SmartLink may be configured incorrectly. Contact a  for help.]    Medications:  Continuous Infusions:  Scheduled Meds:   aluminum-magnesium hydroxide-simethicone  30 mL Oral Once    And    LIDOcaine HCl 2%  10 mL Oral Once    Lactobacillus acidoph-L.bulgar  1 tablet Oral TID     pantoprazole  40 mg Oral Daily    scopolamine  1 patch Transdermal Q3 Days    spironolactone  25 mg Oral Daily     PRN Meds:bisacodyL, diazePAM, ketorolac, magnesium citrate, ondansetron, sodium chloride 0.9%     Review of patient's allergies indicates:   Allergen Reactions    Morphine Nausea And Vomiting    Erythromycin Other (See Comments)     Stomach cramps        Past Medical History:   Diagnosis Date    Encounter for blood transfusion     KENN (generalized anxiety disorder)     Takes 5-10 mg of valium qd prn anxiety (prescriptions for both on file, one from [facility] & other from )    Insomnia     Takes 5-10 mg of valium qhs prn insomnia (prescriptions for both on file, one from [facility] & other from )    Migraine headache     Nephrolithiasis 08/03/2019    Left ureteral stone -> UTI c/b pyelonephritis and urosepsis w/ hypokalemia -> transfered to [facility] urology service from [facility] after CT abn dx, s/p 2 stents to allow infx to drain, IV Vanc/Rocephin, fever free since yesterday    Reflex sympathetic dystrophy     UTI (urinary tract infection)     Vertigo      Past Surgical History:   Procedure Laterality Date    BLADDER SURGERY      CHOLECYSTECTOMY      COLONOSCOPY N/A 11/10/2021    Procedure: COLONOSCOPY;  Surgeon: Eryn Rothman MD;  Location: [facility];  Service: Endoscopy;  Laterality: N/A;    CYSTOSCOPY WITH URETEROSCOPY, RETROGRADE PYELOGRAPHY, AND INSERTION OF STENT Right 9/30/2021    Procedure: CYSTOSCOPY, WITH RETROGRADE PYELOGRAM AND URETERAL STENT INSERTION;  Surgeon: Annita Gamino MD;  Location: [facility] OR;  Service: Urology;   Laterality: Right;    DIAGNOSTIC LAPAROSCOPY N/A 11/11/2020    Procedure: LAPAROSCOPY, DIAGNOSTIC;  Surgeon: Tee Segura MD;  Location: [facility] OR;  Service: General;  Laterality: N/A;  CONVERTED TO OPEN    ESOPHAGOGASTRODUODENOSCOPY N/A 11/10/2021    Procedure: EGD (ESOPHAGOGASTRODUODENOSCOPY);  Surgeon: Eryn Rothman MD;  Location: [facility] ENDO;  Service: Endoscopy;  Laterality: N/A;    facet injections  02/14/2017    L3, L4, L5, S1 Done by Dr. Lara    HYSTERECTOMY      INJECTION OF ANESTHETIC AGENT INTO TISSUE PLANE DEFINED BY TRANSVERSUS ABDOMINIS MUSCLE N/A 11/11/2020    Procedure: BLOCK, TRANSVERSUS ABDOMINIS PLANE;  Surgeon: Tee Segura MD;  Location: [facility] OR;  Service: General;  Laterality: N/A;    KNEE SURGERY      LAPAROSCOPIC LYSIS OF ADHESIONS N/A 11/11/2020    Procedure: LYSIS, ADHESIONS, LAPAROSCOPIC;  Surgeon: Tee Segura MD;  Location: [facility] OR;  Service: General;  Laterality: N/A;  CONVERTED TO OPEN    LAPAROTOMY N/A 11/20/2020    Procedure: LAPAROTOMY;  Surgeon: Tee Segura MD;  Location: [facility] OR;  Service: General;  Laterality: N/A;    LASER LITHOTRIPSY Left 2/8/2021    Procedure: LITHOTRIPSY, USING LASER;  Surgeon: Irving Kidd MD;  Location: [facility] OR;  Service: Urology;  Laterality: Left;    LASER LITHOTRIPSY Right 10/13/2021    Procedure: LITHOTRIPSY, USING LASER;  Surgeon: Annita Gamino MD;  Location: [facility] OR;  Service: Urology;  Laterality: Right;    LYSIS OF ADHESIONS N/A 11/11/2020    Procedure: LYSIS, ADHESIONS;  Surgeon: Tee Segura MD;  Location: [facility] OR;  Service: General;  Laterality: N/A;    LYSIS OF ADHESIONS N/A 11/20/2020    Procedure: LYSIS, ADHESIONS;  Surgeon: Tee Segura MD;  Location: [facility] OR;  Service: General;  Laterality: N/A;    TONSILLECTOMY      URETEROSCOPY Left 2/8/2021    Procedure: URETEROSCOPY;  Surgeon: Irving Kidd MD;  Location: [facility] OR;  Service: Urology;  Laterality: Left;  stent placement     URETEROSCOPY Right 10/13/2021    Procedure: URETEROSCOPY;  Surgeon: Annita Gamino MD;  Location: [facility];  Service: Urology;  Laterality: Right;     Family History       Problem Relation (Age of Onset)    COPD Father    Drug abuse Brother    Hypertension Mother    Stroke Mother          Tobacco Use    Smoking status: Never Smoker    Smokeless tobacco: Never Used   Substance and Sexual Activity    Alcohol use: Yes     Comment: rarely    Drug use: No    Sexual activity: Never       Review of Systems   Constitutional:  Positive for appetite change. Negative for chills, fatigue, fever and unexpected weight change.   HENT:  Negative for congestion, mouth sores, nosebleeds, sore throat, trouble swallowing and voice change.    Respiratory:  Negative for cough, chest tightness, shortness of breath and wheezing.    Cardiovascular:  Negative for chest pain and leg swelling.   Gastrointestinal:  Positive for abdominal pain. Negative for abdominal distention, blood in stool, constipation, diarrhea, nausea and vomiting.   Genitourinary:  Negative for difficulty urinating, dysuria and hematuria.   Musculoskeletal:  Negative for arthralgias, back pain and myalgias.   Skin:  Negative for pallor, rash and wound.   Neurological:  Negative for dizziness, syncope, weakness and headaches.   Hematological:  Negative for adenopathy. Does not bruise/bleed easily.   Psychiatric/Behavioral:  The patient is nervous/anxious.    Objective:     Vital Signs (Most Recent):  Temp: 97.8 °F (36.6 °C) (05/10/22 0704)  Pulse: (!) 58 (05/10/22 0704)  Resp: 17 (05/10/22 0704)  BP: 110/61 (05/10/22 0704)  SpO2: 96 % (05/10/22 0704)   Vital Signs (24h Range):  Temp:  [97.4 °F (36.3 °C)-98.6 °F (37 °C)] 97.8 °F (36.6 °C)  Pulse:  [58-87] 58  Resp:  [16-18] 17  SpO2:  [94 %-99 %] 96 %  BP: (102-130)/(56-68) 110/61     Weight: 68 kg (149 lb 14.6 oz)  Body mass index is 23.48 kg/m².  Body surface area is 1.79 meters squared.      Intake/Output Summary  (Last 24 hours) at 5/10/2022 1009  Last data filed at 5/9/2022 1556  Gross per 24 hour   Intake 122.55 ml   Output --   Net 122.55 ml       Physical Exam  Pulmonary:      Effort: Pulmonary effort is normal. No respiratory distress.   Abdominal:      Tenderness: There is abdominal tenderness.   Neurological:      Mental Status: She is alert and oriented to person, place, and time.       Significant Labs:   BMP:   Recent Labs   Lab 05/09/22 0234 05/10/22  0552   GLU 94 86    143   K 3.8 4.2    107   CO2 25 31*   BUN 14 16   CREATININE 1.0 0.8   CALCIUM 9.4 9.4   , CBC:   Recent Labs   Lab 05/09/22  0234 05/10/22  0552   WBC 6.27 4.23   HGB 13.6 12.0   HCT 38.8 35.2*    169   , LDH: No results for input(s): LDHCSF, BFSOURCE in the last 48 hours., LFTs:   Recent Labs   Lab 05/09/22 0234 05/10/22  0552   ALT 13 9*   AST 14 14   ALKPHOS 63 51*   BILITOT 0.6 0.5   PROT 6.4 5.3*   ALBUMIN 4.0 3.3*   , and All pertinent labs from the last 24 hours have been reviewed.    Diagnostic Results:  I have reviewed all pertinent imaging results/findings within the past 24 hours.

## 2022-05-10 NOTE — ASSESSMENT & PLAN NOTE
-No evidence of mesenteric vein thrombosis  management per Primary team  -We will s/o. Please re consult as needed

## 2022-05-10 NOTE — PLAN OF CARE
[facility]  Discharge Final Note    Primary Care Provider: Tay Duff MD    Expected Discharge Date: 5/10/2022    Final Discharge Note (most recent)     Final Note - 05/10/22 1421        Final Note    Assessment Type Final Discharge Note     Anticipated Discharge Disposition Home or Self Care     Hospital Resources/Appts/Education Provided Provided patient/caregiver with written discharge plan information;Appointments scheduled and added to AVS        Post-Acute Status    Discharge Delays None known at this time                 Important Message from Medicare

## 2022-05-10 NOTE — DISCHARGE SUMMARY
[facility] Medicine  Discharge Summary      Patient Name: Genny Calixto  MRN: [id number]  Patient Class: OP- Observation  Admission Date: 5/9/2022  Hospital Length of Stay: 1 days  Discharge Date and Time: 5/10/2022  3:52 PM  Attending Physician: No att. providers found   Discharging Provider: Enrique Dowd NP  Primary Care Provider: Tay Duff MD      HPI:   56F PMH KENN, IBS, SBO s/p lysis of adhesions, and multiple abdominal surgeries in past requiring lysis of adhesions, presents to the Emergency Department for epigastric abdominal pain which onset gradually 3 days PTA. Characterized as abdominal cramping. Pain with positional changes in her torso and with walking. Associated sxs include nausea, abdominal distention, and a fever (T max 100.3). Patient denies any blood in stool, melena, CP, SOB, weakness, diaphoresis, V/D, constipation, and trauma. Prior Tx includes Tylenol last taken at 1600 yesterday. The pt reports that her last bowel movement was yesterday afternoon. She reports passing minimal gas. She states that she drinks 1 roberto every 2 weeks. She denies smoking. She denies a history of blood clots.    Initial workup in emergency department, VSS, labs within normal limits. Lipase within normal limits. Urinalysis negative. CTA abdomen pelvis with constipation and no evidence of mesenteric vein thrombosis. CTA renal with right hydronephrosis and stool in colon.    Hospital medicine consulted for admission under observation for potential mesenteric vein thrombosis. Gastroenterology and hem/onc recommended admission for observation with heparin gtt.       * No surgery found *      Hospital Course:   55yo female admitted for epigastric pain. Patient has a logn standing GI history and followed by Dr Rothman as an outpatient. CT abdomen pelvis with contrast performed and confirmed that there was no evidence of mesenteric vein thrombosis or ischemia by vascular surgery. Report was  thoroughly explained by Dr Moon and GI. She was reassured that there was no evidence of lesion or mass. Patient initiated on Linzess and was able to have several large BMs while in the hospital. Epigastric pain markedly improved since admit and patient comfortable to d/c home. Discharged with Linzess to assist with bowel movements. Outpatient follow ups with GI and PCP. In stable condition at time of discharge and patient agreeable with plan. Referred to NP at home program as well.          Goals of Care Treatment Preferences:  Code Status: Full Code      Consults:   Consults (From admission, onward)        Status Ordering Provider     Inpatient consult to Hematology/Oncology  Once        Provider:  (Not yet assigned)    Completed PRIYA MOON     Inpatient consult to Vascular Surgery  Once        Provider:  (Not yet assigned)    Acknowledged PRIYA MOON     Inpatient consult to Hospitalist  Once        Provider:  Mariel Leonard, NP    Acknowledged ANA LAURA ODONNELL          No new Assessment & Plan notes have been filed under this hospital service since the last note was generated.  Service: Hospital Medicine    Final Active Diagnoses:    Diagnosis Date Noted POA    PRINCIPAL PROBLEM:  Epigastric pain [R10.13] 05/09/2022 Yes    Hydronephrosis [N13.30] 05/09/2022 Yes    Migraine headache [G43.909]  Yes    KENN (generalized anxiety disorder) [F41.1]  Yes    Anxiety [F41.9] 02/24/2017 Yes      Problems Resolved During this Admission:       Discharged Condition: stable    Disposition: Home or Self Care    Follow Up:    Patient Instructions:      Ambulatory referral/consult to [facility]   Standing Status: Future   Referral Priority: Routine Referral Type: Consultation   Referral Reason: Specialty Services Required   Number of Visits Requested: 1       Significant Diagnostic Studies:   Results for orders placed or performed during the hospital encounter of 05/09/22   CBC W/ AUTO DIFFERENTIAL    Result Value Ref Range    WBC 6.27 3.90 - 12.70 K/uL    RBC 3.94 (L) 4.00 - 5.40 M/uL    Hemoglobin 13.6 12.0 - 16.0 g/dL    Hematocrit 38.8 37.0 - 48.5 %    MCV 99 (H) 82 - 98 fL    MCH 34.5 (H) 27.0 - 31.0 pg    MCHC 35.1 32.0 - 36.0 g/dL    RDW 12.2 11.5 - 14.5 %    Platelets 209 150 - 450 K/uL    MPV 11.5 9.2 - 12.9 fL    Immature Granulocytes 0.3 0.0 - 0.5 %    Gran # (ANC) 3.1 1.8 - 7.7 K/uL    Immature Grans (Abs) 0.02 0.00 - 0.04 K/uL    Lymph # 2.6 1.0 - 4.8 K/uL    Mono # 0.4 0.3 - 1.0 K/uL    Eos # 0.2 0.0 - 0.5 K/uL    Baso # 0.06 0.00 - 0.20 K/uL    nRBC 0 0 /100 WBC    Gran % 48.8 38.0 - 73.0 %    Lymph % 41.3 18.0 - 48.0 %    Mono % 6.2 4.0 - 15.0 %    Eosinophil % 2.4 0.0 - 8.0 %    Basophil % 1.0 0.0 - 1.9 %    Differential Method Automated    Comp. Metabolic Panel   Result Value Ref Range    Sodium 142 136 - 145 mmol/L    Potassium 3.8 3.5 - 5.1 mmol/L    Chloride 103 95 - 110 mmol/L    CO2 25 23 - 29 mmol/L    Glucose 94 70 - 110 mg/dL    BUN 14 6 - 20 mg/dL    Creatinine 1.0 0.5 - 1.4 mg/dL    Calcium 9.4 8.7 - 10.5 mg/dL    Total Protein 6.4 6.0 - 8.4 g/dL    Albumin 4.0 3.5 - 5.2 g/dL    Total Bilirubin 0.6 0.1 - 1.0 mg/dL    Alkaline Phosphatase 63 55 - 135 U/L    AST 14 10 - 40 U/L    ALT 13 10 - 44 U/L    Anion Gap 14 8 - 16 mmol/L    eGFR if African American >60 >60 mL/min/1.73 m^2    eGFR if non African American >60 >60 mL/min/1.73 m^2   Lipase   Result Value Ref Range    Lipase 38 4 - 60 U/L   Urinalysis, Reflex to Urine Culture Urine, Clean Catch    Specimen: Urine   Result Value Ref Range    Specimen UA Urine, Clean Catch     Color, UA Yellow Yellow, Straw, Hilda    Appearance, UA Clear Clear    pH, UA 6.0 5.0 - 8.0    Specific Gravity, UA 1.020 1.005 - 1.030    Protein, UA Negative Negative    Glucose, UA Negative Negative    Ketones, UA Negative Negative    Bilirubin (UA) Negative Negative    Occult Blood UA Negative Negative    Nitrite, UA Negative Negative    Urobilinogen, UA  Negative <2.0 EU/dL    Leukocytes, UA Negative Negative   Protime-INR   Result Value Ref Range    Prothrombin Time 10.5 9.0 - 12.5 sec    INR 1.0 0.8 - 1.2   APTT   Result Value Ref Range    aPTT 26.5 21.0 - 32.0 sec   COVID-19 Rapid Screening   Result Value Ref Range    SARS-CoV-2 RNA, Amplification, Qual Negative Negative   Troponin I   Result Value Ref Range    Troponin I <0.006 0.000 - 0.026 ng/mL   APTT   Result Value Ref Range    aPTT 60.9 (H) 21.0 - 32.0 sec   Comprehensive metabolic panel   Result Value Ref Range    Sodium 143 136 - 145 mmol/L    Potassium 4.2 3.5 - 5.1 mmol/L    Chloride 107 95 - 110 mmol/L    CO2 31 (H) 23 - 29 mmol/L    Glucose 86 70 - 110 mg/dL    BUN 16 6 - 20 mg/dL    Creatinine 0.8 0.5 - 1.4 mg/dL    Calcium 9.4 8.7 - 10.5 mg/dL    Total Protein 5.3 (L) 6.0 - 8.4 g/dL    Albumin 3.3 (L) 3.5 - 5.2 g/dL    Total Bilirubin 0.5 0.1 - 1.0 mg/dL    Alkaline Phosphatase 51 (L) 55 - 135 U/L    AST 14 10 - 40 U/L    ALT 9 (L) 10 - 44 U/L    Anion Gap 5 (L) 8 - 16 mmol/L    eGFR if African American >60 >60 mL/min/1.73 m^2    eGFR if non African American >60 >60 mL/min/1.73 m^2   CBC auto differential   Result Value Ref Range    WBC 4.23 3.90 - 12.70 K/uL    RBC 3.54 (L) 4.00 - 5.40 M/uL    Hemoglobin 12.0 12.0 - 16.0 g/dL    Hematocrit 35.2 (L) 37.0 - 48.5 %    MCV 99 (H) 82 - 98 fL    MCH 33.9 (H) 27.0 - 31.0 pg    MCHC 34.1 32.0 - 36.0 g/dL    RDW 12.2 11.5 - 14.5 %    Platelets 169 150 - 450 K/uL    MPV 11.3 9.2 - 12.9 fL    Immature Granulocytes 0.2 0.0 - 0.5 %    Gran # (ANC) 1.5 (L) 1.8 - 7.7 K/uL    Immature Grans (Abs) 0.01 0.00 - 0.04 K/uL    Lymph # 2.1 1.0 - 4.8 K/uL    Mono # 0.4 0.3 - 1.0 K/uL    Eos # 0.2 0.0 - 0.5 K/uL    Baso # 0.06 0.00 - 0.20 K/uL    nRBC 0 0 /100 WBC    Gran % 36.3 (L) 38.0 - 73.0 %    Lymph % 49.9 (H) 18.0 - 48.0 %    Mono % 8.7 4.0 - 15.0 %    Eosinophil % 3.5 0.0 - 8.0 %    Basophil % 1.4 0.0 - 1.9 %    Differential Method Automated          Pending  Diagnostic Studies:     None         Imaging Results          CT Abdomen Pelvis With Contrast (Final result)  Result time 05/09/22 07:42:38    Final result by Jose Estrada MD (05/09/22 07:42:38)                 Impression:      Constipation and slow transit time suspected.  No obstruction is identified.  Consider follow-up small bowel follow-through.    All CT scans at this facility use dose modulation, iterative reconstruction, and/or weight based dosing when appropriate to reduce radiation dose to as low as reasonable achievable.      Electronically signed by: Jose Estrada MD  Date:    05/09/2022  Time:    07:42             Narrative:    EXAMINATION:  CT ABDOMEN PELVIS WITH CONTRAST    CLINICAL HISTORY:  Bowel obstruction suspected;Abdominal pain, acute, nonlocalized;    TECHNIQUE:  Low dose axial images, sagittal and coronal reformations were obtained from the lung bases to the pubic symphysis following the IV administration of 100 mL of Omnipaque 350.  Oral contrast was not administered.    COMPARISON:  04/19/2022    FINDINGS:  Heart: Normal size. No effusion.    Lung Bases: Clear.    Liver: Normal size and attenuation. No focal lesions.  No evidence of mesenteric vein thrombosis.  Portal vein is patent.  Suspect flow artifact from the ELIZABETH into the splenic vein.    Gallbladder: Surgically absent.    Bile Ducts: No dilatation.    Pancreas: No mass. No peripancreatic fat stranding.    Spleen: Normal.    Adrenals: Normal.    Kidneys/Ureters: Normal enhancement.  No mass or  hydroureteronephrosis.  Small left renal cyst.    Bladder: No wall thickening.    Reproductive organs: Not seen    GI Tract/Mesentery: No evidence of bowel obstruction.  Fecalization of the small bowel without significant dilatation or obstruction could reflect slow transit time.  Anastomosis seen within the left lower quadrant and upper pelvis.  Moderate constipation within the large bowel.    Peritoneal Space: No ascites or free  air.    Retroperitoneum: Shotty adenopathy.    Abdominal wall: Normal.  Abdominal wall scar from prior surgery.    Vasculature: No aneurysm.    Bones: No acute fracture.  Moderate degenerative changes lower lumbar spine.  No suspicious lytic or sclerotic lesions.                               RADIOLOGY REPORT (Final result)  Result time 05/09/22 14:17:04    Final result by Unknown User (05/09/22 14:17:04)                                  Medications:  Reconciled Home Medications:      Medication List      START taking these medications    LINZESS 290 mcg Cap capsule  Generic drug: linaCLOtide  Take 1 capsule (290 mcg total) by mouth before breakfast. for 30 doses        CHANGE how you take these medications    spironolactone 50 MG tablet  Commonly known as: ALDACTONE  TAKE 1 TABLET BY MOUTH ONCE DAILY THEN INCREASE TO 2 TABLETS DAILY AS TOLERATED  What changed: additional instructions        CONTINUE taking these medications    * diazePAM 5 MG tablet  Commonly known as: VALIUM  Take one with onset of migraine     * diazePAM 10 MG Tab  Commonly known as: VALIUM  TAKE 1 TABLET BY MOUTH EVERY DAY AS NEEDED     doxepin 10 MG capsule  Commonly known as: SINEQUAN  TAKE 1 CAPSULE (10 MG TOTAL) BY MOUTH EVERY EVENING.     ergocalciferol 50,000 unit Cap  Commonly known as: ERGOCALCIFEROL  Take 1 capsule (50,000 Units total) by mouth every 7 days.     loratadine 10 mg tablet  Commonly known as: CLARITIN  Take 10 mg by mouth daily     multivitamin capsule  Take 1 capsule by mouth once daily.     omeprazole 20 MG tablet  Commonly known as: PRILOSEC OTC  Take 20 mg by mouth once daily.     ondansetron 4 MG Tbdl  Commonly known as: ZOFRAN-ODT  Take 1 tablet (4 mg total) by mouth every 8 (eight) hours as needed (nausea).     pantoprazole 40 MG tablet  Commonly known as: PROTONIX  TAKE 1 TABLET BY MOUTH EVERY DAY     PROBIOTIC COMPLEX ORAL  Take by mouth once daily at 6am.     sumatriptan 100 MG tablet  Commonly known as:  IMITREX  TAKE 1 TABLET BY MOUTH IF NEEDED, MAY REPEAT ONCE IN 1 HOUR. MAX OF 2 TABLETS IN 24 HOURS         * This list has 2 medication(s) that are the same as other medications prescribed for you. Read the directions carefully, and ask your doctor or other care provider to review them with you.            STOP taking these medications    dicyclomine 20 mg tablet  Commonly known as: BENTYL            Indwelling Lines/Drains at time of discharge:   Lines/Drains/Airways     None                 Time spent on the discharge of patient: 45 minutes         Enrique Dowd NP  Department of Hospital Medicine  [facility]

## 2022-05-10 NOTE — PLAN OF CARE
Pt remains free from falls/injuries this shift. Safety precautions maintained. Pain managed with pain medication. No s/s of acute distress noted. VSS. Will continue to monitor. Chart check completed.

## 2022-05-10 NOTE — PLAN OF CARE
05/10/22 0902   Discharge Planning   Assessment Type Discharge Planning Assessment   Resource/Environmental Concerns none   Support Systems Spouse/significant other;Family members   Equipment Currently Used at Home none   Current Living Arrangements home/apartment/condo   Patient/Family Anticipates Transition to home with family   Patient/Family Anticipated Services at Transition none   DME Needed Upon Discharge  none   Discharge Plan A Home with family   Discharge Plan B Home with family     Swer spoke with pt for initial assessment. Pt lives with spouse and reported being independent with ADLs prior to admission. Pt's family is help at home and will provide transportation at discharge. No identified CM needs at this time. SWer provided a transitional care folder, information on advanced directives, information on pharmacy bedside delivery, and discharge planning begins on admission with contact information for any needs/questions.

## 2022-05-10 NOTE — NURSING
Received pt sitting up in bed awake and alert talking to , assessment per flowsheet, resp even and unlabored, c/o mild upper abdominal pain, bed low, call light within reach.

## 2022-05-11 ENCOUNTER — PATIENT MESSAGE (OUTPATIENT)
Dept: GASTROENTEROLOGY | Facility: CLINIC | Age: 57
End: 2022-05-11
Payer: MEDICARE

## 2022-05-16 ENCOUNTER — PES CALL (OUTPATIENT)
Dept: ADMINISTRATIVE | Facility: CLINIC | Age: 57
End: 2022-05-16
Payer: MEDICARE

## 2022-05-16 ENCOUNTER — PATIENT MESSAGE (OUTPATIENT)
Dept: INTERNAL MEDICINE | Facility: CLINIC | Age: 57
End: 2022-05-16
Payer: MEDICARE

## 2022-05-26 ENCOUNTER — OFFICE VISIT (OUTPATIENT)
Dept: GASTROENTEROLOGY | Facility: CLINIC | Age: 57
End: 2022-05-26
Payer: MEDICARE

## 2022-05-26 VITALS
OXYGEN SATURATION: 94 % | WEIGHT: 150.44 LBS | SYSTOLIC BLOOD PRESSURE: 124 MMHG | BODY MASS INDEX: 23.61 KG/M2 | HEIGHT: 67 IN | HEART RATE: 87 BPM | DIASTOLIC BLOOD PRESSURE: 78 MMHG

## 2022-05-26 DIAGNOSIS — K58.1 IRRITABLE BOWEL SYNDROME WITH CONSTIPATION: Primary | ICD-10-CM

## 2022-05-26 PROCEDURE — 99999 PR PBB SHADOW E&M-EST. PATIENT-LVL IV: CPT | Mod: PBBFAC,,, | Performed by: INTERNAL MEDICINE

## 2022-05-26 PROCEDURE — 99213 OFFICE O/P EST LOW 20 MIN: CPT | Mod: S$PBB,,, | Performed by: INTERNAL MEDICINE

## 2022-05-26 PROCEDURE — 99214 OFFICE O/P EST MOD 30 MIN: CPT | Mod: PBBFAC | Performed by: INTERNAL MEDICINE

## 2022-05-26 PROCEDURE — 99213 PR OFFICE/OUTPT VISIT, EST, LEVL III, 20-29 MIN: ICD-10-PCS | Mod: S$PBB,,, | Performed by: INTERNAL MEDICINE

## 2022-05-26 PROCEDURE — 99999 PR PBB SHADOW E&M-EST. PATIENT-LVL IV: ICD-10-PCS | Mod: PBBFAC,,, | Performed by: INTERNAL MEDICINE

## 2022-05-26 NOTE — PROGRESS NOTES
[facility]  Gastroenterology    PCP: Tay Duff MD    5/26/22    HPI     Abdominal Pain      Additional comments: ER F/U for epigastric pain; symptoms improving on Linzess              Digestion concerns      Additional comments: Notices whole pieces of food in stool, so is concerned she is not digesting properly          Last edited by Ro Fontana MA on 5/26/2022  8:46 AM. (History)          Reason for Visit: Hospital Follow-up Epigastric Pain    Subjective:   Genny Calixto is a 56 y.o. female with PMH of IBS here for follow-up. States she has been doing well since she was discharged on Linzess 290 mcg po daily. States she is having more complete and regular bowel movements. Bentyl was stopped and she has not needed to use it. Abdominal cramping/pain is all better. She is going to gym regularly and drinking plenty of water. No complaints voiced today. Following low FODMAP diet.       Past Medical History:   Diagnosis Date    Encounter for blood transfusion     KENN (generalized anxiety disorder)     Takes 5-10 mg of valium qd prn anxiety (prescriptions for both on file, one from [facility] & other from )    Insomnia     Takes 5-10 mg of valium qhs prn insomnia (prescriptions for both on file, one from [facility] & other from )    Migraine headache     Nephrolithiasis 08/03/2019    Left ureteral stone -> UTI c/b pyelonephritis and urosepsis w/ hypokalemia -> transfered to [facility] urology service from [facility] after CT abn dx, s/p 2 stents to allow infx to drain, IV Vanc/Rocephin, fever free since yesterday    Reflex sympathetic dystrophy     UTI (urinary tract infection)     Vertigo        Past Surgical History:   Procedure Laterality Date    BLADDER SURGERY      CHOLECYSTECTOMY      COLONOSCOPY N/A 11/10/2021    Procedure: COLONOSCOPY;  Surgeon: Eryn Rothman MD;  Location: [facility];  Service: Endoscopy;  Laterality: N/A;    CYSTOSCOPY WITH URETEROSCOPY, RETROGRADE  PYELOGRAPHY, AND INSERTION OF STENT Right 9/30/2021    Procedure: CYSTOSCOPY, WITH RETROGRADE PYELOGRAM AND URETERAL STENT INSERTION;  Surgeon: Annita Gamino MD;  Location: [facility] OR;  Service: Urology;  Laterality: Right;    DIAGNOSTIC LAPAROSCOPY N/A 11/11/2020    Procedure: LAPAROSCOPY, DIAGNOSTIC;  Surgeon: Tee Segura MD;  Location: [facility] OR;  Service: General;  Laterality: N/A;  CONVERTED TO OPEN    ESOPHAGOGASTRODUODENOSCOPY N/A 11/10/2021    Procedure: EGD (ESOPHAGOGASTRODUODENOSCOPY);  Surgeon: Eryn Rothman MD;  Location: [facility] ENDO;  Service: Endoscopy;  Laterality: N/A;    facet injections  02/14/2017    L3, L4, L5, S1 Done by Dr. Lara    HYSTERECTOMY      INJECTION OF ANESTHETIC AGENT INTO TISSUE PLANE DEFINED BY TRANSVERSUS ABDOMINIS MUSCLE N/A 11/11/2020    Procedure: BLOCK, TRANSVERSUS ABDOMINIS PLANE;  Surgeon: Tee Segura MD;  Location: [facility] OR;  Service: General;  Laterality: N/A;    KNEE SURGERY      LAPAROSCOPIC LYSIS OF ADHESIONS N/A 11/11/2020    Procedure: LYSIS, ADHESIONS, LAPAROSCOPIC;  Surgeon: Tee Segura MD;  Location: [facility] OR;  Service: General;  Laterality: N/A;  CONVERTED TO OPEN    LAPAROTOMY N/A 11/20/2020    Procedure: LAPAROTOMY;  Surgeon: Tee Segura MD;  Location: [facility] OR;  Service: General;  Laterality: N/A;    LASER LITHOTRIPSY Left 2/8/2021    Procedure: LITHOTRIPSY, USING LASER;  Surgeon: Irving Kidd MD;  Location: [facility] OR;  Service: Urology;  Laterality: Left;    LASER LITHOTRIPSY Right 10/13/2021    Procedure: LITHOTRIPSY, USING LASER;  Surgeon: Annita Gamino MD;  Location: [facility] OR;  Service: Urology;  Laterality: Right;    LYSIS OF ADHESIONS N/A 11/11/2020    Procedure: LYSIS, ADHESIONS;  Surgeon: Tee Segura MD;  Location: [facility] OR;  Service: General;  Laterality: N/A;    LYSIS OF ADHESIONS N/A 11/20/2020    Procedure: LYSIS, ADHESIONS;  Surgeon: Tee Segura MD;  Location: [facility] OR;  Service:  General;  Laterality: N/A;    TONSILLECTOMY      URETEROSCOPY Left 2/8/2021    Procedure: URETEROSCOPY;  Surgeon: Irving Kidd MD;  Location: [facility] OR;  Service: Urology;  Laterality: Left;  stent placement    URETEROSCOPY Right 10/13/2021    Procedure: URETEROSCOPY;  Surgeon: Annita Gamino MD;  Location: [facility] OR;  Service: Urology;  Laterality: Right;       Current Outpatient Medications on File Prior to Visit   Medication Sig Dispense Refill    diazePAM (VALIUM) 10 MG Tab TAKE 1 TABLET BY MOUTH EVERY DAY AS NEEDED 90 tablet 1    diazePAM (VALIUM) 5 MG tablet Take one with onset of migraine 20 tablet 5    doxepin (SINEQUAN) 10 MG capsule TAKE 1 CAPSULE (10 MG TOTAL) BY MOUTH EVERY EVENING. 30 capsule 11    ergocalciferol (ERGOCALCIFEROL) 50,000 unit Cap Take 1 capsule (50,000 Units total) by mouth every 7 days. 12 capsule 3    LACTOBACILLUS COMBO NO.6 (PROBIOTIC COMPLEX ORAL) Take by mouth once daily at 6am.      linaCLOtide (LINZESS) 290 mcg Cap capsule Take 1 capsule (290 mcg total) by mouth before breakfast. for 30 doses 30 capsule 0    loratadine (CLARITIN) 10 mg tablet Take 10 mg by mouth daily      multivitamin capsule Take 1 capsule by mouth once daily.      omeprazole (PRILOSEC OTC) 20 MG tablet Take 20 mg by mouth once daily.      ondansetron (ZOFRAN-ODT) 4 MG TbDL Take 1 tablet (4 mg total) by mouth every 8 (eight) hours as needed (nausea). 20 tablet 0    pantoprazole (PROTONIX) 40 MG tablet TAKE 1 TABLET BY MOUTH EVERY DAY 90 tablet 2    spironolactone (ALDACTONE) 50 MG tablet TAKE 1 TABLET BY MOUTH ONCE DAILY THEN INCREASE TO 2 TABLETS DAILY AS TOLERATED (Patient taking differently: TAKE 1 TABLET BY MOUTH ONCE DAILY) 180 tablet 1    sumatriptan (IMITREX) 100 MG tablet TAKE 1 TABLET BY MOUTH IF NEEDED, MAY REPEAT ONCE IN 1 HOUR. MAX OF 2 TABLETS IN 24 HOURS 10 tablet 2     No current facility-administered medications on file prior to visit.       Review of patient's  allergies indicates:   Allergen Reactions    Morphine Nausea And Vomiting    Erythromycin Other (See Comments)     Stomach cramps       Social History     Socioeconomic History    Marital status:    Tobacco Use    Smoking status: Never Smoker    Smokeless tobacco: Never Used   Substance and Sexual Activity    Alcohol use: Yes     Comment: rarely    Drug use: No    Sexual activity: Never   Social History Narrative    Breakfast: Fruit & organic oatmeal; water or tea w/ lemon    Lunch: Salads: spinach leaves, iceberg lettuce, tomatoes, avaccado, light dressing or protein smoothie    Dinner: Grilled or broiled chicken or fish (no pork since 3/2017; red meat only 5 times since 3/2017)    Snacks: Fruit    Eats out: 1x/wk    Water: 96 oz/d    PA: 5-6 d/wk; basketball    Goal weight is 160 lb       Family History   Problem Relation Age of Onset    Stroke Mother     Hypertension Mother     COPD Father     Drug abuse Brother        Review of Systems   Constitutional: Negative for appetite change, fever and unexpected weight change.   HENT: Negative for postnasal drip, rhinorrhea, sneezing, sore throat and trouble swallowing.    Eyes: Negative for visual disturbance.   Respiratory: Negative for cough, shortness of breath and wheezing.    Cardiovascular: Negative for chest pain, palpitations and leg swelling.   Gastrointestinal: Negative for abdominal pain, blood in stool, constipation, diarrhea, nausea and vomiting.   Genitourinary: Negative for dysuria.   Musculoskeletal: Negative for arthralgias, joint swelling and myalgias.   Skin: Negative for color change, pallor and rash.   Neurological: Negative for weakness, light-headedness, numbness and headaches.   Hematological: Negative for adenopathy. Does not bruise/bleed easily.   Psychiatric/Behavioral: Negative for agitation.           Objective:   Vitals:   Vitals:    05/26/22 0843   BP: 124/78   Pulse: 87       Physical Exam  Vitals reviewed.    Constitutional:       General: She is not in acute distress.     Appearance: She is not diaphoretic.   HENT:      Head: Normocephalic and atraumatic.      Mouth/Throat:      Pharynx: No oropharyngeal exudate.   Eyes:      General: No scleral icterus.        Right eye: No discharge.         Left eye: No discharge.      Conjunctiva/sclera: Conjunctivae normal.      Pupils: Pupils are equal, round, and reactive to light.   Cardiovascular:      Rate and Rhythm: Normal rate and regular rhythm.      Heart sounds: Normal heart sounds. No murmur heard.    No friction rub. No gallop.   Pulmonary:      Effort: Pulmonary effort is normal. No respiratory distress.      Breath sounds: Normal breath sounds. No stridor. No wheezing or rales.   Abdominal:      General: Bowel sounds are normal. There is no distension.      Palpations: Abdomen is soft. There is no mass.      Tenderness: There is no abdominal tenderness. There is no guarding.   Musculoskeletal:         General: Normal range of motion.      Cervical back: Normal range of motion.   Skin:     General: Skin is warm and dry.      Coloration: Skin is not pale.      Findings: No erythema or rash.   Neurological:      Mental Status: She is alert and oriented to person, place, and time.             CT Abdomen Pelvis With Contrast    Result Date: 5/9/2022  EXAMINATION: CT ABDOMEN PELVIS WITH CONTRAST CLINICAL HISTORY: Bowel obstruction suspected;Abdominal pain, acute, nonlocalized; TECHNIQUE: Low dose axial images, sagittal and coronal reformations were obtained from the lung bases to the pubic symphysis following the IV administration of 100 mL of Omnipaque 350.  Oral contrast was not administered. COMPARISON: 04/19/2022 FINDINGS: Heart: Normal size. No effusion. Lung Bases: Clear. Liver: Normal size and attenuation. No focal lesions.  No evidence of mesenteric vein thrombosis.  Portal vein is patent.  Suspect flow artifact from the ELIZABETH into the splenic vein. Gallbladder:  Surgically absent. Bile Ducts: No dilatation. Pancreas: No mass. No peripancreatic fat stranding. Spleen: Normal. Adrenals: Normal. Kidneys/Ureters: Normal enhancement.  No mass or  hydroureteronephrosis.  Small left renal cyst. Bladder: No wall thickening. Reproductive organs: Not seen GI Tract/Mesentery: No evidence of bowel obstruction.  Fecalization of the small bowel without significant dilatation or obstruction could reflect slow transit time.  Anastomosis seen within the left lower quadrant and upper pelvis.  Moderate constipation within the large bowel. Peritoneal Space: No ascites or free air. Retroperitoneum: Shotty adenopathy. Abdominal wall: Normal.  Abdominal wall scar from prior surgery. Vasculature: No aneurysm. Bones: No acute fracture.  Moderate degenerative changes lower lumbar spine.  No suspicious lytic or sclerotic lesions.     Constipation and slow transit time suspected.  No obstruction is identified.  Consider follow-up small bowel follow-through. All CT scans at this facility use dose modulation, iterative reconstruction, and/or weight based dosing when appropriate to reduce radiation dose to as low as reasonable achievable. Electronically signed by: Jose Estrada MD Date:    05/09/2022 Time:    07:42      IMPRESSION     Problem List Items Addressed This Visit    None     Visit Diagnoses     Irritable bowel syndrome with constipation    -  Primary          PLANS:    - Patient doing well, would continue current routine  - Continue Linzess 290 mg po daily  - High fiber diet with plenty of fluid intake  - Will have patient RTC PRN    Irritable bowel syndrome with constipation            Eryn Rothman MD  Gastroenterology and Hepatology

## 2022-05-30 DIAGNOSIS — F41.9 ANXIETY: ICD-10-CM

## 2022-05-30 NOTE — TELEPHONE ENCOUNTER
No new care gaps identified.  [facility] Embedded Care Gaps. Reference number: [id number]. 5/30/2022   6:10:40 PM CDT

## 2022-05-31 ENCOUNTER — PATIENT MESSAGE (OUTPATIENT)
Dept: INTERNAL MEDICINE | Facility: CLINIC | Age: 57
End: 2022-05-31
Payer: MEDICARE

## 2022-05-31 ENCOUNTER — HOSPITAL ENCOUNTER (INPATIENT)
Facility: HOSPITAL | Age: 57
LOS: 3 days | Discharge: HOME | DRG: 389 | End: 2022-06-04
Attending: EMERGENCY MEDICINE | Admitting: SURGERY
Payer: MEDICARE

## 2022-05-31 DIAGNOSIS — R11.0 NAUSEA: Primary | ICD-10-CM

## 2022-05-31 DIAGNOSIS — K90.829 SHORT GUT SYNDROME: ICD-10-CM

## 2022-05-31 DIAGNOSIS — R10.84 GENERALIZED ABDOMINAL PAIN: ICD-10-CM

## 2022-05-31 DIAGNOSIS — K56.609 SBO (SMALL BOWEL OBSTRUCTION): ICD-10-CM

## 2022-05-31 DIAGNOSIS — F41.9 ANXIETY: ICD-10-CM

## 2022-05-31 DIAGNOSIS — K56.609 SMALL BOWEL OBSTRUCTION: Primary | ICD-10-CM

## 2022-05-31 LAB
ALBUMIN SERPL BCP-MCNC: 4.5 G/DL (ref 3.5–5.2)
ALP SERPL-CCNC: 67 U/L (ref 55–135)
ALT SERPL W/O P-5'-P-CCNC: 14 U/L (ref 10–44)
ANION GAP SERPL CALC-SCNC: 15 MMOL/L (ref 8–16)
AST SERPL-CCNC: 20 U/L (ref 10–40)
BASOPHILS # BLD AUTO: 0.08 K/UL (ref 0–0.2)
BASOPHILS NFR BLD: 0.7 % (ref 0–1.9)
BILIRUB SERPL-MCNC: 1.3 MG/DL (ref 0.1–1)
BILIRUB UR QL STRIP: NEGATIVE
BUN SERPL-MCNC: 11 MG/DL (ref 6–20)
CALCIUM SERPL-MCNC: 9.7 MG/DL (ref 8.7–10.5)
CHLORIDE SERPL-SCNC: 105 MMOL/L (ref 95–110)
CLARITY UR: CLEAR
CO2 SERPL-SCNC: 23 MMOL/L (ref 23–29)
COLOR UR: YELLOW
CREAT SERPL-MCNC: 0.7 MG/DL (ref 0.5–1.4)
CTP QC/QA: YES
DIFFERENTIAL METHOD: ABNORMAL
EOSINOPHIL # BLD AUTO: 0.1 K/UL (ref 0–0.5)
EOSINOPHIL NFR BLD: 0.8 % (ref 0–8)
ERYTHROCYTE [DISTWIDTH] IN BLOOD BY AUTOMATED COUNT: 12.2 % (ref 11.5–14.5)
EST. GFR  (AFRICAN AMERICAN): >60 ML/MIN/1.73 M^2
EST. GFR  (NON AFRICAN AMERICAN): >60 ML/MIN/1.73 M^2
GLUCOSE SERPL-MCNC: 109 MG/DL (ref 70–110)
GLUCOSE UR QL STRIP: NEGATIVE
HCT VFR BLD AUTO: 43.1 % (ref 37–48.5)
HGB BLD-MCNC: 15.2 G/DL (ref 12–16)
HGB UR QL STRIP: NEGATIVE
IMM GRANULOCYTES # BLD AUTO: 0.04 K/UL (ref 0–0.04)
IMM GRANULOCYTES NFR BLD AUTO: 0.3 % (ref 0–0.5)
KETONES UR QL STRIP: NEGATIVE
LACTATE SERPL-SCNC: 1.2 MMOL/L (ref 0.5–2.2)
LEUKOCYTE ESTERASE UR QL STRIP: NEGATIVE
LIPASE SERPL-CCNC: 20 U/L (ref 4–60)
LYMPHOCYTES # BLD AUTO: 1.2 K/UL (ref 1–4.8)
LYMPHOCYTES NFR BLD: 10.5 % (ref 18–48)
MCH RBC QN AUTO: 33.7 PG (ref 27–31)
MCHC RBC AUTO-ENTMCNC: 35.3 G/DL (ref 32–36)
MCV RBC AUTO: 96 FL (ref 82–98)
MONOCYTES # BLD AUTO: 0.6 K/UL (ref 0.3–1)
MONOCYTES NFR BLD: 5.4 % (ref 4–15)
NEUTROPHILS # BLD AUTO: 9.5 K/UL (ref 1.8–7.7)
NEUTROPHILS NFR BLD: 82.3 % (ref 38–73)
NITRITE UR QL STRIP: NEGATIVE
NRBC BLD-RTO: 0 /100 WBC
PH UR STRIP: 6 [PH] (ref 5–8)
PLATELET # BLD AUTO: 249 K/UL (ref 150–450)
PMV BLD AUTO: 11.3 FL (ref 9.2–12.9)
POTASSIUM SERPL-SCNC: 3.7 MMOL/L (ref 3.5–5.1)
PROT SERPL-MCNC: 7.3 G/DL (ref 6–8.4)
PROT UR QL STRIP: NEGATIVE
RBC # BLD AUTO: 4.51 M/UL (ref 4–5.4)
SARS-COV-2 RDRP RESP QL NAA+PROBE: NEGATIVE
SODIUM SERPL-SCNC: 143 MMOL/L (ref 136–145)
SP GR UR STRIP: 1.02 (ref 1–1.03)
URN SPEC COLLECT METH UR: NORMAL
UROBILINOGEN UR STRIP-ACNC: NEGATIVE EU/DL
WBC # BLD AUTO: 11.53 K/UL (ref 3.9–12.7)

## 2022-05-31 PROCEDURE — 96374 THER/PROPH/DIAG INJ IV PUSH: CPT

## 2022-05-31 PROCEDURE — G0378 HOSPITAL OBSERVATION PER HR: HCPCS

## 2022-05-31 PROCEDURE — 99222 PR INITIAL HOSPITAL CARE,LEVL II: ICD-10-PCS | Mod: AI,,, | Performed by: SURGERY

## 2022-05-31 PROCEDURE — 85025 COMPLETE CBC W/AUTO DIFF WBC: CPT | Performed by: NURSE PRACTITIONER

## 2022-05-31 PROCEDURE — 96376 TX/PRO/DX INJ SAME DRUG ADON: CPT

## 2022-05-31 PROCEDURE — 80053 COMPREHEN METABOLIC PANEL: CPT | Performed by: NURSE PRACTITIONER

## 2022-05-31 PROCEDURE — 83605 ASSAY OF LACTIC ACID: CPT | Performed by: EMERGENCY MEDICINE

## 2022-05-31 PROCEDURE — 99285 EMERGENCY DEPT VISIT HI MDM: CPT | Mod: 25

## 2022-05-31 PROCEDURE — 25000003 PHARM REV CODE 250: Performed by: EMERGENCY MEDICINE

## 2022-05-31 PROCEDURE — 99222 1ST HOSP IP/OBS MODERATE 55: CPT | Mod: AI,,, | Performed by: SURGERY

## 2022-05-31 PROCEDURE — 63600175 PHARM REV CODE 636 W HCPCS: Performed by: EMERGENCY MEDICINE

## 2022-05-31 PROCEDURE — 25000003 PHARM REV CODE 250: Performed by: SURGERY

## 2022-05-31 PROCEDURE — 36415 COLL VENOUS BLD VENIPUNCTURE: CPT | Performed by: EMERGENCY MEDICINE

## 2022-05-31 PROCEDURE — 63600175 PHARM REV CODE 636 W HCPCS: Performed by: SURGERY

## 2022-05-31 PROCEDURE — 81003 URINALYSIS AUTO W/O SCOPE: CPT | Performed by: NURSE PRACTITIONER

## 2022-05-31 PROCEDURE — 96375 TX/PRO/DX INJ NEW DRUG ADDON: CPT

## 2022-05-31 PROCEDURE — 63600175 PHARM REV CODE 636 W HCPCS: Performed by: NURSE PRACTITIONER

## 2022-05-31 PROCEDURE — U0002 COVID-19 LAB TEST NON-CDC: HCPCS | Performed by: EMERGENCY MEDICINE

## 2022-05-31 PROCEDURE — 83690 ASSAY OF LIPASE: CPT | Performed by: NURSE PRACTITIONER

## 2022-05-31 RX ORDER — ONDANSETRON 2 MG/ML
4 INJECTION INTRAMUSCULAR; INTRAVENOUS
Status: COMPLETED | OUTPATIENT
Start: 2022-05-31 | End: 2022-05-31

## 2022-05-31 RX ORDER — HYDROMORPHONE HYDROCHLORIDE 2 MG/ML
1 INJECTION, SOLUTION INTRAMUSCULAR; INTRAVENOUS; SUBCUTANEOUS
Status: DISCONTINUED | OUTPATIENT
Start: 2022-05-31 | End: 2022-06-03

## 2022-05-31 RX ORDER — LORAZEPAM 2 MG/ML
0.5 INJECTION INTRAMUSCULAR
Status: COMPLETED | OUTPATIENT
Start: 2022-05-31 | End: 2022-05-31

## 2022-05-31 RX ORDER — DOXEPIN HYDROCHLORIDE 10 MG/1
10 CAPSULE ORAL NIGHTLY
Qty: 90 CAPSULE | Refills: 3 | Status: SHIPPED | OUTPATIENT
Start: 2022-05-31 | End: 2022-09-26

## 2022-05-31 RX ORDER — HYDROMORPHONE HYDROCHLORIDE 1 MG/ML
1 INJECTION, SOLUTION INTRAMUSCULAR; INTRAVENOUS; SUBCUTANEOUS
Status: COMPLETED | OUTPATIENT
Start: 2022-05-31 | End: 2022-05-31

## 2022-05-31 RX ORDER — ONDANSETRON 4 MG/1
4 TABLET, FILM COATED ORAL 2 TIMES DAILY
Qty: 30 TABLET | Refills: 0 | Status: SHIPPED | OUTPATIENT
Start: 2022-05-31 | End: 2022-10-03

## 2022-05-31 RX ORDER — LIDOCAINE HYDROCHLORIDE 10 MG/ML
1 INJECTION, SOLUTION EPIDURAL; INFILTRATION; INTRACAUDAL; PERINEURAL ONCE
Status: DISCONTINUED | OUTPATIENT
Start: 2022-05-31 | End: 2022-06-02

## 2022-05-31 RX ORDER — DIAZEPAM 5 MG/1
10 TABLET ORAL NIGHTLY PRN
Status: DISCONTINUED | OUTPATIENT
Start: 2022-05-31 | End: 2022-06-04 | Stop reason: HOSPADM

## 2022-05-31 RX ORDER — KETOROLAC TROMETHAMINE 30 MG/ML
15 INJECTION, SOLUTION INTRAMUSCULAR; INTRAVENOUS ONCE
Status: DISCONTINUED | OUTPATIENT
Start: 2022-05-31 | End: 2022-05-31

## 2022-05-31 RX ORDER — HYDROMORPHONE HYDROCHLORIDE 2 MG/ML
0.5 INJECTION, SOLUTION INTRAMUSCULAR; INTRAVENOUS; SUBCUTANEOUS
Status: DISCONTINUED | OUTPATIENT
Start: 2022-05-31 | End: 2022-06-03

## 2022-05-31 RX ORDER — SUMATRIPTAN SUCCINATE 100 MG/1
TABLET ORAL
Qty: 10 TABLET | Refills: 2 | Status: SHIPPED | OUTPATIENT
Start: 2022-05-31 | End: 2022-11-14 | Stop reason: SDUPTHER

## 2022-05-31 RX ORDER — ONDANSETRON 2 MG/ML
4 INJECTION INTRAMUSCULAR; INTRAVENOUS EVERY 8 HOURS PRN
Status: DISCONTINUED | OUTPATIENT
Start: 2022-05-31 | End: 2022-06-01

## 2022-05-31 RX ORDER — SODIUM CHLORIDE, SODIUM LACTATE, POTASSIUM CHLORIDE, CALCIUM CHLORIDE 600; 310; 30; 20 MG/100ML; MG/100ML; MG/100ML; MG/100ML
INJECTION, SOLUTION INTRAVENOUS CONTINUOUS
Status: DISCONTINUED | OUTPATIENT
Start: 2022-05-31 | End: 2022-06-03

## 2022-05-31 RX ORDER — DIAZEPAM 5 MG/1
5 TABLET ORAL EVERY 6 HOURS PRN
Status: CANCELLED | OUTPATIENT
Start: 2022-05-31

## 2022-05-31 RX ADMIN — DIATRIZOATE MEGLUMINE AND DIATRIZOATE SODIUM 120 ML: 660; 100 LIQUID ORAL; RECTAL at 11:05

## 2022-05-31 RX ADMIN — HYDROMORPHONE HYDROCHLORIDE 1 MG: 2 INJECTION INTRAMUSCULAR; INTRAVENOUS; SUBCUTANEOUS at 09:05

## 2022-05-31 RX ADMIN — HYDROMORPHONE HYDROCHLORIDE 1 MG: 1 INJECTION, SOLUTION INTRAMUSCULAR; INTRAVENOUS; SUBCUTANEOUS at 03:05

## 2022-05-31 RX ADMIN — LORAZEPAM 0.5 MG: 2 INJECTION INTRAMUSCULAR; INTRAVENOUS at 03:05

## 2022-05-31 RX ADMIN — PROMETHAZINE HYDROCHLORIDE 6.25 MG: 25 INJECTION INTRAMUSCULAR; INTRAVENOUS at 09:05

## 2022-05-31 RX ADMIN — BENZOCAINE: 200 SPRAY DENTAL; ORAL; PERIODONTAL at 03:05

## 2022-05-31 RX ADMIN — ONDANSETRON 4 MG: 2 INJECTION INTRAMUSCULAR; INTRAVENOUS at 01:05

## 2022-05-31 RX ADMIN — HYDROMORPHONE HYDROCHLORIDE 1 MG: 1 INJECTION, SOLUTION INTRAMUSCULAR; INTRAVENOUS; SUBCUTANEOUS at 05:05

## 2022-05-31 RX ADMIN — SODIUM CHLORIDE, SODIUM LACTATE, POTASSIUM CHLORIDE, AND CALCIUM CHLORIDE: .6; .31; .03; .02 INJECTION, SOLUTION INTRAVENOUS at 09:05

## 2022-05-31 RX ADMIN — DIAZEPAM 10 MG: 5 TABLET ORAL at 11:05

## 2022-05-31 RX ADMIN — ONDANSETRON 4 MG: 2 INJECTION INTRAMUSCULAR; INTRAVENOUS at 10:05

## 2022-05-31 RX ADMIN — HYDROMORPHONE HYDROCHLORIDE 1 MG: 1 INJECTION, SOLUTION INTRAMUSCULAR; INTRAVENOUS; SUBCUTANEOUS at 01:05

## 2022-05-31 RX ADMIN — ONDANSETRON 4 MG: 2 INJECTION INTRAMUSCULAR; INTRAVENOUS at 05:05

## 2022-05-31 NOTE — FIRST PROVIDER EVALUATION
Medical screening exam completed.  I have conducted a focused provider triage encounter, findings are as follows:    Brief history of present illness:  56-year-old female presents the emergency department for abdominal pain, nausea vomiting.    There were no vitals filed for this visit.    Pertinent physical exam:  Patient appears uncomfortable, distended abdomen    Brief workup plan:  Labs, UA, imaging, meds    Preliminary workup initiated; this workup will be continued and followed by the physician or advanced practice provider that is assigned to the patient when roomed.

## 2022-05-31 NOTE — ED PROVIDER NOTES
SCRIBE #1 NOTE: I, Piyush Long, am scribing for, and in the presence of, Mario Gallardo MD. I have scribed the entire note.      History      Chief Complaint   Patient presents with    Abdominal Pain     Epigastric abdominal pain since early this morning, states pain is similar to SBO/peritonitis (pt has hx of both), also states abdomen is distended, fever at home       Review of patient's allergies indicates:   Allergen Reactions    Morphine Nausea And Vomiting    Erythromycin Other (See Comments)     Stomach cramps        HPI   HPI    5/31/2022, 1:09 PM   History obtained from the patient      History of Present Illness: Genny Calixto is a 56 y.o. female patient who presents to the Emergency Department for epigastric abdominal pain, onset this morning. which onset gradually. Symptoms are constant and moderate in severity. No mitigating or exacerbating factors reported. Associated sxs include n/v and dysuria. Patient denies any fever, chills, SOB, CP, weakness, numbness, dizziness, headache, and all other sxs at this time. Pt currently follows with Dr. Rothman (Gastroenterology). No further complaints or concerns at this time.     Arrival mode: Personal vehicle    PCP: Tay Duff MD       Past Medical History:  Past Medical History:   Diagnosis Date    Encounter for blood transfusion     KENN (generalized anxiety disorder)     Takes 5-10 mg of valium qd prn anxiety (prescriptions for both on file, one from [facility] & other from )    Insomnia     Takes 5-10 mg of valium qhs prn insomnia (prescriptions for both on file, one from [facility] & other from )    Migraine headache     Nephrolithiasis 08/03/2019    Left ureteral stone -> UTI c/b pyelonephritis and urosepsis w/ hypokalemia -> transfered to [facility] urology service from [facility] after CT abn dx, s/p 2 stents to allow infx to drain, IV Vanc/Rocephin, fever free since yesterday    Reflex sympathetic dystrophy     UTI (urinary tract infection)      Vertigo        Past Surgical History:  Past Surgical History:   Procedure Laterality Date    BLADDER SURGERY      CHOLECYSTECTOMY      COLONOSCOPY N/A 11/10/2021    Procedure: COLONOSCOPY;  Surgeon: Eryn Rothman MD;  Location: [facility];  Service: Endoscopy;  Laterality: N/A;    CYSTOSCOPY WITH URETEROSCOPY, RETROGRADE PYELOGRAPHY, AND INSERTION OF STENT Right 9/30/2021    Procedure: CYSTOSCOPY, WITH RETROGRADE PYELOGRAM AND URETERAL STENT INSERTION;  Surgeon: Annita Gamino MD;  Location: [facility];  Service: Urology;  Laterality: Right;    DIAGNOSTIC LAPAROSCOPY N/A 11/11/2020    Procedure: LAPAROSCOPY, DIAGNOSTIC;  Surgeon: Tee Segura MD;  Location: [facility];  Service: General;  Laterality: N/A;  CONVERTED TO OPEN    ESOPHAGOGASTRODUODENOSCOPY N/A 11/10/2021    Procedure: EGD (ESOPHAGOGASTRODUODENOSCOPY);  Surgeon: Eryn Rothman MD;  Location: [facility];  Service: Endoscopy;  Laterality: N/A;    facet injections  02/14/2017    L3, L4, L5, S1 Done by Dr. Lara    HYSTERECTOMY      INJECTION OF ANESTHETIC AGENT INTO TISSUE PLANE DEFINED BY TRANSVERSUS ABDOMINIS MUSCLE N/A 11/11/2020    Procedure: BLOCK, TRANSVERSUS ABDOMINIS PLANE;  Surgeon: Tee Segura MD;  Location: [facility];  Service: General;  Laterality: N/A;    KNEE SURGERY      LAPAROSCOPIC LYSIS OF ADHESIONS N/A 11/11/2020    Procedure: LYSIS, ADHESIONS, LAPAROSCOPIC;  Surgeon: Tee Segura MD;  Location: [facility];  Service: General;  Laterality: N/A;  CONVERTED TO OPEN    LAPAROTOMY N/A 11/20/2020    Procedure: LAPAROTOMY;  Surgeon: Tee Segura MD;  Location: [facility];  Service: General;  Laterality: N/A;    LASER LITHOTRIPSY Left 2/8/2021    Procedure: LITHOTRIPSY, USING LASER;  Surgeon: Irving Kidd MD;  Location: [facility];  Service: Urology;  Laterality: Left;    LASER LITHOTRIPSY Right 10/13/2021    Procedure: LITHOTRIPSY, USING LASER;  Surgeon: Annita Gamino MD;  Location: [facility];   Service: Urology;  Laterality: Right;    LYSIS OF ADHESIONS N/A 11/11/2020    Procedure: LYSIS, ADHESIONS;  Surgeon: Tee Segura MD;  Location: [facility];  Service: General;  Laterality: N/A;    LYSIS OF ADHESIONS N/A 11/20/2020    Procedure: LYSIS, ADHESIONS;  Surgeon: Tee Segura MD;  Location: [facility];  Service: General;  Laterality: N/A;    TONSILLECTOMY      URETEROSCOPY Left 2/8/2021    Procedure: URETEROSCOPY;  Surgeon: Irving Kidd MD;  Location: [facility];  Service: Urology;  Laterality: Left;  stent placement    URETEROSCOPY Right 10/13/2021    Procedure: URETEROSCOPY;  Surgeon: Annita Gamino MD;  Location: [facility];  Service: Urology;  Laterality: Right;         Family History:  Family History   Problem Relation Age of Onset    Stroke Mother     Hypertension Mother     COPD Father     Drug abuse Brother        Social History:  Social History     Tobacco Use    Smoking status: Never Smoker    Smokeless tobacco: Never Used   Substance and Sexual Activity    Alcohol use: Yes     Comment: rarely    Drug use: No    Sexual activity: Never       ROS   Review of Systems   Constitutional: Negative for chills and fever.   HENT: Negative for sore throat.    Respiratory: Negative for shortness of breath.    Cardiovascular: Negative for chest pain.   Gastrointestinal: Positive for abdominal pain (epigastric), nausea and vomiting. Negative for diarrhea.   Genitourinary: Positive for dysuria.   Musculoskeletal: Negative for back pain.   Skin: Negative for rash.   Neurological: Negative for dizziness, weakness, light-headedness, numbness and headaches.   Hematological: Does not bruise/bleed easily.   All other systems reviewed and are negative.    Physical Exam      Initial Vitals [05/31/22 1228]   BP Pulse Resp Temp SpO2   (!) 143/83 98 20 99.3 °F (37.4 °C) 98 %      MAP       --          Physical Exam  Nursing Notes and Vital Signs Reviewed.  Constitutional: Patient is in mod  distress. Well-developed and well-nourished.  Head: Atraumatic. Normocephalic.  Eyes: PERRL. EOM intact. Conjunctivae are not pale. No scleral icterus.  ENT: Mucous membranes are moist. Oropharynx is clear and symmetric.    Neck: Supple. Full ROM.   Cardiovascular: Regular rate. Regular rhythm. No murmurs, rubs, or gallops. Distal pulses are 2+ and symmetric.  Pulmonary/Chest: No respiratory distress. Clear to auscultation bilaterally. No wheezing or rales.  Abdominal: Mildly distended. There is diffuse tenderness c gaurding.  No rebound or rigidity. Decreased bowel sounds but not absent.  Musculoskeletal: Moves all extremities. No obvious deformities. No edema.  Skin: Warm and dry.  Neurological:  Alert, awake, and appropriate.  Normal speech.  No acute focal neurological deficits are appreciated.  Psychiatric: Normal affect. Good eye contact. Appropriate in content.    ED Course    Procedures  ED Vital Signs:  Vitals:    05/31/22 1228 05/31/22 1338 05/31/22 1510   BP: (!) 143/83     Pulse: 98     Resp: 20 20 18   Temp: 99.3 °F (37.4 °C)     TempSrc: Oral     SpO2: 98%     Weight: 68.1 kg (150 lb 0.7 oz)         Abnormal Lab Results:  Labs Reviewed   CBC W/ AUTO DIFFERENTIAL - Abnormal; Notable for the following components:       Result Value    MCH 33.7 (*)     Gran # (ANC) 9.5 (*)     Gran % 82.3 (*)     Lymph % 10.5 (*)     All other components within normal limits   COMPREHENSIVE METABOLIC PANEL - Abnormal; Notable for the following components:    Total Bilirubin 1.3 (*)     All other components within normal limits   LIPASE   URINALYSIS, REFLEX TO URINE CULTURE   LACTIC ACID, PLASMA        All Lab Results:  Results for orders placed or performed during the hospital encounter of 05/31/22   CBC auto differential   Result Value Ref Range    WBC 11.53 3.90 - 12.70 K/uL    RBC 4.51 4.00 - 5.40 M/uL    Hemoglobin 15.2 12.0 - 16.0 g/dL    Hematocrit 43.1 37.0 - 48.5 %    MCV 96 82 - 98 fL    MCH 33.7 (H) 27.0 - 31.0  pg    MCHC 35.3 32.0 - 36.0 g/dL    RDW 12.2 11.5 - 14.5 %    Platelets 249 150 - 450 K/uL    MPV 11.3 9.2 - 12.9 fL    Immature Granulocytes 0.3 0.0 - 0.5 %    Gran # (ANC) 9.5 (H) 1.8 - 7.7 K/uL    Immature Grans (Abs) 0.04 0.00 - 0.04 K/uL    Lymph # 1.2 1.0 - 4.8 K/uL    Mono # 0.6 0.3 - 1.0 K/uL    Eos # 0.1 0.0 - 0.5 K/uL    Baso # 0.08 0.00 - 0.20 K/uL    nRBC 0 0 /100 WBC    Gran % 82.3 (H) 38.0 - 73.0 %    Lymph % 10.5 (L) 18.0 - 48.0 %    Mono % 5.4 4.0 - 15.0 %    Eosinophil % 0.8 0.0 - 8.0 %    Basophil % 0.7 0.0 - 1.9 %    Differential Method Automated    Comprehensive metabolic panel   Result Value Ref Range    Sodium 143 136 - 145 mmol/L    Potassium 3.7 3.5 - 5.1 mmol/L    Chloride 105 95 - 110 mmol/L    CO2 23 23 - 29 mmol/L    Glucose 109 70 - 110 mg/dL    BUN 11 6 - 20 mg/dL    Creatinine 0.7 0.5 - 1.4 mg/dL    Calcium 9.7 8.7 - 10.5 mg/dL    Total Protein 7.3 6.0 - 8.4 g/dL    Albumin 4.5 3.5 - 5.2 g/dL    Total Bilirubin 1.3 (H) 0.1 - 1.0 mg/dL    Alkaline Phosphatase 67 55 - 135 U/L    AST 20 10 - 40 U/L    ALT 14 10 - 44 U/L    Anion Gap 15 8 - 16 mmol/L    eGFR if African American >60 >60 mL/min/1.73 m^2    eGFR if non African American >60 >60 mL/min/1.73 m^2   Lipase   Result Value Ref Range    Lipase 20 4 - 60 U/L     Imaging Results:  Imaging Results          CT Abdomen Pelvis  Without Contrast (Final result)  Result time 05/31/22 14:02:39    Final result by Andrew Mccormack MD (05/31/22 14:02:39)                 Impression:      1.  Detrimental change.  Markedly dilated loops of small bowel within the central lower abdomen and pelvis, air/fluid/stool filled, with surrounding inflammatory changes.  These measure up to 4.8 cm in diameter.  The proximal and distal ends of the dilated loops of bowel occur at the 2 areas of postoperative change in the small bowel.  Findings are concerning for a closed loop obstruction.  Negative for free air or focal drainable fluid collection to suggest  perforation.  Negative for pneumatosis.    2. There is fluid in nondilated proximal colon, which can be seen with gastroenteritis or other diarrheal disease.  Clinical correlation is advised.    3.  Negative for acute process otherwise.  Normal appendix.  Negative for obstructing renal stones or hydronephrosis.  No other inflammatory changes are seen.  4.  Stable and nonemergent findings as described above.    All CT scans at this facility are performed  using dose modulation techniques as appropriate to performed exam including the following:  automated exposure control; adjustment of mA and/or kV according to the patients size (this includes techniques or standardized protocols for targeted exams where dose is matched to indication/reason for exam: i.e. extremities or head);  iterative reconstruction technique.      Electronically signed by: Andrew Mccormack MD  Date:    05/31/2022  Time:    14:02             Narrative:    EXAMINATION:  CT ABDOMEN PELVIS WITHOUT CONTRAST, multiplanar reconstructions    CLINICAL HISTORY:  Abdominal pain with distention;    TECHNIQUE:  Axial images through the abdomen and pelvis were obtained without the use of IV contrast.  Sagittal and coronal reconstructions are provided for review.  Oral contrast was not utilized.    COMPARISON:  May 9, 2022    FINDINGS:  LUNG BASES: Stable dependent atelectasis in the left greater than right lung bases.  Lung bases are free of new pulmonary opacities.  Negative for pleural effusions. The distal esophagus is normal.  Trace amount of pericardial fluid again seen.    ABDOMEN: Stable cholecystectomy clips.  The liver and spleen appear normal.  The pancreas is unremarkable.  The adrenal glands are normal.    Stable inferior pole 1.5 cm left renal cyst.  Punctate nonobstructing inferior pole left renal stone.  Kidneys are otherwise normal in appearance.    Negative for adenopathy or ascites noted within the abdomen or pelvis.  Mild vascular  calcifications are present without aneurysmal changes.    There are focally dilated air, fluid and stool filled loops of small bowel within the central lower abdomen and pelvis measuring up to 4.8 cm in diameter, with surrounding inflammatory changes.  There are postoperative changes to the proximal and distal ends of the dilated loops of bowel.  The loops of bowel leading into and out of these focally dilated loops are normal in caliber.  There is fluid within the proximal half of the colon.  The colon is otherwise normal.  Normal appendix.    There is high-density material within the stomach and proximal colon, likely something ingested such as Pepto-Bismol.  Negative for free air.    PELVIS: The urinary bladder is unremarkable.  The uterus is absent.  The rest of the female pelvic organs are normal. There are pelvic phleboliths.    There are postoperative changes to the abdominal wall.  The abdominal wall is intact.    No significant osseous abnormality is identified.  Negative for significant spinal canal stenosis. Similar degree of multilevel marginal spondylosis and degenerative facet arthropathy with vacuum phenomenon involving multiple lower thoracic and lumbar vertebra.  There is disc height reduction at multiple levels most significantly involving L5/S1.  Stable osteopenia.  Stable convex left curvature of the lumbar spine.  Stable mild degenerative changes of the pelvis.    Negative for groin adenopathy.                                        The Emergency Provider reviewed the vital signs and test results, which are outlined above.    ED Discussion   Will NGT order for decompression.      3:15 PM: Discussed pt's case with Dr. Hightower (General Surgery) who recommends admission to his service. Dr. Hightower agrees with current care and management of pt and accepts admission.   Admitting Service: General Surgery  Admitting Physician: Dr. Hightower  Admit to: Obs Med Surg    3:16 PM: Re-evaluated pt. I have discussed  test results, shared treatment plan, and the need for admission with patient and family at bedside. Pt and family express understanding at this time and agree with all information. All questions answered. Pt and family have no further questions or concerns at this time. Pt is ready for admit.           ED Medication(s):  Medications   sodium chloride 0.9% bolus 1,000 mL (1,000 mLs Intravenous Bolus from Bag 5/31/22 1510)   benzocaine 20 % mouth spray (has no administration in time range)   ondansetron injection 4 mg (4 mg Intravenous Given 5/31/22 1328)   HYDROmorphone injection 1 mg (1 mg Intravenous Given 5/31/22 1338)   HYDROmorphone injection 1 mg (1 mg Intravenous Given 5/31/22 1510)     New Prescriptions    No medications on file           Medical Decision Making    Medical Decision Making:   Clinical Tests:   Lab Tests: Ordered and Reviewed  Radiological Study: Ordered and Reviewed           Scribe Attestation:   Scribe #1: I performed the above scribed service and the documentation accurately describes the services I performed. I attest to the accuracy of the note.    Attending:   Physician Attestation Statement for Scribe #1: I, Mario Gallardo MD, personally performed the services described in this documentation, as scribed by Piyush Long, in my presence, and it is both accurate and complete.          Clinical Impression       ICD-10-CM ICD-9-CM   1. Small bowel obstruction  K56.609 560.9   Final diagnoses:  [K56.609] Small bowel obstruction (Primary)  [R10.84] Generalized abdominal pain     Disposition:   Disposition: Placed in Observation  Condition: Fair         Mario Gallardo MD  06/01/22 8430

## 2022-06-01 ENCOUNTER — PATIENT MESSAGE (OUTPATIENT)
Dept: GASTROENTEROLOGY | Facility: CLINIC | Age: 57
End: 2022-06-01
Payer: MEDICARE

## 2022-06-01 ENCOUNTER — PATIENT MESSAGE (OUTPATIENT)
Dept: INTERNAL MEDICINE | Facility: CLINIC | Age: 57
End: 2022-06-01
Payer: MEDICARE

## 2022-06-01 LAB
ANION GAP SERPL CALC-SCNC: 10 MMOL/L (ref 8–16)
BASOPHILS # BLD AUTO: 0.03 K/UL (ref 0–0.2)
BASOPHILS NFR BLD: 0.5 % (ref 0–1.9)
BUN SERPL-MCNC: 11 MG/DL (ref 6–20)
CALCIUM SERPL-MCNC: 8.9 MG/DL (ref 8.7–10.5)
CHLORIDE SERPL-SCNC: 106 MMOL/L (ref 95–110)
CO2 SERPL-SCNC: 30 MMOL/L (ref 23–29)
CREAT SERPL-MCNC: 0.6 MG/DL (ref 0.5–1.4)
DIFFERENTIAL METHOD: ABNORMAL
EOSINOPHIL # BLD AUTO: 0.2 K/UL (ref 0–0.5)
EOSINOPHIL NFR BLD: 2.6 % (ref 0–8)
ERYTHROCYTE [DISTWIDTH] IN BLOOD BY AUTOMATED COUNT: 12.8 % (ref 11.5–14.5)
EST. GFR  (AFRICAN AMERICAN): >60 ML/MIN/1.73 M^2
EST. GFR  (NON AFRICAN AMERICAN): >60 ML/MIN/1.73 M^2
GLUCOSE SERPL-MCNC: 95 MG/DL (ref 70–110)
HCT VFR BLD AUTO: 41.1 % (ref 37–48.5)
HGB BLD-MCNC: 13.6 G/DL (ref 12–16)
IMM GRANULOCYTES # BLD AUTO: 0.02 K/UL (ref 0–0.04)
IMM GRANULOCYTES NFR BLD AUTO: 0.4 % (ref 0–0.5)
LYMPHOCYTES # BLD AUTO: 1.3 K/UL (ref 1–4.8)
LYMPHOCYTES NFR BLD: 22.5 % (ref 18–48)
MAGNESIUM SERPL-MCNC: 2 MG/DL (ref 1.6–2.6)
MCH RBC QN AUTO: 33.9 PG (ref 27–31)
MCHC RBC AUTO-ENTMCNC: 33.1 G/DL (ref 32–36)
MCV RBC AUTO: 103 FL (ref 82–98)
MONOCYTES # BLD AUTO: 0.5 K/UL (ref 0.3–1)
MONOCYTES NFR BLD: 9.1 % (ref 4–15)
NEUTROPHILS # BLD AUTO: 3.7 K/UL (ref 1.8–7.7)
NEUTROPHILS NFR BLD: 64.9 % (ref 38–73)
NRBC BLD-RTO: 0 /100 WBC
PLATELET # BLD AUTO: 197 K/UL (ref 150–450)
PMV BLD AUTO: 11.6 FL (ref 9.2–12.9)
POTASSIUM SERPL-SCNC: 3.4 MMOL/L (ref 3.5–5.1)
RBC # BLD AUTO: 4.01 M/UL (ref 4–5.4)
SODIUM SERPL-SCNC: 146 MMOL/L (ref 136–145)
WBC # BLD AUTO: 5.7 K/UL (ref 3.9–12.7)

## 2022-06-01 PROCEDURE — 25000003 PHARM REV CODE 250

## 2022-06-01 PROCEDURE — 36415 COLL VENOUS BLD VENIPUNCTURE: CPT | Performed by: SURGERY

## 2022-06-01 PROCEDURE — 63600175 PHARM REV CODE 636 W HCPCS

## 2022-06-01 PROCEDURE — 25000003 PHARM REV CODE 250: Performed by: SURGERY

## 2022-06-01 PROCEDURE — 85025 COMPLETE CBC W/AUTO DIFF WBC: CPT | Performed by: SURGERY

## 2022-06-01 PROCEDURE — 63600175 PHARM REV CODE 636 W HCPCS: Performed by: SURGERY

## 2022-06-01 PROCEDURE — 80048 BASIC METABOLIC PNL TOTAL CA: CPT | Performed by: SURGERY

## 2022-06-01 PROCEDURE — 11000001 HC ACUTE MED/SURG PRIVATE ROOM

## 2022-06-01 PROCEDURE — 25500020 PHARM REV CODE 255: Performed by: SURGERY

## 2022-06-01 PROCEDURE — 83735 ASSAY OF MAGNESIUM: CPT | Performed by: SURGERY

## 2022-06-01 RX ORDER — SYRING-NEEDL,DISP,INSUL,0.3 ML 29 G X1/2"
296 SYRINGE, EMPTY DISPOSABLE MISCELLANEOUS ONCE
Status: DISCONTINUED | OUTPATIENT
Start: 2022-06-01 | End: 2022-06-04 | Stop reason: HOSPADM

## 2022-06-01 RX ORDER — FAMOTIDINE 20 MG/50ML
20 INJECTION, SOLUTION INTRAVENOUS ONCE
Status: COMPLETED | OUTPATIENT
Start: 2022-06-02 | End: 2022-06-02

## 2022-06-01 RX ORDER — DIPHENHYDRAMINE HCL 25 MG
25 CAPSULE ORAL EVERY 6 HOURS PRN
Status: DISCONTINUED | OUTPATIENT
Start: 2022-06-02 | End: 2022-06-01

## 2022-06-01 RX ORDER — PANTOPRAZOLE SODIUM 40 MG/1
40 TABLET, DELAYED RELEASE ORAL DAILY
Status: DISCONTINUED | OUTPATIENT
Start: 2022-06-01 | End: 2022-06-04 | Stop reason: HOSPADM

## 2022-06-01 RX ORDER — SUMATRIPTAN 50 MG/1
100 TABLET, FILM COATED ORAL
Status: DISCONTINUED | OUTPATIENT
Start: 2022-06-01 | End: 2022-06-04 | Stop reason: HOSPADM

## 2022-06-01 RX ORDER — DIPHENHYDRAMINE HYDROCHLORIDE 50 MG/ML
25 INJECTION INTRAMUSCULAR; INTRAVENOUS EVERY 6 HOURS PRN
Status: DISCONTINUED | OUTPATIENT
Start: 2022-06-02 | End: 2022-06-04 | Stop reason: HOSPADM

## 2022-06-01 RX ORDER — ONDANSETRON 2 MG/ML
8 INJECTION INTRAMUSCULAR; INTRAVENOUS EVERY 8 HOURS PRN
Status: DISCONTINUED | OUTPATIENT
Start: 2022-06-01 | End: 2022-06-04 | Stop reason: HOSPADM

## 2022-06-01 RX ADMIN — ONDANSETRON 8 MG: 2 INJECTION INTRAMUSCULAR; INTRAVENOUS at 11:06

## 2022-06-01 RX ADMIN — PROMETHAZINE HYDROCHLORIDE 6.25 MG: 25 INJECTION INTRAMUSCULAR; INTRAVENOUS at 09:06

## 2022-06-01 RX ADMIN — SUMATRIPTAN SUCCINATE 100 MG: 50 TABLET ORAL at 08:06

## 2022-06-01 RX ADMIN — DIPHENHYDRAMINE HYDROCHLORIDE 25 MG: 50 INJECTION, SOLUTION INTRAMUSCULAR; INTRAVENOUS at 11:06

## 2022-06-01 RX ADMIN — PANTOPRAZOLE SODIUM 40 MG: 40 TABLET, DELAYED RELEASE ORAL at 11:06

## 2022-06-01 RX ADMIN — PROMETHAZINE HYDROCHLORIDE 6.25 MG: 25 INJECTION INTRAMUSCULAR; INTRAVENOUS at 03:06

## 2022-06-01 RX ADMIN — ONDANSETRON 8 MG: 2 INJECTION INTRAMUSCULAR; INTRAVENOUS at 09:06

## 2022-06-01 RX ADMIN — HYDROMORPHONE HYDROCHLORIDE 1 MG: 2 INJECTION INTRAMUSCULAR; INTRAVENOUS; SUBCUTANEOUS at 09:06

## 2022-06-01 RX ADMIN — HYDROMORPHONE HYDROCHLORIDE 1 MG: 2 INJECTION INTRAMUSCULAR; INTRAVENOUS; SUBCUTANEOUS at 05:06

## 2022-06-01 RX ADMIN — HYDROMORPHONE HYDROCHLORIDE 1 MG: 2 INJECTION INTRAMUSCULAR; INTRAVENOUS; SUBCUTANEOUS at 11:06

## 2022-06-01 RX ADMIN — SUMATRIPTAN SUCCINATE 100 MG: 50 TABLET ORAL at 02:06

## 2022-06-01 RX ADMIN — ONDANSETRON 4 MG: 2 INJECTION INTRAMUSCULAR; INTRAVENOUS at 07:06

## 2022-06-01 RX ADMIN — HYDROMORPHONE HYDROCHLORIDE 1 MG: 2 INJECTION INTRAMUSCULAR; INTRAVENOUS; SUBCUTANEOUS at 02:06

## 2022-06-01 RX ADMIN — LINACLOTIDE 290 MCG: 145 CAPSULE, GELATIN COATED ORAL at 08:06

## 2022-06-01 RX ADMIN — FAMOTIDINE 20 MG: 20 INJECTION, SOLUTION INTRAVENOUS at 11:06

## 2022-06-01 NOTE — PROGRESS NOTES
[facility] - Med Surg  Adult Nutrition  Progress Note    SUMMARY     Recommendations     1. As medically able, advance diet as tolerated, goal: soft, bland , general, healthful diet as tolerated  2. Nutrition Supplements for optimal calorie and protein intake   - while on clear: Boost Breeze (wild berry/peach/orange) daily to provide an additional 250 calories and 9g protein per carton  - or -    - while on full/advanced diet: Boost Plus (vanilla/chocolate/strawberry) daily to provide an additional 360 calories and 14g protein per carton    3. If unable to advance PO diet to meet >75% estimated energy and protein needs within 72 hours, consider alternate source of nutrition such as PPN to decrease risk for malnutrition  4. RD to follow up to monitor intake, tolerance, and labs.    *Please send consult if acute nutrition needs arise.    Goals:  Pt will tolerate >65% estimated energy and protein needs by RD follow up.    Nutrition Goal Status: new   Communication of RD recs: POC and sticky note    Assessment and Plan  Nutrition Problem    Inadequate energy and protein intake  Related to (etiology):      Inability to consume adequate energy    Medical condition    Altered GI function  Signs and Symptoms (as evidenced by):     NPO with no alternate source of nutrition at this time   Interventions:    general, healththful diet    Nutrition supplement    Collaboration with other care providers  Nutrition Diagnosis Status:   New     Reason for Assessment  Reason For Assessment: identified at risk by screening criteria  Diagnosis: <principal problem not specified> SBO (small bowel obstruction)  Relevant Medical History: short gut syndrome, migraines    General information comments:   06/01/2022:  RD spoke with pt who reports poor appetite, intake, and worsening abdominal pain for several days as well as constipation. Pt stated she had a headache during conversation and requested to discuss further at another time. Pt admitted  "yesterday, referred for SBO. She has a history of small bowel obstruction with exploratory laparotomy and small-bowel resection a year and half ago. Will continue to monitor.     Nutrition Discharge Planning: .  pending medical course    Nutrition Risk Screen  Nutrition Risk Screen: no indicators present  Have you recently lost weight without trying?: Unsure  Have you been eating poorly because of a decreased appetite?: Yes    Physical Findings/Malnutrition Assessment  Patient does not meet at least 2 ASPEN criteria for malnutrition at this time.   Will continue to monitor.    Poor PO intake x 2-3 days per pt  Weights stable per EMR  N/V:   not indicated   Wounds: not indicated   Edema:   not indicated   Last Bowel Movement: 05/09/22      O2 Device (Oxygen Therapy): room air   Ras Score: 19  NFPE performed indicating mild muscle and fat wasting    Nutrition/Diet History  Patient Reported Diet/Restrictions/Preferences: general        Food Allergies: NKFA  Factors Affecting Nutritional Intake: abdominal pain, decreased appetite, altered gastrointestinal function, NPO     Spiritual, Cultural Beliefs, Zoroastrianism Practices, Values that Affect Care: no    Anthropometrics  Temp: 98.1 °F (36.7 °C)  Height: 5' 7" (170.2 cm)  Height (inches): 67 in  Weight Method: Bed Scale  Weight: 68.2 kg (150 lb 5.7 oz)  Weight (lb): 150.36 lb  Ideal Body Weight (IBW), Female: 135 lb  % Ideal Body Weight, Female (lb): 111.21 %  BMI (Calculated): 23.5  BMI Grade: 18.5-24.9 - normal        Labs  Pertinent Labs: reviewed  Lab Results   Component Value Date    ALBUMIN 4.5 05/31/2022    HGB 13.6 06/01/2022    HCT 41.1 06/01/2022    WBC 5.70 06/01/2022    CALCIUM 8.9 06/01/2022     Lab Results   Component Value Date     (H) 06/01/2022    K 3.4 (L) 06/01/2022    PHOS 3.4 10/02/2021    BUN 11 06/01/2022    CREATININE 0.6 06/01/2022    ESTGFRAFRICA >60 06/01/2022    EGFRNONAA >60 06/01/2022     Lab Results   Component Value Date    ALT 14 " 05/31/2022    AST 20 05/31/2022    ALKPHOS 67 05/31/2022    BILITOT 1.3 (H) 05/31/2022     Meds  Pertinent Medications: reviewed    LIDOcaine (PF) 10 mg/ml (1%)  1 mL Other Once    [START ON 6/2/2022] linaCLOtide  290 mcg Oral Before breakfast    pantoprazole  40 mg Oral Daily     Continuous Infusions:   lactated ringers 125 mL/hr at 05/31/22 2114     PRN Meds:diazePAM, HYDROmorphone, HYDROmorphone, ondansetron, promethazine (PHENERGAN) IVPB, sumatriptan     Estimated/Assessed Needs  Weight Used For Calorie Calculations: 68.2 kg (150 lb 5.7 oz)  Energy Calorie Requirements (kcal): 5471-4290 (25-30)  Energy Need Method: Kcal/kg  Weight Used For Protein Calculations: 68.2 kg (150 lb 5.7 oz)  Protein Requirements: 54-68g (.8-1)  RDA Method (mL): 1705ml fluid or per MD/NP  CHO Requirement: 213g (50% CHO)    Nutrition Prescription Ordered  Current Diet Order: NPO    Evaluation of Received Nutrients  Energy Calories Required: not meeting needs  Protein Required: not meeting needs  Tolerance: n/a, pt is NPO  % Intake of Estimated Energy Needs: NPO  % Meal Intake: NPO    Monitor and Evaluation  Food and Nutrient Intake: food and beverage intake  Food and Nutrient Administration: diet order  Anthropometric Measurements: weight, weight change, body mass index  Biochemical Data, Medical Tests and Procedures: electrolyte and renal panel, lipid profile, gastrointestinal profile, glucose/endocrine profile, Inflammatory profile  Nutrition-Focused Physical Findings: overall appearance     Nutrition Follow-Up  Level of Risk/Frequency of Follow-up: moderate - high / Twice weekly   Sasha Dior, MS, RD, LDN

## 2022-06-01 NOTE — ASSESSMENT & PLAN NOTE
Dilated loops of small bowel at site of previous small bowel resections with fecalization.  Patient with multiple prior surgeries requiring resections.  Small-bowel follow-through with Gastrografin contrast for further evaluation  Discussed with patient that if her condition fails simply to refer or worsens could require surgery for further treatment.   NPO, IV fluids  NG tube to low intermittent wall suction

## 2022-06-01 NOTE — PLAN OF CARE
RD Recommendations      1. As medically able, advance diet as tolerated, goal: soft, bland , general, healthful diet as tolerated  2. Nutrition Supplements for optimal calorie and protein intake   - while on clear: Boost Breeze (wild berry/peach/orange) daily to provide an additional 250 calories and 9g protein per carton  - or -    - while on full/advanced diet: Boost Plus (vanilla/chocolate/strawberry) daily to provide an additional 360 calories and 14g protein per carton    3. If unable to advance PO diet to meet >75% estimated energy and protein needs within 72 hours, consider alternate source of nutrition such as PPN to decrease risk for malnutrition  4. RD to follow up to monitor intake, tolerance, and labs.    *Please send consult if acute nutrition needs arise.     Goals:  Pt will tolerate >65% estimated energy and protein needs by RD follow up.       Sasha Dior, MS, RD, LDN

## 2022-06-01 NOTE — HOSPITAL COURSE
06/01/2022: SBFT with normal colonic transit time. Still with significant nausea and not much improvement in pain. Repeat abdominal films with no signs of obstruction.     06/02/2022: Patient tolerated clears with any increased nausea or pain but reports pain and nausea are still present and not much better. Passing flatus now but no BM since Tuesday. No vomiting.     06/03/2022 tolerating diet passing flatus no bowel movement    06/04/2022: Passing flatus and BM now. Tolerating diet. Ready for discharge.

## 2022-06-01 NOTE — SUBJECTIVE & OBJECTIVE
Interval History: SBFT with normal colonic transit time. Still with significant nausea and not much improvement in pain. Repeat abdominal films with no signs of obstruction.     Medications:  Continuous Infusions:   lactated ringers 125 mL/hr at 05/31/22 2114     Scheduled Meds:   LIDOcaine (PF) 10 mg/ml (1%)  1 mL Other Once    [START ON 6/2/2022] linaCLOtide  290 mcg Oral Before breakfast    pantoprazole  40 mg Oral Daily     PRN Meds:diazePAM, HYDROmorphone, HYDROmorphone, ondansetron, promethazine (PHENERGAN) IVPB, sumatriptan     Review of patient's allergies indicates:   Allergen Reactions    Morphine Nausea And Vomiting    Erythromycin Other (See Comments)     Stomach cramps     Objective:     Vital Signs (Most Recent):  Temp: 98.1 °F (36.7 °C) (06/01/22 0733)  Pulse: 90 (06/01/22 0733)  Resp: 13 (06/01/22 0733)  BP: 128/72 (06/01/22 0733)  SpO2: 95 % (06/01/22 0733) Vital Signs (24h Range):  Temp:  [98.1 °F (36.7 °C)-99.7 °F (37.6 °C)] 98.1 °F (36.7 °C)  Pulse:  [82-99] 90  Resp:  [13-22] 13  SpO2:  [91 %-98 %] 95 %  BP: (103-161)/(57-83) 128/72     Weight: 68.2 kg (150 lb 5.7 oz)  Body mass index is 23.55 kg/m².    Intake/Output - Last 3 Shifts         05/30 0700 05/31 0659 05/31 0700 06/01 0659 06/01 0700 06/02 0659    IV Piggyback  350     Total Intake(mL/kg)  350 (5.1)     Net  +350            Urine Occurrence  2 x 1 x            Physical Exam  Vitals and nursing note reviewed.   Constitutional:       General: She is not in acute distress.     Appearance: Normal appearance. She is well-developed. She is not ill-appearing.   HENT:      Head: Normocephalic and atraumatic.   Eyes:      Extraocular Movements: Extraocular movements intact.      Conjunctiva/sclera: Conjunctivae normal.   Cardiovascular:      Rate and Rhythm: Normal rate and regular rhythm.   Pulmonary:      Effort: Pulmonary effort is normal. No respiratory distress.   Abdominal:      General: There is no distension.      Palpations:  Abdomen is soft.      Tenderness: There is abdominal tenderness (Minimal).      Comments: Abdomen soft and non-distended. NG tube in place with moderate clear output.   Musculoskeletal:      Cervical back: Neck supple.   Skin:     General: Skin is warm and dry.   Neurological:      Mental Status: She is alert and oriented to person, place, and time.   Psychiatric:         Behavior: Behavior normal.       Significant Labs:  I have reviewed all pertinent lab results within the past 24 hours.  CBC:   Recent Labs   Lab 06/01/22  0757   WBC 5.70   RBC 4.01   HGB 13.6   HCT 41.1      *   MCH 33.9*   MCHC 33.1     CMP:   Recent Labs   Lab 05/31/22  1330 06/01/22  0757    95   CALCIUM 9.7 8.9   ALBUMIN 4.5  --    PROT 7.3  --     146*   K 3.7 3.4*   CO2 23 30*    106   BUN 11 11   CREATININE 0.7 0.6   ALKPHOS 67  --    ALT 14  --    AST 20  --    BILITOT 1.3*  --        Significant Diagnostics:  I have reviewed all pertinent imaging results/findings within the past 24 hours.  SBFT and abdominal films reviewed. No signs of obstruction.

## 2022-06-01 NOTE — SUBJECTIVE & OBJECTIVE
No current facility-administered medications on file prior to encounter.     Current Outpatient Medications on File Prior to Encounter   Medication Sig    diazePAM (VALIUM) 10 MG Tab TAKE 1 TABLET BY MOUTH EVERY DAY AS NEEDED    diazePAM (VALIUM) 5 MG tablet Take one with onset of migraine    doxepin (SINEQUAN) 10 MG capsule TAKE 1 CAPSULE (10 MG TOTAL) BY MOUTH EVERY EVENING.    ergocalciferol (ERGOCALCIFEROL) 50,000 unit Cap Take 1 capsule (50,000 Units total) by mouth every 7 days.    LACTOBACILLUS COMBO NO.6 (PROBIOTIC COMPLEX ORAL) Take by mouth once daily at 6am.    linaCLOtide (LINZESS) 290 mcg Cap capsule Take 1 capsule (290 mcg total) by mouth before breakfast. for 30 doses    loratadine (CLARITIN) 10 mg tablet Take 10 mg by mouth daily    multivitamin capsule Take 1 capsule by mouth once daily.    omeprazole (PRILOSEC OTC) 20 MG tablet Take 20 mg by mouth once daily.    ondansetron (ZOFRAN) 4 MG tablet Take 1 tablet (4 mg total) by mouth 2 (two) times daily.    pantoprazole (PROTONIX) 40 MG tablet TAKE 1 TABLET BY MOUTH EVERY DAY    spironolactone (ALDACTONE) 50 MG tablet TAKE 1 TABLET BY MOUTH ONCE DAILY THEN INCREASE TO 2 TABLETS DAILY AS TOLERATED (Patient taking differently: TAKE 1 TABLET BY MOUTH ONCE DAILY)    sumatriptan (IMITREX) 100 MG tablet TAKE 1 TABLET BY MOUTH IF NEEDED, MAY REPEAT ONCE IN 1 HOUR. MAX OF 2 TABLETS IN 24 HOURS    [DISCONTINUED] doxepin (SINEQUAN) 10 MG capsule TAKE 1 CAPSULE (10 MG TOTAL) BY MOUTH EVERY EVENING.    [DISCONTINUED] sumatriptan (IMITREX) 100 MG tablet TAKE 1 TABLET BY MOUTH IF NEEDED, MAY REPEAT ONCE IN 1 HOUR. MAX OF 2 TABLETS IN 24 HOURS       Review of patient's allergies indicates:   Allergen Reactions    Morphine Nausea And Vomiting    Erythromycin Other (See Comments)     Stomach cramps       Past Medical History:   Diagnosis Date    Encounter for blood transfusion     KENN (generalized anxiety disorder)     Takes 5-10 mg of valium qd prn anxiety  (prescriptions for both on file, one from [facility] & other from )    Insomnia     Takes 5-10 mg of valium qhs prn insomnia (prescriptions for both on file, one from [facility] & other from )    Migraine headache     Nephrolithiasis 08/03/2019    Left ureteral stone -> UTI c/b pyelonephritis and urosepsis w/ hypokalemia -> transfered to [facility] urology service from [facility] after CT abn dx, s/p 2 stents to allow infx to drain, IV Vanc/Rocephin, fever free since yesterday    Reflex sympathetic dystrophy     UTI (urinary tract infection)     Vertigo      Past Surgical History:   Procedure Laterality Date    BLADDER SURGERY      CHOLECYSTECTOMY      COLONOSCOPY N/A 11/10/2021    Procedure: COLONOSCOPY;  Surgeon: Erny Rothman MD;  Location: [facility];  Service: Endoscopy;  Laterality: N/A;    CYSTOSCOPY WITH URETEROSCOPY, RETROGRADE PYELOGRAPHY, AND INSERTION OF STENT Right 9/30/2021    Procedure: CYSTOSCOPY, WITH RETROGRADE PYELOGRAM AND URETERAL STENT INSERTION;  Surgeon: Annita Gamino MD;  Location: [facility];  Service: Urology;  Laterality: Right;    DIAGNOSTIC LAPAROSCOPY N/A 11/11/2020    Procedure: LAPAROSCOPY, DIAGNOSTIC;  Surgeon: Tee Segura MD;  Location: [facility];  Service: General;  Laterality: N/A;  CONVERTED TO OPEN    ESOPHAGOGASTRODUODENOSCOPY N/A 11/10/2021    Procedure: EGD (ESOPHAGOGASTRODUODENOSCOPY);  Surgeon: Eryn Rothman MD;  Location: [facility];  Service: Endoscopy;  Laterality: N/A;    facet injections  02/14/2017    L3, L4, L5, S1 Done by Dr. Lara    HYSTERECTOMY      INJECTION OF ANESTHETIC AGENT INTO TISSUE PLANE DEFINED BY TRANSVERSUS ABDOMINIS MUSCLE N/A 11/11/2020    Procedure: BLOCK, TRANSVERSUS ABDOMINIS PLANE;  Surgeon: Tee Segura MD;  Location: [facility];  Service: General;  Laterality: N/A;    KNEE SURGERY      LAPAROSCOPIC LYSIS OF ADHESIONS N/A 11/11/2020    Procedure: LYSIS, ADHESIONS, LAPAROSCOPIC;  Surgeon: Tee Segura MD;  Location: [facility];   Service: General;  Laterality: N/A;  CONVERTED TO OPEN    LAPAROTOMY N/A 11/20/2020    Procedure: LAPAROTOMY;  Surgeon: Tee Segura MD;  Location: [facility] OR;  Service: General;  Laterality: N/A;    LASER LITHOTRIPSY Left 2/8/2021    Procedure: LITHOTRIPSY, USING LASER;  Surgeon: Irving Kidd MD;  Location: [facility] OR;  Service: Urology;  Laterality: Left;    LASER LITHOTRIPSY Right 10/13/2021    Procedure: LITHOTRIPSY, USING LASER;  Surgeon: Annita Gamino MD;  Location: [facility] OR;  Service: Urology;  Laterality: Right;    LYSIS OF ADHESIONS N/A 11/11/2020    Procedure: LYSIS, ADHESIONS;  Surgeon: Tee Segura MD;  Location: [facility] OR;  Service: General;  Laterality: N/A;    LYSIS OF ADHESIONS N/A 11/20/2020    Procedure: LYSIS, ADHESIONS;  Surgeon: Tee Segura MD;  Location: [facility] OR;  Service: General;  Laterality: N/A;    TONSILLECTOMY      URETEROSCOPY Left 2/8/2021    Procedure: URETEROSCOPY;  Surgeon: Irving Kidd MD;  Location: [facility] OR;  Service: Urology;  Laterality: Left;  stent placement    URETEROSCOPY Right 10/13/2021    Procedure: URETEROSCOPY;  Surgeon: Annita Gamino MD;  Location: [facility] OR;  Service: Urology;  Laterality: Right;     Family History       Problem Relation (Age of Onset)    COPD Father    Drug abuse Brother    Hypertension Mother    Stroke Mother          Tobacco Use    Smoking status: Never Smoker    Smokeless tobacco: Never Used   Substance and Sexual Activity    Alcohol use: Yes     Comment: rarely    Drug use: No    Sexual activity: Never     Review of Systems   Constitutional:  Negative for chills, fatigue, fever and unexpected weight change.   Respiratory:  Negative for cough, shortness of breath, wheezing and stridor.    Cardiovascular:  Negative for chest pain, palpitations and leg swelling.   Gastrointestinal:  Positive for abdominal pain and nausea. Negative for abdominal distention, constipation, diarrhea and vomiting.   Genitourinary:   Negative for difficulty urinating, dysuria, frequency, hematuria and urgency.   Skin:  Negative for color change, pallor, rash and wound.   Hematological:  Does not bruise/bleed easily.   Objective:     Vital Signs (Most Recent):  Temp: 98.3 °F (36.8 °C) (05/31/22 1755)  Pulse: 92 (05/31/22 1755)  Resp: 20 (05/31/22 1755)  BP: (!) 103/57 (05/31/22 1755)  SpO2: 95 % (05/31/22 1755)   Vital Signs (24h Range):  Temp:  [98.2 °F (36.8 °C)-99.3 °F (37.4 °C)] 98.3 °F (36.8 °C)  Pulse:  [82-99] 92  Resp:  [18-22] 20  SpO2:  [91 %-98 %] 95 %  BP: (103-161)/(57-83) 103/57     Weight: 68.1 kg (150 lb 0.7 oz)  Body mass index is 23.5 kg/m².    Physical Exam  Vitals reviewed.   Constitutional:       Appearance: She is well-developed.   HENT:      Head: Normocephalic and atraumatic.   Cardiovascular:      Rate and Rhythm: Normal rate and regular rhythm.   Pulmonary:      Effort: Pulmonary effort is normal.   Abdominal:      General: There is distension (mild).      Palpations: Abdomen is soft.      Tenderness: There is abdominal tenderness (Mild diffuse).   Musculoskeletal:      Cervical back: Neck supple.   Skin:     General: Skin is warm and dry.   Neurological:      Mental Status: She is alert and oriented to person, place, and time.       Significant Labs:  I have reviewed all pertinent lab results within the past 24 hours.  CBC:   Recent Labs   Lab 05/31/22  1330   WBC 11.53   RBC 4.51   HGB 15.2   HCT 43.1      MCV 96   MCH 33.7*   MCHC 35.3     BMP:   Recent Labs   Lab 05/31/22  1330         K 3.7      CO2 23   BUN 11   CREATININE 0.7   CALCIUM 9.7       Significant Diagnostics:  CT:  Impression:     1.  Detrimental change.  Markedly dilated loops of small bowel within the central lower abdomen and pelvis, air/fluid/stool filled, with surrounding inflammatory changes.  These measure up to 4.8 cm in diameter.  The proximal and distal ends of the dilated loops of bowel occur at the 2 areas of  postoperative change in the small bowel.  Findings are concerning for a closed loop obstruction.  Negative for free air or focal drainable fluid collection to suggest perforation.  Negative for pneumatosis.     2. There is fluid in nondilated proximal colon, which can be seen with gastroenteritis or other diarrheal disease.  Clinical correlation is advised.     3.  Negative for acute process otherwise.  Normal appendix.  Negative for obstructing renal stones or hydronephrosis.  No other inflammatory changes are seen.  4.  Stable and nonemergent findings as described above.

## 2022-06-01 NOTE — HPI
56-year-old female referred for small bowel obstruction.  Patient reports worsening abdominal pain which began earlier today.  Prior to this her bowel function had improved while taking Linzess for chronic constipation however think progressively worsened today.  She underwent CT scan in the ER concern for small bowel obstruction.  She has a history of small bowel obstruction with exploratory laparotomy and small-bowel resection a year and half ago.

## 2022-06-01 NOTE — ASSESSMENT & PLAN NOTE
Dilated loops of small bowel at site of previous small bowel resections with fecalization.  Patient with multiple prior surgeries requiring resections. SBFT and follow-up abdominal films with contrast throughout colon and no signs of obstruction.      - NG tube removal today  - Continue NPO for now  - Increased dose of Zofran  - Restart home meds, including Linzess.

## 2022-06-01 NOTE — H&P
[facility] Surg  General Surgery  History & Physical    Patient Name: Genny Calixto  MRN: [id number]  Admission Date: 5/31/2022  Attending Physician: Vega Hightower MD   Primary Care Provider: Tay Duff MD    Patient information was obtained from patient.     Subjective:     Chief Complaint/Reason for Admission: sbo    History of Present Illness: 56-year-old female referred for small bowel obstruction.  Patient reports worsening abdominal pain which began earlier today.  Prior to this her bowel function had improved while taking Linzess for chronic constipation however think progressively worsened today.  She underwent CT scan in the ER concern for small bowel obstruction.  She has a history of small bowel obstruction with exploratory laparotomy and small-bowel resection a year and half ago.      No current facility-administered medications on file prior to encounter.     Current Outpatient Medications on File Prior to Encounter   Medication Sig    diazePAM (VALIUM) 10 MG Tab TAKE 1 TABLET BY MOUTH EVERY DAY AS NEEDED    diazePAM (VALIUM) 5 MG tablet Take one with onset of migraine    doxepin (SINEQUAN) 10 MG capsule TAKE 1 CAPSULE (10 MG TOTAL) BY MOUTH EVERY EVENING.    ergocalciferol (ERGOCALCIFEROL) 50,000 unit Cap Take 1 capsule (50,000 Units total) by mouth every 7 days.    LACTOBACILLUS COMBO NO.6 (PROBIOTIC COMPLEX ORAL) Take by mouth once daily at 6am.    linaCLOtide (LINZESS) 290 mcg Cap capsule Take 1 capsule (290 mcg total) by mouth before breakfast. for 30 doses    loratadine (CLARITIN) 10 mg tablet Take 10 mg by mouth daily    multivitamin capsule Take 1 capsule by mouth once daily.    omeprazole (PRILOSEC OTC) 20 MG tablet Take 20 mg by mouth once daily.    ondansetron (ZOFRAN) 4 MG tablet Take 1 tablet (4 mg total) by mouth 2 (two) times daily.    pantoprazole (PROTONIX) 40 MG tablet TAKE 1 TABLET BY MOUTH EVERY DAY    spironolactone (ALDACTONE) 50 MG tablet TAKE 1 TABLET BY  MOUTH ONCE DAILY THEN INCREASE TO 2 TABLETS DAILY AS TOLERATED (Patient taking differently: TAKE 1 TABLET BY MOUTH ONCE DAILY)    sumatriptan (IMITREX) 100 MG tablet TAKE 1 TABLET BY MOUTH IF NEEDED, MAY REPEAT ONCE IN 1 HOUR. MAX OF 2 TABLETS IN 24 HOURS    [DISCONTINUED] doxepin (SINEQUAN) 10 MG capsule TAKE 1 CAPSULE (10 MG TOTAL) BY MOUTH EVERY EVENING.    [DISCONTINUED] sumatriptan (IMITREX) 100 MG tablet TAKE 1 TABLET BY MOUTH IF NEEDED, MAY REPEAT ONCE IN 1 HOUR. MAX OF 2 TABLETS IN 24 HOURS       Review of patient's allergies indicates:   Allergen Reactions    Morphine Nausea And Vomiting    Erythromycin Other (See Comments)     Stomach cramps       Past Medical History:   Diagnosis Date    Encounter for blood transfusion     KENN (generalized anxiety disorder)     Takes 5-10 mg of valium qd prn anxiety (prescriptions for both on file, one from [facility] & other from )    Insomnia     Takes 5-10 mg of valium qhs prn insomnia (prescriptions for both on file, one from [facility] & other from )    Migraine headache     Nephrolithiasis 08/03/2019    Left ureteral stone -> UTI c/b pyelonephritis and urosepsis w/ hypokalemia -> transfered to [facility] urology service from [facility] after CT abn dx, s/p 2 stents to allow infx to drain, IV Vanc/Rocephin, fever free since yesterday    Reflex sympathetic dystrophy     UTI (urinary tract infection)     Vertigo      Past Surgical History:   Procedure Laterality Date    BLADDER SURGERY      CHOLECYSTECTOMY      COLONOSCOPY N/A 11/10/2021    Procedure: COLONOSCOPY;  Surgeon: Eryn Rothman MD;  Location: [facility] ENDO;  Service: Endoscopy;  Laterality: N/A;    CYSTOSCOPY WITH URETEROSCOPY, RETROGRADE PYELOGRAPHY, AND INSERTION OF STENT Right 9/30/2021    Procedure: CYSTOSCOPY, WITH RETROGRADE PYELOGRAM AND URETERAL STENT INSERTION;  Surgeon: Annita Gamino MD;  Location: [facility] OR;  Service: Urology;  Laterality: Right;    DIAGNOSTIC LAPAROSCOPY N/A 11/11/2020     Procedure: LAPAROSCOPY, DIAGNOSTIC;  Surgeon: Tee Segura MD;  Location: [facility] OR;  Service: General;  Laterality: N/A;  CONVERTED TO OPEN    ESOPHAGOGASTRODUODENOSCOPY N/A 11/10/2021    Procedure: EGD (ESOPHAGOGASTRODUODENOSCOPY);  Surgeon: Eryn Rothman MD;  Location: [facility] ENDO;  Service: Endoscopy;  Laterality: N/A;    facet injections  02/14/2017    L3, L4, L5, S1 Done by Dr. Lara    HYSTERECTOMY      INJECTION OF ANESTHETIC AGENT INTO TISSUE PLANE DEFINED BY TRANSVERSUS ABDOMINIS MUSCLE N/A 11/11/2020    Procedure: BLOCK, TRANSVERSUS ABDOMINIS PLANE;  Surgeon: Tee Segura MD;  Location: [facility] OR;  Service: General;  Laterality: N/A;    KNEE SURGERY      LAPAROSCOPIC LYSIS OF ADHESIONS N/A 11/11/2020    Procedure: LYSIS, ADHESIONS, LAPAROSCOPIC;  Surgeon: Tee Segura MD;  Location: [facility] OR;  Service: General;  Laterality: N/A;  CONVERTED TO OPEN    LAPAROTOMY N/A 11/20/2020    Procedure: LAPAROTOMY;  Surgeon: Tee Segura MD;  Location: [facility] OR;  Service: General;  Laterality: N/A;    LASER LITHOTRIPSY Left 2/8/2021    Procedure: LITHOTRIPSY, USING LASER;  Surgeon: Irving Kidd MD;  Location: [facility] OR;  Service: Urology;  Laterality: Left;    LASER LITHOTRIPSY Right 10/13/2021    Procedure: LITHOTRIPSY, USING LASER;  Surgeon: Annita Gamino MD;  Location: [facility] OR;  Service: Urology;  Laterality: Right;    LYSIS OF ADHESIONS N/A 11/11/2020    Procedure: LYSIS, ADHESIONS;  Surgeon: Tee Segura MD;  Location: [facility] OR;  Service: General;  Laterality: N/A;    LYSIS OF ADHESIONS N/A 11/20/2020    Procedure: LYSIS, ADHESIONS;  Surgeon: Tee Segura MD;  Location: [facility] OR;  Service: General;  Laterality: N/A;    TONSILLECTOMY      URETEROSCOPY Left 2/8/2021    Procedure: URETEROSCOPY;  Surgeon: Irving Kidd MD;  Location: [facility] OR;  Service: Urology;  Laterality: Left;  stent placement    URETEROSCOPY Right 10/13/2021    Procedure:  URETEROSCOPY;  Surgeon: Annita Gamino MD;  Location: [facility];  Service: Urology;  Laterality: Right;     Family History       Problem Relation (Age of Onset)    COPD Father    Drug abuse Brother    Hypertension Mother    Stroke Mother          Tobacco Use    Smoking status: Never Smoker    Smokeless tobacco: Never Used   Substance and Sexual Activity    Alcohol use: Yes     Comment: rarely    Drug use: No    Sexual activity: Never     Review of Systems   Constitutional:  Negative for chills, fatigue, fever and unexpected weight change.   Respiratory:  Negative for cough, shortness of breath, wheezing and stridor.    Cardiovascular:  Negative for chest pain, palpitations and leg swelling.   Gastrointestinal:  Positive for abdominal pain and nausea. Negative for abdominal distention, constipation, diarrhea and vomiting.   Genitourinary:  Negative for difficulty urinating, dysuria, frequency, hematuria and urgency.   Skin:  Negative for color change, pallor, rash and wound.   Hematological:  Does not bruise/bleed easily.   Objective:     Vital Signs (Most Recent):  Temp: 98.3 °F (36.8 °C) (05/31/22 1755)  Pulse: 92 (05/31/22 1755)  Resp: 20 (05/31/22 1755)  BP: (!) 103/57 (05/31/22 1755)  SpO2: 95 % (05/31/22 1755)   Vital Signs (24h Range):  Temp:  [98.2 °F (36.8 °C)-99.3 °F (37.4 °C)] 98.3 °F (36.8 °C)  Pulse:  [82-99] 92  Resp:  [18-22] 20  SpO2:  [91 %-98 %] 95 %  BP: (103-161)/(57-83) 103/57     Weight: 68.1 kg (150 lb 0.7 oz)  Body mass index is 23.5 kg/m².    Physical Exam  Vitals reviewed.   Constitutional:       Appearance: She is well-developed.   HENT:      Head: Normocephalic and atraumatic.   Cardiovascular:      Rate and Rhythm: Normal rate and regular rhythm.   Pulmonary:      Effort: Pulmonary effort is normal.   Abdominal:      General: There is distension (mild).      Palpations: Abdomen is soft.      Tenderness: There is abdominal tenderness (Mild diffuse).   Musculoskeletal:      Cervical  back: Neck supple.   Skin:     General: Skin is warm and dry.   Neurological:      Mental Status: She is alert and oriented to person, place, and time.       Significant Labs:  I have reviewed all pertinent lab results within the past 24 hours.  CBC:   Recent Labs   Lab 05/31/22  1330   WBC 11.53   RBC 4.51   HGB 15.2   HCT 43.1      MCV 96   MCH 33.7*   MCHC 35.3     BMP:   Recent Labs   Lab 05/31/22  1330         K 3.7      CO2 23   BUN 11   CREATININE 0.7   CALCIUM 9.7       Significant Diagnostics:  CT:  Impression:     1.  Detrimental change.  Markedly dilated loops of small bowel within the central lower abdomen and pelvis, air/fluid/stool filled, with surrounding inflammatory changes.  These measure up to 4.8 cm in diameter.  The proximal and distal ends of the dilated loops of bowel occur at the 2 areas of postoperative change in the small bowel.  Findings are concerning for a closed loop obstruction.  Negative for free air or focal drainable fluid collection to suggest perforation.  Negative for pneumatosis.     2. There is fluid in nondilated proximal colon, which can be seen with gastroenteritis or other diarrheal disease.  Clinical correlation is advised.     3.  Negative for acute process otherwise.  Normal appendix.  Negative for obstructing renal stones or hydronephrosis.  No other inflammatory changes are seen.  4.  Stable and nonemergent findings as described above.      Assessment/Plan:     SBO (small bowel obstruction)  Dilated loops of small bowel at site of previous small bowel resections with fecalization.  Patient with multiple prior surgeries requiring resections.  Small-bowel follow-through with Gastrografin contrast for further evaluation  Discussed with patient that if her condition fails simply to refer or worsens could require surgery for further treatment.   NPO, IV fluids  NG tube to low intermittent wall suction      VTE Risk Mitigation (From admission,  onward)    None          Vega Hightower MD  General Surgery  [facility]

## 2022-06-01 NOTE — PLAN OF CARE
[facility]  Initial Discharge Assessment       Primary Care Provider: Tay Duff MD    Admission Diagnosis: Small bowel obstruction [K56.609]    Admission Date: 5/31/2022  Expected Discharge Date:     Discharge Barriers Identified: None    Payor: MEDICARE / Plan: MEDICARE PART A & B / Product Type: Government /     Extended Emergency Contact Information  Primary Emergency Contact: manny caceres  Mobile Phone: [phone]  Relation: Significant other  Preferred language: English   needed? No  Secondary Emergency Contact: Jose Darnell   [facility]  Home Phone: [phone]  Mobile Phone: [phone]  Relation: Brother    Discharge Plan A: Home  Discharge Plan B: Home      [id number] - [place], [place] - [address]  [address]  [place] [address]  Phone: [phone] Fax: [phone]    [id number]  [place] - [address]  [address]  [facility] [address]  Phone: [phone] Fax: [phone]    [id number] - [place], [place] - [address] AT [facility]  [address] [facility] [address]  Phone: [phone] Fax: [phone]    [id number]  [place], [place] - [address] AT [facility]  [address]  [place] [address]  Phone: [phone] Fax: [phone]      Initial Assessment (most recent)     Adult Discharge Assessment - 06/01/22 1437        Discharge Assessment    Assessment Type Discharge Planning Assessment     Confirmed/corrected address, phone number and insurance Yes     Confirmed Demographics Correct on Facesheet     Source of Information patient     Communicated MANAS with patient/caregiver Date not available/Unable to determine     Reason For Admission SBO     Lives With significant other     Facility Arrived From: home     Do you expect to return to your current living situation? Yes     Do you have help  at home or someone to help you manage your care at home? No     Prior to hospitilization cognitive status: Alert/Oriented     Current cognitive status: Alert/Oriented     Walking or Climbing Stairs Difficulty none     Dressing/Bathing Difficulty none     Equipment Currently Used at Home none     Readmission within 30 days? No     Patient currently being followed by outpatient case management? No     Do you currently have service(s) that help you manage your care at home? No     Do you take prescription medications? Yes     Do you have prescription coverage? Yes     Coverage Medicare A&B     Do you have any problems affording any of your prescribed medications? No     Is the patient taking medications as prescribed? yes     Who is going to help you get home at discharge? patients significant other     How do you get to doctors appointments? car, drives self     Are you on dialysis? No     Do you take coumadin? No     Discharge Plan A Home     Discharge Plan B Home     DME Needed Upon Discharge  none     Discharge Plan discussed with: Patient     Discharge Barriers Identified None               Initial assessment completed with patient. Patient reports independence with ADL's  prior to hospitalization. Patient uses no DME at home. Patient currently has no cm needs upon DC. CM will continue following to assist with other needs.   CM provided a transitional care folder, information on advanced directives, information on pharmacy bedside delivery, and discharge planning begins on admission with contact information for any needs/questions.

## 2022-06-01 NOTE — PLAN OF CARE
Patient remains free of injury. AAOx4. POC reviewed, patient verbalizes understanding. Pain controlled with IV pain medication. Patient remains NPO. Call light and personal items within reach. Chart check complete. Will continue to monitor.    Problem: Adult Inpatient Plan of Care  Goal: Plan of Care Review  Outcome: Ongoing, Progressing  Goal: Patient-Specific Goal (Individualized)  Outcome: Ongoing, Progressing  Goal: Absence of Hospital-Acquired Illness or Injury  Outcome: Ongoing, Progressing  Goal: Optimal Comfort and Wellbeing  Outcome: Ongoing, Progressing  Goal: Readiness for Transition of Care  Outcome: Ongoing, Progressing

## 2022-06-01 NOTE — PROGRESS NOTES
[facility]  General Surgery  Progress Note    Subjective:     History of Present Illness:  56-year-old female referred for small bowel obstruction.  Patient reports worsening abdominal pain which began earlier today.  Prior to this her bowel function had improved while taking Linzess for chronic constipation however think progressively worsened today.  She underwent CT scan in the ER concern for small bowel obstruction.  She has a history of small bowel obstruction with exploratory laparotomy and small-bowel resection a year and half ago.      Post-Op Info:  * No surgery found *         Interval History: SBFT with normal colonic transit time. Still with significant nausea and not much improvement in pain. Repeat abdominal films with no signs of obstruction.     Medications:  Continuous Infusions:   lactated ringers 125 mL/hr at 05/31/22 2114     Scheduled Meds:   LIDOcaine (PF) 10 mg/ml (1%)  1 mL Other Once    [START ON 6/2/2022] linaCLOtide  290 mcg Oral Before breakfast    pantoprazole  40 mg Oral Daily     PRN Meds:diazePAM, HYDROmorphone, HYDROmorphone, ondansetron, promethazine (PHENERGAN) IVPB, sumatriptan     Review of patient's allergies indicates:   Allergen Reactions    Morphine Nausea And Vomiting    Erythromycin Other (See Comments)     Stomach cramps     Objective:     Vital Signs (Most Recent):  Temp: 98.1 °F (36.7 °C) (06/01/22 0733)  Pulse: 90 (06/01/22 0733)  Resp: 13 (06/01/22 0733)  BP: 128/72 (06/01/22 0733)  SpO2: 95 % (06/01/22 0733) Vital Signs (24h Range):  Temp:  [98.1 °F (36.7 °C)-99.7 °F (37.6 °C)] 98.1 °F (36.7 °C)  Pulse:  [82-99] 90  Resp:  [13-22] 13  SpO2:  [91 %-98 %] 95 %  BP: (103-161)/(57-83) 128/72     Weight: 68.2 kg (150 lb 5.7 oz)  Body mass index is 23.55 kg/m².    Intake/Output - Last 3 Shifts         05/30 0700 05/31 0659 05/31 0700 06/01 0659 06/01 0700 06/02 0659    IV Piggyback  350     Total Intake(mL/kg)  350 (5.1)     Net  +350            Urine  Occurrence  2 x 1 x            Physical Exam  Vitals and nursing note reviewed.   Constitutional:       General: She is not in acute distress.     Appearance: Normal appearance. She is well-developed. She is not ill-appearing.   HENT:      Head: Normocephalic and atraumatic.   Eyes:      Extraocular Movements: Extraocular movements intact.      Conjunctiva/sclera: Conjunctivae normal.   Cardiovascular:      Rate and Rhythm: Normal rate and regular rhythm.   Pulmonary:      Effort: Pulmonary effort is normal. No respiratory distress.   Abdominal:      General: There is no distension.      Palpations: Abdomen is soft.      Tenderness: There is abdominal tenderness (Minimal).      Comments: Abdomen soft and non-distended. NG tube in place with moderate clear output.   Musculoskeletal:      Cervical back: Neck supple.   Skin:     General: Skin is warm and dry.   Neurological:      Mental Status: She is alert and oriented to person, place, and time.   Psychiatric:         Behavior: Behavior normal.       Significant Labs:  I have reviewed all pertinent lab results within the past 24 hours.  CBC:   Recent Labs   Lab 06/01/22  0757   WBC 5.70   RBC 4.01   HGB 13.6   HCT 41.1      *   MCH 33.9*   MCHC 33.1     CMP:   Recent Labs   Lab 05/31/22  1330 06/01/22  0757    95   CALCIUM 9.7 8.9   ALBUMIN 4.5  --    PROT 7.3  --     146*   K 3.7 3.4*   CO2 23 30*    106   BUN 11 11   CREATININE 0.7 0.6   ALKPHOS 67  --    ALT 14  --    AST 20  --    BILITOT 1.3*  --        Significant Diagnostics:  I have reviewed all pertinent imaging results/findings within the past 24 hours.  SBFT and abdominal films reviewed. No signs of obstruction.     Assessment/Plan:     SBO (small bowel obstruction)  Dilated loops of small bowel at site of previous small bowel resections with fecalization.  Patient with multiple prior surgeries requiring resections. SBFT and follow-up abdominal films with contrast  throughout colon and no signs of obstruction.      - NG tube removal today  - Continue NPO for now  - Increased dose of Zofran  - Restart home meds, including Linzess.        Dianen Johnston PA-C  General Surgery  [facility]

## 2022-06-02 LAB
ANION GAP SERPL CALC-SCNC: 11 MMOL/L (ref 8–16)
BASOPHILS # BLD AUTO: 0.05 K/UL (ref 0–0.2)
BASOPHILS NFR BLD: 1.3 % (ref 0–1.9)
BUN SERPL-MCNC: 4 MG/DL (ref 6–20)
CALCIUM SERPL-MCNC: 8.5 MG/DL (ref 8.7–10.5)
CHLORIDE SERPL-SCNC: 105 MMOL/L (ref 95–110)
CO2 SERPL-SCNC: 29 MMOL/L (ref 23–29)
CREAT SERPL-MCNC: 0.6 MG/DL (ref 0.5–1.4)
DIFFERENTIAL METHOD: ABNORMAL
EOSINOPHIL # BLD AUTO: 0.2 K/UL (ref 0–0.5)
EOSINOPHIL NFR BLD: 5.2 % (ref 0–8)
ERYTHROCYTE [DISTWIDTH] IN BLOOD BY AUTOMATED COUNT: 12.5 % (ref 11.5–14.5)
EST. GFR  (AFRICAN AMERICAN): >60 ML/MIN/1.73 M^2
EST. GFR  (NON AFRICAN AMERICAN): >60 ML/MIN/1.73 M^2
GLUCOSE SERPL-MCNC: 79 MG/DL (ref 70–110)
HCT VFR BLD AUTO: 37.2 % (ref 37–48.5)
HGB BLD-MCNC: 12.3 G/DL (ref 12–16)
IMM GRANULOCYTES # BLD AUTO: 0.01 K/UL (ref 0–0.04)
IMM GRANULOCYTES NFR BLD AUTO: 0.3 % (ref 0–0.5)
LYMPHOCYTES # BLD AUTO: 1.5 K/UL (ref 1–4.8)
LYMPHOCYTES NFR BLD: 37.6 % (ref 18–48)
MCH RBC QN AUTO: 34 PG (ref 27–31)
MCHC RBC AUTO-ENTMCNC: 33.1 G/DL (ref 32–36)
MCV RBC AUTO: 103 FL (ref 82–98)
MONOCYTES # BLD AUTO: 0.4 K/UL (ref 0.3–1)
MONOCYTES NFR BLD: 9.3 % (ref 4–15)
NEUTROPHILS # BLD AUTO: 1.8 K/UL (ref 1.8–7.7)
NEUTROPHILS NFR BLD: 46.3 % (ref 38–73)
NRBC BLD-RTO: 0 /100 WBC
PLATELET # BLD AUTO: 171 K/UL (ref 150–450)
PMV BLD AUTO: 11.9 FL (ref 9.2–12.9)
POTASSIUM SERPL-SCNC: 3.1 MMOL/L (ref 3.5–5.1)
RBC # BLD AUTO: 3.62 M/UL (ref 4–5.4)
SODIUM SERPL-SCNC: 145 MMOL/L (ref 136–145)
WBC # BLD AUTO: 3.88 K/UL (ref 3.9–12.7)

## 2022-06-02 PROCEDURE — 63600175 PHARM REV CODE 636 W HCPCS: Performed by: SURGERY

## 2022-06-02 PROCEDURE — 63600175 PHARM REV CODE 636 W HCPCS

## 2022-06-02 PROCEDURE — 80048 BASIC METABOLIC PNL TOTAL CA: CPT

## 2022-06-02 PROCEDURE — 25000003 PHARM REV CODE 250: Performed by: SURGERY

## 2022-06-02 PROCEDURE — 36415 COLL VENOUS BLD VENIPUNCTURE: CPT

## 2022-06-02 PROCEDURE — 63600175 PHARM REV CODE 636 W HCPCS: Performed by: NURSE PRACTITIONER

## 2022-06-02 PROCEDURE — 99232 PR SUBSEQUENT HOSPITAL CARE,LEVL II: ICD-10-PCS | Mod: ,,,

## 2022-06-02 PROCEDURE — 11000001 HC ACUTE MED/SURG PRIVATE ROOM

## 2022-06-02 PROCEDURE — 25000003 PHARM REV CODE 250: Performed by: NURSE PRACTITIONER

## 2022-06-02 PROCEDURE — 99232 SBSQ HOSP IP/OBS MODERATE 35: CPT | Mod: ,,,

## 2022-06-02 PROCEDURE — S0028 INJECTION, FAMOTIDINE, 20 MG: HCPCS | Performed by: NURSE PRACTITIONER

## 2022-06-02 PROCEDURE — 85025 COMPLETE CBC W/AUTO DIFF WBC: CPT

## 2022-06-02 PROCEDURE — 25000003 PHARM REV CODE 250

## 2022-06-02 RX ORDER — ELECTROLYTES/DEXTROSE
SOLUTION, ORAL ORAL 2 TIMES DAILY
Status: DISCONTINUED | OUTPATIENT
Start: 2022-06-02 | End: 2022-06-04 | Stop reason: HOSPADM

## 2022-06-02 RX ORDER — HYDROCODONE BITARTRATE AND ACETAMINOPHEN 10; 325 MG/1; MG/1
1 TABLET ORAL EVERY 6 HOURS PRN
Status: DISCONTINUED | OUTPATIENT
Start: 2022-06-02 | End: 2022-06-04 | Stop reason: HOSPADM

## 2022-06-02 RX ORDER — HYDROCODONE BITARTRATE AND ACETAMINOPHEN 7.5; 325 MG/1; MG/1
1 TABLET ORAL EVERY 6 HOURS PRN
Status: DISCONTINUED | OUTPATIENT
Start: 2022-06-02 | End: 2022-06-04 | Stop reason: HOSPADM

## 2022-06-02 RX ADMIN — LINACLOTIDE 290 MCG: 145 CAPSULE, GELATIN COATED ORAL at 11:06

## 2022-06-02 RX ADMIN — HYDROMORPHONE HYDROCHLORIDE 1 MG: 2 INJECTION INTRAMUSCULAR; INTRAVENOUS; SUBCUTANEOUS at 02:06

## 2022-06-02 RX ADMIN — PROMETHAZINE HYDROCHLORIDE 6.25 MG: 25 INJECTION INTRAMUSCULAR; INTRAVENOUS at 08:06

## 2022-06-02 RX ADMIN — ONDANSETRON 8 MG: 2 INJECTION INTRAMUSCULAR; INTRAVENOUS at 02:06

## 2022-06-02 RX ADMIN — HYDROCODONE BITARTRATE AND ACETAMINOPHEN 1 TABLET: 10; 325 TABLET ORAL at 05:06

## 2022-06-02 RX ADMIN — HYDROCODONE BITARTRATE AND ACETAMINOPHEN 1 TABLET: 10; 325 TABLET ORAL at 11:06

## 2022-06-02 RX ADMIN — DIPHENHYDRAMINE HYDROCHLORIDE 25 MG: 50 INJECTION, SOLUTION INTRAMUSCULAR; INTRAVENOUS at 12:06

## 2022-06-02 RX ADMIN — PANTOPRAZOLE SODIUM 40 MG: 40 TABLET, DELAYED RELEASE ORAL at 09:06

## 2022-06-02 RX ADMIN — DIAZEPAM 10 MG: 5 TABLET ORAL at 12:06

## 2022-06-02 RX ADMIN — HYDROMORPHONE HYDROCHLORIDE 1 MG: 2 INJECTION INTRAMUSCULAR; INTRAVENOUS; SUBCUTANEOUS at 09:06

## 2022-06-02 RX ADMIN — PROMETHAZINE HYDROCHLORIDE 6.25 MG: 25 INJECTION INTRAMUSCULAR; INTRAVENOUS at 01:06

## 2022-06-02 RX ADMIN — HYDROMORPHONE HYDROCHLORIDE 1 MG: 2 INJECTION INTRAMUSCULAR; INTRAVENOUS; SUBCUTANEOUS at 01:06

## 2022-06-02 RX ADMIN — DIAZEPAM 10 MG: 5 TABLET ORAL at 11:06

## 2022-06-02 RX ADMIN — HYDROCORTISONE: 0.01 CREAM TOPICAL at 08:06

## 2022-06-02 RX ADMIN — HYDROMORPHONE HYDROCHLORIDE 1 MG: 2 INJECTION INTRAMUSCULAR; INTRAVENOUS; SUBCUTANEOUS at 08:06

## 2022-06-02 RX ADMIN — SODIUM CHLORIDE, SODIUM LACTATE, POTASSIUM CHLORIDE, AND CALCIUM CHLORIDE: .6; .31; .03; .02 INJECTION, SOLUTION INTRAVENOUS at 05:06

## 2022-06-02 RX ADMIN — ONDANSETRON 8 MG: 2 INJECTION INTRAMUSCULAR; INTRAVENOUS at 09:06

## 2022-06-02 NOTE — SUBJECTIVE & OBJECTIVE
Interval History: Patient tolerated clears with any increased nausea or pain but reports pain and nausea are still present and not much better. Passing flatus now but no BM since Tuesday. No vomiting.     Medications:  Continuous Infusions:   lactated ringers Stopped (06/02/22 0159)     Scheduled Meds:   LIDOcaine (PF) 10 mg/ml (1%)  1 mL Other Once    linaCLOtide  290 mcg Oral Before breakfast    magnesium citrate  296 mL Oral Once    pantoprazole  40 mg Oral Daily     PRN Meds:diazePAM, diphenhydrAMINE, HYDROcodone-acetaminophen, HYDROcodone-acetaminophen, HYDROmorphone, HYDROmorphone, ondansetron, promethazine (PHENERGAN) IVPB, sumatriptan     Review of patient's allergies indicates:   Allergen Reactions    Morphine Nausea And Vomiting    Erythromycin Other (See Comments)     Stomach cramps     Objective:     Vital Signs (Most Recent):  Temp: 98.6 °F (37 °C) (06/02/22 1135)  Pulse: 79 (06/02/22 1135)  Resp: 20 (06/02/22 1135)  BP: 124/66 (06/02/22 1135)  SpO2: 95 % (06/02/22 1135) Vital Signs (24h Range):  Temp:  [98 °F (36.7 °C)-98.7 °F (37.1 °C)] 98.6 °F (37 °C)  Pulse:  [72-87] 79  Resp:  [14-20] 20  SpO2:  [90 %-97 %] 95 %  BP: (121-146)/(65-80) 124/66     Weight: 71.7 kg (158 lb 1.1 oz)  Body mass index is 24.76 kg/m².    Intake/Output - Last 3 Shifts         05/31 0700  06/01 0659 06/01 0700  06/02 0659 06/02 0700  06/03 0659    P.O.  720     I.V. (mL/kg)  3177.9 (44.3)     IV Piggyback 350 278.7     Total Intake(mL/kg) 350 (5.1) 4176.6 (58.3)     Urine (mL/kg/hr)  0 (0)     Drains  200     Total Output  200     Net +350 +3976.6            Urine Occurrence 2 x 6 x             Physical Exam  Vitals and nursing note reviewed.   Constitutional:       General: She is not in acute distress.     Appearance: Normal appearance. She is well-developed. She is not ill-appearing.   HENT:      Head: Normocephalic and atraumatic.      Right Ear: External ear normal.      Left Ear: External ear normal.   Eyes:       Extraocular Movements: Extraocular movements intact.      Conjunctiva/sclera: Conjunctivae normal.   Cardiovascular:      Rate and Rhythm: Normal rate and regular rhythm.   Pulmonary:      Effort: Pulmonary effort is normal. No respiratory distress.   Abdominal:      General: There is no distension.      Palpations: Abdomen is soft.      Tenderness: There is no abdominal tenderness.      Comments: Soft, non-tender, and non-distended on exam. No tympany to percussion.    Musculoskeletal:      Cervical back: Neck supple.   Skin:     General: Skin is warm and dry.   Neurological:      Mental Status: She is alert and oriented to person, place, and time.   Psychiatric:         Behavior: Behavior normal.       Significant Labs:  I have reviewed all pertinent lab results within the past 24 hours.  CBC:   Recent Labs   Lab 06/02/22  0835   WBC 3.88*   RBC 3.62*   HGB 12.3   HCT 37.2      *   MCH 34.0*   MCHC 33.1     CMP:   Recent Labs   Lab 05/31/22  1330 06/01/22  0757 06/02/22  0835      < > 79   CALCIUM 9.7   < > 8.5*   ALBUMIN 4.5  --   --    PROT 7.3  --   --       < > 145   K 3.7   < > 3.1*   CO2 23   < > 29      < > 105   BUN 11   < > 4*   CREATININE 0.7   < > 0.6   ALKPHOS 67  --   --    ALT 14  --   --    AST 20  --   --    BILITOT 1.3*  --   --     < > = values in this interval not displayed.       Significant Diagnostics:  I have reviewed all pertinent imaging results/findings within the past 24 hours.  SBFT and subsequent abdominal films reviewed.

## 2022-06-02 NOTE — NURSING
"Patient wiped down her body with chlorhexidine wipes. Patient stated "for my daily bath" patient is itching and skin is excoriated. Gave patient IV benedryl and IVPB Pepcid. Will continue to monitor  "

## 2022-06-02 NOTE — PLAN OF CARE
AAOx4. Pt pain is being managed with medication. Nausea intermittent.  Pt remained free from fall and injury. No sign of any distress noted. Safety precautions alert. Pt asked to call for assistance if needed. IV access clean dry and intact. Chart checked reviewed. VSS. Will continue to monitor on going.

## 2022-06-02 NOTE — PROGRESS NOTES
[facility]  General Surgery  Progress Note    Subjective:     History of Present Illness:  56-year-old female referred for small bowel obstruction.  Patient reports worsening abdominal pain which began earlier today.  Prior to this her bowel function had improved while taking Linzess for chronic constipation however think progressively worsened today.  She underwent CT scan in the ER concern for small bowel obstruction.  She has a history of small bowel obstruction with exploratory laparotomy and small-bowel resection a year and half ago.      Post-Op Info:  * No surgery found *         Interval History: Patient tolerated clears with any increased nausea or pain but reports pain and nausea are still present and not much better. Passing flatus now but no BM since Tuesday. No vomiting.     Medications:  Continuous Infusions:   lactated ringers Stopped (06/02/22 0159)     Scheduled Meds:   LIDOcaine (PF) 10 mg/ml (1%)  1 mL Other Once    linaCLOtide  290 mcg Oral Before breakfast    magnesium citrate  296 mL Oral Once    pantoprazole  40 mg Oral Daily     PRN Meds:diazePAM, diphenhydrAMINE, HYDROcodone-acetaminophen, HYDROcodone-acetaminophen, HYDROmorphone, HYDROmorphone, ondansetron, promethazine (PHENERGAN) IVPB, sumatriptan     Review of patient's allergies indicates:   Allergen Reactions    Morphine Nausea And Vomiting    Erythromycin Other (See Comments)     Stomach cramps     Objective:     Vital Signs (Most Recent):  Temp: 98.6 °F (37 °C) (06/02/22 1135)  Pulse: 79 (06/02/22 1135)  Resp: 20 (06/02/22 1135)  BP: 124/66 (06/02/22 1135)  SpO2: 95 % (06/02/22 1135) Vital Signs (24h Range):  Temp:  [98 °F (36.7 °C)-98.7 °F (37.1 °C)] 98.6 °F (37 °C)  Pulse:  [72-87] 79  Resp:  [14-20] 20  SpO2:  [90 %-97 %] 95 %  BP: (121-146)/(65-80) 124/66     Weight: 71.7 kg (158 lb 1.1 oz)  Body mass index is 24.76 kg/m².    Intake/Output - Last 3 Shifts         05/31 0700 06/01 0659 06/01 0700 06/02 0659 06/02  0700  06/03 0659    P.O.  720     I.V. (mL/kg)  3177.9 (44.3)     IV Piggyback 350 278.7     Total Intake(mL/kg) 350 (5.1) 4176.6 (58.3)     Urine (mL/kg/hr)  0 (0)     Drains  200     Total Output  200     Net +350 +3976.6            Urine Occurrence 2 x 6 x             Physical Exam  Vitals and nursing note reviewed.   Constitutional:       General: She is not in acute distress.     Appearance: Normal appearance. She is well-developed. She is not ill-appearing.   HENT:      Head: Normocephalic and atraumatic.      Right Ear: External ear normal.      Left Ear: External ear normal.   Eyes:      Extraocular Movements: Extraocular movements intact.      Conjunctiva/sclera: Conjunctivae normal.   Cardiovascular:      Rate and Rhythm: Normal rate and regular rhythm.   Pulmonary:      Effort: Pulmonary effort is normal. No respiratory distress.   Abdominal:      General: There is no distension.      Palpations: Abdomen is soft.      Tenderness: There is no abdominal tenderness.      Comments: Soft, non-tender, and non-distended on exam. No tympany to percussion.    Musculoskeletal:      Cervical back: Neck supple.   Skin:     General: Skin is warm and dry.   Neurological:      Mental Status: She is alert and oriented to person, place, and time.   Psychiatric:         Behavior: Behavior normal.       Significant Labs:  I have reviewed all pertinent lab results within the past 24 hours.  CBC:   Recent Labs   Lab 06/02/22  0835   WBC 3.88*   RBC 3.62*   HGB 12.3   HCT 37.2      *   MCH 34.0*   MCHC 33.1     CMP:   Recent Labs   Lab 05/31/22  1330 06/01/22  0757 06/02/22  0835      < > 79   CALCIUM 9.7   < > 8.5*   ALBUMIN 4.5  --   --    PROT 7.3  --   --       < > 145   K 3.7   < > 3.1*   CO2 23   < > 29      < > 105   BUN 11   < > 4*   CREATININE 0.7   < > 0.6   ALKPHOS 67  --   --    ALT 14  --   --    AST 20  --   --    BILITOT 1.3*  --   --     < > = values in this interval not  displayed.       Significant Diagnostics:  I have reviewed all pertinent imaging results/findings within the past 24 hours.  SBFT and subsequent abdominal films reviewed.     Assessment/Plan:     SBO (small bowel obstruction)  Dilated loops of small bowel at site of previous small bowel resections with fecalization.  Patient with multiple prior surgeries requiring resections. SBFT and follow-up abdominal films with contrast throughout colon and no signs of obstruction.      - Tolerating clear liquids, advance to full liquids.  - Increased dose of Zofran  - Restart home meds, including Linzess.  - Monitor for increased bowel function.         Dianne Johnston PA-C  General Surgery  [facility]

## 2022-06-03 LAB
ANION GAP SERPL CALC-SCNC: 13 MMOL/L (ref 8–16)
BASOPHILS # BLD AUTO: 0.04 K/UL (ref 0–0.2)
BASOPHILS NFR BLD: 1 % (ref 0–1.9)
BUN SERPL-MCNC: 3 MG/DL (ref 6–20)
CALCIUM SERPL-MCNC: 8.8 MG/DL (ref 8.7–10.5)
CHLORIDE SERPL-SCNC: 105 MMOL/L (ref 95–110)
CO2 SERPL-SCNC: 27 MMOL/L (ref 23–29)
CREAT SERPL-MCNC: 0.6 MG/DL (ref 0.5–1.4)
DIFFERENTIAL METHOD: ABNORMAL
EOSINOPHIL # BLD AUTO: 0.2 K/UL (ref 0–0.5)
EOSINOPHIL NFR BLD: 4.4 % (ref 0–8)
ERYTHROCYTE [DISTWIDTH] IN BLOOD BY AUTOMATED COUNT: 12.4 % (ref 11.5–14.5)
EST. GFR  (AFRICAN AMERICAN): >60 ML/MIN/1.73 M^2
EST. GFR  (NON AFRICAN AMERICAN): >60 ML/MIN/1.73 M^2
GLUCOSE SERPL-MCNC: 174 MG/DL (ref 70–110)
HCT VFR BLD AUTO: 36.9 % (ref 37–48.5)
HGB BLD-MCNC: 12.4 G/DL (ref 12–16)
IMM GRANULOCYTES # BLD AUTO: 0.01 K/UL (ref 0–0.04)
IMM GRANULOCYTES NFR BLD AUTO: 0.2 % (ref 0–0.5)
LYMPHOCYTES # BLD AUTO: 1.8 K/UL (ref 1–4.8)
LYMPHOCYTES NFR BLD: 42.8 % (ref 18–48)
MCH RBC QN AUTO: 34.2 PG (ref 27–31)
MCHC RBC AUTO-ENTMCNC: 33.6 G/DL (ref 32–36)
MCV RBC AUTO: 102 FL (ref 82–98)
MONOCYTES # BLD AUTO: 0.3 K/UL (ref 0.3–1)
MONOCYTES NFR BLD: 7.1 % (ref 4–15)
NEUTROPHILS # BLD AUTO: 1.8 K/UL (ref 1.8–7.7)
NEUTROPHILS NFR BLD: 44.5 % (ref 38–73)
NRBC BLD-RTO: 0 /100 WBC
PLATELET # BLD AUTO: 178 K/UL (ref 150–450)
PMV BLD AUTO: 11 FL (ref 9.2–12.9)
POTASSIUM SERPL-SCNC: 3.3 MMOL/L (ref 3.5–5.1)
RBC # BLD AUTO: 3.63 M/UL (ref 4–5.4)
SODIUM SERPL-SCNC: 145 MMOL/L (ref 136–145)
WBC # BLD AUTO: 4.11 K/UL (ref 3.9–12.7)

## 2022-06-03 PROCEDURE — 25000003 PHARM REV CODE 250

## 2022-06-03 PROCEDURE — 99231 SBSQ HOSP IP/OBS SF/LOW 25: CPT | Mod: ,,, | Performed by: SURGERY

## 2022-06-03 PROCEDURE — 21400001 HC TELEMETRY ROOM

## 2022-06-03 PROCEDURE — 85025 COMPLETE CBC W/AUTO DIFF WBC: CPT | Performed by: SURGERY

## 2022-06-03 PROCEDURE — 80048 BASIC METABOLIC PNL TOTAL CA: CPT | Performed by: SURGERY

## 2022-06-03 PROCEDURE — 36415 COLL VENOUS BLD VENIPUNCTURE: CPT | Performed by: SURGERY

## 2022-06-03 PROCEDURE — 11000001 HC ACUTE MED/SURG PRIVATE ROOM

## 2022-06-03 PROCEDURE — 25000003 PHARM REV CODE 250: Performed by: SURGERY

## 2022-06-03 PROCEDURE — 63600175 PHARM REV CODE 636 W HCPCS: Performed by: SURGERY

## 2022-06-03 PROCEDURE — 99231 PR SUBSEQUENT HOSPITAL CARE,LEVL I: ICD-10-PCS | Mod: ,,, | Performed by: SURGERY

## 2022-06-03 PROCEDURE — 63600175 PHARM REV CODE 636 W HCPCS

## 2022-06-03 RX ORDER — POLYETHYLENE GLYCOL 3350 17 G/17G
17 POWDER, FOR SOLUTION ORAL 2 TIMES DAILY
Status: DISCONTINUED | OUTPATIENT
Start: 2022-06-03 | End: 2022-06-04 | Stop reason: HOSPADM

## 2022-06-03 RX ORDER — DEXTROSE MONOHYDRATE, SODIUM CHLORIDE, AND POTASSIUM CHLORIDE 50; 1.49; 4.5 G/1000ML; G/1000ML; G/1000ML
INJECTION, SOLUTION INTRAVENOUS CONTINUOUS
Status: DISCONTINUED | OUTPATIENT
Start: 2022-06-03 | End: 2022-06-04 | Stop reason: HOSPADM

## 2022-06-03 RX ORDER — BISACODYL 5 MG
10 TABLET, DELAYED RELEASE (ENTERIC COATED) ORAL DAILY
Status: DISCONTINUED | OUTPATIENT
Start: 2022-06-03 | End: 2022-06-04 | Stop reason: HOSPADM

## 2022-06-03 RX ORDER — ENOXAPARIN SODIUM 100 MG/ML
40 INJECTION SUBCUTANEOUS EVERY 24 HOURS
Status: DISCONTINUED | OUTPATIENT
Start: 2022-06-03 | End: 2022-06-04 | Stop reason: HOSPADM

## 2022-06-03 RX ADMIN — DIAZEPAM 10 MG: 5 TABLET ORAL at 09:06

## 2022-06-03 RX ADMIN — ONDANSETRON 8 MG: 2 INJECTION INTRAMUSCULAR; INTRAVENOUS at 12:06

## 2022-06-03 RX ADMIN — ENOXAPARIN SODIUM 40 MG: 100 INJECTION SUBCUTANEOUS at 09:06

## 2022-06-03 RX ADMIN — LINACLOTIDE 290 MCG: 145 CAPSULE, GELATIN COATED ORAL at 05:06

## 2022-06-03 RX ADMIN — ONDANSETRON 8 MG: 2 INJECTION INTRAMUSCULAR; INTRAVENOUS at 03:06

## 2022-06-03 RX ADMIN — HYDROCODONE BITARTRATE AND ACETAMINOPHEN 1 TABLET: 10; 325 TABLET ORAL at 09:06

## 2022-06-03 RX ADMIN — HYDROCODONE BITARTRATE AND ACETAMINOPHEN 1 TABLET: 10; 325 TABLET ORAL at 07:06

## 2022-06-03 RX ADMIN — SUMATRIPTAN SUCCINATE 100 MG: 50 TABLET ORAL at 09:06

## 2022-06-03 RX ADMIN — HYDROCORTISONE: 0.01 CREAM TOPICAL at 09:06

## 2022-06-03 RX ADMIN — HYDROMORPHONE HYDROCHLORIDE 1 MG: 2 INJECTION INTRAMUSCULAR; INTRAVENOUS; SUBCUTANEOUS at 03:06

## 2022-06-03 RX ADMIN — DEXTROSE, SODIUM CHLORIDE, AND POTASSIUM CHLORIDE: 5; .45; .15 INJECTION INTRAVENOUS at 02:06

## 2022-06-03 RX ADMIN — SUMATRIPTAN SUCCINATE 100 MG: 50 TABLET ORAL at 07:06

## 2022-06-03 RX ADMIN — BISACODYL 10 MG: 5 TABLET, COATED ORAL at 05:06

## 2022-06-03 RX ADMIN — HYDROMORPHONE HYDROCHLORIDE 0.5 MG: 2 INJECTION INTRAMUSCULAR; INTRAVENOUS; SUBCUTANEOUS at 12:06

## 2022-06-03 RX ADMIN — PANTOPRAZOLE SODIUM 40 MG: 40 TABLET, DELAYED RELEASE ORAL at 09:06

## 2022-06-03 NOTE — PROGRESS NOTES
[facility]  Adult Nutrition  Progress Note    SUMMARY     Recommendations     1. Continue diet as prescribed: regular  2. RD to follow up to monitor intake, tolerance, and labs.    *Please send consult if acute nutrition needs arise.    Goals:    1. Pt will tolerate >75% estimated energy and protein needs by RD follow up.    Nutrition Goal Status: new  2. Pt will tolerate >65% estimated energy and protein needs by RD follow up.    Nutrition Goal Status: goal met   Communication of RD recs: POC and sticky note    Assessment and Plan  Nutrition Problem    Inadequate energy and protein intake  Related to (etiology):      Inability to consume adequate energy    Medical condition    Altered GI function  Signs and Symptoms (as evidenced by):     NPO with no alternate source of nutrition at this time   Interventions:    general, healththful diet    Nutrition supplement    Collaboration with other care providers  Nutrition Diagnosis Status:   Improving     Reason for Assessment  Reason For Assessment: RD follow-up  Diagnosis: SBO (small bowel obstruction)   Relevant Medical History: short gut syndrome, migraines    General information comments:   06/01/2022:  RD spoke with pt who reports poor appetite, intake, and worsening abdominal pain for several days as well as constipation. Pt stated she had a headache during conversation and requested to discuss further at another time. Pt admitted yesterday, referred for SBO. She has a history of small bowel obstruction with exploratory laparotomy and small-bowel resection a year and half ago. Will continue to monitor.     6/3: RD spoke with pt who reports good appetite since last assessment, requested to advance diet and, after communicating with Dr. Hightower, I updated her diet order and placed her lunch order. She denies any GI issues and says she is hungry. Will continue to monitor.      Nutrition Discharge Planning: regular diet  pending medical course    Nutrition Risk  "Screen  Nutrition Risk Screen: no indicators present  Have you recently lost weight without trying?: Unsure  Have you been eating poorly because of a decreased appetite?: Yes    Physical Findings/Malnutrition Assessment  Patient does not meet at least 2 ASPEN criteria for malnutrition at this time.   Will continue to monitor.    Weights stable per EMR  N/V:   not indicated   Wounds: not indicated   Edema:   not indicated   Last Bowel Movement: 05/30/22   GI Signs/Symptoms: nausea  O2 Device (Oxygen Therapy): room air   Ras Score: 22  NFPE performed indicating mild muscle and fat wasting    Nutrition/Diet History  Patient Reported Diet/Restrictions/Preferences: general  Food Allergies: NKFA  Factors Affecting Nutritional Intake: None identified at this time  Spiritual, Cultural Beliefs, [employer] Practices, Values that Affect Care: no    Anthropometrics  Temp: 98.2 °F (36.8 °C)  Height: 5' 7" (170.2 cm)  Height (inches): 67 in  Weight Method: Bed Scale  Weight: 74.7 kg (164 lb 10.9 oz)  Weight (lb): 164.69 lb  Ideal Body Weight (IBW), Female: 135 lb  % Ideal Body Weight, Female (lb): 111.21 %  BMI (Calculated): 25.8  BMI Grade: 25 - 29.9 - overweight        Labs  Pertinent Labs: reviewed  Lab Results   Component Value Date    ALBUMIN 4.5 05/31/2022    HGB 12.4 06/03/2022    HCT 36.9 (L) 06/03/2022    WBC 4.11 06/03/2022    CALCIUM 8.8 06/03/2022     Lab Results   Component Value Date     06/03/2022    K 3.3 (L) 06/03/2022    PHOS 3.4 10/02/2021    BUN 3 (L) 06/03/2022    CREATININE 0.6 06/03/2022    ESTGFRAFRICA >60 06/03/2022    EGFRNONAA >60 06/03/2022     Lab Results   Component Value Date    ALT 14 05/31/2022    AST 20 05/31/2022    ALKPHOS 67 05/31/2022    BILITOT 1.3 (H) 05/31/2022     Meds  Pertinent Medications: reviewed    enoxaparin  40 mg Subcutaneous Daily    hydrocortisone-aloe vera   Topical (Top) BID    linaCLOtide  290 mcg Oral Before breakfast    magnesium citrate  296 mL Oral Once    " pantoprazole  40 mg Oral Daily    polyethylene glycol  17 g Oral BID     Continuous Infusions:   dextrose 5 % and 0.45 % NaCl with KCl 20 mEq       PRN Meds:diazePAM, diphenhydrAMINE, HYDROcodone-acetaminophen, HYDROcodone-acetaminophen, HYDROmorphone, HYDROmorphone, ondansetron, promethazine (PHENERGAN) IVPB, sumatriptan     Estimated/Assessed Needs  Weight Used For Calorie Calculations: 68.2 kg (150 lb 5.7 oz)  Energy Calorie Requirements (kcal): 6909-5328 (25-30)  Energy Need Method: Kcal/kg  Weight Used For Protein Calculations: 68.2 kg (150 lb 5.7 oz)  Protein Requirements: 54-68g (.8-1)  RDA Method (mL): 1705ml fluid or per MD/NP  CHO Requirement: 213g (50% CHO)    Nutrition Prescription Ordered  Current Diet Order: regular diet    Evaluation of Received Nutrients  Energy Calories Required: meeting needs  Protein Required: meeting needs  Tolerance: tolerating  % Intake of Estimated Energy Needs: %  % Meal Intake: %    Monitor and Evaluation  Food and Nutrient Intake: food and beverage intake  Food and Nutrient Administration: diet order  Anthropometric Measurements: weight, weight change, body mass index  Biochemical Data, Medical Tests and Procedures: electrolyte and renal panel, lipid profile, gastrointestinal profile, glucose/endocrine profile, Inflammatory profile  Nutrition-Focused Physical Findings: overall appearance     Nutrition Follow-Up  Level of Risk/Frequency of Follow-up: low - moderate / Once weekly   Sasha Dior, MS, RD, LDN

## 2022-06-03 NOTE — SUBJECTIVE & OBJECTIVE
Interval History: tolerating diet passing flatus no bowel movement     Medications:  Continuous Infusions:   dextrose 5 % and 0.45 % NaCl with KCl 20 mEq 50 mL/hr at 06/03/22 1453     Scheduled Meds:   bisacodyL  10 mg Oral Daily    enoxaparin  40 mg Subcutaneous Daily    hydrocortisone-aloe vera   Topical (Top) BID    linaCLOtide  290 mcg Oral Before breakfast    magnesium citrate  296 mL Oral Once    pantoprazole  40 mg Oral Daily    polyethylene glycol  17 g Oral BID     PRN Meds:diazePAM, diphenhydrAMINE, HYDROcodone-acetaminophen, HYDROcodone-acetaminophen, ondansetron, promethazine (PHENERGAN) IVPB, sumatriptan     Review of patient's allergies indicates:   Allergen Reactions    Morphine Nausea And Vomiting    Erythromycin Other (See Comments)     Stomach cramps     Objective:     Vital Signs (Most Recent):  Temp: 98.1 °F (36.7 °C) (06/03/22 1606)  Pulse: 80 (06/03/22 1606)  Resp: 20 (06/03/22 1606)  BP: 126/67 (06/03/22 1606)  SpO2: 97 % (06/03/22 1606) Vital Signs (24h Range):  Temp:  [98.1 °F (36.7 °C)-98.7 °F (37.1 °C)] 98.1 °F (36.7 °C)  Pulse:  [66-87] 80  Resp:  [16-20] 20  SpO2:  [94 %-97 %] 97 %  BP: (123-167)/(59-76) 126/67     Weight: 74.7 kg (164 lb 10.9 oz)  Body mass index is 25.79 kg/m².    Intake/Output - Last 3 Shifts         06/01 0700  06/02 0659 06/02 0700  06/03 0659 06/03 0700  06/04 0659    P.O. 720 1078 240    I.V. (mL/kg) 3177.9 (44.3) 31.9 (0.4)     IV Piggyback 278.7      Total Intake(mL/kg) 4176.6 (58.3) 1109.9 (14.9) 240 (3.2)    Urine (mL/kg/hr) 0 (0)  0 (0)    Drains 200      Stool   1    Total Output 200  1    Net +3976.6 +1109.9 +239           Urine Occurrence 6 x 4 x 1 x            Physical Exam  Vitals and nursing note reviewed.   Constitutional:       General: She is not in acute distress.     Appearance: Normal appearance. She is well-developed. She is not ill-appearing.   HENT:      Head: Normocephalic and atraumatic.      Right Ear: External ear normal.      Left Ear:  External ear normal.   Eyes:      Extraocular Movements: Extraocular movements intact.      Conjunctiva/sclera: Conjunctivae normal.   Cardiovascular:      Rate and Rhythm: Normal rate and regular rhythm.   Pulmonary:      Effort: Pulmonary effort is normal. No respiratory distress.   Abdominal:      General: There is no distension.      Palpations: Abdomen is soft.      Tenderness: There is no abdominal tenderness.      Comments: Soft, non-tender, and non-distended on exam. No tympany to percussion.    Musculoskeletal:      Cervical back: Neck supple.   Skin:     General: Skin is warm and dry.   Neurological:      Mental Status: She is alert and oriented to person, place, and time.   Psychiatric:         Behavior: Behavior normal.       Significant Labs:  I have reviewed all pertinent lab results within the past 24 hours.  CBC:   Recent Labs   Lab 06/03/22  0913   WBC 4.11   RBC 3.63*   HGB 12.4   HCT 36.9*      *   MCH 34.2*   MCHC 33.6       CMP:   Recent Labs   Lab 05/31/22  1330 06/01/22  0757 06/03/22  0913      < > 174*   CALCIUM 9.7   < > 8.8   ALBUMIN 4.5  --   --    PROT 7.3  --   --       < > 145   K 3.7   < > 3.3*   CO2 23   < > 27      < > 105   BUN 11   < > 3*   CREATININE 0.7   < > 0.6   ALKPHOS 67  --   --    ALT 14  --   --    AST 20  --   --    BILITOT 1.3*  --   --     < > = values in this interval not displayed.         Significant Diagnostics:

## 2022-06-03 NOTE — ASSESSMENT & PLAN NOTE
Dilated loops of small bowel at site of previous small bowel resections with fecalization.  Patient with multiple prior surgeries requiring resections. SBFT and follow-up abdominal films with contrast throughout colon and no signs of obstruction.      - regular diet  -bowel regimen  - home meds, including Linzess.  - Monitor for increased bowel function.   -discharge planning

## 2022-06-03 NOTE — PLAN OF CARE
RD Recommendations     1. Continue diet as prescribed: regular  2. RD to follow up to monitor intake, tolerance, and labs.    *Please send consult if acute nutrition needs arise.      Sasha Dior MS, RD, LDN

## 2022-06-03 NOTE — PROGRESS NOTES
[facility]  General Surgery  Progress Note    Subjective:     History of Present Illness:  56-year-old female referred for small bowel obstruction.  Patient reports worsening abdominal pain which began earlier today.  Prior to this her bowel function had improved while taking Linzess for chronic constipation however think progressively worsened today.  She underwent CT scan in the ER concern for small bowel obstruction.  She has a history of small bowel obstruction with exploratory laparotomy and small-bowel resection a year and half ago.      Post-Op Info:  * No surgery found *         Interval History: tolerating diet passing flatus no bowel movement     Medications:  Continuous Infusions:   dextrose 5 % and 0.45 % NaCl with KCl 20 mEq 50 mL/hr at 06/03/22 1453     Scheduled Meds:   bisacodyL  10 mg Oral Daily    enoxaparin  40 mg Subcutaneous Daily    hydrocortisone-aloe vera   Topical (Top) BID    linaCLOtide  290 mcg Oral Before breakfast    magnesium citrate  296 mL Oral Once    pantoprazole  40 mg Oral Daily    polyethylene glycol  17 g Oral BID     PRN Meds:diazePAM, diphenhydrAMINE, HYDROcodone-acetaminophen, HYDROcodone-acetaminophen, ondansetron, promethazine (PHENERGAN) IVPB, sumatriptan     Review of patient's allergies indicates:   Allergen Reactions    Morphine Nausea And Vomiting    Erythromycin Other (See Comments)     Stomach cramps     Objective:     Vital Signs (Most Recent):  Temp: 98.1 °F (36.7 °C) (06/03/22 1606)  Pulse: 80 (06/03/22 1606)  Resp: 20 (06/03/22 1606)  BP: 126/67 (06/03/22 1606)  SpO2: 97 % (06/03/22 1606) Vital Signs (24h Range):  Temp:  [98.1 °F (36.7 °C)-98.7 °F (37.1 °C)] 98.1 °F (36.7 °C)  Pulse:  [66-87] 80  Resp:  [16-20] 20  SpO2:  [94 %-97 %] 97 %  BP: (123-167)/(59-76) 126/67     Weight: 74.7 kg (164 lb 10.9 oz)  Body mass index is 25.79 kg/m².    Intake/Output - Last 3 Shifts         06/01 0700  06/02 0659 06/02 0700 06/03 0659 06/03 0700 06/04 0659     P.O. 720 1078 240    I.V. (mL/kg) 3177.9 (44.3) 31.9 (0.4)     IV Piggyback 278.7      Total Intake(mL/kg) 4176.6 (58.3) 1109.9 (14.9) 240 (3.2)    Urine (mL/kg/hr) 0 (0)  0 (0)    Drains 200      Stool   1    Total Output 200  1    Net +3976.6 +1109.9 +239           Urine Occurrence 6 x 4 x 1 x            Physical Exam  Vitals and nursing note reviewed.   Constitutional:       General: She is not in acute distress.     Appearance: Normal appearance. She is well-developed. She is not ill-appearing.   HENT:      Head: Normocephalic and atraumatic.      Right Ear: External ear normal.      Left Ear: External ear normal.   Eyes:      Extraocular Movements: Extraocular movements intact.      Conjunctiva/sclera: Conjunctivae normal.   Cardiovascular:      Rate and Rhythm: Normal rate and regular rhythm.   Pulmonary:      Effort: Pulmonary effort is normal. No respiratory distress.   Abdominal:      General: There is no distension.      Palpations: Abdomen is soft.      Tenderness: There is no abdominal tenderness.      Comments: Soft, non-tender, and non-distended on exam. No tympany to percussion.    Musculoskeletal:      Cervical back: Neck supple.   Skin:     General: Skin is warm and dry.   Neurological:      Mental Status: She is alert and oriented to person, place, and time.   Psychiatric:         Behavior: Behavior normal.       Significant Labs:  I have reviewed all pertinent lab results within the past 24 hours.  CBC:   Recent Labs   Lab 06/03/22  0913   WBC 4.11   RBC 3.63*   HGB 12.4   HCT 36.9*      *   MCH 34.2*   MCHC 33.6       CMP:   Recent Labs   Lab 05/31/22  1330 06/01/22  0757 06/03/22  0913      < > 174*   CALCIUM 9.7   < > 8.8   ALBUMIN 4.5  --   --    PROT 7.3  --   --       < > 145   K 3.7   < > 3.3*   CO2 23   < > 27      < > 105   BUN 11   < > 3*   CREATININE 0.7   < > 0.6   ALKPHOS 67  --   --    ALT 14  --   --    AST 20  --   --    BILITOT 1.3*  --   --      < > = values in this interval not displayed.         Significant Diagnostics:      Assessment/Plan:     * SBO (small bowel obstruction)  Dilated loops of small bowel at site of previous small bowel resections with fecalization.  Patient with multiple prior surgeries requiring resections. SBFT and follow-up abdominal films with contrast throughout colon and no signs of obstruction.      - regular diet  -bowel regimen  - home meds, including Linzess.  - Monitor for increased bowel function.   -discharge planning        Vega Hightower MD  General Surgery  [facility]

## 2022-06-04 VITALS
HEART RATE: 77 BPM | WEIGHT: 162.5 LBS | RESPIRATION RATE: 17 BRPM | SYSTOLIC BLOOD PRESSURE: 122 MMHG | BODY MASS INDEX: 25.51 KG/M2 | OXYGEN SATURATION: 97 % | DIASTOLIC BLOOD PRESSURE: 68 MMHG | TEMPERATURE: 99 F | HEIGHT: 67 IN

## 2022-06-04 PROCEDURE — 99239 PR HOSPITAL DISCHARGE DAY,>30 MIN: ICD-10-PCS | Mod: ,,, | Performed by: COLON & RECTAL SURGERY

## 2022-06-04 PROCEDURE — 99239 HOSP IP/OBS DSCHRG MGMT >30: CPT | Mod: ,,, | Performed by: COLON & RECTAL SURGERY

## 2022-06-04 PROCEDURE — 25000003 PHARM REV CODE 250: Performed by: SURGERY

## 2022-06-04 PROCEDURE — 25000003 PHARM REV CODE 250

## 2022-06-04 PROCEDURE — 63600175 PHARM REV CODE 636 W HCPCS

## 2022-06-04 RX ORDER — DOXEPIN HYDROCHLORIDE 10 MG/1
10 CAPSULE ORAL NIGHTLY
Qty: 30 CAPSULE | Refills: 11 | Status: CANCELLED | OUTPATIENT
Start: 2022-06-04 | End: 2023-06-04

## 2022-06-04 RX ADMIN — LINACLOTIDE 290 MCG: 145 CAPSULE, GELATIN COATED ORAL at 05:06

## 2022-06-04 RX ADMIN — BISACODYL 10 MG: 5 TABLET, COATED ORAL at 09:06

## 2022-06-04 RX ADMIN — HYDROCORTISONE: 0.01 CREAM TOPICAL at 09:06

## 2022-06-04 RX ADMIN — PANTOPRAZOLE SODIUM 40 MG: 40 TABLET, DELAYED RELEASE ORAL at 09:06

## 2022-06-04 RX ADMIN — ONDANSETRON 8 MG: 2 INJECTION INTRAMUSCULAR; INTRAVENOUS at 01:06

## 2022-06-04 RX ADMIN — HYDROCODONE BITARTRATE AND ACETAMINOPHEN 1 TABLET: 7.5; 325 TABLET ORAL at 09:06

## 2022-06-04 RX ADMIN — HYDROCODONE BITARTRATE AND ACETAMINOPHEN 1 TABLET: 10; 325 TABLET ORAL at 01:06

## 2022-06-04 NOTE — DISCHARGE SUMMARY
[facility]  General Surgery  Discharge Summary      Patient Name: Genny Calixto  MRN: [id number]  Admission Date: 5/31/2022  Hospital Length of Stay: 3 days  Discharge Date and Time:  06/04/2022 11:32 AM  Attending Physician: Vega Hightower MD   Discharging Provider: Remigio Herron MD  Primary Care Provider: Tay Duff MD    HPI:   56-year-old female referred for small bowel obstruction.  Patient reports worsening abdominal pain which began earlier today.  Prior to this her bowel function had improved while taking Linzess for chronic constipation however think progressively worsened today.  She underwent CT scan in the ER concern for small bowel obstruction.  She has a history of small bowel obstruction with exploratory laparotomy and small-bowel resection a year and half ago.      * No surgery found *      Indwelling Lines/Drains at time of discharge:   Lines/Drains/Airways     None               Hospital Course: 06/01/2022: SBFT with normal colonic transit time. Still with significant nausea and not much improvement in pain. Repeat abdominal films with no signs of obstruction.     06/02/2022: Patient tolerated clears with any increased nausea or pain but reports pain and nausea are still present and not much better. Passing flatus now but no BM since Tuesday. No vomiting.     06/03/2022 tolerating diet passing flatus no bowel movement    06/04/2022: Passing flatus and BM now. Tolerating diet. Ready for discharge.        Goals of Care Treatment Preferences:  Code Status: Full Code      Consults:     Significant Diagnostic Studies: Labs:   BMP:   Recent Labs   Lab 06/03/22  0913   *      K 3.3*      CO2 27   BUN 3*   CREATININE 0.6   CALCIUM 8.8    and CBC   Recent Labs   Lab 06/03/22  0913   WBC 4.11   HGB 12.4   HCT 36.9*        Radiology: CT scan:   CT ABDOMEN PELVIS WITHOUT CONTRAST:   Results for orders placed or performed during the hospital encounter of 05/31/22    CT Abdomen Pelvis  Without Contrast    Narrative    EXAMINATION:  CT ABDOMEN PELVIS WITHOUT CONTRAST, multiplanar reconstructions    CLINICAL HISTORY:  Abdominal pain with distention;    TECHNIQUE:  Axial images through the abdomen and pelvis were obtained without the use of IV contrast.  Sagittal and coronal reconstructions are provided for review.  Oral contrast was not utilized.    COMPARISON:  May 9, 2022    FINDINGS:  LUNG BASES: Stable dependent atelectasis in the left greater than right lung bases.  Lung bases are free of new pulmonary opacities.  Negative for pleural effusions. The distal esophagus is normal.  Trace amount of pericardial fluid again seen.    ABDOMEN: Stable cholecystectomy clips.  The liver and spleen appear normal.  The pancreas is unremarkable.  The adrenal glands are normal.    Stable inferior pole 1.5 cm left renal cyst.  Punctate nonobstructing inferior pole left renal stone.  Kidneys are otherwise normal in appearance.    Negative for adenopathy or ascites noted within the abdomen or pelvis.  Mild vascular calcifications are present without aneurysmal changes.    There are focally dilated air, fluid and stool filled loops of small bowel within the central lower abdomen and pelvis measuring up to 4.8 cm in diameter, with surrounding inflammatory changes.  There are postoperative changes to the proximal and distal ends of the dilated loops of bowel.  The loops of bowel leading into and out of these focally dilated loops are normal in caliber.  There is fluid within the proximal half of the colon.  The colon is otherwise normal.  Normal appendix.    There is high-density material within the stomach and proximal colon, likely something ingested such as Pepto-Bismol.  Negative for free air.    PELVIS: The urinary bladder is unremarkable.  The uterus is absent.  The rest of the female pelvic organs are normal. There are pelvic phleboliths.    There are postoperative changes to the  abdominal wall.  The abdominal wall is intact.    No significant osseous abnormality is identified.  Negative for significant spinal canal stenosis. Similar degree of multilevel marginal spondylosis and degenerative facet arthropathy with vacuum phenomenon involving multiple lower thoracic and lumbar vertebra.  There is disc height reduction at multiple levels most significantly involving L5/S1.  Stable osteopenia.  Stable convex left curvature of the lumbar spine.  Stable mild degenerative changes of the pelvis.    Negative for groin adenopathy.      Impression    1.  Detrimental change.  Markedly dilated loops of small bowel within the central lower abdomen and pelvis, air/fluid/stool filled, with surrounding inflammatory changes.  These measure up to 4.8 cm in diameter.  The proximal and distal ends of the dilated loops of bowel occur at the 2 areas of postoperative change in the small bowel.  Findings are concerning for a closed loop obstruction.  Negative for free air or focal drainable fluid collection to suggest perforation.  Negative for pneumatosis.    2. There is fluid in nondilated proximal colon, which can be seen with gastroenteritis or other diarrheal disease.  Clinical correlation is advised.    3.  Negative for acute process otherwise.  Normal appendix.  Negative for obstructing renal stones or hydronephrosis.  No other inflammatory changes are seen.  4.  Stable and nonemergent findings as described above.    All CT scans at this facility are performed  using dose modulation techniques as appropriate to performed exam including the following:  automated exposure control; adjustment of mA and/or kV according to the patients size (this includes techniques or standardized protocols for targeted exams where dose is matched to indication/reason for exam: i.e. extremities or head);  iterative reconstruction technique.      Electronically signed by: Andrew Mccormack MD  Date:    05/31/2022  Time:    14:02        Pending Diagnostic Studies:     None        Final Active Diagnoses:    Diagnosis Date Noted POA    PRINCIPAL PROBLEM:  SBO (small bowel obstruction) [K56.609] 05/31/2022 Yes      Problems Resolved During this Admission:      Discharged Condition: good    Disposition: Home or Self Care    Follow Up:   Follow-up Information     Vega Hightower MD Follow up.    Specialties: General Surgery, Bariatrics  Why: As needed  Contact information:  [address] [facility]  4th [address]  [place] [address] [phone]                       Patient Instructions:      Ambulatory referral/consult to Outpatient Case Management   Referral Priority: Routine Referral Type: Consultation   Referral Reason: Specialty Services Required   Number of Visits Requested: 1     Diet Adult Regular     Notify your health care provider if you experience any of the following:  increased confusion or weakness     Notify your health care provider if you experience any of the following:  persistent dizziness, light-headedness, or visual disturbances     Notify your health care provider if you experience any of the following:  worsening rash     Notify your health care provider if you experience any of the following:  severe persistent headache     Notify your health care provider if you experience any of the following:  difficulty breathing or increased cough     Notify your health care provider if you experience any of the following:  redness, tenderness, or signs of infection (pain, swelling, redness, odor or green/yellow discharge around incision site)     Notify your health care provider if you experience any of the following:  severe uncontrolled pain     Notify your health care provider if you experience any of the following:  persistent nausea and vomiting or diarrhea     Notify your health care provider if you experience any of the following:  temperature >100.4     Activity as tolerated     Medications:  Reconciled Home Medications:       Medication List      START taking these medications    ondansetron 4 MG tablet  Commonly known as: ZOFRAN  Take 1 tablet (4 mg total) by mouth 2 (two) times daily.        CHANGE how you take these medications    spironolactone 50 MG tablet  Commonly known as: ALDACTONE  TAKE 1 TABLET BY MOUTH ONCE DAILY THEN INCREASE TO 2 TABLETS DAILY AS TOLERATED  What changed: additional instructions        CONTINUE taking these medications    * diazePAM 5 MG tablet  Commonly known as: VALIUM  Take one with onset of migraine     * diazePAM 10 MG Tab  Commonly known as: VALIUM  TAKE 1 TABLET BY MOUTH EVERY DAY AS NEEDED     doxepin 10 MG capsule  Commonly known as: SINEQUAN  TAKE 1 CAPSULE (10 MG TOTAL) BY MOUTH EVERY EVENING.     ergocalciferol 50,000 unit Cap  Commonly known as: ERGOCALCIFEROL  Take 1 capsule (50,000 Units total) by mouth every 7 days.     LINZESS 290 mcg Cap capsule  Generic drug: linaCLOtide  Take 1 capsule (290 mcg total) by mouth before breakfast. for 30 doses     loratadine 10 mg tablet  Commonly known as: CLARITIN  Take 10 mg by mouth daily     multivitamin capsule  Take 1 capsule by mouth once daily.     omeprazole 20 MG tablet  Commonly known as: PRILOSEC OTC  Take 20 mg by mouth once daily.     pantoprazole 40 MG tablet  Commonly known as: PROTONIX  TAKE 1 TABLET BY MOUTH EVERY DAY     PROBIOTIC COMPLEX ORAL  Take by mouth once daily at 6am.     sumatriptan 100 MG tablet  Commonly known as: IMITREX  TAKE 1 TABLET BY MOUTH IF NEEDED, MAY REPEAT ONCE IN 1 HOUR. MAX OF 2 TABLETS IN 24 HOURS         * This list has 2 medication(s) that are the same as other medications prescribed for you. Read the directions carefully, and ask your doctor or other care provider to review them with you.              Time spent on the discharge of patient: 35 minutes    Remigio Herron MD  General Surgery  [facility]

## 2022-06-04 NOTE — PLAN OF CARE
Patient remains free from falls and injuries this shift. Pain managed with hydrocodone. No acute signs of distress notes. Will continue to monitor, chart check completed.

## 2022-06-04 NOTE — PLAN OF CARE
Patient being discharged to home in stable condition. Iv removed, catheter intact. Patient tolerated well. Discharge instructions reviewed with patient. Patient verbalized understanding.

## 2022-06-04 NOTE — NURSING
"Patient was educated several times about the importance of ambulating and drinking water. Patient will not take the daniela lax  she stated "Its a texture thing and I cant do that or the Mag Citrate" I explained to the patient that pain medications will just make the constipation worse and the patient stated "I know"   "

## 2022-06-05 ENCOUNTER — NURSE TRIAGE (OUTPATIENT)
Dept: ADMINISTRATIVE | Facility: CLINIC | Age: 57
End: 2022-06-05
Payer: MEDICARE

## 2022-06-05 NOTE — TELEPHONE ENCOUNTER
"Post discharge tracker day 1 outbound call       - Pt c/o  Belly cramping, left arm and hand swollen the size of a "baseball rojas." Bloody stool. Denies constipation. Abdominal pain protocol followed and pt advised to go to the ER now and for another person to drive you if possible and to call 911 if certain criteria is met. Education provided on criteria. Alert and oriented Pt agrees with disposition and will follow through. Encounter routed to PCP.    Reason for Disposition   Pain is mainly in upper abdomen  (if needed ask: "is it mainly above the belly button?")   Blood in bowel movements  (Exception: Blood on surface of BM with constipation)    Additional Information   Negative: Shock suspected (e.g., cold/pale/clammy skin, too weak to stand, low BP, rapid pulse)   Negative: Difficult to awaken or acting confused (e.g., disoriented, slurred speech)   Negative: Passed out (i.e., lost consciousness, collapsed and was not responding)   Negative: Sounds like a life-threatening emergency to the triager   Negative: SEVERE difficulty breathing (e.g., struggling for each breath, speaks in single words)   Negative: Shock suspected (e.g., cold/pale/clammy skin, too weak to stand, low BP, rapid pulse)   Negative: Difficult to awaken or acting confused (e.g., disoriented, slurred speech)   Negative: Passed out (i.e., lost consciousness, collapsed and was not responding)   Negative: Visible sweat on face or sweat dripping down face   Negative: Sounds like a life-threatening emergency to the triager   Negative: [1] SEVERE pain (e.g., excruciating) AND [2] present > 1 hour   Negative: [1] Pain lasts > 10 minutes AND [2] age > 50   Negative: [1] Pain lasts > 10 minutes AND [2] age > 40 AND [3] associated chest, arm, neck, upper back or jaw pain   Negative: [1] Pain lasts > 10 minutes AND [2] age > 35 AND [3] at least one cardiac risk factor (i.e., hypertension, diabetes, obesity, smoker or strong family history " "of heart disease)   Negative: [1] Pain lasts > 10 minutes AND [2] history of heart disease (i.e., heart attack, bypass surgery, angina, angioplasty, CHF; not just a heart murmur)   Negative: [1] Pain lasts > 10 minutes AND [2] difficulty breathing   Negative: [1] Vomiting AND [2] contains red blood  (Exception: few streaks and only occurred once)   Negative: [1] Vomiting AND [2] contains black ("coffee ground") material    Protocols used: ABDOMINAL PAIN - FEMALE-A-AH, ABDOMINAL PAIN - UPPER-A-AH      "

## 2022-06-06 NOTE — PLAN OF CARE
[facility]  Discharge Final Note    Primary Care Provider: Tay Duff MD    Expected Discharge Date: 6/4/2022    Final Discharge Note (most recent)     Final Note - 06/06/22 0937        Final Note    Assessment Type Final Discharge Note     Anticipated Discharge Disposition Home or Self Care                 Important Message from Medicare             Contact Info     Vega Hightower MD   Specialty: General Surgery, Bariatrics    [address] [facility]  [address]th [address]  [place] [address]   Phone: [phone]       Next Steps: Follow up    Instructions: As needed

## 2022-06-07 ENCOUNTER — PATIENT MESSAGE (OUTPATIENT)
Dept: GASTROENTEROLOGY | Facility: CLINIC | Age: 57
End: 2022-06-07
Payer: MEDICARE

## 2022-06-07 NOTE — PHYSICIAN QUERY
PT Name: Genny Calixto  MR #: [id number]     DOCUMENTATION CLARIFICATION     CDS/: Casi De Leon RN, CDS               Contact information: [email or web address]    This form is a permanent document in the medical record.     Query Date: June 7, 2022    By submitting this query, we are merely seeking further clarification of documentation to reflect the severity of illness of your patient. Please utilize your independent clinical judgment when addressing the question(s) below.    The medical record reflects the following:     Indicators   Supporting Clinical Findings Location in Medical Record   x Bowel obstruction, intestinal obstruction, LBO or SBO documented Chief Complaint/Reason for Admission: sbo     H&P 5/31   x Radiology findings Impression:  1.  Detrimental change.  Markedly dilated loops of small bowel within the central lower abdomen and pelvis, air/fluid/stool filled, with surrounding inflammatory changes.  These measure up to 4.8 cm in diameter.  The proximal and distal ends of the dilated loops of bowel occur at the 2 areas of postoperative change in the small bowel.  Findings are concerning for a closed loop obstruction.  Negative for free air or focal drainable fluid collection to suggest perforation. Negative for pneumatosis.  2. There is fluid in nondilated proximal colon, which can be seen with gastroenteritis or other diarrheal disease.  Clinical correlation is advised.   CT Abdomen 5/31   x Treatment/Medication NPO, IV fluids  NG tube to low intermittent wall suction    SBFT with normal colonic transit time. Still with significant nausea and not much improvement in pain. Repeat abdominal films with no signs of obstruction.     - NG tube removal today  - Continue NPO for now  - Increased dose of Zofran  - Restart home meds, including Linzess.    regular diet  -bowel regimen  - home meds, including Linzess.  - Monitor for increased bowel function.   -discharge planning H&P  5/31      Surgery PN 6/1          Surgery PN 6/1           Surgery PN 6/3    Procedure/Surgery     x Other 56-year-old female referred for small bowel obstruction.  Patient reports worsening abdominal pain which began earlier today.  Prior to this her bowel function had improved while taking Linzess for chronic constipation however think progressively worsened today.  She underwent CT scan in the ER concern for small bowel obstruction.  She has a history of small bowel obstruction with exploratory laparotomy and small-bowel resection a year and half ago. H&P 5/31     Provider, please further specify the bowel obstruction diagnosis:  [   ] Partial or incomplete intestinal obstruction, due to known or suspected etiology (please specify): ____________   [   ] Partial or incomplete intestinal obstruction, unknown or unspecified etiology   [   ] Complete intestinal obstruction, due to known or suspected etiology (please specify): ____________   [  X ] Complete intestinal obstruction, unknown or unspecified etiology   [   ] Other intestinal condition (please specify): _____________________   [   ] Clinically Undetermined           Please document in your progress notes daily for the duration of treatment until resolved, and include in your discharge summary.

## 2022-06-14 ENCOUNTER — PATIENT OUTREACH (OUTPATIENT)
Dept: ADMINISTRATIVE | Facility: OTHER | Age: 57
End: 2022-06-14
Payer: MEDICARE

## 2022-06-14 NOTE — PROGRESS NOTES
CHW - Case Closure    This Community Health Worker spoke to Genny Marily by telephone today.   Pt/Caregiver reported: Patient states she did not respond to hospital text.  Pt/Caregiver denied any additional needs at this time and agrees with episode closure at this time.  Provided Genny Calixto with Community Health Worker's contact information and encouraged him/her to contact this Community Health Worker if additional needs arise.

## 2022-06-29 DIAGNOSIS — Z12.31 OTHER SCREENING MAMMOGRAM: ICD-10-CM

## 2022-06-30 ENCOUNTER — HOSPITAL ENCOUNTER (INPATIENT)
Facility: HOSPITAL | Age: 57
LOS: 2 days | Discharge: HOME | DRG: 390 | End: 2022-07-04
Attending: EMERGENCY MEDICINE | Admitting: COLON & RECTAL SURGERY
Payer: MEDICARE

## 2022-06-30 DIAGNOSIS — R10.9 ABDOMINAL PAIN: ICD-10-CM

## 2022-06-30 DIAGNOSIS — K56.609 SBO (SMALL BOWEL OBSTRUCTION): Primary | ICD-10-CM

## 2022-06-30 DIAGNOSIS — R10.84 GENERALIZED ABDOMINAL PAIN: ICD-10-CM

## 2022-06-30 DIAGNOSIS — Z01.89 ENCOUNTER FOR IMAGING STUDY TO CONFIRM NASOGASTRIC (NG) TUBE PLACEMENT: ICD-10-CM

## 2022-06-30 LAB
ALBUMIN SERPL BCP-MCNC: 4.2 G/DL (ref 3.5–5.2)
ALP SERPL-CCNC: 57 U/L (ref 55–135)
ALT SERPL W/O P-5'-P-CCNC: 15 U/L (ref 10–44)
ANION GAP SERPL CALC-SCNC: 12 MMOL/L (ref 8–16)
AST SERPL-CCNC: 16 U/L (ref 10–40)
BASOPHILS # BLD AUTO: 0.06 K/UL (ref 0–0.2)
BASOPHILS NFR BLD: 0.6 % (ref 0–1.9)
BILIRUB SERPL-MCNC: 1.2 MG/DL (ref 0.1–1)
BILIRUB UR QL STRIP: NEGATIVE
BUN SERPL-MCNC: 10 MG/DL (ref 6–20)
CALCIUM SERPL-MCNC: 9.1 MG/DL (ref 8.7–10.5)
CHLORIDE SERPL-SCNC: 105 MMOL/L (ref 95–110)
CLARITY UR: CLEAR
CO2 SERPL-SCNC: 23 MMOL/L (ref 23–29)
COLOR UR: YELLOW
CREAT SERPL-MCNC: 0.7 MG/DL (ref 0.5–1.4)
DIFFERENTIAL METHOD: ABNORMAL
EOSINOPHIL # BLD AUTO: 0.1 K/UL (ref 0–0.5)
EOSINOPHIL NFR BLD: 0.7 % (ref 0–8)
ERYTHROCYTE [DISTWIDTH] IN BLOOD BY AUTOMATED COUNT: 11.8 % (ref 11.5–14.5)
EST. GFR  (AFRICAN AMERICAN): >60 ML/MIN/1.73 M^2
EST. GFR  (NON AFRICAN AMERICAN): >60 ML/MIN/1.73 M^2
GLUCOSE SERPL-MCNC: 100 MG/DL (ref 70–110)
GLUCOSE UR QL STRIP: NEGATIVE
HCT VFR BLD AUTO: 45.9 % (ref 37–48.5)
HGB BLD-MCNC: 15.8 G/DL (ref 12–16)
HGB UR QL STRIP: NEGATIVE
IMM GRANULOCYTES # BLD AUTO: 0.04 K/UL (ref 0–0.04)
IMM GRANULOCYTES NFR BLD AUTO: 0.4 % (ref 0–0.5)
KETONES UR QL STRIP: NEGATIVE
LEUKOCYTE ESTERASE UR QL STRIP: NEGATIVE
LIPASE SERPL-CCNC: 19 U/L (ref 4–60)
LYMPHOCYTES # BLD AUTO: 2 K/UL (ref 1–4.8)
LYMPHOCYTES NFR BLD: 19.8 % (ref 18–48)
MCH RBC QN AUTO: 33.4 PG (ref 27–31)
MCHC RBC AUTO-ENTMCNC: 34.4 G/DL (ref 32–36)
MCV RBC AUTO: 97 FL (ref 82–98)
MONOCYTES # BLD AUTO: 0.6 K/UL (ref 0.3–1)
MONOCYTES NFR BLD: 6.1 % (ref 4–15)
NEUTROPHILS # BLD AUTO: 7.4 K/UL (ref 1.8–7.7)
NEUTROPHILS NFR BLD: 72.4 % (ref 38–73)
NITRITE UR QL STRIP: NEGATIVE
NRBC BLD-RTO: 0 /100 WBC
PH UR STRIP: 6 [PH] (ref 5–8)
PLATELET # BLD AUTO: 225 K/UL (ref 150–450)
PMV BLD AUTO: 12 FL (ref 9.2–12.9)
POTASSIUM SERPL-SCNC: 3.4 MMOL/L (ref 3.5–5.1)
PROT SERPL-MCNC: 6.6 G/DL (ref 6–8.4)
PROT UR QL STRIP: NEGATIVE
RBC # BLD AUTO: 4.73 M/UL (ref 4–5.4)
SARS-COV-2 RDRP RESP QL NAA+PROBE: NEGATIVE
SODIUM SERPL-SCNC: 140 MMOL/L (ref 136–145)
SP GR UR STRIP: 1.01 (ref 1–1.03)
URN SPEC COLLECT METH UR: NORMAL
UROBILINOGEN UR STRIP-ACNC: NEGATIVE EU/DL
WBC # BLD AUTO: 10.21 K/UL (ref 3.9–12.7)

## 2022-06-30 PROCEDURE — 63600175 PHARM REV CODE 636 W HCPCS: Performed by: EMERGENCY MEDICINE

## 2022-06-30 PROCEDURE — 93005 ELECTROCARDIOGRAM TRACING: CPT

## 2022-06-30 PROCEDURE — 85025 COMPLETE CBC W/AUTO DIFF WBC: CPT | Performed by: NURSE PRACTITIONER

## 2022-06-30 PROCEDURE — 83690 ASSAY OF LIPASE: CPT | Performed by: EMERGENCY MEDICINE

## 2022-06-30 PROCEDURE — 96365 THER/PROPH/DIAG IV INF INIT: CPT

## 2022-06-30 PROCEDURE — 96375 TX/PRO/DX INJ NEW DRUG ADDON: CPT

## 2022-06-30 PROCEDURE — 93010 ELECTROCARDIOGRAM REPORT: CPT | Mod: ,,, | Performed by: INTERNAL MEDICINE

## 2022-06-30 PROCEDURE — 96361 HYDRATE IV INFUSION ADD-ON: CPT

## 2022-06-30 PROCEDURE — 25000003 PHARM REV CODE 250: Performed by: EMERGENCY MEDICINE

## 2022-06-30 PROCEDURE — U0002 COVID-19 LAB TEST NON-CDC: HCPCS | Performed by: EMERGENCY MEDICINE

## 2022-06-30 PROCEDURE — 81003 URINALYSIS AUTO W/O SCOPE: CPT | Performed by: NURSE PRACTITIONER

## 2022-06-30 PROCEDURE — 80053 COMPREHEN METABOLIC PANEL: CPT | Performed by: EMERGENCY MEDICINE

## 2022-06-30 PROCEDURE — 93010 EKG 12-LEAD: ICD-10-PCS | Mod: ,,, | Performed by: INTERNAL MEDICINE

## 2022-06-30 RX ORDER — HYDROMORPHONE HYDROCHLORIDE 1 MG/ML
1 INJECTION, SOLUTION INTRAMUSCULAR; INTRAVENOUS; SUBCUTANEOUS
Status: COMPLETED | OUTPATIENT
Start: 2022-06-30 | End: 2022-06-30

## 2022-06-30 RX ADMIN — HYDROMORPHONE HYDROCHLORIDE 1 MG: 1 INJECTION, SOLUTION INTRAMUSCULAR; INTRAVENOUS; SUBCUTANEOUS at 09:06

## 2022-06-30 RX ADMIN — PROMETHAZINE HYDROCHLORIDE 12.5 MG: 25 INJECTION INTRAMUSCULAR; INTRAVENOUS at 09:06

## 2022-06-30 RX ADMIN — SODIUM CHLORIDE 1000 ML: 0.9 INJECTION, SOLUTION INTRAVENOUS at 09:06

## 2022-07-01 PROCEDURE — 99285 EMERGENCY DEPT VISIT HI MDM: CPT | Mod: 25

## 2022-07-01 PROCEDURE — 25000003 PHARM REV CODE 250

## 2022-07-01 PROCEDURE — G0378 HOSPITAL OBSERVATION PER HR: HCPCS

## 2022-07-01 PROCEDURE — 96375 TX/PRO/DX INJ NEW DRUG ADDON: CPT

## 2022-07-01 PROCEDURE — 96376 TX/PRO/DX INJ SAME DRUG ADON: CPT

## 2022-07-01 PROCEDURE — 63600175 PHARM REV CODE 636 W HCPCS

## 2022-07-01 PROCEDURE — 63600175 PHARM REV CODE 636 W HCPCS: Performed by: COLON & RECTAL SURGERY

## 2022-07-01 PROCEDURE — 99233 PR SUBSEQUENT HOSPITAL CARE,LEVL III: ICD-10-PCS | Mod: ,,,

## 2022-07-01 PROCEDURE — 63600175 PHARM REV CODE 636 W HCPCS: Performed by: SURGERY

## 2022-07-01 PROCEDURE — 99233 SBSQ HOSP IP/OBS HIGH 50: CPT | Mod: ,,,

## 2022-07-01 PROCEDURE — 25000003 PHARM REV CODE 250: Performed by: NURSE PRACTITIONER

## 2022-07-01 PROCEDURE — 63600175 PHARM REV CODE 636 W HCPCS: Performed by: EMERGENCY MEDICINE

## 2022-07-01 PROCEDURE — 25000003 PHARM REV CODE 250: Performed by: EMERGENCY MEDICINE

## 2022-07-01 RX ORDER — ONDANSETRON 2 MG/ML
4 INJECTION INTRAMUSCULAR; INTRAVENOUS EVERY 4 HOURS PRN
Status: DISCONTINUED | OUTPATIENT
Start: 2022-07-02 | End: 2022-07-01

## 2022-07-01 RX ORDER — SODIUM CHLORIDE, SODIUM LACTATE, POTASSIUM CHLORIDE, CALCIUM CHLORIDE 600; 310; 30; 20 MG/100ML; MG/100ML; MG/100ML; MG/100ML
INJECTION, SOLUTION INTRAVENOUS CONTINUOUS
Status: DISCONTINUED | OUTPATIENT
Start: 2022-07-01 | End: 2022-07-03

## 2022-07-01 RX ORDER — HYDROMORPHONE HYDROCHLORIDE 1 MG/ML
0.5 INJECTION, SOLUTION INTRAMUSCULAR; INTRAVENOUS; SUBCUTANEOUS EVERY 4 HOURS PRN
Status: DISCONTINUED | OUTPATIENT
Start: 2022-07-01 | End: 2022-07-02

## 2022-07-01 RX ORDER — ONDANSETRON 2 MG/ML
4 INJECTION INTRAMUSCULAR; INTRAVENOUS EVERY 6 HOURS PRN
Status: DISCONTINUED | OUTPATIENT
Start: 2022-07-01 | End: 2022-07-01

## 2022-07-01 RX ORDER — SODIUM CHLORIDE 0.9 % (FLUSH) 0.9 %
10 SYRINGE (ML) INJECTION
Status: DISCONTINUED | OUTPATIENT
Start: 2022-07-01 | End: 2022-07-04 | Stop reason: HOSPADM

## 2022-07-01 RX ORDER — DIPHENHYDRAMINE HCL 25 MG
25 CAPSULE ORAL 2 TIMES DAILY PRN
COMMUNITY

## 2022-07-01 RX ORDER — HYDROMORPHONE HYDROCHLORIDE 1 MG/ML
0.3 INJECTION, SOLUTION INTRAMUSCULAR; INTRAVENOUS; SUBCUTANEOUS
Status: DISCONTINUED | OUTPATIENT
Start: 2022-07-01 | End: 2022-07-01

## 2022-07-01 RX ORDER — ACETAMINOPHEN 325 MG/1
650 TABLET ORAL EVERY 6 HOURS PRN
Status: DISCONTINUED | OUTPATIENT
Start: 2022-07-01 | End: 2022-07-04 | Stop reason: HOSPADM

## 2022-07-01 RX ORDER — CYCLOBENZAPRINE HCL 10 MG
10 TABLET ORAL NIGHTLY
COMMUNITY
Start: 2022-06-27 | End: 2022-10-03

## 2022-07-01 RX ORDER — ONDANSETRON 2 MG/ML
4 INJECTION INTRAMUSCULAR; INTRAVENOUS
Status: COMPLETED | OUTPATIENT
Start: 2022-07-01 | End: 2022-07-01

## 2022-07-01 RX ORDER — LIDOCAINE HYDROCHLORIDE 10 MG/ML
1 INJECTION, SOLUTION EPIDURAL; INFILTRATION; INTRACAUDAL; PERINEURAL ONCE
Status: DISCONTINUED | OUTPATIENT
Start: 2022-07-01 | End: 2022-07-04 | Stop reason: HOSPADM

## 2022-07-01 RX ORDER — TALC
6 POWDER (GRAM) TOPICAL NIGHTLY PRN
Status: DISCONTINUED | OUTPATIENT
Start: 2022-07-01 | End: 2022-07-04 | Stop reason: HOSPADM

## 2022-07-01 RX ORDER — ONDANSETRON 2 MG/ML
4 INJECTION INTRAMUSCULAR; INTRAVENOUS EVERY 4 HOURS PRN
Status: DISCONTINUED | OUTPATIENT
Start: 2022-07-01 | End: 2022-07-04 | Stop reason: HOSPADM

## 2022-07-01 RX ORDER — PSEUDOEPHEDRINE HCL 120 MG/1
120 TABLET, FILM COATED, EXTENDED RELEASE ORAL DAILY
COMMUNITY
End: 2022-10-03

## 2022-07-01 RX ORDER — HYDROMORPHONE HYDROCHLORIDE 1 MG/ML
0.5 INJECTION, SOLUTION INTRAMUSCULAR; INTRAVENOUS; SUBCUTANEOUS
Status: COMPLETED | OUTPATIENT
Start: 2022-07-01 | End: 2022-07-01

## 2022-07-01 RX ADMIN — BENZOCAINE: 200 SPRAY DENTAL; ORAL; PERIODONTAL at 11:07

## 2022-07-01 RX ADMIN — ONDANSETRON 4 MG: 2 INJECTION INTRAMUSCULAR; INTRAVENOUS at 11:07

## 2022-07-01 RX ADMIN — ACETAMINOPHEN 650 MG: 325 TABLET ORAL at 09:07

## 2022-07-01 RX ADMIN — ACETAMINOPHEN 650 MG: 325 TABLET ORAL at 07:07

## 2022-07-01 RX ADMIN — HYDROMORPHONE HYDROCHLORIDE 0.3 MG: 1 INJECTION, SOLUTION INTRAMUSCULAR; INTRAVENOUS; SUBCUTANEOUS at 12:07

## 2022-07-01 RX ADMIN — ONDANSETRON 4 MG: 2 INJECTION INTRAMUSCULAR; INTRAVENOUS at 10:07

## 2022-07-01 RX ADMIN — HYDROMORPHONE HYDROCHLORIDE 0.5 MG: 1 INJECTION, SOLUTION INTRAMUSCULAR; INTRAVENOUS; SUBCUTANEOUS at 11:07

## 2022-07-01 RX ADMIN — SODIUM CHLORIDE, SODIUM LACTATE, POTASSIUM CHLORIDE, AND CALCIUM CHLORIDE: .6; .31; .03; .02 INJECTION, SOLUTION INTRAVENOUS at 02:07

## 2022-07-01 RX ADMIN — ONDANSETRON 4 MG: 2 INJECTION INTRAMUSCULAR; INTRAVENOUS at 12:07

## 2022-07-01 RX ADMIN — HYDROMORPHONE HYDROCHLORIDE 0.3 MG: 1 INJECTION, SOLUTION INTRAMUSCULAR; INTRAVENOUS; SUBCUTANEOUS at 10:07

## 2022-07-01 RX ADMIN — HYDROMORPHONE HYDROCHLORIDE 0.5 MG: 1 INJECTION, SOLUTION INTRAMUSCULAR; INTRAVENOUS; SUBCUTANEOUS at 03:07

## 2022-07-01 RX ADMIN — HYDROMORPHONE HYDROCHLORIDE 0.5 MG: 1 INJECTION, SOLUTION INTRAMUSCULAR; INTRAVENOUS; SUBCUTANEOUS at 05:07

## 2022-07-01 RX ADMIN — ONDANSETRON 4 MG: 2 INJECTION INTRAMUSCULAR; INTRAVENOUS at 05:07

## 2022-07-01 NOTE — ED NOTES
LOC: Patient name and date of birth verified. The patient is awake, alert and aware of environment with an appropriate affect, the patient is oriented x 3 and speaking appropriately.   APPEARANCE: Patient resting comfortably, patient is clean and well groomed, patient's clothing is properly fastened. NG to R nare.  SKIN: The skin is warm and dry, color consistent with ethnicity, patient has normal skin turgor and moist mucus membranes, skin intact, no breakdown or bruising noted.  MUSCULOSKELETAL: Patient moving all extremities well, no obvious swelling or deformities noted.   RESPIRATORY: Respirations are spontaneous, patient has a normal effort and rate, no accessory muscle use noted.  CARDIAC: Patient has a normal rate and rhythm, no periphreal edema noted, capillary refill < 3 seconds.  ABDOMEN: Soft and non tender to palpation, no distention noted. Bowel sounds present in all four quadrants.  NEUROLOGIC: Eyes open spontaneously, behavior appropriate to situation, follows commands

## 2022-07-01 NOTE — ED NOTES
Pt bedside for NG tube placement. Pt now requesting tylenol for headache before NG attempt. HM aware. Awaiting orders.

## 2022-07-01 NOTE — ED NOTES
Pt requesting Tylenol for headache, secure message sent to [facility] and general surgery for orders

## 2022-07-01 NOTE — ED NOTES
"Pt requesting dilaudid and nausea medication, sent secure message to [facility] and Dr. Herron- was advised to send message to Dr. Manzano as she is on call and Dr. Herron's status is currently "busy"   "

## 2022-07-01 NOTE — H&P
[facility]  General Surgery  History & Physical    Patient Name: Genny Calixto  MRN: [id number]  Admission Date: 6/30/2022  Attending Physician: Remigio Herron MD   Primary Care Provider: Tay Duff MD    Patient information was obtained from patient, past medical records and ER records.     Subjective:     Chief Complaint/Reason for Admission: Abdominal pain    History of Present Illness:  Patient is a 57 y.o. female presents with abdominal pain for the last day. She describes it as a cramping pain. She has been having several loose bowel movements a day recently. She has had nausea as well but has not vomited. She has a history of several admissions for small bowel obstructions in the past, the most recent of which was treated conservatively with bowel rest and NGT. She has an extensive abdominal surgical history. She denies hematochezia, melena, fevers, chills, nausea, and vomiting.     No current facility-administered medications on file prior to encounter.     Current Outpatient Medications on File Prior to Encounter   Medication Sig    cyclobenzaprine (FLEXERIL) 10 MG tablet Take 10 mg by mouth nightly.    diazePAM (VALIUM) 10 MG Tab TAKE 1 TABLET BY MOUTH EVERY DAY AS NEEDED    diazePAM (VALIUM) 5 MG tablet Take one with onset of migraine    diphenhydrAMINE (BENADRYL) 25 mg capsule Take 25 mg by mouth 2 (two) times a day.    doxepin (SINEQUAN) 10 MG capsule TAKE 1 CAPSULE (10 MG TOTAL) BY MOUTH EVERY EVENING.    LACTOBACILLUS COMBO NO.6 (PROBIOTIC COMPLEX ORAL) Take by mouth once daily at 6am.    LINZESS 290 mcg Cap capsule TAKE 1 CAPSULE (290 MCG TOTAL) BY MOUTH BEFORE BREAKFAST. FOR 30 DOSES    loratadine (CLARITIN) 10 mg tablet Take 10 mg by mouth daily    methocarbamoL (ROBAXIN) 500 MG Tab TAKE 1 TABLET (500 MG TOTAL) BY MOUTH 4 (FOUR) TIMES DAILY    multivitamin capsule Take 1 capsule by mouth once daily.    ondansetron (ZOFRAN) 4 MG tablet Take 1 tablet (4 mg  "total) by mouth 2 (two) times daily.    pantoprazole (PROTONIX) 40 MG tablet TAKE 1 TABLET BY MOUTH EVERY DAY    pseudoephedrine (SUDAFED) 120 mg 12 hr tablet Take 120 mg by mouth once daily.    spironolactone (ALDACTONE) 50 MG tablet TAKE 1 TABLET BY MOUTH ONCE DAILY THEN INCREASE TO 2 TABLETS DAILY AS TOLERATED (Patient taking differently: Take 50 mg by mouth 2 (two) times daily.)    sumatriptan (IMITREX) 100 MG tablet TAKE 1 TABLET BY MOUTH IF NEEDED, MAY REPEAT ONCE IN 1 HOUR. MAX OF 2 TABLETS IN 24 HOURS    [DISCONTINUED] ergocalciferol (ERGOCALCIFEROL) 50,000 unit Cap Take 1 capsule (50,000 Units total) by mouth every 7 days.    [DISCONTINUED] omeprazole (PRILOSEC OTC) 20 MG tablet Take 20 mg by mouth once daily.       Review of patient's allergies indicates:   Allergen Reactions    Erythromycin Other (See Comments)     Stomach cramps    Morphine Nausea And Vomiting     "Projectile vomiting"       Past Medical History:   Diagnosis Date    Encounter for blood transfusion     KENN (generalized anxiety disorder)     Takes 5-10 mg of valium qd prn anxiety (prescriptions for both on file, one from [facility] & other from )    Insomnia     Takes 5-10 mg of valium qhs prn insomnia (prescriptions for both on file, one from [facility] & other from )    Migraine headache     Nephrolithiasis 08/03/2019    Left ureteral stone -> UTI c/b pyelonephritis and urosepsis w/ hypokalemia -> transfered to [facility] urology service from [facility] after CT abn dx, s/p 2 stents to allow infx to drain, IV Vanc/Rocephin, fever free since yesterday    Reflex sympathetic dystrophy     UTI (urinary tract infection)     Vertigo      Past Surgical History:   Procedure Laterality Date    BLADDER SURGERY      CHOLECYSTECTOMY      COLONOSCOPY N/A 11/10/2021    Procedure: COLONOSCOPY;  Surgeon: Eryn Rothman MD;  Location: [facility];  Service: Endoscopy;  Laterality: N/A;    CYSTOSCOPY WITH URETEROSCOPY, RETROGRADE PYELOGRAPHY, " AND INSERTION OF STENT Right 9/30/2021    Procedure: CYSTOSCOPY, WITH RETROGRADE PYELOGRAM AND URETERAL STENT INSERTION;  Surgeon: Annita Gamino MD;  Location: [facility] OR;  Service: Urology;  Laterality: Right;    DIAGNOSTIC LAPAROSCOPY N/A 11/11/2020    Procedure: LAPAROSCOPY, DIAGNOSTIC;  Surgeon: Tee Segura MD;  Location: [facility] OR;  Service: General;  Laterality: N/A;  CONVERTED TO OPEN    ESOPHAGOGASTRODUODENOSCOPY N/A 11/10/2021    Procedure: EGD (ESOPHAGOGASTRODUODENOSCOPY);  Surgeon: Eryn Rothman MD;  Location: [facility] ENDO;  Service: Endoscopy;  Laterality: N/A;    facet injections  02/14/2017    L3, L4, L5, S1 Done by Dr. Lara    HYSTERECTOMY      INJECTION OF ANESTHETIC AGENT INTO TISSUE PLANE DEFINED BY TRANSVERSUS ABDOMINIS MUSCLE N/A 11/11/2020    Procedure: BLOCK, TRANSVERSUS ABDOMINIS PLANE;  Surgeon: Tee Segura MD;  Location: [facility] OR;  Service: General;  Laterality: N/A;    KNEE SURGERY      LAPAROSCOPIC LYSIS OF ADHESIONS N/A 11/11/2020    Procedure: LYSIS, ADHESIONS, LAPAROSCOPIC;  Surgeon: Tee Segura MD;  Location: [facility] OR;  Service: General;  Laterality: N/A;  CONVERTED TO OPEN    LAPAROTOMY N/A 11/20/2020    Procedure: LAPAROTOMY;  Surgeon: Tee Segura MD;  Location: [facility] OR;  Service: General;  Laterality: N/A;    LASER LITHOTRIPSY Left 2/8/2021    Procedure: LITHOTRIPSY, USING LASER;  Surgeon: Irving Kidd MD;  Location: [facility] OR;  Service: Urology;  Laterality: Left;    LASER LITHOTRIPSY Right 10/13/2021    Procedure: LITHOTRIPSY, USING LASER;  Surgeon: Annita Gamino MD;  Location: [facility] OR;  Service: Urology;  Laterality: Right;    LYSIS OF ADHESIONS N/A 11/11/2020    Procedure: LYSIS, ADHESIONS;  Surgeon: Tee Segura MD;  Location: [facility] OR;  Service: General;  Laterality: N/A;    LYSIS OF ADHESIONS N/A 11/20/2020    Procedure: LYSIS, ADHESIONS;  Surgeon: Tee Segura MD;  Location: [facility] OR;  Service: General;   Laterality: N/A;    TONSILLECTOMY      URETEROSCOPY Left 2/8/2021    Procedure: URETEROSCOPY;  Surgeon: Irving Kidd MD;  Location: [facility] OR;  Service: Urology;  Laterality: Left;  stent placement    URETEROSCOPY Right 10/13/2021    Procedure: URETEROSCOPY;  Surgeon: Annita Gamino MD;  Location: [facility] OR;  Service: Urology;  Laterality: Right;     Family History     Problem Relation (Age of Onset)    COPD Father    Drug abuse Brother    Hypertension Mother    Stroke Mother        Tobacco Use    Smoking status: Never Smoker    Smokeless tobacco: Never Used   Substance and Sexual Activity    Alcohol use: Yes     Comment: rarely    Drug use: No    Sexual activity: Never     Review of Systems   Constitutional: Negative for chills, fatigue, fever and unexpected weight change.   Respiratory: Negative for cough, shortness of breath, wheezing and stridor.    Cardiovascular: Negative for chest pain, palpitations and leg swelling.   Gastrointestinal: Positive for abdominal distention, abdominal pain, diarrhea and nausea. Negative for blood in stool, constipation and vomiting.   Genitourinary: Negative for difficulty urinating, dysuria, frequency, hematuria and urgency.   Skin: Negative for color change, pallor, rash and wound.   Neurological: Positive for headaches.   Hematological: Does not bruise/bleed easily.     Objective:     Vital Signs (Most Recent):  Temp: 98.5 °F (36.9 °C) (06/30/22 1932)  Pulse: 79 (07/01/22 0902)  Resp: 20 (07/01/22 1251)  BP: 132/78 (07/01/22 0902)  SpO2: 95 % (07/01/22 0902) Vital Signs (24h Range):  Temp:  [98.5 °F (36.9 °C)] 98.5 °F (36.9 °C)  Pulse:  [] 79  Resp:  [16-20] 20  SpO2:  [94 %-100 %] 95 %  BP: (115-151)/(65-85) 132/78     Weight: 68.8 kg (151 lb 9.1 oz)  Body mass index is 23.74 kg/m².    Physical Exam  Vitals and nursing note reviewed.   Constitutional:       General: She is not in acute distress.     Appearance: Normal appearance. She is well-developed.  She is not ill-appearing.      Comments: Resting comfortably, conversing without issue.    HENT:      Head: Normocephalic and atraumatic.      Right Ear: External ear normal.      Left Ear: External ear normal.   Eyes:      Extraocular Movements: Extraocular movements intact.      Conjunctiva/sclera: Conjunctivae normal.   Cardiovascular:      Rate and Rhythm: Normal rate.   Pulmonary:      Effort: Pulmonary effort is normal. No respiratory distress.   Abdominal:      Comments: Moderately distended with TTP to moderate palpation. Well-healed previous laparotomy scar at midline.    Musculoskeletal:      Cervical back: Neck supple.   Skin:     General: Skin is warm and dry.   Neurological:      Mental Status: She is alert and oriented to person, place, and time.   Psychiatric:         Behavior: Behavior normal.         Significant Labs:  I have reviewed all pertinent lab results within the past 24 hours.  CBC:   Recent Labs   Lab 06/30/22  2044   WBC 10.21   RBC 4.73   HGB 15.8   HCT 45.9      MCV 97   MCH 33.4*   MCHC 34.4     BMP:   Recent Labs   Lab 06/30/22  2251         K 3.4*      CO2 23   BUN 10   CREATININE 0.7   CALCIUM 9.1       Significant Diagnostics:  I have reviewed all pertinent imaging results/findings within the past 24 hours.  CT: I have reviewed all pertinent results/findings within the past 24 hours and my personal findings are:  Evidence for at least partial SBO at previous anastamosis site.    Assessment/Plan:     58 y/o female with likely small bowel obstruction.    - NPO  - IVF  - NGT to LIS  - Discussed conservative management of SBO as above but explained that should this not resolve with conservative measure, surgical intervention may be warranted.  - Encouraged ambulation  - DVT PPx      Dianne Johnston PA-C  General Surgery  [facility] ([facility])

## 2022-07-01 NOTE — PHARMACY MED REC
"Admission Medication History     The home medication history was taken by Rudi Alejandra.    You may go to "Admission" then "Reconcile Home Medications" tabs to review and/or act upon these items.      The home medication list has been updated by the Pharmacy department.    Please read ALL comments highlighted in yellow.    Please address this information as you see fit.     Feel free to contact us if you have any questions or require assistance.      The medications listed below were removed from the home medication list. Please reorder if appropriate:  Patient reports no longer taking the following medication(s):   DICYCLOMINE 20 MG TABLET   ERGOCALCIFEROL 50,000 UNIT CAPSULE   FLUTICASONE PROPIONATE 50 MCG/ACTUATION NASAL SPRAY   OMEPRAZOLE 20 MG TABLET    Medications listed below were obtained from: Patient/family, Analytic software- [employer] and Patient's pharmacy  (Not in a hospital admission)      Potential issues to be addressed PRIOR TO DISCHARGE: NONE    Rudi Alejandra CPhT  Pharmacy Technician Specialist-Medication History  [facility] [phone]  Secure chat preferred.     Current Outpatient Medications on File Prior to Encounter   Medication Sig Dispense Refill Last Dose    cyclobenzaprine (FLEXERIL) 10 MG tablet Take 10 mg by mouth nightly.   Past Week at Unknown time    diazePAM (VALIUM) 10 MG Tab TAKE 1 TABLET BY MOUTH EVERY DAY AS NEEDED 90 tablet 1 Past Month at Unknown time    diazePAM (VALIUM) 5 MG tablet Take one with onset of migraine 20 tablet 5 Past Month at Unknown time    diphenhydrAMINE (BENADRYL) 25 mg capsule Take 25 mg by mouth 2 (two) times a day.   6/30/2022 at Unknown time    doxepin (SINEQUAN) 10 MG capsule TAKE 1 CAPSULE (10 MG TOTAL) BY MOUTH EVERY EVENING. 90 capsule 3 Past Week at Unknown time    LACTOBACILLUS COMBO NO.6 (PROBIOTIC COMPLEX ORAL) Take by mouth once daily at 6am.   Past Week at Wednesday 06/29/22    LINZESS 290 mcg Cap capsule TAKE 1 CAPSULE (290 " MCG TOTAL) BY MOUTH BEFORE BREAKFAST. FOR 30 DOSES 30 capsule 0 6/30/2022 at Unknown time    loratadine (CLARITIN) 10 mg tablet Take 10 mg by mouth daily   6/30/2022 at Unknown time    methocarbamoL (ROBAXIN) 500 MG Tab TAKE 1 TABLET (500 MG TOTAL) BY MOUTH 4 (FOUR) TIMES DAILY 180 tablet 3 Past Week at Unknown time    multivitamin capsule Take 1 capsule by mouth once daily.   6/30/2022 at Unknown time    ondansetron (ZOFRAN) 4 MG tablet Take 1 tablet (4 mg total) by mouth 2 (two) times daily. 30 tablet 0 Past Week at Unknown time    pantoprazole (PROTONIX) 40 MG tablet TAKE 1 TABLET BY MOUTH EVERY DAY 90 tablet 2 6/30/2022 at Unknown time    pseudoephedrine (SUDAFED) 120 mg 12 hr tablet Take 120 mg by mouth once daily.   6/30/2022 at Unknown time    spironolactone (ALDACTONE) 50 MG tablet TAKE 1 TABLET BY MOUTH ONCE DAILY THEN INCREASE TO 2 TABLETS DAILY AS TOLERATED (Patient taking differently: Take 50 mg by mouth 2 (two) times daily.) 180 tablet 1 6/30/2022 at Unknown time    sumatriptan (IMITREX) 100 MG tablet TAKE 1 TABLET BY MOUTH IF NEEDED, MAY REPEAT ONCE IN 1 HOUR. MAX OF 2 TABLETS IN 24 HOURS 10 tablet 2 Past Week at Unknown time    [DISCONTINUED] ergocalciferol (ERGOCALCIFEROL) 50,000 unit Cap Take 1 capsule (50,000 Units total) by mouth every 7 days. 12 capsule 3     [DISCONTINUED] omeprazole (PRILOSEC OTC) 20 MG tablet Take 20 mg by mouth once daily.                                .

## 2022-07-01 NOTE — ED PROVIDER NOTES
"SCRIBE #1 NOTE: I, Jose G Daley, am scribing for, and in the presence of, Ari Poole MD. I have scribed the entire note.       History     Chief Complaint   Patient presents with    Abdominal Pain     Pt c/o of umbilical abd pain/N/V/D since 8am. Pt has a hx of SBO, IBS and colon resection.      Review of patient's allergies indicates:   Allergen Reactions    Erythromycin Other (See Comments)     Stomach cramps    Morphine Nausea And Vomiting     "Projectile vomiting"         History of Present Illness     HPI    6/30/2022, 8:45 PM  History obtained from the patient      History of Present Illness: Genny Calixto is a 57 y.o. female patient who presents to the Emergency Department for evaluation of periumbilical abdominal pain which onset gradually at 0800 today. Pt describes the pain as being similar to the pain she had with her 3 previous SBO. She rates the pain as an 8.5 out of 10. Symptoms are constant and severe in severity. No mitigating or exacerbating factors reported. Associated sxs include nausea and vomiting. Patient denies any fever, chills, diarrhea, hematochezia, SOB, CP, HA, weakness, dysuria, and all other sxs at this time. No prior Tx reported. No further complaints or concerns at this time.       Arrival mode: Personal vehicle    PCP: Tay Duff MD        Past Medical History:  Past Medical History:   Diagnosis Date    Encounter for blood transfusion     KENN (generalized anxiety disorder)     Takes 5-10 mg of valium qd prn anxiety (prescriptions for both on file, one from [facility] & other from )    Insomnia     Takes 5-10 mg of valium qhs prn insomnia (prescriptions for both on file, one from [facility] & other from )    Migraine headache     Nephrolithiasis 08/03/2019    Left ureteral stone -> UTI c/b pyelonephritis and urosepsis w/ hypokalemia -> transfered to [facility] urology service from [facility] after CT abn dx, s/p 2 stents to allow infx to drain, IV Vanc/Rocephin, " fever free since yesterday    Reflex sympathetic dystrophy     UTI (urinary tract infection)     Vertigo        Past Surgical History:  Past Surgical History:   Procedure Laterality Date    BLADDER SURGERY      CHOLECYSTECTOMY      COLONOSCOPY N/A 11/10/2021    Procedure: COLONOSCOPY;  Surgeon: Eryn Rothman MD;  Location: [facility];  Service: Endoscopy;  Laterality: N/A;    CYSTOSCOPY WITH URETEROSCOPY, RETROGRADE PYELOGRAPHY, AND INSERTION OF STENT Right 9/30/2021    Procedure: CYSTOSCOPY, WITH RETROGRADE PYELOGRAM AND URETERAL STENT INSERTION;  Surgeon: Annita Gamino MD;  Location: [facility];  Service: Urology;  Laterality: Right;    DIAGNOSTIC LAPAROSCOPY N/A 11/11/2020    Procedure: LAPAROSCOPY, DIAGNOSTIC;  Surgeon: Tee Segura MD;  Location: [facility];  Service: General;  Laterality: N/A;  CONVERTED TO OPEN    ESOPHAGOGASTRODUODENOSCOPY N/A 11/10/2021    Procedure: EGD (ESOPHAGOGASTRODUODENOSCOPY);  Surgeon: Eryn Rothman MD;  Location: [facility];  Service: Endoscopy;  Laterality: N/A;    facet injections  02/14/2017    L3, L4, L5, S1 Done by Dr. Lara    HYSTERECTOMY      INJECTION OF ANESTHETIC AGENT INTO TISSUE PLANE DEFINED BY TRANSVERSUS ABDOMINIS MUSCLE N/A 11/11/2020    Procedure: BLOCK, TRANSVERSUS ABDOMINIS PLANE;  Surgeon: Tee Segura MD;  Location: [facility];  Service: General;  Laterality: N/A;    KNEE SURGERY      LAPAROSCOPIC LYSIS OF ADHESIONS N/A 11/11/2020    Procedure: LYSIS, ADHESIONS, LAPAROSCOPIC;  Surgeon: Tee Segura MD;  Location: [facility];  Service: General;  Laterality: N/A;  CONVERTED TO OPEN    LAPAROTOMY N/A 11/20/2020    Procedure: LAPAROTOMY;  Surgeon: Tee Segura MD;  Location: [facility];  Service: General;  Laterality: N/A;    LASER LITHOTRIPSY Left 2/8/2021    Procedure: LITHOTRIPSY, USING LASER;  Surgeon: Irving Kidd MD;  Location: [facility];  Service: Urology;  Laterality: Left;    LASER LITHOTRIPSY Right  10/13/2021    Procedure: LITHOTRIPSY, USING LASER;  Surgeon: Annita Gamino MD;  Location: [facility];  Service: Urology;  Laterality: Right;    LYSIS OF ADHESIONS N/A 11/11/2020    Procedure: LYSIS, ADHESIONS;  Surgeon: Tee Segura MD;  Location: [facility];  Service: General;  Laterality: N/A;    LYSIS OF ADHESIONS N/A 11/20/2020    Procedure: LYSIS, ADHESIONS;  Surgeon: Tee Segura MD;  Location: [facility];  Service: General;  Laterality: N/A;    TONSILLECTOMY      URETEROSCOPY Left 2/8/2021    Procedure: URETEROSCOPY;  Surgeon: Irving Kidd MD;  Location: [facility];  Service: Urology;  Laterality: Left;  stent placement    URETEROSCOPY Right 10/13/2021    Procedure: URETEROSCOPY;  Surgeon: Annita Gamino MD;  Location: [facility];  Service: Urology;  Laterality: Right;         Family History:  Family History   Problem Relation Age of Onset    Stroke Mother     Hypertension Mother     COPD Father     Drug abuse Brother        Social History:  Social History     Tobacco Use    Smoking status: Never Smoker    Smokeless tobacco: Never Used   Substance and Sexual Activity    Alcohol use: Yes     Comment: rarely    Drug use: No    Sexual activity: Never        Review of Systems     Review of Systems   Constitutional: Negative for chills and fever.   HENT: Negative for sore throat.    Respiratory: Negative for shortness of breath.    Cardiovascular: Negative for chest pain.   Gastrointestinal: Positive for abdominal pain, nausea and vomiting. Negative for blood in stool and diarrhea.   Genitourinary: Negative for dysuria and flank pain.   Musculoskeletal: Negative for back pain.   Skin: Negative for rash.   Neurological: Negative for weakness, numbness and headaches.   Hematological: Does not bruise/bleed easily.   All other systems reviewed and are negative.     Physical Exam     Initial Vitals [06/30/22 1932]   BP Pulse Resp Temp SpO2   (!) 146/85 100 20 98.5 °F (36.9 °C) 99 %      MAP   "     --          Physical Exam  Nursing Notes and Vital Signs Reviewed.  Constitutional: Patient is in moderate distress. Well-developed and well-nourished.  Head: Atraumatic. Normocephalic.  Eyes: PERRL. EOM intact. Conjunctivae are not pale. No scleral icterus.  ENT: Mucous membranes are moist. Oropharynx is clear and symmetric.    Neck: Supple. Full ROM. No lymphadenopathy.  Cardiovascular: Regular rate. Regular rhythm. No murmurs, rubs, or gallops. Distal pulses are 2+ and symmetric.  Pulmonary/Chest: No respiratory distress. Clear to auscultation bilaterally. No wheezing or rales.  Abdominal: Soft and non-distended.  There is moderate diffuse tenderness.  No rebound, guarding, or rigidity. Diminished bowel sounds.  Genitourinary: No CVA tenderness  Musculoskeletal: Moves all extremities. No obvious deformities. No edema. No calf tenderness.  Skin: Warm and dry.  Neurological:  Alert, awake, and appropriate.  Normal speech.  No acute focal neurological deficits are appreciated.  Psychiatric: Normal affect. Good eye contact. Appropriate in content.     ED Course   Procedures  ED Vital Signs:  Vitals:    07/01/22 0341 07/01/22 0617 07/01/22 0730 07/01/22 0902   BP:  125/65 124/75 132/78   Pulse:  81 80 79   Resp: 18  18 16   Temp:       TempSrc:       SpO2:  99% 100% 95%   Weight:       Height:        07/01/22 1027 07/01/22 1251 07/01/22 1330 07/01/22 1652   BP:   127/82 139/77   Pulse:   84 74   Resp: 19 20 20 18   Temp:   98.6 °F (37 °C) 98.4 °F (36.9 °C)   TempSrc:   Oral Oral   SpO2:   97% 96%   Weight:   68.8 kg (151 lb 10.8 oz)    Height:   5' 7" (1.702 m)     07/01/22 1724 07/01/22 1939 07/01/22 1951 07/01/22 2311   BP:   135/82    Pulse:  77 72    Resp: 20 18 19 15   Temp:   97.4 °F (36.3 °C)    TempSrc:   Axillary    SpO2:  96% 99%    Weight:       Height:        07/02/22 0037 07/02/22 0353 07/02/22 0536   BP: 137/78  133/73   Pulse: 67  72   Resp: 18 15 18   Temp: 99 °F (37.2 °C)  98.3 °F (36.8 °C) "   TempSrc: Oral  Oral   SpO2: 96%  (!) 90%   Weight: 70 kg (154 lb 5.2 oz)     Height:          Abnormal Lab Results:  Labs Reviewed   CBC W/ AUTO DIFFERENTIAL - Abnormal; Notable for the following components:       Result Value    MCH 33.4 (*)     All other components within normal limits   COMPREHENSIVE METABOLIC PANEL - Abnormal; Notable for the following components:    Potassium 3.4 (*)     Total Bilirubin 1.2 (*)     All other components within normal limits   URINALYSIS, REFLEX TO URINE CULTURE    Narrative:     Specimen Source->Urine   SARS-COV-2 RNA AMPLIFICATION, QUAL   LIPASE        All Lab Results:  Results for orders placed or performed during the hospital encounter of 06/30/22   CBC auto differential   Result Value Ref Range    WBC 10.21 3.90 - 12.70 K/uL    RBC 4.73 4.00 - 5.40 M/uL    Hemoglobin 15.8 12.0 - 16.0 g/dL    Hematocrit 45.9 37.0 - 48.5 %    MCV 97 82 - 98 fL    MCH 33.4 (H) 27.0 - 31.0 pg    MCHC 34.4 32.0 - 36.0 g/dL    RDW 11.8 11.5 - 14.5 %    Platelets 225 150 - 450 K/uL    MPV 12.0 9.2 - 12.9 fL    Immature Granulocytes 0.4 0.0 - 0.5 %    Gran # (ANC) 7.4 1.8 - 7.7 K/uL    Immature Grans (Abs) 0.04 0.00 - 0.04 K/uL    Lymph # 2.0 1.0 - 4.8 K/uL    Mono # 0.6 0.3 - 1.0 K/uL    Eos # 0.1 0.0 - 0.5 K/uL    Baso # 0.06 0.00 - 0.20 K/uL    nRBC 0 0 /100 WBC    Gran % 72.4 38.0 - 73.0 %    Lymph % 19.8 18.0 - 48.0 %    Mono % 6.1 4.0 - 15.0 %    Eosinophil % 0.7 0.0 - 8.0 %    Basophil % 0.6 0.0 - 1.9 %    Differential Method Automated    Urinalysis, Reflex to Urine Culture Urine, Clean Catch    Specimen: Urine   Result Value Ref Range    Specimen UA Urine, Clean Catch     Color, UA Yellow Yellow, Straw, Hilda    Appearance, UA Clear Clear    pH, UA 6.0 5.0 - 8.0    Specific Gravity, UA 1.010 1.005 - 1.030    Protein, UA Negative Negative    Glucose, UA Negative Negative    Ketones, UA Negative Negative    Bilirubin (UA) Negative Negative    Occult Blood UA Negative Negative    Nitrite, UA  Negative Negative    Urobilinogen, UA Negative <2.0 EU/dL    Leukocytes, UA Negative Negative   COVID-19 Rapid Screening   Result Value Ref Range    SARS-CoV-2 RNA, Amplification, Qual Negative Negative   Comprehensive metabolic panel   Result Value Ref Range    Sodium 140 136 - 145 mmol/L    Potassium 3.4 (L) 3.5 - 5.1 mmol/L    Chloride 105 95 - 110 mmol/L    CO2 23 23 - 29 mmol/L    Glucose 100 70 - 110 mg/dL    BUN 10 6 - 20 mg/dL    Creatinine 0.7 0.5 - 1.4 mg/dL    Calcium 9.1 8.7 - 10.5 mg/dL    Total Protein 6.6 6.0 - 8.4 g/dL    Albumin 4.2 3.5 - 5.2 g/dL    Total Bilirubin 1.2 (H) 0.1 - 1.0 mg/dL    Alkaline Phosphatase 57 55 - 135 U/L    AST 16 10 - 40 U/L    ALT 15 10 - 44 U/L    Anion Gap 12 8 - 16 mmol/L    eGFR if African American >60 >60 mL/min/1.73 m^2    eGFR if non African American >60 >60 mL/min/1.73 m^2   Lipase   Result Value Ref Range    Lipase 19 4 - 60 U/L         Imaging Results:  Imaging Results          X-Ray Chest AP Portable (Final result)  Result time 07/01/22 12:22:24    Final result by Favio Almonte MD (07/01/22 12:22:24)                 Impression:      Satisfactory position of the NG tube, coiled over the body of the stomach.      Electronically signed by: Favio Almonte  Date:    07/01/2022  Time:    12:22             Narrative:    EXAMINATION:  XR CHEST AP PORTABLE    CLINICAL HISTORY:  . Encounter for other specified special examinations    TECHNIQUE:  Single frontal portable view of the chest was performed.    COMPARISON:  09/30/2021    FINDINGS:  Support devices: Satisfactory position of the NG tube, coiled over the body of the stomach.    Subsegmental atelectasis left lung base.    The cardiac silhouette is normal in size. The hilar and mediastinal contours are unremarkable.    Bones are intact.                               CT Abdomen Pelvis  Without Contrast (Final result)  Result time 07/01/22 08:04:43    Final result by Favio Almonte MD (07/01/22 08:04:43)                  Impression:      Distal ileal anastomosis in the central abdomen to the right of midline with surrounding mesenteric edema. There is fecalization of small bowel proximal to this anastomosis. No oral contrast is seen beyond the anastomosis. There is prominent stool and mild gas throughout the colon. Findings are suspicious for partial small bowel obstruction with likely transition point at the anastomosis.  No free air or abscess.      Electronically signed by: Favio Almonte  Date:    07/01/2022  Time:    08:04             Narrative:    EXAMINATION:  CT ABDOMEN PELVIS WITHOUT CONTRAST    CLINICAL HISTORY:  Nausea/vomiting;Bowel obstruction suspected;    TECHNIQUE:  Low dose axial images, sagittal and coronal reformations were obtained from the lung bases to the pubic symphysis.  Contrast was not administered.    All CT scans at this location are performed using dose modulation techniques as appropriate to a performed exam including the following: Automated exposure control; adjustment of the mA and/or kV according to patient size.    COMPARISON:  06/30/2022    FINDINGS:  Heart: Normal in size. No pericardial effusion.    Lung Bases: Well aerated, without consolidation or pleural fluid.    Liver: Normal in size and attenuation, with no focal hepatic lesions.    Gallbladder: Surgically absent    Bile Ducts: No evidence of dilated ducts.    Pancreas: No mass or peripancreatic fat stranding.    Spleen: Unremarkable.    Adrenals: Unremarkable.    Kidneys/ Ureters: Unremarkable.    Bladder: No evidence of wall thickening.    Reproductive organs: Uterus and ovaries surgically absent    GI Tract/Mesentery: Distal esophagus, stomach, duodenum unremarkable.  Distal ileal anastomosis in the central abdomen to the right of midline with surrounding mesenteric edema.  There is fecalization of small bowel proximal to this anastomosis.  No oral contrast is seen beyond the anastomosis.  There is prominent stool and mild gas  throughout the colon.  Findings are suspicious for partial small bowel obstruction with likely transition point at the anastomosis.  No free air or abscess.  Appendix not well visualized.  No evidence of appendicitis.  Sigmoid diverticulosis without evidence of diverticulitis.    Peritoneal Space: No ascites. No free air.    Retroperitoneum: No significant adenopathy.    Abdominal wall: Unremarkable.    Vasculature: No significant atherosclerosis or aneurysm.    Bones: No acute fracture. Moderate severity multilevel discogenic degenerative change of the lumbar spine.                                CT Abdomen Pelvis  Without Contrast (Final result)  Result time 06/30/22 22:21:47    Final result by Pascual Cummings MD (06/30/22 22:21:47)                 Impression:      Small bowel wall thickening and fecalization with findings concerning for transition point in the mid low abdomen best seen on coronal imaging series 601, image 67 and axial series 2 image 116 about the surgical anastomosis. Upstream there is dilation and fecalization of the small bowel with some air-fluid levels.  No generalized small bowel dilation at this time. Clinical correlation is advised.  Surgical consultation should be considered.    Additional details as above.    This report was flagged in Epic as abnormal.    All CT scans at this facility are performed  using dose modulation techniques as appropriate to performed exam including the following:  automated exposure control; adjustment of mA and/or kV according to the patients size (this includes techniques or standardized protocols for targeted exams where dose is matched to indication/reason for exam: i.e. extremities or head);  iterative reconstruction technique.      Electronically signed by: Pascual Cummings  Date:    06/30/2022  Time:    22:21             Narrative:    EXAMINATION:  CT ABDOMEN PELVIS WITHOUT CONTRAST    CLINICAL HISTORY:  Bowel obstruction suspected;    TECHNIQUE:  Low  dose axial images, sagittal and coronal reformations were obtained from the lung bases to the pubic symphysis.  Contrast was not administered.    COMPARISON:  Multiple priors.    FINDINGS:  Heart: Normal in size. No pericardial effusion.    Lung Bases: Well aerated, without consolidation or pleural fluid.    Liver: Normal in size and attenuation, with no focal hepatic lesions.    Gallbladder: Surgically absent.    Bile Ducts: No evidence of dilated ducts.    Pancreas: No mass or peripancreatic fat stranding.    Spleen: Unremarkable.    Adrenals: Unremarkable.    Kidneys/ Ureters: No hydronephrosis.  No obstructive uropathy.  No nonobstructive nephrolithiasis.    Bladder: No evidence of wall thickening.    Reproductive organs: Unremarkable.    GI Tract/Mesentery: Small bowel wall thickening and fecalization with findings concerning for transition point in the mid low abdomen best seen on coronal imaging series 601, image 67 and axial series 2 image 116 about the surgical anastomosis.  Upstream there is dilation and fecalization of the small bowel with some air-fluid levels.  No generalized small bowel dilation at this time.  Clinical correlation is advised.    Additional bowel anastomoses.  Diverticulosis without diverticulitis.    Peritoneal Space: No ascites. No free air.    Retroperitoneum: No significant adenopathy.    Abdominal wall: Unremarkable.    Vasculature: Mild aortoiliac atherosclerosis.  No aneurysm.    Bones: No acute fracture.  Multifocal degenerative changes.                               Exam: CT Abdomen and Pelvis w/o IV Contrast  Impression:    1. Anastomotic sutures in the mid abdomen to the right of midline with prominent small bowel proximal to anastomosis. Correlate for early/partial small  bowel obstruction. The small bowel which are mildly thickened and demonstrate perienteric fat stranding, consistent with enteritis. No perforation or  abscess. No free intraperitoneal air. Consider  follow-up exam after migration of administered oral contrast.   2. Cholecystectomy.   3. No hydronephrosis or nephrolithiasis. No objective uropathy.   4. Hysterectomy.  Radiologist: Cholo Smith MD    The EKG was ordered, reviewed, and independently interpreted by the ED provider.  Interpretation time: 21:06  Rate: 84 BPM  Rhythm: normal sinus rhythm  Interpretation: No acute ST abnormality. No STEMI.      The Emergency Provider reviewed the vital signs and test results, which are outlined above.     ED Discussion     11:36 PM: Discussed pt's case with Dr. Herron (General Surgery) who recommends repeating the CT scan with oral contrast for further evaluation.    3:42 AM: Discussed pt's case with Dr. Herron (General Surgery) who recommends admitting pt to Obs with an NG tube and PRN medications.    3:42 AM: Discussed case with Dr. Herron (General Surgery) Dr. Herron agrees with current care and management of pt and accepts admission.   Admitting Service: General Surgery  Admitting Physician: Dr. Herron  Admit to: Obs    3:43 AM: Re-evaluated pt. I have discussed test results, shared treatment plan, and the need for admission with patient and family at bedside. Pt and family express understanding at this time and agree with all information. All questions answered. Pt and family have no further questions or concerns at this time. Pt is ready for admit.         Medical Decision Making:   Clinical Tests:   Lab Tests: Ordered and Reviewed  Radiological Study: Ordered and Reviewed  Medical Tests: Ordered and Reviewed           ED Medication(s):  Medications   acetaminophen tablet 650 mg (650 mg Oral Given 7/1/22 9265)   LIDOcaine (PF) 10 mg/ml (1%) injection 10 mg (has no administration in time range)   sodium chloride 0.9% flush 10 mL (has no administration in time range)   melatonin tablet 6 mg (has no administration in time range)   lactated ringers infusion ( Intravenous Canceled Entry 7/2/22 1304)   HYDROmorphone  injection 0.5 mg (0.5 mg Intravenous Given 7/2/22 0353)   ondansetron injection 4 mg (4 mg Intravenous Given 7/2/22 0353)   HYDROmorphone injection 1 mg (1 mg Intravenous Given 6/30/22 2152)   promethazine (PHENERGAN) 12.5 mg in dextrose 5 % 50 mL IVPB (0 mg Intravenous Stopped 6/30/22 2147)   sodium chloride 0.9% bolus 1,000 mL (0 mLs Intravenous Stopped 6/30/22 2227)   ondansetron injection 4 mg (4 mg Intravenous Given 7/1/22 0025)   HYDROmorphone injection 0.5 mg (0.5 mg Intravenous Given 7/1/22 0341)   benzocaine 20 % mouth spray ( Mouth/Throat Given 7/1/22 1126)       Current Discharge Medication List                  Scribe Attestation:   Scribe #1: I performed the above scribed service and the documentation accurately describes the services I performed. I attest to the accuracy of the note.     Attending:   Physician Attestation Statement for Scribe #1: I, Ari Poole, personally performed the services described in this documentation, as scribed by Jose G Daley, in my presence, and it is both accurate and complete.           Clinical Impression       ICD-10-CM ICD-9-CM   1. SBO (small bowel obstruction)  K56.609 560.9   2. Abdominal pain  R10.9 789.00   3. Encounter for imaging study to confirm nasogastric (NG) tube placement  Z01.89 V72.5       Disposition:   Disposition: Placed in Observation  Condition: Fair         Ari Poole MD  07/02/22 0548

## 2022-07-02 PROCEDURE — 11000001 HC ACUTE MED/SURG PRIVATE ROOM

## 2022-07-02 PROCEDURE — 99233 PR SUBSEQUENT HOSPITAL CARE,LEVL III: ICD-10-PCS | Mod: ,,, | Performed by: SURGERY

## 2022-07-02 PROCEDURE — 63600175 PHARM REV CODE 636 W HCPCS: Performed by: SURGERY

## 2022-07-02 PROCEDURE — C9113 INJ PANTOPRAZOLE SODIUM, VIA: HCPCS | Performed by: SURGERY

## 2022-07-02 PROCEDURE — 25500020 PHARM REV CODE 255: Performed by: COLON & RECTAL SURGERY

## 2022-07-02 PROCEDURE — 25000003 PHARM REV CODE 250

## 2022-07-02 PROCEDURE — 99233 SBSQ HOSP IP/OBS HIGH 50: CPT | Mod: ,,, | Performed by: SURGERY

## 2022-07-02 PROCEDURE — 25000003 PHARM REV CODE 250: Performed by: SURGERY

## 2022-07-02 PROCEDURE — 63600175 PHARM REV CODE 636 W HCPCS

## 2022-07-02 PROCEDURE — 63600175 PHARM REV CODE 636 W HCPCS: Performed by: COLON & RECTAL SURGERY

## 2022-07-02 RX ORDER — PANTOPRAZOLE SODIUM 40 MG/10ML
40 INJECTION, POWDER, LYOPHILIZED, FOR SOLUTION INTRAVENOUS DAILY
Status: DISCONTINUED | OUTPATIENT
Start: 2022-07-02 | End: 2022-07-03

## 2022-07-02 RX ORDER — HYDROMORPHONE HYDROCHLORIDE 1 MG/ML
1 INJECTION, SOLUTION INTRAMUSCULAR; INTRAVENOUS; SUBCUTANEOUS EVERY 4 HOURS PRN
Status: DISCONTINUED | OUTPATIENT
Start: 2022-07-02 | End: 2022-07-02 | Stop reason: CLARIF

## 2022-07-02 RX ORDER — POTASSIUM CHLORIDE 7.45 MG/ML
10 INJECTION INTRAVENOUS
Status: COMPLETED | OUTPATIENT
Start: 2022-07-02 | End: 2022-07-02

## 2022-07-02 RX ORDER — HEPARIN SODIUM 5000 [USP'U]/ML
5000 INJECTION, SOLUTION INTRAVENOUS; SUBCUTANEOUS EVERY 12 HOURS
Status: DISCONTINUED | OUTPATIENT
Start: 2022-07-02 | End: 2022-07-04 | Stop reason: HOSPADM

## 2022-07-02 RX ORDER — FAMOTIDINE 20 MG/50ML
20 INJECTION, SOLUTION INTRAVENOUS 2 TIMES DAILY
Status: DISCONTINUED | OUTPATIENT
Start: 2022-07-02 | End: 2022-07-02

## 2022-07-02 RX ORDER — HYDROMORPHONE HYDROCHLORIDE 2 MG/ML
1 INJECTION, SOLUTION INTRAMUSCULAR; INTRAVENOUS; SUBCUTANEOUS EVERY 4 HOURS PRN
Status: DISCONTINUED | OUTPATIENT
Start: 2022-07-02 | End: 2022-07-04 | Stop reason: HOSPADM

## 2022-07-02 RX ORDER — DIAZEPAM 10 MG/2ML
5 INJECTION INTRAMUSCULAR EVERY 8 HOURS PRN
Status: DISCONTINUED | OUTPATIENT
Start: 2022-07-02 | End: 2022-07-03

## 2022-07-02 RX ADMIN — HYDROMORPHONE HYDROCHLORIDE 1 MG: 2 INJECTION INTRAMUSCULAR; INTRAVENOUS; SUBCUTANEOUS at 11:07

## 2022-07-02 RX ADMIN — POTASSIUM CHLORIDE 10 MEQ: 10 INJECTION, SOLUTION INTRAVENOUS at 07:07

## 2022-07-02 RX ADMIN — POTASSIUM CHLORIDE 10 MEQ: 10 INJECTION, SOLUTION INTRAVENOUS at 05:07

## 2022-07-02 RX ADMIN — HYDROMORPHONE HYDROCHLORIDE 1 MG: 1 INJECTION, SOLUTION INTRAMUSCULAR; INTRAVENOUS; SUBCUTANEOUS at 01:07

## 2022-07-02 RX ADMIN — PROMETHAZINE HYDROCHLORIDE 25 MG: 25 INJECTION INTRAMUSCULAR; INTRAVENOUS at 03:07

## 2022-07-02 RX ADMIN — HYDROMORPHONE HYDROCHLORIDE 1 MG: 2 INJECTION INTRAMUSCULAR; INTRAVENOUS; SUBCUTANEOUS at 06:07

## 2022-07-02 RX ADMIN — DIATRIZOATE MEGLUMINE AND DIATRIZOATE SODIUM 240 ML: 660; 100 LIQUID ORAL; RECTAL at 02:07

## 2022-07-02 RX ADMIN — ONDANSETRON 4 MG: 2 INJECTION INTRAMUSCULAR; INTRAVENOUS at 03:07

## 2022-07-02 RX ADMIN — ONDANSETRON 4 MG: 2 INJECTION INTRAMUSCULAR; INTRAVENOUS at 11:07

## 2022-07-02 RX ADMIN — ACETAMINOPHEN 650 MG: 325 TABLET ORAL at 10:07

## 2022-07-02 RX ADMIN — ONDANSETRON 4 MG: 2 INJECTION INTRAMUSCULAR; INTRAVENOUS at 06:07

## 2022-07-02 RX ADMIN — ONDANSETRON 4 MG: 2 INJECTION INTRAMUSCULAR; INTRAVENOUS at 01:07

## 2022-07-02 RX ADMIN — HYDROMORPHONE HYDROCHLORIDE 0.5 MG: 1 INJECTION, SOLUTION INTRAMUSCULAR; INTRAVENOUS; SUBCUTANEOUS at 03:07

## 2022-07-02 RX ADMIN — SODIUM CHLORIDE, SODIUM LACTATE, POTASSIUM CHLORIDE, AND CALCIUM CHLORIDE: .6; .31; .03; .02 INJECTION, SOLUTION INTRAVENOUS at 03:07

## 2022-07-02 RX ADMIN — ONDANSETRON 4 MG: 2 INJECTION INTRAMUSCULAR; INTRAVENOUS at 08:07

## 2022-07-02 RX ADMIN — HYDROMORPHONE HYDROCHLORIDE 0.5 MG: 1 INJECTION, SOLUTION INTRAMUSCULAR; INTRAVENOUS; SUBCUTANEOUS at 08:07

## 2022-07-02 RX ADMIN — PANTOPRAZOLE SODIUM 40 MG: 40 INJECTION, POWDER, FOR SOLUTION INTRAVENOUS at 03:07

## 2022-07-02 NOTE — ASSESSMENT & PLAN NOTE
Hold home oral anxiolytics until able to tolerate PO. Will substitute with IV diazepam prn in the interim.

## 2022-07-02 NOTE — PLAN OF CARE
Problem: Adult Inpatient Plan of Care  Goal: Plan of Care Review  Outcome: Ongoing, Progressing     Problem: Infection  Goal: Absence of Infection Signs and Symptoms  Outcome: Ongoing, Progressing     Problem: Pain Acute  Goal: Acceptable Pain Control and Functional Ability  Outcome: Ongoing, Progressing

## 2022-07-02 NOTE — SUBJECTIVE & OBJECTIVE
"Interval History: Continues to have abdominal discomfort and some nausea. Passed a very small amount of flatus but nothing significant.     Medications:  Continuous Infusions:   lactated ringers 75 mL/hr at 07/02/22 0643     Scheduled Meds:   LIDOcaine (PF) 10 mg/ml (1%)  1 mL Other Once     PRN Meds:acetaminophen, HYDROmorphone, melatonin, ondansetron, sodium chloride 0.9%     Review of patient's allergies indicates:   Allergen Reactions    Erythromycin Other (See Comments)     Stomach cramps    Morphine Nausea And Vomiting     "Projectile vomiting"     Objective:     Vital Signs (Most Recent):  Temp: 98.3 °F (36.8 °C) (07/02/22 1148)  Pulse: 70 (07/02/22 1148)  Resp: 18 (07/02/22 1148)  BP: (!) 141/77 (07/02/22 1148)  SpO2: 95 % (07/02/22 0733)   Vital Signs (24h Range):  Temp:  [97.4 °F (36.3 °C)-99 °F (37.2 °C)] 98.3 °F (36.8 °C)  Pulse:  [67-84] 70  Resp:  [15-20] 18  SpO2:  [90 %-99 %] 95 %  BP: (127-141)/(73-82) 141/77     Weight: 70 kg (154 lb 5.2 oz)  Body mass index is 24.17 kg/m².    Intake/Output - Last 3 Shifts         06/30 0700  07/01 0659 07/01 0700  07/02 0659 07/02 0700  07/03 0659    P.O.  0     I.V. (mL/kg)  1175.8 (16.8)     IV Piggyback 1049      Total Intake(mL/kg) 1049 (15.3) 1175.8 (16.8)     Net +1049 +1175.8            Urine Occurrence  2 x 1 x    Stool Occurrence  1 x             Physical Exam  Vitals and nursing note reviewed.   Constitutional:       General: She is not in acute distress.  HENT:      Head: Normocephalic and atraumatic.      Mouth/Throat:      Mouth: Mucous membranes are moist.   Eyes:      Extraocular Movements: Extraocular movements intact.   Cardiovascular:      Rate and Rhythm: Normal rate.   Pulmonary:      Effort: No respiratory distress.      Breath sounds: No stridor.   Abdominal:      Comments: Soft, mildly distended, mild diffuse tenderness. No peritoneal signs.   Musculoskeletal:      Cervical back: No rigidity.   Skin:     General: Skin is warm and dry. "   Neurological:      General: No focal deficit present.      Mental Status: She is alert and oriented to person, place, and time.   Psychiatric:         Mood and Affect: Mood normal.       Significant Labs:  I have reviewed all pertinent lab results within the past 24 hours.  CBC:   Recent Labs   Lab 06/30/22  2044   WBC 10.21   RBC 4.73   HGB 15.8   HCT 45.9      MCV 97   MCH 33.4*   MCHC 34.4     BMP:   Recent Labs   Lab 06/30/22  2251         K 3.4*      CO2 23   BUN 10   CREATININE 0.7   CALCIUM 9.1       Significant Diagnostics:  I have reviewed all pertinent imaging results/findings within the past 24 hours.

## 2022-07-02 NOTE — PLAN OF CARE
Received to room 545 from the second floor. Ambulated to bed per self. NG tube clamped. Pain managed with IV medication. Will monitor.

## 2022-07-02 NOTE — PROGRESS NOTES
"[facility] ([facility])  General Surgery  Progress Note    Subjective:     History of Present Illness:  No notes on file    Post-Op Info:  * No surgery found *         Interval History: Continues to have abdominal discomfort and some nausea. Passed a very small amount of flatus but nothing significant.     Medications:  Continuous Infusions:   lactated ringers 75 mL/hr at 07/02/22 0643     Scheduled Meds:   LIDOcaine (PF) 10 mg/ml (1%)  1 mL Other Once     PRN Meds:acetaminophen, HYDROmorphone, melatonin, ondansetron, sodium chloride 0.9%     Review of patient's allergies indicates:   Allergen Reactions    Erythromycin Other (See Comments)     Stomach cramps    Morphine Nausea And Vomiting     "Projectile vomiting"     Objective:     Vital Signs (Most Recent):  Temp: 98.3 °F (36.8 °C) (07/02/22 1148)  Pulse: 70 (07/02/22 1148)  Resp: 18 (07/02/22 1148)  BP: (!) 141/77 (07/02/22 1148)  SpO2: 95 % (07/02/22 0733)   Vital Signs (24h Range):  Temp:  [97.4 °F (36.3 °C)-99 °F (37.2 °C)] 98.3 °F (36.8 °C)  Pulse:  [67-84] 70  Resp:  [15-20] 18  SpO2:  [90 %-99 %] 95 %  BP: (127-141)/(73-82) 141/77     Weight: 70 kg (154 lb 5.2 oz)  Body mass index is 24.17 kg/m².    Intake/Output - Last 3 Shifts         06/30 0700 07/01 0659 07/01 0700 07/02 0659 07/02 0700  07/03 0659    P.O.  0     I.V. (mL/kg)  1175.8 (16.8)     IV Piggyback 1049      Total Intake(mL/kg) 1049 (15.3) 1175.8 (16.8)     Net +1049 +1175.8            Urine Occurrence  2 x 1 x    Stool Occurrence  1 x             Physical Exam  Vitals and nursing note reviewed.   Constitutional:       General: She is not in acute distress.  HENT:      Head: Normocephalic and atraumatic.      Mouth/Throat:      Mouth: Mucous membranes are moist.   Eyes:      Extraocular Movements: Extraocular movements intact.   Cardiovascular:      Rate and Rhythm: Normal rate.   Pulmonary:      Effort: No respiratory distress.      Breath sounds: No stridor.   Abdominal:      " Comments: Soft, mildly distended, mild diffuse tenderness. No peritoneal signs.   Musculoskeletal:      Cervical back: No rigidity.   Skin:     General: Skin is warm and dry.   Neurological:      General: No focal deficit present.      Mental Status: She is alert and oriented to person, place, and time.   Psychiatric:         Mood and Affect: Mood normal.       Significant Labs:  I have reviewed all pertinent lab results within the past 24 hours.  CBC:   Recent Labs   Lab 06/30/22  2044   WBC 10.21   RBC 4.73   HGB 15.8   HCT 45.9      MCV 97   MCH 33.4*   MCHC 34.4     BMP:   Recent Labs   Lab 06/30/22  2251         K 3.4*      CO2 23   BUN 10   CREATININE 0.7   CALCIUM 9.1       Significant Diagnostics:  I have reviewed all pertinent imaging results/findings within the past 24 hours.    Assessment/Plan:     * SBO (small bowel obstruction)  - No significant bowel function yet. Will obtain small bowel follow through with gastrograffin today.  - IV fluids  - Analgesia and antiemetics prn  - NPO  - DVT and gi ppx    Hypokalemia  - Monitor and replace prn    Anxiety  Hold home oral anxiolytics until able to tolerate PO. Will substitute with IV diazepam prn in the interim.      Jo Manzano, DO  General Surgery  [facility] ([facility])

## 2022-07-02 NOTE — ASSESSMENT & PLAN NOTE
- No significant bowel function yet. Will obtain small bowel follow through with gastrograffin today.  - IV fluids  - Analgesia and antiemetics prn  - NPO  - DVT and gi ppx

## 2022-07-02 NOTE — NURSING
Patient transferred to [facility] room 545 via wheelchair. PAULINA Caraballo at bedside.

## 2022-07-02 NOTE — HOSPITAL COURSE
7/2/22 Continues to have abdominal discomfort and some nausea. Passed a very small amount of flatus but nothing significant. Obtain SBFT today.    7/3/22 SBFT demonstrates contrast in the colon a little after an hour. Patient is feeling better but still having some mild abdominal discomfort. No nausea. Passing flatus. NG tube removed. Trial diet.     7/4/22 Feeling well today. Abdominal exam benign. VSS. Tolerating a regular diet. Passing flatus and stool. D/c home today and follow up in clinic.

## 2022-07-03 LAB
ANION GAP SERPL CALC-SCNC: 13 MMOL/L (ref 8–16)
BASOPHILS # BLD AUTO: 0.04 K/UL (ref 0–0.2)
BASOPHILS NFR BLD: 0.7 % (ref 0–1.9)
BUN SERPL-MCNC: 6 MG/DL (ref 6–20)
CALCIUM SERPL-MCNC: 9.8 MG/DL (ref 8.7–10.5)
CHLORIDE SERPL-SCNC: 96 MMOL/L (ref 95–110)
CO2 SERPL-SCNC: 32 MMOL/L (ref 23–29)
CREAT SERPL-MCNC: 0.7 MG/DL (ref 0.5–1.4)
DIFFERENTIAL METHOD: ABNORMAL
EOSINOPHIL # BLD AUTO: 0.1 K/UL (ref 0–0.5)
EOSINOPHIL NFR BLD: 2.4 % (ref 0–8)
ERYTHROCYTE [DISTWIDTH] IN BLOOD BY AUTOMATED COUNT: 11.9 % (ref 11.5–14.5)
EST. GFR  (AFRICAN AMERICAN): >60 ML/MIN/1.73 M^2
EST. GFR  (NON AFRICAN AMERICAN): >60 ML/MIN/1.73 M^2
GLUCOSE SERPL-MCNC: 81 MG/DL (ref 70–110)
HCT VFR BLD AUTO: 40.3 % (ref 37–48.5)
HGB BLD-MCNC: 14.1 G/DL (ref 12–16)
IMM GRANULOCYTES # BLD AUTO: 0.01 K/UL (ref 0–0.04)
IMM GRANULOCYTES NFR BLD AUTO: 0.2 % (ref 0–0.5)
LYMPHOCYTES # BLD AUTO: 1.9 K/UL (ref 1–4.8)
LYMPHOCYTES NFR BLD: 34.9 % (ref 18–48)
MAGNESIUM SERPL-MCNC: 1.5 MG/DL (ref 1.6–2.6)
MCH RBC QN AUTO: 34 PG (ref 27–31)
MCHC RBC AUTO-ENTMCNC: 35 G/DL (ref 32–36)
MCV RBC AUTO: 97 FL (ref 82–98)
MONOCYTES # BLD AUTO: 0.5 K/UL (ref 0.3–1)
MONOCYTES NFR BLD: 8.4 % (ref 4–15)
NEUTROPHILS # BLD AUTO: 2.9 K/UL (ref 1.8–7.7)
NEUTROPHILS NFR BLD: 53.4 % (ref 38–73)
NRBC BLD-RTO: 0 /100 WBC
PHOSPHATE SERPL-MCNC: 3.1 MG/DL (ref 2.7–4.5)
PLATELET # BLD AUTO: 176 K/UL (ref 150–450)
PMV BLD AUTO: 11 FL (ref 9.2–12.9)
POTASSIUM SERPL-SCNC: 4.1 MMOL/L (ref 3.5–5.1)
RBC # BLD AUTO: 4.15 M/UL (ref 4–5.4)
SODIUM SERPL-SCNC: 141 MMOL/L (ref 136–145)
WBC # BLD AUTO: 5.47 K/UL (ref 3.9–12.7)

## 2022-07-03 PROCEDURE — 63600175 PHARM REV CODE 636 W HCPCS: Performed by: SURGERY

## 2022-07-03 PROCEDURE — 84100 ASSAY OF PHOSPHORUS: CPT | Performed by: COLON & RECTAL SURGERY

## 2022-07-03 PROCEDURE — 11000001 HC ACUTE MED/SURG PRIVATE ROOM

## 2022-07-03 PROCEDURE — 80048 BASIC METABOLIC PNL TOTAL CA: CPT | Performed by: COLON & RECTAL SURGERY

## 2022-07-03 PROCEDURE — 25000003 PHARM REV CODE 250: Performed by: SURGERY

## 2022-07-03 PROCEDURE — 36415 COLL VENOUS BLD VENIPUNCTURE: CPT | Performed by: SURGERY

## 2022-07-03 PROCEDURE — 99233 PR SUBSEQUENT HOSPITAL CARE,LEVL III: ICD-10-PCS | Mod: ,,, | Performed by: SURGERY

## 2022-07-03 PROCEDURE — C9113 INJ PANTOPRAZOLE SODIUM, VIA: HCPCS | Performed by: SURGERY

## 2022-07-03 PROCEDURE — 85025 COMPLETE CBC W/AUTO DIFF WBC: CPT | Performed by: SURGERY

## 2022-07-03 PROCEDURE — 99233 SBSQ HOSP IP/OBS HIGH 50: CPT | Mod: ,,, | Performed by: SURGERY

## 2022-07-03 PROCEDURE — 63600175 PHARM REV CODE 636 W HCPCS: Performed by: COLON & RECTAL SURGERY

## 2022-07-03 PROCEDURE — 83735 ASSAY OF MAGNESIUM: CPT | Performed by: COLON & RECTAL SURGERY

## 2022-07-03 PROCEDURE — 36415 COLL VENOUS BLD VENIPUNCTURE: CPT | Performed by: COLON & RECTAL SURGERY

## 2022-07-03 RX ORDER — METHOCARBAMOL 500 MG/1
500 TABLET, FILM COATED ORAL 4 TIMES DAILY PRN
Status: DISCONTINUED | OUTPATIENT
Start: 2022-07-03 | End: 2022-07-04 | Stop reason: HOSPADM

## 2022-07-03 RX ORDER — DIAZEPAM 5 MG/1
10 TABLET ORAL DAILY PRN
Status: DISCONTINUED | OUTPATIENT
Start: 2022-07-03 | End: 2022-07-04 | Stop reason: HOSPADM

## 2022-07-03 RX ORDER — SPIRONOLACTONE 25 MG/1
50 TABLET ORAL 2 TIMES DAILY
Status: DISCONTINUED | OUTPATIENT
Start: 2022-07-03 | End: 2022-07-04 | Stop reason: HOSPADM

## 2022-07-03 RX ORDER — MAGNESIUM SULFATE HEPTAHYDRATE 40 MG/ML
2 INJECTION, SOLUTION INTRAVENOUS ONCE
Status: COMPLETED | OUTPATIENT
Start: 2022-07-03 | End: 2022-07-03

## 2022-07-03 RX ORDER — DOCUSATE SODIUM 100 MG/1
100 CAPSULE, LIQUID FILLED ORAL DAILY
Status: DISCONTINUED | OUTPATIENT
Start: 2022-07-03 | End: 2022-07-04 | Stop reason: HOSPADM

## 2022-07-03 RX ORDER — HYDROCODONE BITARTRATE AND ACETAMINOPHEN 7.5; 325 MG/1; MG/1
1 TABLET ORAL EVERY 4 HOURS PRN
Status: DISCONTINUED | OUTPATIENT
Start: 2022-07-03 | End: 2022-07-04 | Stop reason: HOSPADM

## 2022-07-03 RX ORDER — SUMATRIPTAN 50 MG/1
100 TABLET, FILM COATED ORAL DAILY PRN
Status: DISCONTINUED | OUTPATIENT
Start: 2022-07-03 | End: 2022-07-04 | Stop reason: HOSPADM

## 2022-07-03 RX ORDER — POLYETHYLENE GLYCOL 3350 17 G/17G
17 POWDER, FOR SOLUTION ORAL DAILY
Status: DISCONTINUED | OUTPATIENT
Start: 2022-07-03 | End: 2022-07-04 | Stop reason: HOSPADM

## 2022-07-03 RX ORDER — HYDROMORPHONE HYDROCHLORIDE 2 MG/ML
1 INJECTION, SOLUTION INTRAMUSCULAR; INTRAVENOUS; SUBCUTANEOUS ONCE
Status: COMPLETED | OUTPATIENT
Start: 2022-07-03 | End: 2022-07-03

## 2022-07-03 RX ORDER — CYCLOBENZAPRINE HCL 10 MG
10 TABLET ORAL NIGHTLY
Status: DISCONTINUED | OUTPATIENT
Start: 2022-07-03 | End: 2022-07-04 | Stop reason: HOSPADM

## 2022-07-03 RX ORDER — DOXEPIN HYDROCHLORIDE 10 MG/1
10 CAPSULE ORAL NIGHTLY
Status: DISCONTINUED | OUTPATIENT
Start: 2022-07-03 | End: 2022-07-04 | Stop reason: HOSPADM

## 2022-07-03 RX ORDER — PANTOPRAZOLE SODIUM 40 MG/1
40 TABLET, DELAYED RELEASE ORAL DAILY
Status: DISCONTINUED | OUTPATIENT
Start: 2022-07-03 | End: 2022-07-04 | Stop reason: HOSPADM

## 2022-07-03 RX ADMIN — ONDANSETRON 4 MG: 2 INJECTION INTRAMUSCULAR; INTRAVENOUS at 10:07

## 2022-07-03 RX ADMIN — Medication 4 TABLET: at 11:07

## 2022-07-03 RX ADMIN — SPIRONOLACTONE 50 MG: 25 TABLET ORAL at 09:07

## 2022-07-03 RX ADMIN — HYDROCODONE BITARTRATE AND ACETAMINOPHEN 1 TABLET: 7.5; 325 TABLET ORAL at 03:07

## 2022-07-03 RX ADMIN — CYCLOBENZAPRINE HYDROCHLORIDE 10 MG: 10 TABLET, FILM COATED ORAL at 09:07

## 2022-07-03 RX ADMIN — HYDROMORPHONE HYDROCHLORIDE 1 MG: 2 INJECTION INTRAMUSCULAR; INTRAVENOUS; SUBCUTANEOUS at 10:07

## 2022-07-03 RX ADMIN — HYDROCODONE BITARTRATE AND ACETAMINOPHEN 1 TABLET: 7.5; 325 TABLET ORAL at 09:07

## 2022-07-03 RX ADMIN — Medication 4 TABLET: at 04:07

## 2022-07-03 RX ADMIN — SPIRONOLACTONE 50 MG: 25 TABLET ORAL at 11:07

## 2022-07-03 RX ADMIN — HYDROMORPHONE HYDROCHLORIDE 1 MG: 2 INJECTION INTRAMUSCULAR; INTRAVENOUS; SUBCUTANEOUS at 05:07

## 2022-07-03 RX ADMIN — PANTOPRAZOLE SODIUM 40 MG: 40 INJECTION, POWDER, FOR SOLUTION INTRAVENOUS at 09:07

## 2022-07-03 RX ADMIN — ONDANSETRON 4 MG: 2 INJECTION INTRAMUSCULAR; INTRAVENOUS at 03:07

## 2022-07-03 RX ADMIN — ONDANSETRON 4 MG: 2 INJECTION INTRAMUSCULAR; INTRAVENOUS at 09:07

## 2022-07-03 RX ADMIN — DOXEPIN HYDROCHLORIDE 10 MG: 10 CAPSULE ORAL at 09:07

## 2022-07-03 RX ADMIN — Medication 4 TABLET: at 09:07

## 2022-07-03 RX ADMIN — THERA TABS 1 TABLET: TAB at 11:07

## 2022-07-03 RX ADMIN — MAGNESIUM SULFATE HEPTAHYDRATE 2 G: 40 INJECTION, SOLUTION INTRAVENOUS at 04:07

## 2022-07-03 RX ADMIN — ONDANSETRON 4 MG: 2 INJECTION INTRAMUSCULAR; INTRAVENOUS at 05:07

## 2022-07-03 NOTE — ASSESSMENT & PLAN NOTE
- SBFT demonstrates contrast in the colon. Patient is passing flatus and denies nausea with NG tube clamped. NG tube removed.  - Trial diet  - D/c IV fluids  - Analgesia and antiemetics prn  - DVT and gi ppx    If tolerates diet, anticipate discharge home tomorrow.

## 2022-07-03 NOTE — PROGRESS NOTES
"[facility] Surg  General Surgery  Progress Note    Subjective:     History of Present Illness:  No notes on file    Post-Op Info:  * No surgery found *         Interval History: SBFT demonstrates contrast in the colon a little after an hour. Patient is feeling better but still having some mild abdominal discomfort. No nausea. Passing flatus.    Medications:  Continuous Infusions:  Scheduled Meds:   cyclobenzaprine  10 mg Oral Nightly    docusate sodium  100 mg Oral Daily    doxepin  10 mg Oral QHS    heparin (porcine)  5,000 Units Subcutaneous Q12H    Lactobacillus acidoph-L.bulgar  4 tablet Oral TID    LIDOcaine (PF) 10 mg/ml (1%)  1 mL Other Once    multivitamin  1 tablet Oral Daily    pantoprazole  40 mg Oral Daily    polyethylene glycol  17 g Oral Daily    spironolactone  50 mg Oral BID     PRN Meds:acetaminophen, diazePAM, HYDROcodone-acetaminophen, HYDROmorphone, melatonin, methocarbamoL, ondansetron, sodium chloride 0.9%, sumatriptan     Review of patient's allergies indicates:   Allergen Reactions    Erythromycin Other (See Comments)     Stomach cramps    Morphine Nausea And Vomiting     "Projectile vomiting"     Objective:     Vital Signs (Most Recent):  Temp: 98.2 °F (36.8 °C) (07/03/22 0754)  Pulse: 77 (07/03/22 0754)  Resp: 18 (07/03/22 0754)  BP: 119/71 (07/03/22 0754)  SpO2: 97 % (07/03/22 0754) Vital Signs (24h Range):  Temp:  [97.7 °F (36.5 °C)-98.9 °F (37.2 °C)] 98.2 °F (36.8 °C)  Pulse:  [70-84] 77  Resp:  [16-18] 18  SpO2:  [95 %-98 %] 97 %  BP: (119-141)/(68-79) 119/71     Weight: 70 kg (154 lb 5.2 oz)  Body mass index is 24.17 kg/m².    Intake/Output - Last 3 Shifts         07/01 0700  07/02 0659 07/02 0700  07/03 0659 07/03 0700  07/04 0659    P.O. 0      I.V. (mL/kg) 1175.8 (16.8)      IV Piggyback       Total Intake(mL/kg) 1175.8 (16.8)      Net +1175.8             Urine Occurrence 2 x 4 x     Stool Occurrence 1 x              Physical Exam  Vitals and nursing note reviewed. "   Constitutional:       General: She is not in acute distress.  HENT:      Head: Normocephalic and atraumatic.      Mouth/Throat:      Mouth: Mucous membranes are moist.   Eyes:      Extraocular Movements: Extraocular movements intact.   Cardiovascular:      Rate and Rhythm: Normal rate.   Pulmonary:      Effort: No respiratory distress.      Breath sounds: No stridor.   Abdominal:      Comments: Soft, non-distended, mild diffuse tenderness. No peritoneal signs.   Musculoskeletal:      Cervical back: No rigidity.   Skin:     General: Skin is warm and dry.   Neurological:      General: No focal deficit present.      Mental Status: She is alert and oriented to person, place, and time.   Psychiatric:      Comments: anxious       Significant Labs:  I have reviewed all pertinent lab results within the past 24 hours.  CBC:   Recent Labs   Lab 07/03/22  0817   WBC 5.47   RBC 4.15   HGB 14.1   HCT 40.3      MCV 97   MCH 34.0*   MCHC 35.0     BMP:   Recent Labs   Lab 06/30/22  2251         K 3.4*      CO2 23   BUN 10   CREATININE 0.7   CALCIUM 9.1       Significant Diagnostics:  I have reviewed all pertinent imaging results/findings within the past 24 hours.    Assessment/Plan:     * SBO (small bowel obstruction)  - SBFT demonstrates contrast in the colon. Patient is passing flatus and denies nausea with NG tube clamped. NG tube removed.  - Trial diet  - D/c IV fluids  - Analgesia and antiemetics prn  - DVT and gi ppx    If tolerates diet, anticipate discharge home tomorrow.    Hypokalemia  - Monitor and replace prn    Anxiety  - Resume home anxiolytics        Jo Manzano, DO  General Surgery  [facility]

## 2022-07-03 NOTE — PLAN OF CARE
SW met with pt. Pt able to verified name, address,  and emergency contact. Pt' emergency contact is her SO, Tha Jay [phone], and her son, Jose Darnell [phone]. Pt's PCP is Dr. Bravo. Pt is insured with Medicare.       22 1007   Discharge Assessment   Assessment Type Discharge Planning Assessment   Confirmed/corrected address, phone number and insurance Yes   Confirmed Demographics Correct on Facesheet   Source of Information patient   If unable to respond/provide information was family/caregiver contacted? No Contact Information Available   When was your last doctors appointment?   (in a while)   Does patient/caregiver understand observation status Yes   Communicated MANAS with patient/caregiver Date not available/Unable to determine   Reason For Admission small bowel obstruction   Lives With alone   Do you expect to return to your current living situation? Yes   Do you have help at home or someone to help you manage your care at home? No   Prior to hospitilization cognitive status: Alert/Oriented   Current cognitive status: Alert/Oriented   Walking or Climbing Stairs Difficulty none   Dressing/Bathing Difficulty none   Home Accessibility not wheelchair accessible   Home Layout Able to live on 1st floor   Equipment Currently Used at Home none   Readmission within 30 days? Yes   Patient currently being followed by outpatient case management? No   Do you currently have service(s) that help you manage your care at home? No   Do you take prescription medications? Yes   Do you have prescription coverage? Yes   Coverage Medicare Part A&B   Do you have any problems affording any of your prescribed medications? No   Is the patient taking medications as prescribed? yes   How do you get to doctors appointments? car, drives self   Are you on dialysis? No   Do you take coumadin? No   Discharge Plan A Home   Discharge Plan B Home   Discharge Barriers Identified None       Sebastian Zavala LMSW

## 2022-07-03 NOTE — PLAN OF CARE
07/03/22 1007   Readmission   Why were you hospitalized in the last 30 days? small bowel obstruction   Why were you readmitted? New medical problem   When you left the hospital how did you feel? ok   When you left the hospital where did you go? Home Alone   Did patient/caregiver refused recommended DC plan? No   Did you try to manage your symptoms your self? No   Did you call anyone? No   Did you try to see or did see a doctor or nurse before you came? No   Did you have  a follow-up appointment on discharge? No   Was this a planned readmission? No       Sebastian Zavala LMSW

## 2022-07-03 NOTE — SUBJECTIVE & OBJECTIVE
"Interval History: SBFT demonstrates contrast in the colon a little after an hour. Patient is feeling better but still having some mild abdominal discomfort. No nausea. Passing flatus.    Medications:  Continuous Infusions:  Scheduled Meds:   cyclobenzaprine  10 mg Oral Nightly    docusate sodium  100 mg Oral Daily    doxepin  10 mg Oral QHS    heparin (porcine)  5,000 Units Subcutaneous Q12H    Lactobacillus acidoph-L.bulgar  4 tablet Oral TID    LIDOcaine (PF) 10 mg/ml (1%)  1 mL Other Once    multivitamin  1 tablet Oral Daily    pantoprazole  40 mg Oral Daily    polyethylene glycol  17 g Oral Daily    spironolactone  50 mg Oral BID     PRN Meds:acetaminophen, diazePAM, HYDROcodone-acetaminophen, HYDROmorphone, melatonin, methocarbamoL, ondansetron, sodium chloride 0.9%, sumatriptan     Review of patient's allergies indicates:   Allergen Reactions    Erythromycin Other (See Comments)     Stomach cramps    Morphine Nausea And Vomiting     "Projectile vomiting"     Objective:     Vital Signs (Most Recent):  Temp: 98.2 °F (36.8 °C) (07/03/22 0754)  Pulse: 77 (07/03/22 0754)  Resp: 18 (07/03/22 0754)  BP: 119/71 (07/03/22 0754)  SpO2: 97 % (07/03/22 0754) Vital Signs (24h Range):  Temp:  [97.7 °F (36.5 °C)-98.9 °F (37.2 °C)] 98.2 °F (36.8 °C)  Pulse:  [70-84] 77  Resp:  [16-18] 18  SpO2:  [95 %-98 %] 97 %  BP: (119-141)/(68-79) 119/71     Weight: 70 kg (154 lb 5.2 oz)  Body mass index is 24.17 kg/m².    Intake/Output - Last 3 Shifts         07/01 0700 07/02 0659 07/02 0700 07/03 0659 07/03 0700 07/04 0659    P.O. 0      I.V. (mL/kg) 1175.8 (16.8)      IV Piggyback       Total Intake(mL/kg) 1175.8 (16.8)      Net +1175.8             Urine Occurrence 2 x 4 x     Stool Occurrence 1 x              Physical Exam  Vitals and nursing note reviewed.   Constitutional:       General: She is not in acute distress.  HENT:      Head: Normocephalic and atraumatic.      Mouth/Throat:      Mouth: Mucous membranes are moist.   Eyes: "      Extraocular Movements: Extraocular movements intact.   Cardiovascular:      Rate and Rhythm: Normal rate.   Pulmonary:      Effort: No respiratory distress.      Breath sounds: No stridor.   Abdominal:      Comments: Soft, non-distended, mild diffuse tenderness. No peritoneal signs.   Musculoskeletal:      Cervical back: No rigidity.   Skin:     General: Skin is warm and dry.   Neurological:      General: No focal deficit present.      Mental Status: She is alert and oriented to person, place, and time.   Psychiatric:      Comments: anxious       Significant Labs:  I have reviewed all pertinent lab results within the past 24 hours.  CBC:   Recent Labs   Lab 07/03/22  0817   WBC 5.47   RBC 4.15   HGB 14.1   HCT 40.3      MCV 97   MCH 34.0*   MCHC 35.0     BMP:   Recent Labs   Lab 06/30/22  2251         K 3.4*      CO2 23   BUN 10   CREATININE 0.7   CALCIUM 9.1       Significant Diagnostics:  I have reviewed all pertinent imaging results/findings within the past 24 hours.

## 2022-07-03 NOTE — PLAN OF CARE
Remains injury free. NGT discontinued per MD. Started on regular diet, severe pain after eating. One time dose of Dilaudid 1mg IVP administered with relief of pain. Will monitor.

## 2022-07-04 VITALS
OXYGEN SATURATION: 95 % | HEART RATE: 80 BPM | HEIGHT: 67 IN | TEMPERATURE: 98 F | BODY MASS INDEX: 24.22 KG/M2 | RESPIRATION RATE: 16 BRPM | WEIGHT: 154.31 LBS | SYSTOLIC BLOOD PRESSURE: 129 MMHG | DIASTOLIC BLOOD PRESSURE: 69 MMHG

## 2022-07-04 LAB
ANION GAP SERPL CALC-SCNC: 10 MMOL/L (ref 8–16)
BASOPHILS # BLD AUTO: 0.04 K/UL (ref 0–0.2)
BASOPHILS NFR BLD: 0.8 % (ref 0–1.9)
BUN SERPL-MCNC: 7 MG/DL (ref 6–20)
CALCIUM SERPL-MCNC: 9.2 MG/DL (ref 8.7–10.5)
CHLORIDE SERPL-SCNC: 102 MMOL/L (ref 95–110)
CO2 SERPL-SCNC: 30 MMOL/L (ref 23–29)
CREAT SERPL-MCNC: 0.7 MG/DL (ref 0.5–1.4)
DIFFERENTIAL METHOD: ABNORMAL
EOSINOPHIL # BLD AUTO: 0.1 K/UL (ref 0–0.5)
EOSINOPHIL NFR BLD: 2.8 % (ref 0–8)
ERYTHROCYTE [DISTWIDTH] IN BLOOD BY AUTOMATED COUNT: 11.8 % (ref 11.5–14.5)
EST. GFR  (AFRICAN AMERICAN): >60 ML/MIN/1.73 M^2
EST. GFR  (NON AFRICAN AMERICAN): >60 ML/MIN/1.73 M^2
GLUCOSE SERPL-MCNC: 94 MG/DL (ref 70–110)
HCT VFR BLD AUTO: 38.6 % (ref 37–48.5)
HGB BLD-MCNC: 13.5 G/DL (ref 12–16)
IMM GRANULOCYTES # BLD AUTO: 0.01 K/UL (ref 0–0.04)
IMM GRANULOCYTES NFR BLD AUTO: 0.2 % (ref 0–0.5)
LYMPHOCYTES # BLD AUTO: 2.1 K/UL (ref 1–4.8)
LYMPHOCYTES NFR BLD: 42.9 % (ref 18–48)
MAGNESIUM SERPL-MCNC: 1.9 MG/DL (ref 1.6–2.6)
MCH RBC QN AUTO: 33.4 PG (ref 27–31)
MCHC RBC AUTO-ENTMCNC: 35 G/DL (ref 32–36)
MCV RBC AUTO: 96 FL (ref 82–98)
MONOCYTES # BLD AUTO: 0.5 K/UL (ref 0.3–1)
MONOCYTES NFR BLD: 10.9 % (ref 4–15)
NEUTROPHILS # BLD AUTO: 2.1 K/UL (ref 1.8–7.7)
NEUTROPHILS NFR BLD: 42.4 % (ref 38–73)
NRBC BLD-RTO: 0 /100 WBC
PHOSPHATE SERPL-MCNC: 4.1 MG/DL (ref 2.7–4.5)
PLATELET # BLD AUTO: 185 K/UL (ref 150–450)
PMV BLD AUTO: 11.3 FL (ref 9.2–12.9)
POTASSIUM SERPL-SCNC: 3.7 MMOL/L (ref 3.5–5.1)
RBC # BLD AUTO: 4.04 M/UL (ref 4–5.4)
SODIUM SERPL-SCNC: 142 MMOL/L (ref 136–145)
WBC # BLD AUTO: 4.94 K/UL (ref 3.9–12.7)

## 2022-07-04 PROCEDURE — 25000003 PHARM REV CODE 250: Performed by: SURGERY

## 2022-07-04 PROCEDURE — 80048 BASIC METABOLIC PNL TOTAL CA: CPT | Performed by: SURGERY

## 2022-07-04 PROCEDURE — 63600175 PHARM REV CODE 636 W HCPCS: Performed by: SURGERY

## 2022-07-04 PROCEDURE — 36415 COLL VENOUS BLD VENIPUNCTURE: CPT | Performed by: SURGERY

## 2022-07-04 PROCEDURE — 85025 COMPLETE CBC W/AUTO DIFF WBC: CPT | Performed by: SURGERY

## 2022-07-04 PROCEDURE — 83735 ASSAY OF MAGNESIUM: CPT | Performed by: SURGERY

## 2022-07-04 PROCEDURE — 84100 ASSAY OF PHOSPHORUS: CPT | Performed by: SURGERY

## 2022-07-04 RX ORDER — PROMETHAZINE HYDROCHLORIDE 25 MG/1
25 TABLET ORAL EVERY 4 HOURS PRN
Qty: 15 TABLET | Refills: 0 | Status: SHIPPED | OUTPATIENT
Start: 2022-07-04 | End: 2022-07-18

## 2022-07-04 RX ORDER — HYDROCODONE BITARTRATE AND ACETAMINOPHEN 7.5; 325 MG/1; MG/1
1 TABLET ORAL EVERY 4 HOURS PRN
Qty: 25 TABLET | Refills: 0 | Status: ON HOLD | OUTPATIENT
Start: 2022-07-04 | End: 2022-08-03 | Stop reason: HOSPADM

## 2022-07-04 RX ORDER — DOCUSATE SODIUM 100 MG/1
100 CAPSULE, LIQUID FILLED ORAL 2 TIMES DAILY
Qty: 60 CAPSULE | Refills: 1 | Status: SHIPPED | OUTPATIENT
Start: 2022-07-04 | End: 2024-02-15

## 2022-07-04 RX ADMIN — ONDANSETRON 4 MG: 2 INJECTION INTRAMUSCULAR; INTRAVENOUS at 09:07

## 2022-07-04 RX ADMIN — PANTOPRAZOLE SODIUM 40 MG: 40 TABLET, DELAYED RELEASE ORAL at 09:07

## 2022-07-04 RX ADMIN — HEPARIN SODIUM 5000 UNITS: 5000 INJECTION INTRAVENOUS; SUBCUTANEOUS at 09:07

## 2022-07-04 RX ADMIN — SPIRONOLACTONE 50 MG: 25 TABLET ORAL at 09:07

## 2022-07-04 RX ADMIN — HYDROCODONE BITARTRATE AND ACETAMINOPHEN 1 TABLET: 7.5; 325 TABLET ORAL at 09:07

## 2022-07-04 RX ADMIN — POLYETHYLENE GLYCOL 3350 17 G: 17 POWDER, FOR SOLUTION ORAL at 09:07

## 2022-07-04 RX ADMIN — Medication 4 TABLET: at 09:07

## 2022-07-04 RX ADMIN — THERA TABS 1 TABLET: TAB at 09:07

## 2022-07-04 NOTE — PROGRESS NOTES
Discharge instructions given. Verbalized understanding. IV's removed tip intact. Waiting on ride.

## 2022-07-04 NOTE — DISCHARGE SUMMARY
[facility]  General Surgery  Discharge Summary      Patient Name: Genny Calixto  MRN: [id number]  Admission Date: 6/30/2022  Hospital Length of Stay: 2 days  Discharge Date and Time:  07/04/2022 9:37 AM  Attending Physician: Remigio Herron MD   Discharging Provider: Jo Manzano DO  Primary Care Provider: Tay Duff MD    HPI:   No notes on file    * No surgery found *      Indwelling Lines/Drains at time of discharge:   Lines/Drains/Airways     None               Hospital Course: 7/2/22 Continues to have abdominal discomfort and some nausea. Passed a very small amount of flatus but nothing significant. Obtain SBFT today.    7/3/22 SBFT demonstrates contrast in the colon a little after an hour. Patient is feeling better but still having some mild abdominal discomfort. No nausea. Passing flatus. NG tube removed. Trial diet.     7/4/22 Feeling well today. Abdominal exam benign. VSS. Tolerating a regular diet. Passing flatus and stool. D/c home today and follow up in clinic.      Goals of Care Treatment Preferences:  Code Status: Full Code      Consults:     Significant Diagnostic Studies:     Pending Diagnostic Studies:     None        Final Active Diagnoses:    Diagnosis Date Noted POA    PRINCIPAL PROBLEM:  SBO (small bowel obstruction) [K56.609] 05/31/2022 Yes    Hypokalemia [E87.6] 02/06/2021 Yes    Anxiety [F41.9] 02/24/2017 Yes      Problems Resolved During this Admission:      Discharged Condition: good    Disposition: Home or Self Care    Follow Up:   Follow-up Information     Jo Manzano DO. Schedule an appointment as soon as possible for a visit on 7/12/2022.    Specialty: General Surgery  Contact information:  [address]  [place] [address]  [phone]                       Patient Instructions:   No discharge procedures on file.  Medications:  Reconciled Home Medications:      Medication List      START taking these medications    docusate sodium 100 MG  capsule  Commonly known as: COLACE  Take 1 capsule (100 mg total) by mouth 2 (two) times daily.     HYDROcodone-acetaminophen 7.5-325 mg per tablet  Commonly known as: NORCO  Take 1 tablet by mouth every 4 (four) hours as needed for Pain.     promethazine 25 MG tablet  Commonly known as: PHENERGAN  Take 1 tablet (25 mg total) by mouth every 4 (four) hours as needed for Nausea.        CHANGE how you take these medications    spironolactone 50 MG tablet  Commonly known as: ALDACTONE  TAKE 1 TABLET BY MOUTH ONCE DAILY THEN INCREASE TO 2 TABLETS DAILY AS TOLERATED  What changed:   · how much to take  · how to take this  · when to take this  · additional instructions        CONTINUE taking these medications    cyclobenzaprine 10 MG tablet  Commonly known as: FLEXERIL  Take 10 mg by mouth nightly.     * diazePAM 5 MG tablet  Commonly known as: VALIUM  Take one with onset of migraine     * diazePAM 10 MG Tab  Commonly known as: VALIUM  TAKE 1 TABLET BY MOUTH EVERY DAY AS NEEDED     diphenhydrAMINE 25 mg capsule  Commonly known as: BENADRYL  Take 25 mg by mouth 2 (two) times a day.     doxepin 10 MG capsule  Commonly known as: SINEQUAN  TAKE 1 CAPSULE (10 MG TOTAL) BY MOUTH EVERY EVENING.     LINZESS 290 mcg Cap capsule  Generic drug: linaCLOtide  TAKE 1 CAPSULE (290 MCG TOTAL) BY MOUTH BEFORE BREAKFAST. FOR 30 DOSES     loratadine 10 mg tablet  Commonly known as: CLARITIN  Take 10 mg by mouth daily     methocarbamoL 500 MG Tab  Commonly known as: ROBAXIN  TAKE 1 TABLET (500 MG TOTAL) BY MOUTH 4 (FOUR) TIMES DAILY     multivitamin capsule  Take 1 capsule by mouth once daily.     ondansetron 4 MG tablet  Commonly known as: ZOFRAN  Take 1 tablet (4 mg total) by mouth 2 (two) times daily.     pantoprazole 40 MG tablet  Commonly known as: PROTONIX  TAKE 1 TABLET BY MOUTH EVERY DAY     PROBIOTIC COMPLEX ORAL  Take by mouth once daily at 6am.     pseudoephedrine 120 mg 12 hr tablet  Commonly known as: SUDAFED  Take 120 mg by  mouth once daily.     sumatriptan 100 MG tablet  Commonly known as: IMITREX  TAKE 1 TABLET BY MOUTH IF NEEDED, MAY REPEAT ONCE IN 1 HOUR. MAX OF 2 TABLETS IN 24 HOURS         * This list has 2 medication(s) that are the same as other medications prescribed for you. Read the directions carefully, and ask your doctor or other care provider to review them with you.              Time spent on the discharge of patient: 10 minutes    Jo Manzano, DO  General Surgery  [facility]

## 2022-07-04 NOTE — PLAN OF CARE
07/04/22 0949   Final Note   Assessment Type Discharge Planning Assessment   Anticipated Discharge Disposition Home   Hospital Resources/Appts/Education Provided Appointments scheduled and added to AVS

## 2022-07-05 ENCOUNTER — PATIENT MESSAGE (OUTPATIENT)
Dept: INTERNAL MEDICINE | Facility: CLINIC | Age: 57
End: 2022-07-05
Payer: MEDICARE

## 2022-07-05 ENCOUNTER — PATIENT MESSAGE (OUTPATIENT)
Dept: GASTROENTEROLOGY | Facility: CLINIC | Age: 57
End: 2022-07-05
Payer: MEDICARE

## 2022-07-06 ENCOUNTER — PATIENT OUTREACH (OUTPATIENT)
Dept: ADMINISTRATIVE | Facility: CLINIC | Age: 57
End: 2022-07-06
Payer: MEDICARE

## 2022-07-06 NOTE — PROGRESS NOTES
C3 nurse spoke with Genny Calixto for a [facility] post hospital discharge follow up call. The patient has a scheduled hospitals appointment with Jo Manzano DO on 7/11/2022 @ 1500.

## 2022-07-11 ENCOUNTER — TELEPHONE (OUTPATIENT)
Dept: SURGERY | Facility: CLINIC | Age: 57
End: 2022-07-11
Payer: MEDICARE

## 2022-07-11 NOTE — TELEPHONE ENCOUNTER
Called pt, inquired whether pt would like to see Dr. Hightower as she is already an established pt with him, she spoke with Dr. Manzano in the hospital and would like to keep her appointment with her, pt verbalized understanding.

## 2022-07-12 ENCOUNTER — OFFICE VISIT (OUTPATIENT)
Dept: SURGERY | Facility: CLINIC | Age: 57
End: 2022-07-12
Payer: MEDICARE

## 2022-07-12 ENCOUNTER — PATIENT MESSAGE (OUTPATIENT)
Dept: SURGERY | Facility: CLINIC | Age: 57
End: 2022-07-12

## 2022-07-12 VITALS
HEART RATE: 90 BPM | WEIGHT: 157.88 LBS | BODY MASS INDEX: 24.72 KG/M2 | TEMPERATURE: 98 F | SYSTOLIC BLOOD PRESSURE: 149 MMHG | DIASTOLIC BLOOD PRESSURE: 87 MMHG

## 2022-07-12 DIAGNOSIS — K56.609 SBO (SMALL BOWEL OBSTRUCTION): Primary | ICD-10-CM

## 2022-07-12 DIAGNOSIS — R10.9 ABDOMINAL CRAMPING: ICD-10-CM

## 2022-07-12 DIAGNOSIS — K66.0 ABDOMINAL ADHESIONS: ICD-10-CM

## 2022-07-12 PROCEDURE — 99214 PR OFFICE/OUTPT VISIT, EST, LEVL IV, 30-39 MIN: ICD-10-PCS | Mod: S$PBB,,, | Performed by: SURGERY

## 2022-07-12 PROCEDURE — 99999 PR PBB SHADOW E&M-EST. PATIENT-LVL V: CPT | Mod: PBBFAC,,, | Performed by: SURGERY

## 2022-07-12 PROCEDURE — 99999 PR PBB SHADOW E&M-EST. PATIENT-LVL V: ICD-10-PCS | Mod: PBBFAC,,, | Performed by: SURGERY

## 2022-07-12 PROCEDURE — 99215 OFFICE O/P EST HI 40 MIN: CPT | Mod: PBBFAC | Performed by: SURGERY

## 2022-07-12 PROCEDURE — 99214 OFFICE O/P EST MOD 30 MIN: CPT | Mod: S$PBB,,, | Performed by: SURGERY

## 2022-07-12 NOTE — H&P (VIEW-ONLY)
"[facility] -   General Surgery History & Physical    SUBJECTIVE:     History of Present Illness:  Patient is a 57 y.o. female presents from the hospital due to recurrent small bowel obstructions. She is frustrated that this keeps happening and believes it is due to possible adhesions from her many prior surgeries or even possibly a bowel stricture. She is concerned that another obstruction is soon impending due to more frequent episodes of abdominal cramping after eating.      Review of patient's allergies indicates:   Allergen Reactions    Erythromycin Other (See Comments)     Stomach cramps    Morphine Nausea And Vomiting     "Projectile vomiting"       Current Outpatient Medications   Medication Sig Dispense Refill    cyclobenzaprine (FLEXERIL) 10 MG tablet Take 10 mg by mouth nightly.      diazePAM (VALIUM) 10 MG Tab TAKE 1 TABLET BY MOUTH EVERY DAY AS NEEDED 90 tablet 1    diazePAM (VALIUM) 5 MG tablet Take one with onset of migraine 20 tablet 5    diphenhydrAMINE (BENADRYL) 25 mg capsule Take 25 mg by mouth 2 (two) times a day.      docusate sodium (COLACE) 100 MG capsule Take 1 capsule (100 mg total) by mouth 2 (two) times daily. 60 capsule 1    doxepin (SINEQUAN) 10 MG capsule TAKE 1 CAPSULE (10 MG TOTAL) BY MOUTH EVERY EVENING. 90 capsule 3    HYDROcodone-acetaminophen (NORCO) 7.5-325 mg per tablet Take 1 tablet by mouth every 4 (four) hours as needed for Pain. 25 tablet 0    LACTOBACILLUS COMBO NO.6 (PROBIOTIC COMPLEX ORAL) Take by mouth once daily at 6am.      LINZESS 290 mcg Cap capsule TAKE 1 CAPSULE (290 MCG TOTAL) BY MOUTH BEFORE BREAKFAST. FOR 30 DOSES 30 capsule 0    loratadine (CLARITIN) 10 mg tablet Take 10 mg by mouth daily      methocarbamoL (ROBAXIN) 500 MG Tab TAKE 1 TABLET (500 MG TOTAL) BY MOUTH 4 (FOUR) TIMES DAILY 180 tablet 3    multivitamin capsule Take 1 capsule by mouth once daily.      ondansetron (ZOFRAN) 4 MG tablet Take 1 tablet (4 mg total) by mouth 2 " (two) times daily. 30 tablet 0    pantoprazole (PROTONIX) 40 MG tablet TAKE 1 TABLET BY MOUTH EVERY DAY 90 tablet 2    promethazine (PHENERGAN) 25 MG tablet Take 1 tablet (25 mg total) by mouth every 4 (four) hours as needed for Nausea. 15 tablet 0    pseudoephedrine (SUDAFED) 120 mg 12 hr tablet Take 120 mg by mouth once daily.      spironolactone (ALDACTONE) 50 MG tablet TAKE 1 TABLET BY MOUTH ONCE DAILY THEN INCREASE TO 2 TABLETS DAILY AS TOLERATED (Patient taking differently: Take 50 mg by mouth 2 (two) times daily.) 180 tablet 1    sumatriptan (IMITREX) 100 MG tablet TAKE 1 TABLET BY MOUTH IF NEEDED, MAY REPEAT ONCE IN 1 HOUR. MAX OF 2 TABLETS IN 24 HOURS 10 tablet 2     No current facility-administered medications for this visit.       Past Medical History:   Diagnosis Date    Encounter for blood transfusion     KENN (generalized anxiety disorder)     Takes 5-10 mg of valium qd prn anxiety (prescriptions for both on file, one from [facility] & other from )    Insomnia     Takes 5-10 mg of valium qhs prn insomnia (prescriptions for both on file, one from [facility] & other from )    Migraine headache     Nephrolithiasis 08/03/2019    Left ureteral stone -> UTI c/b pyelonephritis and urosepsis w/ hypokalemia -> transfered to [facility] urology service from [facility] after CT abn dx, s/p 2 stents to allow infx to drain, IV Vanc/Rocephin, fever free since yesterday    Reflex sympathetic dystrophy     UTI (urinary tract infection)     Vertigo      Past Surgical History:   Procedure Laterality Date    BLADDER SURGERY      CHOLECYSTECTOMY      COLONOSCOPY N/A 11/10/2021    Procedure: COLONOSCOPY;  Surgeon: Eryn Rothman MD;  Location: [facility];  Service: Endoscopy;  Laterality: N/A;    CYSTOSCOPY WITH URETEROSCOPY, RETROGRADE PYELOGRAPHY, AND INSERTION OF STENT Right 9/30/2021    Procedure: CYSTOSCOPY, WITH RETROGRADE PYELOGRAM AND URETERAL STENT INSERTION;  Surgeon: Annita Gamino MD;  Location:  [facility] OR;  Service: Urology;  Laterality: Right;    DIAGNOSTIC LAPAROSCOPY N/A 11/11/2020    Procedure: LAPAROSCOPY, DIAGNOSTIC;  Surgeon: Tee Segura MD;  Location: [facility] OR;  Service: General;  Laterality: N/A;  CONVERTED TO OPEN    ESOPHAGOGASTRODUODENOSCOPY N/A 11/10/2021    Procedure: EGD (ESOPHAGOGASTRODUODENOSCOPY);  Surgeon: Eryn Rothman MD;  Location: [facility] ENDO;  Service: Endoscopy;  Laterality: N/A;    facet injections  02/14/2017    L3, L4, L5, S1 Done by Dr. Lara    HYSTERECTOMY      INJECTION OF ANESTHETIC AGENT INTO TISSUE PLANE DEFINED BY TRANSVERSUS ABDOMINIS MUSCLE N/A 11/11/2020    Procedure: BLOCK, TRANSVERSUS ABDOMINIS PLANE;  Surgeon: Tee Segura MD;  Location: [facility] OR;  Service: General;  Laterality: N/A;    KNEE SURGERY      LAPAROSCOPIC LYSIS OF ADHESIONS N/A 11/11/2020    Procedure: LYSIS, ADHESIONS, LAPAROSCOPIC;  Surgeon: Tee Segura MD;  Location: [facility] OR;  Service: General;  Laterality: N/A;  CONVERTED TO OPEN    LAPAROTOMY N/A 11/20/2020    Procedure: LAPAROTOMY;  Surgeon: Tee Segura MD;  Location: [facility] OR;  Service: General;  Laterality: N/A;    LASER LITHOTRIPSY Left 2/8/2021    Procedure: LITHOTRIPSY, USING LASER;  Surgeon: Irving Kidd MD;  Location: [facility] OR;  Service: Urology;  Laterality: Left;    LASER LITHOTRIPSY Right 10/13/2021    Procedure: LITHOTRIPSY, USING LASER;  Surgeon: Annita Gamino MD;  Location: [facility] OR;  Service: Urology;  Laterality: Right;    LYSIS OF ADHESIONS N/A 11/11/2020    Procedure: LYSIS, ADHESIONS;  Surgeon: Tee Segura MD;  Location: [facility] OR;  Service: General;  Laterality: N/A;    LYSIS OF ADHESIONS N/A 11/20/2020    Procedure: LYSIS, ADHESIONS;  Surgeon: Tee Segura MD;  Location: [facility] OR;  Service: General;  Laterality: N/A;    TONSILLECTOMY      URETEROSCOPY Left 2/8/2021    Procedure: URETEROSCOPY;  Surgeon: Irving Kidd MD;  Location: [facility] OR;  Service: Urology;   Laterality: Left;  stent placement    URETEROSCOPY Right 10/13/2021    Procedure: URETEROSCOPY;  Surgeon: Annita Gamino MD;  Location: [facility];  Service: Urology;  Laterality: Right;     Family History   Problem Relation Age of Onset    Stroke Mother     Hypertension Mother     COPD Father     Drug abuse Brother      Social History     Tobacco Use    Smoking status: Never Smoker    Smokeless tobacco: Never Used   Substance Use Topics    Alcohol use: Yes     Comment: rarely    Drug use: No        Review of Systems:  Review of Systems   Constitutional: Negative for fever and unexpected weight change.   HENT: Negative for trouble swallowing.    Respiratory: Negative for cough and shortness of breath.    Cardiovascular: Negative for chest pain.   Gastrointestinal: Positive for abdominal pain and nausea. Negative for blood in stool, constipation, diarrhea and vomiting.   Genitourinary: Negative for difficulty urinating, dysuria and hematuria.       OBJECTIVE:     Vital Signs (Most Recent)  Temp: 97.7 °F (36.5 °C) (07/12/22 1507)  Pulse: 90 (07/12/22 1507)  BP: (!) 149/87 (07/12/22 1507)     71.6 kg (157 lb 13.6 oz)     Physical Exam:  Physical Exam  Vitals and nursing note reviewed.   Constitutional:       General: She is not in acute distress.     Appearance: She is not ill-appearing, toxic-appearing or diaphoretic.   HENT:      Head: Normocephalic and atraumatic.      Nose: Nose normal.      Mouth/Throat:      Mouth: Mucous membranes are moist.   Eyes:      General: No scleral icterus.        Right eye: No discharge.         Left eye: No discharge.      Extraocular Movements: Extraocular movements intact.      Pupils: Pupils are equal, round, and reactive to light.   Cardiovascular:      Rate and Rhythm: Normal rate and regular rhythm.   Pulmonary:      Effort: Pulmonary effort is normal. No respiratory distress.      Breath sounds: No stridor.   Abdominal:      General: There is no distension.       Palpations: Abdomen is soft.      Tenderness: There is no abdominal tenderness. There is no guarding or rebound.   Musculoskeletal:      Cervical back: No rigidity.   Skin:     General: Skin is warm and dry.      Coloration: Skin is not jaundiced.   Neurological:      General: No focal deficit present.      Mental Status: She is alert and oriented to person, place, and time.   Psychiatric:         Mood and Affect: Mood normal.         Behavior: Behavior normal.         Laboratory      Diagnostic Results:  Small bowel follow through during last admission demonstrated normal transit time of contrast to the colon.    ASSESSMENT/PLAN:     Genny was seen today for consult.    Diagnoses and all orders for this visit:    SBO (small bowel obstruction)  -     Case Request Operating Room: LAPAROSCOPY, DIAGNOSTIC    Abdominal adhesions    Abdominal cramping         Plan for diagnostic laparoscopy, likely laparotomy, for lysis of adhesions, possible bowel resection. We discussed possibly staying overnight in the hospital.    After explaining the risks, benefits, and alternatives, patient verbalized understanding and would like to proceed with surgery. All questions were answered to their satisfaction.      Patient expressed understanding and is in agreement.      Jo Manzano, DO  General Surgery  [facility]  7/12/2022

## 2022-07-12 NOTE — PROGRESS NOTES
"[facility] -   General Surgery History & Physical    SUBJECTIVE:     History of Present Illness:  Patient is a 57 y.o. female presents from the hospital due to recurrent small bowel obstructions. She is frustrated that this keeps happening and believes it is due to possible adhesions from her many prior surgeries or even possibly a bowel stricture. She is concerned that another obstruction is soon impending due to more frequent episodes of abdominal cramping after eating.      Review of patient's allergies indicates:   Allergen Reactions    Erythromycin Other (See Comments)     Stomach cramps    Morphine Nausea And Vomiting     "Projectile vomiting"       Current Outpatient Medications   Medication Sig Dispense Refill    cyclobenzaprine (FLEXERIL) 10 MG tablet Take 10 mg by mouth nightly.      diazePAM (VALIUM) 10 MG Tab TAKE 1 TABLET BY MOUTH EVERY DAY AS NEEDED 90 tablet 1    diazePAM (VALIUM) 5 MG tablet Take one with onset of migraine 20 tablet 5    diphenhydrAMINE (BENADRYL) 25 mg capsule Take 25 mg by mouth 2 (two) times a day.      docusate sodium (COLACE) 100 MG capsule Take 1 capsule (100 mg total) by mouth 2 (two) times daily. 60 capsule 1    doxepin (SINEQUAN) 10 MG capsule TAKE 1 CAPSULE (10 MG TOTAL) BY MOUTH EVERY EVENING. 90 capsule 3    HYDROcodone-acetaminophen (NORCO) 7.5-325 mg per tablet Take 1 tablet by mouth every 4 (four) hours as needed for Pain. 25 tablet 0    LACTOBACILLUS COMBO NO.6 (PROBIOTIC COMPLEX ORAL) Take by mouth once daily at 6am.      LINZESS 290 mcg Cap capsule TAKE 1 CAPSULE (290 MCG TOTAL) BY MOUTH BEFORE BREAKFAST. FOR 30 DOSES 30 capsule 0    loratadine (CLARITIN) 10 mg tablet Take 10 mg by mouth daily      methocarbamoL (ROBAXIN) 500 MG Tab TAKE 1 TABLET (500 MG TOTAL) BY MOUTH 4 (FOUR) TIMES DAILY 180 tablet 3    multivitamin capsule Take 1 capsule by mouth once daily.      ondansetron (ZOFRAN) 4 MG tablet Take 1 tablet (4 mg total) by mouth 2 " (two) times daily. 30 tablet 0    pantoprazole (PROTONIX) 40 MG tablet TAKE 1 TABLET BY MOUTH EVERY DAY 90 tablet 2    promethazine (PHENERGAN) 25 MG tablet Take 1 tablet (25 mg total) by mouth every 4 (four) hours as needed for Nausea. 15 tablet 0    pseudoephedrine (SUDAFED) 120 mg 12 hr tablet Take 120 mg by mouth once daily.      spironolactone (ALDACTONE) 50 MG tablet TAKE 1 TABLET BY MOUTH ONCE DAILY THEN INCREASE TO 2 TABLETS DAILY AS TOLERATED (Patient taking differently: Take 50 mg by mouth 2 (two) times daily.) 180 tablet 1    sumatriptan (IMITREX) 100 MG tablet TAKE 1 TABLET BY MOUTH IF NEEDED, MAY REPEAT ONCE IN 1 HOUR. MAX OF 2 TABLETS IN 24 HOURS 10 tablet 2     No current facility-administered medications for this visit.       Past Medical History:   Diagnosis Date    Encounter for blood transfusion     KENN (generalized anxiety disorder)     Takes 5-10 mg of valium qd prn anxiety (prescriptions for both on file, one from [facility] & other from )    Insomnia     Takes 5-10 mg of valium qhs prn insomnia (prescriptions for both on file, one from [facility] & other from )    Migraine headache     Nephrolithiasis 08/03/2019    Left ureteral stone -> UTI c/b pyelonephritis and urosepsis w/ hypokalemia -> transfered to [facility]rology service from [facility] after CT abn dx, s/p 2 stents to allow infx to drain, IV Vanc/Rocephin, fever free since yesterday    Reflex sympathetic dystrophy     UTI (urinary tract infection)     Vertigo      Past Surgical History:   Procedure Laterality Date    BLADDER SURGERY      CHOLECYSTECTOMY      COLONOSCOPY N/A 11/10/2021    Procedure: COLONOSCOPY;  Surgeon: Eryn Rothman MD;  Location: [facility];  Service: Endoscopy;  Laterality: N/A;    CYSTOSCOPY WITH URETEROSCOPY, RETROGRADE PYELOGRAPHY, AND INSERTION OF STENT Right 9/30/2021    Procedure: CYSTOSCOPY, WITH RETROGRADE PYELOGRAM AND URETERAL STENT INSERTION;  Surgeon: Annita Gamino MD;  Location:  [facility] OR;  Service: Urology;  Laterality: Right;    DIAGNOSTIC LAPAROSCOPY N/A 11/11/2020    Procedure: LAPAROSCOPY, DIAGNOSTIC;  Surgeon: Tee Segura MD;  Location: [facility] OR;  Service: General;  Laterality: N/A;  CONVERTED TO OPEN    ESOPHAGOGASTRODUODENOSCOPY N/A 11/10/2021    Procedure: EGD (ESOPHAGOGASTRODUODENOSCOPY);  Surgeon: Eryn Rothman MD;  Location: [facility] ENDO;  Service: Endoscopy;  Laterality: N/A;    facet injections  02/14/2017    L3, L4, L5, S1 Done by Dr. Lara    HYSTERECTOMY      INJECTION OF ANESTHETIC AGENT INTO TISSUE PLANE DEFINED BY TRANSVERSUS ABDOMINIS MUSCLE N/A 11/11/2020    Procedure: BLOCK, TRANSVERSUS ABDOMINIS PLANE;  Surgeon: Tee Segura MD;  Location: [facility] OR;  Service: General;  Laterality: N/A;    KNEE SURGERY      LAPAROSCOPIC LYSIS OF ADHESIONS N/A 11/11/2020    Procedure: LYSIS, ADHESIONS, LAPAROSCOPIC;  Surgeon: Tee Segura MD;  Location: [facility] OR;  Service: General;  Laterality: N/A;  CONVERTED TO OPEN    LAPAROTOMY N/A 11/20/2020    Procedure: LAPAROTOMY;  Surgeon: Tee Segura MD;  Location: [facility] OR;  Service: General;  Laterality: N/A;    LASER LITHOTRIPSY Left 2/8/2021    Procedure: LITHOTRIPSY, USING LASER;  Surgeon: Irving Kidd MD;  Location: [facility] OR;  Service: Urology;  Laterality: Left;    LASER LITHOTRIPSY Right 10/13/2021    Procedure: LITHOTRIPSY, USING LASER;  Surgeon: Annita Gamino MD;  Location: [facility] OR;  Service: Urology;  Laterality: Right;    LYSIS OF ADHESIONS N/A 11/11/2020    Procedure: LYSIS, ADHESIONS;  Surgeon: Tee Segura MD;  Location: [facility] OR;  Service: General;  Laterality: N/A;    LYSIS OF ADHESIONS N/A 11/20/2020    Procedure: LYSIS, ADHESIONS;  Surgeon: Tee Segura MD;  Location: [facility] OR;  Service: General;  Laterality: N/A;    TONSILLECTOMY      URETEROSCOPY Left 2/8/2021    Procedure: URETEROSCOPY;  Surgeon: Irving Kidd MD;  Location: [facility] OR;  Service: Urology;   Laterality: Left;  stent placement    URETEROSCOPY Right 10/13/2021    Procedure: URETEROSCOPY;  Surgeon: Annita Gamino MD;  Location: [facility];  Service: Urology;  Laterality: Right;     Family History   Problem Relation Age of Onset    Stroke Mother     Hypertension Mother     COPD Father     Drug abuse Brother      Social History     Tobacco Use    Smoking status: Never Smoker    Smokeless tobacco: Never Used   Substance Use Topics    Alcohol use: Yes     Comment: rarely    Drug use: No        Review of Systems:  Review of Systems   Constitutional: Negative for fever and unexpected weight change.   HENT: Negative for trouble swallowing.    Respiratory: Negative for cough and shortness of breath.    Cardiovascular: Negative for chest pain.   Gastrointestinal: Positive for abdominal pain and nausea. Negative for blood in stool, constipation, diarrhea and vomiting.   Genitourinary: Negative for difficulty urinating, dysuria and hematuria.       OBJECTIVE:     Vital Signs (Most Recent)  Temp: 97.7 °F (36.5 °C) (07/12/22 1507)  Pulse: 90 (07/12/22 1507)  BP: (!) 149/87 (07/12/22 1507)     71.6 kg (157 lb 13.6 oz)     Physical Exam:  Physical Exam  Vitals and nursing note reviewed.   Constitutional:       General: She is not in acute distress.     Appearance: She is not ill-appearing, toxic-appearing or diaphoretic.   HENT:      Head: Normocephalic and atraumatic.      Nose: Nose normal.      Mouth/Throat:      Mouth: Mucous membranes are moist.   Eyes:      General: No scleral icterus.        Right eye: No discharge.         Left eye: No discharge.      Extraocular Movements: Extraocular movements intact.      Pupils: Pupils are equal, round, and reactive to light.   Cardiovascular:      Rate and Rhythm: Normal rate and regular rhythm.   Pulmonary:      Effort: Pulmonary effort is normal. No respiratory distress.      Breath sounds: No stridor.   Abdominal:      General: There is no distension.       Palpations: Abdomen is soft.      Tenderness: There is no abdominal tenderness. There is no guarding or rebound.   Musculoskeletal:      Cervical back: No rigidity.   Skin:     General: Skin is warm and dry.      Coloration: Skin is not jaundiced.   Neurological:      General: No focal deficit present.      Mental Status: She is alert and oriented to person, place, and time.   Psychiatric:         Mood and Affect: Mood normal.         Behavior: Behavior normal.         Laboratory      Diagnostic Results:  Small bowel follow through during last admission demonstrated normal transit time of contrast to the colon.    ASSESSMENT/PLAN:     Genny was seen today for consult.    Diagnoses and all orders for this visit:    SBO (small bowel obstruction)  -     Case Request Operating Room: LAPAROSCOPY, DIAGNOSTIC    Abdominal adhesions    Abdominal cramping         Plan for diagnostic laparoscopy, likely laparotomy, for lysis of adhesions, possible bowel resection. We discussed possibly staying overnight in the hospital.    After explaining the risks, benefits, and alternatives, patient verbalized understanding and would like to proceed with surgery. All questions were answered to their satisfaction.      Patient expressed understanding and is in agreement.      Jo Manzano, DO  General Surgery  [facility]  7/12/2022

## 2022-07-13 ENCOUNTER — PATIENT MESSAGE (OUTPATIENT)
Dept: SURGERY | Facility: HOSPITAL | Age: 57
End: 2022-07-13
Payer: MEDICARE

## 2022-07-15 ENCOUNTER — PATIENT MESSAGE (OUTPATIENT)
Dept: SURGERY | Facility: HOSPITAL | Age: 57
End: 2022-07-15
Payer: MEDICARE

## 2022-07-19 ENCOUNTER — TELEPHONE (OUTPATIENT)
Dept: PREADMISSION TESTING | Facility: HOSPITAL | Age: 57
End: 2022-07-19
Payer: MEDICARE

## 2022-07-19 NOTE — TELEPHONE ENCOUNTER
Pre op instructions reviewed with pt per phone: Spoke about pre op process and surgery instructions, verbalized understanding.    Surgery is scheduled on 7/25/22. Please arrive at 0530am. We will call you the afternoon prior to surgery to confirm arrival time, as it is subject to change due to cancellations & emergencies.    Please report to the [facility] (1st Floor) at [facility] located off of [facility] (2nd building on the left, in front of the flag pole).  Address: [address] [place] [address] [place]. [address]    Your Covid Test appointment is scheduled on day of surgery.  .      INSTRUCTIONS IMPORTANT!!!  Do Not Eat, Drink, or Smoke after 12 midnight! NO WATER after midnight! OK to brush teeth, no gum, candy or mints!      *Take only these medicines with a small swallow of water-morning of surgery.  Valium  protonix    ____  NO Acrylic/fake nails or nail polish worn day of surgery (specifically hand/arm & foot surgeries).  ____  NO powder, lotions, deodorants, oils or creams on body.  ____  Please Remove All jewelry & piercings prior to surgery.  ____  Please Remove Dentures, Hearing Aids & Contact Lens prior to the start of surgery.  ____  Please bring photo ID and insurance information to hospital (Leave Valuables at Home).  ____  If going home the same day, arrange for a ride home. You will not be able to drive 24 hrs if Anesthesia was used.   ____  Females (ages 11-60) may need to give a urine sample the morning of surgery; please see Pre op Nurse prior to voiding.  ____  Wear clean, loose fitting clothing. Allow for dressings, bandages.  ____  Stop all Aspirin products, Ibuprofen, Advil, Motrin & Aleve at least 5-7 days before surgery, unless otherwise instructed by your doctor, or the nurse.   ____  Blood Thinners are stopped based on your Provider's recommendation; Call Surgeon's Office to inquire when to stop/hold.  ____  Stop taking any Fish Oil supplements or Vitamins at least 5 days prior to  surgery, unless instructed otherwise by your Doctor.            Diabetic Patients: If you take diabetic medication, do NOT take morning of surgery unless instructed by             Doctor. Metformin to be stopped 24 hrs prior to surgery time. DO NOT take long-acting insulin the evening before surgery. Blood sugars will be checked in pre-op morning of.    Bathing Instructions:    -Do not shave your face or body the day before or the day of surgery.  -Do not shave pubic hair 7 days prior to surgery (gyn pt's).   -Shower & Rinse your body as usual with anti-bacterial Soap (Dial, Lever 2000, or Hibiclens)   -Do not use Hibiclens on your head, face, or genitals.   -Do not wash with anti-bacterial soap after you use the Hibiclens.   -Rinse your body thoroughly.      [facility] Visitor/Ride Policy:  Only 2 adults allowed (over the age of 18) to accompany you to the Hospital. You Must have a ride home from a responsible adult that you know and trust. Medical Transport, Uber or Lyft can only be used if patient has a responsible adult to accompany them during ride home.    Post-Op Instructions: You will receive Post-op/Discharge instructions by your Discharge Nurse prior to going home. Please call your Surgeon's office with any post-surgery questions/concerns.    *Call [facility] Department with surgery instruction questions @ [phone] or [phone] (Mon-Fri 8 am to 4 pm)  *If you are running late or have questions the morning of surgery, please call the Surgery Dept @ [phone]  *Insurance/ Financial Questions, please call [phone].

## 2022-07-21 ENCOUNTER — PATIENT MESSAGE (OUTPATIENT)
Dept: SURGERY | Facility: HOSPITAL | Age: 57
End: 2022-07-21
Payer: MEDICARE

## 2022-07-21 ENCOUNTER — NURSE TRIAGE (OUTPATIENT)
Dept: ADMINISTRATIVE | Facility: CLINIC | Age: 57
End: 2022-07-21
Payer: MEDICARE

## 2022-07-22 ENCOUNTER — TELEPHONE (OUTPATIENT)
Dept: PREADMISSION TESTING | Facility: HOSPITAL | Age: 57
End: 2022-07-22
Payer: MEDICARE

## 2022-07-22 NOTE — TELEPHONE ENCOUNTER
Pt called with same question/comment earlier and spoke to another triage nurse regarding going to the ED but not wanting a certain doctor.  Pt refused triage at this time.  All questions answered.  Advised her to call back 24/7 for any symptoms or concerns.    Reason for Disposition   Caller has already spoken to PCP or another triager   Caller has already spoken with another triager or PCP AND has further questions AND triager able to answer questions.    Protocols used: INFORMATION ONLY CALL - NO TRIAGE-A-AH, NO CONTACT OR DUPLICATE CONTACT CALL-A-AH

## 2022-07-22 NOTE — TELEPHONE ENCOUNTER
"Pt states surgery scheduled Monday with MD Manzano.    Pt states she has a bowel obstruction. Offered to triage pt, to which she declines at this time. Pt states she plans to go to the ED, but "One surgeon there gave me terrible advice and if he's there I don't wanna go in". "I could write a book on it", referring to her symptoms.  Pt advised to call the preferred ED and ask who the surgeon is on call. Instructed to call back if she would like triaged. Pt VU.     Reason for Disposition   General information question, no triage required and triager able to answer question    Protocols used: INFORMATION ONLY CALL - NO TRIAGE-A-AH      "

## 2022-07-24 ENCOUNTER — ANESTHESIA EVENT (OUTPATIENT)
Dept: SURGERY | Facility: HOSPITAL | Age: 57
DRG: 329 | End: 2022-07-24
Payer: MEDICARE

## 2022-07-24 NOTE — ANESTHESIA PREPROCEDURE EVALUATION
07/24/2022  Genny Calixto is a 57 y.o., female.    Patient Active Problem List   Diagnosis    Anxiety    Bilateral low back pain with right-sided sciatica    Vertigo    Right nephrolithiasis    Hypokalemia    Short gut syndrome    Ureteral stone    Abdominal pain    Change in bowel habits    KENN (generalized anxiety disorder)    Migraine headache    Epigastric pain    Hydronephrosis    SBO (small bowel obstruction)     Past Surgical History:   Procedure Laterality Date    BLADDER SURGERY      CHOLECYSTECTOMY      COLONOSCOPY N/A 11/10/2021    Procedure: COLONOSCOPY;  Surgeon: Eryn Rothman MD;  Location: [facility];  Service: Endoscopy;  Laterality: N/A;    CYSTOSCOPY WITH URETEROSCOPY, RETROGRADE PYELOGRAPHY, AND INSERTION OF STENT Right 9/30/2021    Procedure: CYSTOSCOPY, WITH RETROGRADE PYELOGRAM AND URETERAL STENT INSERTION;  Surgeon: Annita Gamino MD;  Location: [facility];  Service: Urology;  Laterality: Right;    DIAGNOSTIC LAPAROSCOPY N/A 11/11/2020    Procedure: LAPAROSCOPY, DIAGNOSTIC;  Surgeon: Tee Segura MD;  Location: [facility] OR;  Service: General;  Laterality: N/A;  CONVERTED TO OPEN    ESOPHAGOGASTRODUODENOSCOPY N/A 11/10/2021    Procedure: EGD (ESOPHAGOGASTRODUODENOSCOPY);  Surgeon: Eryn Rothman MD;  Location: [facility];  Service: Endoscopy;  Laterality: N/A;    facet injections  02/14/2017    L3, L4, L5, S1 Done by Dr. Lara    HYSTERECTOMY      INJECTION OF ANESTHETIC AGENT INTO TISSUE PLANE DEFINED BY TRANSVERSUS ABDOMINIS MUSCLE N/A 11/11/2020    Procedure: BLOCK, TRANSVERSUS ABDOMINIS PLANE;  Surgeon: Tee Segura MD;  Location: [facility] OR;  Service: General;  Laterality: N/A;    KNEE SURGERY      LAPAROSCOPIC LYSIS OF ADHESIONS N/A 11/11/2020    Procedure: LYSIS, ADHESIONS, LAPAROSCOPIC;  Surgeon: Tee Segura MD;  Location:  [facility] OR;  Service: General;  Laterality: N/A;  CONVERTED TO OPEN    LAPAROTOMY N/A 11/20/2020    Procedure: LAPAROTOMY;  Surgeon: Tee Segura MD;  Location: [facility] OR;  Service: General;  Laterality: N/A;    LASER LITHOTRIPSY Left 2/8/2021    Procedure: LITHOTRIPSY, USING LASER;  Surgeon: Irving Kidd MD;  Location: [facility] OR;  Service: Urology;  Laterality: Left;    LASER LITHOTRIPSY Right 10/13/2021    Procedure: LITHOTRIPSY, USING LASER;  Surgeon: Annita Gamino MD;  Location: [facility] OR;  Service: Urology;  Laterality: Right;    LYSIS OF ADHESIONS N/A 11/11/2020    Procedure: LYSIS, ADHESIONS;  Surgeon: Tee Segura MD;  Location: [facility] OR;  Service: General;  Laterality: N/A;    LYSIS OF ADHESIONS N/A 11/20/2020    Procedure: LYSIS, ADHESIONS;  Surgeon: Tee Segura MD;  Location: [facility] OR;  Service: General;  Laterality: N/A;    TONSILLECTOMY      URETEROSCOPY Left 2/8/2021    Procedure: URETEROSCOPY;  Surgeon: Irving Kidd MD;  Location: [facility] OR;  Service: Urology;  Laterality: Left;  stent placement    URETEROSCOPY Right 10/13/2021    Procedure: URETEROSCOPY;  Surgeon: Annita Gamino MD;  Location: [facility];  Service: Urology;  Laterality: Right;     '  Pre-op Assessment    I have reviewed the Patient Summary Reports.    I have reviewed the NPO Status.   I have reviewed the Medications.     Review of Systems  Anesthesia Hx:  No problems with previous Anesthesia    Social:  Non-Smoker    Hematology/Oncology:  Hematology Normal        Cardiovascular:  Cardiovascular Normal  ECG has been reviewed.    Pulmonary:  Pulmonary Normal    Renal/:   renal calculi    Hepatic/GI:  Hepatic/GI Normal SBO (small bowel obstruction)   Neurological:   Headaches Reflex sympathetic dystrophy    Vertigo   Endocrine:  Endocrine Normal    Psych:   anxiety          Physical Exam  General: Well nourished and Alert    Airway:  Mallampati: II / II  Mouth Opening: Normal  TM Distance:  Normal  Tongue: Normal  Neck ROM: Normal ROM    Dental:  Edentulous        Anesthesia Plan  Type of Anesthesia, risks & benefits discussed:    Anesthesia Type: Gen ETT, Spinal  Intra-op Monitoring Plan: Standard ASA Monitors  Post Op Pain Control Plan: IV/PO Opioids PRN  Induction:  IV  Airway Plan: Direct  Informed Consent: Informed consent signed with the Patient and all parties understand the risks and agree with anesthesia plan.  All questions answered.   ASA Score: 2    Ready For Surgery From Anesthesia Perspective.     .      Chemistry        Component Value Date/Time     07/04/2022 0620    K 3.7 07/04/2022 0620     07/04/2022 0620    CO2 30 (H) 07/04/2022 0620    BUN 7 07/04/2022 0620    CREATININE 0.7 07/04/2022 0620    GLU 94 07/04/2022 0620        Component Value Date/Time    CALCIUM 9.2 07/04/2022 0620    ALKPHOS 57 06/30/2022 2251    AST 16 06/30/2022 2251    ALT 15 06/30/2022 2251    BILITOT 1.2 (H) 06/30/2022 2251    ESTGFRAFRICA >60 07/04/2022 0620    EGFRNONAA >60 07/04/2022 0620        Lab Results   Component Value Date    WBC 4.94 07/04/2022    HGB 13.5 07/04/2022    HCT 38.6 07/04/2022    MCV 96 07/04/2022     07/04/2022

## 2022-07-25 ENCOUNTER — ANESTHESIA (OUTPATIENT)
Dept: SURGERY | Facility: HOSPITAL | Age: 57
DRG: 329 | End: 2022-07-25
Payer: MEDICARE

## 2022-07-25 ENCOUNTER — HOSPITAL ENCOUNTER (INPATIENT)
Facility: HOSPITAL | Age: 57
LOS: 7 days | Discharge: HOME | DRG: 329 | End: 2022-08-03
Attending: SURGERY | Admitting: SURGERY
Payer: MEDICARE

## 2022-07-25 DIAGNOSIS — K56.609 SBO (SMALL BOWEL OBSTRUCTION): ICD-10-CM

## 2022-07-25 LAB
CREAT SERPL-MCNC: 0.7 MG/DL (ref 0.5–1.4)
CTP QC/QA: YES
EST. GFR  (AFRICAN AMERICAN): >60 ML/MIN/1.73 M^2
EST. GFR  (NON AFRICAN AMERICAN): >60 ML/MIN/1.73 M^2
SARS-COV-2 AG RESP QL IA.RAPID: NEGATIVE

## 2022-07-25 PROCEDURE — 44160 REMOVAL OF COLON: CPT | Mod: 22,,, | Performed by: SURGERY

## 2022-07-25 PROCEDURE — 36000708 HC OR TIME LEV III 1ST 15 MIN: Performed by: SURGERY

## 2022-07-25 PROCEDURE — 63600175 PHARM REV CODE 636 W HCPCS: Performed by: NURSE ANESTHETIST, CERTIFIED REGISTERED

## 2022-07-25 PROCEDURE — 36000709 HC OR TIME LEV III EA ADD 15 MIN: Performed by: SURGERY

## 2022-07-25 PROCEDURE — 94799 UNLISTED PULMONARY SVC/PX: CPT

## 2022-07-25 PROCEDURE — 88307 TISSUE EXAM BY PATHOLOGIST: CPT | Mod: 26,,, | Performed by: PATHOLOGY

## 2022-07-25 PROCEDURE — 27201423 OPTIME MED/SURG SUP & DEVICES STERILE SUPPLY: Performed by: SURGERY

## 2022-07-25 PROCEDURE — 37000008 HC ANESTHESIA 1ST 15 MINUTES: Performed by: SURGERY

## 2022-07-25 PROCEDURE — 82565 ASSAY OF CREATININE: CPT | Performed by: SURGERY

## 2022-07-25 PROCEDURE — 88307 PR  SURG PATH,LEVEL V: ICD-10-PCS | Mod: 26,,, | Performed by: PATHOLOGY

## 2022-07-25 PROCEDURE — 94761 N-INVAS EAR/PLS OXIMETRY MLT: CPT

## 2022-07-25 PROCEDURE — A4216 STERILE WATER/SALINE, 10 ML: HCPCS | Performed by: SURGERY

## 2022-07-25 PROCEDURE — 71000039 HC RECOVERY, EACH ADD'L HOUR: Performed by: SURGERY

## 2022-07-25 PROCEDURE — 25000003 PHARM REV CODE 250: Performed by: SURGERY

## 2022-07-25 PROCEDURE — 27000221 HC OXYGEN, UP TO 24 HOURS

## 2022-07-25 PROCEDURE — 71000033 HC RECOVERY, INTIAL HOUR: Performed by: SURGERY

## 2022-07-25 PROCEDURE — 88307 TISSUE EXAM BY PATHOLOGIST: CPT | Performed by: PATHOLOGY

## 2022-07-25 PROCEDURE — 37000009 HC ANESTHESIA EA ADD 15 MINS: Performed by: SURGERY

## 2022-07-25 PROCEDURE — 44160 PR REMVL COLON & TERM ILEUM W/ILEOCOLOSTOMY: ICD-10-PCS | Mod: 22,,, | Performed by: SURGERY

## 2022-07-25 PROCEDURE — 36415 COLL VENOUS BLD VENIPUNCTURE: CPT | Performed by: SURGERY

## 2022-07-25 PROCEDURE — C9290 INJ, BUPIVACAINE LIPOSOME: HCPCS | Performed by: SURGERY

## 2022-07-25 PROCEDURE — 63600175 PHARM REV CODE 636 W HCPCS: Performed by: SURGERY

## 2022-07-25 PROCEDURE — 63600175 PHARM REV CODE 636 W HCPCS: Performed by: ANESTHESIOLOGY

## 2022-07-25 PROCEDURE — 25000003 PHARM REV CODE 250: Performed by: NURSE ANESTHETIST, CERTIFIED REGISTERED

## 2022-07-25 RX ORDER — KETOROLAC TROMETHAMINE 30 MG/ML
15 INJECTION, SOLUTION INTRAMUSCULAR; INTRAVENOUS EVERY 8 HOURS
Status: COMPLETED | OUTPATIENT
Start: 2022-07-25 | End: 2022-07-28

## 2022-07-25 RX ORDER — ACETAMINOPHEN 325 MG/1
650 TABLET ORAL
Status: DISCONTINUED | OUTPATIENT
Start: 2022-07-25 | End: 2022-08-03 | Stop reason: HOSPADM

## 2022-07-25 RX ORDER — BUPIVACAINE HYDROCHLORIDE 2.5 MG/ML
INJECTION, SOLUTION EPIDURAL; INFILTRATION; INTRACAUDAL
Status: DISCONTINUED | OUTPATIENT
Start: 2022-07-25 | End: 2022-07-25 | Stop reason: HOSPADM

## 2022-07-25 RX ORDER — HYDROMORPHONE HYDROCHLORIDE 2 MG/ML
0.2 INJECTION, SOLUTION INTRAMUSCULAR; INTRAVENOUS; SUBCUTANEOUS EVERY 5 MIN PRN
Status: DISCONTINUED | OUTPATIENT
Start: 2022-07-25 | End: 2022-07-25 | Stop reason: HOSPADM

## 2022-07-25 RX ORDER — SODIUM CHLORIDE, SODIUM LACTATE, POTASSIUM CHLORIDE, CALCIUM CHLORIDE 600; 310; 30; 20 MG/100ML; MG/100ML; MG/100ML; MG/100ML
INJECTION, SOLUTION INTRAVENOUS CONTINUOUS
Status: DISCONTINUED | OUTPATIENT
Start: 2022-07-25 | End: 2022-07-27

## 2022-07-25 RX ORDER — DEXAMETHASONE SODIUM PHOSPHATE 4 MG/ML
INJECTION, SOLUTION INTRA-ARTICULAR; INTRALESIONAL; INTRAMUSCULAR; INTRAVENOUS; SOFT TISSUE
Status: DISCONTINUED | OUTPATIENT
Start: 2022-07-25 | End: 2022-07-25

## 2022-07-25 RX ORDER — PROPOFOL 10 MG/ML
VIAL (ML) INTRAVENOUS
Status: DISCONTINUED | OUTPATIENT
Start: 2022-07-25 | End: 2022-07-25

## 2022-07-25 RX ORDER — DIAZEPAM 5 MG/1
10 TABLET ORAL DAILY PRN
Status: DISCONTINUED | OUTPATIENT
Start: 2022-07-25 | End: 2022-08-03 | Stop reason: HOSPADM

## 2022-07-25 RX ORDER — ACETAMINOPHEN 325 MG/1
650 TABLET ORAL EVERY 6 HOURS
Status: DISCONTINUED | OUTPATIENT
Start: 2022-07-25 | End: 2022-07-25 | Stop reason: CLARIF

## 2022-07-25 RX ORDER — CEFAZOLIN SODIUM 2 G/50ML
2 SOLUTION INTRAVENOUS ONCE
Status: DISCONTINUED | OUTPATIENT
Start: 2022-07-25 | End: 2022-07-29

## 2022-07-25 RX ORDER — FENTANYL CITRATE 50 UG/ML
INJECTION, SOLUTION INTRAMUSCULAR; INTRAVENOUS
Status: DISCONTINUED | OUTPATIENT
Start: 2022-07-25 | End: 2022-07-25

## 2022-07-25 RX ORDER — ROCURONIUM BROMIDE 10 MG/ML
INJECTION, SOLUTION INTRAVENOUS
Status: DISCONTINUED | OUTPATIENT
Start: 2022-07-25 | End: 2022-07-25

## 2022-07-25 RX ORDER — OXYCODONE HYDROCHLORIDE 5 MG/1
5 TABLET ORAL EVERY 6 HOURS PRN
Status: DISCONTINUED | OUTPATIENT
Start: 2022-07-25 | End: 2022-08-03 | Stop reason: HOSPADM

## 2022-07-25 RX ORDER — MIDAZOLAM HYDROCHLORIDE 1 MG/ML
INJECTION INTRAMUSCULAR; INTRAVENOUS
Status: DISCONTINUED | OUTPATIENT
Start: 2022-07-25 | End: 2022-07-25

## 2022-07-25 RX ORDER — HYDROMORPHONE HYDROCHLORIDE 2 MG/ML
1 INJECTION, SOLUTION INTRAMUSCULAR; INTRAVENOUS; SUBCUTANEOUS EVERY 4 HOURS PRN
Status: DISCONTINUED | OUTPATIENT
Start: 2022-07-25 | End: 2022-07-26

## 2022-07-25 RX ORDER — ONDANSETRON 2 MG/ML
INJECTION INTRAMUSCULAR; INTRAVENOUS
Status: DISCONTINUED | OUTPATIENT
Start: 2022-07-25 | End: 2022-07-25

## 2022-07-25 RX ORDER — TALC
9 POWDER (GRAM) TOPICAL NIGHTLY PRN
Status: DISCONTINUED | OUTPATIENT
Start: 2022-07-25 | End: 2022-08-03 | Stop reason: HOSPADM

## 2022-07-25 RX ORDER — PANTOPRAZOLE SODIUM 40 MG/1
40 TABLET, DELAYED RELEASE ORAL DAILY
Status: DISCONTINUED | OUTPATIENT
Start: 2022-07-25 | End: 2022-07-29

## 2022-07-25 RX ORDER — ASCORBIC ACID 500 MG
500 TABLET ORAL 2 TIMES DAILY
Status: DISCONTINUED | OUTPATIENT
Start: 2022-07-25 | End: 2022-08-03 | Stop reason: HOSPADM

## 2022-07-25 RX ORDER — DOXEPIN HYDROCHLORIDE 10 MG/1
10 CAPSULE ORAL NIGHTLY
Status: DISCONTINUED | OUTPATIENT
Start: 2022-07-25 | End: 2022-08-03 | Stop reason: HOSPADM

## 2022-07-25 RX ORDER — SUCCINYLCHOLINE CHLORIDE 20 MG/ML
INJECTION INTRAMUSCULAR; INTRAVENOUS
Status: DISCONTINUED | OUTPATIENT
Start: 2022-07-25 | End: 2022-07-25

## 2022-07-25 RX ORDER — ONDANSETRON 2 MG/ML
4 INJECTION INTRAMUSCULAR; INTRAVENOUS EVERY 6 HOURS PRN
Status: DISCONTINUED | OUTPATIENT
Start: 2022-07-25 | End: 2022-07-26

## 2022-07-25 RX ORDER — ACETAMINOPHEN 10 MG/ML
INJECTION, SOLUTION INTRAVENOUS
Status: DISCONTINUED | OUTPATIENT
Start: 2022-07-25 | End: 2022-07-25

## 2022-07-25 RX ORDER — SODIUM CHLORIDE 0.9 % (FLUSH) 0.9 %
10 SYRINGE (ML) INJECTION
Status: DISCONTINUED | OUTPATIENT
Start: 2022-07-25 | End: 2022-08-03 | Stop reason: HOSPADM

## 2022-07-25 RX ORDER — HYDROMORPHONE HYDROCHLORIDE 2 MG/ML
INJECTION, SOLUTION INTRAMUSCULAR; INTRAVENOUS; SUBCUTANEOUS
Status: DISCONTINUED | OUTPATIENT
Start: 2022-07-25 | End: 2022-07-25

## 2022-07-25 RX ORDER — DOCUSATE SODIUM 100 MG/1
100 CAPSULE, LIQUID FILLED ORAL 2 TIMES DAILY
Status: DISCONTINUED | OUTPATIENT
Start: 2022-07-25 | End: 2022-08-03 | Stop reason: HOSPADM

## 2022-07-25 RX ORDER — SODIUM CHLORIDE, SODIUM LACTATE, POTASSIUM CHLORIDE, CALCIUM CHLORIDE 600; 310; 30; 20 MG/100ML; MG/100ML; MG/100ML; MG/100ML
INJECTION, SOLUTION INTRAVENOUS CONTINUOUS PRN
Status: DISCONTINUED | OUTPATIENT
Start: 2022-07-25 | End: 2022-07-25

## 2022-07-25 RX ORDER — KETAMINE HYDROCHLORIDE 50 MG/ML
INJECTION, SOLUTION INTRAMUSCULAR; INTRAVENOUS
Status: DISCONTINUED | OUTPATIENT
Start: 2022-07-25 | End: 2022-07-25

## 2022-07-25 RX ORDER — SODIUM CHLORIDE 9 MG/ML
INJECTION, SOLUTION INTRAMUSCULAR; INTRAVENOUS; SUBCUTANEOUS
Status: DISCONTINUED | OUTPATIENT
Start: 2022-07-25 | End: 2022-07-25 | Stop reason: HOSPADM

## 2022-07-25 RX ORDER — METHOCARBAMOL 100 MG/ML
1000 INJECTION, SOLUTION INTRAMUSCULAR; INTRAVENOUS EVERY 8 HOURS
Status: COMPLETED | OUTPATIENT
Start: 2022-07-25 | End: 2022-07-28

## 2022-07-25 RX ORDER — LIDOCAINE HYDROCHLORIDE 10 MG/ML
INJECTION, SOLUTION EPIDURAL; INFILTRATION; INTRACAUDAL; PERINEURAL
Status: DISCONTINUED | OUTPATIENT
Start: 2022-07-25 | End: 2022-07-25

## 2022-07-25 RX ORDER — OXYCODONE AND ACETAMINOPHEN 5; 325 MG/1; MG/1
1 TABLET ORAL
Status: DISCONTINUED | OUTPATIENT
Start: 2022-07-25 | End: 2022-07-25 | Stop reason: HOSPADM

## 2022-07-25 RX ORDER — SPIRONOLACTONE 25 MG/1
50 TABLET ORAL 2 TIMES DAILY
Status: DISCONTINUED | OUTPATIENT
Start: 2022-07-25 | End: 2022-08-03 | Stop reason: HOSPADM

## 2022-07-25 RX ADMIN — HYDROMORPHONE HYDROCHLORIDE 0.2 MG: 2 INJECTION INTRAMUSCULAR; INTRAVENOUS; SUBCUTANEOUS at 11:07

## 2022-07-25 RX ADMIN — SUCCINYLCHOLINE CHLORIDE 140 MG: 20 INJECTION, SOLUTION INTRAMUSCULAR; INTRAVENOUS at 07:07

## 2022-07-25 RX ADMIN — ACETAMINOPHEN 650 MG: 325 TABLET ORAL at 04:07

## 2022-07-25 RX ADMIN — SODIUM CHLORIDE, SODIUM LACTATE, POTASSIUM CHLORIDE, AND CALCIUM CHLORIDE: 600; 310; 30; 20 INJECTION, SOLUTION INTRAVENOUS at 06:07

## 2022-07-25 RX ADMIN — KETAMINE HYDROCHLORIDE 10 MG: 50 INJECTION, SOLUTION, CONCENTRATE INTRAMUSCULAR; INTRAVENOUS at 08:07

## 2022-07-25 RX ADMIN — PANTOPRAZOLE SODIUM 40 MG: 40 TABLET, DELAYED RELEASE ORAL at 01:07

## 2022-07-25 RX ADMIN — HYDROMORPHONE HYDROCHLORIDE 1 MG: 2 INJECTION, SOLUTION INTRAMUSCULAR; INTRAVENOUS; SUBCUTANEOUS at 04:07

## 2022-07-25 RX ADMIN — HYDROMORPHONE HYDROCHLORIDE 1 MG: 2 INJECTION, SOLUTION INTRAMUSCULAR; INTRAVENOUS; SUBCUTANEOUS at 09:07

## 2022-07-25 RX ADMIN — SODIUM CHLORIDE, SODIUM LACTATE, POTASSIUM CHLORIDE, AND CALCIUM CHLORIDE: 600; 310; 30; 20 INJECTION, SOLUTION INTRAVENOUS at 08:07

## 2022-07-25 RX ADMIN — DIAZEPAM 10 MG: 5 TABLET ORAL at 02:07

## 2022-07-25 RX ADMIN — PROMETHAZINE HYDROCHLORIDE 25 MG: 25 INJECTION INTRAMUSCULAR; INTRAVENOUS at 01:07

## 2022-07-25 RX ADMIN — ROCURONIUM BROMIDE 25 MG: 10 INJECTION, SOLUTION INTRAVENOUS at 07:07

## 2022-07-25 RX ADMIN — ONDANSETRON 4 MG: 2 INJECTION INTRAMUSCULAR; INTRAVENOUS at 07:07

## 2022-07-25 RX ADMIN — DOXEPIN HYDROCHLORIDE 10 MG: 10 CAPSULE ORAL at 09:07

## 2022-07-25 RX ADMIN — FENTANYL CITRATE 50 MCG: 50 INJECTION, SOLUTION INTRAMUSCULAR; INTRAVENOUS at 07:07

## 2022-07-25 RX ADMIN — ROCURONIUM BROMIDE 5 MG: 10 INJECTION, SOLUTION INTRAVENOUS at 07:07

## 2022-07-25 RX ADMIN — ACETAMINOPHEN 650 MG: 325 TABLET ORAL at 10:07

## 2022-07-25 RX ADMIN — Medication 1 TABLET: at 01:07

## 2022-07-25 RX ADMIN — PIPERACILLIN SODIUM AND TAZOBACTAM SODIUM 4.5 G: 4; .5 INJECTION, POWDER, LYOPHILIZED, FOR SOLUTION INTRAVENOUS at 02:07

## 2022-07-25 RX ADMIN — MIDAZOLAM HYDROCHLORIDE 2 MG: 1 INJECTION, SOLUTION INTRAMUSCULAR; INTRAVENOUS at 06:07

## 2022-07-25 RX ADMIN — DOCUSATE SODIUM 100 MG: 100 CAPSULE, LIQUID FILLED ORAL at 09:07

## 2022-07-25 RX ADMIN — KETAMINE HYDROCHLORIDE 30 MG: 50 INJECTION, SOLUTION, CONCENTRATE INTRAMUSCULAR; INTRAVENOUS at 07:07

## 2022-07-25 RX ADMIN — METHOCARBAMOL 1000 MG: 100 INJECTION INTRAMUSCULAR; INTRAVENOUS at 02:07

## 2022-07-25 RX ADMIN — MELATONIN TAB 3 MG 9 MG: 3 TAB at 10:07

## 2022-07-25 RX ADMIN — OXYCODONE HYDROCHLORIDE AND ACETAMINOPHEN 500 MG: 500 TABLET ORAL at 01:07

## 2022-07-25 RX ADMIN — ACETAMINOPHEN 1000 MG: 10 INJECTION, SOLUTION INTRAVENOUS at 09:07

## 2022-07-25 RX ADMIN — OXYCODONE HYDROCHLORIDE 5 MG: 5 TABLET ORAL at 07:07

## 2022-07-25 RX ADMIN — PIPERACILLIN SODIUM AND TAZOBACTAM SODIUM 4.5 G: 4; .5 INJECTION, POWDER, LYOPHILIZED, FOR SOLUTION INTRAVENOUS at 10:07

## 2022-07-25 RX ADMIN — SUGAMMADEX 200 MG: 100 INJECTION, SOLUTION INTRAVENOUS at 10:07

## 2022-07-25 RX ADMIN — HYDROMORPHONE HYDROCHLORIDE 0.5 MG: 2 INJECTION INTRAMUSCULAR; INTRAVENOUS; SUBCUTANEOUS at 09:07

## 2022-07-25 RX ADMIN — DOCUSATE SODIUM 100 MG: 100 CAPSULE, LIQUID FILLED ORAL at 01:07

## 2022-07-25 RX ADMIN — METHOCARBAMOL 1000 MG: 100 INJECTION INTRAMUSCULAR; INTRAVENOUS at 10:07

## 2022-07-25 RX ADMIN — FENTANYL CITRATE 50 MCG: 50 INJECTION, SOLUTION INTRAMUSCULAR; INTRAVENOUS at 08:07

## 2022-07-25 RX ADMIN — DEXAMETHASONE SODIUM PHOSPHATE 8 MG: 4 INJECTION, SOLUTION INTRAMUSCULAR; INTRAVENOUS at 07:07

## 2022-07-25 RX ADMIN — LIDOCAINE HYDROCHLORIDE 100 MG: 10 INJECTION, SOLUTION EPIDURAL; INFILTRATION; INTRACAUDAL; PERINEURAL at 07:07

## 2022-07-25 RX ADMIN — SODIUM CHLORIDE, SODIUM LACTATE, POTASSIUM CHLORIDE, AND CALCIUM CHLORIDE: .6; .31; .03; .02 INJECTION, SOLUTION INTRAVENOUS at 12:07

## 2022-07-25 RX ADMIN — ONDANSETRON 4 MG: 2 INJECTION, SOLUTION INTRAMUSCULAR; INTRAVENOUS at 09:07

## 2022-07-25 RX ADMIN — HYDROMORPHONE HYDROCHLORIDE 0.2 MG: 2 INJECTION INTRAMUSCULAR; INTRAVENOUS; SUBCUTANEOUS at 12:07

## 2022-07-25 RX ADMIN — ROCURONIUM BROMIDE 10 MG: 10 INJECTION, SOLUTION INTRAVENOUS at 08:07

## 2022-07-25 RX ADMIN — PROMETHAZINE HYDROCHLORIDE 25 MG: 25 INJECTION INTRAMUSCULAR; INTRAVENOUS at 09:07

## 2022-07-25 RX ADMIN — OXYCODONE HYDROCHLORIDE AND ACETAMINOPHEN 500 MG: 500 TABLET ORAL at 09:07

## 2022-07-25 RX ADMIN — Medication 1 TABLET: at 09:07

## 2022-07-25 RX ADMIN — PROPOFOL 140 MG: 10 INJECTION, EMULSION INTRAVENOUS at 07:07

## 2022-07-25 RX ADMIN — ROCURONIUM BROMIDE 20 MG: 10 INJECTION, SOLUTION INTRAVENOUS at 07:07

## 2022-07-25 RX ADMIN — KETOROLAC TROMETHAMINE 15 MG: 30 INJECTION, SOLUTION INTRAMUSCULAR at 04:07

## 2022-07-25 RX ADMIN — ROCURONIUM BROMIDE 10 MG: 10 INJECTION, SOLUTION INTRAVENOUS at 09:07

## 2022-07-25 RX ADMIN — HYDROMORPHONE HYDROCHLORIDE 1 MG: 2 INJECTION INTRAMUSCULAR; INTRAVENOUS; SUBCUTANEOUS at 09:07

## 2022-07-25 RX ADMIN — SPIRONOLACTONE 50 MG: 25 TABLET, FILM COATED ORAL at 09:07

## 2022-07-25 RX ADMIN — OXYCODONE HYDROCHLORIDE 5 MG: 5 TABLET ORAL at 01:07

## 2022-07-25 RX ADMIN — KETOROLAC TROMETHAMINE 15 MG: 30 INJECTION, SOLUTION INTRAMUSCULAR at 10:07

## 2022-07-25 RX ADMIN — SPIRONOLACTONE 50 MG: 25 TABLET, FILM COATED ORAL at 01:07

## 2022-07-25 NOTE — ANESTHESIA POSTPROCEDURE EVALUATION
Anesthesia Post Evaluation    Patient: Genny Calixto    Procedure(s) Performed: Procedure(s) (LRB):  LAPAROSCOPY, DIAGNOSTIC (N/A)  LYSIS, ADHESIONS (N/A)  LAPAROTOMY, EXPLORATORY (N/A)  BLOCK, TRANSVERSUS ABDOMINIS PLANE (N/A)  EXCISION, CECUM WITH ILEUM    Final Anesthesia Type: general      Patient location during evaluation: PACU  Patient participation: Yes- Able to Participate  Level of consciousness: awake and alert  Post-procedure vital signs: reviewed and stable  Pain management: adequate  Airway patency: patent    PONV status at discharge: No PONV  Anesthetic complications: no      Cardiovascular status: blood pressure returned to baseline  Respiratory status: unassisted  Hydration status: euvolemic  Follow-up not needed.          Vitals Value Taken Time   /69 07/25/22 1242   Temp 37.6 °C (99.6 °F) 07/25/22 1242   Pulse 104 07/25/22 1242   Resp 18 07/25/22 1313   SpO2 96 % 07/25/22 1313         Event Time   Out of Recovery 12:26:07         Pain/Pat Score: Pain Rating Prior to Med Admin: 6 (7/25/2022  1:13 PM)  Pat Score: 8 (7/25/2022 12:15 PM)

## 2022-07-25 NOTE — TRANSFER OF CARE
"Anesthesia Transfer of Care Note    Patient: Genny Calixto    Procedure(s) Performed: Procedure(s) (LRB):  LAPAROSCOPY, DIAGNOSTIC (N/A)  LYSIS, ADHESIONS (N/A)  LAPAROTOMY, EXPLORATORY (N/A)  BLOCK, TRANSVERSUS ABDOMINIS PLANE (N/A)  EXCISION, CECUM WITH ILEUM    Patient location: PACU    Anesthesia Type: general    Transport from OR: Transported from OR on room air with adequate spontaneous ventilation    Post pain: adequate analgesia    Post assessment: no apparent anesthetic complications    Post vital signs: stable    Level of consciousness: sedated and responds to stimulation    Nausea/Vomiting: no nausea/vomiting    Complications: none    Transfer of care protocol was followed      Last vitals:   Visit Vitals  BP (!) 147/81   Pulse 89   Temp 36.7 °C (98.1 °F) (Temporal)   Resp 18   Ht 5' 7" (1.702 m)   Wt 71.5 kg (157 lb 8.3 oz)   SpO2 100%   Breastfeeding No   BMI 24.67 kg/m²     "

## 2022-07-26 LAB
ANION GAP SERPL CALC-SCNC: 10 MMOL/L (ref 8–16)
BASOPHILS # BLD AUTO: 0.05 K/UL (ref 0–0.2)
BASOPHILS NFR BLD: 0.6 % (ref 0–1.9)
BUN SERPL-MCNC: 24 MG/DL (ref 6–20)
CALCIUM SERPL-MCNC: 8.3 MG/DL (ref 8.7–10.5)
CHLORIDE SERPL-SCNC: 104 MMOL/L (ref 95–110)
CO2 SERPL-SCNC: 20 MMOL/L (ref 23–29)
CREAT SERPL-MCNC: 0.7 MG/DL (ref 0.5–1.4)
DIFFERENTIAL METHOD: ABNORMAL
EOSINOPHIL # BLD AUTO: 0.3 K/UL (ref 0–0.5)
EOSINOPHIL NFR BLD: 3.1 % (ref 0–8)
ERYTHROCYTE [DISTWIDTH] IN BLOOD BY AUTOMATED COUNT: 13.2 % (ref 11.5–14.5)
EST. GFR  (AFRICAN AMERICAN): >60 ML/MIN/1.73 M^2
EST. GFR  (NON AFRICAN AMERICAN): >60 ML/MIN/1.73 M^2
GLUCOSE SERPL-MCNC: 98 MG/DL (ref 70–110)
HCT VFR BLD AUTO: 37.6 % (ref 37–48.5)
HGB BLD-MCNC: 12.9 G/DL (ref 12–16)
IMM GRANULOCYTES # BLD AUTO: 0.02 K/UL (ref 0–0.04)
IMM GRANULOCYTES NFR BLD AUTO: 0.2 % (ref 0–0.5)
LYMPHOCYTES # BLD AUTO: 1.6 K/UL (ref 1–4.8)
LYMPHOCYTES NFR BLD: 19.3 % (ref 18–48)
MAGNESIUM SERPL-MCNC: 1.4 MG/DL (ref 1.6–2.6)
MCH RBC QN AUTO: 33.6 PG (ref 27–31)
MCHC RBC AUTO-ENTMCNC: 34.3 G/DL (ref 32–36)
MCV RBC AUTO: 98 FL (ref 82–98)
MONOCYTES # BLD AUTO: 0.6 K/UL (ref 0.3–1)
MONOCYTES NFR BLD: 6.9 % (ref 4–15)
NEUTROPHILS # BLD AUTO: 5.7 K/UL (ref 1.8–7.7)
NEUTROPHILS NFR BLD: 69.9 % (ref 38–73)
NRBC BLD-RTO: 0 /100 WBC
PHOSPHATE SERPL-MCNC: 3.6 MG/DL (ref 2.7–4.5)
PLATELET # BLD AUTO: 212 K/UL (ref 150–450)
PMV BLD AUTO: 11.5 FL (ref 9.2–12.9)
POTASSIUM SERPL-SCNC: 3.5 MMOL/L (ref 3.5–5.1)
RBC # BLD AUTO: 3.84 M/UL (ref 4–5.4)
SODIUM SERPL-SCNC: 134 MMOL/L (ref 136–145)
WBC # BLD AUTO: 8.13 K/UL (ref 3.9–12.7)

## 2022-07-26 PROCEDURE — 25000003 PHARM REV CODE 250: Performed by: SURGERY

## 2022-07-26 PROCEDURE — 80048 BASIC METABOLIC PNL TOTAL CA: CPT | Performed by: SURGERY

## 2022-07-26 PROCEDURE — 36415 COLL VENOUS BLD VENIPUNCTURE: CPT | Performed by: SURGERY

## 2022-07-26 PROCEDURE — 99900035 HC TECH TIME PER 15 MIN (STAT)

## 2022-07-26 PROCEDURE — 63600175 PHARM REV CODE 636 W HCPCS: Performed by: SURGERY

## 2022-07-26 PROCEDURE — 94799 UNLISTED PULMONARY SVC/PX: CPT

## 2022-07-26 PROCEDURE — 84100 ASSAY OF PHOSPHORUS: CPT | Performed by: SURGERY

## 2022-07-26 PROCEDURE — 94761 N-INVAS EAR/PLS OXIMETRY MLT: CPT

## 2022-07-26 PROCEDURE — 27000221 HC OXYGEN, UP TO 24 HOURS

## 2022-07-26 PROCEDURE — 85025 COMPLETE CBC W/AUTO DIFF WBC: CPT | Performed by: SURGERY

## 2022-07-26 PROCEDURE — 83735 ASSAY OF MAGNESIUM: CPT | Performed by: SURGERY

## 2022-07-26 RX ORDER — ENOXAPARIN SODIUM 100 MG/ML
40 INJECTION SUBCUTANEOUS EVERY 24 HOURS
Status: DISCONTINUED | OUTPATIENT
Start: 2022-07-26 | End: 2022-08-03 | Stop reason: HOSPADM

## 2022-07-26 RX ORDER — HYDROMORPHONE HYDROCHLORIDE 2 MG/ML
1 INJECTION, SOLUTION INTRAMUSCULAR; INTRAVENOUS; SUBCUTANEOUS
Status: DISCONTINUED | OUTPATIENT
Start: 2022-07-26 | End: 2022-08-03 | Stop reason: HOSPADM

## 2022-07-26 RX ORDER — PROCHLORPERAZINE EDISYLATE 5 MG/ML
10 INJECTION INTRAMUSCULAR; INTRAVENOUS EVERY 6 HOURS PRN
Status: DISCONTINUED | OUTPATIENT
Start: 2022-07-26 | End: 2022-07-27

## 2022-07-26 RX ADMIN — SPIRONOLACTONE 50 MG: 25 TABLET, FILM COATED ORAL at 09:07

## 2022-07-26 RX ADMIN — OXYCODONE HYDROCHLORIDE 5 MG: 5 TABLET ORAL at 09:07

## 2022-07-26 RX ADMIN — PROMETHAZINE HYDROCHLORIDE 25 MG: 25 INJECTION INTRAMUSCULAR; INTRAVENOUS at 07:07

## 2022-07-26 RX ADMIN — ONDANSETRON 4 MG: 2 INJECTION INTRAMUSCULAR; INTRAVENOUS at 02:07

## 2022-07-26 RX ADMIN — OXYCODONE HYDROCHLORIDE 5 MG: 5 TABLET ORAL at 02:07

## 2022-07-26 RX ADMIN — ACETAMINOPHEN 650 MG: 325 TABLET ORAL at 05:07

## 2022-07-26 RX ADMIN — KETOROLAC TROMETHAMINE 15 MG: 30 INJECTION, SOLUTION INTRAMUSCULAR at 09:07

## 2022-07-26 RX ADMIN — MELATONIN TAB 3 MG 9 MG: 3 TAB at 09:07

## 2022-07-26 RX ADMIN — METHOCARBAMOL 1000 MG: 100 INJECTION INTRAMUSCULAR; INTRAVENOUS at 02:07

## 2022-07-26 RX ADMIN — DOXEPIN HYDROCHLORIDE 10 MG: 10 CAPSULE ORAL at 09:07

## 2022-07-26 RX ADMIN — ACETAMINOPHEN 650 MG: 325 TABLET ORAL at 09:07

## 2022-07-26 RX ADMIN — HYDROMORPHONE HYDROCHLORIDE 1 MG: 2 INJECTION, SOLUTION INTRAMUSCULAR; INTRAVENOUS; SUBCUTANEOUS at 05:07

## 2022-07-26 RX ADMIN — OXYCODONE HYDROCHLORIDE AND ACETAMINOPHEN 500 MG: 500 TABLET ORAL at 09:07

## 2022-07-26 RX ADMIN — PIPERACILLIN SODIUM AND TAZOBACTAM SODIUM 4.5 G: 4; .5 INJECTION, POWDER, LYOPHILIZED, FOR SOLUTION INTRAVENOUS at 06:07

## 2022-07-26 RX ADMIN — ENOXAPARIN SODIUM 40 MG: 100 INJECTION SUBCUTANEOUS at 05:07

## 2022-07-26 RX ADMIN — PROMETHAZINE HYDROCHLORIDE 25 MG: 25 INJECTION INTRAMUSCULAR; INTRAVENOUS at 06:07

## 2022-07-26 RX ADMIN — HYDROMORPHONE HYDROCHLORIDE 1 MG: 2 INJECTION INTRAMUSCULAR; INTRAVENOUS; SUBCUTANEOUS at 07:07

## 2022-07-26 RX ADMIN — KETOROLAC TROMETHAMINE 15 MG: 30 INJECTION, SOLUTION INTRAMUSCULAR at 02:07

## 2022-07-26 RX ADMIN — OXYCODONE HYDROCHLORIDE 5 MG: 5 TABLET ORAL at 05:07

## 2022-07-26 RX ADMIN — HYDROMORPHONE HYDROCHLORIDE 1 MG: 2 INJECTION INTRAMUSCULAR; INTRAVENOUS; SUBCUTANEOUS at 12:07

## 2022-07-26 RX ADMIN — ONDANSETRON 4 MG: 2 INJECTION INTRAMUSCULAR; INTRAVENOUS at 01:07

## 2022-07-26 RX ADMIN — DOCUSATE SODIUM 100 MG: 100 CAPSULE, LIQUID FILLED ORAL at 09:07

## 2022-07-26 RX ADMIN — Medication 1 TABLET: at 09:07

## 2022-07-26 RX ADMIN — PANTOPRAZOLE SODIUM 40 MG: 40 TABLET, DELAYED RELEASE ORAL at 09:07

## 2022-07-26 RX ADMIN — PROCHLORPERAZINE EDISYLATE 10 MG: 5 INJECTION INTRAMUSCULAR; INTRAVENOUS at 05:07

## 2022-07-26 RX ADMIN — KETOROLAC TROMETHAMINE 15 MG: 30 INJECTION, SOLUTION INTRAMUSCULAR at 05:07

## 2022-07-26 RX ADMIN — METHOCARBAMOL 1000 MG: 100 INJECTION INTRAMUSCULAR; INTRAVENOUS at 05:07

## 2022-07-26 RX ADMIN — METHOCARBAMOL 1000 MG: 100 INJECTION INTRAMUSCULAR; INTRAVENOUS at 09:07

## 2022-07-26 NOTE — PROGRESS NOTES
"[facility]  General Surgery  Progress Note    Subjective:     History of Present Illness:  No notes on file    Post-Op Info:  Procedure(s) (LRB):  LAPAROSCOPY, DIAGNOSTIC (N/A)  LYSIS, ADHESIONS (N/A)  LAPAROTOMY, EXPLORATORY (N/A)  BLOCK, TRANSVERSUS ABDOMINIS PLANE (N/A)  EXCISION, CECUM WITH ILEUM   1 Day Post-Op     Interval History: Feeling sore today and having some nausea. Passed some flatus.    Medications:  Continuous Infusions:   lactated ringers 75 mL/hr at 07/26/22 0722     Scheduled Meds:   acetaminophen  650 mg Oral Q6H    ascorbic acid (vitamin C)  500 mg Oral BID    ceFAZolin (ANCEF) IVPB  2 g Intravenous Once    docusate sodium  100 mg Oral BID    doxepin  10 mg Oral QHS    ketorolac  15 mg Intravenous Q8H    Lactobacillus acidoph-L.bulgar  1 tablet Oral BID    methocarbamoL  1,000 mg Intravenous Q8H    pantoprazole  40 mg Oral Daily    spironolactone  50 mg Oral BID     PRN Meds:diazePAM, HYDROmorphone, melatonin, oxyCODONE, prochlorperazine, promethazine (PHENERGAN) IVPB, sodium chloride 0.9%, sodium chloride 0.9%     Review of patient's allergies indicates:   Allergen Reactions    Erythromycin Other (See Comments)     Stomach cramps    Morphine Nausea And Vomiting     "Projectile vomiting"     Objective:     Vital Signs (Most Recent):  Temp: 98.9 °F (37.2 °C) (07/26/22 1629)  Pulse: 104 (07/26/22 1629)  Resp: 16 (07/26/22 1710)  BP: 121/64 (07/26/22 1629)  SpO2: (!) 90 % (07/26/22 1629)   Vital Signs (24h Range):  Temp:  [97.6 °F (36.4 °C)-98.9 °F (37.2 °C)] 98.9 °F (37.2 °C)  Pulse:  [] 104  Resp:  [15-18] 16  SpO2:  [90 %-97 %] 90 %  BP: (110-122)/(59-65) 121/64     Weight: 71.5 kg (157 lb 8.3 oz)  Body mass index is 24.67 kg/m².    Intake/Output - Last 3 Shifts         07/24 0700 07/25 0659 07/25 0700 07/26 0659 07/26 0700 07/27 0659    I.V. (mL/kg)  1800 (25.2)     Total Intake(mL/kg)  1800 (25.2)     Urine (mL/kg/hr)  650 (0.4)     Blood  200     Total Output  " 850     Net  +950                    Physical Exam  Vitals and nursing note reviewed.   Constitutional:       General: She is not in acute distress.  Eyes:      Extraocular Movements: Extraocular movements intact.   Cardiovascular:      Rate and Rhythm: Normal rate.   Pulmonary:      Effort: No respiratory distress.   Abdominal:      Palpations: Abdomen is soft.      Comments: Appropriately tender to palpation, midline incision with staples intact--no drainage or erythema, bowel sounds active   Musculoskeletal:      Cervical back: No rigidity.      Comments: LUE edema from prior IV site   Neurological:      General: No focal deficit present.      Mental Status: She is alert and oriented to person, place, and time.       Significant Labs:  I have reviewed all pertinent lab results within the past 24 hours.  CBC:   Recent Labs   Lab 07/26/22  0634   WBC 8.13   RBC 3.84*   HGB 12.9   HCT 37.6      MCV 98   MCH 33.6*   MCHC 34.3     BMP:   Recent Labs   Lab 07/26/22  0634   GLU 98   *   K 3.5      CO2 20*   BUN 24*   CREATININE 0.7   CALCIUM 8.3*   MG 1.4*       Significant Diagnostics:  I have reviewed all pertinent imaging results/findings within the past 24 hours.    Assessment/Plan:     * SBO (small bowel obstruction)  S/p diagnostic laparoscopy, laparotomy, extensive lysis of adhesions, ileocecectomy      - Full liquids  - Analgesia prn  - Home medications  - Decrease IV fluids and PO intake increases  - Await return of significant bowel function  - Increase activity        Jo Manzano, DO  General Surgery  [facility]

## 2022-07-26 NOTE — ASSESSMENT & PLAN NOTE
S/p diagnostic laparoscopy, laparotomy, extensive lysis of adhesions, ileocecectomy      - Full liquids  - Analgesia prn  - Home medications  - Decrease IV fluids and PO intake increases  - Await return of significant bowel function  - Increase activity

## 2022-07-26 NOTE — PLAN OF CARE
Problem: Adult Inpatient Plan of Care  Goal: Plan of Care Review  Outcome: Ongoing, Progressing  Goal: Patient-Specific Goal (Individualized)  Outcome: Ongoing, Progressing  Goal: Absence of Hospital-Acquired Illness or Injury  Outcome: Ongoing, Progressing  Goal: Optimal Comfort and Wellbeing  Outcome: Ongoing, Progressing  Goal: Readiness for Transition of Care  Outcome: Ongoing, Progressing     Problem: Infection  Goal: Absence of Infection Signs and Symptoms  Outcome: Ongoing, Progressing     Problem: Fall Injury Risk  Goal: Absence of Fall and Fall-Related Injury  Outcome: Ongoing, Progressing    Patient is in stable condition, no acute distress, free from falls, receiving IV fluids, receiving IV antibiotics, pain adequately controlled with PRN, dressing to abdomen CDI, cooper in place, patient ambulated in room, VSS, and will continue to monitor. 24 hr chart check performed.

## 2022-07-26 NOTE — PLAN OF CARE
IV ABT therapy remains in progress. No adverse reactions noted, remains afebrile. Dressing to ABD C/D/I. Full liquid diet, PRN nausea meds adm as needed. LR@75mL/hr. PRN pain meds adm as needed. Remains free from incident/injury. Call light in reach.

## 2022-07-26 NOTE — SUBJECTIVE & OBJECTIVE
"Interval History: Feeling sore today and having some nausea. Passed some flatus.    Medications:  Continuous Infusions:   lactated ringers 75 mL/hr at 07/26/22 0722     Scheduled Meds:   acetaminophen  650 mg Oral Q6H    ascorbic acid (vitamin C)  500 mg Oral BID    ceFAZolin (ANCEF) IVPB  2 g Intravenous Once    docusate sodium  100 mg Oral BID    doxepin  10 mg Oral QHS    ketorolac  15 mg Intravenous Q8H    Lactobacillus acidoph-L.bulgar  1 tablet Oral BID    methocarbamoL  1,000 mg Intravenous Q8H    pantoprazole  40 mg Oral Daily    spironolactone  50 mg Oral BID     PRN Meds:diazePAM, HYDROmorphone, melatonin, oxyCODONE, prochlorperazine, promethazine (PHENERGAN) IVPB, sodium chloride 0.9%, sodium chloride 0.9%     Review of patient's allergies indicates:   Allergen Reactions    Erythromycin Other (See Comments)     Stomach cramps    Morphine Nausea And Vomiting     "Projectile vomiting"     Objective:     Vital Signs (Most Recent):  Temp: 98.9 °F (37.2 °C) (07/26/22 1629)  Pulse: 104 (07/26/22 1629)  Resp: 16 (07/26/22 1710)  BP: 121/64 (07/26/22 1629)  SpO2: (!) 90 % (07/26/22 1629)   Vital Signs (24h Range):  Temp:  [97.6 °F (36.4 °C)-98.9 °F (37.2 °C)] 98.9 °F (37.2 °C)  Pulse:  [] 104  Resp:  [15-18] 16  SpO2:  [90 %-97 %] 90 %  BP: (110-122)/(59-65) 121/64     Weight: 71.5 kg (157 lb 8.3 oz)  Body mass index is 24.67 kg/m².    Intake/Output - Last 3 Shifts         07/24 0700 07/25 0659 07/25 0700 07/26 0659 07/26 0700 07/27 0659    I.V. (mL/kg)  1800 (25.2)     Total Intake(mL/kg)  1800 (25.2)     Urine (mL/kg/hr)  650 (0.4)     Blood  200     Total Output  850     Net  +950                    Physical Exam  Vitals and nursing note reviewed.   Constitutional:       General: She is not in acute distress.  Eyes:      Extraocular Movements: Extraocular movements intact.   Cardiovascular:      Rate and Rhythm: Normal rate.   Pulmonary:      Effort: No respiratory distress.   Abdominal:      " Palpations: Abdomen is soft.      Comments: Appropriately tender to palpation, midline incision with staples intact--no drainage or erythema, bowel sounds active   Musculoskeletal:      Cervical back: No rigidity.      Comments: LUE edema from prior IV site   Neurological:      General: No focal deficit present.      Mental Status: She is alert and oriented to person, place, and time.       Significant Labs:  I have reviewed all pertinent lab results within the past 24 hours.  CBC:   Recent Labs   Lab 07/26/22  0634   WBC 8.13   RBC 3.84*   HGB 12.9   HCT 37.6      MCV 98   MCH 33.6*   MCHC 34.3     BMP:   Recent Labs   Lab 07/26/22  0634   GLU 98   *   K 3.5      CO2 20*   BUN 24*   CREATININE 0.7   CALCIUM 8.3*   MG 1.4*       Significant Diagnostics:  I have reviewed all pertinent imaging results/findings within the past 24 hours.

## 2022-07-26 NOTE — PLAN OF CARE
[facility]  Initial Discharge Assessment       Primary Care Provider: Tay Duff MD    Admission Diagnosis: SBO (small bowel obstruction) [K56.609]  S/P laparotomy [Z98.890]    Admission Date: 7/25/2022  Expected Discharge Date:     Discharge Barriers Identified: None    Payor: MEDICARE / Plan: MEDICARE PART A & B / Product Type: Government /     Extended Emergency Contact Information  Primary Emergency Contact: tha caceres  Mobile Phone: [phone]  Relation: Significant other  Preferred language: English   needed? No  Secondary Emergency Contact: Jose Darnell   [facility]  Home Phone: [phone]  Mobile Phone: [phone]  Relation: Brother    Discharge Plan A: Home with family  Discharge Plan B: Home with family      [id number] Temp [facility] - [place] - [address]  [address]  [facility] [address]  Phone: [phone] Fax: [phone]    [id number]  [place] - [address]  [address]  [facility] [address]  Phone: [phone] Fax: [phone]    [id number] [place] - [address] [facility] [address] [facility]  [address] [facility] [place] [address]  Phone: [phone] Fax: [phone]    [id number]  [place] - [address] AT [facility]  [address]  [place] [address]  Phone: [phone] Fax: [phone]    [facility]  [place] - [address]  [address]  [facility] [address]  Phone: [phone] Fax: [phone]  My Chart; Active  Swer spoke with SO (Tha Caceres) for initial assessment. Swer explained role of discharge planner. Pt lives alone and was independent with ADLs prior to admission. Pt's SO/ family is help at home and will provide transportation at discharge. Pt does not use any medical equipment. Pt does not have a problem obtaining medication and drives  self to medical appointments. Pt does have an advanced directive at this time. SWer provided a transitional care folder, information on advanced directives, information on pharmacy bedside delivery, and discharge planning begins on admission with contact information for any needs/questions. CM needs to be determined based on hospital progress.     Initial Assessment (most recent)     Adult Discharge Assessment - 07/26/22 1018        Discharge Assessment    Assessment Type Discharge Planning Assessment     Confirmed/corrected address, phone number and insurance Yes     Confirmed Demographics Correct on Facesheet     Source of Information --   Edward Miles (Significant other)    If unable to respond/provide information was family/caregiver contacted? Yes     Contact Name/Number Edward Miles (SO) [phone]     Communicated MANAS with patient/caregiver Date not available/Unable to determine     Reason For Admission SBO     Lives With alone     Facility Arrived From: home     Do you expect to return to your current living situation? Yes     Do you have help at home or someone to help you manage your care at home? Yes     Who are your caregiver(s) and their phone number(s)? SO/Family     Prior to hospitilization cognitive status: Alert/Oriented     Current cognitive status: Alert/Oriented     Walking or Climbing Stairs Difficulty none     Dressing/Bathing Difficulty none     Home Accessibility wheelchair accessible     Home Layout Able to live on 1st floor     Equipment Currently Used at Home none     Readmission within 30 days? No     Patient currently being followed by outpatient case management? No     Do you currently have service(s) that help you manage your care at home? No     Do you take prescription medications? Yes     Do you have prescription coverage? Yes     Do you have any problems affording any of your prescribed medications? No     Is the patient taking medications as prescribed? yes     Who is going to  help you get home at discharge? family     How do you get to doctors appointments? car, drives self     Are you on dialysis? No     Discharge Plan A Home with family     Discharge Plan B Home with family     DME Needed Upon Discharge  none     Discharge Plan discussed with: Spouse/sig other     Name(s) and Number(s) Tha Miles (SO) [phone]     Discharge Barriers Identified None

## 2022-07-27 LAB
ANION GAP SERPL CALC-SCNC: 12 MMOL/L (ref 8–16)
BASOPHILS # BLD AUTO: 0.03 K/UL (ref 0–0.2)
BASOPHILS NFR BLD: 0.8 % (ref 0–1.9)
BUN SERPL-MCNC: 21 MG/DL (ref 6–20)
CALCIUM SERPL-MCNC: 8 MG/DL (ref 8.7–10.5)
CHLORIDE SERPL-SCNC: 107 MMOL/L (ref 95–110)
CO2 SERPL-SCNC: 16 MMOL/L (ref 23–29)
CREAT SERPL-MCNC: 0.7 MG/DL (ref 0.5–1.4)
DIFFERENTIAL METHOD: ABNORMAL
EOSINOPHIL # BLD AUTO: 0.1 K/UL (ref 0–0.5)
EOSINOPHIL NFR BLD: 2.7 % (ref 0–8)
ERYTHROCYTE [DISTWIDTH] IN BLOOD BY AUTOMATED COUNT: 12.9 % (ref 11.5–14.5)
EST. GFR  (AFRICAN AMERICAN): >60 ML/MIN/1.73 M^2
EST. GFR  (NON AFRICAN AMERICAN): >60 ML/MIN/1.73 M^2
GLUCOSE SERPL-MCNC: 113 MG/DL (ref 70–110)
HCT VFR BLD AUTO: 35.8 % (ref 37–48.5)
HGB BLD-MCNC: 11.6 G/DL (ref 12–16)
IMM GRANULOCYTES # BLD AUTO: 0.01 K/UL (ref 0–0.04)
IMM GRANULOCYTES NFR BLD AUTO: 0.3 % (ref 0–0.5)
LYMPHOCYTES # BLD AUTO: 0.6 K/UL (ref 1–4.8)
LYMPHOCYTES NFR BLD: 16.7 % (ref 18–48)
MAGNESIUM SERPL-MCNC: 1.4 MG/DL (ref 1.6–2.6)
MCH RBC QN AUTO: 32.1 PG (ref 27–31)
MCHC RBC AUTO-ENTMCNC: 32.4 G/DL (ref 32–36)
MCV RBC AUTO: 99 FL (ref 82–98)
MONOCYTES # BLD AUTO: 0.5 K/UL (ref 0.3–1)
MONOCYTES NFR BLD: 12.1 % (ref 4–15)
NEUTROPHILS # BLD AUTO: 2.5 K/UL (ref 1.8–7.7)
NEUTROPHILS NFR BLD: 67.4 % (ref 38–73)
NRBC BLD-RTO: 0 /100 WBC
PHOSPHATE SERPL-MCNC: 2.7 MG/DL (ref 2.7–4.5)
PLATELET # BLD AUTO: 206 K/UL (ref 150–450)
PMV BLD AUTO: 11.1 FL (ref 9.2–12.9)
POTASSIUM SERPL-SCNC: 3.7 MMOL/L (ref 3.5–5.1)
RBC # BLD AUTO: 3.61 M/UL (ref 4–5.4)
SODIUM SERPL-SCNC: 135 MMOL/L (ref 136–145)
WBC # BLD AUTO: 3.71 K/UL (ref 3.9–12.7)

## 2022-07-27 PROCEDURE — 99900035 HC TECH TIME PER 15 MIN (STAT)

## 2022-07-27 PROCEDURE — 63600175 PHARM REV CODE 636 W HCPCS: Performed by: SURGERY

## 2022-07-27 PROCEDURE — 84100 ASSAY OF PHOSPHORUS: CPT | Performed by: SURGERY

## 2022-07-27 PROCEDURE — 11000001 HC ACUTE MED/SURG PRIVATE ROOM

## 2022-07-27 PROCEDURE — 80048 BASIC METABOLIC PNL TOTAL CA: CPT | Performed by: SURGERY

## 2022-07-27 PROCEDURE — 83735 ASSAY OF MAGNESIUM: CPT | Performed by: SURGERY

## 2022-07-27 PROCEDURE — 25000003 PHARM REV CODE 250: Performed by: SURGERY

## 2022-07-27 PROCEDURE — 36415 COLL VENOUS BLD VENIPUNCTURE: CPT | Performed by: SURGERY

## 2022-07-27 PROCEDURE — 27000221 HC OXYGEN, UP TO 24 HOURS

## 2022-07-27 PROCEDURE — 94761 N-INVAS EAR/PLS OXIMETRY MLT: CPT

## 2022-07-27 PROCEDURE — 85025 COMPLETE CBC W/AUTO DIFF WBC: CPT | Performed by: SURGERY

## 2022-07-27 RX ORDER — ONDANSETRON 2 MG/ML
4 INJECTION INTRAMUSCULAR; INTRAVENOUS EVERY 6 HOURS PRN
Status: DISCONTINUED | OUTPATIENT
Start: 2022-07-27 | End: 2022-08-03 | Stop reason: HOSPADM

## 2022-07-27 RX ORDER — MAGNESIUM SULFATE HEPTAHYDRATE 40 MG/ML
2 INJECTION, SOLUTION INTRAVENOUS ONCE
Status: COMPLETED | OUTPATIENT
Start: 2022-07-27 | End: 2022-07-27

## 2022-07-27 RX ORDER — SCOLOPAMINE TRANSDERMAL SYSTEM 1 MG/1
1 PATCH, EXTENDED RELEASE TRANSDERMAL
Status: DISCONTINUED | OUTPATIENT
Start: 2022-07-27 | End: 2022-08-03 | Stop reason: HOSPADM

## 2022-07-27 RX ADMIN — MELATONIN TAB 3 MG 9 MG: 3 TAB at 09:07

## 2022-07-27 RX ADMIN — HYDROMORPHONE HYDROCHLORIDE 1 MG: 2 INJECTION INTRAMUSCULAR; INTRAVENOUS; SUBCUTANEOUS at 02:07

## 2022-07-27 RX ADMIN — OXYCODONE HYDROCHLORIDE 5 MG: 5 TABLET ORAL at 03:07

## 2022-07-27 RX ADMIN — DOXEPIN HYDROCHLORIDE 10 MG: 10 CAPSULE ORAL at 09:07

## 2022-07-27 RX ADMIN — PANTOPRAZOLE SODIUM 40 MG: 40 TABLET, DELAYED RELEASE ORAL at 10:07

## 2022-07-27 RX ADMIN — METHOCARBAMOL 1000 MG: 100 INJECTION INTRAMUSCULAR; INTRAVENOUS at 05:07

## 2022-07-27 RX ADMIN — DOCUSATE SODIUM 100 MG: 100 CAPSULE, LIQUID FILLED ORAL at 10:07

## 2022-07-27 RX ADMIN — SODIUM CHLORIDE, SODIUM LACTATE, POTASSIUM CHLORIDE, AND CALCIUM CHLORIDE: .6; .31; .03; .02 INJECTION, SOLUTION INTRAVENOUS at 03:07

## 2022-07-27 RX ADMIN — Medication 1 TABLET: at 10:07

## 2022-07-27 RX ADMIN — SPIRONOLACTONE 50 MG: 25 TABLET, FILM COATED ORAL at 09:07

## 2022-07-27 RX ADMIN — DOCUSATE SODIUM 100 MG: 100 CAPSULE, LIQUID FILLED ORAL at 09:07

## 2022-07-27 RX ADMIN — OXYCODONE HYDROCHLORIDE AND ACETAMINOPHEN 500 MG: 500 TABLET ORAL at 09:07

## 2022-07-27 RX ADMIN — HYDROMORPHONE HYDROCHLORIDE 1 MG: 2 INJECTION INTRAMUSCULAR; INTRAVENOUS; SUBCUTANEOUS at 09:07

## 2022-07-27 RX ADMIN — KETOROLAC TROMETHAMINE 15 MG: 30 INJECTION, SOLUTION INTRAMUSCULAR at 05:07

## 2022-07-27 RX ADMIN — KETOROLAC TROMETHAMINE 15 MG: 30 INJECTION, SOLUTION INTRAMUSCULAR at 02:07

## 2022-07-27 RX ADMIN — ACETAMINOPHEN 650 MG: 325 TABLET ORAL at 10:07

## 2022-07-27 RX ADMIN — ACETAMINOPHEN 650 MG: 325 TABLET ORAL at 04:07

## 2022-07-27 RX ADMIN — ONDANSETRON 4 MG: 2 INJECTION INTRAMUSCULAR; INTRAVENOUS at 03:07

## 2022-07-27 RX ADMIN — HYDROMORPHONE HYDROCHLORIDE 1 MG: 2 INJECTION INTRAMUSCULAR; INTRAVENOUS; SUBCUTANEOUS at 07:07

## 2022-07-27 RX ADMIN — PROCHLORPERAZINE EDISYLATE 10 MG: 5 INJECTION INTRAMUSCULAR; INTRAVENOUS at 05:07

## 2022-07-27 RX ADMIN — PROMETHAZINE HYDROCHLORIDE 25 MG: 25 INJECTION INTRAMUSCULAR; INTRAVENOUS at 07:07

## 2022-07-27 RX ADMIN — SPIRONOLACTONE 50 MG: 25 TABLET, FILM COATED ORAL at 10:07

## 2022-07-27 RX ADMIN — MAGNESIUM SULFATE HEPTAHYDRATE 2 G: 40 INJECTION, SOLUTION INTRAVENOUS at 10:07

## 2022-07-27 RX ADMIN — HYDROMORPHONE HYDROCHLORIDE 1 MG: 2 INJECTION INTRAMUSCULAR; INTRAVENOUS; SUBCUTANEOUS at 11:07

## 2022-07-27 RX ADMIN — METHOCARBAMOL 1000 MG: 100 INJECTION INTRAMUSCULAR; INTRAVENOUS at 09:07

## 2022-07-27 RX ADMIN — PROMETHAZINE HYDROCHLORIDE 25 MG: 25 INJECTION INTRAMUSCULAR; INTRAVENOUS at 12:07

## 2022-07-27 RX ADMIN — OXYCODONE HYDROCHLORIDE AND ACETAMINOPHEN 500 MG: 500 TABLET ORAL at 10:07

## 2022-07-27 RX ADMIN — Medication 1 TABLET: at 09:07

## 2022-07-27 RX ADMIN — ACETAMINOPHEN 650 MG: 325 TABLET ORAL at 09:07

## 2022-07-27 RX ADMIN — SCOPOLAMINE 1 PATCH: 1.5 PATCH, EXTENDED RELEASE TRANSDERMAL at 04:07

## 2022-07-27 RX ADMIN — ONDANSETRON 4 MG: 2 INJECTION INTRAMUSCULAR; INTRAVENOUS at 11:07

## 2022-07-27 RX ADMIN — ENOXAPARIN SODIUM 40 MG: 100 INJECTION SUBCUTANEOUS at 04:07

## 2022-07-27 RX ADMIN — KETOROLAC TROMETHAMINE 15 MG: 30 INJECTION, SOLUTION INTRAMUSCULAR at 09:07

## 2022-07-27 RX ADMIN — METHOCARBAMOL 1000 MG: 100 INJECTION INTRAMUSCULAR; INTRAVENOUS at 02:07

## 2022-07-27 NOTE — PLAN OF CARE
Problem: Adult Inpatient Plan of Care  Goal: Plan of Care Review  Outcome: Ongoing, Progressing  Goal: Patient-Specific Goal (Individualized)  Outcome: Ongoing, Progressing  Goal: Absence of Hospital-Acquired Illness or Injury  Outcome: Ongoing, Progressing  Goal: Optimal Comfort and Wellbeing  Outcome: Ongoing, Progressing  Goal: Readiness for Transition of Care  Outcome: Ongoing, Progressing     Problem: Infection  Goal: Absence of Infection Signs and Symptoms  Outcome: Ongoing, Progressing     Problem: Fall Injury Risk  Goal: Absence of Fall and Fall-Related Injury  Outcome: Ongoing, Progressing     Patient is in stable condition, no acute distress, free from falls, receiving IV fluids, pain adequately controlled with PRN, nausea controlled with PRN, dressing to abdomen CDI, patient ambulated in room, VSS, and will continue to monitor. 24 hr chart check performed.

## 2022-07-27 NOTE — ASSESSMENT & PLAN NOTE
S/p diagnostic laparoscopy, laparotomy, extensive lysis of adhesions, ileocecectomy      - Analgesia prn--change compazine to zofran. Continue phenergan. Add scopolamine patch.  - Diet as tolerated  - Home medications  - Increase activity  - DVT ppx  - Replace magnesium    If continues to have nausea, will obtain abdominal xray tomorrow

## 2022-07-27 NOTE — PROGRESS NOTES
"[facility]  General Surgery  Progress Note    Subjective:     History of Present Illness:  No notes on file    Post-Op Info:  Procedure(s) (LRB):  LAPAROSCOPY, DIAGNOSTIC (N/A)  LYSIS, ADHESIONS (N/A)  LAPAROTOMY, EXPLORATORY (N/A)  BLOCK, TRANSVERSUS ABDOMINIS PLANE (N/A)  EXCISION, CECUM WITH ILEUM   2 Days Post-Op     Interval History: Passed some flatus and stool this morning. Having nausea not responding to compazine.    Medications:  Continuous Infusions:  Scheduled Meds:   acetaminophen  650 mg Oral Q6H    ascorbic acid (vitamin C)  500 mg Oral BID    ceFAZolin (ANCEF) IVPB  2 g Intravenous Once    docusate sodium  100 mg Oral BID    doxepin  10 mg Oral QHS    enoxaparin  40 mg Subcutaneous Daily    ketorolac  15 mg Intravenous Q8H    Lactobacillus acidoph-L.bulgar  1 tablet Oral BID    methocarbamoL  1,000 mg Intravenous Q8H    pantoprazole  40 mg Oral Daily    scopolamine  1 patch Transdermal Q3 Days    spironolactone  50 mg Oral BID     PRN Meds:diazePAM, HYDROmorphone, melatonin, ondansetron, oxyCODONE, promethazine (PHENERGAN) IVPB, sodium chloride 0.9%, sodium chloride 0.9%     Review of patient's allergies indicates:   Allergen Reactions    Erythromycin Other (See Comments)     Stomach cramps    Morphine Nausea And Vomiting     "Projectile vomiting"     Objective:     Vital Signs (Most Recent):  Temp: 98.8 °F (37.1 °C) (07/27/22 1234)  Pulse: 97 (07/27/22 1234)  Resp: 20 (07/27/22 1234)  BP: 131/64 (07/27/22 1234)  SpO2: (!) 92 % (07/27/22 1234)   Vital Signs (24h Range):  Temp:  [98.8 °F (37.1 °C)-99.7 °F (37.6 °C)] 98.8 °F (37.1 °C)  Pulse:  [] 97  Resp:  [16-20] 20  SpO2:  [90 %-95 %] 92 %  BP: (104-131)/(55-64) 131/64     Weight: 71.5 kg (157 lb 8.3 oz)  Body mass index is 24.67 kg/m².    Intake/Output - Last 3 Shifts         07/25 0700 07/26 0659 07/26 0700 07/27 0659 07/27 0700 07/28 0659    P.O.   720    I.V. (mL/kg) 1800 (25.2) 1757.8 (24.6)     IV Piggyback  462.9 "     Total Intake(mL/kg) 1800 (25.2) 2220.6 (31.1) 720 (10.1)    Urine (mL/kg/hr) 650 (0.4)      Blood 200      Total Output 850      Net +950 +2220.6 +720           Urine Occurrence  1 x 4 x    Stool Occurrence   2 x            Physical Exam  Vitals and nursing note reviewed.   Constitutional:       General: She is not in acute distress.  Eyes:      Extraocular Movements: Extraocular movements intact.   Cardiovascular:      Rate and Rhythm: Normal rate.   Pulmonary:      Effort: No respiratory distress.   Abdominal:      Palpations: Abdomen is soft.      Comments: Appropriately tender to palpation, midline incision with staples intact--no drainage or erythema   Musculoskeletal:      Cervical back: No rigidity.      Comments: LUE edema from prior IV site   Neurological:      General: No focal deficit present.      Mental Status: She is alert and oriented to person, place, and time.       Significant Labs:  I have reviewed all pertinent lab results within the past 24 hours.  CBC:   Recent Labs   Lab 07/27/22  0744   WBC 3.71*   RBC 3.61*   HGB 11.6*   HCT 35.8*      MCV 99*   MCH 32.1*   MCHC 32.4     BMP:   Recent Labs   Lab 07/27/22  0613   *   *   K 3.7      CO2 16*   BUN 21*   CREATININE 0.7   CALCIUM 8.0*   MG 1.4*       Significant Diagnostics:  I have reviewed all pertinent imaging results/findings within the past 24 hours.    Assessment/Plan:     * SBO (small bowel obstruction)  S/p diagnostic laparoscopy, laparotomy, extensive lysis of adhesions, ileocecectomy      - Analgesia prn--change compazine to zofran. Continue phenergan. Add scopolamine patch.  - Diet as tolerated  - Home medications  - Increase activity  - DVT ppx  - Replace magnesium    If continues to have nausea, will obtain abdominal xray tomorrow        Jo Manzano, DO  General Surgery  [facility]

## 2022-07-27 NOTE — OP NOTE
Genny Calixto  : 1965, MRN: [id number]  Date of procedure: 22      Procedure: Diagnostic laparoscopy converted to laparotomy, extensive lysis of adhesions, ileocecectomy, transversus abdominis plan (TAP) block    Pre-procedure diagnosis: Small bowel obstruction, chronic abdominal pain  Post-procedure diagnosis: Small bowel obstruction, abdominal adhesions, anastomotic stricture  Surgeon: Jo Manzano DO  Assistant: None  Anesthesia: General  EBL: 200 mL  Implants/Drains: None  Specimen: Terminal ileum with cecum and appendix  Complications: None apparent    Significant findings: Extensive adhesions throughout abdomen, some causing partial obstruction. Stricture of the patient's previous anastomosis located a few centimeters proximal to the ileocecal valve. 190 cm of small bowel remaining.    A modifier 22 should be applied to this procedure due to the complexity and prolonged amount of time spent tediously lysing adhesions (approx 2 hours).     Indications for procedure: The patient presents with chronic abdominal pain and recurrent small bowel obstructions. After explaining the risks, benefits, and alternatives, the patient verbalized understanding and informed written consent was obtained. All questions were answered to their satisfaction.    Description of procedure: The patient was brought to the OR and SCDs were applied. General anesthesia was induced by the Anesthesia Department. Patient was in the supine position. A cooepr catheter was inserted under sterile procedure.  The abdomen was prepped and draped in usual sterile fashion. A time out was taken to identify the correct patient, correct procedure, and correct anatomical site; all parties were in agreement.  A 5 mm incision was made at Long's point and a Veress needle was inserted. The abdomen was insufflated to 15 mmHg. A 5 mm optiview trochar was inserted under direct visualization and the peritoneal cavity was safely entered.  There were dense adhesions throughout the abdomen, especially amongst the bowel. It was deemed unsafe to try and attempt lysing these dense adhesions laparoscopically due to risk of injuring the bowel, so the decision was made to open. A 7 cm mini laparotomy midline incision was made and carried down through the subcutaneous tissues using electrocautery. The fascia was carefully incised and the peritoneal cavity was safely entered. I began meticulously and tediously lysing adhesions using a combination of sharp metzenbaum dissection and electrocautery. The small bowel was quite entwined within these adhesions and there were areas where dense bands could be found causing partial obstructions. There was a prior anastomosis of the ileum which was intact and patent. At last, the terminal ileum was encountered where, a few centimeters proximal to the cecum, was another anastomosis of the ileum. This anastomosis was strictured and lacking a significant lumen. The appendix was present and normal appearing. The decision was made to resect this anastomosis which would require resection of the cecum as well due to its proximity. A window was made in the mesentery of the small bowel and colon. Using a blue ALONZO stapler, the small bowel proximal to the anastomosis was transected as well as the cecum from the ascending colon. The mesentery of the transected segment was then ligated using the ligasure device. Next, a side-to-side enterocolic anastomosis was created. An enterotomy and colotomy were made at the staple lines and a blue 100 ALONZO stapler was inserted. A common channel was then formed between the antimesenteric side of the small bowel and the taenia of the ascending colon. The common channel lumen was inspected and found to be patent and hemostatic. The anastomosis was then completed by firing another blue ALONZO stapler across the os of the common channel. A 3-0 silk stay suture was placed at the angle of the anastomosis.  The mesenteric defect was closed with a running 3-0 silk suture. The small bowel was then ran from the ligament of Treitz to the ascending colon twice to inspect for any missed enterotomies or serosal tears. The small bowel length was measured at 190 cm. The small bowel and the new anastomosis were pink and viable. The abdomen was irrigated with warm saline. Good hemostasis was observed.  A TAP block was performed by injecting 30 mL of an Exparel solution into the transversus abdominis plane on each side of the incision.  The fascia was closed with two running bidirectional #1 Strattafix sutures. The skin was closed with staples. Gauze packing was inserted in between the staples and a sterile dressing was applied. The port site incision was closed with 4-0 monocryl in the subcuticular layer and Dermabond was applied on top.    All sponge and instrument counts were deemed correct. The patient appeared to tolerate the procedure well and there were no apparent complications. The patient was transported to PACU in stable condition.    Jo Manzano,   General Surgery  [facility]

## 2022-07-27 NOTE — SUBJECTIVE & OBJECTIVE
"Interval History: Passed some flatus and stool this morning. Having nausea not responding to compazine.    Medications:  Continuous Infusions:  Scheduled Meds:   acetaminophen  650 mg Oral Q6H    ascorbic acid (vitamin C)  500 mg Oral BID    ceFAZolin (ANCEF) IVPB  2 g Intravenous Once    docusate sodium  100 mg Oral BID    doxepin  10 mg Oral QHS    enoxaparin  40 mg Subcutaneous Daily    ketorolac  15 mg Intravenous Q8H    Lactobacillus acidoph-L.bulgar  1 tablet Oral BID    methocarbamoL  1,000 mg Intravenous Q8H    pantoprazole  40 mg Oral Daily    scopolamine  1 patch Transdermal Q3 Days    spironolactone  50 mg Oral BID     PRN Meds:diazePAM, HYDROmorphone, melatonin, ondansetron, oxyCODONE, promethazine (PHENERGAN) IVPB, sodium chloride 0.9%, sodium chloride 0.9%     Review of patient's allergies indicates:   Allergen Reactions    Erythromycin Other (See Comments)     Stomach cramps    Morphine Nausea And Vomiting     "Projectile vomiting"     Objective:     Vital Signs (Most Recent):  Temp: 98.8 °F (37.1 °C) (07/27/22 1234)  Pulse: 97 (07/27/22 1234)  Resp: 20 (07/27/22 1234)  BP: 131/64 (07/27/22 1234)  SpO2: (!) 92 % (07/27/22 1234)   Vital Signs (24h Range):  Temp:  [98.8 °F (37.1 °C)-99.7 °F (37.6 °C)] 98.8 °F (37.1 °C)  Pulse:  [] 97  Resp:  [16-20] 20  SpO2:  [90 %-95 %] 92 %  BP: (104-131)/(55-64) 131/64     Weight: 71.5 kg (157 lb 8.3 oz)  Body mass index is 24.67 kg/m².    Intake/Output - Last 3 Shifts         07/25 0700 07/26 0659 07/26 0700 07/27 0659 07/27 0700 07/28 0659    P.O.   720    I.V. (mL/kg) 1800 (25.2) 1757.8 (24.6)     IV Piggyback  462.9     Total Intake(mL/kg) 1800 (25.2) 2220.6 (31.1) 720 (10.1)    Urine (mL/kg/hr) 650 (0.4)      Blood 200      Total Output 850      Net +950 +2220.6 +720           Urine Occurrence  1 x 4 x    Stool Occurrence   2 x            Physical Exam  Vitals and nursing note reviewed.   Constitutional:       General: She is not in acute " distress.  Eyes:      Extraocular Movements: Extraocular movements intact.   Cardiovascular:      Rate and Rhythm: Normal rate.   Pulmonary:      Effort: No respiratory distress.   Abdominal:      Palpations: Abdomen is soft.      Comments: Appropriately tender to palpation, midline incision with staples intact--no drainage or erythema   Musculoskeletal:      Cervical back: No rigidity.      Comments: LUE edema from prior IV site   Neurological:      General: No focal deficit present.      Mental Status: She is alert and oriented to person, place, and time.       Significant Labs:  I have reviewed all pertinent lab results within the past 24 hours.  CBC:   Recent Labs   Lab 07/27/22  0744   WBC 3.71*   RBC 3.61*   HGB 11.6*   HCT 35.8*      MCV 99*   MCH 32.1*   MCHC 32.4     BMP:   Recent Labs   Lab 07/27/22  0613   *   *   K 3.7      CO2 16*   BUN 21*   CREATININE 0.7   CALCIUM 8.0*   MG 1.4*       Significant Diagnostics:  I have reviewed all pertinent imaging results/findings within the past 24 hours.

## 2022-07-27 NOTE — PLAN OF CARE
Problem: Adult Inpatient Plan of Care  Goal: Plan of Care Review  7/27/2022 1548 by Ann Marie De La Cruz RN  Outcome: Ongoing, Progressing  7/27/2022 1506 by Ann Marie De La Cruz RN  Outcome: Ongoing, Progressing  Goal: Patient-Specific Goal (Individualized)  7/27/2022 1548 by Ann Marie De La Cruz RN  Outcome: Ongoing, Progressing  7/27/2022 1506 by Ann Marie De La Cruz RN  Outcome: Ongoing, Progressing  Goal: Absence of Hospital-Acquired Illness or Injury  7/27/2022 1548 by Ann Marie De La Cruz RN  Outcome: Ongoing, Progressing  7/27/2022 1506 by Ann Marie De La Cruz RN  Outcome: Ongoing, Progressing  Goal: Optimal Comfort and Wellbeing  7/27/2022 1548 by Ann Marie De La Cruz RN  Outcome: Ongoing, Progressing  7/27/2022 1506 by Ann Marie De La Cruz RN  Outcome: Ongoing, Progressing  Goal: Readiness for Transition of Care  7/27/2022 1548 by Ann Marie De La Cruz RN  Outcome: Ongoing, Progressing  7/27/2022 1506 by Ann Marie De La Cruz RN  Outcome: Ongoing, Progressing     Problem: Infection  Goal: Absence of Infection Signs and Symptoms  7/27/2022 1548 by Ann Marie De La Cruz RN  Outcome: Ongoing, Progressing  7/27/2022 1506 by Ann Marie De La Cruz RN  Outcome: Ongoing, Progressing

## 2022-07-28 LAB
ANION GAP SERPL CALC-SCNC: 15 MMOL/L (ref 8–16)
BASOPHILS # BLD AUTO: 0.04 K/UL (ref 0–0.2)
BASOPHILS NFR BLD: 1.1 % (ref 0–1.9)
BUN SERPL-MCNC: 24 MG/DL (ref 6–20)
CALCIUM SERPL-MCNC: 8.6 MG/DL (ref 8.7–10.5)
CHLORIDE SERPL-SCNC: 103 MMOL/L (ref 95–110)
CO2 SERPL-SCNC: 18 MMOL/L (ref 23–29)
CREAT SERPL-MCNC: 0.9 MG/DL (ref 0.5–1.4)
DIFFERENTIAL METHOD: ABNORMAL
EOSINOPHIL # BLD AUTO: 0.1 K/UL (ref 0–0.5)
EOSINOPHIL NFR BLD: 2.4 % (ref 0–8)
ERYTHROCYTE [DISTWIDTH] IN BLOOD BY AUTOMATED COUNT: 12.7 % (ref 11.5–14.5)
EST. GFR  (AFRICAN AMERICAN): >60 ML/MIN/1.73 M^2
EST. GFR  (NON AFRICAN AMERICAN): >60 ML/MIN/1.73 M^2
FINAL PATHOLOGIC DIAGNOSIS: NORMAL
GLUCOSE SERPL-MCNC: 148 MG/DL (ref 70–110)
GROSS: NORMAL
HCT VFR BLD AUTO: 38.1 % (ref 37–48.5)
HGB BLD-MCNC: 13.2 G/DL (ref 12–16)
IMM GRANULOCYTES # BLD AUTO: 0.02 K/UL (ref 0–0.04)
IMM GRANULOCYTES NFR BLD AUTO: 0.5 % (ref 0–0.5)
LYMPHOCYTES # BLD AUTO: 0.7 K/UL (ref 1–4.8)
LYMPHOCYTES NFR BLD: 18.5 % (ref 18–48)
Lab: NORMAL
MAGNESIUM SERPL-MCNC: 1.9 MG/DL (ref 1.6–2.6)
MCH RBC QN AUTO: 33.4 PG (ref 27–31)
MCHC RBC AUTO-ENTMCNC: 34.6 G/DL (ref 32–36)
MCV RBC AUTO: 97 FL (ref 82–98)
MONOCYTES # BLD AUTO: 0.6 K/UL (ref 0.3–1)
MONOCYTES NFR BLD: 16.4 % (ref 4–15)
NEUTROPHILS # BLD AUTO: 2.3 K/UL (ref 1.8–7.7)
NEUTROPHILS NFR BLD: 61.1 % (ref 38–73)
NRBC BLD-RTO: 0 /100 WBC
PHOSPHATE SERPL-MCNC: 2.9 MG/DL (ref 2.7–4.5)
PLATELET # BLD AUTO: 283 K/UL (ref 150–450)
PLATELET BLD QL SMEAR: ABNORMAL
PMV BLD AUTO: 11.2 FL (ref 9.2–12.9)
POCT GLUCOSE: 142 MG/DL (ref 70–110)
POTASSIUM SERPL-SCNC: 4.3 MMOL/L (ref 3.5–5.1)
RBC # BLD AUTO: 3.95 M/UL (ref 4–5.4)
SODIUM SERPL-SCNC: 136 MMOL/L (ref 136–145)
WBC # BLD AUTO: 3.79 K/UL (ref 3.9–12.7)

## 2022-07-28 PROCEDURE — 83735 ASSAY OF MAGNESIUM: CPT | Performed by: SURGERY

## 2022-07-28 PROCEDURE — 99900035 HC TECH TIME PER 15 MIN (STAT)

## 2022-07-28 PROCEDURE — 63600175 PHARM REV CODE 636 W HCPCS: Performed by: SURGERY

## 2022-07-28 PROCEDURE — 84100 ASSAY OF PHOSPHORUS: CPT | Performed by: SURGERY

## 2022-07-28 PROCEDURE — 11000001 HC ACUTE MED/SURG PRIVATE ROOM

## 2022-07-28 PROCEDURE — 94799 UNLISTED PULMONARY SVC/PX: CPT

## 2022-07-28 PROCEDURE — 25000003 PHARM REV CODE 250: Performed by: SURGERY

## 2022-07-28 PROCEDURE — 80048 BASIC METABOLIC PNL TOTAL CA: CPT | Performed by: SURGERY

## 2022-07-28 PROCEDURE — 36415 COLL VENOUS BLD VENIPUNCTURE: CPT | Performed by: SURGERY

## 2022-07-28 PROCEDURE — 85025 COMPLETE CBC W/AUTO DIFF WBC: CPT | Performed by: SURGERY

## 2022-07-28 RX ORDER — MAGNESIUM SULFATE HEPTAHYDRATE 40 MG/ML
2 INJECTION, SOLUTION INTRAVENOUS ONCE
Status: COMPLETED | OUTPATIENT
Start: 2022-07-28 | End: 2022-07-28

## 2022-07-28 RX ORDER — SODIUM CHLORIDE 9 MG/ML
INJECTION, SOLUTION INTRAVENOUS CONTINUOUS
Status: DISCONTINUED | OUTPATIENT
Start: 2022-07-28 | End: 2022-08-02

## 2022-07-28 RX ADMIN — OXYCODONE HYDROCHLORIDE AND ACETAMINOPHEN 500 MG: 500 TABLET ORAL at 09:07

## 2022-07-28 RX ADMIN — ACETAMINOPHEN 650 MG: 325 TABLET ORAL at 04:07

## 2022-07-28 RX ADMIN — DOCUSATE SODIUM 100 MG: 100 CAPSULE, LIQUID FILLED ORAL at 09:07

## 2022-07-28 RX ADMIN — HYDROMORPHONE HYDROCHLORIDE 1 MG: 2 INJECTION INTRAMUSCULAR; INTRAVENOUS; SUBCUTANEOUS at 05:07

## 2022-07-28 RX ADMIN — PROMETHAZINE HYDROCHLORIDE 25 MG: 25 INJECTION INTRAMUSCULAR; INTRAVENOUS at 04:07

## 2022-07-28 RX ADMIN — PANTOPRAZOLE SODIUM 40 MG: 40 TABLET, DELAYED RELEASE ORAL at 09:07

## 2022-07-28 RX ADMIN — METHOCARBAMOL 1000 MG: 100 INJECTION INTRAMUSCULAR; INTRAVENOUS at 06:07

## 2022-07-28 RX ADMIN — PROMETHAZINE HYDROCHLORIDE 25 MG: 25 INJECTION INTRAMUSCULAR; INTRAVENOUS at 11:07

## 2022-07-28 RX ADMIN — ACETAMINOPHEN 650 MG: 325 TABLET ORAL at 11:07

## 2022-07-28 RX ADMIN — SPIRONOLACTONE 50 MG: 25 TABLET, FILM COATED ORAL at 08:07

## 2022-07-28 RX ADMIN — MELATONIN TAB 3 MG 9 MG: 3 TAB at 08:07

## 2022-07-28 RX ADMIN — DOXEPIN HYDROCHLORIDE 10 MG: 10 CAPSULE ORAL at 08:07

## 2022-07-28 RX ADMIN — ACETAMINOPHEN 650 MG: 325 TABLET ORAL at 09:07

## 2022-07-28 RX ADMIN — HYDROMORPHONE HYDROCHLORIDE 1 MG: 2 INJECTION INTRAMUSCULAR; INTRAVENOUS; SUBCUTANEOUS at 10:07

## 2022-07-28 RX ADMIN — SPIRONOLACTONE 50 MG: 25 TABLET, FILM COATED ORAL at 09:07

## 2022-07-28 RX ADMIN — Medication 1 TABLET: at 09:07

## 2022-07-28 RX ADMIN — Medication 1 TABLET: at 08:07

## 2022-07-28 RX ADMIN — HYDROMORPHONE HYDROCHLORIDE 1 MG: 2 INJECTION INTRAMUSCULAR; INTRAVENOUS; SUBCUTANEOUS at 08:07

## 2022-07-28 RX ADMIN — MAGNESIUM SULFATE HEPTAHYDRATE 2 G: 40 INJECTION, SOLUTION INTRAVENOUS at 09:07

## 2022-07-28 RX ADMIN — HYDROMORPHONE HYDROCHLORIDE 1 MG: 2 INJECTION INTRAMUSCULAR; INTRAVENOUS; SUBCUTANEOUS at 04:07

## 2022-07-28 RX ADMIN — ONDANSETRON 4 MG: 2 INJECTION INTRAMUSCULAR; INTRAVENOUS at 05:07

## 2022-07-28 RX ADMIN — KETOROLAC TROMETHAMINE 15 MG: 30 INJECTION, SOLUTION INTRAMUSCULAR at 06:07

## 2022-07-28 RX ADMIN — SODIUM CHLORIDE: 0.9 INJECTION, SOLUTION INTRAVENOUS at 05:07

## 2022-07-28 RX ADMIN — DOCUSATE SODIUM 100 MG: 100 CAPSULE, LIQUID FILLED ORAL at 08:07

## 2022-07-28 RX ADMIN — PROMETHAZINE HYDROCHLORIDE 25 MG: 25 INJECTION INTRAMUSCULAR; INTRAVENOUS at 08:07

## 2022-07-28 RX ADMIN — OXYCODONE HYDROCHLORIDE AND ACETAMINOPHEN 500 MG: 500 TABLET ORAL at 08:07

## 2022-07-28 RX ADMIN — ONDANSETRON 4 MG: 2 INJECTION INTRAMUSCULAR; INTRAVENOUS at 09:07

## 2022-07-28 NOTE — ASSESSMENT & PLAN NOTE
S/p diagnostic laparoscopy, laparotomy, extensive lysis of adhesions, ileocecectomy      - Abdominal xray demonstrating ileus. Having persistent nausea/vomiting. NG tube ordered. Place to LIS  - IV fluids  - NPO, ice chips  - Analgesia prn  - Home medications  - Increase activity  - DVT ppx  - Replace magnesium

## 2022-07-28 NOTE — PROGRESS NOTES
"[facility]  General Surgery  Progress Note    Subjective:     History of Present Illness:  No notes on file    Post-Op Info:  Procedure(s) (LRB):  LAPAROSCOPY, DIAGNOSTIC (N/A)  LYSIS, ADHESIONS (N/A)  LAPAROTOMY, EXPLORATORY (N/A)  BLOCK, TRANSVERSUS ABDOMINIS PLANE (N/A)  EXCISION, CECUM WITH ILEUM   3 Days Post-Op     Interval History: Continues to have persistent nausea and vomiting.    Medications:  Continuous Infusions:   sodium chloride 0.9%       Scheduled Meds:   acetaminophen  650 mg Oral Q6H    ascorbic acid (vitamin C)  500 mg Oral BID    ceFAZolin (ANCEF) IVPB  2 g Intravenous Once    docusate sodium  100 mg Oral BID    doxepin  10 mg Oral QHS    enoxaparin  40 mg Subcutaneous Daily    Lactobacillus acidoph-L.bulgar  1 tablet Oral BID    pantoprazole  40 mg Oral Daily    scopolamine  1 patch Transdermal Q3 Days    spironolactone  50 mg Oral BID     PRN Meds:diazePAM, HYDROmorphone, melatonin, ondansetron, oxyCODONE, promethazine (PHENERGAN) IVPB, sodium chloride 0.9%, sodium chloride 0.9%     Review of patient's allergies indicates:   Allergen Reactions    Erythromycin Other (See Comments)     Stomach cramps    Morphine Nausea And Vomiting     "Projectile vomiting"     Objective:     Vital Signs (Most Recent):  Temp: 99 °F (37.2 °C) (07/28/22 1134)  Pulse: (!) 114 (07/28/22 1134)  Resp: 13 (07/28/22 1134)  BP: (!) 140/61 (07/28/22 1134)  SpO2: (!) 93 % (07/28/22 1134)   Vital Signs (24h Range):  Temp:  [98.4 °F (36.9 °C)-99.4 °F (37.4 °C)] 99 °F (37.2 °C)  Pulse:  [104-114] 114  Resp:  [13-20] 13  SpO2:  [90 %-96 %] 93 %  BP: (121-140)/(61-85) 140/61     Weight: 71.5 kg (157 lb 8.3 oz)  Body mass index is 24.67 kg/m².    Intake/Output - Last 3 Shifts         07/26 0700 07/27 0659 07/27 0700 07/28 0659 07/28 0700 07/29 0659    P.O.  960 0    I.V. (mL/kg) 1757.8 (24.6) 397.7 (5.6)     IV Piggyback 462.9 50.7     Total Intake(mL/kg) 2220.6 (31.1) 1408.4 (19.7) 0 (0)    Urine " (mL/kg/hr)       Blood       Total Output       Net +2220.6 +1408.4 0           Urine Occurrence 1 x 7 x 3 x    Stool Occurrence  3 x             Physical Exam  Vitals and nursing note reviewed.   Constitutional:       General: She is not in acute distress.  Eyes:      Extraocular Movements: Extraocular movements intact.   Cardiovascular:      Rate and Rhythm: Normal rate.   Pulmonary:      Effort: No respiratory distress.   Abdominal:      Palpations: Abdomen is soft.      Comments: Appropriately tender to palpation, midline incision with staples intact--no drainage or erythema. Mildly distended   Musculoskeletal:      Cervical back: No rigidity.      Comments: LUE edema from prior IV site   Neurological:      General: No focal deficit present.      Mental Status: She is alert and oriented to person, place, and time.       Significant Labs:  I have reviewed all pertinent lab results within the past 24 hours.  CBC:   Recent Labs   Lab 07/28/22  0543   WBC 3.79*   RBC 3.95*   HGB 13.2   HCT 38.1      MCV 97   MCH 33.4*   MCHC 34.6     BMP:   Recent Labs   Lab 07/28/22  0543   *      K 4.3      CO2 18*   BUN 24*   CREATININE 0.9   CALCIUM 8.6*   MG 1.9       Significant Diagnostics:  I have reviewed all pertinent imaging results/findings within the past 24 hours.    Assessment/Plan:     * SBO (small bowel obstruction)  S/p diagnostic laparoscopy, laparotomy, extensive lysis of adhesions, ileocecectomy      - Abdominal xray demonstrating ileus. Having persistent nausea/vomiting. NG tube ordered. Place to LIS  - IV fluids  - NPO, ice chips  - Analgesia prn  - Home medications  - Increase activity  - DVT ppx  - Replace magnesium        Jo Manzano DO  General Surgery  [facility]

## 2022-07-28 NOTE — SUBJECTIVE & OBJECTIVE
"Interval History: Continues to have persistent nausea and vomiting.    Medications:  Continuous Infusions:   sodium chloride 0.9%       Scheduled Meds:   acetaminophen  650 mg Oral Q6H    ascorbic acid (vitamin C)  500 mg Oral BID    ceFAZolin (ANCEF) IVPB  2 g Intravenous Once    docusate sodium  100 mg Oral BID    doxepin  10 mg Oral QHS    enoxaparin  40 mg Subcutaneous Daily    Lactobacillus acidoph-L.bulgar  1 tablet Oral BID    pantoprazole  40 mg Oral Daily    scopolamine  1 patch Transdermal Q3 Days    spironolactone  50 mg Oral BID     PRN Meds:diazePAM, HYDROmorphone, melatonin, ondansetron, oxyCODONE, promethazine (PHENERGAN) IVPB, sodium chloride 0.9%, sodium chloride 0.9%     Review of patient's allergies indicates:   Allergen Reactions    Erythromycin Other (See Comments)     Stomach cramps    Morphine Nausea And Vomiting     "Projectile vomiting"     Objective:     Vital Signs (Most Recent):  Temp: 99 °F (37.2 °C) (07/28/22 1134)  Pulse: (!) 114 (07/28/22 1134)  Resp: 13 (07/28/22 1134)  BP: (!) 140/61 (07/28/22 1134)  SpO2: (!) 93 % (07/28/22 1134)   Vital Signs (24h Range):  Temp:  [98.4 °F (36.9 °C)-99.4 °F (37.4 °C)] 99 °F (37.2 °C)  Pulse:  [104-114] 114  Resp:  [13-20] 13  SpO2:  [90 %-96 %] 93 %  BP: (121-140)/(61-85) 140/61     Weight: 71.5 kg (157 lb 8.3 oz)  Body mass index is 24.67 kg/m².    Intake/Output - Last 3 Shifts         07/26 0700 07/27 0659 07/27 0700 07/28 0659 07/28 0700 07/29 0659    P.O.  960 0    I.V. (mL/kg) 1757.8 (24.6) 397.7 (5.6)     IV Piggyback 462.9 50.7     Total Intake(mL/kg) 2220.6 (31.1) 1408.4 (19.7) 0 (0)    Urine (mL/kg/hr)       Blood       Total Output       Net +2220.6 +1408.4 0           Urine Occurrence 1 x 7 x 3 x    Stool Occurrence  3 x             Physical Exam  Vitals and nursing note reviewed.   Constitutional:       General: She is not in acute distress.  Eyes:      Extraocular Movements: Extraocular movements intact.   Cardiovascular:      Rate " and Rhythm: Normal rate.   Pulmonary:      Effort: No respiratory distress.   Abdominal:      Palpations: Abdomen is soft.      Comments: Appropriately tender to palpation, midline incision with staples intact--no drainage or erythema. Mildly distended   Musculoskeletal:      Cervical back: No rigidity.      Comments: LUE edema from prior IV site   Neurological:      General: No focal deficit present.      Mental Status: She is alert and oriented to person, place, and time.       Significant Labs:  I have reviewed all pertinent lab results within the past 24 hours.  CBC:   Recent Labs   Lab 07/28/22  0543   WBC 3.79*   RBC 3.95*   HGB 13.2   HCT 38.1      MCV 97   MCH 33.4*   MCHC 34.6     BMP:   Recent Labs   Lab 07/28/22  0543   *      K 4.3      CO2 18*   BUN 24*   CREATININE 0.9   CALCIUM 8.6*   MG 1.9       Significant Diagnostics:  I have reviewed all pertinent imaging results/findings within the past 24 hours.
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
## 2022-07-29 PROBLEM — J18.9 HOSPITAL-ACQUIRED PNEUMONIA: Status: ACTIVE | Noted: 2022-07-29

## 2022-07-29 PROBLEM — J96.01 ACUTE HYPOXEMIC RESPIRATORY FAILURE: Status: ACTIVE | Noted: 2022-07-29

## 2022-07-29 LAB
ALLENS TEST: ABNORMAL
ANION GAP SERPL CALC-SCNC: 13 MMOL/L (ref 8–16)
BNP SERPL-MCNC: 18 PG/ML (ref 0–99)
BUN SERPL-MCNC: 20 MG/DL (ref 6–20)
CALCIUM SERPL-MCNC: 8.6 MG/DL (ref 8.7–10.5)
CHLORIDE SERPL-SCNC: 102 MMOL/L (ref 95–110)
CO2 SERPL-SCNC: 21 MMOL/L (ref 23–29)
CREAT SERPL-MCNC: 0.7 MG/DL (ref 0.5–1.4)
D DIMER PPP IA.FEU-MCNC: 6.15 MG/L FEU
DELSYS: ABNORMAL
EST. GFR  (AFRICAN AMERICAN): >60 ML/MIN/1.73 M^2
EST. GFR  (NON AFRICAN AMERICAN): >60 ML/MIN/1.73 M^2
FIO2: 21
GLUCOSE SERPL-MCNC: 111 MG/DL (ref 70–110)
HCO3 UR-SCNC: 23.3 MMOL/L (ref 24–28)
MAGNESIUM SERPL-MCNC: 2 MG/DL (ref 1.6–2.6)
MODE: ABNORMAL
PCO2 BLDA: 40.7 MMHG (ref 35–45)
PH SMN: 7.37 [PH] (ref 7.35–7.45)
PHOSPHATE SERPL-MCNC: 1.7 MG/DL (ref 2.7–4.5)
PO2 BLDA: 51 MMHG (ref 80–100)
POC BE: -2 MMOL/L
POC SATURATED O2: 84 % (ref 95–100)
POTASSIUM SERPL-SCNC: 3.6 MMOL/L (ref 3.5–5.1)
SAMPLE: ABNORMAL
SITE: ABNORMAL
SODIUM SERPL-SCNC: 136 MMOL/L (ref 136–145)

## 2022-07-29 PROCEDURE — 87040 BLOOD CULTURE FOR BACTERIA: CPT | Mod: 59 | Performed by: INTERNAL MEDICINE

## 2022-07-29 PROCEDURE — 85379 FIBRIN DEGRADATION QUANT: CPT | Performed by: INTERNAL MEDICINE

## 2022-07-29 PROCEDURE — 83735 ASSAY OF MAGNESIUM: CPT | Performed by: INTERNAL MEDICINE

## 2022-07-29 PROCEDURE — 87205 SMEAR GRAM STAIN: CPT | Performed by: INTERNAL MEDICINE

## 2022-07-29 PROCEDURE — 63600175 PHARM REV CODE 636 W HCPCS: Performed by: SURGERY

## 2022-07-29 PROCEDURE — 25000003 PHARM REV CODE 250: Performed by: INTERNAL MEDICINE

## 2022-07-29 PROCEDURE — 25000003 PHARM REV CODE 250: Performed by: SURGERY

## 2022-07-29 PROCEDURE — 83880 ASSAY OF NATRIURETIC PEPTIDE: CPT | Performed by: INTERNAL MEDICINE

## 2022-07-29 PROCEDURE — C9113 INJ PANTOPRAZOLE SODIUM, VIA: HCPCS | Performed by: SURGERY

## 2022-07-29 PROCEDURE — 87077 CULTURE AEROBIC IDENTIFY: CPT | Mod: 59 | Performed by: INTERNAL MEDICINE

## 2022-07-29 PROCEDURE — 36600 WITHDRAWAL OF ARTERIAL BLOOD: CPT

## 2022-07-29 PROCEDURE — 87186 SC STD MICRODIL/AGAR DIL: CPT | Performed by: INTERNAL MEDICINE

## 2022-07-29 PROCEDURE — 99900035 HC TECH TIME PER 15 MIN (STAT)

## 2022-07-29 PROCEDURE — 36415 COLL VENOUS BLD VENIPUNCTURE: CPT | Performed by: INTERNAL MEDICINE

## 2022-07-29 PROCEDURE — 63600175 PHARM REV CODE 636 W HCPCS: Performed by: INTERNAL MEDICINE

## 2022-07-29 PROCEDURE — 80048 BASIC METABOLIC PNL TOTAL CA: CPT | Performed by: INTERNAL MEDICINE

## 2022-07-29 PROCEDURE — 84100 ASSAY OF PHOSPHORUS: CPT | Performed by: INTERNAL MEDICINE

## 2022-07-29 PROCEDURE — 25500020 PHARM REV CODE 255: Performed by: SURGERY

## 2022-07-29 PROCEDURE — 11000001 HC ACUTE MED/SURG PRIVATE ROOM

## 2022-07-29 RX ORDER — PANTOPRAZOLE SODIUM 40 MG/10ML
40 INJECTION, POWDER, LYOPHILIZED, FOR SOLUTION INTRAVENOUS DAILY
Status: DISCONTINUED | OUTPATIENT
Start: 2022-07-29 | End: 2022-08-03

## 2022-07-29 RX ORDER — FUROSEMIDE 10 MG/ML
40 INJECTION INTRAMUSCULAR; INTRAVENOUS ONCE
Status: COMPLETED | OUTPATIENT
Start: 2022-07-29 | End: 2022-07-29

## 2022-07-29 RX ORDER — IPRATROPIUM BROMIDE AND ALBUTEROL SULFATE 2.5; .5 MG/3ML; MG/3ML
3 SOLUTION RESPIRATORY (INHALATION) EVERY 6 HOURS PRN
Status: DISCONTINUED | OUTPATIENT
Start: 2022-07-29 | End: 2022-08-03 | Stop reason: HOSPADM

## 2022-07-29 RX ADMIN — HYDROMORPHONE HYDROCHLORIDE 1 MG: 2 INJECTION INTRAMUSCULAR; INTRAVENOUS; SUBCUTANEOUS at 03:07

## 2022-07-29 RX ADMIN — HYDROMORPHONE HYDROCHLORIDE 1 MG: 2 INJECTION INTRAMUSCULAR; INTRAVENOUS; SUBCUTANEOUS at 05:07

## 2022-07-29 RX ADMIN — HYDROMORPHONE HYDROCHLORIDE 1 MG: 2 INJECTION INTRAMUSCULAR; INTRAVENOUS; SUBCUTANEOUS at 10:07

## 2022-07-29 RX ADMIN — FUROSEMIDE 40 MG: 10 INJECTION, SOLUTION INTRAMUSCULAR; INTRAVENOUS at 02:07

## 2022-07-29 RX ADMIN — DIAZEPAM 10 MG: 5 TABLET ORAL at 09:07

## 2022-07-29 RX ADMIN — ACETAMINOPHEN 650 MG: 325 TABLET ORAL at 09:07

## 2022-07-29 RX ADMIN — PROMETHAZINE HYDROCHLORIDE 25 MG: 25 INJECTION INTRAMUSCULAR; INTRAVENOUS at 02:07

## 2022-07-29 RX ADMIN — PROMETHAZINE HYDROCHLORIDE 25 MG: 25 INJECTION INTRAMUSCULAR; INTRAVENOUS at 08:07

## 2022-07-29 RX ADMIN — OXYCODONE HYDROCHLORIDE AND ACETAMINOPHEN 500 MG: 500 TABLET ORAL at 08:07

## 2022-07-29 RX ADMIN — ONDANSETRON 4 MG: 2 INJECTION INTRAMUSCULAR; INTRAVENOUS at 12:07

## 2022-07-29 RX ADMIN — DIAZEPAM 10 MG: 5 TABLET ORAL at 12:07

## 2022-07-29 RX ADMIN — HYDROMORPHONE HYDROCHLORIDE 1 MG: 2 INJECTION INTRAMUSCULAR; INTRAVENOUS; SUBCUTANEOUS at 08:07

## 2022-07-29 RX ADMIN — MELATONIN TAB 3 MG 9 MG: 3 TAB at 08:07

## 2022-07-29 RX ADMIN — SPIRONOLACTONE 50 MG: 25 TABLET, FILM COATED ORAL at 08:07

## 2022-07-29 RX ADMIN — ONDANSETRON 4 MG: 2 INJECTION INTRAMUSCULAR; INTRAVENOUS at 03:07

## 2022-07-29 RX ADMIN — IOHEXOL 100 ML: 350 INJECTION, SOLUTION INTRAVENOUS at 04:07

## 2022-07-29 RX ADMIN — ACETAMINOPHEN 650 MG: 325 TABLET ORAL at 03:07

## 2022-07-29 RX ADMIN — PANTOPRAZOLE SODIUM 40 MG: 40 INJECTION, POWDER, FOR SOLUTION INTRAVENOUS at 09:07

## 2022-07-29 RX ADMIN — SODIUM PHOSPHATE, MONOBASIC, MONOHYDRATE 30 MMOL: 276; 142 INJECTION, SOLUTION INTRAVENOUS at 05:07

## 2022-07-29 RX ADMIN — HYDROMORPHONE HYDROCHLORIDE 1 MG: 2 INJECTION INTRAMUSCULAR; INTRAVENOUS; SUBCUTANEOUS at 12:07

## 2022-07-29 RX ADMIN — PROMETHAZINE HYDROCHLORIDE 25 MG: 25 INJECTION INTRAMUSCULAR; INTRAVENOUS at 05:07

## 2022-07-29 RX ADMIN — DOXEPIN HYDROCHLORIDE 10 MG: 10 CAPSULE ORAL at 08:07

## 2022-07-29 RX ADMIN — Medication 1 TABLET: at 08:07

## 2022-07-29 RX ADMIN — SODIUM CHLORIDE 3 G: 9 INJECTION, SOLUTION INTRAVENOUS at 07:07

## 2022-07-29 NOTE — ASSESSMENT & PLAN NOTE
- Imaging study suggestive of eze onset  bibasilar right greater than left pulmonary opacities  -Aspiration highly suspected in the setting of recent episodes of vomiting   -Blood cultures x 2  -Antimicrobial targeting aspiration  Pneumonia   -Supplemental oxygen   -Monitor course

## 2022-07-29 NOTE — SUBJECTIVE & OBJECTIVE
Past Medical History:   Diagnosis Date    Encounter for blood transfusion     KENN (generalized anxiety disorder)     Takes 5-10 mg of valium qd prn anxiety (prescriptions for both on file, one from [facility] & other from )    Insomnia     Takes 5-10 mg of valium qhs prn insomnia (prescriptions for both on file, one from [facility] & other from )    Migraine headache     Nephrolithiasis 08/03/2019    Left ureteral stone -> UTI c/b pyelonephritis and urosepsis w/ hypokalemia -> transfered to [facility] urology service from [facility] after CT abn dx, s/p 2 stents to allow infx to drain, IV Vanc/Rocephin, fever free since yesterday    Reflex sympathetic dystrophy     UTI (urinary tract infection)     Vertigo        Past Surgical History:   Procedure Laterality Date    BLADDER SURGERY      CHOLECYSTECTOMY      COLONOSCOPY N/A 11/10/2021    Procedure: COLONOSCOPY;  Surgeon: Eryn Rothman MD;  Location: [facility];  Service: Endoscopy;  Laterality: N/A;    CYSTOSCOPY WITH URETEROSCOPY, RETROGRADE PYELOGRAPHY, AND INSERTION OF STENT Right 9/30/2021    Procedure: CYSTOSCOPY, WITH RETROGRADE PYELOGRAM AND URETERAL STENT INSERTION;  Surgeon: Annita Gamino MD;  Location: [facility];  Service: Urology;  Laterality: Right;    DIAGNOSTIC LAPAROSCOPY N/A 11/11/2020    Procedure: LAPAROSCOPY, DIAGNOSTIC;  Surgeon: Tee Segura MD;  Location: [facility];  Service: General;  Laterality: N/A;  CONVERTED TO OPEN    DIAGNOSTIC LAPAROSCOPY N/A 7/25/2022    Procedure: LAPAROSCOPY, DIAGNOSTIC;  Surgeon: Jo Manzano DO;  Location: [facility];  Service: General;  Laterality: N/A;  convert to open at 0739    ESOPHAGOGASTRODUODENOSCOPY N/A 11/10/2021    Procedure: EGD (ESOPHAGOGASTRODUODENOSCOPY);  Surgeon: Eryn Rothman MD;  Location: [facility];  Service: Endoscopy;  Laterality: N/A;    facet injections  02/14/2017    L3, L4, L5, S1 Done by Dr. Lara    HYSTERECTOMY      INJECTION OF ANESTHETIC AGENT INTO TISSUE PLANE DEFINED BY  TRANSVERSUS ABDOMINIS MUSCLE N/A 11/11/2020    Procedure: BLOCK, TRANSVERSUS ABDOMINIS PLANE;  Surgeon: Tee Segura MD;  Location: [facility] OR;  Service: General;  Laterality: N/A;    INJECTION OF ANESTHETIC AGENT INTO TISSUE PLANE DEFINED BY TRANSVERSUS ABDOMINIS MUSCLE N/A 7/25/2022    Procedure: BLOCK, TRANSVERSUS ABDOMINIS PLANE;  Surgeon: Jo Manzano DO;  Location: [facility] OR;  Service: General;  Laterality: N/A;    KNEE SURGERY      LAPAROSCOPIC LYSIS OF ADHESIONS N/A 11/11/2020    Procedure: LYSIS, ADHESIONS, LAPAROSCOPIC;  Surgeon: Tee Segura MD;  Location: [facility] OR;  Service: General;  Laterality: N/A;  CONVERTED TO OPEN    LAPAROTOMY N/A 11/20/2020    Procedure: LAPAROTOMY;  Surgeon: Tee Segura MD;  Location: [facility] OR;  Service: General;  Laterality: N/A;    LASER LITHOTRIPSY Left 2/8/2021    Procedure: LITHOTRIPSY, USING LASER;  Surgeon: Irving Kidd MD;  Location: [facility] OR;  Service: Urology;  Laterality: Left;    LASER LITHOTRIPSY Right 10/13/2021    Procedure: LITHOTRIPSY, USING LASER;  Surgeon: Annita Gamino MD;  Location: [facility];  Service: Urology;  Laterality: Right;    LYSIS OF ADHESIONS N/A 11/11/2020    Procedure: LYSIS, ADHESIONS;  Surgeon: Tee Segura MD;  Location: [facility];  Service: General;  Laterality: N/A;    LYSIS OF ADHESIONS N/A 11/20/2020    Procedure: LYSIS, ADHESIONS;  Surgeon: Tee Segura MD;  Location: [facility];  Service: General;  Laterality: N/A;    LYSIS OF ADHESIONS N/A 7/25/2022    Procedure: LYSIS, ADHESIONS;  Surgeon: Jo Manzano DO;  Location: [facility];  Service: General;  Laterality: N/A;    SURGICAL REMOVAL OF ILEUM WITH CECUM  7/25/2022    Procedure: EXCISION, CECUM WITH ILEUM;  Surgeon: Jo Manzano DO;  Location: [facility] OR;  Service: General;;    TONSILLECTOMY      URETEROSCOPY Left 2/8/2021    Procedure: URETEROSCOPY;  Surgeon: Irving Kidd MD;  Location: [facility] OR;  Service: Urology;  Laterality: Left;   "stent placement    URETEROSCOPY Right 10/13/2021    Procedure: URETEROSCOPY;  Surgeon: Annita Gamino MD;  Location: [facility];  Service: Urology;  Laterality: Right;       Review of patient's allergies indicates:   Allergen Reactions    Erythromycin Other (See Comments)     Stomach cramps    Morphine Nausea And Vomiting     "Projectile vomiting"       No current facility-administered medications on file prior to encounter.     Current Outpatient Medications on File Prior to Encounter   Medication Sig    cyclobenzaprine (FLEXERIL) 10 MG tablet Take 10 mg by mouth nightly.    diazePAM (VALIUM) 10 MG Tab TAKE 1 TABLET BY MOUTH EVERY DAY AS NEEDED    diazePAM (VALIUM) 5 MG tablet Take one with onset of migraine    diphenhydrAMINE (BENADRYL) 25 mg capsule Take 25 mg by mouth 2 (two) times a day.    docusate sodium (COLACE) 100 MG capsule Take 1 capsule (100 mg total) by mouth 2 (two) times daily.    doxepin (SINEQUAN) 10 MG capsule TAKE 1 CAPSULE (10 MG TOTAL) BY MOUTH EVERY EVENING.    HYDROcodone-acetaminophen (NORCO) 7.5-325 mg per tablet Take 1 tablet by mouth every 4 (four) hours as needed for Pain.    LACTOBACILLUS COMBO NO.6 (PROBIOTIC COMPLEX ORAL) Take by mouth once daily at 6am.    LINZESS 290 mcg Cap capsule TAKE 1 CAPSULE (290 MCG TOTAL) BY MOUTH BEFORE BREAKFAST. FOR 30 DOSES    loratadine (CLARITIN) 10 mg tablet Take 10 mg by mouth daily    multivitamin capsule Take 1 capsule by mouth once daily.    ondansetron (ZOFRAN) 4 MG tablet Take 1 tablet (4 mg total) by mouth 2 (two) times daily.    pantoprazole (PROTONIX) 40 MG tablet TAKE 1 TABLET BY MOUTH EVERY DAY    pseudoephedrine (SUDAFED) 120 mg 12 hr tablet Take 120 mg by mouth once daily.    spironolactone (ALDACTONE) 50 MG tablet TAKE 1 TABLET BY MOUTH ONCE DAILY THEN INCREASE TO 2 TABLETS DAILY AS TOLERATED (Patient taking differently: Take 50 mg by mouth 2 (two) times daily.)    sumatriptan (IMITREX) 100 MG tablet TAKE 1 TABLET BY MOUTH IF NEEDED, " MAY REPEAT ONCE IN 1 HOUR. MAX OF 2 TABLETS IN 24 HOURS    methocarbamoL (ROBAXIN) 500 MG Tab TAKE 1 TABLET (500 MG TOTAL) BY MOUTH 4 (FOUR) TIMES DAILY     Family History       Problem Relation (Age of Onset)    COPD Father    Drug abuse Brother    Hypertension Mother    Stroke Mother          Tobacco Use    Smoking status: Never Smoker    Smokeless tobacco: Never Used   Substance and Sexual Activity    Alcohol use: Yes     Comment: rarely    Drug use: No    Sexual activity: Never     Review of Systems   Constitutional:  Positive for activity change, appetite change and chills. Negative for fever.   HENT:  Negative for sore throat.    Eyes:  Negative for visual disturbance.   Respiratory:  Positive for cough. Negative for chest tightness and shortness of breath.    Cardiovascular:  Positive for chest pain. Negative for palpitations and leg swelling.   Gastrointestinal:  Positive for abdominal pain. Negative for abdominal distention, constipation, diarrhea, nausea and vomiting.   Endocrine: Negative for polyuria.   Genitourinary:  Negative for decreased urine volume, dysuria, flank pain, frequency and hematuria.   Musculoskeletal:  Negative for back pain and gait problem.   Skin:  Negative for rash.   Neurological:  Negative for syncope, speech difficulty, weakness, light-headedness and headaches.   Psychiatric/Behavioral:  Negative for confusion, hallucinations and sleep disturbance.    Objective:     Vital Signs (Most Recent):  Temp: 98 °F (36.7 °C) (07/29/22 0711)  Pulse: 107 (07/29/22 0711)  Resp: 16 (07/29/22 1531)  BP: 111/67 (07/29/22 0711)  SpO2: (!) 86 % (07/29/22 0711)   Vital Signs (24h Range):  Temp:  [97.5 °F (36.4 °C)-100.2 °F (37.9 °C)] 98 °F (36.7 °C)  Pulse:  [107-123] 107  Resp:  [14-18] 16  SpO2:  [86 %-100 %] 86 %  BP: (111-168)/() 111/67     Weight: 71.5 kg (157 lb 8.3 oz)  Body mass index is 24.67 kg/m².    Physical Exam  Constitutional:       General: She is not in acute distress.      Appearance: She is well-developed. She is ill-appearing. She is not diaphoretic.   HENT:      Head: Normocephalic and atraumatic.      Mouth/Throat:      Pharynx: No oropharyngeal exudate.   Eyes:      Conjunctiva/sclera: Conjunctivae normal.      Pupils: Pupils are equal, round, and reactive to light.   Neck:      Thyroid: No thyromegaly.      Vascular: No JVD.   Cardiovascular:      Rate and Rhythm: Normal rate and regular rhythm.      Heart sounds: Normal heart sounds. No murmur heard.  Pulmonary:      Effort: Pulmonary effort is normal. No respiratory distress.      Breath sounds: Examination of the right-lower field reveals rales. Examination of the left-lower field reveals rales. Rales present. No wheezing.   Chest:      Chest wall: No tenderness.   Abdominal:      General: Bowel sounds are normal. There is no distension.      Palpations: Abdomen is soft.      Tenderness: There is abdominal tenderness (generalized). There is no guarding or rebound.      Comments: Bowel sounds distant    Musculoskeletal:         General: Normal range of motion.      Cervical back: Normal range of motion and neck supple.   Lymphadenopathy:      Cervical: No cervical adenopathy.   Skin:     General: Skin is warm and dry.      Findings: No rash.   Neurological:      Mental Status: She is alert and oriented to person, place, and time.      Cranial Nerves: No cranial nerve deficit.      Sensory: No sensory deficit.      Deep Tendon Reflexes: Reflexes normal.       Significant Labs:   Results for orders placed or performed during the hospital encounter of 07/25/22   Creatinine, serum   Result Value Ref Range    Creatinine 0.7 0.5 - 1.4 mg/dL    eGFR if African American >60 >60 mL/min/1.73 m^2    eGFR if non African American >60 >60 mL/min/1.73 m^2   CBC Auto Differential   Result Value Ref Range    WBC 8.13 3.90 - 12.70 K/uL    RBC 3.84 (L) 4.00 - 5.40 M/uL    Hemoglobin 12.9 12.0 - 16.0 g/dL    Hematocrit 37.6 37.0 - 48.5 %    MCV  98 82 - 98 fL    MCH 33.6 (H) 27.0 - 31.0 pg    MCHC 34.3 32.0 - 36.0 g/dL    RDW 13.2 11.5 - 14.5 %    Platelets 212 150 - 450 K/uL    MPV 11.5 9.2 - 12.9 fL    Immature Granulocytes 0.2 0.0 - 0.5 %    Gran # (ANC) 5.7 1.8 - 7.7 K/uL    Immature Grans (Abs) 0.02 0.00 - 0.04 K/uL    Lymph # 1.6 1.0 - 4.8 K/uL    Mono # 0.6 0.3 - 1.0 K/uL    Eos # 0.3 0.0 - 0.5 K/uL    Baso # 0.05 0.00 - 0.20 K/uL    nRBC 0 0 /100 WBC    Gran % 69.9 38.0 - 73.0 %    Lymph % 19.3 18.0 - 48.0 %    Mono % 6.9 4.0 - 15.0 %    Eosinophil % 3.1 0.0 - 8.0 %    Basophil % 0.6 0.0 - 1.9 %    Differential Method Automated    Basic Metabolic Panel   Result Value Ref Range    Sodium 134 (L) 136 - 145 mmol/L    Potassium 3.5 3.5 - 5.1 mmol/L    Chloride 104 95 - 110 mmol/L    CO2 20 (L) 23 - 29 mmol/L    Glucose 98 70 - 110 mg/dL    BUN 24 (H) 6 - 20 mg/dL    Creatinine 0.7 0.5 - 1.4 mg/dL    Calcium 8.3 (L) 8.7 - 10.5 mg/dL    Anion Gap 10 8 - 16 mmol/L    eGFR if African American >60 >60 mL/min/1.73 m^2    eGFR if non African American >60 >60 mL/min/1.73 m^2   Magnesium   Result Value Ref Range    Magnesium 1.4 (L) 1.6 - 2.6 mg/dL   Phosphorus   Result Value Ref Range    Phosphorus 3.6 2.7 - 4.5 mg/dL   Basic Metabolic Panel   Result Value Ref Range    Sodium 135 (L) 136 - 145 mmol/L    Potassium 3.7 3.5 - 5.1 mmol/L    Chloride 107 95 - 110 mmol/L    CO2 16 (L) 23 - 29 mmol/L    Glucose 113 (H) 70 - 110 mg/dL    BUN 21 (H) 6 - 20 mg/dL    Creatinine 0.7 0.5 - 1.4 mg/dL    Calcium 8.0 (L) 8.7 - 10.5 mg/dL    Anion Gap 12 8 - 16 mmol/L    eGFR if African American >60 >60 mL/min/1.73 m^2    eGFR if non African American >60 >60 mL/min/1.73 m^2   Magnesium   Result Value Ref Range    Magnesium 1.4 (L) 1.6 - 2.6 mg/dL   Phosphorus   Result Value Ref Range    Phosphorus 2.7 2.7 - 4.5 mg/dL   CBC auto differential   Result Value Ref Range    WBC 3.71 (L) 3.90 - 12.70 K/uL    RBC 3.61 (L) 4.00 - 5.40 M/uL    Hemoglobin 11.6 (L) 12.0 - 16.0 g/dL     Hematocrit 35.8 (L) 37.0 - 48.5 %    MCV 99 (H) 82 - 98 fL    MCH 32.1 (H) 27.0 - 31.0 pg    MCHC 32.4 32.0 - 36.0 g/dL    RDW 12.9 11.5 - 14.5 %    Platelets 206 150 - 450 K/uL    MPV 11.1 9.2 - 12.9 fL    Immature Granulocytes 0.3 0.0 - 0.5 %    Gran # (ANC) 2.5 1.8 - 7.7 K/uL    Immature Grans (Abs) 0.01 0.00 - 0.04 K/uL    Lymph # 0.6 (L) 1.0 - 4.8 K/uL    Mono # 0.5 0.3 - 1.0 K/uL    Eos # 0.1 0.0 - 0.5 K/uL    Baso # 0.03 0.00 - 0.20 K/uL    nRBC 0 0 /100 WBC    Gran % 67.4 38.0 - 73.0 %    Lymph % 16.7 (L) 18.0 - 48.0 %    Mono % 12.1 4.0 - 15.0 %    Eosinophil % 2.7 0.0 - 8.0 %    Basophil % 0.8 0.0 - 1.9 %    Differential Method Automated    CBC Auto Differential   Result Value Ref Range    WBC 3.79 (L) 3.90 - 12.70 K/uL    RBC 3.95 (L) 4.00 - 5.40 M/uL    Hemoglobin 13.2 12.0 - 16.0 g/dL    Hematocrit 38.1 37.0 - 48.5 %    MCV 97 82 - 98 fL    MCH 33.4 (H) 27.0 - 31.0 pg    MCHC 34.6 32.0 - 36.0 g/dL    RDW 12.7 11.5 - 14.5 %    Platelets 283 150 - 450 K/uL    MPV 11.2 9.2 - 12.9 fL    Immature Granulocytes 0.5 0.0 - 0.5 %    Gran # (ANC) 2.3 1.8 - 7.7 K/uL    Immature Grans (Abs) 0.02 0.00 - 0.04 K/uL    Lymph # 0.7 (L) 1.0 - 4.8 K/uL    Mono # 0.6 0.3 - 1.0 K/uL    Eos # 0.1 0.0 - 0.5 K/uL    Baso # 0.04 0.00 - 0.20 K/uL    nRBC 0 0 /100 WBC    Gran % 61.1 38.0 - 73.0 %    Lymph % 18.5 18.0 - 48.0 %    Mono % 16.4 (H) 4.0 - 15.0 %    Eosinophil % 2.4 0.0 - 8.0 %    Basophil % 1.1 0.0 - 1.9 %    Platelet Estimate Decreased (A)     Differential Method Automated    Basic Metabolic Panel   Result Value Ref Range    Sodium 136 136 - 145 mmol/L    Potassium 4.3 3.5 - 5.1 mmol/L    Chloride 103 95 - 110 mmol/L    CO2 18 (L) 23 - 29 mmol/L    Glucose 148 (H) 70 - 110 mg/dL    BUN 24 (H) 6 - 20 mg/dL    Creatinine 0.9 0.5 - 1.4 mg/dL    Calcium 8.6 (L) 8.7 - 10.5 mg/dL    Anion Gap 15 8 - 16 mmol/L    eGFR if African American >60 >60 mL/min/1.73 m^2    eGFR if non African American >60 >60 mL/min/1.73 m^2  "  Magnesium   Result Value Ref Range    Magnesium 1.9 1.6 - 2.6 mg/dL   Phosphorus   Result Value Ref Range    Phosphorus 2.9 2.7 - 4.5 mg/dL   BNP   Result Value Ref Range    BNP 18 0 - 99 pg/mL   D dimer, quantitative   Result Value Ref Range    D-Dimer 6.15 (H) <0.50 mg/L FEU   Basic Metabolic Panel   Result Value Ref Range    Sodium 136 136 - 145 mmol/L    Potassium 3.6 3.5 - 5.1 mmol/L    Chloride 102 95 - 110 mmol/L    CO2 21 (L) 23 - 29 mmol/L    Glucose 111 (H) 70 - 110 mg/dL    BUN 20 6 - 20 mg/dL    Creatinine 0.7 0.5 - 1.4 mg/dL    Calcium 8.6 (L) 8.7 - 10.5 mg/dL    Anion Gap 13 8 - 16 mmol/L    eGFR if African American >60 >60 mL/min/1.73 m^2    eGFR if non African American >60 >60 mL/min/1.73 m^2   Magnesium   Result Value Ref Range    Magnesium 2.0 1.6 - 2.6 mg/dL   Phosphorus   Result Value Ref Range    Phosphorus 1.7 (L) 2.7 - 4.5 mg/dL   SARS Coronavirus 2 Antigen, POCT Manual Read   Result Value Ref Range    SARS Coronavirus 2 Antigen Negative Negative     Acceptable Yes    Specimen to Pathology, Surgery General Surgery   Result Value Ref Range    Final Pathologic Diagnosis       1. Cecum, terminal ileum, and appendix; ileocecectomy:  - Segment of colon and small bowel with congestion, fibrous serosal  adhesions, and acute serositis  - Appendix with no diagnostic histopathologic alterations  - Negative for dysplasia and malignancy      Gross       Surgery ID:  [id number]   Pathology ID:  [id number]  1. Received in formalin labeled "cecum, terminal ileum, and appendix" is a 14  cm in length by 3.5 cm in diameter anastomotic portion of terminal ileum and  9 cm in length by 4.5 cm in diameter cecum with attached 5 x 0.9 cm appendix.   The portion of ileum is adherent to the portion of cecum.  There is a 6 cm  in diameter transmural defect identified at the adherent area of ileum to  cecum.  The remaining serosa is red-brown with areas of adhesions.  Opening  the specimen reveals a 11.2 cm " red-brown, hemorrhagic, poorly healed,  dehiscent anastomotic junction between the ileum and cecum colon coinciding  with the previously described transmural defect.  The mucosa surrounding this  anastomotic junction is pink-red and congested.  The remaining portion of  ileum shows an additional 4.2 cm in length well-healed anastomotic junction  measuring 9 cm from the ileal margin and 13 cm from the distal cecal margin.  The remaining cecal mucosa is pin k-red, congested, and edematous.  Sectioning  through the appendix reveals a patent, unremarkable lumen.  Representative  sections are submitted as follows:  1A:  Tissue from proximal ileal staple line margin  1B:  Tissue from distal cecal margin  1C-1D:  Representative sections of poorly healed anastomotic junction  1E:  Representative section of well-healed anastomotic junction  1F:  Representative section of cecum  1G:  Representative sections of appendix  Grossed by: Joana Moore      Disclaimer       Unless the case is a 'gross only' or additional testing only, the final  diagnosis for each specimen is based on a microscopic examination of  appropriate tissue sections.     POCT glucose   Result Value Ref Range    POCT Glucose 142 (H) 70 - 110 mg/dL   ISTAT PROCEDURE   Result Value Ref Range    POC PH 7.366 7.35 - 7.45    POC PCO2 40.7 35 - 45 mmHg    POC PO2 51 (LL) 80 - 100 mmHg    POC HCO3 23.3 (L) 24 - 28 mmol/L    POC BE -2 -2 to 2 mmol/L    POC SATURATED O2 84 (L) 95 - 100 %    Sample ARTERIAL     Site RR     Allens Test Pass     DelSys Room Air     Mode SPONT     FiO2 21          Significant Imaging:

## 2022-07-29 NOTE — NURSING
"Pt. Rounds completed, pt noted with NGT dislodged sitting on her lap. Pt. States " I'm sorry, I did not mean to pull the tube out, I am so sorry for making you guys work so hard" Instructed patient on importance of maintaining NGT in place as ordered per MD, verbalizes understanding. Pt. Agrees to NGT replacement. 16 FR inserted to rt nare, secured to center nostril securement device. Pt. Tolerated well. CXR ordered to confirm placement. Will continue to monitor.   "

## 2022-07-29 NOTE — PHYSICIAN QUERY
PT Name: Genny Calixto  MR #: [id number]     Documentation Clarification      CDS/: Buffy Daniel               Contact information: [email or web address]    This form is a permanent document in the medical record.     Query Date: July 29, 2022    By submitting this query, we are merely seeking further clarification of documentation. Please utilize your independent clinical judgment when addressing the question(s) below.    The Medical Record reflects the following:    Supporting Clinical Findings Location in Medical Record   Small bowel obstruction, abdominal adhesions, anastomotic stricture    Procedure: Diagnostic laparoscopy converted to laparotomy, extensive lysis of adhesions, ileocecectomy, transversus abdominis plan (TAP) block    Significant findings: Extensive adhesions throughout abdomen, some causing partial obstruction. Stricture of the patient's previous anastomosis located a few centimeters proximal to the ileocecal valve. 190 cm of small bowel remaining.     Op Note 07/27   3 Days Post-Op  Continues to have persistent nausea and vomiting  Abdominal:  Mildly distended    S/p diagnostic laparoscopy, laparotomy, extensive lysis of adhesions, ileocecectomy  Abdominal xray demonstrating ileus. Having persistent nausea/vomiting. NG tube ordered. Place to LIS  IV fluids  NPO, ice chips   General Surgery PN 07/28   Numerous dilated small bowel loops within the central abdomen with gastric distention with air.  Postoperative ileus versus small-bowel obstruction must be considered, considering there are new postoperative changes in the right lower quadrant and lower abdominal midline. X Ray Abdomen Flat and Erect 07/28   Prominent bowel loops seen within the abdomen could reflect bowel obstruction Chest X Ray 07/29                                                                            Provider, please provide diagnosis or diagnoses associated with above clinical findings.      Paralytic/Adynamic ileus, complication of surgery    x Paralytic/Adynamic ileus -- occurring in the post-operative period but not a complication of the surgery    [   ] Postoperative Ileus ruled out; Expected delay of return of bowel function    [   ] Other diagnosis or clarification, please specify: _______________________   [  ] Clinically undetermined

## 2022-07-29 NOTE — PLAN OF CARE
Problem: Infection  Goal: Absence of Infection Signs and Symptoms  Outcome: Ongoing, Progressing     Problem: Fall Injury Risk  Goal: Absence of Fall and Fall-Related Injury  Outcome: Ongoing, Progressing     Problem: Adult Inpatient Plan of Care  Goal: Plan of Care Review  Outcome: Ongoing, Progressing  Goal: Patient-Specific Goal (Individualized)  Outcome: Ongoing, Progressing  Goal: Absence of Hospital-Acquired Illness or Injury  Outcome: Ongoing, Progressing  Goal: Optimal Comfort and Wellbeing  Outcome: Ongoing, Progressing  Goal: Readiness for Transition of Care  Outcome: Ongoing, Progressing

## 2022-07-29 NOTE — ASSESSMENT & PLAN NOTE
- S/P  elective diagnostic exp lap, lysis of adhesion and excision of cecum with ileum.   -Further post op management per Primary

## 2022-07-29 NOTE — HPI
The pt is a 58 yo female with h/o recurrent small bowel obstructions, KENN, Migraine headaches, Kidney stones, Insomnia admitted on 7/25/22 under General Surgery service for elective diagnostic exp lap and underwent lysis of adhesion and excision of cecum with ileum. Post operatively Pt started on full liquid diet. On post op day #3 pt developed persistent nausea and vomiting and X-ray abd showed ileus. NG tube placed to LIS. Today 7/29/22 pt is noted to be hypoxic with SpO2 86% on RA. ABG showed 7.3/40.7/51 on RA.  consulted for evaluation of new onset hypoxia. Pt examined at bedside. C/O feeling feverish and chills along with mild chest tightness and cough but denies SOB. Denies further nausea or vomiting since NG tube placed. Reports passing flatus . Generalized abd tenderness appreciated on exam. Lung auscultation revealed crackles at both bases. BNP is normal. D-dimer is elevated, however nonspecific in immediate post op period. CTA chest done showed no pulmonary embolism but bibasilar right greater than left pulmonary opacities concerning for aspiration or multifocal pneumonia . Blood cultures obtained and Pt started on antimicrobial targeting aspiration pneumonia.

## 2022-07-29 NOTE — PLAN OF CARE
Problem: Adult Inpatient Plan of Care  Goal: Plan of Care Review  Outcome: Ongoing, Progressing  Goal: Patient-Specific Goal (Individualized)  Outcome: Ongoing, Progressing  Goal: Absence of Hospital-Acquired Illness or Injury  Outcome: Ongoing, Progressing  Goal: Optimal Comfort and Wellbeing  Outcome: Ongoing, Progressing  Goal: Readiness for Transition of Care  Outcome: Ongoing, Progressing     Problem: Infection  Goal: Absence of Infection Signs and Symptoms  Outcome: Ongoing, Progressing     Problem: Fall Injury Risk  Goal: Absence of Fall and Fall-Related Injury  Outcome: Ongoing, Progressing     Problem: Fluid Imbalance (Pneumonia)  Goal: Fluid Balance  Outcome: Ongoing, Progressing     Problem: Infection (Pneumonia)  Goal: Resolution of Infection Signs and Symptoms  Outcome: Ongoing, Progressing     Problem: Respiratory Compromise (Pneumonia)  Goal: Effective Oxygenation and Ventilation  Outcome: Ongoing, Progressing

## 2022-07-29 NOTE — CONSULTS
[facility] Medicine  Consult Note    Patient Name: Genny Calixto  MRN: [id number]  Admission Date: 7/25/2022  Hospital Length of Stay: 2 days  Attending Physician: Lady Moreau MD  Primary Care Provider: Tay Duff MD           Patient information was obtained from patient and past medical records.     Inpatient consult to Hospitalist  Consult performed by: Lady Moreau MD  Consult ordered by: Jo Manazno DO        Subjective:     Principal Problem: SBO (small bowel obstruction)    Chief Complaint: No chief complaint on file.       HPI: The pt is a 56 yo female with h/o recurrent small bowel obstructions, KENN, Migraine headaches, Kidney stones, Insomnia admitted on 7/25/22 under General Surgery service for elective diagnostic exp lap and underwent lysis of adhesion and excision of cecum with ileum. Post operatively Pt started on full liquid diet. On post op day #3 pt developed persistent nausea and vomiting and X-ray abd showed ileus. NG tube placed to LIS. Today 7/29/22 pt is noted to be hypoxic with SpO2 86% on RA. ABG showed 7.3/40.7/51 on RA. HM consulted for evaluation of new onset hypoxia. Pt examined at bedside. C/O feeling feverish and chills along with mild chest tightness and cough but denies SOB. Denies further nausea or vomiting since NG tube placed. Reports passing flatus . Generalized abd tenderness appreciated on exam. Lung auscultation revealed crackles at both bases. BNP is normal. D-dimer is elevated, however no specific in post op period. CTA chest done showed no pulmonary embolism but bibasilar right greater than left pulmonary opacities concerning for aspiration or multifocal pneumonia . Blood cultures obtained and Pt started on antimicrobial targeting aspiration pneumonia.       Past Medical History:   Diagnosis Date    Encounter for blood transfusion     KENN (generalized anxiety disorder)     Takes 5-10 mg of valium qd prn anxiety (prescriptions for  both on file, one from [facility] & other from )    Insomnia     Takes 5-10 mg of valium qhs prn insomnia (prescriptions for both on file, one from [facility] & other from )    Migraine headache     Nephrolithiasis 08/03/2019    Left ureteral stone -> UTI c/b pyelonephritis and urosepsis w/ hypokalemia -> transfered to [facility] urology service from [facility] after CT abn dx, s/p 2 stents to allow infx to drain, IV Vanc/Rocephin, fever free since yesterday    Reflex sympathetic dystrophy     UTI (urinary tract infection)     Vertigo        Past Surgical History:   Procedure Laterality Date    BLADDER SURGERY      CHOLECYSTECTOMY      COLONOSCOPY N/A 11/10/2021    Procedure: COLONOSCOPY;  Surgeon: Eryn Rothman MD;  [facility];  Service: Endoscopy;  Laterality: N/A;    CYSTOSCOPY WITH URETEROSCOPY, RETROGRADE PYELOGRAPHY, AND INSERTION OF STENT Right 9/30/2021    Procedure: CYSTOSCOPY, WITH RETROGRADE PYELOGRAM AND URETERAL STENT INSERTION;  Surgeon: Annita Gamino MD;  Location: [facility] ER;  Service: Urology;  Laterality: Right;    DIAGNOSTIC LAPAROSCOPY N/A 11/11/2020    Procedure: LAPAROSCOPY, DIAGNOSTIC;  Surgeon: Tee Segura MD;  Location: [facility] ER;  Service: General;  Laterality: N/A;  CONVERTED TO OPEN    DIAGNOSTIC LAPAROSCOPY N/A 7/25/2022    Procedure: LAPAROSCOPY, DIAGNOSTIC;  Surgeon: Jo Manzano DO;  Location[facility] OR;  Service: General;  Laterality: N/A;  convert to open at 0739    ESOPHAGOGASTRODUODENOSCOPY N/A 11/10/2021    Procedure: EGD (ESOPHAGOGASTRODUODENOSCOPY);  Surgeon: Eryn Rothman MD;  Location: [facility];  Service: Endoscopy;  Laterality: N/A;    facet injections  02/14/2017    L3, L4, L5, S1 Done by Dr. Lara    HYSTERECTOMY      INJECTION OF ANESTHETIC AGENT INTO TISSUE PLANE DEFINED BY TRANSVERSUS ABDOMINIS MUSCLE N/A 11/11/2020    Procedure: BLOCK, TRANSVERSUS ABDOMINIS PLANE;  Surgeon: Tee Segura MD;  Location: [facility];  Service:  General;  Laterality: N/A;    INJECTION OF ANESTHETIC AGENT INTO TISSUE PLANE DEFINED BY TRANSVERSUS ABDOMINIS MUSCLE N/A 7/25/2022    Procedure: BLOCK, TRANSVERSUS ABDOMINIS PLANE;  Surgeon: Jo Manzano DO;  Location: [facility] OR;  Service: General;  Laterality: N/A;    KNEE SURGERY      LAPAROSCOPIC LYSIS OF ADHESIONS N/A 11/11/2020    Procedure: LYSIS, ADHESIONS, LAPAROSCOPIC;  Surgeon: Tee Segura MD;  Location: [facility] OR;  Service: General;  Laterality: N/A;  CONVERTED TO OPEN    LAPAROTOMY N/A 11/20/2020    Procedure: LAPAROTOMY;  Surgeon: Tee Segura MD;  Location: [facility] OR;  Service: General;  Laterality: N/A;    LASER LITHOTRIPSY Left 2/8/2021    Procedure: LITHOTRIPSY, USING LASER;  Surgeon: Irving Kidd MD;  Location: [facility] OR;  Service: Urology;  Laterality: Left;    LASER LITHOTRIPSY Right 10/13/2021    Procedure: LITHOTRIPSY, USING LASER;  Surgeon: Annita Gamino MD;  Location: [facility] OR;  Service: Urology;  Laterality: Right;    LYSIS OF ADHESIONS N/A 11/11/2020    Procedure: LYSIS, ADHESIONS;  Surgeon: Tee Segura MD;  Location: [facility] OR;  Service: General;  Laterality: N/A;    LYSIS OF ADHESIONS N/A 11/20/2020    Procedure: LYSIS, ADHESIONS;  Surgeon: Tee Segura MD;  Location: [facility] OR;  Service: General;  Laterality: N/A;    LYSIS OF ADHESIONS N/A 7/25/2022    Procedure: LYSIS, ADHESIONS;  Surgeon: Jo Manzano DO;  Location: [facility] OR;  Service: General;  Laterality: N/A;    SURGICAL REMOVAL OF ILEUM WITH CECUM  7/25/2022    Procedure: EXCISION, CECUM WITH ILEUM;  Surgeon: Jo Manzano DO;  Location: [facility] OR;  Service: General;;    TONSILLECTOMY      URETEROSCOPY Left 2/8/2021    Procedure: URETEROSCOPY;  Surgeon: Irving Kidd MD;  Location: [facility] OR;  Service: Urology;  Laterality: Left;  stent placement    URETEROSCOPY Right 10/13/2021    Procedure: URETEROSCOPY;  Surgeon: Annita Gamino MD;  Location: [facility] OR;  Service:  "Urology;  Laterality: Right;       Review of patient's allergies indicates:   Allergen Reactions    Erythromycin Other (See Comments)     Stomach cramps    Morphine Nausea And Vomiting     "Projectile vomiting"       No current facility-administered medications on file prior to encounter.     Current Outpatient Medications on File Prior to Encounter   Medication Sig    cyclobenzaprine (FLEXERIL) 10 MG tablet Take 10 mg by mouth nightly.    diazePAM (VALIUM) 10 MG Tab TAKE 1 TABLET BY MOUTH EVERY DAY AS NEEDED    diazePAM (VALIUM) 5 MG tablet Take one with onset of migraine    diphenhydrAMINE (BENADRYL) 25 mg capsule Take 25 mg by mouth 2 (two) times a day.    docusate sodium (COLACE) 100 MG capsule Take 1 capsule (100 mg total) by mouth 2 (two) times daily.    doxepin (SINEQUAN) 10 MG capsule TAKE 1 CAPSULE (10 MG TOTAL) BY MOUTH EVERY EVENING.    HYDROcodone-acetaminophen (NORCO) 7.5-325 mg per tablet Take 1 tablet by mouth every 4 (four) hours as needed for Pain.    LACTOBACILLUS COMBO NO.6 (PROBIOTIC COMPLEX ORAL) Take by mouth once daily at 6am.    LINZESS 290 mcg Cap capsule TAKE 1 CAPSULE (290 MCG TOTAL) BY MOUTH BEFORE BREAKFAST. FOR 30 DOSES    loratadine (CLARITIN) 10 mg tablet Take 10 mg by mouth daily    multivitamin capsule Take 1 capsule by mouth once daily.    ondansetron (ZOFRAN) 4 MG tablet Take 1 tablet (4 mg total) by mouth 2 (two) times daily.    pantoprazole (PROTONIX) 40 MG tablet TAKE 1 TABLET BY MOUTH EVERY DAY    pseudoephedrine (SUDAFED) 120 mg 12 hr tablet Take 120 mg by mouth once daily.    spironolactone (ALDACTONE) 50 MG tablet TAKE 1 TABLET BY MOUTH ONCE DAILY THEN INCREASE TO 2 TABLETS DAILY AS TOLERATED (Patient taking differently: Take 50 mg by mouth 2 (two) times daily.)    sumatriptan (IMITREX) 100 MG tablet TAKE 1 TABLET BY MOUTH IF NEEDED, MAY REPEAT ONCE IN 1 HOUR. MAX OF 2 TABLETS IN 24 HOURS    methocarbamoL (ROBAXIN) 500 MG Tab TAKE 1 TABLET (500 MG TOTAL) " BY MOUTH 4 (FOUR) TIMES DAILY     Family History       Problem Relation (Age of Onset)    COPD Father    Drug abuse Brother    Hypertension Mother    Stroke Mother          Tobacco Use    Smoking status: Never Smoker    Smokeless tobacco: Never Used   Substance and Sexual Activity    Alcohol use: Yes     Comment: rarely    Drug use: No    Sexual activity: Never     Review of Systems   Constitutional:  Positive for activity change, appetite change and chills. Negative for fever.   HENT:  Negative for sore throat.    Eyes:  Negative for visual disturbance.   Respiratory:  Positive for cough. Negative for chest tightness and shortness of breath.    Cardiovascular:  Positive for chest pain. Negative for palpitations and leg swelling.   Gastrointestinal:  Positive for abdominal pain. Negative for abdominal distention, constipation, diarrhea, nausea and vomiting.   Endocrine: Negative for polyuria.   Genitourinary:  Negative for decreased urine volume, dysuria, flank pain, frequency and hematuria.   Musculoskeletal:  Negative for back pain and gait problem.   Skin:  Negative for rash.   Neurological:  Negative for syncope, speech difficulty, weakness, light-headedness and headaches.   Psychiatric/Behavioral:  Negative for confusion, hallucinations and sleep disturbance.    Objective:     Vital Signs (Most Recent):  Temp: 98 °F (36.7 °C) (07/29/22 0711)  Pulse: 107 (07/29/22 0711)  Resp: 16 (07/29/22 1531)  BP: 111/67 (07/29/22 0711)  SpO2: (!) 86 % (07/29/22 0711)   Vital Signs (24h Range):  Temp:  [97.5 °F (36.4 °C)-100.2 °F (37.9 °C)] 98 °F (36.7 °C)  Pulse:  [107-123] 107  Resp:  [14-18] 16  SpO2:  [86 %-100 %] 86 %  BP: (111-168)/() 111/67     Weight: 71.5 kg (157 lb 8.3 oz)  Body mass index is 24.67 kg/m².    Physical Exam  Constitutional:       General: She is not in acute distress.     Appearance: She is well-developed. She is ill-appearing. She is not diaphoretic.   HENT:      Head: Normocephalic and  atraumatic.      Mouth/Throat:      Pharynx: No oropharyngeal exudate.   Eyes:      Conjunctiva/sclera: Conjunctivae normal.      Pupils: Pupils are equal, round, and reactive to light.   Neck:      Thyroid: No thyromegaly.      Vascular: No JVD.   Cardiovascular:      Rate and Rhythm: Normal rate and regular rhythm.      Heart sounds: Normal heart sounds. No murmur heard.  Pulmonary:      Effort: Pulmonary effort is normal. No respiratory distress.      Breath sounds: Examination of the right-lower field reveals rales. Examination of the left-lower field reveals rales. Rales present. No wheezing.   Chest:      Chest wall: No tenderness.   Abdominal:      General: Bowel sounds are normal. There is no distension.      Palpations: Abdomen is soft.      Tenderness: There is abdominal tenderness (generalized). There is no guarding or rebound.      Comments: Bowel sounds distant    Musculoskeletal:         General: Normal range of motion.      Cervical back: Normal range of motion and neck supple.   Lymphadenopathy:      Cervical: No cervical adenopathy.   Skin:     General: Skin is warm and dry.      Findings: No rash.   Neurological:      Mental Status: She is alert and oriented to person, place, and time.      Cranial Nerves: No cranial nerve deficit.      Sensory: No sensory deficit.      Deep Tendon Reflexes: Reflexes normal.       Significant Labs:   Results for orders placed or performed during the hospital encounter of 07/25/22   Creatinine, serum   Result Value Ref Range    Creatinine 0.7 0.5 - 1.4 mg/dL    eGFR if African American >60 >60 mL/min/1.73 m^2    eGFR if non African American >60 >60 mL/min/1.73 m^2   CBC Auto Differential   Result Value Ref Range    WBC 8.13 3.90 - 12.70 K/uL    RBC 3.84 (L) 4.00 - 5.40 M/uL    Hemoglobin 12.9 12.0 - 16.0 g/dL    Hematocrit 37.6 37.0 - 48.5 %    MCV 98 82 - 98 fL    MCH 33.6 (H) 27.0 - 31.0 pg    MCHC 34.3 32.0 - 36.0 g/dL    RDW 13.2 11.5 - 14.5 %    Platelets 212  150 - 450 K/uL    MPV 11.5 9.2 - 12.9 fL    Immature Granulocytes 0.2 0.0 - 0.5 %    Gran # (ANC) 5.7 1.8 - 7.7 K/uL    Immature Grans (Abs) 0.02 0.00 - 0.04 K/uL    Lymph # 1.6 1.0 - 4.8 K/uL    Mono # 0.6 0.3 - 1.0 K/uL    Eos # 0.3 0.0 - 0.5 K/uL    Baso # 0.05 0.00 - 0.20 K/uL    nRBC 0 0 /100 WBC    Gran % 69.9 38.0 - 73.0 %    Lymph % 19.3 18.0 - 48.0 %    Mono % 6.9 4.0 - 15.0 %    Eosinophil % 3.1 0.0 - 8.0 %    Basophil % 0.6 0.0 - 1.9 %    Differential Method Automated    Basic Metabolic Panel   Result Value Ref Range    Sodium 134 (L) 136 - 145 mmol/L    Potassium 3.5 3.5 - 5.1 mmol/L    Chloride 104 95 - 110 mmol/L    CO2 20 (L) 23 - 29 mmol/L    Glucose 98 70 - 110 mg/dL    BUN 24 (H) 6 - 20 mg/dL    Creatinine 0.7 0.5 - 1.4 mg/dL    Calcium 8.3 (L) 8.7 - 10.5 mg/dL    Anion Gap 10 8 - 16 mmol/L    eGFR if African American >60 >60 mL/min/1.73 m^2    eGFR if non African American >60 >60 mL/min/1.73 m^2   Magnesium   Result Value Ref Range    Magnesium 1.4 (L) 1.6 - 2.6 mg/dL   Phosphorus   Result Value Ref Range    Phosphorus 3.6 2.7 - 4.5 mg/dL   Basic Metabolic Panel   Result Value Ref Range    Sodium 135 (L) 136 - 145 mmol/L    Potassium 3.7 3.5 - 5.1 mmol/L    Chloride 107 95 - 110 mmol/L    CO2 16 (L) 23 - 29 mmol/L    Glucose 113 (H) 70 - 110 mg/dL    BUN 21 (H) 6 - 20 mg/dL    Creatinine 0.7 0.5 - 1.4 mg/dL    Calcium 8.0 (L) 8.7 - 10.5 mg/dL    Anion Gap 12 8 - 16 mmol/L    eGFR if African American >60 >60 mL/min/1.73 m^2    eGFR if non African American >60 >60 mL/min/1.73 m^2   Magnesium   Result Value Ref Range    Magnesium 1.4 (L) 1.6 - 2.6 mg/dL   Phosphorus   Result Value Ref Range    Phosphorus 2.7 2.7 - 4.5 mg/dL   CBC auto differential   Result Value Ref Range    WBC 3.71 (L) 3.90 - 12.70 K/uL    RBC 3.61 (L) 4.00 - 5.40 M/uL    Hemoglobin 11.6 (L) 12.0 - 16.0 g/dL    Hematocrit 35.8 (L) 37.0 - 48.5 %    MCV 99 (H) 82 - 98 fL    MCH 32.1 (H) 27.0 - 31.0 pg    MCHC 32.4 32.0 - 36.0 g/dL     RDW 12.9 11.5 - 14.5 %    Platelets 206 150 - 450 K/uL    MPV 11.1 9.2 - 12.9 fL    Immature Granulocytes 0.3 0.0 - 0.5 %    Gran # (ANC) 2.5 1.8 - 7.7 K/uL    Immature Grans (Abs) 0.01 0.00 - 0.04 K/uL    Lymph # 0.6 (L) 1.0 - 4.8 K/uL    Mono # 0.5 0.3 - 1.0 K/uL    Eos # 0.1 0.0 - 0.5 K/uL    Baso # 0.03 0.00 - 0.20 K/uL    nRBC 0 0 /100 WBC    Gran % 67.4 38.0 - 73.0 %    Lymph % 16.7 (L) 18.0 - 48.0 %    Mono % 12.1 4.0 - 15.0 %    Eosinophil % 2.7 0.0 - 8.0 %    Basophil % 0.8 0.0 - 1.9 %    Differential Method Automated    CBC Auto Differential   Result Value Ref Range    WBC 3.79 (L) 3.90 - 12.70 K/uL    RBC 3.95 (L) 4.00 - 5.40 M/uL    Hemoglobin 13.2 12.0 - 16.0 g/dL    Hematocrit 38.1 37.0 - 48.5 %    MCV 97 82 - 98 fL    MCH 33.4 (H) 27.0 - 31.0 pg    MCHC 34.6 32.0 - 36.0 g/dL    RDW 12.7 11.5 - 14.5 %    Platelets 283 150 - 450 K/uL    MPV 11.2 9.2 - 12.9 fL    Immature Granulocytes 0.5 0.0 - 0.5 %    Gran # (ANC) 2.3 1.8 - 7.7 K/uL    Immature Grans (Abs) 0.02 0.00 - 0.04 K/uL    Lymph # 0.7 (L) 1.0 - 4.8 K/uL    Mono # 0.6 0.3 - 1.0 K/uL    Eos # 0.1 0.0 - 0.5 K/uL    Baso # 0.04 0.00 - 0.20 K/uL    nRBC 0 0 /100 WBC    Gran % 61.1 38.0 - 73.0 %    Lymph % 18.5 18.0 - 48.0 %    Mono % 16.4 (H) 4.0 - 15.0 %    Eosinophil % 2.4 0.0 - 8.0 %    Basophil % 1.1 0.0 - 1.9 %    Platelet Estimate Decreased (A)     Differential Method Automated    Basic Metabolic Panel   Result Value Ref Range    Sodium 136 136 - 145 mmol/L    Potassium 4.3 3.5 - 5.1 mmol/L    Chloride 103 95 - 110 mmol/L    CO2 18 (L) 23 - 29 mmol/L    Glucose 148 (H) 70 - 110 mg/dL    BUN 24 (H) 6 - 20 mg/dL    Creatinine 0.9 0.5 - 1.4 mg/dL    Calcium 8.6 (L) 8.7 - 10.5 mg/dL    Anion Gap 15 8 - 16 mmol/L    eGFR if African American >60 >60 mL/min/1.73 m^2    eGFR if non African American >60 >60 mL/min/1.73 m^2   Magnesium   Result Value Ref Range    Magnesium 1.9 1.6 - 2.6 mg/dL   Phosphorus   Result Value Ref Range    Phosphorus 2.9 2.7 -  "4.5 mg/dL   BNP   Result Value Ref Range    BNP 18 0 - 99 pg/mL   D dimer, quantitative   Result Value Ref Range    D-Dimer 6.15 (H) <0.50 mg/L FEU   Basic Metabolic Panel   Result Value Ref Range    Sodium 136 136 - 145 mmol/L    Potassium 3.6 3.5 - 5.1 mmol/L    Chloride 102 95 - 110 mmol/L    CO2 21 (L) 23 - 29 mmol/L    Glucose 111 (H) 70 - 110 mg/dL    BUN 20 6 - 20 mg/dL    Creatinine 0.7 0.5 - 1.4 mg/dL    Calcium 8.6 (L) 8.7 - 10.5 mg/dL    Anion Gap 13 8 - 16 mmol/L    eGFR if African American >60 >60 mL/min/1.73 m^2    eGFR if non African American >60 >60 mL/min/1.73 m^2   Magnesium   Result Value Ref Range    Magnesium 2.0 1.6 - 2.6 mg/dL   Phosphorus   Result Value Ref Range    Phosphorus 1.7 (L) 2.7 - 4.5 mg/dL   SARS Coronavirus 2 Antigen, POCT Manual Read   Result Value Ref Range    SARS Coronavirus 2 Antigen Negative Negative     Acceptable Yes    Specimen to Pathology, Surgery General Surgery   Result Value Ref Range    Final Pathologic Diagnosis       1. Cecum, terminal ileum, and appendix; ileocecectomy:  - Segment of colon and small bowel with congestion, fibrous serosal  adhesions, and acute serositis  - Appendix with no diagnostic histopathologic alterations  - Negative for dysplasia and malignancy      Gross       Surgery ID:  [id number]   Pathology ID:  [id number]  1. Received in formalin labeled "cecum, terminal ileum, and appendix" is a 14  cm in length by 3.5 cm in diameter anastomotic portion of terminal ileum and  9 cm in length by 4.5 cm in diameter cecum with attached 5 x 0.9 cm appendix.   The portion of ileum is adherent to the portion of cecum.  There is a 6 cm  in diameter transmural defect identified at the adherent area of ileum to  cecum.  The remaining serosa is red-brown with areas of adhesions.  Opening  the specimen reveals a 11.2 cm red-brown, hemorrhagic, poorly healed,  dehiscent anastomotic junction between the ileum and cecum colon coinciding  with the previously " described transmural defect.  The mucosa surrounding this  anastomotic junction is pink-red and congested.  The remaining portion of  ileum shows an additional 4.2 cm in length well-healed anastomotic junction  measuring 9 cm from the ileal margin and 13 cm from the distal cecal margin.  The remaining cecal mucosa is pin k-red, congested, and edematous.  Sectioning  through the appendix reveals a patent, unremarkable lumen.  Representative  sections are submitted as follows:  1A:  Tissue from proximal ileal staple line margin  1B:  Tissue from distal cecal margin  1C-1D:  Representative sections of poorly healed anastomotic junction  1E:  Representative section of well-healed anastomotic junction  1F:  Representative section of cecum  1G:  Representative sections of appendix  Grossed by: Joana Moore      Disclaimer       Unless the case is a 'gross only' or additional testing only, the final  diagnosis for each specimen is based on a microscopic examination of  appropriate tissue sections.     POCT glucose   Result Value Ref Range    POCT Glucose 142 (H) 70 - 110 mg/dL   ISTAT PROCEDURE   Result Value Ref Range    POC PH 7.366 7.35 - 7.45    POC PCO2 40.7 35 - 45 mmHg    POC PO2 51 (LL) 80 - 100 mmHg    POC HCO3 23.3 (L) 24 - 28 mmol/L    POC BE -2 -2 to 2 mmol/L    POC SATURATED O2 84 (L) 95 - 100 %    Sample ARTERIAL     Site RR     Allens Test Pass     DelSys Room Air     Mode SPONT     FiO2 21          Significant Imaging:       Assessment/Plan:     * SBO (small bowel obstruction)  - S/P  elective diagnostic exp lap, lysis of adhesion and excision of cecum with ileum.   -Further post op management per Primary     Acute hypoxemic respiratory failure  Patient with Hypoxic Respiratory failure which is Acute.  she is not on home oxygen. Supplemental oxygen was provided and noted-  .   Signs/symptoms of respiratory failure include- tachypnea. Contributing diagnoses includes - Aspiration Labs and images were  reviewed. Patient Has recent ABG, which has been reviewed. Will treat underlying causes and adjust management of respiratory failure as follows-       -Supplemental oxygen to keep SpO2 >92%  -Inhaled bronchodilators prn  Antimicrobial targeting aspiration pneumonia in the setting recent episodes of vomiting             Hospital-acquired pneumonia  - Imaging study suggestive of eze onset  bibasilar right greater than left pulmonary opacities  -Aspiration highly suspected in the setting of recent episodes of vomiting   -Blood cultures x 2  -Antimicrobial targeting aspiration  Pneumonia   -Supplemental oxygen   -Monitor course         VTE Risk Mitigation (From admission, onward)         Ordered     enoxaparin injection 40 mg  Daily         07/26/22 1723     Place sequential compression device  Until discontinued         07/25/22 1244                    Thank you for your consult. I will follow-up with patient. Please contact us if you have any additional questions.    Lady Moreau MD  Department of Hospital Medicine   [facility] - Med Surg

## 2022-07-29 NOTE — ASSESSMENT & PLAN NOTE
Patient with Hypoxic Respiratory failure which is Acute.  she is not on home oxygen. Supplemental oxygen was provided and noted-  .   Signs/symptoms of respiratory failure include- tachypnea. Contributing diagnoses includes - Aspiration Labs and images were reviewed. Patient Has recent ABG, which has been reviewed. Will treat underlying causes and adjust management of respiratory failure as follows-       -Supplemental oxygen to keep SpO2 >92%  -Inhaled bronchodilators prn  Antimicrobial targeting aspiration pneumonia in the setting recent episodes of vomiting

## 2022-07-29 NOTE — PROGRESS NOTES
Pharmacist Renal Dose Adjustment Note    Genny Calixto is a 57 y.o. female being treated with the medication ampicillin-sulbactam (unasyn).    Patient Data:    Vital Signs (Most Recent):  Temp: 98 °F (36.7 °C) (07/29/22 0711)  Pulse: 107 (07/29/22 0711)  Resp: 16 (07/29/22 1531)  BP: 111/67 (07/29/22 0711)  SpO2: (!) 86 % (07/29/22 0711)   Vital Signs (72h Range):  Temp:  [97.5 °F (36.4 °C)-100.2 °F (37.9 °C)]   Pulse:  []   Resp:  [13-20]   BP: (104-168)/()   SpO2:  [86 %-100 %]      Recent Labs   Lab 07/27/22  0613 07/28/22  0543 07/29/22  1126   CREATININE 0.7 0.9 0.7     Serum creatinine: 0.7 mg/dL 07/29/22 1126  Estimated creatinine clearance: 86.2 mL/min    Ampicillin-sulbactam 3 grams IV every 8 hours will be changed to ampicillin-sulbactam 3 grams every 6 hours per renal dose protocol for CrCl > 50 ml/min.     Thank you,  Pharmacist's Name: Lee Lu

## 2022-07-30 ENCOUNTER — PATIENT MESSAGE (OUTPATIENT)
Dept: SURGERY | Facility: CLINIC | Age: 57
End: 2022-07-30
Payer: MEDICARE

## 2022-07-30 LAB
ANION GAP SERPL CALC-SCNC: 18 MMOL/L (ref 8–16)
BASOPHILS # BLD AUTO: 0.03 K/UL (ref 0–0.2)
BASOPHILS NFR BLD: 0.5 % (ref 0–1.9)
BUN SERPL-MCNC: 17 MG/DL (ref 6–20)
CALCIUM SERPL-MCNC: 9 MG/DL (ref 8.7–10.5)
CHLORIDE SERPL-SCNC: 99 MMOL/L (ref 95–110)
CO2 SERPL-SCNC: 25 MMOL/L (ref 23–29)
CREAT SERPL-MCNC: 0.7 MG/DL (ref 0.5–1.4)
DIFFERENTIAL METHOD: ABNORMAL
EOSINOPHIL # BLD AUTO: 0.2 K/UL (ref 0–0.5)
EOSINOPHIL NFR BLD: 2.3 % (ref 0–8)
ERYTHROCYTE [DISTWIDTH] IN BLOOD BY AUTOMATED COUNT: 13.2 % (ref 11.5–14.5)
EST. GFR  (AFRICAN AMERICAN): >60 ML/MIN/1.73 M^2
EST. GFR  (NON AFRICAN AMERICAN): >60 ML/MIN/1.73 M^2
GLUCOSE SERPL-MCNC: 103 MG/DL (ref 70–110)
GRAM STN SPEC: NORMAL
GRAM STN SPEC: NORMAL
HCT VFR BLD AUTO: 37.9 % (ref 37–48.5)
HGB BLD-MCNC: 12.5 G/DL (ref 12–16)
IMM GRANULOCYTES # BLD AUTO: 0.09 K/UL (ref 0–0.04)
IMM GRANULOCYTES NFR BLD AUTO: 1.4 % (ref 0–0.5)
LYMPHOCYTES # BLD AUTO: 0.7 K/UL (ref 1–4.8)
LYMPHOCYTES NFR BLD: 10.7 % (ref 18–48)
MAGNESIUM SERPL-MCNC: 2 MG/DL (ref 1.6–2.6)
MCH RBC QN AUTO: 32.3 PG (ref 27–31)
MCHC RBC AUTO-ENTMCNC: 33 G/DL (ref 32–36)
MCV RBC AUTO: 98 FL (ref 82–98)
MONOCYTES # BLD AUTO: 1 K/UL (ref 0.3–1)
MONOCYTES NFR BLD: 15.1 % (ref 4–15)
NEUTROPHILS # BLD AUTO: 4.5 K/UL (ref 1.8–7.7)
NEUTROPHILS NFR BLD: 70 % (ref 38–73)
NRBC BLD-RTO: 0 /100 WBC
PHOSPHATE SERPL-MCNC: 2.4 MG/DL (ref 2.7–4.5)
PLATELET # BLD AUTO: 314 K/UL (ref 150–450)
PMV BLD AUTO: 10.6 FL (ref 9.2–12.9)
POTASSIUM SERPL-SCNC: 3.1 MMOL/L (ref 3.5–5.1)
PROCALCITONIN SERPL IA-MCNC: 0.42 NG/ML
RBC # BLD AUTO: 3.87 M/UL (ref 4–5.4)
SODIUM SERPL-SCNC: 142 MMOL/L (ref 136–145)
WBC # BLD AUTO: 6.43 K/UL (ref 3.9–12.7)

## 2022-07-30 PROCEDURE — 63600175 PHARM REV CODE 636 W HCPCS: Performed by: SURGERY

## 2022-07-30 PROCEDURE — 94760 N-INVAS EAR/PLS OXIMETRY 1: CPT

## 2022-07-30 PROCEDURE — 63600175 PHARM REV CODE 636 W HCPCS: Performed by: INTERNAL MEDICINE

## 2022-07-30 PROCEDURE — 36415 COLL VENOUS BLD VENIPUNCTURE: CPT | Performed by: INTERNAL MEDICINE

## 2022-07-30 PROCEDURE — 25000003 PHARM REV CODE 250: Performed by: SURGERY

## 2022-07-30 PROCEDURE — 84100 ASSAY OF PHOSPHORUS: CPT | Performed by: INTERNAL MEDICINE

## 2022-07-30 PROCEDURE — 85025 COMPLETE CBC W/AUTO DIFF WBC: CPT | Performed by: SURGERY

## 2022-07-30 PROCEDURE — 84145 PROCALCITONIN (PCT): CPT | Performed by: INTERNAL MEDICINE

## 2022-07-30 PROCEDURE — 25000003 PHARM REV CODE 250: Performed by: INTERNAL MEDICINE

## 2022-07-30 PROCEDURE — 80048 BASIC METABOLIC PNL TOTAL CA: CPT | Performed by: SURGERY

## 2022-07-30 PROCEDURE — C9113 INJ PANTOPRAZOLE SODIUM, VIA: HCPCS | Performed by: SURGERY

## 2022-07-30 PROCEDURE — 83735 ASSAY OF MAGNESIUM: CPT | Performed by: INTERNAL MEDICINE

## 2022-07-30 PROCEDURE — 11000001 HC ACUTE MED/SURG PRIVATE ROOM

## 2022-07-30 RX ORDER — POTASSIUM CHLORIDE 20 MEQ/1
40 TABLET, EXTENDED RELEASE ORAL ONCE
Status: COMPLETED | OUTPATIENT
Start: 2022-07-30 | End: 2022-07-30

## 2022-07-30 RX ORDER — SODIUM,POTASSIUM PHOSPHATES 280-250MG
2 POWDER IN PACKET (EA) ORAL ONCE
Status: COMPLETED | OUTPATIENT
Start: 2022-07-30 | End: 2022-07-30

## 2022-07-30 RX ORDER — HYDRALAZINE HYDROCHLORIDE 20 MG/ML
10 INJECTION INTRAMUSCULAR; INTRAVENOUS EVERY 8 HOURS PRN
Status: DISCONTINUED | OUTPATIENT
Start: 2022-07-30 | End: 2022-08-03 | Stop reason: HOSPADM

## 2022-07-30 RX ADMIN — DOCUSATE SODIUM 100 MG: 100 CAPSULE, LIQUID FILLED ORAL at 09:07

## 2022-07-30 RX ADMIN — HYDROMORPHONE HYDROCHLORIDE 1 MG: 2 INJECTION INTRAMUSCULAR; INTRAVENOUS; SUBCUTANEOUS at 06:07

## 2022-07-30 RX ADMIN — SODIUM CHLORIDE 3 G: 9 INJECTION, SOLUTION INTRAVENOUS at 11:07

## 2022-07-30 RX ADMIN — ONDANSETRON 4 MG: 2 INJECTION INTRAMUSCULAR; INTRAVENOUS at 06:07

## 2022-07-30 RX ADMIN — ACETAMINOPHEN 650 MG: 325 TABLET ORAL at 09:07

## 2022-07-30 RX ADMIN — Medication 1 TABLET: at 09:07

## 2022-07-30 RX ADMIN — OXYCODONE HYDROCHLORIDE AND ACETAMINOPHEN 500 MG: 500 TABLET ORAL at 09:07

## 2022-07-30 RX ADMIN — POTASSIUM CHLORIDE 40 MEQ: 1500 TABLET, EXTENDED RELEASE ORAL at 09:07

## 2022-07-30 RX ADMIN — SCOPOLAMINE 1 PATCH: 1.5 PATCH, EXTENDED RELEASE TRANSDERMAL at 04:07

## 2022-07-30 RX ADMIN — PANTOPRAZOLE SODIUM 40 MG: 40 INJECTION, POWDER, FOR SOLUTION INTRAVENOUS at 09:07

## 2022-07-30 RX ADMIN — SODIUM CHLORIDE 3 G: 9 INJECTION, SOLUTION INTRAVENOUS at 08:07

## 2022-07-30 RX ADMIN — SODIUM CHLORIDE 3 G: 9 INJECTION, SOLUTION INTRAVENOUS at 06:07

## 2022-07-30 RX ADMIN — HYDROMORPHONE HYDROCHLORIDE 1 MG: 2 INJECTION INTRAMUSCULAR; INTRAVENOUS; SUBCUTANEOUS at 02:07

## 2022-07-30 RX ADMIN — SODIUM CHLORIDE 3 G: 9 INJECTION, SOLUTION INTRAVENOUS at 01:07

## 2022-07-30 RX ADMIN — POTASSIUM & SODIUM PHOSPHATES POWDER PACK 280-160-250 MG 2 PACKET: 280-160-250 PACK at 11:07

## 2022-07-30 RX ADMIN — SPIRONOLACTONE 50 MG: 25 TABLET, FILM COATED ORAL at 09:07

## 2022-07-30 RX ADMIN — PROMETHAZINE HYDROCHLORIDE 25 MG: 25 INJECTION INTRAMUSCULAR; INTRAVENOUS at 03:07

## 2022-07-30 RX ADMIN — DIAZEPAM 10 MG: 5 TABLET ORAL at 09:07

## 2022-07-30 RX ADMIN — HYDROMORPHONE HYDROCHLORIDE 1 MG: 2 INJECTION INTRAMUSCULAR; INTRAVENOUS; SUBCUTANEOUS at 09:07

## 2022-07-30 RX ADMIN — DOXEPIN HYDROCHLORIDE 10 MG: 10 CAPSULE ORAL at 09:07

## 2022-07-30 RX ADMIN — ENOXAPARIN SODIUM 40 MG: 100 INJECTION SUBCUTANEOUS at 04:07

## 2022-07-30 RX ADMIN — ACETAMINOPHEN 650 MG: 325 TABLET ORAL at 04:07

## 2022-07-30 RX ADMIN — OXYCODONE HYDROCHLORIDE 5 MG: 5 TABLET ORAL at 11:07

## 2022-07-30 NOTE — PROGRESS NOTES
"[facility]  General [facility]  Progress Note    Subjective:     History of Present Illness:  No notes on file    Post-Op Info:  Procedure(s) (LRB):  LAPAROSCOPY, DIAGNOSTIC (N/A)  LYSIS, ADHESIONS (N/A)  LAPAROTOMY, EXPLORATORY (N/A)  BLOCK, TRANSVERSUS ABDOMINIS PLANE (N/A)  EXCISION, CECUM WITH ILEUM   5 Days Post-Op     Interval History: ultrasound and CTA negative for DVT and PE, NG tube with continued output, minimal flatus/remains distended    Medications:  Continuous Infusions:   sodium chloride 0.9% 110 mL/hr at 07/29/22 1742     Scheduled Meds:   acetaminophen  650 mg Oral Q6H    ampicillin-sulbactim (UNASYN) IVPB  3 g Intravenous Q6H    ascorbic acid (vitamin C)  500 mg Oral BID    docusate sodium  100 mg Oral BID    doxepin  10 mg Oral QHS    enoxaparin  40 mg Subcutaneous Daily    Lactobacillus acidoph-L.bulgar  1 tablet Oral BID    pantoprazole  40 mg Intravenous Daily    potassium, sodium phosphates  2 packet Oral Once    scopolamine  1 patch Transdermal Q3 Days    spironolactone  50 mg Oral BID     PRN Meds:albuterol-ipratropium, diazePAM, HYDROmorphone, melatonin, ondansetron, oxyCODONE, promethazine (PHENERGAN) IVPB, sodium chloride 0.9%, sodium chloride 0.9%     Review of patient's allergies indicates:   Allergen Reactions    Erythromycin Other (See Comments)     Stomach cramps    Morphine Nausea And Vomiting     "Projectile vomiting"     Objective:     Vital Signs (Most Recent):  Temp: 99.3 °F (37.4 °C) (07/30/22 0749)  Pulse: 106 (07/30/22 0749)  Resp: 14 (07/30/22 0942)  BP: 136/64 (07/30/22 0749)  SpO2: (!) 93 % (07/30/22 0749)   Vital Signs (24h Range):  Temp:  [98.5 °F (36.9 °C)-99.4 °F (37.4 °C)] 99.3 °F (37.4 °C)  Pulse:  [105-112] 106  Resp:  [14-19] 14  SpO2:  [92 %-95 %] 93 %  BP: (119-140)/(64-70) 136/64     Weight: 71.5 kg (157 lb 8.3 oz)  Body mass index is 24.67 kg/m².    Intake/Output - Last 3 Shifts         07/28 0700 07/29 0659 07/29 0700 07/30 0659 07/30 " 0700  07/31 0659    P.O. 200 0     I.V. (mL/kg) 131.2 (1.8) 1939.8 (27.2)     IV Piggyback 144.6 184     Total Intake(mL/kg) 475.8 (6.7) 2123.8 (29.7)     Urine (mL/kg/hr) 0 (0)  500 (1.7)    Drains 300 1100     Stool 0      Total Output 300 1100 500    Net +175.8 +1023.8 -500           Urine Occurrence 5 x 3 x 1 x    Stool Occurrence 1 x              Physical Exam  Vitals and nursing note reviewed.   Constitutional:       General: She is not in acute distress.  Eyes:      Extraocular Movements: Extraocular movements intact.   Cardiovascular:      Rate and Rhythm: Normal rate.   Pulmonary:      Effort: No respiratory distress.   Abdominal:      Palpations: Abdomen is soft.      Comments: Appropriately tender to palpation, midline incision with staples intact--no drainage or erythema. Mildly distended   Musculoskeletal:      Cervical back: No rigidity.      Comments: LUE edema from prior IV site   Neurological:      General: No focal deficit present.      Mental Status: She is alert and oriented to person, place, and time.       Significant Labs:  I have reviewed all pertinent lab results within the past 24 hours.  CBC:   Recent Labs   Lab 07/30/22  0500   WBC 6.43   RBC 3.87*   HGB 12.5   HCT 37.9      MCV 98   MCH 32.3*   MCHC 33.0       BMP:   Recent Labs   Lab 07/30/22  0500         K 3.1*   CL 99   CO2 25   BUN 17   CREATININE 0.7   CALCIUM 9.0   MG 2.0         Significant Diagnostics:  I have reviewed all pertinent imaging results/findings within the past 24 hours.  Ultrasound:  Impression:     No thrombus in central veins of the right or left upper extremity.    CTA:  Impression:     No pulmonary thromboembolism.     Bibasilar right greater than left pulmonary opacities concerning for aspiration or multifocal pneumonia as above.     Contrast extravasation into the right upper arm near the shoulder.  Suggest elevation and application of ice pack.  If the patient develops pain, distal  coldness, numbness, or weakness, or any other signs of neurovascular compromise, suggest repeat presentation to the emergency room, or further surgical evaluation for decompression.       Assessment/Plan:     * SBO (small bowel obstruction)  S/p diagnostic laparoscopy, laparotomy, extensive lysis of adhesions, ileocecectomy      - Continue NG tube to LIS. Await return of significant bowel function.  - IV fluids  - NPO, ice chips, until bowel function returns  - Analgesia prn  - Home medications  - Increase activity  - DVT ppx  - Monitor electrolytes and replace prn    Acute hypoxemic respiratory failure  Pulmonary toileting      Hospital-acquired pneumonia  Likely secondary to aspiration.  consulted for assistance in medical management. Antibiotics.        Vega Hightower MD  General Surgery  [facility]

## 2022-07-30 NOTE — ASSESSMENT & PLAN NOTE
S/p diagnostic laparoscopy, laparotomy, extensive lysis of adhesions, ileocecectomy      - Continue NG tube to LIS. Await return of significant bowel function.  - Unable to maintain peripheral line due to poor venous access--will order midline  - IV fluids  - NPO, ice chips  - Analgesia prn  - Home medications  - Increase activity  - DVT ppx  - Monitor electrolytes and replace prn

## 2022-07-30 NOTE — SUBJECTIVE & OBJECTIVE
"Interval History: Nausea is much better after NG tube placed. The tube did fall out last night and had to be replaced. She is now resting comfortably and has been able to get some sleep.    Medications:  Continuous Infusions:   sodium chloride 0.9% 110 mL/hr at 07/29/22 1742     Scheduled Meds:   acetaminophen  650 mg Oral Q6H    ampicillin-sulbactim (UNASYN) IVPB  3 g Intravenous Q6H    ascorbic acid (vitamin C)  500 mg Oral BID    docusate sodium  100 mg Oral BID    doxepin  10 mg Oral QHS    enoxaparin  40 mg Subcutaneous Daily    Lactobacillus acidoph-L.bulgar  1 tablet Oral BID    pantoprazole  40 mg Intravenous Daily    scopolamine  1 patch Transdermal Q3 Days    sodium phosphate IVPB  30 mmol Intravenous Once    spironolactone  50 mg Oral BID     PRN Meds:diazePAM, HYDROmorphone, melatonin, ondansetron, oxyCODONE, promethazine (PHENERGAN) IVPB, sodium chloride 0.9%, sodium chloride 0.9%     Review of patient's allergies indicates:   Allergen Reactions    Erythromycin Other (See Comments)     Stomach cramps    Morphine Nausea And Vomiting     "Projectile vomiting"     Objective:     Vital Signs (Most Recent):  Temp: 98 °F (36.7 °C) (07/29/22 0711)  Pulse: 107 (07/29/22 0711)  Resp: 16 (07/29/22 1531)  BP: 111/67 (07/29/22 0711)  SpO2: (!) 86 % (07/29/22 0711)   Vital Signs (24h Range):  Temp:  [98 °F (36.7 °C)-100.2 °F (37.9 °C)] 98 °F (36.7 °C)  Pulse:  [107-123] 107  Resp:  [14-18] 16  SpO2:  [86 %-100 %] 86 %  BP: (111-168)/() 111/67     Weight: 71.5 kg (157 lb 8.3 oz)  Body mass index is 24.67 kg/m².    Intake/Output - Last 3 Shifts         07/27 0700 07/28 0659 07/28 0700 07/29 0659 07/29 0700 07/30 0659    P.O. 960 200 0    I.V. (mL/kg) 397.7 (5.6) 131.2 (1.8) 1939.8 (27.2)    IV Piggyback 50.7 144.6 184    Total Intake(mL/kg) 1408.4 (19.7) 475.8 (6.7) 2123.8 (29.7)    Urine (mL/kg/hr)  0 (0)     Drains  300     Stool  0     Total Output  300     Net +1408.4 +175.8 +2123.8           Urine " Occurrence 7 x 5 x 3 x    Stool Occurrence 3 x 1 x             Physical Exam  Vitals and nursing note reviewed.   Constitutional:       General: She is not in acute distress.  Eyes:      Extraocular Movements: Extraocular movements intact.   Cardiovascular:      Rate and Rhythm: Normal rate.   Pulmonary:      Effort: No respiratory distress.   Abdominal:      Palpations: Abdomen is soft.      Comments: Appropriately tender to palpation, midline incision with staples intact--no drainage or erythema. Mildly distended   Musculoskeletal:      Cervical back: No rigidity.      Comments: LUE edema from prior IV site   Neurological:      General: No focal deficit present.      Mental Status: She is alert and oriented to person, place, and time.       Significant Labs:  I have reviewed all pertinent lab results within the past 24 hours.  CBC:   Recent Labs   Lab 07/28/22  0543   WBC 3.79*   RBC 3.95*   HGB 13.2   HCT 38.1      MCV 97   MCH 33.4*   MCHC 34.6     BMP:   Recent Labs   Lab 07/29/22  1126   *      K 3.6      CO2 21*   BUN 20   CREATININE 0.7   CALCIUM 8.6*   MG 2.0       Significant Diagnostics:  I have reviewed all pertinent imaging results/findings within the past 24 hours.

## 2022-07-30 NOTE — SUBJECTIVE & OBJECTIVE
"Interval History: ultrasound and CTA negative for DVT and PE, NG tube with continued output, minimal flatus/remains distended    Medications:  Continuous Infusions:   sodium chloride 0.9% 110 mL/hr at 07/29/22 1742     Scheduled Meds:   acetaminophen  650 mg Oral Q6H    ampicillin-sulbactim (UNASYN) IVPB  3 g Intravenous Q6H    ascorbic acid (vitamin C)  500 mg Oral BID    docusate sodium  100 mg Oral BID    doxepin  10 mg Oral QHS    enoxaparin  40 mg Subcutaneous Daily    Lactobacillus acidoph-L.bulgar  1 tablet Oral BID    pantoprazole  40 mg Intravenous Daily    potassium, sodium phosphates  2 packet Oral Once    scopolamine  1 patch Transdermal Q3 Days    spironolactone  50 mg Oral BID     PRN Meds:albuterol-ipratropium, diazePAM, HYDROmorphone, melatonin, ondansetron, oxyCODONE, promethazine (PHENERGAN) IVPB, sodium chloride 0.9%, sodium chloride 0.9%     Review of patient's allergies indicates:   Allergen Reactions    Erythromycin Other (See Comments)     Stomach cramps    Morphine Nausea And Vomiting     "Projectile vomiting"     Objective:     Vital Signs (Most Recent):  Temp: 99.3 °F (37.4 °C) (07/30/22 0749)  Pulse: 106 (07/30/22 0749)  Resp: 14 (07/30/22 0942)  BP: 136/64 (07/30/22 0749)  SpO2: (!) 93 % (07/30/22 0749)   Vital Signs (24h Range):  Temp:  [98.5 °F (36.9 °C)-99.4 °F (37.4 °C)] 99.3 °F (37.4 °C)  Pulse:  [105-112] 106  Resp:  [14-19] 14  SpO2:  [92 %-95 %] 93 %  BP: (119-140)/(64-70) 136/64     Weight: 71.5 kg (157 lb 8.3 oz)  Body mass index is 24.67 kg/m².    Intake/Output - Last 3 Shifts         07/28 0700 07/29 0659 07/29 0700 07/30 0659 07/30 0700 07/31 0659    P.O. 200 0     I.V. (mL/kg) 131.2 (1.8) 1939.8 (27.2)     IV Piggyback 144.6 184     Total Intake(mL/kg) 475.8 (6.7) 2123.8 (29.7)     Urine (mL/kg/hr) 0 (0)  500 (1.7)    Drains 300 1100     Stool 0      Total Output 300 1100 500    Net +175.8 +1023.8 -500           Urine Occurrence 5 x 3 x 1 x    Stool Occurrence 1 x        "       Physical Exam  Vitals and nursing note reviewed.   Constitutional:       General: She is not in acute distress.  Eyes:      Extraocular Movements: Extraocular movements intact.   Cardiovascular:      Rate and Rhythm: Normal rate.   Pulmonary:      Effort: No respiratory distress.   Abdominal:      Palpations: Abdomen is soft.      Comments: Appropriately tender to palpation, midline incision with staples intact--no drainage or erythema. Mildly distended   Musculoskeletal:      Cervical back: No rigidity.      Comments: LUE edema from prior IV site   Neurological:      General: No focal deficit present.      Mental Status: She is alert and oriented to person, place, and time.       Significant Labs:  I have reviewed all pertinent lab results within the past 24 hours.  CBC:   Recent Labs   Lab 07/30/22  0500   WBC 6.43   RBC 3.87*   HGB 12.5   HCT 37.9      MCV 98   MCH 32.3*   MCHC 33.0       BMP:   Recent Labs   Lab 07/30/22  0500         K 3.1*   CL 99   CO2 25   BUN 17   CREATININE 0.7   CALCIUM 9.0   MG 2.0         Significant Diagnostics:  I have reviewed all pertinent imaging results/findings within the past 24 hours.  Ultrasound:  Impression:     No thrombus in central veins of the right or left upper extremity.    CTA:  Impression:     No pulmonary thromboembolism.     Bibasilar right greater than left pulmonary opacities concerning for aspiration or multifocal pneumonia as above.     Contrast extravasation into the right upper arm near the shoulder.  Suggest elevation and application of ice pack.  If the patient develops pain, distal coldness, numbness, or weakness, or any other signs of neurovascular compromise, suggest repeat presentation to the emergency room, or further surgical evaluation for decompression.

## 2022-07-30 NOTE — ASSESSMENT & PLAN NOTE
S/p diagnostic laparoscopy, laparotomy, extensive lysis of adhesions, ileocecectomy      - Continue NG tube to LIS. Await return of significant bowel function.  - IV fluids  - NPO, ice chips, until bowel function returns  - Analgesia prn  - Home medications  - Increase activity  - DVT ppx  - Monitor electrolytes and replace prn

## 2022-07-30 NOTE — HOSPITAL COURSE
7/30- Tmax 99.4. Pt feels better today. O2 wean down to RA. Denies chest pain, tightness or SOB. Cough is better. No N/V. NG tube output 1.1 Liter yesterday. Bowel sounds appreciated on exam, however no bowel movement reported. WBC 6.4, Hgb 12.5, Na 142, K 3.1, Cr 0.7, PCT 0.42.     7/31- Tmax 99. Remains on RA. Feels better subjectively . (+) flatus and bowel movement . NG tube remains in place. Pt remains NPO. Continue Unasyn IV. May transition to oral Augmentin once safe for oral intake. Pt will need total 5 days antibiotic treatment.     8/1- Continues to feel better. Denies SOB or cough. Remains on RA. NG tube in place with LIS. (+) flatus and bowel movement. One of two blood cultures from 7/29 resulted GNR. Antibiotic transitioned to rocephin 2 gram IV daily . Await final identification and sensitivity . Blood cultures repeated today.     8/2- Continue Rocephin while in house. May transition to oral Levofloxacin based on final identification and sensitivity to complete total 10 days treatment from neg blood culture. Repeat blood cultures from 8/1/22- NGTD. K 2.8, Mg 1.4 are being replaced.     8/3- Recommendation as above. Discharge plan per primary .  will sign off.

## 2022-07-30 NOTE — ASSESSMENT & PLAN NOTE
Patient with Hypoxic Respiratory failure which is Acute.  she is not on home oxygen. Supplemental oxygen was provided and noted-  .   Signs/symptoms of respiratory failure include- tachypnea. Contributing diagnoses includes - Aspiration Labs and images were reviewed. Patient Has recent ABG, which has been reviewed. Will treat underlying causes and adjust management of respiratory failure as follows-       -Supplemental oxygen to keep SpO2 >92%  -Inhaled bronchodilators prn  Antimicrobial targeting aspiration pneumonia in the setting recent episodes of vomiting       7/30-  Resolved   Currently on RA with satisfactory SpO2

## 2022-07-30 NOTE — PROGRESS NOTES
[facility] Medicine  Progress Note    Patient Name: Genny Calixto  MRN: [id number]  Patient Class: IP- Inpatient   Admission Date: 7/25/2022  Length of Stay: 3 days  Attending Physician: Jo Manzano DO  Primary Care Provider: Tay Duff MD        Subjective:     Principal Problem:SBO (small bowel obstruction)        HPI:  The pt is a 58 yo female with h/o recurrent small bowel obstructions, KENN, Migraine headaches, Kidney stones, Insomnia admitted on 7/25/22 under General Surgery service for elective diagnostic exp lap and underwent lysis of adhesion and excision of cecum with ileum. Post operatively Pt started on full liquid diet. On post op day #3 pt developed persistent nausea and vomiting and X-ray abd showed ileus. NG tube placed to LIS. Today 7/29/22 pt is noted to be hypoxic with SpO2 86% on RA. ABG showed 7.3/40.7/51 on RA. HM consulted for evaluation of new onset hypoxia. Pt examined at bedside. C/O feeling feverish and chills along with mild chest tightness and cough but denies SOB. Denies further nausea or vomiting since NG tube placed. Reports passing flatus . Generalized abd tenderness appreciated on exam. Lung auscultation revealed crackles at both bases. BNP is normal. D-dimer is elevated, however nonspecific in immediate post op period. CTA chest done showed no pulmonary embolism but bibasilar right greater than left pulmonary opacities concerning for aspiration or multifocal pneumonia . Blood cultures obtained and Pt started on antimicrobial targeting aspiration pneumonia.       Overview/Hospital Course:  7/30- Tmax 99.4. Pt feels better today. O2 wean down to RA. Denies chest pain, tightness or SOB. Cough is better. No N/V. NG tube output 1.1 Liter yesterday. Bowel sounds appreciated on exam, however no bowel movement reported. WBC 6.4, Hgb 12.5, Na 142, K 3.1, Cr 0.7, PCT 0.42.       Interval History:     Clinically improving . O2 wean down to RA.     Review of  Systems   Constitutional:  Positive for activity change. Negative for appetite change and fever.   HENT:  Negative for sore throat.    Eyes:  Negative for visual disturbance.   Respiratory:  Negative for cough, chest tightness and shortness of breath.    Cardiovascular:  Negative for chest pain, palpitations and leg swelling.   Gastrointestinal:  Positive for abdominal pain. Negative for abdominal distention, constipation, diarrhea, nausea and vomiting.        NG tube in place    Endocrine: Negative for polyuria.   Genitourinary:  Negative for decreased urine volume, dysuria, flank pain, frequency and hematuria.   Musculoskeletal:  Negative for back pain and gait problem.   Skin:  Negative for rash.   Neurological:  Negative for syncope, speech difficulty, weakness, light-headedness and headaches.   Psychiatric/Behavioral:  Negative for confusion, hallucinations and sleep disturbance.    Objective:     Vital Signs (Most Recent):  Temp: 99.2 °F (37.3 °C) (07/30/22 1659)  Pulse: 110 (07/30/22 1659)  Resp: 18 (07/30/22 1659)  BP: (!) 152/83 (07/30/22 1659)  SpO2: (!) 93 % (07/30/22 1659)   Vital Signs (24h Range):  Temp:  [98.5 °F (36.9 °C)-99.4 °F (37.4 °C)] 99.2 °F (37.3 °C)  Pulse:  [100-112] 110  Resp:  [14-19] 18  SpO2:  [92 %-95 %] 93 %  BP: (119-152)/(64-83) 152/83     Weight: 71.5 kg (157 lb 8.3 oz)  Body mass index is 24.67 kg/m².    Intake/Output Summary (Last 24 hours) at 7/30/2022 1800  Last data filed at 7/30/2022 1148  Gross per 24 hour   Intake 2123.83 ml   Output 1600 ml   Net 523.83 ml      Physical Exam  Constitutional:       General: She is not in acute distress.     Appearance: She is well-developed. She is ill-appearing. She is not diaphoretic.   HENT:      Head: Normocephalic and atraumatic.      Mouth/Throat:      Pharynx: No oropharyngeal exudate.   Eyes:      Conjunctiva/sclera: Conjunctivae normal.      Pupils: Pupils are equal, round, and reactive to light.   Neck:      Thyroid: No  thyromegaly.      Vascular: No JVD.   Cardiovascular:      Rate and Rhythm: Normal rate and regular rhythm.      Heart sounds: Normal heart sounds. No murmur heard.  Pulmonary:      Effort: Pulmonary effort is normal. No respiratory distress.      Breath sounds: No wheezing or rales.   Chest:      Chest wall: No tenderness.   Abdominal:      General: Bowel sounds are normal. There is no distension.      Palpations: Abdomen is soft.      Tenderness: There is abdominal tenderness (generalized). There is no guarding or rebound.      Comments: Bowel sounds are better today    Musculoskeletal:         General: Normal range of motion.      Cervical back: Normal range of motion and neck supple.   Lymphadenopathy:      Cervical: No cervical adenopathy.   Skin:     General: Skin is warm and dry.      Findings: No rash.   Neurological:      Mental Status: She is alert and oriented to person, place, and time.      Cranial Nerves: No cranial nerve deficit.      Sensory: No sensory deficit.      Deep Tendon Reflexes: Reflexes normal.       Significant Labs: All pertinent labs within the past 24 hours have been reviewed.  BMP:   Recent Labs   Lab 07/30/22  0500         K 3.1*   CL 99   CO2 25   BUN 17   CREATININE 0.7   CALCIUM 9.0   MG 2.0     CBC:   Recent Labs   Lab 07/30/22  0500   WBC 6.43   HGB 12.5   HCT 37.9        CMP:   Recent Labs   Lab 07/29/22  1126 07/30/22  0500    142   K 3.6 3.1*    99   CO2 21* 25   * 103   BUN 20 17   CREATININE 0.7 0.7   CALCIUM 8.6* 9.0   ANIONGAP 13 18*   EGFRNONAA >60 >60       Significant Imaging:       Assessment/Plan:      * SBO (small bowel obstruction)  - S/P  elective diagnostic exp lap, lysis of adhesion and excision of cecum with ileum.   -Further post op management per Primary     Acute hypoxemic respiratory failure  Patient with Hypoxic Respiratory failure which is Acute.  she is not on home oxygen. Supplemental oxygen was provided and noted-   .   Signs/symptoms of respiratory failure include- tachypnea. Contributing diagnoses includes - Aspiration Labs and images were reviewed. Patient Has recent ABG, which has been reviewed. Will treat underlying causes and adjust management of respiratory failure as follows-       -Supplemental oxygen to keep SpO2 >92%  -Inhaled bronchodilators prn  Antimicrobial targeting aspiration pneumonia in the setting recent episodes of vomiting       7/30-  Resolved   Currently on RA with satisfactory SpO2      Hospital-acquired pneumonia  - Imaging study suggestive of eze onset  bibasilar right greater than left pulmonary opacities  -Aspiration highly suspected in the setting of recent episodes of vomiting   -Blood cultures x 2  -Antimicrobial targeting aspiration  Pneumonia   -Supplemental oxygen   -Monitor course         VTE Risk Mitigation (From admission, onward)         Ordered     enoxaparin injection 40 mg  Daily         07/26/22 1723     Place sequential compression device  Until discontinued         07/25/22 1244                Discharge Planning   MANAS:      Code Status: Prior   Is the patient medically ready for discharge?:     Reason for patient still in hospital (select all that apply): Patient trending condition  Discharge Plan A: Home with family                  Lady Moreau MD  Department of Hospital Medicine   [facility]

## 2022-07-30 NOTE — SUBJECTIVE & OBJECTIVE
Interval History:     Clinically improving . O2 wean down to RA.     Review of Systems   Constitutional:  Positive for activity change. Negative for appetite change and fever.   HENT:  Negative for sore throat.    Eyes:  Negative for visual disturbance.   Respiratory:  Negative for cough, chest tightness and shortness of breath.    Cardiovascular:  Negative for chest pain, palpitations and leg swelling.   Gastrointestinal:  Positive for abdominal pain. Negative for abdominal distention, constipation, diarrhea, nausea and vomiting.        NG tube in place    Endocrine: Negative for polyuria.   Genitourinary:  Negative for decreased urine volume, dysuria, flank pain, frequency and hematuria.   Musculoskeletal:  Negative for back pain and gait problem.   Skin:  Negative for rash.   Neurological:  Negative for syncope, speech difficulty, weakness, light-headedness and headaches.   Psychiatric/Behavioral:  Negative for confusion, hallucinations and sleep disturbance.    Objective:     Vital Signs (Most Recent):  Temp: 99.2 °F (37.3 °C) (07/30/22 1659)  Pulse: 110 (07/30/22 1659)  Resp: 18 (07/30/22 1659)  BP: (!) 152/83 (07/30/22 1659)  SpO2: (!) 93 % (07/30/22 1659)   Vital Signs (24h Range):  Temp:  [98.5 °F (36.9 °C)-99.4 °F (37.4 °C)] 99.2 °F (37.3 °C)  Pulse:  [100-112] 110  Resp:  [14-19] 18  SpO2:  [92 %-95 %] 93 %  BP: (119-152)/(64-83) 152/83     Weight: 71.5 kg (157 lb 8.3 oz)  Body mass index is 24.67 kg/m².    Intake/Output Summary (Last 24 hours) at 7/30/2022 1800  Last data filed at 7/30/2022 1148  Gross per 24 hour   Intake 2123.83 ml   Output 1600 ml   Net 523.83 ml      Physical Exam  Constitutional:       General: She is not in acute distress.     Appearance: She is well-developed. She is ill-appearing. She is not diaphoretic.   HENT:      Head: Normocephalic and atraumatic.      Mouth/Throat:      Pharynx: No oropharyngeal exudate.   Eyes:      Conjunctiva/sclera: Conjunctivae normal.      Pupils:  Pupils are equal, round, and reactive to light.   Neck:      Thyroid: No thyromegaly.      Vascular: No JVD.   Cardiovascular:      Rate and Rhythm: Normal rate and regular rhythm.      Heart sounds: Normal heart sounds. No murmur heard.  Pulmonary:      Effort: Pulmonary effort is normal. No respiratory distress.      Breath sounds: No wheezing or rales.   Chest:      Chest wall: No tenderness.   Abdominal:      General: Bowel sounds are normal. There is no distension.      Palpations: Abdomen is soft.      Tenderness: There is abdominal tenderness (generalized). There is no guarding or rebound.      Comments: Bowel sounds are better today    Musculoskeletal:         General: Normal range of motion.      Cervical back: Normal range of motion and neck supple.   Lymphadenopathy:      Cervical: No cervical adenopathy.   Skin:     General: Skin is warm and dry.      Findings: No rash.   Neurological:      Mental Status: She is alert and oriented to person, place, and time.      Cranial Nerves: No cranial nerve deficit.      Sensory: No sensory deficit.      Deep Tendon Reflexes: Reflexes normal.       Significant Labs: All pertinent labs within the past 24 hours have been reviewed.  BMP:   Recent Labs   Lab 07/30/22  0500         K 3.1*   CL 99   CO2 25   BUN 17   CREATININE 0.7   CALCIUM 9.0   MG 2.0     CBC:   Recent Labs   Lab 07/30/22  0500   WBC 6.43   HGB 12.5   HCT 37.9        CMP:   Recent Labs   Lab 07/29/22  1126 07/30/22  0500    142   K 3.6 3.1*    99   CO2 21* 25   * 103   BUN 20 17   CREATININE 0.7 0.7   CALCIUM 8.6* 9.0   ANIONGAP 13 18*   EGFRNONAA >60 >60       Significant Imaging:

## 2022-07-30 NOTE — PROGRESS NOTES
"[facility]  General Surgery  Progress Note    Subjective:     History of Present Illness:  No notes on file    Post-Op Info:  Procedure(s) (LRB):  LAPAROSCOPY, DIAGNOSTIC (N/A)  LYSIS, ADHESIONS (N/A)  LAPAROTOMY, EXPLORATORY (N/A)  BLOCK, TRANSVERSUS ABDOMINIS PLANE (N/A)  EXCISION, CECUM WITH ILEUM   4 Days Post-Op     Interval History: Nausea is much better after NG tube placed. The tube did fall out last night and had to be replaced. She is now resting comfortably and has been able to get some sleep.    Medications:  Continuous Infusions:   sodium chloride 0.9% 110 mL/hr at 07/29/22 1742     Scheduled Meds:   acetaminophen  650 mg Oral Q6H    ampicillin-sulbactim (UNASYN) IVPB  3 g Intravenous Q6H    ascorbic acid (vitamin C)  500 mg Oral BID    docusate sodium  100 mg Oral BID    doxepin  10 mg Oral QHS    enoxaparin  40 mg Subcutaneous Daily    Lactobacillus acidoph-L.bulgar  1 tablet Oral BID    pantoprazole  40 mg Intravenous Daily    scopolamine  1 patch Transdermal Q3 Days    sodium phosphate IVPB  30 mmol Intravenous Once    spironolactone  50 mg Oral BID     PRN Meds:diazePAM, HYDROmorphone, melatonin, ondansetron, oxyCODONE, promethazine (PHENERGAN) IVPB, sodium chloride 0.9%, sodium chloride 0.9%     Review of patient's allergies indicates:   Allergen Reactions    Erythromycin Other (See Comments)     Stomach cramps    Morphine Nausea And Vomiting     "Projectile vomiting"     Objective:     Vital Signs (Most Recent):  Temp: 98 °F (36.7 °C) (07/29/22 0711)  Pulse: 107 (07/29/22 0711)  Resp: 16 (07/29/22 1531)  BP: 111/67 (07/29/22 0711)  SpO2: (!) 86 % (07/29/22 0711)   Vital Signs (24h Range):  Temp:  [98 °F (36.7 °C)-100.2 °F (37.9 °C)] 98 °F (36.7 °C)  Pulse:  [107-123] 107  Resp:  [14-18] 16  SpO2:  [86 %-100 %] 86 %  BP: (111-168)/() 111/67     Weight: 71.5 kg (157 lb 8.3 oz)  Body mass index is 24.67 kg/m².    Intake/Output - Last 3 Shifts         07/27 0700 07/28 0659 " 07/28 0700  07/29 0659 07/29 0700 07/30 0659    P.O. 960 200 0    I.V. (mL/kg) 397.7 (5.6) 131.2 (1.8) 1939.8 (27.2)    IV Piggyback 50.7 144.6 184    Total Intake(mL/kg) 1408.4 (19.7) 475.8 (6.7) 2123.8 (29.7)    Urine (mL/kg/hr)  0 (0)     Drains  300     Stool  0     Total Output  300     Net +1408.4 +175.8 +2123.8           Urine Occurrence 7 x 5 x 3 x    Stool Occurrence 3 x 1 x             Physical Exam  Vitals and nursing note reviewed.   Constitutional:       General: She is not in acute distress.  Eyes:      Extraocular Movements: Extraocular movements intact.   Cardiovascular:      Rate and Rhythm: Normal rate.   Pulmonary:      Effort: No respiratory distress.   Abdominal:      Palpations: Abdomen is soft.      Comments: Appropriately tender to palpation, midline incision with staples intact--no drainage or erythema. Mildly distended   Musculoskeletal:      Cervical back: No rigidity.      Comments: LUE edema from prior IV site   Neurological:      General: No focal deficit present.      Mental Status: She is alert and oriented to person, place, and time.       Significant Labs:  I have reviewed all pertinent lab results within the past 24 hours.  CBC:   Recent Labs   Lab 07/28/22  0543   WBC 3.79*   RBC 3.95*   HGB 13.2   HCT 38.1      MCV 97   MCH 33.4*   MCHC 34.6     BMP:   Recent Labs   Lab 07/29/22  1126   *      K 3.6      CO2 21*   BUN 20   CREATININE 0.7   CALCIUM 8.6*   MG 2.0       Significant Diagnostics:  I have reviewed all pertinent imaging results/findings within the past 24 hours.    Assessment/Plan:     * SBO (small bowel obstruction)  S/p diagnostic laparoscopy, laparotomy, extensive lysis of adhesions, ileocecectomy      - Continue NG tube to LIS. Await return of significant bowel function.  - Unable to maintain peripheral line due to poor venous access--will order midline  - IV fluids  - NPO, ice chips  - Analgesia prn  - Home medications  - Increase  activity  - DVT ppx  - Monitor electrolytes and replace prn    Hospital-acquired pneumonia  Likely secondary to aspiration. HM consulted for assistance in medical management. Antibiotics.        Jo Manzano, DO  General Surgery  [facility]

## 2022-07-31 LAB
ANION GAP SERPL CALC-SCNC: 15 MMOL/L (ref 8–16)
BASOPHILS # BLD AUTO: 0.09 K/UL (ref 0–0.2)
BASOPHILS NFR BLD: 1 % (ref 0–1.9)
BUN SERPL-MCNC: 11 MG/DL (ref 6–20)
CALCIUM SERPL-MCNC: 8.4 MG/DL (ref 8.7–10.5)
CHLORIDE SERPL-SCNC: 101 MMOL/L (ref 95–110)
CO2 SERPL-SCNC: 24 MMOL/L (ref 23–29)
CREAT SERPL-MCNC: 0.6 MG/DL (ref 0.5–1.4)
DIFFERENTIAL METHOD: ABNORMAL
EOSINOPHIL # BLD AUTO: 0.2 K/UL (ref 0–0.5)
EOSINOPHIL NFR BLD: 1.7 % (ref 0–8)
ERYTHROCYTE [DISTWIDTH] IN BLOOD BY AUTOMATED COUNT: 13.2 % (ref 11.5–14.5)
EST. GFR  (AFRICAN AMERICAN): >60 ML/MIN/1.73 M^2
EST. GFR  (NON AFRICAN AMERICAN): >60 ML/MIN/1.73 M^2
GLUCOSE SERPL-MCNC: 92 MG/DL (ref 70–110)
HCT VFR BLD AUTO: 31.8 % (ref 37–48.5)
HGB BLD-MCNC: 10.4 G/DL (ref 12–16)
IMM GRANULOCYTES # BLD AUTO: 0.4 K/UL (ref 0–0.04)
IMM GRANULOCYTES NFR BLD AUTO: 4.6 % (ref 0–0.5)
LYMPHOCYTES # BLD AUTO: 1.2 K/UL (ref 1–4.8)
LYMPHOCYTES NFR BLD: 13.6 % (ref 18–48)
MCH RBC QN AUTO: 31.8 PG (ref 27–31)
MCHC RBC AUTO-ENTMCNC: 32.7 G/DL (ref 32–36)
MCV RBC AUTO: 97 FL (ref 82–98)
MONOCYTES # BLD AUTO: 1.1 K/UL (ref 0.3–1)
MONOCYTES NFR BLD: 12.4 % (ref 4–15)
NEUTROPHILS # BLD AUTO: 5.8 K/UL (ref 1.8–7.7)
NEUTROPHILS NFR BLD: 66.7 % (ref 38–73)
NRBC BLD-RTO: 0 /100 WBC
PLATELET # BLD AUTO: 282 K/UL (ref 150–450)
PMV BLD AUTO: 10.1 FL (ref 9.2–12.9)
POTASSIUM SERPL-SCNC: 3.1 MMOL/L (ref 3.5–5.1)
RBC # BLD AUTO: 3.27 M/UL (ref 4–5.4)
SODIUM SERPL-SCNC: 140 MMOL/L (ref 136–145)
WBC # BLD AUTO: 8.63 K/UL (ref 3.9–12.7)

## 2022-07-31 PROCEDURE — 36415 COLL VENOUS BLD VENIPUNCTURE: CPT | Performed by: SURGERY

## 2022-07-31 PROCEDURE — 80048 BASIC METABOLIC PNL TOTAL CA: CPT | Performed by: SURGERY

## 2022-07-31 PROCEDURE — C1751 CATH, INF, PER/CENT/MIDLINE: HCPCS

## 2022-07-31 PROCEDURE — 36410 VNPNXR 3YR/> PHY/QHP DX/THER: CPT

## 2022-07-31 PROCEDURE — 25000003 PHARM REV CODE 250: Performed by: INTERNAL MEDICINE

## 2022-07-31 PROCEDURE — 63600175 PHARM REV CODE 636 W HCPCS: Performed by: SURGERY

## 2022-07-31 PROCEDURE — 85025 COMPLETE CBC W/AUTO DIFF WBC: CPT | Performed by: SURGERY

## 2022-07-31 PROCEDURE — 94761 N-INVAS EAR/PLS OXIMETRY MLT: CPT

## 2022-07-31 PROCEDURE — 63600175 PHARM REV CODE 636 W HCPCS: Performed by: INTERNAL MEDICINE

## 2022-07-31 PROCEDURE — 76937 US GUIDE VASCULAR ACCESS: CPT

## 2022-07-31 PROCEDURE — C9113 INJ PANTOPRAZOLE SODIUM, VIA: HCPCS | Performed by: SURGERY

## 2022-07-31 PROCEDURE — 25000003 PHARM REV CODE 250: Performed by: SURGERY

## 2022-07-31 PROCEDURE — 11000001 HC ACUTE MED/SURG PRIVATE ROOM

## 2022-07-31 RX ORDER — DIAZEPAM 10 MG/2ML
5 INJECTION INTRAMUSCULAR ONCE
Status: COMPLETED | OUTPATIENT
Start: 2022-07-31 | End: 2022-07-31

## 2022-07-31 RX ORDER — POTASSIUM CHLORIDE 7.45 MG/ML
10 INJECTION INTRAVENOUS
Status: DISPENSED | OUTPATIENT
Start: 2022-07-31 | End: 2022-07-31

## 2022-07-31 RX ORDER — POTASSIUM CHLORIDE 7.45 MG/ML
10 INJECTION INTRAVENOUS ONCE
Status: COMPLETED | OUTPATIENT
Start: 2022-07-31 | End: 2022-07-31

## 2022-07-31 RX ADMIN — SODIUM CHLORIDE: 0.9 INJECTION, SOLUTION INTRAVENOUS at 12:07

## 2022-07-31 RX ADMIN — ACETAMINOPHEN 650 MG: 325 TABLET ORAL at 09:07

## 2022-07-31 RX ADMIN — Medication 1 TABLET: at 08:07

## 2022-07-31 RX ADMIN — DIAZEPAM 5 MG: 10 INJECTION, SOLUTION INTRAMUSCULAR; INTRAVENOUS at 12:07

## 2022-07-31 RX ADMIN — OXYCODONE HYDROCHLORIDE AND ACETAMINOPHEN 500 MG: 500 TABLET ORAL at 08:07

## 2022-07-31 RX ADMIN — ONDANSETRON 4 MG: 2 INJECTION INTRAMUSCULAR; INTRAVENOUS at 10:07

## 2022-07-31 RX ADMIN — DOCUSATE SODIUM 100 MG: 100 CAPSULE, LIQUID FILLED ORAL at 08:07

## 2022-07-31 RX ADMIN — CEFTRIAXONE 2 G: 2 INJECTION, SOLUTION INTRAVENOUS at 05:07

## 2022-07-31 RX ADMIN — ONDANSETRON 4 MG: 2 INJECTION INTRAMUSCULAR; INTRAVENOUS at 05:07

## 2022-07-31 RX ADMIN — ACETAMINOPHEN 650 MG: 325 TABLET ORAL at 10:07

## 2022-07-31 RX ADMIN — ONDANSETRON 4 MG: 2 INJECTION INTRAMUSCULAR; INTRAVENOUS at 06:07

## 2022-07-31 RX ADMIN — HYDROMORPHONE HYDROCHLORIDE 1 MG: 2 INJECTION INTRAMUSCULAR; INTRAVENOUS; SUBCUTANEOUS at 05:07

## 2022-07-31 RX ADMIN — OXYCODONE HYDROCHLORIDE 5 MG: 5 TABLET ORAL at 08:07

## 2022-07-31 RX ADMIN — ACETAMINOPHEN 650 MG: 325 TABLET ORAL at 03:07

## 2022-07-31 RX ADMIN — HYDROMORPHONE HYDROCHLORIDE 1 MG: 2 INJECTION INTRAMUSCULAR; INTRAVENOUS; SUBCUTANEOUS at 10:07

## 2022-07-31 RX ADMIN — SODIUM CHLORIDE 3 G: 9 INJECTION, SOLUTION INTRAVENOUS at 06:07

## 2022-07-31 RX ADMIN — SPIRONOLACTONE 50 MG: 25 TABLET, FILM COATED ORAL at 08:07

## 2022-07-31 RX ADMIN — POTASSIUM CHLORIDE 10 MEQ: 10 INJECTION, SOLUTION INTRAVENOUS at 10:07

## 2022-07-31 RX ADMIN — DOXEPIN HYDROCHLORIDE 10 MG: 10 CAPSULE ORAL at 08:07

## 2022-07-31 RX ADMIN — ENOXAPARIN SODIUM 40 MG: 100 INJECTION SUBCUTANEOUS at 05:07

## 2022-07-31 RX ADMIN — HYDROMORPHONE HYDROCHLORIDE 1 MG: 2 INJECTION INTRAMUSCULAR; INTRAVENOUS; SUBCUTANEOUS at 06:07

## 2022-07-31 RX ADMIN — PANTOPRAZOLE SODIUM 40 MG: 40 INJECTION, POWDER, FOR SOLUTION INTRAVENOUS at 10:07

## 2022-07-31 RX ADMIN — SODIUM CHLORIDE 3 G: 9 INJECTION, SOLUTION INTRAVENOUS at 12:07

## 2022-07-31 RX ADMIN — PROMETHAZINE HYDROCHLORIDE 25 MG: 25 INJECTION INTRAMUSCULAR; INTRAVENOUS at 10:07

## 2022-07-31 RX ADMIN — POTASSIUM CHLORIDE 10 MEQ: 7.46 INJECTION, SOLUTION INTRAVENOUS at 05:07

## 2022-07-31 RX ADMIN — POTASSIUM CHLORIDE 10 MEQ: 7.46 INJECTION, SOLUTION INTRAVENOUS at 07:07

## 2022-07-31 RX ADMIN — DIAZEPAM 10 MG: 5 TABLET ORAL at 08:07

## 2022-07-31 NOTE — ASSESSMENT & PLAN NOTE
S/p diagnostic laparoscopy, laparotomy, extensive lysis of adhesions, ileocecectomy      - Continue NG tube to LIS. Await return of significant bowel function. Per nursing patient was found to have been sneaking drinks and consuming fair amount of liquids which may account for high volume ngt output  - IV fluids  - NPO, ice chips, until bowel function returns  - Analgesia prn  - Home medications  - Increase activity  - DVT ppx  - Monitor electrolytes and replace prn  -xray consistent with ileus as expected

## 2022-07-31 NOTE — PT/OT/SLP PROGRESS
Physical Therapy      Patient Name:  Genny Calixto   MRN:  [id number]    Initial PT eval order received and chart review completed. Patient not seen today secondary to patient refused, stating that she did not sleep well last night due to multiple people coming into her room to complete tests. Will follow-up at next scheduled therapy session.    Nubia Fiore, PT  07/31/2022  10:20 AM

## 2022-07-31 NOTE — ASSESSMENT & PLAN NOTE
- Imaging study suggestive of eze onset  bibasilar right greater than left pulmonary opacities  -Aspiration highly suspected in the setting of recent episodes of vomiting   -Blood cultures x 2  -Antimicrobial targeting aspiration  Pneumonia   -Supplemental oxygen   -Monitor course   7/31-  NG tube remains in place. Pt remains NPO. Continue Unasyn IV. May transition to oral Augmentin once safe for oral intake. Pt will need total 5 days antibiotic treatment.

## 2022-07-31 NOTE — SUBJECTIVE & OBJECTIVE
"Interval History: high bilious output from ngt, passing some flatus, pain moderately controlled    Medications:  Continuous Infusions:   sodium chloride 0.9% 110 mL/hr at 07/31/22 1212     Scheduled Meds:   acetaminophen  650 mg Oral Q6H    ascorbic acid (vitamin C)  500 mg Oral BID    cefTRIAXone (ROCEPHIN) IVPB  2 g Intravenous Q24H    docusate sodium  100 mg Oral BID    doxepin  10 mg Oral QHS    enoxaparin  40 mg Subcutaneous Daily    Lactobacillus acidoph-L.bulgar  1 tablet Oral BID    pantoprazole  40 mg Intravenous Daily    scopolamine  1 patch Transdermal Q3 Days    spironolactone  50 mg Oral BID     PRN Meds:albuterol-ipratropium, diazePAM, hydrALAZINE, HYDROmorphone, melatonin, ondansetron, oxyCODONE, promethazine (PHENERGAN) IVPB, sodium chloride 0.9%, sodium chloride 0.9%     Review of patient's allergies indicates:   Allergen Reactions    Erythromycin Other (See Comments)     Stomach cramps    Morphine Nausea And Vomiting     "Projectile vomiting"     Objective:     Vital Signs (Most Recent):  Temp: 99.3 °F (37.4 °C) (07/31/22 1155)  Pulse: (!) 118 (07/31/22 1155)  Resp: 18 (07/31/22 1155)  BP: 134/81 (07/31/22 1155)  SpO2: (!) 93 % (07/31/22 1155)   Vital Signs (24h Range):  Temp:  [99.2 °F (37.3 °C)-99.9 °F (37.7 °C)] 99.3 °F (37.4 °C)  Pulse:  [101-118] 118  Resp:  [15-18] 18  SpO2:  [92 %-94 %] 93 %  BP: (131-160)/(62-83) 134/81     Weight: 71.5 kg (157 lb 8.3 oz)  Body mass index is 24.67 kg/m².    Intake/Output - Last 3 Shifts         07/29 0700 07/30 0659 07/30 0700 07/31 0659 07/31 0700 08/01 0659    P.O. 0 0 0    I.V. (mL/kg) 1939.8 (27.2) 565.1 (7.9)     IV Piggyback 184 586.2     Total Intake(mL/kg) 2123.8 (29.7) 1151.3 (16.1) 0 (0)    Urine (mL/kg/hr)  500 (0.3) 0 (0)    Drains 1100 2350 1000    Stool  0     Total Output 1100 2850 1000    Net +1023.8 -1698.7 -1000           Urine Occurrence 3 x 7 x 2 x    Stool Occurrence  2 x             Physical Exam  Vitals and nursing note reviewed. "   Constitutional:       General: She is not in acute distress.  Eyes:      Extraocular Movements: Extraocular movements intact.   Cardiovascular:      Rate and Rhythm: Normal rate.   Pulmonary:      Effort: No respiratory distress.   Abdominal:      Palpations: Abdomen is soft.      Comments: Appropriately tender to palpation, midline incision with staples intact--no drainage or erythema. Mildly distended   Musculoskeletal:      Cervical back: No rigidity.      Comments: LUE edema from prior IV site   Neurological:      General: No focal deficit present.      Mental Status: She is alert and oriented to person, place, and time.       Significant Labs:  I have reviewed all pertinent lab results within the past 24 hours.  CBC:   Recent Labs   Lab 07/31/22  0731   WBC 8.63   RBC 3.27*   HGB 10.4*   HCT 31.8*      MCV 97   MCH 31.8*   MCHC 32.7       BMP:   Recent Labs   Lab 07/30/22  0500 07/31/22  0731    92    140   K 3.1* 3.1*   CL 99 101   CO2 25 24   BUN 17 11   CREATININE 0.7 0.6   CALCIUM 9.0 8.4*   MG 2.0  --          Significant Diagnostics:  FINDINGS:  Comparison study 07/28/2022. there has been interval advancement of the nasogastric tube the tip within the proximal stomach, side port well below the GE junction in excellent positioning.  Postoperative abdomen with lower abdominal wall surgical staples.  Right lower quadrant surgical staple lines.  Cholecystectomy.  Again, abnormal bowel gas pattern multiple air-filled dilated loops of small and large bowel, obstruction versus ileus, unchanged compared to prior study.  Sigmoidal scoliosis degenerative thoracolumbar spine.

## 2022-07-31 NOTE — PLAN OF CARE
AAOx4. Significant other @ bedside. Pain is being managed on going. Pt remained free from fall and injury. No sign of any distress noted. Safety precautions alert. Pt asked to call for assistance if needed. Restarting IV access. Chart checked reviewed. VSS. Will continue to monitor on going.

## 2022-07-31 NOTE — PROGRESS NOTES
[facility] Medicine  Progress Note    Patient Name: Genny Calixto  MRN: [id number]  Patient Class: IP- Inpatient   Admission Date: 7/25/2022  Length of Stay: 4 days  Attending Physician: Jo Manzano DO  Primary Care Provider: Tay Duff MD        Subjective:     Principal Problem:SBO (small bowel obstruction)        HPI:  The pt is a 58 yo female with h/o recurrent small bowel obstructions, KENN, Migraine headaches, Kidney stones, Insomnia admitted on 7/25/22 under General Surgery service for elective diagnostic exp lap and underwent lysis of adhesion and excision of cecum with ileum. Post operatively Pt started on full liquid diet. On post op day #3 pt developed persistent nausea and vomiting and X-ray abd showed ileus. NG tube placed to LIS. Today 7/29/22 pt is noted to be hypoxic with SpO2 86% on RA. ABG showed 7.3/40.7/51 on RA. HM consulted for evaluation of new onset hypoxia. Pt examined at bedside. C/O feeling feverish and chills along with mild chest tightness and cough but denies SOB. Denies further nausea or vomiting since NG tube placed. Reports passing flatus . Generalized abd tenderness appreciated on exam. Lung auscultation revealed crackles at both bases. BNP is normal. D-dimer is elevated, however nonspecific in immediate post op period. CTA chest done showed no pulmonary embolism but bibasilar right greater than left pulmonary opacities concerning for aspiration or multifocal pneumonia . Blood cultures obtained and Pt started on antimicrobial targeting aspiration pneumonia.       Overview/Hospital Course:  7/30- Tmax 99.4. Pt feels better today. O2 wean down to RA. Denies chest pain, tightness or SOB. Cough is better. No N/V. NG tube output 1.1 Liter yesterday. Bowel sounds appreciated on exam, however no bowel movement reported. WBC 6.4, Hgb 12.5, Na 142, K 3.1, Cr 0.7, PCT 0.42.     7/31- Tmax 99. Remains on RA. Feels better subjectively . (+) flatus and bowel  movement . NG tube remains in place. Pt remains NPO. Continue Unasyn IV. May transition to oral Augmentin once safe for oral intake. Pt will need total 5 days antibiotic treatment.       Interval History:   (+) flatus and bowel movement . NG tube remains in place. Pt remains NPO. Continue Unasyn IV. May transition to oral Augmentin once safe for oral intake. Pt will need total 5 days antibiotic treatment.       Review of Systems   Constitutional:  Positive for activity change. Negative for appetite change and fever.   HENT:  Negative for sore throat.    Eyes:  Negative for visual disturbance.   Respiratory:  Negative for cough, chest tightness and shortness of breath.    Cardiovascular:  Negative for chest pain, palpitations and leg swelling.   Gastrointestinal:  Positive for abdominal pain (much better). Negative for abdominal distention, constipation, diarrhea, nausea and vomiting.        NG tube in place    Endocrine: Negative for polyuria.   Genitourinary:  Negative for decreased urine volume, dysuria, flank pain, frequency and hematuria.   Musculoskeletal:  Negative for back pain and gait problem.   Skin:  Negative for rash.   Neurological:  Negative for syncope, speech difficulty, weakness, light-headedness and headaches.   Psychiatric/Behavioral:  Negative for confusion, hallucinations and sleep disturbance.    Objective:     Vital Signs (Most Recent):  Temp: 99.3 °F (37.4 °C) (07/31/22 1155)  Pulse: (!) 118 (07/31/22 1155)  Resp: 18 (07/31/22 1155)  BP: 134/81 (07/31/22 1155)  SpO2: (!) 93 % (07/31/22 1155)   Vital Signs (24h Range):  Temp:  [99.2 °F (37.3 °C)-99.9 °F (37.7 °C)] 99.3 °F (37.4 °C)  Pulse:  [101-118] 118  Resp:  [15-18] 18  SpO2:  [92 %-94 %] 93 %  BP: (131-160)/(62-83) 134/81     Weight: 71.5 kg (157 lb 8.3 oz)  Body mass index is 24.67 kg/m².    Intake/Output Summary (Last 24 hours) at 7/31/2022 1303  Last data filed at 7/31/2022 1110  Gross per 24 hour   Intake 1151.34 ml   Output 3350 ml    Net -2198.66 ml      Physical Exam  Constitutional:       General: She is not in acute distress.     Appearance: She is well-developed. She is not ill-appearing or diaphoretic.   HENT:      Head: Normocephalic and atraumatic.      Mouth/Throat:      Pharynx: No oropharyngeal exudate.   Eyes:      Conjunctiva/sclera: Conjunctivae normal.      Pupils: Pupils are equal, round, and reactive to light.   Neck:      Thyroid: No thyromegaly.      Vascular: No JVD.   Cardiovascular:      Rate and Rhythm: Normal rate and regular rhythm.      Heart sounds: Normal heart sounds. No murmur heard.  Pulmonary:      Effort: Pulmonary effort is normal. No respiratory distress.      Breath sounds: No wheezing or rales.   Chest:      Chest wall: No tenderness.   Abdominal:      General: Bowel sounds are normal. There is no distension.      Palpations: Abdomen is soft.      Tenderness: There is abdominal tenderness (generalized). There is no guarding or rebound.      Comments: Bowel sounds are better today    Musculoskeletal:         General: Normal range of motion.      Cervical back: Normal range of motion and neck supple.   Lymphadenopathy:      Cervical: No cervical adenopathy.   Skin:     General: Skin is warm and dry.      Findings: No rash.   Neurological:      Mental Status: She is alert and oriented to person, place, and time.      Cranial Nerves: No cranial nerve deficit.      Sensory: No sensory deficit.      Deep Tendon Reflexes: Reflexes normal.       Significant Labs: All pertinent labs within the past 24 hours have been reviewed.  BMP:   Recent Labs   Lab 07/30/22  0500 07/31/22  0731    92    140   K 3.1* 3.1*   CL 99 101   CO2 25 24   BUN 17 11   CREATININE 0.7 0.6   CALCIUM 9.0 8.4*   MG 2.0  --      CBC:   Recent Labs   Lab 07/30/22  0500 07/31/22  0731   WBC 6.43 8.63   HGB 12.5 10.4*   HCT 37.9 31.8*    282     CMP:   Recent Labs   Lab 07/30/22  0500 07/31/22  0731    140   K 3.1* 3.1*    CL 99 101   CO2 25 24    92   BUN 17 11   CREATININE 0.7 0.6   CALCIUM 9.0 8.4*   ANIONGAP 18* 15   EGFRNONAA >60 >60       Significant Imaging:       Assessment/Plan:      * SBO (small bowel obstruction)  - S/P  elective diagnostic exp lap, lysis of adhesion and excision of cecum with ileum.   -Further post op management per Primary     Acute hypoxemic respiratory failure- Resolved   Patient with Hypoxic Respiratory failure which is Acute.  she is not on home oxygen. Supplemental oxygen was provided and noted-  .   Signs/symptoms of respiratory failure include- tachypnea. Contributing diagnoses includes - Aspiration Labs and images were reviewed. Patient Has recent ABG, which has been reviewed. Will treat underlying causes and adjust management of respiratory failure as follows-       -Supplemental oxygen to keep SpO2 >92%  -Inhaled bronchodilators prn  Antimicrobial targeting aspiration pneumonia in the setting recent episodes of vomiting       7/30-  Resolved   Currently on RA with satisfactory SpO2      Hospital-acquired pneumonia  - Imaging study suggestive of eze onset  bibasilar right greater than left pulmonary opacities  -Aspiration highly suspected in the setting of recent episodes of vomiting   -Blood cultures x 2  -Antimicrobial targeting aspiration  Pneumonia   -Supplemental oxygen   -Monitor course   7/31-  NG tube remains in place. Pt remains NPO. Continue Unasyn IV. May transition to oral Augmentin once safe for oral intake. Pt will need total 5 days antibiotic treatment.       VTE Risk Mitigation (From admission, onward)         Ordered     enoxaparin injection 40 mg  Daily         07/26/22 1723     Place sequential compression device  Until discontinued         07/25/22 1244                Discharge Planning   MANAS:      Code Status: Prior   Is the patient medically ready for discharge?:     Reason for patient still in hospital (select all that apply): Patient trending condition  Discharge  Plan A: Home with family                  Lady Moreau MD  Department of Hospital Medicine   [facility]

## 2022-07-31 NOTE — PROGRESS NOTES
"[facility]urg  General Surgery  Progress Note    Subjective:     History of Present Illness:  No notes on file    Post-Op Info:  Procedure(s) (LRB):  LAPAROSCOPY, DIAGNOSTIC (N/A)  LYSIS, ADHESIONS (N/A)  LAPAROTOMY, EXPLORATORY (N/A)  BLOCK, TRANSVERSUS ABDOMINIS PLANE (N/A)  EXCISION, CECUM WITH ILEUM   6 Days Post-Op     Interval History: high bilious output from ngt, passing some flatus, pain moderately controlled    Medications:  Continuous Infusions:   sodium chloride 0.9% 110 mL/hr at 07/31/22 1212     Scheduled Meds:   acetaminophen  650 mg Oral Q6H    ascorbic acid (vitamin C)  500 mg Oral BID    cefTRIAXone (ROCEPHIN) IVPB  2 g Intravenous Q24H    docusate sodium  100 mg Oral BID    doxepin  10 mg Oral QHS    enoxaparin  40 mg Subcutaneous Daily    Lactobacillus acidoph-L.bulgar  1 tablet Oral BID    pantoprazole  40 mg Intravenous Daily    scopolamine  1 patch Transdermal Q3 Days    spironolactone  50 mg Oral BID     PRN Meds:albuterol-ipratropium, diazePAM, hydrALAZINE, HYDROmorphone, melatonin, ondansetron, oxyCODONE, promethazine (PHENERGAN) IVPB, sodium chloride 0.9%, sodium chloride 0.9%     Review of patient's allergies indicates:   Allergen Reactions    Erythromycin Other (See Comments)     Stomach cramps    Morphine Nausea And Vomiting     "Projectile vomiting"     Objective:     Vital Signs (Most Recent):  Temp: 99.3 °F (37.4 °C) (07/31/22 1155)  Pulse: (!) 118 (07/31/22 1155)  Resp: 18 (07/31/22 1155)  BP: 134/81 (07/31/22 1155)  SpO2: (!) 93 % (07/31/22 1155)   Vital Signs (24h Range):  Temp:  [99.2 °F (37.3 °C)-99.9 °F (37.7 °C)] 99.3 °F (37.4 °C)  Pulse:  [101-118] 118  Resp:  [15-18] 18  SpO2:  [92 %-94 %] 93 %  BP: (131-160)/(62-83) 134/81     Weight: 71.5 kg (157 lb 8.3 oz)  Body mass index is 24.67 kg/m².    Intake/Output - Last 3 Shifts         07/29 0700 07/30 0659 07/30 0700 07/31 0659 07/31 0700 08/01 0659    P.O. 0 0 0    I.V. (mL/kg) 1939.8 (27.2) 565.1 (7.9)     " IV Piggyback 184 586.2     Total Intake(mL/kg) 2123.8 (29.7) 1151.3 (16.1) 0 (0)    Urine (mL/kg/hr)  500 (0.3) 0 (0)    Drains 1100 2350 1000    Stool  0     Total Output 1100 2850 1000    Net +1023.8 -1698.7 -1000           Urine Occurrence 3 x 7 x 2 x    Stool Occurrence  2 x             Physical Exam  Vitals and nursing note reviewed.   Constitutional:       General: She is not in acute distress.  Eyes:      Extraocular Movements: Extraocular movements intact.   Cardiovascular:      Rate and Rhythm: Normal rate.   Pulmonary:      Effort: No respiratory distress.   Abdominal:      Palpations: Abdomen is soft.      Comments: Appropriately tender to palpation, midline incision with staples intact--no drainage or erythema. Mildly distended   Musculoskeletal:      Cervical back: No rigidity.      Comments: LUE edema from prior IV site   Neurological:      General: No focal deficit present.      Mental Status: She is alert and oriented to person, place, and time.       Significant Labs:  I have reviewed all pertinent lab results within the past 24 hours.  CBC:   Recent Labs   Lab 07/31/22  0731   WBC 8.63   RBC 3.27*   HGB 10.4*   HCT 31.8*      MCV 97   MCH 31.8*   MCHC 32.7       BMP:   Recent Labs   Lab 07/30/22  0500 07/31/22  0731    92    140   K 3.1* 3.1*   CL 99 101   CO2 25 24   BUN 17 11   CREATININE 0.7 0.6   CALCIUM 9.0 8.4*   MG 2.0  --          Significant Diagnostics:  FINDINGS:  Comparison study 07/28/2022. there has been interval advancement of the nasogastric tube the tip within the proximal stomach, side port well below the GE junction in excellent positioning.  Postoperative abdomen with lower abdominal wall surgical staples.  Right lower quadrant surgical staple lines.  Cholecystectomy.  Again, abnormal bowel gas pattern multiple air-filled dilated loops of small and large bowel, obstruction versus ileus, unchanged compared to prior study.  Sigmoidal scoliosis degenerative  thoracolumbar spine.    Assessment/Plan:     * SBO (small bowel obstruction)  S/p diagnostic laparoscopy, laparotomy, extensive lysis of adhesions, ileocecectomy      - Continue NG tube to LIS. Await return of significant bowel function. Per nursing patient was found to have been sneaking drinks and consuming fair amount of liquids which may account for high volume ngt output  - IV fluids  - NPO, ice chips, until bowel function returns  - Analgesia prn  - Home medications  - Increase activity  - DVT ppx  - Monitor electrolytes and replace prn  -xray consistent with ileus as expected    Acute hypoxemic respiratory failure- Resolved   Pulmonary toileting      Hospital-acquired pneumonia  Likely secondary to aspiration.  consulted for assistance in medical management. Antibiotics.        Vega Hightower MD  General Surgery  [facility]

## 2022-07-31 NOTE — CARE UPDATE
One of two blood cultures from 7/29/22 resulted GNR. Will change Unasyn to Rocephin 2 gram IV daily .

## 2022-07-31 NOTE — PLAN OF CARE
AAOx4. Significant other @ bedside. Pt to low intermittent suction on NG tube to the right nare. Incisional dressing clean and intact. Pt remained free from fall and injury. No sign of any distress noted. Safety precautions alert. Pt asked to call for assistance if needed. IV access clean dry and intact saline locked. Chart checked reviewed. VSS. Will continue to monitor on going.

## 2022-07-31 NOTE — SUBJECTIVE & OBJECTIVE
Interval History:   (+) flatus and bowel movement . NG tube remains in place. Pt remains NPO. Continue Unasyn IV. May transition to oral Augmentin once safe for oral intake. Pt will need total 5 days antibiotic treatment.       Review of Systems   Constitutional:  Positive for activity change. Negative for appetite change and fever.   HENT:  Negative for sore throat.    Eyes:  Negative for visual disturbance.   Respiratory:  Negative for cough, chest tightness and shortness of breath.    Cardiovascular:  Negative for chest pain, palpitations and leg swelling.   Gastrointestinal:  Positive for abdominal pain (much better). Negative for abdominal distention, constipation, diarrhea, nausea and vomiting.        NG tube in place    Endocrine: Negative for polyuria.   Genitourinary:  Negative for decreased urine volume, dysuria, flank pain, frequency and hematuria.   Musculoskeletal:  Negative for back pain and gait problem.   Skin:  Negative for rash.   Neurological:  Negative for syncope, speech difficulty, weakness, light-headedness and headaches.   Psychiatric/Behavioral:  Negative for confusion, hallucinations and sleep disturbance.    Objective:     Vital Signs (Most Recent):  Temp: 99.3 °F (37.4 °C) (07/31/22 1155)  Pulse: (!) 118 (07/31/22 1155)  Resp: 18 (07/31/22 1155)  BP: 134/81 (07/31/22 1155)  SpO2: (!) 93 % (07/31/22 1155)   Vital Signs (24h Range):  Temp:  [99.2 °F (37.3 °C)-99.9 °F (37.7 °C)] 99.3 °F (37.4 °C)  Pulse:  [101-118] 118  Resp:  [15-18] 18  SpO2:  [92 %-94 %] 93 %  BP: (131-160)/(62-83) 134/81     Weight: 71.5 kg (157 lb 8.3 oz)  Body mass index is 24.67 kg/m².    Intake/Output Summary (Last 24 hours) at 7/31/2022 1303  Last data filed at 7/31/2022 1110  Gross per 24 hour   Intake 1151.34 ml   Output 3350 ml   Net -2198.66 ml      Physical Exam  Constitutional:       General: She is not in acute distress.     Appearance: She is well-developed. She is not ill-appearing or diaphoretic.   HENT:       Head: Normocephalic and atraumatic.      Mouth/Throat:      Pharynx: No oropharyngeal exudate.   Eyes:      Conjunctiva/sclera: Conjunctivae normal.      Pupils: Pupils are equal, round, and reactive to light.   Neck:      Thyroid: No thyromegaly.      Vascular: No JVD.   Cardiovascular:      Rate and Rhythm: Normal rate and regular rhythm.      Heart sounds: Normal heart sounds. No murmur heard.  Pulmonary:      Effort: Pulmonary effort is normal. No respiratory distress.      Breath sounds: No wheezing or rales.   Chest:      Chest wall: No tenderness.   Abdominal:      General: Bowel sounds are normal. There is no distension.      Palpations: Abdomen is soft.      Tenderness: There is abdominal tenderness (generalized). There is no guarding or rebound.      Comments: Bowel sounds are better today    Musculoskeletal:         General: Normal range of motion.      Cervical back: Normal range of motion and neck supple.   Lymphadenopathy:      Cervical: No cervical adenopathy.   Skin:     General: Skin is warm and dry.      Findings: No rash.   Neurological:      Mental Status: She is alert and oriented to person, place, and time.      Cranial Nerves: No cranial nerve deficit.      Sensory: No sensory deficit.      Deep Tendon Reflexes: Reflexes normal.       Significant Labs: All pertinent labs within the past 24 hours have been reviewed.  BMP:   Recent Labs   Lab 07/30/22  0500 07/31/22  0731    92    140   K 3.1* 3.1*   CL 99 101   CO2 25 24   BUN 17 11   CREATININE 0.7 0.6   CALCIUM 9.0 8.4*   MG 2.0  --      CBC:   Recent Labs   Lab 07/30/22  0500 07/31/22  0731   WBC 6.43 8.63   HGB 12.5 10.4*   HCT 37.9 31.8*    282     CMP:   Recent Labs   Lab 07/30/22  0500 07/31/22  0731    140   K 3.1* 3.1*   CL 99 101   CO2 25 24    92   BUN 17 11   CREATININE 0.7 0.6   CALCIUM 9.0 8.4*   ANIONGAP 18* 15   EGFRNONAA >60 >60       Significant Imaging:

## 2022-08-01 ENCOUNTER — PATIENT MESSAGE (OUTPATIENT)
Dept: SURGERY | Facility: CLINIC | Age: 57
End: 2022-08-01
Payer: MEDICARE

## 2022-08-01 LAB
ANION GAP SERPL CALC-SCNC: 14 MMOL/L (ref 8–16)
BASOPHILS NFR BLD: 1 % (ref 0–1.9)
BUN SERPL-MCNC: 7 MG/DL (ref 6–20)
CALCIUM SERPL-MCNC: 8.4 MG/DL (ref 8.7–10.5)
CHLORIDE SERPL-SCNC: 103 MMOL/L (ref 95–110)
CO2 SERPL-SCNC: 25 MMOL/L (ref 23–29)
CREAT SERPL-MCNC: 0.5 MG/DL (ref 0.5–1.4)
DIFFERENTIAL METHOD: ABNORMAL
EOSINOPHIL NFR BLD: 2 % (ref 0–8)
ERYTHROCYTE [DISTWIDTH] IN BLOOD BY AUTOMATED COUNT: 13.2 % (ref 11.5–14.5)
EST. GFR  (NO RACE VARIABLE): >60 ML/MIN/1.73 M^2
GLUCOSE SERPL-MCNC: 80 MG/DL (ref 70–110)
HCT VFR BLD AUTO: 31.8 % (ref 37–48.5)
HGB BLD-MCNC: 10.4 G/DL (ref 12–16)
IMM GRANULOCYTES # BLD AUTO: ABNORMAL K/UL (ref 0–0.04)
IMM GRANULOCYTES NFR BLD AUTO: ABNORMAL % (ref 0–0.5)
LYMPHOCYTES NFR BLD: 15 % (ref 18–48)
MCH RBC QN AUTO: 32.3 PG (ref 27–31)
MCHC RBC AUTO-ENTMCNC: 32.7 G/DL (ref 32–36)
MCV RBC AUTO: 99 FL (ref 82–98)
METAMYELOCYTES NFR BLD MANUAL: 2 %
MONOCYTES NFR BLD: 8 % (ref 4–15)
MYELOCYTES NFR BLD MANUAL: 1 %
NEUTROPHILS NFR BLD: 58 % (ref 38–73)
NEUTS BAND NFR BLD MANUAL: 13 %
NRBC BLD-RTO: 0 /100 WBC
PLATELET # BLD AUTO: 292 K/UL (ref 150–450)
PLATELET BLD QL SMEAR: ABNORMAL
PMV BLD AUTO: 10.2 FL (ref 9.2–12.9)
POTASSIUM SERPL-SCNC: 3.1 MMOL/L (ref 3.5–5.1)
RBC # BLD AUTO: 3.22 M/UL (ref 4–5.4)
SODIUM SERPL-SCNC: 142 MMOL/L (ref 136–145)
WBC # BLD AUTO: 8.57 K/UL (ref 3.9–12.7)

## 2022-08-01 PROCEDURE — 80048 BASIC METABOLIC PNL TOTAL CA: CPT | Performed by: SURGERY

## 2022-08-01 PROCEDURE — 25000003 PHARM REV CODE 250: Performed by: INTERNAL MEDICINE

## 2022-08-01 PROCEDURE — 63600175 PHARM REV CODE 636 W HCPCS: Performed by: SURGERY

## 2022-08-01 PROCEDURE — 36415 COLL VENOUS BLD VENIPUNCTURE: CPT | Performed by: INTERNAL MEDICINE

## 2022-08-01 PROCEDURE — 94761 N-INVAS EAR/PLS OXIMETRY MLT: CPT

## 2022-08-01 PROCEDURE — 85007 BL SMEAR W/DIFF WBC COUNT: CPT | Performed by: SURGERY

## 2022-08-01 PROCEDURE — 87040 BLOOD CULTURE FOR BACTERIA: CPT | Mod: 59 | Performed by: INTERNAL MEDICINE

## 2022-08-01 PROCEDURE — 25000003 PHARM REV CODE 250: Performed by: SURGERY

## 2022-08-01 PROCEDURE — 85027 COMPLETE CBC AUTOMATED: CPT | Performed by: SURGERY

## 2022-08-01 PROCEDURE — 11000001 HC ACUTE MED/SURG PRIVATE ROOM

## 2022-08-01 PROCEDURE — C9113 INJ PANTOPRAZOLE SODIUM, VIA: HCPCS | Performed by: SURGERY

## 2022-08-01 PROCEDURE — 63600175 PHARM REV CODE 636 W HCPCS: Performed by: INTERNAL MEDICINE

## 2022-08-01 RX ORDER — POTASSIUM CHLORIDE 20 MEQ/1
40 TABLET, EXTENDED RELEASE ORAL ONCE
Status: COMPLETED | OUTPATIENT
Start: 2022-08-01 | End: 2022-08-01

## 2022-08-01 RX ORDER — POTASSIUM CHLORIDE 7.45 MG/ML
10 INJECTION INTRAVENOUS
Status: COMPLETED | OUTPATIENT
Start: 2022-08-01 | End: 2022-08-01

## 2022-08-01 RX ADMIN — ACETAMINOPHEN 650 MG: 325 TABLET ORAL at 09:08

## 2022-08-01 RX ADMIN — CEFTRIAXONE 2 G: 2 INJECTION, SOLUTION INTRAVENOUS at 04:08

## 2022-08-01 RX ADMIN — SPIRONOLACTONE 50 MG: 25 TABLET, FILM COATED ORAL at 09:08

## 2022-08-01 RX ADMIN — DOCUSATE SODIUM 100 MG: 100 CAPSULE, LIQUID FILLED ORAL at 09:08

## 2022-08-01 RX ADMIN — ACETAMINOPHEN 650 MG: 325 TABLET ORAL at 03:08

## 2022-08-01 RX ADMIN — OXYCODONE HYDROCHLORIDE AND ACETAMINOPHEN 500 MG: 500 TABLET ORAL at 09:08

## 2022-08-01 RX ADMIN — POTASSIUM CHLORIDE 10 MEQ: 7.46 INJECTION, SOLUTION INTRAVENOUS at 11:08

## 2022-08-01 RX ADMIN — DOXEPIN HYDROCHLORIDE 10 MG: 10 CAPSULE ORAL at 09:08

## 2022-08-01 RX ADMIN — POTASSIUM CHLORIDE 10 MEQ: 7.46 INJECTION, SOLUTION INTRAVENOUS at 12:08

## 2022-08-01 RX ADMIN — ACETAMINOPHEN 650 MG: 325 TABLET ORAL at 04:08

## 2022-08-01 RX ADMIN — SODIUM CHLORIDE: 0.9 INJECTION, SOLUTION INTRAVENOUS at 09:08

## 2022-08-01 RX ADMIN — HYDROMORPHONE HYDROCHLORIDE 1 MG: 2 INJECTION INTRAMUSCULAR; INTRAVENOUS; SUBCUTANEOUS at 09:08

## 2022-08-01 RX ADMIN — ENOXAPARIN SODIUM 40 MG: 100 INJECTION SUBCUTANEOUS at 04:08

## 2022-08-01 RX ADMIN — OXYCODONE HYDROCHLORIDE 5 MG: 5 TABLET ORAL at 10:08

## 2022-08-01 RX ADMIN — POTASSIUM CHLORIDE 40 MEQ: 1500 TABLET, EXTENDED RELEASE ORAL at 09:08

## 2022-08-01 RX ADMIN — HYDROMORPHONE HYDROCHLORIDE 1 MG: 2 INJECTION INTRAMUSCULAR; INTRAVENOUS; SUBCUTANEOUS at 01:08

## 2022-08-01 RX ADMIN — ONDANSETRON 4 MG: 2 INJECTION INTRAMUSCULAR; INTRAVENOUS at 02:08

## 2022-08-01 RX ADMIN — Medication 1 TABLET: at 09:08

## 2022-08-01 RX ADMIN — ONDANSETRON 4 MG: 2 INJECTION INTRAMUSCULAR; INTRAVENOUS at 09:08

## 2022-08-01 RX ADMIN — PANTOPRAZOLE SODIUM 40 MG: 40 INJECTION, POWDER, FOR SOLUTION INTRAVENOUS at 09:08

## 2022-08-01 NOTE — PROGRESS NOTES
[facility] Medicine  Progress Note    Patient Name: Genny Calixto  MRN: [id number]  Patient Class: IP- Inpatient   Admission Date: 7/25/2022  Length of Stay: 5 days  Attending Physician: Jo Manzano DO  Primary Care Provider: Tay Duff MD        Subjective:     Principal Problem:SBO (small bowel obstruction)        HPI:  The pt is a 56 yo female with h/o recurrent small bowel obstructions, KENN, Migraine headaches, Kidney stones, Insomnia admitted on 7/25/22 under General Surgery service for elective diagnostic exp lap and underwent lysis of adhesion and excision of cecum with ileum. Post operatively Pt started on full liquid diet. On post op day #3 pt developed persistent nausea and vomiting and X-ray abd showed ileus. NG tube placed to LIS. Today 7/29/22 pt is noted to be hypoxic with SpO2 86% on RA. ABG showed 7.3/40.7/51 on RA. HM consulted for evaluation of new onset hypoxia. Pt examined at bedside. C/O feeling feverish and chills along with mild chest tightness and cough but denies SOB. Denies further nausea or vomiting since NG tube placed. Reports passing flatus . Generalized abd tenderness appreciated on exam. Lung auscultation revealed crackles at both bases. BNP is normal. D-dimer is elevated, however nonspecific in immediate post op period. CTA chest done showed no pulmonary embolism but bibasilar right greater than left pulmonary opacities concerning for aspiration or multifocal pneumonia . Blood cultures obtained and Pt started on antimicrobial targeting aspiration pneumonia.       Overview/Hospital Course:  7/30- Tmax 99.4. Pt feels better today. O2 wean down to RA. Denies chest pain, tightness or SOB. Cough is better. No N/V. NG tube output 1.1 Liter yesterday. Bowel sounds appreciated on exam, however no bowel movement reported. WBC 6.4, Hgb 12.5, Na 142, K 3.1, Cr 0.7, PCT 0.42.     7/31- Tmax 99. Remains on RA. Feels better subjectively . (+) flatus and bowel  movement . NG tube remains in place. Pt remains NPO. Continue Unasyn IV. May transition to oral Augmentin once safe for oral intake. Pt will need total 5 days antibiotic treatment.     8/1- Continues to feel better. Denies SOB or cough. Remains on RA. NG tube in place with LIS. (+) flatus and bowel movement. One of two blood cultures from 7/29 resulted GNR. Antibiotic transitioned to rocephin 2 gram IV daily . Await final identification and sensitivity . Blood cultures repeated today.       Interval History:    One of two blood cultures from 7/29 resulted GNR. Antibiotic transitioned to rocephin 2 gram IV daily . Await final identification and sensitivity . Blood cultures repeated today.      Review of Systems   Constitutional:  Positive for activity change. Negative for appetite change and fever.   HENT:  Negative for sore throat.    Eyes:  Negative for visual disturbance.   Respiratory:  Negative for cough, chest tightness and shortness of breath.    Cardiovascular:  Negative for chest pain, palpitations and leg swelling.   Gastrointestinal:  Positive for abdominal pain (much better). Negative for abdominal distention, constipation, diarrhea, nausea and vomiting.        NG tube in place    Endocrine: Negative for polyuria.   Genitourinary:  Negative for decreased urine volume, dysuria, flank pain, frequency and hematuria.   Musculoskeletal:  Negative for back pain and gait problem.   Skin:  Negative for rash.   Neurological:  Negative for syncope, speech difficulty, weakness, light-headedness and headaches.   Psychiatric/Behavioral:  Negative for confusion, hallucinations and sleep disturbance.    Objective:     Vital Signs (Most Recent):  Temp: 99.5 °F (37.5 °C) (08/01/22 1149)  Pulse: 93 (08/01/22 1149)  Resp: 18 (08/01/22 1149)  BP: 133/72 (08/01/22 1149)  SpO2: 95 % (08/01/22 1149) Vital Signs (24h Range):  Temp:  [97.4 °F (36.3 °C)-99.5 °F (37.5 °C)] 99.5 °F (37.5 °C)  Pulse:  [88-99] 93  Resp:  [16-18]  18  SpO2:  [91 %-100 %] 95 %  BP: (131-143)/(60-73) 133/72     Weight: 71.5 kg (157 lb 8.3 oz)  Body mass index is 24.67 kg/m².    Intake/Output Summary (Last 24 hours) at 8/1/2022 1311  Last data filed at 8/1/2022 0800  Gross per 24 hour   Intake 4667.41 ml   Output 4500 ml   Net 167.41 ml      Physical Exam  Constitutional:       General: She is not in acute distress.     Appearance: She is well-developed. She is not ill-appearing or diaphoretic.   HENT:      Head: Normocephalic and atraumatic.      Mouth/Throat:      Pharynx: No oropharyngeal exudate.   Eyes:      Conjunctiva/sclera: Conjunctivae normal.      Pupils: Pupils are equal, round, and reactive to light.   Neck:      Thyroid: No thyromegaly.      Vascular: No JVD.   Cardiovascular:      Rate and Rhythm: Normal rate and regular rhythm.      Heart sounds: Normal heart sounds. No murmur heard.  Pulmonary:      Effort: Pulmonary effort is normal. No respiratory distress.      Breath sounds: No wheezing or rales.   Chest:      Chest wall: No tenderness.   Abdominal:      General: Bowel sounds are normal. There is no distension.      Palpations: Abdomen is soft.      Tenderness: There is abdominal tenderness (generalized). There is no guarding or rebound.      Comments: Bowel sounds are better today    Musculoskeletal:         General: Normal range of motion.      Cervical back: Normal range of motion and neck supple.   Lymphadenopathy:      Cervical: No cervical adenopathy.   Skin:     General: Skin is warm and dry.      Findings: No rash.   Neurological:      Mental Status: She is alert and oriented to person, place, and time.      Cranial Nerves: No cranial nerve deficit.      Sensory: No sensory deficit.      Deep Tendon Reflexes: Reflexes normal.       Significant Labs: All pertinent labs within the past 24 hours have been reviewed.  BMP:   Recent Labs   Lab 08/01/22  0417   GLU 80      K 3.1*      CO2 25   BUN 7   CREATININE 0.5   CALCIUM  8.4*     CBC:   Recent Labs   Lab 07/31/22  0731 08/01/22  0417   WBC 8.63 8.57   HGB 10.4* 10.4*   HCT 31.8* 31.8*    292     CMP:   Recent Labs   Lab 07/31/22  0731 08/01/22  0417    142   K 3.1* 3.1*    103   CO2 24 25   GLU 92 80   BUN 11 7   CREATININE 0.6 0.5   CALCIUM 8.4* 8.4*   ANIONGAP 15 14   EGFRNONAA >60  --        Significant Imaging:       Assessment/Plan:      * SBO (small bowel obstruction)  - S/P  elective diagnostic exp lap, lysis of adhesion and excision of cecum with ileum.   -Further post op management per Primary     Acute hypoxemic respiratory failure- Resolved   Patient with Hypoxic Respiratory failure which is Acute.  she is not on home oxygen. Supplemental oxygen was provided and noted-  .   Signs/symptoms of respiratory failure include- tachypnea. Contributing diagnoses includes - Aspiration Labs and images were reviewed. Patient Has recent ABG, which has been reviewed. Will treat underlying causes and adjust management of respiratory failure as follows-       -Supplemental oxygen to keep SpO2 >92%  -Inhaled bronchodilators prn  Antimicrobial targeting aspiration pneumonia in the setting recent episodes of vomiting       7/30-  Resolved   Currently on RA with satisfactory SpO2      Hospital-acquired pneumonia  - Imaging study suggestive of eze onset  bibasilar right greater than left pulmonary opacities  -Aspiration highly suspected in the setting of recent episodes of vomiting   -Blood cultures x 2  -Antimicrobial targeting aspiration  Pneumonia   -Supplemental oxygen   -Monitor course   7/31-  NG tube remains in place. Pt remains NPO. Continue Unasyn IV. May transition to oral Augmentin once safe for oral intake. Pt will need total 5 days antibiotic treatment.   8/1-  Antibiotic changed to Rocephin     Gram-negative bacteremia  8/1  -One of two blood cultures from 7/29 resulted GNR. Antibiotic transitioned to rocephin 2 gram IV daily . Await final identification and  sensitivity . Blood cultures repeated today.         VTE Risk Mitigation (From admission, onward)         Ordered     enoxaparin injection 40 mg  Daily         07/26/22 1723     Place sequential compression device  Until discontinued         07/25/22 1244                Discharge Planning   MANAS:      Code Status: Prior   Is the patient medically ready for discharge?:     Reason for patient still in hospital (select all that apply): Patient trending condition  Discharge Plan A: Home with family                  Lady Moreau MD  Department of Hospital Medicine   [facility]

## 2022-08-01 NOTE — PT/OT/SLP PROGRESS
"Occupational Therapy      Patient Name:  Genny Calixto   MRN:  [id number]    JEMMA ISLAS INITIATED THIS AM VIA CHART REVIEW, ATTEMPTED COMPLETION OF ANNAMARIE.DERECK ISLAS 2 TIMES THIS AM, 940 PT REFUSED DUE TO C/O FATIGUE, "I AM TOO TIRED", 1035 PT FOUND IN BATHROOM TOILETING, WILL ASSESS PT NEXT VISIT    Felicita Castellanos OT  8/1/2022  "

## 2022-08-01 NOTE — ASSESSMENT & PLAN NOTE
8/1  -One of two blood cultures from 7/29 resulted GNR. Antibiotic transitioned to rocephin 2 gram IV daily . Await final identification and sensitivity . Blood cultures repeated today.

## 2022-08-01 NOTE — PT/OT/SLP PROGRESS
Occupational Therapy      Patient Name:  Genny Calixto   MRN:  [id number]  Initial OT eval order received and chart review completed. Patient not seen today secondary to patient refused, stating that she did not sleep well last night due to multiple people coming into her room to complete tests. Will follow-up at next scheduled therapy session.      Felicita Castellanos, OT  1031  7/31/2022

## 2022-08-01 NOTE — SUBJECTIVE & OBJECTIVE
Interval History:    One of two blood cultures from 7/29 resulted GNR. Antibiotic transitioned to rocephin 2 gram IV daily . Await final identification and sensitivity . Blood cultures repeated today.      Review of Systems   Constitutional:  Positive for activity change. Negative for appetite change and fever.   HENT:  Negative for sore throat.    Eyes:  Negative for visual disturbance.   Respiratory:  Negative for cough, chest tightness and shortness of breath.    Cardiovascular:  Negative for chest pain, palpitations and leg swelling.   Gastrointestinal:  Positive for abdominal pain (much better). Negative for abdominal distention, constipation, diarrhea, nausea and vomiting.        NG tube in place    Endocrine: Negative for polyuria.   Genitourinary:  Negative for decreased urine volume, dysuria, flank pain, frequency and hematuria.   Musculoskeletal:  Negative for back pain and gait problem.   Skin:  Negative for rash.   Neurological:  Negative for syncope, speech difficulty, weakness, light-headedness and headaches.   Psychiatric/Behavioral:  Negative for confusion, hallucinations and sleep disturbance.    Objective:     Vital Signs (Most Recent):  Temp: 99.5 °F (37.5 °C) (08/01/22 1149)  Pulse: 93 (08/01/22 1149)  Resp: 18 (08/01/22 1149)  BP: 133/72 (08/01/22 1149)  SpO2: 95 % (08/01/22 1149) Vital Signs (24h Range):  Temp:  [97.4 °F (36.3 °C)-99.5 °F (37.5 °C)] 99.5 °F (37.5 °C)  Pulse:  [88-99] 93  Resp:  [16-18] 18  SpO2:  [91 %-100 %] 95 %  BP: (131-143)/(60-73) 133/72     Weight: 71.5 kg (157 lb 8.3 oz)  Body mass index is 24.67 kg/m².    Intake/Output Summary (Last 24 hours) at 8/1/2022 1311  Last data filed at 8/1/2022 0800  Gross per 24 hour   Intake 4667.41 ml   Output 4500 ml   Net 167.41 ml      Physical Exam  Constitutional:       General: She is not in acute distress.     Appearance: She is well-developed. She is not ill-appearing or diaphoretic.   HENT:      Head: Normocephalic and atraumatic.       Mouth/Throat:      Pharynx: No oropharyngeal exudate.   Eyes:      Conjunctiva/sclera: Conjunctivae normal.      Pupils: Pupils are equal, round, and reactive to light.   Neck:      Thyroid: No thyromegaly.      Vascular: No JVD.   Cardiovascular:      Rate and Rhythm: Normal rate and regular rhythm.      Heart sounds: Normal heart sounds. No murmur heard.  Pulmonary:      Effort: Pulmonary effort is normal. No respiratory distress.      Breath sounds: No wheezing or rales.   Chest:      Chest wall: No tenderness.   Abdominal:      General: Bowel sounds are normal. There is no distension.      Palpations: Abdomen is soft.      Tenderness: There is abdominal tenderness (generalized). There is no guarding or rebound.      Comments: Bowel sounds are better today    Musculoskeletal:         General: Normal range of motion.      Cervical back: Normal range of motion and neck supple.   Lymphadenopathy:      Cervical: No cervical adenopathy.   Skin:     General: Skin is warm and dry.      Findings: No rash.   Neurological:      Mental Status: She is alert and oriented to person, place, and time.      Cranial Nerves: No cranial nerve deficit.      Sensory: No sensory deficit.      Deep Tendon Reflexes: Reflexes normal.       Significant Labs: All pertinent labs within the past 24 hours have been reviewed.  BMP:   Recent Labs   Lab 08/01/22 0417   GLU 80      K 3.1*      CO2 25   BUN 7   CREATININE 0.5   CALCIUM 8.4*     CBC:   Recent Labs   Lab 07/31/22  0731 08/01/22  0417   WBC 8.63 8.57   HGB 10.4* 10.4*   HCT 31.8* 31.8*    292     CMP:   Recent Labs   Lab 07/31/22 0731 08/01/22 0417    142   K 3.1* 3.1*    103   CO2 24 25   GLU 92 80   BUN 11 7   CREATININE 0.6 0.5   CALCIUM 8.4* 8.4*   ANIONGAP 15 14   EGFRNONAA >60  --        Significant Imaging:

## 2022-08-01 NOTE — PT/OT/SLP PROGRESS
"Physical Therapy      Patient Name:  Genny Calixto   MRN:  [id number]    MIKAL ISLAS INITIATED THIS AM VIA CHART REVIEW, ATTEMPTED COMPLETION OF P.TJanel ISLAS 2 TIMES THIS AM, 945 PT REFUSED DUE TO C/O FATIGUE, "I AM TOO TIRED", 1030 PT FOUND IN BATHROOM TOILETING, WILL ASSESS PT NEXT VISIT    Nancy Kasper, PT  8/1/2022          "

## 2022-08-01 NOTE — ASSESSMENT & PLAN NOTE
- Imaging study suggestive of eze onset  bibasilar right greater than left pulmonary opacities  -Aspiration highly suspected in the setting of recent episodes of vomiting   -Blood cultures x 2  -Antimicrobial targeting aspiration  Pneumonia   -Supplemental oxygen   -Monitor course   7/31-  NG tube remains in place. Pt remains NPO. Continue Unasyn IV. May transition to oral Augmentin once safe for oral intake. Pt will need total 5 days antibiotic treatment.   8/1-  Antibiotic changed to Rocephin

## 2022-08-02 LAB
ANION GAP SERPL CALC-SCNC: 10 MMOL/L (ref 8–16)
BUN SERPL-MCNC: 4 MG/DL (ref 6–20)
CALCIUM SERPL-MCNC: 7.8 MG/DL (ref 8.7–10.5)
CHLORIDE SERPL-SCNC: 104 MMOL/L (ref 95–110)
CO2 SERPL-SCNC: 24 MMOL/L (ref 23–29)
CREAT SERPL-MCNC: 0.5 MG/DL (ref 0.5–1.4)
EST. GFR  (NO RACE VARIABLE): >60 ML/MIN/1.73 M^2
GLUCOSE SERPL-MCNC: 81 MG/DL (ref 70–110)
MAGNESIUM SERPL-MCNC: 1.4 MG/DL (ref 1.6–2.6)
POTASSIUM SERPL-SCNC: 2.8 MMOL/L (ref 3.5–5.1)
SODIUM SERPL-SCNC: 138 MMOL/L (ref 136–145)

## 2022-08-02 PROCEDURE — 25000003 PHARM REV CODE 250: Performed by: INTERNAL MEDICINE

## 2022-08-02 PROCEDURE — 97530 THERAPEUTIC ACTIVITIES: CPT

## 2022-08-02 PROCEDURE — 63600175 PHARM REV CODE 636 W HCPCS: Performed by: SURGERY

## 2022-08-02 PROCEDURE — 96372 THER/PROPH/DIAG INJ SC/IM: CPT

## 2022-08-02 PROCEDURE — 11000001 HC ACUTE MED/SURG PRIVATE ROOM

## 2022-08-02 PROCEDURE — 83735 ASSAY OF MAGNESIUM: CPT | Performed by: SURGERY

## 2022-08-02 PROCEDURE — 25000003 PHARM REV CODE 250: Performed by: SURGERY

## 2022-08-02 PROCEDURE — 80048 BASIC METABOLIC PNL TOTAL CA: CPT | Performed by: SURGERY

## 2022-08-02 PROCEDURE — 36415 COLL VENOUS BLD VENIPUNCTURE: CPT | Performed by: SURGERY

## 2022-08-02 PROCEDURE — 97166 OT EVAL MOD COMPLEX 45 MIN: CPT

## 2022-08-02 PROCEDURE — 97161 PT EVAL LOW COMPLEX 20 MIN: CPT

## 2022-08-02 PROCEDURE — 63600175 PHARM REV CODE 636 W HCPCS: Performed by: INTERNAL MEDICINE

## 2022-08-02 PROCEDURE — C9113 INJ PANTOPRAZOLE SODIUM, VIA: HCPCS | Performed by: SURGERY

## 2022-08-02 RX ORDER — POTASSIUM CHLORIDE 20 MEQ/1
40 TABLET, EXTENDED RELEASE ORAL
Status: COMPLETED | OUTPATIENT
Start: 2022-08-02 | End: 2022-08-02

## 2022-08-02 RX ORDER — MAGNESIUM SULFATE HEPTAHYDRATE 40 MG/ML
2 INJECTION, SOLUTION INTRAVENOUS
Status: COMPLETED | OUTPATIENT
Start: 2022-08-02 | End: 2022-08-02

## 2022-08-02 RX ADMIN — OXYCODONE HYDROCHLORIDE AND ACETAMINOPHEN 500 MG: 500 TABLET ORAL at 08:08

## 2022-08-02 RX ADMIN — HYDROMORPHONE HYDROCHLORIDE 1 MG: 2 INJECTION INTRAMUSCULAR; INTRAVENOUS; SUBCUTANEOUS at 09:08

## 2022-08-02 RX ADMIN — Medication 1 TABLET: at 08:08

## 2022-08-02 RX ADMIN — SCOPOLAMINE 1 PATCH: 1.5 PATCH, EXTENDED RELEASE TRANSDERMAL at 03:08

## 2022-08-02 RX ADMIN — ACETAMINOPHEN 650 MG: 325 TABLET ORAL at 03:08

## 2022-08-02 RX ADMIN — POTASSIUM CHLORIDE 40 MEQ: 1500 TABLET, EXTENDED RELEASE ORAL at 03:08

## 2022-08-02 RX ADMIN — ONDANSETRON 4 MG: 2 INJECTION INTRAMUSCULAR; INTRAVENOUS at 10:08

## 2022-08-02 RX ADMIN — HYDROMORPHONE HYDROCHLORIDE 1 MG: 2 INJECTION INTRAMUSCULAR; INTRAVENOUS; SUBCUTANEOUS at 10:08

## 2022-08-02 RX ADMIN — ONDANSETRON 4 MG: 2 INJECTION INTRAMUSCULAR; INTRAVENOUS at 09:08

## 2022-08-02 RX ADMIN — DIAZEPAM 10 MG: 5 TABLET ORAL at 08:08

## 2022-08-02 RX ADMIN — POTASSIUM CHLORIDE 40 MEQ: 1500 TABLET, EXTENDED RELEASE ORAL at 11:08

## 2022-08-02 RX ADMIN — SPIRONOLACTONE 50 MG: 25 TABLET, FILM COATED ORAL at 08:08

## 2022-08-02 RX ADMIN — ACETAMINOPHEN 650 MG: 325 TABLET ORAL at 09:08

## 2022-08-02 RX ADMIN — OXYCODONE HYDROCHLORIDE 5 MG: 5 TABLET ORAL at 01:08

## 2022-08-02 RX ADMIN — OXYCODONE HYDROCHLORIDE 5 MG: 5 TABLET ORAL at 04:08

## 2022-08-02 RX ADMIN — MAGNESIUM SULFATE HEPTAHYDRATE 2 G: 40 INJECTION, SOLUTION INTRAVENOUS at 08:08

## 2022-08-02 RX ADMIN — DOXEPIN HYDROCHLORIDE 10 MG: 10 CAPSULE ORAL at 08:08

## 2022-08-02 RX ADMIN — DOCUSATE SODIUM 100 MG: 100 CAPSULE, LIQUID FILLED ORAL at 08:08

## 2022-08-02 RX ADMIN — MAGNESIUM SULFATE HEPTAHYDRATE 2 G: 40 INJECTION, SOLUTION INTRAVENOUS at 10:08

## 2022-08-02 RX ADMIN — POTASSIUM CHLORIDE 40 MEQ: 1500 TABLET, EXTENDED RELEASE ORAL at 08:08

## 2022-08-02 RX ADMIN — OXYCODONE HYDROCHLORIDE 5 MG: 5 TABLET ORAL at 08:08

## 2022-08-02 RX ADMIN — CEFTRIAXONE 2 G: 2 INJECTION, SOLUTION INTRAVENOUS at 05:08

## 2022-08-02 RX ADMIN — PANTOPRAZOLE SODIUM 40 MG: 40 INJECTION, POWDER, FOR SOLUTION INTRAVENOUS at 08:08

## 2022-08-02 NOTE — PLAN OF CARE
OT daisha completed. Sup>sit mod I, sit>stand SBA, functional mobiltiy x25ft without AD and SBA (numerous LOB), sit>sup mod I. Impulsive movement. Would benefit from continued services, but patient declined participation in acute or post acute therapy. D/C OT.

## 2022-08-02 NOTE — ASSESSMENT & PLAN NOTE
S/p diagnostic laparoscopy, laparotomy, extensive lysis of adhesions, ileocecectomy      - Having bowel function. Tolerated NG tube clamped so tube was removed. On clear liquids.  - Decrease IV fluids as PO intake increases  - Analgesia prn  - Home medications  - Increase activity  - DVT ppx  - Monitor electrolytes and replace prn

## 2022-08-02 NOTE — PLAN OF CARE
D/C PT FROM P.T. SERVICES PER HER REQUEST, PT DECLINES HHPT AS WELL AS MAINTENANCE PROGRAM WHILE HERE IN HOSPITAL, PT PREFERS TO WALK WITHOUT ASSISTANCE

## 2022-08-02 NOTE — PT/OT/SLP EVAL
Physical Therapy Evaluation and Discharge Note    Patient Name:  Genny Calixto   MRN:  [id number]    Recommendations:     Discharge Recommendations:  home, home health PT (P.T. RECOMMENDS HHPT BUT PT DECLINED)   Discharge Equipment Recommendations: none   Barriers to discharge: None    Assessment:     Genny Calixto is a 57 y.o. female admitted with a medical diagnosis of SBO (small bowel obstruction). .  At this time, patient is functioning at their prior level of function and does not require further acute PT services.     Recent Surgery: Procedure(s) (LRB):  LAPAROSCOPY, DIAGNOSTIC (N/A)  LYSIS, ADHESIONS (N/A)  LAPAROTOMY, EXPLORATORY (N/A)  BLOCK, TRANSVERSUS ABDOMINIS PLANE (N/A)  EXCISION, CECUM WITH ILEUM 8 Days Post-Op    Plan:     During this hospitalization, patient does not require further acute PT services.  Please re-consult if situation changes.  ATTEMPTED TO D/C PT FROM P.T. SERVICES TO PEOPLE 'S PROGRAM FOR CONT GAIT DAILY BUT PT DECLINED, PT REPORTS SHE PREFERS TO WALK WHEN SHE WANTS TO WALK    Subjective     Chief Complaint: PAIN  Patient/Family Comments/goals:   Pain/Comfort:  · Pain Rating 1: 10/10  · Location 1: abdomen  · Pain Addressed 1: Reposition, Distraction    Patients cultural, spiritual, Church conflicts given the current situation:      Living Environment:  PT LIVES WITH BROTHER BUT REPORTS SHE WILL BE STAYING WITH HER BOYFRIEND AND HIS SISTER TO HAVE MORE HELP, 1 STORY HOUSE NO STEPS, PT WAS AMB INDEP COMMUNITY DISTANCES, GOES TO THE GYM FOR EXERCISE 2-3X A WEEK PTA, INDEP WITH ADL'S  Prior to admission, patients level of function was INDEP.  Equipment used at home: none.  DME owned (not currently used): none.  Upon discharge, patient will have assistance from .    Objective:     Communicated with NURSE SHETH prior to session.  Patient found supine with peripheral IV upon PT entry to room.    General Precautions: Standard, fall   Orthopedic Precautions:N/A  "  Braces: N/A   Respiratory Status: Room air    Exams:  · Cognitive Exam:  Patient is oriented to Person, Place, Time and Situation  · Postural Exam:  Patient presented with the following abnormalities:    · -       Rounded shoulders  · Sensation:    · -       Intact  · RLE ROM: WNL  · RLE Strength: WNL  · LLE ROM: WNL  · LLE Strength: WNL    Functional Mobility:  · Bed Mobility:     · Rolling Left:  independence  · Rolling Right: independence  · Scooting: independence  · Bridging: independence  · Supine to Sit: independence  · Sit to Supine: independence  · Transfers:     · Sit to Stand:  contact guard assistance with no AD- 2 SMALL LOB DUE TO QUICK/IMPULSIVE, WANTING TO GET IT OVER WITH  · Gait: PT AMB 25' NO AD WITH CGA, NO SOB ON ROOM AIR, MILDLY UNSTEADY BUT NO GROSS LOB, PT DECLINED FURTHER GAIT DISTANCE DESPITE EDUCATION ON BENEFIT OF PROLONGED WALKING   · Balance: FAIR    AM-PAC 6 CLICK MOBILITY  Total Score:18     Therapeutic Activities and Exercises:   PT EDUCATED IN ROLE OF P.T. AND POC IN ACUTE CARE HOSPITAL SETTING, EDUCATED IN LOG ROLLING FOR BED MOBILITY, ENCOURAGED TO INCREASE TIME OOB IN CHAIR TO TOLERANCE, EDUCATED IN AND ENCOURAGED TO PERFORM BLE THEREX WHILE SEATED OR SUPINE THROUGHOUT THE DAY TO TOLERANCE: HIP FLEX/EXT, QUAD SET, LAQ, HEEL SLIDES, AP'S.  PT ENCOURAGED TO AMBULATE 2-3 TIMES A DAY WITH STAFF  PT EDUCATED ON RISK FOR FALLS DUE TO GENERALIZED WEAKNESS, EDUCATED ON "CALL DON'T FALL", ENCOURAGED TO CALL FOR ASSISTANCE WITH ALL NEEDS SUCH AS BED<>CHAIR TRANSFERS OR TRIPS TO BATHROOM, PT AGREEABLE TO SAFETY PRECAUTIONS    AM-PAC 6 CLICK MOBILITY  Total Score:18     Patient left supine with all lines intact, call button in reach and NURSE notified.    GOALS:   Multidisciplinary Problems     Physical Therapy Goals     Not on file                History:     Past Medical History:   Diagnosis Date    Encounter for blood transfusion     KENN (generalized anxiety disorder)     Takes 5-10 mg " of valium qd prn anxiety (prescriptions for both on file, one from [facility] & other from )    Insomnia     Takes 5-10 mg of valium qhs prn insomnia (prescriptions for both on file, one from [facility] & other from )    Migraine headache     Nephrolithiasis 08/03/2019    Left ureteral stone -> UTI c/b pyelonephritis and urosepsis w/ hypokalemia -> transfered to [facility] urology service from [facility] after CT abn dx, s/p 2 stents to allow infx to drain, IV Vanc/Rocephin, fever free since yesterday    Reflex sympathetic dystrophy     UTI (urinary tract infection)     Vertigo        Past Surgical History:   Procedure Laterality Date    BLADDER SURGERY      CHOLECYSTECTOMY      COLONOSCOPY N/A 11/10/2021    Procedure: COLONOSCOPY;  Surgeon: Eryn Rothman MD;  Location: [facility];  Service: Endoscopy;  Laterality: N/A;    CYSTOSCOPY WITH URETEROSCOPY, RETROGRADE PYELOGRAPHY, AND INSERTION OF STENT Right 9/30/2021    Procedure: CYSTOSCOPY, WITH RETROGRADE PYELOGRAM AND URETERAL STENT INSERTION;  Surgeon: Annita Gamino MD;  Location: [facility] OR;  Service: Urology;  Laterality: Right;    DIAGNOSTIC LAPAROSCOPY N/A 11/11/2020    Procedure: LAPAROSCOPY, DIAGNOSTIC;  Surgeon: Tee Segura MD;  Location: [facility] OR;  Service: General;  Laterality: N/A;  CONVERTED TO OPEN    DIAGNOSTIC LAPAROSCOPY N/A 7/25/2022    Procedure: LAPAROSCOPY, DIAGNOSTIC;  Surgeon: Jo Manzano DO;  Location: [facility] OR;  Service: General;  Laterality: N/A;  convert to open at 0739    ESOPHAGOGASTRODUODENOSCOPY N/A 11/10/2021    Procedure: EGD (ESOPHAGOGASTRODUODENOSCOPY);  Surgeon: Eryn Rothman MD;  Location: [facility];  Service: Endoscopy;  Laterality: N/A;    facet injections  02/14/2017    L3, L4, L5, S1 Done by Dr. aLra    HYSTERECTOMY      INJECTION OF ANESTHETIC AGENT INTO TISSUE PLANE DEFINED BY TRANSVERSUS ABDOMINIS MUSCLE N/A 11/11/2020    Procedure: BLOCK, TRANSVERSUS ABDOMINIS PLANE;  Surgeon: Tee  MD Colton;  Location: [facility] OR;  Service: General;  Laterality: N/A;    INJECTION OF ANESTHETIC AGENT INTO TISSUE PLANE DEFINED BY TRANSVERSUS ABDOMINIS MUSCLE N/A 7/25/2022    Procedure: BLOCK, TRANSVERSUS ABDOMINIS PLANE;  Surgeon: Jo Manzano DO;  Location: [facility] OR;  Service: General;  Laterality: N/A;    KNEE SURGERY      LAPAROSCOPIC LYSIS OF ADHESIONS N/A 11/11/2020    Procedure: LYSIS, ADHESIONS, LAPAROSCOPIC;  Surgeon: Tee Segura MD;  Location: [facility] OR;  Service: General;  Laterality: N/A;  CONVERTED TO OPEN    LAPAROTOMY N/A 11/20/2020    Procedure: LAPAROTOMY;  Surgeon: Tee Segura MD;  Location: [facility] OR;  Service: General;  Laterality: N/A;    LASER LITHOTRIPSY Left 2/8/2021    Procedure: LITHOTRIPSY, USING LASER;  Surgeon: Irving Kidd MD;  Location: [facility] OR;  Service: Urology;  Laterality: Left;    LASER LITHOTRIPSY Right 10/13/2021    Procedure: LITHOTRIPSY, USING LASER;  Surgeon: Annita Gamino MD;  Location: [facility];  Service: Urology;  Laterality: Right;    LYSIS OF ADHESIONS N/A 11/11/2020    Procedure: LYSIS, ADHESIONS;  Surgeon: Tee Segura MD;  Location: [facility];  Service: General;  Laterality: N/A;    LYSIS OF ADHESIONS N/A 11/20/2020    Procedure: LYSIS, ADHESIONS;  Surgeon: Tee Segura MD;  Location: [facility];  Service: General;  Laterality: N/A;    LYSIS OF ADHESIONS N/A 7/25/2022    Procedure: LYSIS, ADHESIONS;  Surgeon: Jo Manzano DO;  Location: [facility] OR;  Service: General;  Laterality: N/A;    SURGICAL REMOVAL OF ILEUM WITH CECUM  7/25/2022    Procedure: EXCISION, CECUM WITH ILEUM;  Surgeon: Jo Manzano DO;  Location: [facility] OR;  Service: General;;    TONSILLECTOMY      URETEROSCOPY Left 2/8/2021    Procedure: URETEROSCOPY;  Surgeon: Irving Kidd MD;  Location: [facility];  Service: Urology;  Laterality: Left;  stent placement    URETEROSCOPY Right 10/13/2021    Procedure: URETEROSCOPY;  Surgeon: Annita PEREZ  MD Stevenson;  Location: [facility];  Service: Urology;  Laterality: Right;       Time Tracking:     PT Received On: 08/02/22  PT Start Time: 0945     PT Stop Time: 1008  PT Total Time (min): 23 min     Billable Minutes: Evaluation 15 and Therapeutic Activity 8    08/02/2022

## 2022-08-02 NOTE — ASSESSMENT & PLAN NOTE
8/1  -One of two blood cultures from 7/29 resulted GNR. Antibiotic transitioned to rocephin 2 gram IV daily . Await final identification and sensitivity . Blood cultures repeated today.   8/2-  Awaiting final identification and sensitivity   Continue Rocephin while in house. May transition to oral Levofloxacin based on final identification and sensitivity to complete total 10 days treatment from neg blood culture. Repeat blood cultures from 8/1/22- NGTD

## 2022-08-02 NOTE — PT/OT/SLP EVAL
"Occupational Therapy   Evaluation and Discharge Note    Name: Genny Calixto  MRN: [id number]  Admitting Diagnosis:  SBO (small bowel obstruction)   Recent Surgery: Procedure(s) (LRB):  LAPAROSCOPY, DIAGNOSTIC (N/A)  LYSIS, ADHESIONS (N/A)  LAPAROTOMY, EXPLORATORY (N/A)  BLOCK, TRANSVERSUS ABDOMINIS PLANE (N/A)  EXCISION, CECUM WITH ILEUM 8 Days Post-Op    Recommendations:     Discharge Recommendations:  (patient declined HH therapy at d/c)  Discharge Equipment Recommendations:  none  Barriers to discharge:  None    Assessment:     Genny Calixto is a 57 y.o. female with a medical diagnosis of SBO (small bowel obstruction). At this time, patient is functioning at their prior level of function and does not require further acute OT services.     Plan:     During this hospitalization, patient does not require further acute OT services.  Please re-consult if situation changes.    · Plan of Care Reviewed with: patient    Subjective     Chief Complaint: Reported "I am fine."  Patient/Family Comments/goals: none reported    Occupational Profile:  Living Environment: Patient typically lives with her brother. Reports she will be discharging to her boyfriend's house (with his daughter). The home is 1 story with no steps to enter.  Previous level of function: Patient was independent with ADLs and ambulation prior to admission.  Roles and Routines: Patient was an active .  Equipment Used at home:  none  Assistance upon Discharge: boyfriend and his daughter    Pain/Comfort:  · Pain Rating 1: 7/10  · Location 1: abdomen  · Pain Addressed 1: Nurse notified (activity pacing)    Objective:     Communicated with: NurseBianka, prior to session.  Patient found supine with peripheral IV upon OT entry to room.    General Precautions: Standard, fall   Orthopedic Precautions:N/A   Braces: N/A  Respiratory Status: Room air     Bed Mobility:    · Patient completed Supine to Sit with modified independence  · Patient " completed Sit to Supine with modified independence    Functional Mobility/Transfers:  · Patient completed Sit <> Stand Transfer with stand by assistance  with  no assistive device   · Patient completed Bed <> Chair Transfer using Step Transfer technique with stand by assistance with no assistive device  · Functional Mobility: Patient completed x25ft functional mobility without AD in room with SBA to increase dynamic standing balance and activity tolerance needed for ADL completion.  · Patient noted to have numerous LOB due to impulsive movements. Poor response to cueing to improve.    Activities of Daily Living:  · Upper Body Dressing: setup .  · Lower Body Dressing: setup donned socks while seated EOB    Cognitive/Visual Perceptual:  Cognitive/Psychosocial Skills:     -       Oriented to: Person, Place, Time and Situation   -       Follows Commands/attention:Follows one-step commands  -       Safety awareness/insight to disability: impaired     Physical Exam:  Balance:    -       sitting: WFL, static standing: good, dynamic standing: fair +  Upper Extremity Range of Motion:     -       Right Upper Extremity: WFL  -       Left Upper Extremity: WFL  Upper Extremity Strength:    -       Right Upper Extremity: WFL  -       Left Upper Extremity: WFL   Strength:    -       Right Upper Extremity: WFL  -       Left Upper Extremity: WFL    AMPAC 6 Click ADL:  AMPAC Total Score: 23    Treatment & Education:  Patient educated on role of OT in acute setting and benefits of OOB activity. Encouraged OOB throughout the course of hospitalization to decrease risk of hospital related debility. Patient with poor overall buy in to skilled intervention. Initially attempting to refuse eval for third attempted day in a row, but with encouragement completed limited in room assessment. Patient declined need for continued acute or post acute intervention. She is a fall risk.  Education:    Patient left supine with all lines intact and  call button in reach    History:     Past Medical History:   Diagnosis Date    Encounter for blood transfusion     KENN (generalized anxiety disorder)     Takes 5-10 mg of valium qd prn anxiety (prescriptions for both on file, one from [facility] & other from )    Insomnia     Takes 5-10 mg of valium qhs prn insomnia (prescriptions for both on file, one from [facility] & other from )    Migraine headache     Nephrolithiasis 08/03/2019    Left ureteral stone -> UTI c/b pyelonephritis and urosepsis w/ hypokalemia -> transfered to [facility] urology service from [facility] after CT abn dx, s/p 2 stents to allow infx to drain, IV Vanc/Rocephin, fever free since yesterday    Reflex sympathetic dystrophy     UTI (urinary tract infection)     Vertigo        Past Surgical History:   Procedure Laterality Date    BLADDER SURGERY      CHOLECYSTECTOMY      COLONOSCOPY N/A 11/10/2021    Procedure: COLONOSCOPY;  Surgeon: Eryn Rothman MD;  Location: [facility];  Service: Endoscopy;  Laterality: N/A;    CYSTOSCOPY WITH URETEROSCOPY, RETROGRADE PYELOGRAPHY, AND INSERTION OF STENT Right 9/30/2021    Procedure: CYSTOSCOPY, WITH RETROGRADE PYELOGRAM AND URETERAL STENT INSERTION;  Surgeon: Annita Gamino MD;  Location: [facility] OR;  Service: Urology;  Laterality: Right;    DIAGNOSTIC LAPAROSCOPY N/A 11/11/2020    Procedure: LAPAROSCOPY, DIAGNOSTIC;  Surgeon: Tee Segura MD;  Location: [facility] OR;  Service: General;  Laterality: N/A;  CONVERTED TO OPEN    DIAGNOSTIC LAPAROSCOPY N/A 7/25/2022    Procedure: LAPAROSCOPY, DIAGNOSTIC;  Surgeon: Jo Manzano DO;  Location: [facility] OR;  Service: General;  Laterality: N/A;  convert to open at 0739    ESOPHAGOGASTRODUODENOSCOPY N/A 11/10/2021    Procedure: EGD (ESOPHAGOGASTRODUODENOSCOPY);  Surgeon: Eryn Rothman MD;  Location: [facility];  Service: Endoscopy;  Laterality: N/A;    facet injections  02/14/2017    L3, L4, L5, S1 Done by Dr. Lara    HYSTERECTOMY       INJECTION OF ANESTHETIC AGENT INTO TISSUE PLANE DEFINED BY TRANSVERSUS ABDOMINIS MUSCLE N/A 11/11/2020    Procedure: BLOCK, TRANSVERSUS ABDOMINIS PLANE;  Surgeon: Tee Segura MD;  Location: [facility];  Service: General;  Laterality: N/A;    INJECTION OF ANESTHETIC AGENT INTO TISSUE PLANE DEFINED BY TRANSVERSUS ABDOMINIS MUSCLE N/A 7/25/2022    Procedure: BLOCK, TRANSVERSUS ABDOMINIS PLANE;  Surgeon: Jo Manzano DO;  Location: [facility];  Service: General;  Laterality: N/A;    KNEE SURGERY      LAPAROSCOPIC LYSIS OF ADHESIONS N/A 11/11/2020    Procedure: LYSIS, ADHESIONS, LAPAROSCOPIC;  Surgeon: Tee Segura MD;  Location: [facility];  Service: General;  Laterality: N/A;  CONVERTED TO OPEN    LAPAROTOMY N/A 11/20/2020    Procedure: LAPAROTOMY;  Surgeon: Tee Segura MD;  Location: [facility];  Service: General;  Laterality: N/A;    LASER LITHOTRIPSY Left 2/8/2021    Procedure: LITHOTRIPSY, USING LASER;  Surgeon: Irving Kidd MD;  Location: [facility];  Service: Urology;  Laterality: Left;    LASER LITHOTRIPSY Right 10/13/2021    Procedure: LITHOTRIPSY, USING LASER;  Surgeon: Annita Gamino MD;  Location: [facility];  Service: Urology;  Laterality: Right;    LYSIS OF ADHESIONS N/A 11/11/2020    Procedure: LYSIS, ADHESIONS;  Surgeon: Tee Seguar MD;  Location: [facility];  Service: General;  Laterality: N/A;    LYSIS OF ADHESIONS N/A 11/20/2020    Procedure: LYSIS, ADHESIONS;  Surgeon: Tee Segura MD;  Location: [facility];  Service: General;  Laterality: N/A;    LYSIS OF ADHESIONS N/A 7/25/2022    Procedure: LYSIS, ADHESIONS;  Surgeon: Jo Manzano DO;  Location: [facility];  Service: General;  Laterality: N/A;    SURGICAL REMOVAL OF ILEUM WITH CECUM  7/25/2022    Procedure: EXCISION, CECUM WITH ILEUM;  Surgeon: Jo Manzano DO;  Location: [facility];  Service: General;;    TONSILLECTOMY      URETEROSCOPY Left 2/8/2021    Procedure: URETEROSCOPY;  Surgeon: Irving COMER  MD Marti;  Location: [facility];  Service: Urology;  Laterality: Left;  stent placement    URETEROSCOPY Right 10/13/2021    Procedure: URETEROSCOPY;  Surgeon: Annita Gamino MD;  Location: [facility];  Service: Urology;  Laterality: Right;       Time Tracking:     OT Date of Treatment: 08/02/22  OT Start Time: 0945  OT Stop Time: 1010  OT Total Time (min): 25 min    Billable Minutes:Evaluation 25 8/2/2022

## 2022-08-02 NOTE — PROGRESS NOTES
[facility] Surg  General Surgery  Progress Note    Subjective:     Interval History: Doing well. No nausea or vomiting. Tolerating liquids. Passing significant flatus.     Post-Op Info:  Procedure(s) (LRB):  LAPAROSCOPY, DIAGNOSTIC (N/A)  LYSIS, ADHESIONS (N/A)  LAPAROTOMY, EXPLORATORY (N/A)  BLOCK, TRANSVERSUS ABDOMINIS PLANE (N/A)  EXCISION, CECUM WITH ILEUM   8 Days Post-Op      Medications:  Continuous Infusions:  Scheduled Meds:   acetaminophen  650 mg Oral Q6H    ascorbic acid (vitamin C)  500 mg Oral BID    cefTRIAXone (ROCEPHIN) IVPB  2 g Intravenous Q24H    docusate sodium  100 mg Oral BID    doxepin  10 mg Oral QHS    enoxaparin  40 mg Subcutaneous Daily    Lactobacillus acidoph-L.bulgar  1 tablet Oral BID    pantoprazole  40 mg Intravenous Daily    scopolamine  1 patch Transdermal Q3 Days    spironolactone  50 mg Oral BID     PRN Meds:albuterol-ipratropium, diazePAM, hydrALAZINE, HYDROmorphone, melatonin, ondansetron, oxyCODONE, promethazine (PHENERGAN) IVPB, sodium chloride 0.9%, sodium chloride 0.9%     Objective:     Vital Signs (Most Recent):  Temp: 97.8 °F (36.6 °C) (08/02/22 1131)  Pulse: 86 (08/02/22 1131)  Resp: 20 (08/02/22 1345)  BP: 133/65 (08/02/22 1131)  SpO2: 96 % (08/02/22 1131) Vital Signs (24h Range):  Temp:  [97.8 °F (36.6 °C)-99.2 °F (37.3 °C)] 97.8 °F (36.6 °C)  Pulse:  [84-94] 86  Resp:  [16-20] 20  SpO2:  [93 %-98 %] 96 %  BP: (123-137)/(60-76) 133/65       Intake/Output Summary (Last 24 hours) at 8/2/2022 1546  Last data filed at 8/1/2022 1800  Gross per 24 hour   Intake 180 ml   Output --   Net 180 ml       Physical Exam  Vitals and nursing note reviewed.   Constitutional:       General: She is not in acute distress.  Cardiovascular:      Rate and Rhythm: Normal rate.   Pulmonary:      Effort: No respiratory distress.      Breath sounds: No stridor.   Abdominal:      Comments: Soft, appropriately tender, incisions intact without erythema or drainage   Musculoskeletal:          General: No swelling.   Neurological:      General: No focal deficit present.      Mental Status: She is alert and oriented to person, place, and time.   Psychiatric:         Mood and Affect: Mood normal.         Significant Labs:  BMP:   Recent Labs   Lab 08/02/22  0701   GLU 81      K 2.8*      CO2 24   BUN 4*   CREATININE 0.5   CALCIUM 7.8*   MG 1.4*     CBC:   Recent Labs   Lab 08/01/22  0417   WBC 8.57   RBC 3.22*   HGB 10.4*   HCT 31.8*      MCV 99*   MCH 32.3*   MCHC 32.7       Significant Diagnostics:  I have reviewed all pertinent imaging results/findings within the past 24 hours.    Assessment/Plan:     Active Diagnoses:    Diagnosis Date Noted POA    PRINCIPAL PROBLEM:  SBO (small bowel obstruction) [K56.609] 05/31/2022 Yes    Hospital-acquired pneumonia [J18.9, Y95] 07/29/2022 Yes    Acute hypoxemic respiratory failure- Resolved  [J96.01] 07/29/2022 Yes    Gram-negative bacteremia [R78.81] 09/30/2021 Yes      Problems Resolved During this Admission:       SBO (small bowel obstruction)  S/p diagnostic laparoscopy, laparotomy, extensive lysis of adhesions, ileocecectomy        - Tolerating full liquids, having bowel function. Advance to soft diet.  - Analgesia prn  - Home medications  - Increase activity  - DVT ppx  - Monitor electrolytes and replace prn    Anticipate discharge home tomorrow.     Acute hypoxemic respiratory failure- Resolved   Pulmonary toileting        Hospital-acquired pneumonia  Likely secondary to aspiration.  consulted for assistance in medical management. Antibiotics.     Gram-negative bacteremia  Repeat cultures pending. Antibiotics per HM--transition to oral Levofloxacin based on final identification and sensitivity to complete total 10 days treatment from neg blood culture    Jo Manzano, DO  General Surgery  [facility]

## 2022-08-02 NOTE — SUBJECTIVE & OBJECTIVE
"Interval History: Feeling well. Passing flatus. No nausea or vomiting. Passed NG tube clamp trial. Tolerating liquids.    Medications:  Continuous Infusions:   sodium chloride 0.9% 110 mL/hr at 08/01/22 1406     Scheduled Meds:   acetaminophen  650 mg Oral Q6H    ascorbic acid (vitamin C)  500 mg Oral BID    cefTRIAXone (ROCEPHIN) IVPB  2 g Intravenous Q24H    docusate sodium  100 mg Oral BID    doxepin  10 mg Oral QHS    enoxaparin  40 mg Subcutaneous Daily    Lactobacillus acidoph-L.bulgar  1 tablet Oral BID    pantoprazole  40 mg Intravenous Daily    scopolamine  1 patch Transdermal Q3 Days    spironolactone  50 mg Oral BID     PRN Meds:albuterol-ipratropium, diazePAM, hydrALAZINE, HYDROmorphone, melatonin, ondansetron, oxyCODONE, promethazine (PHENERGAN) IVPB, sodium chloride 0.9%, sodium chloride 0.9%     Review of patient's allergies indicates:   Allergen Reactions    Erythromycin Other (See Comments)     Stomach cramps    Morphine Nausea And Vomiting     "Projectile vomiting"     Objective:     Vital Signs (Most Recent):  Temp: 98.2 °F (36.8 °C) (08/01/22 2000)  Pulse: 94 (08/01/22 2000)  Resp: 18 (08/01/22 2000)  BP: 136/76 (08/01/22 2000)  SpO2: 97 % (08/01/22 2049) Vital Signs (24h Range):  Temp:  [97.4 °F (36.3 °C)-99.5 °F (37.5 °C)] 98.2 °F (36.8 °C)  Pulse:  [88-94] 94  Resp:  [16-18] 18  SpO2:  [91 %-98 %] 97 %  BP: (131-150)/(60-76) 136/76     Weight: 71.5 kg (157 lb 8.3 oz)  Body mass index is 24.67 kg/m².    Intake/Output - Last 3 Shifts         07/30 0700 07/31 0659 07/31 0700 08/01 0659 08/01 0700 08/02 0659    P.O. 0 900 360    I.V. (mL/kg) 565.1 (7.9) 1652 (23.1)     NG/GT  1700     IV Piggyback 586.2 595.4     Total Intake(mL/kg) 1151.3 (16.1) 4847.4 (67.9) 360 (5)    Urine (mL/kg/hr) 500 (0.3) 0 (0)     Drains 2350 4500 1150    Stool 0      Total Output 2850 4500 1150    Net -1698.7 +347.4 -790           Urine Occurrence 7 x 4 x     Stool Occurrence 2 x              Physical Exam  Vitals " and nursing note reviewed.   Constitutional:       General: She is not in acute distress.  Eyes:      Extraocular Movements: Extraocular movements intact.   Cardiovascular:      Rate and Rhythm: Normal rate.   Pulmonary:      Effort: No respiratory distress.   Abdominal:      Palpations: Abdomen is soft.      Comments: Appropriately tender to palpation, midline incision with staples intact--no drainage or erythema. Nondistended.   Musculoskeletal:      Cervical back: No rigidity.   Neurological:      General: No focal deficit present.      Mental Status: She is alert and oriented to person, place, and time.       Significant Labs:  I have reviewed all pertinent lab results within the past 24 hours.  CBC:   Recent Labs   Lab 08/01/22  0417   WBC 8.57   RBC 3.22*   HGB 10.4*   HCT 31.8*      MCV 99*   MCH 32.3*   MCHC 32.7     BMP:   Recent Labs   Lab 07/30/22  0500 07/31/22  0731 08/01/22  0417      < > 80      < > 142   K 3.1*   < > 3.1*   CL 99   < > 103   CO2 25   < > 25   BUN 17   < > 7   CREATININE 0.7   < > 0.5   CALCIUM 9.0   < > 8.4*   MG 2.0  --   --     < > = values in this interval not displayed.       Significant Diagnostics:  I have reviewed all pertinent imaging results/findings within the past 24 hours.

## 2022-08-02 NOTE — PROGRESS NOTES
"[facility]  General Surgery  Progress Note    Subjective:     History of Present Illness:  No notes on file    Post-Op Info:  Procedure(s) (LRB):  LAPAROSCOPY, DIAGNOSTIC (N/A)  LYSIS, ADHESIONS (N/A)  LAPAROTOMY, EXPLORATORY (N/A)  BLOCK, TRANSVERSUS ABDOMINIS PLANE (N/A)  EXCISION, CECUM WITH ILEUM   7 Days Post-Op     Interval History: Feeling well. Passing flatus. No nausea or vomiting. Passed NG tube clamp trial. Tolerating liquids.    Medications:  Continuous Infusions:   sodium chloride 0.9% 110 mL/hr at 08/01/22 1406     Scheduled Meds:   acetaminophen  650 mg Oral Q6H    ascorbic acid (vitamin C)  500 mg Oral BID    cefTRIAXone (ROCEPHIN) IVPB  2 g Intravenous Q24H    docusate sodium  100 mg Oral BID    doxepin  10 mg Oral QHS    enoxaparin  40 mg Subcutaneous Daily    Lactobacillus acidoph-L.bulgar  1 tablet Oral BID    pantoprazole  40 mg Intravenous Daily    scopolamine  1 patch Transdermal Q3 Days    spironolactone  50 mg Oral BID     PRN Meds:albuterol-ipratropium, diazePAM, hydrALAZINE, HYDROmorphone, melatonin, ondansetron, oxyCODONE, promethazine (PHENERGAN) IVPB, sodium chloride 0.9%, sodium chloride 0.9%     Review of patient's allergies indicates:   Allergen Reactions    Erythromycin Other (See Comments)     Stomach cramps    Morphine Nausea And Vomiting     "Projectile vomiting"     Objective:     Vital Signs (Most Recent):  Temp: 98.2 °F (36.8 °C) (08/01/22 2000)  Pulse: 94 (08/01/22 2000)  Resp: 18 (08/01/22 2000)  BP: 136/76 (08/01/22 2000)  SpO2: 97 % (08/01/22 2049) Vital Signs (24h Range):  Temp:  [97.4 °F (36.3 °C)-99.5 °F (37.5 °C)] 98.2 °F (36.8 °C)  Pulse:  [88-94] 94  Resp:  [16-18] 18  SpO2:  [91 %-98 %] 97 %  BP: (131-150)/(60-76) 136/76     Weight: 71.5 kg (157 lb 8.3 oz)  Body mass index is 24.67 kg/m².    Intake/Output - Last 3 Shifts         07/30 0700 07/31 0659 07/31 0700 08/01 0659 08/01 0700 08/02 0659    P.O. 0 900 360    I.V. (mL/kg) 565.1 (7.9) 3484 " (23.1)     NG/GT  1700     IV Piggyback 586.2 595.4     Total Intake(mL/kg) 1151.3 (16.1) 4847.4 (67.9) 360 (5)    Urine (mL/kg/hr) 500 (0.3) 0 (0)     Drains 2350 4500 1150    Stool 0      Total Output 2850 4500 1150    Net -1698.7 +347.4 -790           Urine Occurrence 7 x 4 x     Stool Occurrence 2 x              Physical Exam  Vitals and nursing note reviewed.   Constitutional:       General: She is not in acute distress.  Eyes:      Extraocular Movements: Extraocular movements intact.   Cardiovascular:      Rate and Rhythm: Normal rate.   Pulmonary:      Effort: No respiratory distress.   Abdominal:      Palpations: Abdomen is soft.      Comments: Appropriately tender to palpation, midline incision with staples intact--no drainage or erythema. Nondistended.   Musculoskeletal:      Cervical back: No rigidity.   Neurological:      General: No focal deficit present.      Mental Status: She is alert and oriented to person, place, and time.       Significant Labs:  I have reviewed all pertinent lab results within the past 24 hours.  CBC:   Recent Labs   Lab 08/01/22  0417   WBC 8.57   RBC 3.22*   HGB 10.4*   HCT 31.8*      MCV 99*   MCH 32.3*   MCHC 32.7     BMP:   Recent Labs   Lab 07/30/22  0500 07/31/22  0731 08/01/22  0417      < > 80      < > 142   K 3.1*   < > 3.1*   CL 99   < > 103   CO2 25   < > 25   BUN 17   < > 7   CREATININE 0.7   < > 0.5   CALCIUM 9.0   < > 8.4*   MG 2.0  --   --     < > = values in this interval not displayed.       Significant Diagnostics:  I have reviewed all pertinent imaging results/findings within the past 24 hours.    Assessment/Plan:     * SBO (small bowel obstruction)  S/p diagnostic laparoscopy, laparotomy, extensive lysis of adhesions, ileocecectomy      - Having bowel function. Tolerated NG tube clamped so tube was removed. On clear liquids.  - Decrease IV fluids as PO intake increases  - Analgesia prn  - Home medications  - Increase activity  - DVT ppx  -  Monitor electrolytes and replace prn    Acute hypoxemic respiratory failure- Resolved   Pulmonary toileting      Hospital-acquired pneumonia  Likely secondary to aspiration. HM consulted for assistance in medical management. Antibiotics.    Gram-negative bacteremia  Repeat cultures pending. Antibiotics per         Jo Manzano, DO  General Surgery  [facility]

## 2022-08-02 NOTE — PROGRESS NOTES
[facility]  Progress Note    Patient Name: Genny Calixto  MRN: [id number]  Patient Class: IP- Inpatient   Admission Date: 7/25/2022  Length of Stay: 6 days  Attending Physician: Jo Manzano DO  Primary Care Provider: Tay Duff MD        Subjective:     Principal Problem:SBO (small bowel obstruction)        HPI:  The pt is a 56 yo female with h/o recurrent small bowel obstructions, KENN, Migraine headaches, Kidney stones, Insomnia admitted on 7/25/22 under General Surgery service for elective diagnostic exp lap and underwent lysis of adhesion and excision of cecum with ileum. Post operatively Pt started on full liquid diet. On post op day #3 pt developed persistent nausea and vomiting and X-ray abd showed ileus. NG tube placed to LIS. Today 7/29/22 pt is noted to be hypoxic with SpO2 86% on RA. ABG showed 7.3/40.7/51 on RA. HM consulted for evaluation of new onset hypoxia. Pt examined at bedside. C/O feeling feverish and chills along with mild chest tightness and cough but denies SOB. Denies further nausea or vomiting since NG tube placed. Reports passing flatus . Generalized abd tenderness appreciated on exam. Lung auscultation revealed crackles at both bases. BNP is normal. D-dimer is elevated, however nonspecific in immediate post op period. CTA chest done showed no pulmonary embolism but bibasilar right greater than left pulmonary opacities concerning for aspiration or multifocal pneumonia . Blood cultures obtained and Pt started on antimicrobial targeting aspiration pneumonia.       Overview/Hospital Course:  7/30- Tmax 99.4. Pt feels better today. O2 wean down to RA. Denies chest pain, tightness or SOB. Cough is better. No N/V. NG tube output 1.1 Liter yesterday. Bowel sounds appreciated on exam, however no bowel movement reported. WBC 6.4, Hgb 12.5, Na 142, K 3.1, Cr 0.7, PCT 0.42.     7/31- Tmax 99. Remains on RA. Feels better subjectively . (+) flatus and bowel  movement . NG tube remains in place. Pt remains NPO. Continue Unasyn IV. May transition to oral Augmentin once safe for oral intake. Pt will need total 5 days antibiotic treatment.     8/1- Continues to feel better. Denies SOB or cough. Remains on RA. NG tube in place with LIS. (+) flatus and bowel movement. One of two blood cultures from 7/29 resulted GNR. Antibiotic transitioned to rocephin 2 gram IV daily . Await final identification and sensitivity . Blood cultures repeated today.     8/2- Continue Rocephin while in house. May transition to oral Levofloxacin based on final identification and sensitivity to complete total 10 days treatment from neg blood culture. Repeat blood cultures from 8/1/22- NGTD. K 2.8, Mg 1.4 are being replaced.       Interval History:   Continue Rocephin while in house. May transition to oral Levofloxacin based on final identification and sensitivity to complete total 10 days treatment from neg blood culture      Review of Systems   Constitutional:  Positive for activity change. Negative for appetite change and fever.   HENT:  Negative for sore throat.    Eyes:  Negative for visual disturbance.   Respiratory:  Negative for cough, chest tightness and shortness of breath.    Cardiovascular:  Negative for chest pain, palpitations and leg swelling.   Gastrointestinal:  Positive for abdominal pain (much better). Negative for abdominal distention, constipation, diarrhea, nausea and vomiting.        NG tube in place    Endocrine: Negative for polyuria.   Genitourinary:  Negative for decreased urine volume, dysuria, flank pain, frequency and hematuria.   Musculoskeletal:  Negative for back pain and gait problem.   Skin:  Negative for rash.   Neurological:  Negative for syncope, speech difficulty, weakness, light-headedness and headaches.   Psychiatric/Behavioral:  Negative for confusion, hallucinations and sleep disturbance.    Objective:     Vital Signs (Most Recent):  Temp: 97.8 °F (36.6 °C)  (08/02/22 1131)  Pulse: 86 (08/02/22 1131)  Resp: 18 (08/02/22 1131)  BP: 133/65 (08/02/22 1131)  SpO2: 96 % (08/02/22 1131) Vital Signs (24h Range):  Temp:  [97.8 °F (36.6 °C)-99.2 °F (37.3 °C)] 97.8 °F (36.6 °C)  Pulse:  [84-94] 86  Resp:  [16-20] 18  SpO2:  [93 %-98 %] 96 %  BP: (123-150)/(60-76) 133/65     Weight: 71.5 kg (157 lb 8.3 oz)  Body mass index is 24.67 kg/m².    Intake/Output Summary (Last 24 hours) at 8/2/2022 1257  Last data filed at 8/1/2022 1800  Gross per 24 hour   Intake 360 ml   Output --   Net 360 ml      Physical Exam  Constitutional:       General: She is not in acute distress.     Appearance: She is well-developed. She is not ill-appearing or diaphoretic.   HENT:      Head: Normocephalic and atraumatic.      Mouth/Throat:      Pharynx: No oropharyngeal exudate.   Eyes:      Conjunctiva/sclera: Conjunctivae normal.      Pupils: Pupils are equal, round, and reactive to light.   Neck:      Thyroid: No thyromegaly.      Vascular: No JVD.   Cardiovascular:      Rate and Rhythm: Normal rate and regular rhythm.      Heart sounds: Normal heart sounds. No murmur heard.  Pulmonary:      Effort: Pulmonary effort is normal. No respiratory distress.      Breath sounds: No wheezing or rales.   Chest:      Chest wall: No tenderness.   Abdominal:      General: Bowel sounds are normal. There is no distension.      Palpations: Abdomen is soft.      Tenderness: There is abdominal tenderness (generalized). There is no guarding or rebound.   Musculoskeletal:         General: Normal range of motion.      Cervical back: Normal range of motion and neck supple.   Lymphadenopathy:      Cervical: No cervical adenopathy.   Skin:     General: Skin is warm and dry.      Findings: No rash.   Neurological:      Mental Status: She is alert and oriented to person, place, and time.      Cranial Nerves: No cranial nerve deficit.      Sensory: No sensory deficit.      Deep Tendon Reflexes: Reflexes normal.       Significant  Labs: All pertinent labs within the past 24 hours have been reviewed.  BMP:   Recent Labs   Lab 08/02/22  0701   GLU 81      K 2.8*      CO2 24   BUN 4*   CREATININE 0.5   CALCIUM 7.8*   MG 1.4*     CBC:   Recent Labs   Lab 08/01/22 0417   WBC 8.57   HGB 10.4*   HCT 31.8*        CMP:   Recent Labs   Lab 08/01/22 0417 08/02/22  0701    138   K 3.1* 2.8*    104   CO2 25 24   GLU 80 81   BUN 7 4*   CREATININE 0.5 0.5   CALCIUM 8.4* 7.8*   ANIONGAP 14 10       Significant Imaging:       Assessment/Plan:      * SBO (small bowel obstruction)  - S/P  elective diagnostic exp lap, lysis of adhesion and excision of cecum with ileum.   -Further post op management per Primary     Acute hypoxemic respiratory failure- Resolved   Patient with Hypoxic Respiratory failure which is Acute.  she is not on home oxygen. Supplemental oxygen was provided and noted-  .   Signs/symptoms of respiratory failure include- tachypnea. Contributing diagnoses includes - Aspiration Labs and images were reviewed. Patient Has recent ABG, which has been reviewed. Will treat underlying causes and adjust management of respiratory failure as follows-       -Supplemental oxygen to keep SpO2 >92%  -Inhaled bronchodilators prn  Antimicrobial targeting aspiration pneumonia in the setting recent episodes of vomiting       7/30-  Resolved   Currently on RA with satisfactory SpO2      Hospital-acquired pneumonia  - Imaging study suggestive of eze onset  bibasilar right greater than left pulmonary opacities  -Aspiration highly suspected in the setting of recent episodes of vomiting   -Blood cultures x 2  -Antimicrobial targeting aspiration  Pneumonia   -Supplemental oxygen   -Monitor course   7/31-  NG tube remains in place. Pt remains NPO. Continue Unasyn IV. May transition to oral Augmentin once safe for oral intake. Pt will need total 5 days antibiotic treatment.   8/1-  Antibiotic changed to Rocephin     Gram-negative  bacteremia  8/1  -One of two blood cultures from 7/29 resulted GNR. Antibiotic transitioned to rocephin 2 gram IV daily . Await final identification and sensitivity . Blood cultures repeated today.   8/2-  Awaiting final identification and sensitivity   Continue Rocephin while in house. May transition to oral Levofloxacin based on final identification and sensitivity to complete total 10 days treatment from neg blood culture. Repeat blood cultures from 8/1/22- NGTD        VTE Risk Mitigation (From admission, onward)         Ordered     enoxaparin injection 40 mg  Daily         07/26/22 1723     Place sequential compression device  Until discontinued         07/25/22 1244                Discharge Planning   MANAS:      Code Status: Prior   Is the patient medically ready for discharge?:     Reason for patient still in hospital (select all that apply): Patient trending condition  Discharge Plan A: Home with family                  Lady Moreau MD  Department of Hospital Medicine   [facility]

## 2022-08-02 NOTE — SUBJECTIVE & OBJECTIVE
Interval History:   Continue Rocephin while in house. May transition to oral Levofloxacin based on final identification and sensitivity to complete total 10 days treatment from neg blood culture      Review of Systems   Constitutional:  Positive for activity change. Negative for appetite change and fever.   HENT:  Negative for sore throat.    Eyes:  Negative for visual disturbance.   Respiratory:  Negative for cough, chest tightness and shortness of breath.    Cardiovascular:  Negative for chest pain, palpitations and leg swelling.   Gastrointestinal:  Positive for abdominal pain (much better). Negative for abdominal distention, constipation, diarrhea, nausea and vomiting.        NG tube in place    Endocrine: Negative for polyuria.   Genitourinary:  Negative for decreased urine volume, dysuria, flank pain, frequency and hematuria.   Musculoskeletal:  Negative for back pain and gait problem.   Skin:  Negative for rash.   Neurological:  Negative for syncope, speech difficulty, weakness, light-headedness and headaches.   Psychiatric/Behavioral:  Negative for confusion, hallucinations and sleep disturbance.    Objective:     Vital Signs (Most Recent):  Temp: 97.8 °F (36.6 °C) (08/02/22 1131)  Pulse: 86 (08/02/22 1131)  Resp: 18 (08/02/22 1131)  BP: 133/65 (08/02/22 1131)  SpO2: 96 % (08/02/22 1131) Vital Signs (24h Range):  Temp:  [97.8 °F (36.6 °C)-99.2 °F (37.3 °C)] 97.8 °F (36.6 °C)  Pulse:  [84-94] 86  Resp:  [16-20] 18  SpO2:  [93 %-98 %] 96 %  BP: (123-150)/(60-76) 133/65     Weight: 71.5 kg (157 lb 8.3 oz)  Body mass index is 24.67 kg/m².    Intake/Output Summary (Last 24 hours) at 8/2/2022 1257  Last data filed at 8/1/2022 1800  Gross per 24 hour   Intake 360 ml   Output --   Net 360 ml      Physical Exam  Constitutional:       General: She is not in acute distress.     Appearance: She is well-developed. She is not ill-appearing or diaphoretic.   HENT:      Head: Normocephalic and atraumatic.      Mouth/Throat:       Pharynx: No oropharyngeal exudate.   Eyes:      Conjunctiva/sclera: Conjunctivae normal.      Pupils: Pupils are equal, round, and reactive to light.   Neck:      Thyroid: No thyromegaly.      Vascular: No JVD.   Cardiovascular:      Rate and Rhythm: Normal rate and regular rhythm.      Heart sounds: Normal heart sounds. No murmur heard.  Pulmonary:      Effort: Pulmonary effort is normal. No respiratory distress.      Breath sounds: No wheezing or rales.   Chest:      Chest wall: No tenderness.   Abdominal:      General: Bowel sounds are normal. There is no distension.      Palpations: Abdomen is soft.      Tenderness: There is abdominal tenderness (generalized). There is no guarding or rebound.   Musculoskeletal:         General: Normal range of motion.      Cervical back: Normal range of motion and neck supple.   Lymphadenopathy:      Cervical: No cervical adenopathy.   Skin:     General: Skin is warm and dry.      Findings: No rash.   Neurological:      Mental Status: She is alert and oriented to person, place, and time.      Cranial Nerves: No cranial nerve deficit.      Sensory: No sensory deficit.      Deep Tendon Reflexes: Reflexes normal.       Significant Labs: All pertinent labs within the past 24 hours have been reviewed.  BMP:   Recent Labs   Lab 08/02/22  0701   GLU 81      K 2.8*      CO2 24   BUN 4*   CREATININE 0.5   CALCIUM 7.8*   MG 1.4*     CBC:   Recent Labs   Lab 08/01/22 0417   WBC 8.57   HGB 10.4*   HCT 31.8*        CMP:   Recent Labs   Lab 08/01/22 0417 08/02/22  0701    138   K 3.1* 2.8*    104   CO2 25 24   GLU 80 81   BUN 7 4*   CREATININE 0.5 0.5   CALCIUM 8.4* 7.8*   ANIONGAP 14 10       Significant Imaging:

## 2022-08-03 VITALS
RESPIRATION RATE: 17 BRPM | OXYGEN SATURATION: 97 % | DIASTOLIC BLOOD PRESSURE: 72 MMHG | SYSTOLIC BLOOD PRESSURE: 133 MMHG | BODY MASS INDEX: 24.72 KG/M2 | WEIGHT: 157.5 LBS | TEMPERATURE: 99 F | HEART RATE: 84 BPM | HEIGHT: 67 IN

## 2022-08-03 LAB
ANION GAP SERPL CALC-SCNC: 10 MMOL/L (ref 8–16)
BUN SERPL-MCNC: 5 MG/DL (ref 6–20)
CALCIUM SERPL-MCNC: 8.5 MG/DL (ref 8.7–10.5)
CHLORIDE SERPL-SCNC: 103 MMOL/L (ref 95–110)
CO2 SERPL-SCNC: 30 MMOL/L (ref 23–29)
CREAT SERPL-MCNC: 0.5 MG/DL (ref 0.5–1.4)
EST. GFR  (NO RACE VARIABLE): >60 ML/MIN/1.73 M^2
GLUCOSE SERPL-MCNC: 117 MG/DL (ref 70–110)
MAGNESIUM SERPL-MCNC: 1.9 MG/DL (ref 1.6–2.6)
PHOSPHATE SERPL-MCNC: 3.5 MG/DL (ref 2.7–4.5)
POTASSIUM SERPL-SCNC: 2.9 MMOL/L (ref 3.5–5.1)
SODIUM SERPL-SCNC: 143 MMOL/L (ref 136–145)

## 2022-08-03 PROCEDURE — 80048 BASIC METABOLIC PNL TOTAL CA: CPT | Performed by: INTERNAL MEDICINE

## 2022-08-03 PROCEDURE — 25000003 PHARM REV CODE 250: Performed by: SURGERY

## 2022-08-03 PROCEDURE — 84100 ASSAY OF PHOSPHORUS: CPT | Performed by: INTERNAL MEDICINE

## 2022-08-03 PROCEDURE — 94761 N-INVAS EAR/PLS OXIMETRY MLT: CPT

## 2022-08-03 PROCEDURE — 36415 COLL VENOUS BLD VENIPUNCTURE: CPT | Performed by: INTERNAL MEDICINE

## 2022-08-03 PROCEDURE — 83735 ASSAY OF MAGNESIUM: CPT | Performed by: INTERNAL MEDICINE

## 2022-08-03 RX ORDER — OXYCODONE AND ACETAMINOPHEN 7.5; 325 MG/1; MG/1
1 TABLET ORAL EVERY 4 HOURS PRN
Qty: 25 TABLET | Refills: 0 | Status: SHIPPED | OUTPATIENT
Start: 2022-08-03 | End: 2022-08-08

## 2022-08-03 RX ORDER — LEVOFLOXACIN 750 MG/1
750 TABLET ORAL DAILY
Qty: 10 TABLET | Refills: 0 | Status: SHIPPED | OUTPATIENT
Start: 2022-08-03 | End: 2022-08-13

## 2022-08-03 RX ORDER — PROMETHAZINE HYDROCHLORIDE 25 MG/1
25 TABLET ORAL EVERY 4 HOURS PRN
Qty: 25 TABLET | Refills: 1 | Status: SHIPPED | OUTPATIENT
Start: 2022-08-03 | End: 2022-08-15 | Stop reason: SDUPTHER

## 2022-08-03 RX ORDER — PANTOPRAZOLE SODIUM 40 MG/1
40 TABLET, DELAYED RELEASE ORAL DAILY
Status: DISCONTINUED | OUTPATIENT
Start: 2022-08-03 | End: 2022-08-03 | Stop reason: HOSPADM

## 2022-08-03 RX ADMIN — OXYCODONE HYDROCHLORIDE 5 MG: 5 TABLET ORAL at 10:08

## 2022-08-03 RX ADMIN — Medication 1 TABLET: at 08:08

## 2022-08-03 RX ADMIN — ACETAMINOPHEN 650 MG: 325 TABLET ORAL at 10:08

## 2022-08-03 RX ADMIN — ACETAMINOPHEN 650 MG: 325 TABLET ORAL at 04:08

## 2022-08-03 RX ADMIN — PANTOPRAZOLE SODIUM 40 MG: 40 TABLET, DELAYED RELEASE ORAL at 08:08

## 2022-08-03 RX ADMIN — OXYCODONE HYDROCHLORIDE AND ACETAMINOPHEN 500 MG: 500 TABLET ORAL at 08:08

## 2022-08-03 RX ADMIN — SPIRONOLACTONE 50 MG: 25 TABLET, FILM COATED ORAL at 08:08

## 2022-08-03 RX ADMIN — DOCUSATE SODIUM 100 MG: 100 CAPSULE, LIQUID FILLED ORAL at 08:08

## 2022-08-03 NOTE — SUBJECTIVE & OBJECTIVE
Interval History:     Discharge plan per primary     Review of Systems   Constitutional:  Negative for activity change, appetite change and fever.   HENT:  Negative for sore throat.    Eyes:  Negative for visual disturbance.   Respiratory:  Negative for cough, chest tightness and shortness of breath.    Cardiovascular:  Negative for chest pain, palpitations and leg swelling.   Gastrointestinal:  Negative for abdominal distention, abdominal pain, constipation, diarrhea, nausea and vomiting.   Endocrine: Negative for polyuria.   Genitourinary:  Negative for decreased urine volume, dysuria, flank pain, frequency and hematuria.   Musculoskeletal:  Negative for back pain and gait problem.   Skin:  Negative for rash.   Neurological:  Negative for syncope, speech difficulty, weakness, light-headedness and headaches.   Psychiatric/Behavioral:  Negative for confusion, hallucinations and sleep disturbance.    Objective:     Vital Signs (Most Recent):  Temp: 98.8 °F (37.1 °C) (08/03/22 0855)  Pulse: 84 (08/03/22 0855)  Resp: 17 (08/03/22 1015)  BP: 133/72 (08/03/22 0855)  SpO2: 97 % (08/03/22 0855) Vital Signs (24h Range):  Temp:  [98.2 °F (36.8 °C)-98.8 °F (37.1 °C)] 98.8 °F (37.1 °C)  Pulse:  [80-99] 84  Resp:  [17-20] 17  SpO2:  [95 %-97 %] 97 %  BP: (129-133)/(63-72) 133/72     Weight: 71.5 kg (157 lb 8.3 oz)  Body mass index is 24.67 kg/m².    Intake/Output Summary (Last 24 hours) at 8/3/2022 1223  Last data filed at 8/3/2022 0900  Gross per 24 hour   Intake 2851.47 ml   Output --   Net 2851.47 ml      Physical Exam  Constitutional:       General: She is not in acute distress.     Appearance: She is well-developed. She is not diaphoretic.   HENT:      Head: Normocephalic and atraumatic.      Mouth/Throat:      Pharynx: No oropharyngeal exudate.   Eyes:      Conjunctiva/sclera: Conjunctivae normal.      Pupils: Pupils are equal, round, and reactive to light.   Neck:      Thyroid: No thyromegaly.      Vascular: No JVD.    Cardiovascular:      Rate and Rhythm: Normal rate and regular rhythm.      Heart sounds: Normal heart sounds. No murmur heard.  Pulmonary:      Effort: Pulmonary effort is normal. No respiratory distress.      Breath sounds: Normal breath sounds. No wheezing or rales.   Chest:      Chest wall: No tenderness.   Abdominal:      General: Bowel sounds are normal. There is no distension.      Palpations: Abdomen is soft.      Tenderness: There is no abdominal tenderness. There is no guarding or rebound.   Musculoskeletal:         General: Normal range of motion.      Cervical back: Normal range of motion and neck supple.   Lymphadenopathy:      Cervical: No cervical adenopathy.   Skin:     General: Skin is warm and dry.      Findings: No rash.   Neurological:      Mental Status: She is alert and oriented to person, place, and time.      Cranial Nerves: No cranial nerve deficit.      Sensory: No sensory deficit.      Deep Tendon Reflexes: Reflexes normal.       Significant Labs: All pertinent labs within the past 24 hours have been reviewed.  CBC: No results for input(s): WBC, HGB, HCT, PLT in the last 48 hours.  CMP:   Recent Labs   Lab 08/02/22  0701 08/03/22  0548    143   K 2.8* 2.9*    103   CO2 24 30*   GLU 81 117*   BUN 4* 5*   CREATININE 0.5 0.5   CALCIUM 7.8* 8.5*   ANIONGAP 10 10       Significant Imaging:

## 2022-08-03 NOTE — PROGRESS NOTES
[facility] Medicine  Progress Note    Patient Name: Genny Calixto  MRN: [id number]  Patient Class: IP- Inpatient   Admission Date: 7/25/2022  Length of Stay: 7 days  Attending Physician: Jo Manzano DO  Primary Care Provider: Tay Duff MD        Subjective:     Principal Problem:SBO (small bowel obstruction)        HPI:  The pt is a 58 yo female with h/o recurrent small bowel obstructions, KENN, Migraine headaches, Kidney stones, Insomnia admitted on 7/25/22 under General Surgery service for elective diagnostic exp lap and underwent lysis of adhesion and excision of cecum with ileum. Post operatively Pt started on full liquid diet. On post op day #3 pt developed persistent nausea and vomiting and X-ray abd showed ileus. NG tube placed to LIS. Today 7/29/22 pt is noted to be hypoxic with SpO2 86% on RA. ABG showed 7.3/40.7/51 on RA. HM consulted for evaluation of new onset hypoxia. Pt examined at bedside. C/O feeling feverish and chills along with mild chest tightness and cough but denies SOB. Denies further nausea or vomiting since NG tube placed. Reports passing flatus . Generalized abd tenderness appreciated on exam. Lung auscultation revealed crackles at both bases. BNP is normal. D-dimer is elevated, however nonspecific in immediate post op period. CTA chest done showed no pulmonary embolism but bibasilar right greater than left pulmonary opacities concerning for aspiration or multifocal pneumonia . Blood cultures obtained and Pt started on antimicrobial targeting aspiration pneumonia.       Overview/Hospital Course:  7/30- Tmax 99.4. Pt feels better today. O2 wean down to RA. Denies chest pain, tightness or SOB. Cough is better. No N/V. NG tube output 1.1 Liter yesterday. Bowel sounds appreciated on exam, however no bowel movement reported. WBC 6.4, Hgb 12.5, Na 142, K 3.1, Cr 0.7, PCT 0.42.     7/31- Tmax 99. Remains on RA. Feels better subjectively . (+) flatus and bowel  movement . NG tube remains in place. Pt remains NPO. Continue Unasyn IV. May transition to oral Augmentin once safe for oral intake. Pt will need total 5 days antibiotic treatment.     8/1- Continues to feel better. Denies SOB or cough. Remains on RA. NG tube in place with LIS. (+) flatus and bowel movement. One of two blood cultures from 7/29 resulted GNR. Antibiotic transitioned to rocephin 2 gram IV daily . Await final identification and sensitivity . Blood cultures repeated today.     8/2- Continue Rocephin while in house. May transition to oral Levofloxacin based on final identification and sensitivity to complete total 10 days treatment from neg blood culture. Repeat blood cultures from 8/1/22- NGTD. K 2.8, Mg 1.4 are being replaced.     8/3- Recommendation as above. Discharge plan per primary . HM will sign off.       Interval History:     Discharge plan per primary     Review of Systems   Constitutional:  Negative for activity change, appetite change and fever.   HENT:  Negative for sore throat.    Eyes:  Negative for visual disturbance.   Respiratory:  Negative for cough, chest tightness and shortness of breath.    Cardiovascular:  Negative for chest pain, palpitations and leg swelling.   Gastrointestinal:  Negative for abdominal distention, abdominal pain, constipation, diarrhea, nausea and vomiting.   Endocrine: Negative for polyuria.   Genitourinary:  Negative for decreased urine volume, dysuria, flank pain, frequency and hematuria.   Musculoskeletal:  Negative for back pain and gait problem.   Skin:  Negative for rash.   Neurological:  Negative for syncope, speech difficulty, weakness, light-headedness and headaches.   Psychiatric/Behavioral:  Negative for confusion, hallucinations and sleep disturbance.    Objective:     Vital Signs (Most Recent):  Temp: 98.8 °F (37.1 °C) (08/03/22 0855)  Pulse: 84 (08/03/22 0855)  Resp: 17 (08/03/22 1015)  BP: 133/72 (08/03/22 0855)  SpO2: 97 % (08/03/22 0855) Vital  Signs (24h Range):  Temp:  [98.2 °F (36.8 °C)-98.8 °F (37.1 °C)] 98.8 °F (37.1 °C)  Pulse:  [80-99] 84  Resp:  [17-20] 17  SpO2:  [95 %-97 %] 97 %  BP: (129-133)/(63-72) 133/72     Weight: 71.5 kg (157 lb 8.3 oz)  Body mass index is 24.67 kg/m².    Intake/Output Summary (Last 24 hours) at 8/3/2022 1223  Last data filed at 8/3/2022 0900  Gross per 24 hour   Intake 2851.47 ml   Output --   Net 2851.47 ml      Physical Exam  Constitutional:       General: She is not in acute distress.     Appearance: She is well-developed. She is not diaphoretic.   HENT:      Head: Normocephalic and atraumatic.      Mouth/Throat:      Pharynx: No oropharyngeal exudate.   Eyes:      Conjunctiva/sclera: Conjunctivae normal.      Pupils: Pupils are equal, round, and reactive to light.   Neck:      Thyroid: No thyromegaly.      Vascular: No JVD.   Cardiovascular:      Rate and Rhythm: Normal rate and regular rhythm.      Heart sounds: Normal heart sounds. No murmur heard.  Pulmonary:      Effort: Pulmonary effort is normal. No respiratory distress.      Breath sounds: Normal breath sounds. No wheezing or rales.   Chest:      Chest wall: No tenderness.   Abdominal:      General: Bowel sounds are normal. There is no distension.      Palpations: Abdomen is soft.      Tenderness: There is no abdominal tenderness. There is no guarding or rebound.   Musculoskeletal:         General: Normal range of motion.      Cervical back: Normal range of motion and neck supple.   Lymphadenopathy:      Cervical: No cervical adenopathy.   Skin:     General: Skin is warm and dry.      Findings: No rash.   Neurological:      Mental Status: She is alert and oriented to person, place, and time.      Cranial Nerves: No cranial nerve deficit.      Sensory: No sensory deficit.      Deep Tendon Reflexes: Reflexes normal.       Significant Labs: All pertinent labs within the past 24 hours have been reviewed.  CBC: No results for input(s): WBC, HGB, HCT, PLT in the last  48 hours.  CMP:   Recent Labs   Lab 08/02/22  0701 08/03/22  0548    143   K 2.8* 2.9*    103   CO2 24 30*   GLU 81 117*   BUN 4* 5*   CREATININE 0.5 0.5   CALCIUM 7.8* 8.5*   ANIONGAP 10 10       Significant Imaging:       Assessment/Plan:      * SBO (small bowel obstruction)  - S/P  elective diagnostic exp lap, lysis of adhesion and excision of cecum with ileum.   -Further post op management per Primary     Acute hypoxemic respiratory failure- Resolved   Patient with Hypoxic Respiratory failure which is Acute.  she is not on home oxygen. Supplemental oxygen was provided and noted-  .   Signs/symptoms of respiratory failure include- tachypnea. Contributing diagnoses includes - Aspiration Labs and images were reviewed. Patient Has recent ABG, which has been reviewed. Will treat underlying causes and adjust management of respiratory failure as follows-       -Supplemental oxygen to keep SpO2 >92%  -Inhaled bronchodilators prn  Antimicrobial targeting aspiration pneumonia in the setting recent episodes of vomiting       7/30-  Resolved   Currently on RA with satisfactory SpO2      Hospital-acquired pneumonia  - Imaging study suggestive of eze onset  bibasilar right greater than left pulmonary opacities  -Aspiration highly suspected in the setting of recent episodes of vomiting   -Blood cultures x 2  -Antimicrobial targeting aspiration  Pneumonia   -Supplemental oxygen   -Monitor course   7/31-  NG tube remains in place. Pt remains NPO. Continue Unasyn IV. May transition to oral Augmentin once safe for oral intake. Pt will need total 5 days antibiotic treatment.   8/1-  Antibiotic changed to Rocephin     Gram-negative bacteremia  8/1  -One of two blood cultures from 7/29 resulted GNR. Antibiotic transitioned to rocephin 2 gram IV daily . Await final identification and sensitivity . Blood cultures repeated today.   8/2-  Awaiting final identification and sensitivity   Continue Rocephin while in house. May  transition to oral Levofloxacin based on final identification and sensitivity to complete total 10 days treatment from neg blood culture. Repeat blood cultures from 8/1/22- NGTD  8/3-  As above       VTE Risk Mitigation (From admission, onward)         Ordered     enoxaparin injection 40 mg  Daily         07/26/22 1723     Place sequential compression device  Until discontinued         07/25/22 1244                Discharge Planning   MANAS: 8/3/2022     Code Status: Prior   Is the patient medically ready for discharge?:     Reason for patient still in hospital (select all that apply): Patient trending condition  Discharge Plan A: Home with family                  Lady Moreau MD  Department of Hospital Medicine   [facility] - Med Surg

## 2022-08-03 NOTE — ASSESSMENT & PLAN NOTE
8/1  -One of two blood cultures from 7/29 resulted GNR. Antibiotic transitioned to rocephin 2 gram IV daily . Await final identification and sensitivity . Blood cultures repeated today.   8/2-  Awaiting final identification and sensitivity   Continue Rocephin while in house. May transition to oral Levofloxacin based on final identification and sensitivity to complete total 10 days treatment from neg blood culture. Repeat blood cultures from 8/1/22- NGTD  8/3-  As above

## 2022-08-03 NOTE — PROGRESS NOTES
Pharmacist Intervention IV to PO Note    Genny Calixto is a 57 y.o. female being treated with IV medication pantoprazole    Patient Data:    Vital Signs (Most Recent):  Temp: 98.5 °F (36.9 °C) (08/03/22 0010)  Pulse: 99 (08/03/22 0010)  Resp: 20 (08/03/22 0010)  BP: 129/65 (08/03/22 0010)  SpO2: 95 % (08/03/22 0010) Vital Signs (72h Range):  Temp:  [97.4 °F (36.3 °C)-99.5 °F (37.5 °C)]   Pulse:  []   Resp:  [15-20]   BP: (123-154)/(60-81)   SpO2:  [91 %-100 %]      CBC:  Recent Labs   Lab 07/30/22  0500 07/31/22  0731 08/01/22  0417   WBC 6.43 8.63 8.57   RBC 3.87* 3.27* 3.22*   HGB 12.5 10.4* 10.4*   HCT 37.9 31.8* 31.8*    282 292   MCV 98 97 99*   MCH 32.3* 31.8* 32.3*   MCHC 33.0 32.7 32.7     CMP:     Recent Labs   Lab 07/31/22  0731 08/01/22  0417 08/02/22  0701   GLU 92 80 81   CALCIUM 8.4* 8.4* 7.8*    142 138   K 3.1* 3.1* 2.8*   CO2 24 25 24    103 104   BUN 11 7 4*   CREATININE 0.6 0.5 0.5       Dietary Orders:  Diet Orders  Report           Diet low fiber/residue: Low Fiber/Residue starting at 08/02 0956            Based on the following criteria, this patient qualifies for intravenous to oral conversion:  [x] The patients gastrointestinal tract is functioning (tolerating medications via oral or enteral route for 24 hours and tolerating food or enteral feeds for 24 hours).  [x] The patient is hemodynamically stable for 24 hours (heart rate <100 beats per minute, systolic blood pressure >99 mm Hg, and respiratory rate <20 breaths per minute).  [x] The patient shows clinical improvement (afebrile for at least 24 hours and white blood cell count downtrending or normalized). Additionally, the patient must be non-neutropenic (absolute neutrophil count >500 cells/mm3).  [x] For antimicrobials, the patient has received IV therapy for at least 24 hours.    IV medication protonix will be changed to oral medication     Pharmacist's Name: Andrew Braun  Pharmacist's Extension:  [phone]

## 2022-08-03 NOTE — PLAN OF CARE
Problem: Adult Inpatient Plan of Care  Goal: Plan of Care Review  Outcome: Ongoing, Progressing     Problem: Adult Inpatient Plan of Care  Goal: Absence of Hospital-Acquired Illness or Injury  Outcome: Ongoing, Progressing     Problem: Adult Inpatient Plan of Care  Goal: Optimal Comfort and Wellbeing  Outcome: Ongoing, Progressing     Problem: Adult Inpatient Plan of Care  Goal: Readiness for Transition of Care  Outcome: Ongoing, Progressing     Problem: Infection  Goal: Absence of Infection Signs and Symptoms  Outcome: Ongoing, Progressing     Problem: Fall Injury Risk  Goal: Absence of Fall and Fall-Related Injury  Outcome: Ongoing, Progressing     Problem: Infection (Pneumonia)  Goal: Resolution of Infection Signs and Symptoms  Outcome: Ongoing, Progressing     Problem: Respiratory Compromise (Pneumonia)  Goal: Effective Oxygenation and Ventilation  Outcome: Ongoing, Progressing        Patient remains free from injury. Safety precautions maintained. No s/s of acute distress. Pain controlled per MD order.

## 2022-08-03 NOTE — DISCHARGE INSTRUCTIONS
Discharge Instructions    Hygiene and incision care:   You may shower but do not soak such as in a bathtub or pool. Your incisions are closed with staples which will be removed in office. Do not scrub your incisions, just allow warm soapy water to run over them.   If you develop fevers, worsening redness and/or pus-like drainage, call the office immediately.    Pain control:   You may take Tylenol (650 mg every 4 hours) and ibuprofen (600 mg every 6 hours) as well as alternate heat and cold packs for pain and swelling. Take ibuprofen with food as it can cause stomach upset and ulcers. Do not take ibuprofen if you have an increased risk of bleeding, history of stomach ulcers, are already taking an NSAID, or have kidney issues.   If taking narcotic pain medication, such as Norco (hydrocodone-acetaminophen) or Percocet (oxycodone-acetaminophen), do not drink alcohol or drive. Each Norco and Percocet tablet contains 325 mg of Tylenol; do not take more than 4000 mg of Tylenol per day. Narcotic pain medications can be constipating so be sure to drink plenty of water and take an over the counter stool softener such as colace (100 mg twice a day) or miralax (17 g once a day).    Activity:   No heavy lifting >10 lbs for 6 weeks while your incisions are healing as it might result in a hernia. Avoid straining, pushing, and pulling. It is okay to walk and slowly go up and down stairs.   Make sure to do leg and ankle exercises and take deep breaths frequently to avoid developing blood clots or pneumonia.    Diet:   Remain on a soft, low fiber diet for 1 week. Be sure to eat good, nutritious foods such as vegetables and lean proteins to give your body the nutrients it needs to heal. I recommend also taking vitamin C as this can aid with wound healing.    For any questions or concerns, please do not hesitate to contact the office any time at [phone] or send me a [employer] message.

## 2022-08-04 ENCOUNTER — NURSE TRIAGE (OUTPATIENT)
Dept: ADMINISTRATIVE | Facility: CLINIC | Age: 57
End: 2022-08-04
Payer: MEDICARE

## 2022-08-04 LAB — BACTERIA BLD CULT: NORMAL

## 2022-08-04 NOTE — TELEPHONE ENCOUNTER
Post op day 1 tracker call -         Pt c/o strained belly, worsening pain but not severe, afebrile, 130/80 last bp, no swelling or drainage or blood or hematoma reported.   Post op protocol followed and pt advised to tx at home. Education provided on when to seek further medical care or call ooc. Pt invited to call ooc at [phone] at anytime to speak with a nurse. Alert and oriented, Pt agrees with above. Encounter routed to surgeon, Jo Manzano.    Additional Information   Negative: Major abdominal surgical incision and wound gaping open with visible internal organs   Negative: Sounds like a life-threatening emergency to the triager   Negative: Bleeding from incision and won't stop after 10 minutes of direct pressure   Negative: Widespread rash and bright red, sunburn-like   Negative: Severe pain in the incision   Negative: Incision gaping open and < 2 days (48 hours) since wound re-opened   Negative: Incision gaping open and length of opening > 2 inches (5 cm)   Negative: Patient sounds very sick or weak to the triager   Negative: Sounds like a serious complication to the triager   Negative: Fever > 100.4 F (38.0 C)   Negative: Incision looks infected (spreading redness, pain)   Negative: Red streak runs from the incision and longer than 1 inch (2.5 cm)   Negative: Pus or bad-smelling fluid draining from incision   Negative: Dressing soaked with blood or body fluid (e.g., drainage)   Negative: Raised bruise and size > 2 inches (5 cm) and expanding   Negative: Caller has URGENT question and triager unable to answer question   Negative: Incision gaping open and length of opening > 1/4 inch (6 mm) and on the face and over 2 days since wound re-opened   Negative: Incision gaping open and length of opening > 1/2 inch (1 cm) and over 2 days since wound re-opened   Negative: Clear or blood-tinged fluid draining from wound and no fever   Negative: Suture or staple removal is overdue    Negative: Patient wants to be seen   Negative: INCREASING pain in incision and over 2 days (48 hours) since surgery   Negative: Suture or staple came out early and caller wants wound checked   Negative: Caller has NON-URGENT question and triager unable to answer question   Negative: Pimple where a stitch comes through the skin    Protocols used: POST-OP INCISION SYMPTOMS AND [id number]

## 2022-08-05 LAB
BACTERIA BLD CULT: ABNORMAL

## 2022-08-06 LAB
BACTERIA BLD CULT: NORMAL
BACTERIA BLD CULT: NORMAL

## 2022-08-07 ENCOUNTER — PATIENT MESSAGE (OUTPATIENT)
Dept: SURGERY | Facility: CLINIC | Age: 57
End: 2022-08-07
Payer: MEDICARE

## 2022-08-08 RX ORDER — HYDROCODONE BITARTRATE AND ACETAMINOPHEN 7.5; 325 MG/1; MG/1
1 TABLET ORAL EVERY 6 HOURS PRN
Qty: 25 TABLET | Refills: 0 | Status: SHIPPED | OUTPATIENT
Start: 2022-08-08 | End: 2022-08-16 | Stop reason: SDUPTHER

## 2022-08-09 ENCOUNTER — NURSE TRIAGE (OUTPATIENT)
Dept: ADMINISTRATIVE | Facility: CLINIC | Age: 57
End: 2022-08-09
Payer: MEDICARE

## 2022-08-09 ENCOUNTER — TELEPHONE (OUTPATIENT)
Dept: SURGERY | Facility: CLINIC | Age: 57
End: 2022-08-09
Payer: MEDICARE

## 2022-08-09 ENCOUNTER — PATIENT MESSAGE (OUTPATIENT)
Dept: SURGERY | Facility: CLINIC | Age: 57
End: 2022-08-09
Payer: MEDICARE

## 2022-08-09 NOTE — TELEPHONE ENCOUNTER
Attempted to call patient back to report on my efforts.  Patient did not answer and there is no VMB set up.

## 2022-08-09 NOTE — TELEPHONE ENCOUNTER
Reason for Disposition   Severe pain in the incision    Additional Information   Negative: [1] Major abdominal surgical incision AND [2] wound gaping open AND [3] visible internal organs   Negative: Sounds like a life-threatening emergency to the triager   Negative: Patient has a Negative Pressure Wound Therapy device   Negative: Patient is followed by a wound clinic or wound specialist for this wound   Negative: [1] Bleeding from incision AND [2] won't stop after 10 minutes of direct pressure   Negative: [1] Widespread rash AND [2] bright red, sunburn-like    Protocols used: POST-OP INCISION SYMPTOMS AND [id number]

## 2022-08-09 NOTE — TELEPHONE ENCOUNTER
Called and spoke with patient about post operative concerns.Patient stated excessive amount of drainage and blood coming from incision starting last night. Patients states applying pressure to incision with guazes to control the drainage. States anytime she feels around the area her incision drains a white/tan, light red color. Also, stated 2 knots around the incision, but unable to describe size. Stated pain on a scale of 1-10 is at a 8-9, but has not taken any pain medications. States pain and drainage improves when sitting or lying still. Informed Dr. Manzano is out of town but I will send a message to her and call patient back. Patient verbalized understanding.    ----- Message from Jamie Anand sent at 8/9/2022  8:31 AM CDT -----  Contact: Brenda/Nurse On Call  [facility] Nurse On Call is calling to speak with the nurse for Dr Manzano's patient regarding current status and request of mutual patient. Reports also sending a message before office hours through secure chat. Reports patient is concerning and that patient was upset and attempting to reach doctor on call Dr Herron as soon as possible. Please give patient an urgent call back at [phone] as  high priority call back as requested.  Thanks,  RP/AB

## 2022-08-09 NOTE — TELEPHONE ENCOUNTER
Called patient to follow up on message received from Triage Nurse. States that she has already spoken with someone and has an appointment in the morning with Dianne Johnston PA-C. Advised her to keep that appointment but if she has another episode of the incision draining to seek treatment at the Emergency Room or Urgent Care. Understanding verbalized.

## 2022-08-09 NOTE — DISCHARGE SUMMARY
[facility] Surg  General Surgery  Discharge Summary      Patient Name: Genny Calixto  MRN: [id number]  Admission Date: 7/25/2022  Hospital Length of Stay: 7 days  Discharge Date and Time: 8/3/2022 11:53 AM  Attending Physician: No att. providers found   Discharging Provider: Jo Manzano DO  Primary Care Provider: Jo Manzano DO    HPI:   No notes on file    Procedure(s) (LRB):  LAPAROSCOPY, DIAGNOSTIC (N/A)  LYSIS, ADHESIONS (N/A)  LAPAROTOMY, EXPLORATORY (N/A)  BLOCK, TRANSVERSUS ABDOMINIS PLANE (N/A)  EXCISION, CECUM WITH ILEUM      Indwelling Lines/Drains at time of discharge:   Lines/Drains/Airways     None               Hospital Course: 7/25/22 patient presented for outpatient due to chronic abdominal pain and repeated small-bowel obstruction, likely from abdominal adhesions formed from her multiple prior surgeries.  She underwent a diagnostic laparoscopy, laparotomy, extensive lysis of adhesions, resection of a prior anastomotic stricture.    Patient had an expected postoperative ileus.    7/28/22 Abdominal xray demonstrates postoperative ileus. NG tube, NPO, IV fluids.    7/29/22 Nausea much improved. Having hypoxic episodes--xray demonstrates pneumonia. HM consulted. CTA obtained to r/o PE which was negative. Antibiotics initiated.    07/30/2022 ultrasound and CTA negative for DVT and PE, NG tube with continued output, minimal flatus/remains distended    07/31/2022 high bilious output from ngt, passing some flatus, pain moderately controlled    Chest x-ray demonstrated aspiration pneumonia.  Blood cultures revealed Gram-negative bacteremia.  Hospital medicine was consulted and the patient was started on antibiotics.    Patient began having significant bowel function including flatus and bowel movements.  The NG tube was removed.  She was started on liquids and advanced as tolerated.  Her pain was well controlled with oral analgesia.  Vital signs are stable.  Blood work was acceptable.   Hospital medicine recommended discharge on Levaquin.  She was discharged in good condition.          Goals of Care Treatment Preferences:  Code Status: Full Code      Consults:   Consults (From admission, onward)        Status Ordering Provider     Inpatient consult to Hospitalist  Once        Provider:  (Not yet assigned)    Completed JO MANZANO          Significant Diagnostic Studies:     Pending Diagnostic Studies:     None        Final Active Diagnoses:    Diagnosis Date Noted POA    PRINCIPAL PROBLEM:  SBO (small bowel obstruction) [K56.609] 05/31/2022 Yes    Hospital-acquired pneumonia [J18.9, Y95] 07/29/2022 Yes    Acute hypoxemic respiratory failure- Resolved  [J96.01] 07/29/2022 Yes    Gram-negative bacteremia [R78.81] 09/30/2021 Yes      Problems Resolved During this Admission:      Discharged Condition: good    Disposition: Home or Self Care    Follow Up:   Follow-up Information     Jo Manzano, DO Follow up on 8/16/2022.    Specialty: General Surgery  Why: For suture removal  Contact information:  [address]  [place] [address] [phone]                       Patient Instructions:   No discharge procedures on file.  Medications:  Reconciled Home Medications:      Medication List      START taking these medications    levoFLOXacin 750 MG tablet  Commonly known as: LEVAQUIN  Take 1 tablet (750 mg total) by mouth once daily. for 10 days        CHANGE how you take these medications    promethazine 25 MG tablet  Commonly known as: PHENERGAN  Take 1 tablet (25 mg total) by mouth every 4 (four) hours as needed for Nausea.  What changed:   · when to take this  · reasons to take this     spironolactone 50 MG tablet  Commonly known as: ALDACTONE  TAKE 1 TABLET BY MOUTH ONCE DAILY THEN INCREASE TO 2 TABLETS DAILY AS TOLERATED  What changed:   · how much to take  · how to take this  · when to take this  · additional instructions        CONTINUE taking these medications     cyclobenzaprine 10 MG tablet  Commonly known as: FLEXERIL  Take 10 mg by mouth nightly.     * diazePAM 5 MG tablet  Commonly known as: VALIUM  Take one with onset of migraine     * diazePAM 10 MG Tab  Commonly known as: VALIUM  TAKE 1 TABLET BY MOUTH EVERY DAY AS NEEDED     diphenhydrAMINE 25 mg capsule  Commonly known as: BENADRYL  Take 25 mg by mouth 2 (two) times a day.     docusate sodium 100 MG capsule  Commonly known as: COLACE  Take 1 capsule (100 mg total) by mouth 2 (two) times daily.     doxepin 10 MG capsule  Commonly known as: SINEQUAN  TAKE 1 CAPSULE (10 MG TOTAL) BY MOUTH EVERY EVENING.     LINZESS 290 mcg Cap capsule  Generic drug: linaCLOtide  TAKE 1 CAPSULE (290 MCG TOTAL) BY MOUTH BEFORE BREAKFAST. FOR 30 DOSES     loratadine 10 mg tablet  Commonly known as: CLARITIN  Take 10 mg by mouth daily     methocarbamoL 500 MG Tab  Commonly known as: ROBAXIN  TAKE 1 TABLET (500 MG TOTAL) BY MOUTH 4 (FOUR) TIMES DAILY     multivitamin capsule  Take 1 capsule by mouth once daily.     ondansetron 4 MG tablet  Commonly known as: ZOFRAN  Take 1 tablet (4 mg total) by mouth 2 (two) times daily.     pantoprazole 40 MG tablet  Commonly known as: PROTONIX  TAKE 1 TABLET BY MOUTH EVERY DAY     PROBIOTIC COMPLEX ORAL  Take by mouth once daily at 6am.     pseudoephedrine 120 mg 12 hr tablet  Commonly known as: SUDAFED  Take 120 mg by mouth once daily.     sumatriptan 100 MG tablet  Commonly known as: IMITREX  TAKE 1 TABLET BY MOUTH IF NEEDED, MAY REPEAT ONCE IN 1 HOUR. MAX OF 2 TABLETS IN 24 HOURS         * This list has 2 medication(s) that are the same as other medications prescribed for you. Read the directions carefully, and ask your doctor or other care provider to review them with you.            STOP taking these medications    HYDROcodone-acetaminophen 7.5-325 mg per tablet  Commonly known as: NORCO          Time spent on the discharge of patient: 15 minutes    Jo Manzano, DO  General Surgery  [facility] -  Med Surg

## 2022-08-10 ENCOUNTER — OFFICE VISIT (OUTPATIENT)
Dept: SURGERY | Facility: CLINIC | Age: 57
End: 2022-08-10
Payer: MEDICARE

## 2022-08-10 VITALS
SYSTOLIC BLOOD PRESSURE: 116 MMHG | BODY MASS INDEX: 22.08 KG/M2 | DIASTOLIC BLOOD PRESSURE: 71 MMHG | HEART RATE: 101 BPM | TEMPERATURE: 98 F | WEIGHT: 141 LBS

## 2022-08-10 DIAGNOSIS — K56.609 SBO (SMALL BOWEL OBSTRUCTION): Primary | ICD-10-CM

## 2022-08-10 PROCEDURE — 99213 OFFICE O/P EST LOW 20 MIN: CPT | Mod: PBBFAC

## 2022-08-10 PROCEDURE — 99999 PR PBB SHADOW E&M-EST. PATIENT-LVL III: CPT | Mod: PBBFAC,,,

## 2022-08-10 PROCEDURE — 99024 POSTOP FOLLOW-UP VISIT: CPT | Mod: POP,,,

## 2022-08-10 PROCEDURE — 99999 PR PBB SHADOW E&M-EST. PATIENT-LVL III: ICD-10-PCS | Mod: PBBFAC,,,

## 2022-08-10 PROCEDURE — 99024 PR POST-OP FOLLOW-UP VISIT: ICD-10-PCS | Mod: POP,,,

## 2022-08-10 NOTE — PROGRESS NOTES
"[facility] -   General Surgery Post-operative Evaluation    SUBJECTIVE:     History of Present Illness:  Patient is a 57 y.o. female presents from the hospital due to recurrent small bowel obstructions. She is frustrated that this keeps happening and believes it is due to possible adhesions from her many prior surgeries or even possibly a bowel stricture. She is concerned that another obstruction is soon impending due to more frequent episodes of abdominal cramping after eating.    Interval history:  Since the last clinic visit, the patient underwent exploratory laparotomy. She presents for a wound check. She had increasing erythema and firmness around the lower portion of her incision starting Monday. The area started to drain Monday night and has felt better since but is still draining. She reports running low grade fevers. Her abdominal pain is well-controlled. She denies nausea and vomiting. She is having frequent loose bowel movements. She I currently on Levaquin.      Review of patient's allergies indicates:   Allergen Reactions    Erythromycin Other (See Comments)     Stomach cramps    Morphine Nausea And Vomiting     "Projectile vomiting"       Current Outpatient Medications   Medication Sig Dispense Refill    cyclobenzaprine (FLEXERIL) 10 MG tablet Take 10 mg by mouth nightly.      diazePAM (VALIUM) 10 MG Tab TAKE 1 TABLET BY MOUTH EVERY DAY AS NEEDED 90 tablet 1    diazePAM (VALIUM) 5 MG tablet Take one with onset of migraine 20 tablet 5    diphenhydrAMINE (BENADRYL) 25 mg capsule Take 25 mg by mouth 2 (two) times a day.      docusate sodium (COLACE) 100 MG capsule Take 1 capsule (100 mg total) by mouth 2 (two) times daily. 60 capsule 1    doxepin (SINEQUAN) 10 MG capsule TAKE 1 CAPSULE (10 MG TOTAL) BY MOUTH EVERY EVENING. 90 capsule 3    HYDROcodone-acetaminophen (NORCO) 7.5-325 mg per tablet Take 1 tablet by mouth every 6 (six) hours as needed for Pain. 25 tablet 0    " LACTOBACILLUS COMBO NO.6 (PROBIOTIC COMPLEX ORAL) Take by mouth once daily at 6am.      levoFLOXacin (LEVAQUIN) 750 MG tablet Take 1 tablet (750 mg total) by mouth once daily. for 10 days 10 tablet 0    LINZESS 290 mcg Cap capsule TAKE 1 CAPSULE (290 MCG TOTAL) BY MOUTH BEFORE BREAKFAST. FOR 30 DOSES 30 capsule 0    loratadine (CLARITIN) 10 mg tablet Take 10 mg by mouth daily      methocarbamoL (ROBAXIN) 500 MG Tab TAKE 1 TABLET (500 MG TOTAL) BY MOUTH 4 (FOUR) TIMES DAILY 180 tablet 3    multivitamin capsule Take 1 capsule by mouth once daily.      ondansetron (ZOFRAN) 4 MG tablet Take 1 tablet (4 mg total) by mouth 2 (two) times daily. 30 tablet 0    pantoprazole (PROTONIX) 40 MG tablet TAKE 1 TABLET BY MOUTH EVERY DAY 90 tablet 2    promethazine (PHENERGAN) 25 MG tablet Take 1 tablet (25 mg total) by mouth every 4 (four) hours as needed for Nausea. 25 tablet 1    pseudoephedrine (SUDAFED) 120 mg 12 hr tablet Take 120 mg by mouth once daily.      spironolactone (ALDACTONE) 50 MG tablet TAKE 1 TABLET BY MOUTH ONCE DAILY THEN INCREASE TO 2 TABLETS DAILY AS TOLERATED (Patient taking differently: Take 50 mg by mouth 2 (two) times daily.) 180 tablet 1    sumatriptan (IMITREX) 100 MG tablet TAKE 1 TABLET BY MOUTH IF NEEDED, MAY REPEAT ONCE IN 1 HOUR. MAX OF 2 TABLETS IN 24 HOURS 10 tablet 2     No current facility-administered medications for this visit.       Past Medical History:   Diagnosis Date    Encounter for blood transfusion     KENN (generalized anxiety disorder)     Takes 5-10 mg of valium qd prn anxiety (prescriptions for both on file, one from [facility] & other from )    Insomnia     Takes 5-10 mg of valium qhs prn insomnia (prescriptions for both on file, one from [facility] & other from )    Migraine headache     Nephrolithiasis 08/03/2019    Left ureteral stone -> UTI c/b pyelonephritis and urosepsis w/ hypokalemia -> transfered to [facility] urology service from [facility] after CT abn dx, s/p 2  stents to allow infx to drain, IV Vanc/Rocephin, fever free since yesterday    Reflex sympathetic dystrophy     UTI (urinary tract infection)     Vertigo      Past Surgical History:   Procedure Laterality Date    BLADDER SURGERY      CHOLECYSTECTOMY      COLONOSCOPY N/A 11/10/2021    Procedure: COLONOSCOPY;  Surgeon: Eryn Rothman MD;  Location: [facility];  Service: Endoscopy;  Laterality: N/A;    CYSTOSCOPY WITH URETEROSCOPY, RETROGRADE PYELOGRAPHY, AND INSERTION OF STENT Right 9/30/2021    Procedure: CYSTOSCOPY, WITH RETROGRADE PYELOGRAM AND URETERAL STENT INSERTION;  Surgeon: Annita Gamino MD;  Location: [facility];  Service: Urology;  Laterality: Right;    DIAGNOSTIC LAPAROSCOPY N/A 11/11/2020    Procedure: LAPAROSCOPY, DIAGNOSTIC;  Surgeon: Tee Segura MD;  Location: [facility];  Service: General;  Laterality: N/A;  CONVERTED TO OPEN    DIAGNOSTIC LAPAROSCOPY N/A 7/25/2022    Procedure: LAPAROSCOPY, DIAGNOSTIC;  Surgeon: Jo Manzano DO;  Location: [facility];  Service: General;  Laterality: N/A;  convert to open at 0739    ESOPHAGOGASTRODUODENOSCOPY N/A 11/10/2021    Procedure: EGD (ESOPHAGOGASTRODUODENOSCOPY);  Surgeon: Eryn Rothman MD;  Location: [facility];  Service: Endoscopy;  Laterality: N/A;    facet injections  02/14/2017    L3, L4, L5, S1 Done by Dr. Lara    HYSTERECTOMY      INJECTION OF ANESTHETIC AGENT INTO TISSUE PLANE DEFINED BY TRANSVERSUS ABDOMINIS MUSCLE N/A 11/11/2020    Procedure: BLOCK, TRANSVERSUS ABDOMINIS PLANE;  Surgeon: Tee Segura MD;  Location: [facility];  Service: General;  Laterality: N/A;    INJECTION OF ANESTHETIC AGENT INTO TISSUE PLANE DEFINED BY TRANSVERSUS ABDOMINIS MUSCLE N/A 7/25/2022    Procedure: BLOCK, TRANSVERSUS ABDOMINIS PLANE;  Surgeon: Jo Manzano DO;  Location: [facility];  Service: General;  Laterality: N/A;    KNEE SURGERY      LAPAROSCOPIC LYSIS OF ADHESIONS N/A 11/11/2020    Procedure: LYSIS, ADHESIONS,  LAPAROSCOPIC;  Surgeon: Tee Segura MD;  Location: [facility];  Service: General;  Laterality: N/A;  CONVERTED TO OPEN    LAPAROTOMY N/A 11/20/2020    Procedure: LAPAROTOMY;  Surgeon: Tee Segura MD;  Location: [facility] OR;  Service: General;  Laterality: N/A;    LASER LITHOTRIPSY Left 2/8/2021    Procedure: LITHOTRIPSY, USING LASER;  Surgeon: Irving Kidd MD;  Location: [facility] OR;  Service: Urology;  Laterality: Left;    LASER LITHOTRIPSY Right 10/13/2021    Procedure: LITHOTRIPSY, USING LASER;  Surgeon: Annita Gamino MD;  Location: [facility] OR;  Service: Urology;  Laterality: Right;    LYSIS OF ADHESIONS N/A 11/11/2020    Procedure: LYSIS, ADHESIONS;  Surgeon: Tee Segura MD;  Location: [facility] OR;  Service: General;  Laterality: N/A;    LYSIS OF ADHESIONS N/A 11/20/2020    Procedure: LYSIS, ADHESIONS;  Surgeon: Tee Segura MD;  Location: [facility] OR;  Service: General;  Laterality: N/A;    LYSIS OF ADHESIONS N/A 7/25/2022    Procedure: LYSIS, ADHESIONS;  Surgeon: Jo Manzano DO;  Location: [facility] OR;  Service: General;  Laterality: N/A;    SURGICAL REMOVAL OF ILEUM WITH CECUM  7/25/2022    Procedure: EXCISION, CECUM WITH ILEUM;  Surgeon: Jo Manzano DO;  Location: [facility] OR;  Service: General;;    TONSILLECTOMY      URETEROSCOPY Left 2/8/2021    Procedure: URETEROSCOPY;  Surgeon: Irving Kidd MD;  Location: [facility] OR;  Service: Urology;  Laterality: Left;  stent placement    URETEROSCOPY Right 10/13/2021    Procedure: URETEROSCOPY;  Surgeon: Annita Gamino MD;  Location: [facility] OR;  Service: Urology;  Laterality: Right;     Family History   Problem Relation Age of Onset    Stroke Mother     Hypertension Mother     COPD Father     Drug abuse Brother      Social History     Tobacco Use    Smoking status: Never Smoker    Smokeless tobacco: Never Used   Substance Use Topics    Alcohol use: Yes     Comment: rarely    Drug use: No        Review of  Systems:  Review of Systems   Constitutional: Negative for chills, fatigue, fever and unexpected weight change.   Respiratory: Negative for cough, shortness of breath, wheezing and stridor.    Cardiovascular: Negative for chest pain, palpitations and leg swelling.   Gastrointestinal: Negative for abdominal distention, abdominal pain, constipation, diarrhea, nausea and vomiting.   Genitourinary: Negative for difficulty urinating, dysuria, frequency, hematuria and urgency.   Skin: Negative for color change, pallor, rash and wound.        Drainage from midline abdominal incision   Hematological: Does not bruise/bleed easily.       OBJECTIVE:     Vital Signs (Most Recent)  Temp: 97.6 °F (36.4 °C) (08/10/22 1042)  Pulse: 101 (08/10/22 1042)  BP: 116/71 (08/10/22 1042)     64 kg (141 lb)     Physical Exam:  Physical Exam  Vitals reviewed.   Constitutional:       General: She is not in acute distress.     Appearance: She is well-developed. She is not ill-appearing.   HENT:      Head: Normocephalic and atraumatic.      Right Ear: External ear normal.      Left Ear: External ear normal.   Eyes:      Extraocular Movements: Extraocular movements intact.      Conjunctiva/sclera: Conjunctivae normal.   Cardiovascular:      Rate and Rhythm: Normal rate.   Pulmonary:      Effort: Pulmonary effort is normal. No respiratory distress.   Abdominal:      Comments: Abdomen is soft, non-tender. Staples remain in place in midline abdominal incision. Moderate amount of slightly cloudy light brown drainage from lower/middle portion of the incision. Minimal surrounding induration or erythema.   Musculoskeletal:      Cervical back: Neck supple.   Skin:     General: Skin is warm and dry.   Neurological:      Mental Status: She is alert and oriented to person, place, and time.   Psychiatric:         Behavior: Behavior normal.         Laboratory      Diagnostic Results:  Small bowel follow through during last admission demonstrated normal  transit time of contrast to the colon.    ASSESSMENT/PLAN:     58 y/o female s/p exploratory laparotomy with drainage from surgical incision.    - Complete course of antibiotics.  - Local wound care as needed.  - No further intervention needed at this time.  - RTC as previously scheduled.    Dianne Johnston PA-C  [facility]

## 2022-08-14 ENCOUNTER — PATIENT MESSAGE (OUTPATIENT)
Dept: SURGERY | Facility: CLINIC | Age: 57
End: 2022-08-14
Payer: MEDICARE

## 2022-08-14 DIAGNOSIS — R19.7 DIARRHEA, UNSPECIFIED TYPE: Primary | ICD-10-CM

## 2022-08-15 ENCOUNTER — TELEPHONE (OUTPATIENT)
Dept: SURGERY | Facility: CLINIC | Age: 57
End: 2022-08-15
Payer: MEDICARE

## 2022-08-15 ENCOUNTER — PATIENT MESSAGE (OUTPATIENT)
Dept: SURGERY | Facility: CLINIC | Age: 57
End: 2022-08-15
Payer: MEDICARE

## 2022-08-15 DIAGNOSIS — K56.609 SBO (SMALL BOWEL OBSTRUCTION): ICD-10-CM

## 2022-08-15 RX ORDER — AMOXICILLIN AND CLAVULANATE POTASSIUM 875; 125 MG/1; MG/1
1 TABLET, FILM COATED ORAL EVERY 12 HOURS
Qty: 20 TABLET | Refills: 0 | Status: SHIPPED | OUTPATIENT
Start: 2022-08-15 | End: 2022-08-25

## 2022-08-15 RX ORDER — PROMETHAZINE HYDROCHLORIDE 25 MG/1
25 TABLET ORAL EVERY 4 HOURS PRN
Qty: 25 TABLET | Refills: 1 | Status: SHIPPED | OUTPATIENT
Start: 2022-08-15 | End: 2022-08-15

## 2022-08-15 RX ORDER — AMOXICILLIN AND CLAVULANATE POTASSIUM 875; 125 MG/1; MG/1
1 TABLET, FILM COATED ORAL EVERY 12 HOURS
Qty: 20 TABLET | Refills: 0 | Status: SHIPPED | OUTPATIENT
Start: 2022-08-15 | End: 2022-08-15

## 2022-08-15 RX ORDER — PROMETHAZINE HYDROCHLORIDE 25 MG/1
TABLET ORAL
Qty: 25 TABLET | Refills: 0 | Status: SHIPPED | OUTPATIENT
Start: 2022-08-15 | End: 2022-08-19 | Stop reason: SDUPTHER

## 2022-08-15 NOTE — TELEPHONE ENCOUNTER
Called pt, notified pt if she will be able to  C. Dif bag at the [facility], pt would like to know if she can do it at the appointment with Dr. Manzano tomorrow 8/16 at the [facility], pt states she would have difficulties arranging transportation at this time of day and that she believes it is not C. Diff due to no symptoms (pt's had C. Diff multiple times in the past), notified pt that tentatively will do test tomorrow but will call pt again if Dr. Manzano would like for pt to do it today, will forward message to Dr. Manzano, pt verbalized understanding.

## 2022-08-16 ENCOUNTER — LAB VISIT (OUTPATIENT)
Dept: LAB | Facility: HOSPITAL | Age: 57
End: 2022-08-16
Attending: SURGERY
Payer: MEDICARE

## 2022-08-16 DIAGNOSIS — R19.7 DIARRHEA, UNSPECIFIED TYPE: ICD-10-CM

## 2022-08-16 PROCEDURE — 87449 NOS EACH ORGANISM AG IA: CPT | Performed by: SURGERY

## 2022-08-16 RX ORDER — HYDROCODONE BITARTRATE AND ACETAMINOPHEN 7.5; 325 MG/1; MG/1
1 TABLET ORAL EVERY 6 HOURS PRN
Qty: 25 TABLET | Refills: 0 | Status: SHIPPED | OUTPATIENT
Start: 2022-08-16 | End: 2022-08-19

## 2022-08-17 LAB
C DIFF GDH STL QL: NEGATIVE
C DIFF TOX A+B STL QL IA: NEGATIVE

## 2022-08-19 ENCOUNTER — OFFICE VISIT (OUTPATIENT)
Dept: SURGERY | Facility: CLINIC | Age: 57
End: 2022-08-19
Payer: MEDICARE

## 2022-08-19 ENCOUNTER — PATIENT MESSAGE (OUTPATIENT)
Dept: SURGERY | Facility: CLINIC | Age: 57
End: 2022-08-19
Payer: MEDICARE

## 2022-08-19 VITALS — BODY MASS INDEX: 23.89 KG/M2 | TEMPERATURE: 97 F | WEIGHT: 152.56 LBS

## 2022-08-19 DIAGNOSIS — K56.609 SBO (SMALL BOWEL OBSTRUCTION): ICD-10-CM

## 2022-08-19 PROCEDURE — 99024 POSTOP FOLLOW-UP VISIT: CPT | Mod: POP,,, | Performed by: SURGERY

## 2022-08-19 PROCEDURE — 99024 PR POST-OP FOLLOW-UP VISIT: ICD-10-PCS | Mod: POP,,, | Performed by: SURGERY

## 2022-08-19 PROCEDURE — 99213 OFFICE O/P EST LOW 20 MIN: CPT | Mod: PBBFAC | Performed by: SURGERY

## 2022-08-19 PROCEDURE — 99999 PR PBB SHADOW E&M-EST. PATIENT-LVL III: ICD-10-PCS | Mod: PBBFAC,,, | Performed by: SURGERY

## 2022-08-19 PROCEDURE — 99999 PR PBB SHADOW E&M-EST. PATIENT-LVL III: CPT | Mod: PBBFAC,,, | Performed by: SURGERY

## 2022-08-19 RX ORDER — HYDROCODONE BITARTRATE AND ACETAMINOPHEN 5; 325 MG/1; MG/1
1 TABLET ORAL EVERY 6 HOURS PRN
Qty: 25 TABLET | Refills: 0 | Status: SHIPPED | OUTPATIENT
Start: 2022-08-19 | End: 2022-09-07 | Stop reason: SDUPTHER

## 2022-08-19 RX ORDER — PROMETHAZINE HYDROCHLORIDE 25 MG/1
25 TABLET ORAL EVERY 4 HOURS
Qty: 25 TABLET | Refills: 0 | Status: SHIPPED | OUTPATIENT
Start: 2022-08-19 | End: 2022-08-25

## 2022-08-22 ENCOUNTER — PATIENT MESSAGE (OUTPATIENT)
Dept: GASTROENTEROLOGY | Facility: CLINIC | Age: 57
End: 2022-08-22
Payer: MEDICARE

## 2022-08-22 ENCOUNTER — PATIENT MESSAGE (OUTPATIENT)
Dept: INTERNAL MEDICINE | Facility: CLINIC | Age: 57
End: 2022-08-22
Payer: MEDICARE

## 2022-08-22 ENCOUNTER — PATIENT MESSAGE (OUTPATIENT)
Dept: SURGERY | Facility: CLINIC | Age: 57
End: 2022-08-22
Payer: MEDICARE

## 2022-08-29 ENCOUNTER — PATIENT MESSAGE (OUTPATIENT)
Dept: SURGERY | Facility: CLINIC | Age: 57
End: 2022-08-29
Payer: MEDICARE

## 2022-08-29 ENCOUNTER — LAB VISIT (OUTPATIENT)
Dept: LAB | Facility: HOSPITAL | Age: 57
End: 2022-08-29
Attending: SURGERY
Payer: MEDICARE

## 2022-08-29 DIAGNOSIS — R53.83 FATIGUE, UNSPECIFIED TYPE: ICD-10-CM

## 2022-08-29 DIAGNOSIS — E46 PROTEIN-CALORIE MALNUTRITION, UNSPECIFIED SEVERITY: ICD-10-CM

## 2022-08-29 DIAGNOSIS — E44.1 MILD PROTEIN-CALORIE MALNUTRITION: ICD-10-CM

## 2022-08-29 DIAGNOSIS — R53.83 FATIGUE, UNSPECIFIED TYPE: Primary | ICD-10-CM

## 2022-08-29 LAB
ALBUMIN SERPL BCP-MCNC: 3.6 G/DL (ref 3.5–5.2)
ALP SERPL-CCNC: 69 U/L (ref 55–135)
ALT SERPL W/O P-5'-P-CCNC: 9 U/L (ref 10–44)
ANION GAP SERPL CALC-SCNC: 11 MMOL/L (ref 8–16)
AST SERPL-CCNC: 15 U/L (ref 10–40)
BASOPHILS # BLD AUTO: 0.05 K/UL (ref 0–0.2)
BASOPHILS NFR BLD: 0.9 % (ref 0–1.9)
BILIRUB SERPL-MCNC: 1.2 MG/DL (ref 0.1–1)
BUN SERPL-MCNC: 9 MG/DL (ref 6–20)
CALCIUM SERPL-MCNC: 9.2 MG/DL (ref 8.7–10.5)
CHLORIDE SERPL-SCNC: 104 MMOL/L (ref 95–110)
CO2 SERPL-SCNC: 25 MMOL/L (ref 23–29)
CREAT SERPL-MCNC: 0.6 MG/DL (ref 0.5–1.4)
DIFFERENTIAL METHOD: ABNORMAL
EOSINOPHIL # BLD AUTO: 0.1 K/UL (ref 0–0.5)
EOSINOPHIL NFR BLD: 1.2 % (ref 0–8)
ERYTHROCYTE [DISTWIDTH] IN BLOOD BY AUTOMATED COUNT: 13.5 % (ref 11.5–14.5)
EST. GFR  (NO RACE VARIABLE): >60 ML/MIN/1.73 M^2
GLUCOSE SERPL-MCNC: 114 MG/DL (ref 70–110)
HCT VFR BLD AUTO: 37.5 % (ref 37–48.5)
HGB BLD-MCNC: 12.2 G/DL (ref 12–16)
IMM GRANULOCYTES # BLD AUTO: 0.02 K/UL (ref 0–0.04)
IMM GRANULOCYTES NFR BLD AUTO: 0.3 % (ref 0–0.5)
IRON SERPL-MCNC: 137 UG/DL (ref 30–160)
LYMPHOCYTES # BLD AUTO: 1.5 K/UL (ref 1–4.8)
LYMPHOCYTES NFR BLD: 26.2 % (ref 18–48)
MAGNESIUM SERPL-MCNC: 1.7 MG/DL (ref 1.6–2.6)
MCH RBC QN AUTO: 32.9 PG (ref 27–31)
MCHC RBC AUTO-ENTMCNC: 32.5 G/DL (ref 32–36)
MCV RBC AUTO: 101 FL (ref 82–98)
MONOCYTES # BLD AUTO: 0.3 K/UL (ref 0.3–1)
MONOCYTES NFR BLD: 5.8 % (ref 4–15)
NEUTROPHILS # BLD AUTO: 3.9 K/UL (ref 1.8–7.7)
NEUTROPHILS NFR BLD: 65.6 % (ref 38–73)
NRBC BLD-RTO: 0 /100 WBC
PLATELET # BLD AUTO: 301 K/UL (ref 150–450)
PMV BLD AUTO: 11.4 FL (ref 9.2–12.9)
POTASSIUM SERPL-SCNC: 3.2 MMOL/L (ref 3.5–5.1)
PROT SERPL-MCNC: 6.7 G/DL (ref 6–8.4)
RBC # BLD AUTO: 3.71 M/UL (ref 4–5.4)
SATURATED IRON: 35 % (ref 20–50)
SODIUM SERPL-SCNC: 140 MMOL/L (ref 136–145)
TOTAL IRON BINDING CAPACITY: 394 UG/DL (ref 250–450)
TRANSFERRIN SERPL-MCNC: 266 MG/DL (ref 200–375)
WBC # BLD AUTO: 5.87 K/UL (ref 3.9–12.7)

## 2022-08-29 PROCEDURE — 36415 COLL VENOUS BLD VENIPUNCTURE: CPT | Performed by: SURGERY

## 2022-08-29 PROCEDURE — 83735 ASSAY OF MAGNESIUM: CPT | Performed by: SURGERY

## 2022-08-29 PROCEDURE — 80053 COMPREHEN METABOLIC PANEL: CPT | Performed by: SURGERY

## 2022-08-29 PROCEDURE — 84439 ASSAY OF FREE THYROXINE: CPT | Performed by: SURGERY

## 2022-08-29 PROCEDURE — 84466 ASSAY OF TRANSFERRIN: CPT | Performed by: SURGERY

## 2022-08-29 PROCEDURE — 82607 VITAMIN B-12: CPT | Performed by: SURGERY

## 2022-08-29 PROCEDURE — 84443 ASSAY THYROID STIM HORMONE: CPT | Performed by: SURGERY

## 2022-08-29 PROCEDURE — 85025 COMPLETE CBC W/AUTO DIFF WBC: CPT | Performed by: SURGERY

## 2022-08-30 ENCOUNTER — OFFICE VISIT (OUTPATIENT)
Dept: SURGERY | Facility: CLINIC | Age: 57
End: 2022-08-30
Payer: MEDICARE

## 2022-08-30 VITALS
HEART RATE: 100 BPM | DIASTOLIC BLOOD PRESSURE: 93 MMHG | TEMPERATURE: 98 F | SYSTOLIC BLOOD PRESSURE: 146 MMHG | WEIGHT: 151.25 LBS | BODY MASS INDEX: 23.69 KG/M2

## 2022-08-30 DIAGNOSIS — K56.609 SBO (SMALL BOWEL OBSTRUCTION): ICD-10-CM

## 2022-08-30 LAB
T4 FREE SERPL-MCNC: 0.98 NG/DL (ref 0.71–1.51)
TSH SERPL DL<=0.005 MIU/L-ACNC: 0.52 UIU/ML (ref 0.4–4)
VIT B12 SERPL-MCNC: 368 PG/ML (ref 210–950)

## 2022-08-30 PROCEDURE — 99999 PR PBB SHADOW E&M-EST. PATIENT-LVL III: ICD-10-PCS | Mod: PBBFAC,,, | Performed by: SURGERY

## 2022-08-30 PROCEDURE — 99024 POSTOP FOLLOW-UP VISIT: CPT | Mod: POP,,, | Performed by: SURGERY

## 2022-08-30 PROCEDURE — 99213 OFFICE O/P EST LOW 20 MIN: CPT | Mod: PBBFAC | Performed by: SURGERY

## 2022-08-30 PROCEDURE — 99024 PR POST-OP FOLLOW-UP VISIT: ICD-10-PCS | Mod: POP,,, | Performed by: SURGERY

## 2022-08-30 PROCEDURE — 99999 PR PBB SHADOW E&M-EST. PATIENT-LVL III: CPT | Mod: PBBFAC,,, | Performed by: SURGERY

## 2022-08-30 RX ORDER — TEMAZEPAM 30 MG/1
30 CAPSULE ORAL NIGHTLY PRN
Qty: 30 CAPSULE | Refills: 0 | Status: SHIPPED | OUTPATIENT
Start: 2022-08-30 | End: 2022-09-29

## 2022-08-30 RX ORDER — PROMETHAZINE HYDROCHLORIDE 25 MG/1
25 TABLET ORAL EVERY 4 HOURS PRN
Qty: 25 TABLET | Refills: 2 | Status: SHIPPED | OUTPATIENT
Start: 2022-08-30 | End: 2022-12-06 | Stop reason: SDUPTHER

## 2022-09-06 ENCOUNTER — PATIENT MESSAGE (OUTPATIENT)
Dept: SURGERY | Facility: CLINIC | Age: 57
End: 2022-09-06
Payer: MEDICARE

## 2022-09-07 ENCOUNTER — PATIENT MESSAGE (OUTPATIENT)
Dept: SURGERY | Facility: CLINIC | Age: 57
End: 2022-09-07
Payer: MEDICARE

## 2022-09-07 RX ORDER — HYDROCODONE BITARTRATE AND ACETAMINOPHEN 5; 325 MG/1; MG/1
1 TABLET ORAL EVERY 6 HOURS PRN
Qty: 15 TABLET | Refills: 0 | OUTPATIENT
Start: 2022-09-07 | End: 2023-02-01

## 2022-09-08 ENCOUNTER — PATIENT MESSAGE (OUTPATIENT)
Dept: SURGERY | Facility: CLINIC | Age: 57
End: 2022-09-08
Payer: MEDICARE

## 2022-09-09 ENCOUNTER — PATIENT MESSAGE (OUTPATIENT)
Dept: ADMINISTRATIVE | Facility: OTHER | Age: 57
End: 2022-09-09
Payer: MEDICARE

## 2022-09-13 ENCOUNTER — OFFICE VISIT (OUTPATIENT)
Dept: SURGERY | Facility: CLINIC | Age: 57
End: 2022-09-13
Payer: MEDICARE

## 2022-09-13 VITALS
SYSTOLIC BLOOD PRESSURE: 139 MMHG | BODY MASS INDEX: 23.69 KG/M2 | TEMPERATURE: 98 F | HEART RATE: 102 BPM | DIASTOLIC BLOOD PRESSURE: 90 MMHG | WEIGHT: 151.25 LBS

## 2022-09-13 DIAGNOSIS — K56.609 SBO (SMALL BOWEL OBSTRUCTION): Primary | ICD-10-CM

## 2022-09-13 PROCEDURE — 99024 PR POST-OP FOLLOW-UP VISIT: ICD-10-PCS | Mod: POP,,, | Performed by: SURGERY

## 2022-09-13 PROCEDURE — 99999 PR PBB SHADOW E&M-EST. PATIENT-LVL III: CPT | Mod: PBBFAC,,, | Performed by: SURGERY

## 2022-09-13 PROCEDURE — 99024 POSTOP FOLLOW-UP VISIT: CPT | Mod: POP,,, | Performed by: SURGERY

## 2022-09-13 PROCEDURE — 99213 OFFICE O/P EST LOW 20 MIN: CPT | Mod: PBBFAC | Performed by: SURGERY

## 2022-09-13 PROCEDURE — 99999 PR PBB SHADOW E&M-EST. PATIENT-LVL III: ICD-10-PCS | Mod: PBBFAC,,, | Performed by: SURGERY

## 2022-09-13 NOTE — PROGRESS NOTES
Patient states that she is doing well and is taking a huge turn for the better.  She states that when she asked for a refill on the hydrocodone not too long ago, it turns out she actually passed a kidney stone.  She now reports minimal to no abdominal discomfort. Denies fevers.  She has a good appetite.  She is taking magnesium and potassium supplements which has helped with her energy levels.  Her incision has completely healed at this point and there are no signs of infection.  I have cleared her for going back to the gym for light exercise and swimming in the pool.  Follow-up as needed.

## 2022-09-23 ENCOUNTER — PATIENT MESSAGE (OUTPATIENT)
Dept: SURGERY | Facility: CLINIC | Age: 57
End: 2022-09-23
Payer: MEDICARE

## 2022-09-24 ENCOUNTER — HOSPITAL ENCOUNTER (EMERGENCY)
Facility: HOSPITAL | Age: 57
Discharge: HOME | End: 2022-09-24
Attending: EMERGENCY MEDICINE
Payer: MEDICARE

## 2022-09-24 VITALS
BODY MASS INDEX: 23.39 KG/M2 | OXYGEN SATURATION: 98 % | RESPIRATION RATE: 19 BRPM | SYSTOLIC BLOOD PRESSURE: 118 MMHG | HEART RATE: 85 BPM | TEMPERATURE: 99 F | WEIGHT: 149.38 LBS | DIASTOLIC BLOOD PRESSURE: 71 MMHG

## 2022-09-24 DIAGNOSIS — R10.84 GENERALIZED ABDOMINAL PAIN: Primary | ICD-10-CM

## 2022-09-24 LAB
ALBUMIN SERPL BCP-MCNC: 4.3 G/DL (ref 3.5–5.2)
ALP SERPL-CCNC: 76 U/L (ref 55–135)
ALT SERPL W/O P-5'-P-CCNC: 18 U/L (ref 10–44)
ANION GAP SERPL CALC-SCNC: 13 MMOL/L (ref 8–16)
AST SERPL-CCNC: 22 U/L (ref 10–40)
BASOPHILS # BLD AUTO: 0.05 K/UL (ref 0–0.2)
BASOPHILS NFR BLD: 0.9 % (ref 0–1.9)
BILIRUB SERPL-MCNC: 1.4 MG/DL (ref 0.1–1)
BILIRUB UR QL STRIP: NEGATIVE
BUN SERPL-MCNC: 10 MG/DL (ref 6–20)
CALCIUM SERPL-MCNC: 9.7 MG/DL (ref 8.7–10.5)
CHLORIDE SERPL-SCNC: 105 MMOL/L (ref 95–110)
CLARITY UR: CLEAR
CO2 SERPL-SCNC: 24 MMOL/L (ref 23–29)
COLOR UR: COLORLESS
CREAT SERPL-MCNC: 0.7 MG/DL (ref 0.5–1.4)
DIFFERENTIAL METHOD: ABNORMAL
EOSINOPHIL # BLD AUTO: 0.1 K/UL (ref 0–0.5)
EOSINOPHIL NFR BLD: 1.7 % (ref 0–8)
ERYTHROCYTE [DISTWIDTH] IN BLOOD BY AUTOMATED COUNT: 12.8 % (ref 11.5–14.5)
EST. GFR  (NO RACE VARIABLE): >60 ML/MIN/1.73 M^2
GLUCOSE SERPL-MCNC: 86 MG/DL (ref 70–110)
GLUCOSE UR QL STRIP: NEGATIVE
HCT VFR BLD AUTO: 39.6 % (ref 37–48.5)
HCV AB SERPL QL IA: NEGATIVE
HEP C VIRUS HOLD SPECIMEN: NORMAL
HGB BLD-MCNC: 13.6 G/DL (ref 12–16)
HGB UR QL STRIP: NEGATIVE
HIV 1+2 AB+HIV1 P24 AG SERPL QL IA: NEGATIVE
IMM GRANULOCYTES # BLD AUTO: 0.01 K/UL (ref 0–0.04)
IMM GRANULOCYTES NFR BLD AUTO: 0.2 % (ref 0–0.5)
KETONES UR QL STRIP: NEGATIVE
LEUKOCYTE ESTERASE UR QL STRIP: NEGATIVE
LIPASE SERPL-CCNC: 27 U/L (ref 4–60)
LYMPHOCYTES # BLD AUTO: 2.2 K/UL (ref 1–4.8)
LYMPHOCYTES NFR BLD: 38.3 % (ref 18–48)
MCH RBC QN AUTO: 32.1 PG (ref 27–31)
MCHC RBC AUTO-ENTMCNC: 34.3 G/DL (ref 32–36)
MCV RBC AUTO: 93 FL (ref 82–98)
MONOCYTES # BLD AUTO: 0.3 K/UL (ref 0.3–1)
MONOCYTES NFR BLD: 5.6 % (ref 4–15)
NEUTROPHILS # BLD AUTO: 3.1 K/UL (ref 1.8–7.7)
NEUTROPHILS NFR BLD: 53.3 % (ref 38–73)
NITRITE UR QL STRIP: NEGATIVE
NRBC BLD-RTO: 0 /100 WBC
PH UR STRIP: 7 [PH] (ref 5–8)
PLATELET # BLD AUTO: 262 K/UL (ref 150–450)
PMV BLD AUTO: 10.7 FL (ref 9.2–12.9)
POTASSIUM SERPL-SCNC: 3.4 MMOL/L (ref 3.5–5.1)
PROT SERPL-MCNC: 7.1 G/DL (ref 6–8.4)
PROT UR QL STRIP: NEGATIVE
RBC # BLD AUTO: 4.24 M/UL (ref 4–5.4)
SODIUM SERPL-SCNC: 142 MMOL/L (ref 136–145)
SP GR UR STRIP: >1.03 (ref 1–1.03)
URN SPEC COLLECT METH UR: ABNORMAL
UROBILINOGEN UR STRIP-ACNC: NEGATIVE EU/DL
WBC # BLD AUTO: 5.74 K/UL (ref 3.9–12.7)

## 2022-09-24 PROCEDURE — 96365 THER/PROPH/DIAG IV INF INIT: CPT | Mod: 59

## 2022-09-24 PROCEDURE — 87389 HIV-1 AG W/HIV-1&-2 AB AG IA: CPT | Performed by: EMERGENCY MEDICINE

## 2022-09-24 PROCEDURE — 63600175 PHARM REV CODE 636 W HCPCS: Performed by: EMERGENCY MEDICINE

## 2022-09-24 PROCEDURE — A9698 NON-RAD CONTRAST MATERIALNOC: HCPCS | Performed by: EMERGENCY MEDICINE

## 2022-09-24 PROCEDURE — 80053 COMPREHEN METABOLIC PANEL: CPT | Performed by: EMERGENCY MEDICINE

## 2022-09-24 PROCEDURE — 25000003 PHARM REV CODE 250: Performed by: EMERGENCY MEDICINE

## 2022-09-24 PROCEDURE — 99285 EMERGENCY DEPT VISIT HI MDM: CPT | Mod: 25

## 2022-09-24 PROCEDURE — 83690 ASSAY OF LIPASE: CPT | Performed by: EMERGENCY MEDICINE

## 2022-09-24 PROCEDURE — 81003 URINALYSIS AUTO W/O SCOPE: CPT | Performed by: EMERGENCY MEDICINE

## 2022-09-24 PROCEDURE — 96375 TX/PRO/DX INJ NEW DRUG ADDON: CPT

## 2022-09-24 PROCEDURE — 96361 HYDRATE IV INFUSION ADD-ON: CPT

## 2022-09-24 PROCEDURE — 86803 HEPATITIS C AB TEST: CPT | Performed by: EMERGENCY MEDICINE

## 2022-09-24 PROCEDURE — 25500020 PHARM REV CODE 255: Performed by: EMERGENCY MEDICINE

## 2022-09-24 PROCEDURE — 85025 COMPLETE CBC W/AUTO DIFF WBC: CPT | Performed by: EMERGENCY MEDICINE

## 2022-09-24 RX ORDER — ONDANSETRON 4 MG/1
4 TABLET, ORALLY DISINTEGRATING ORAL
Status: DISCONTINUED | OUTPATIENT
Start: 2022-09-24 | End: 2022-09-24

## 2022-09-24 RX ORDER — HYDROMORPHONE HYDROCHLORIDE 1 MG/ML
1 INJECTION, SOLUTION INTRAMUSCULAR; INTRAVENOUS; SUBCUTANEOUS
Status: COMPLETED | OUTPATIENT
Start: 2022-09-24 | End: 2022-09-24

## 2022-09-24 RX ADMIN — PROMETHAZINE HYDROCHLORIDE 12.5 MG: 25 INJECTION INTRAMUSCULAR; INTRAVENOUS at 09:09

## 2022-09-24 RX ADMIN — SODIUM CHLORIDE 1000 ML: 0.9 INJECTION, SOLUTION INTRAVENOUS at 09:09

## 2022-09-24 RX ADMIN — IOHEXOL 1000 ML: 9 SOLUTION ORAL at 09:09

## 2022-09-24 RX ADMIN — HYDROMORPHONE HYDROCHLORIDE 1 MG: 1 INJECTION, SOLUTION INTRAMUSCULAR; INTRAVENOUS; SUBCUTANEOUS at 09:09

## 2022-09-24 RX ADMIN — IOHEXOL 100 ML: 350 INJECTION, SOLUTION INTRAVENOUS at 09:09

## 2022-09-24 NOTE — ED PROVIDER NOTES
"SCRIBE #1 NOTE: I, Yousef Louann, am scribing for, and in the presence of, aCrmine Whalen MD. I have scribed the entire note.       History     Chief Complaint   Patient presents with    Abdominal Pain     Left lower quadrant and left flank area, started 2 days ago, reports nausea, vomiting and diarrhea, hx of SBO and kidney stones.     Review of patient's allergies indicates:   Allergen Reactions    Erythromycin Other (See Comments)     Stomach cramps    Morphine Nausea And Vomiting     "Projectile vomiting"         History of Present Illness     HPI    9/24/2022, 7:52 AM  History obtained from the patient      History of Present Illness: Genny Calixto is a 57 y.o. female patient who presents to the Emergency Department for evaluation of abd pain which onset gradually x 2-3 days pta. Pt states she has a hx of SBO and kidney stones. Pt complains of abd cramping and N/V/D. Pt states food makes sxxs worse. Symptoms are waxing and waning in severity. No mitigating factors reported. No associated sxs reported. Patient denies any fever, chills, numbness, CP, SOB, and all other sxs at this time. Prior Tx includes hydrocodone with no relief to sxs. No further complaints or concerns at this time.       Arrival mode: Personal vehicle     PCP: Jo Manzano DO        Past Medical History:  Past Medical History:   Diagnosis Date    Encounter for blood transfusion     KENN (generalized anxiety disorder)     Takes 5-10 mg of valium qd prn anxiety (prescriptions for both on file, one from [facility] & other from )    Insomnia     Takes 5-10 mg of valium qhs prn insomnia (prescriptions for both on file, one from [facility] & other from )    Migraine headache     Nephrolithiasis 08/03/2019    Left ureteral stone -> UTI c/b pyelonephritis and urosepsis w/ hypokalemia -> transfered to [facility] urology service from [facility] after CT abn dx, s/p 2 stents to allow infx to drain, IV Vanc/Rocephin, fever free since yesterday    " Reflex sympathetic dystrophy     UTI (urinary tract infection)     Vertigo        Past Surgical History:  Past Surgical History:   Procedure Laterality Date    BLADDER SURGERY      CHOLECYSTECTOMY      COLONOSCOPY N/A 11/10/2021    Procedure: COLONOSCOPY;  Surgeon: Eryn Rothman MD;  Location: [facility];  Service: Endoscopy;  Laterality: N/A;    CYSTOSCOPY WITH URETEROSCOPY, RETROGRADE PYELOGRAPHY, AND INSERTION OF STENT Right 9/30/2021    Procedure: CYSTOSCOPY, WITH RETROGRADE PYELOGRAM AND URETERAL STENT INSERTION;  Surgeon: Annita Gamino MD;  Location: [facility] OR;  Service: Urology;  Laterality: Right;    DIAGNOSTIC LAPAROSCOPY N/A 11/11/2020    Procedure: LAPAROSCOPY, DIAGNOSTIC;  Surgeon: Tee Segura MD;  Location: [facility] OR;  Service: General;  Laterality: N/A;  CONVERTED TO OPEN    DIAGNOSTIC LAPAROSCOPY N/A 7/25/2022    Procedure: LAPAROSCOPY, DIAGNOSTIC;  Surgeon: Jo Manzano DO;  Location: [facility] OR;  Service: General;  Laterality: N/A;  convert to open at 0739    ESOPHAGOGASTRODUODENOSCOPY N/A 11/10/2021    Procedure: EGD (ESOPHAGOGASTRODUODENOSCOPY);  Surgeon: Eryn Rothman MD;  Location: [facility];  Service: Endoscopy;  Laterality: N/A;    facet injections  02/14/2017    L3, L4, L5, S1 Done by Dr. Lara    HYSTERECTOMY      INJECTION OF ANESTHETIC AGENT INTO TISSUE PLANE DEFINED BY TRANSVERSUS ABDOMINIS MUSCLE N/A 11/11/2020    Procedure: BLOCK, TRANSVERSUS ABDOMINIS PLANE;  Surgeon: Tee Segura MD;  Location: [facility] OR;  Service: General;  Laterality: N/A;    INJECTION OF ANESTHETIC AGENT INTO TISSUE PLANE DEFINED BY TRANSVERSUS ABDOMINIS MUSCLE N/A 7/25/2022    Procedure: BLOCK, TRANSVERSUS ABDOMINIS PLANE;  Surgeon: Jo Manzano DO;  Location: [facility] OR;  Service: General;  Laterality: N/A;    KNEE SURGERY      LAPAROSCOPIC LYSIS OF ADHESIONS N/A 11/11/2020    Procedure: LYSIS, ADHESIONS, LAPAROSCOPIC;  Surgeon: Tee Segura MD;  Location: [facility];  Service:  General;  Laterality: N/A;  CONVERTED TO OPEN    LAPAROTOMY N/A 11/20/2020    Procedure: LAPAROTOMY;  Surgeon: Tee Segura MD;  Location: [facility] OR;  Service: General;  Laterality: N/A;    LASER LITHOTRIPSY Left 2/8/2021    Procedure: LITHOTRIPSY, USING LASER;  Surgeon: Irving Kidd MD;  Location: [facility] OR;  Service: Urology;  Laterality: Left;    LASER LITHOTRIPSY Right 10/13/2021    Procedure: LITHOTRIPSY, USING LASER;  Surgeon: Annita Gamino MD;  Location: [facility] OR;  Service: Urology;  Laterality: Right;    LYSIS OF ADHESIONS N/A 11/11/2020    Procedure: LYSIS, ADHESIONS;  Surgeon: Tee Segura MD;  Location: [facility] OR;  Service: General;  Laterality: N/A;    LYSIS OF ADHESIONS N/A 11/20/2020    Procedure: LYSIS, ADHESIONS;  Surgeon: Tee Segura MD;  Location: [facility] OR;  Service: General;  Laterality: N/A;    LYSIS OF ADHESIONS N/A 7/25/2022    Procedure: LYSIS, ADHESIONS;  Surgeon: Jo Manzano DO;  Location: [facility] OR;  Service: General;  Laterality: N/A;    SURGICAL REMOVAL OF ILEUM WITH CECUM  7/25/2022    Procedure: EXCISION, CECUM WITH ILEUM;  Surgeon: Jo Manzano DO;  Location: [facility] OR;  Service: General;;    TONSILLECTOMY      URETEROSCOPY Left 2/8/2021    Procedure: URETEROSCOPY;  Surgeon: Irvign Kidd MD;  Location: [facility] OR;  Service: Urology;  Laterality: Left;  stent placement    URETEROSCOPY Right 10/13/2021    Procedure: URETEROSCOPY;  Surgeon: Annita Gamino MD;  Location: [facility] OR;  Service: Urology;  Laterality: Right;         Family History:  Family History   Problem Relation Age of Onset    Stroke Mother     Hypertension Mother     COPD Father     Drug abuse Brother        Social History:  Social History     Tobacco Use    Smoking status: Never    Smokeless tobacco: Never   Substance and Sexual Activity    Alcohol use: Yes     Comment: rarely    Drug use: No    Sexual activity: Never        Review of Systems     Review of Systems    Constitutional:  Negative for chills and fever.   HENT:  Negative for sore throat.    Respiratory:  Negative for shortness of breath.    Cardiovascular:  Negative for chest pain.   Gastrointestinal:  Positive for abdominal pain, diarrhea, nausea and vomiting.        (+) abd cramping   Genitourinary:  Negative for dysuria.   Musculoskeletal:  Negative for back pain.   Skin:  Negative for rash.   Neurological:  Negative for weakness and numbness.   Hematological:  Does not bruise/bleed easily.   All other systems reviewed and are negative.     Physical Exam     Initial Vitals [09/24/22 0723]   BP Pulse Resp Temp SpO2   (!) 151/76 89 20 99.4 °F (37.4 °C) 99 %      MAP       --          Physical Exam  Nursing Notes and Vital Signs Reviewed.  Constitutional: Patient is in no acute distress. Well-developed and well-nourished.  Head: Atraumatic. Normocephalic.  Eyes: PERRL. EOM intact. Conjunctivae are not pale. No scleral icterus.  ENT: Mucous membranes are moist. Oropharynx is clear and symmetric.    Neck: Supple. Full ROM. No lymphadenopathy.  Cardiovascular: Regular rate. Regular rhythm. No murmurs, rubs, or gallops. Distal pulses are 2+ and symmetric.  Pulmonary/Chest: No respiratory distress. Clear to auscultation bilaterally. No wheezing or rales.  Abdominal: Soft and non-distended.  Generalized tenderness.  No rebound, guarding, or rigidity. Good bowel sounds.  Genitourinary: No CVA tenderness  Musculoskeletal: Moves all extremities. No obvious deformities. No edema. No calf tenderness.  Skin: Warm and dry.  Neurological:  Alert, awake, and appropriate.  Normal speech.  No acute focal neurological deficits are appreciated.  Psychiatric: Normal affect. Good eye contact. Appropriate in content.     ED Course   Procedures  ED Vital Signs:  Vitals:    09/24/22 0723 09/24/22 0802 09/24/22 0904 09/24/22 0930   BP: (!) 151/76 (!) 149/78  (!) 150/86   Pulse: 89 76  88   Resp: 20 20 18 20   Temp: 99.4 °F (37.4 °C)   98.8  °F (37.1 °C)   TempSrc: Oral   Oral   SpO2: 99% 97%  98%   Weight: 67.8 kg (149 lb 5.8 oz)       09/24/22 1002 09/24/22 1303 09/24/22 1403   BP: (!) 159/82 (!) 140/78 118/71   Pulse: 81 88 85   Resp: 20 20 19   Temp:   98.9 °F (37.2 °C)   TempSrc:   Oral   SpO2: 99% 99% 98%   Weight:          Abnormal Lab Results:  Labs Reviewed   CBC W/ AUTO DIFFERENTIAL - Abnormal; Notable for the following components:       Result Value    MCH 32.1 (*)     All other components within normal limits   COMPREHENSIVE METABOLIC PANEL - Abnormal; Notable for the following components:    Potassium 3.4 (*)     Total Bilirubin 1.4 (*)     All other components within normal limits   URINALYSIS, REFLEX TO URINE CULTURE - Abnormal; Notable for the following components:    Color, UA Colorless (*)     Specific Gravity, UA >1.030 (*)     All other components within normal limits    Narrative:     Specimen Source->Urine   HIV 1 / 2 ANTIBODY    Narrative:     Release to patient->Immediate   HEPATITIS C ANTIBODY    Narrative:     Release to patient->Immediate   HEP C VIRUS HOLD SPECIMEN    Narrative:     Release to patient->Immediate   LIPASE        All Lab Results:  Results for orders placed or performed during the hospital encounter of 09/24/22   HIV 1/2 Ag/Ab (4th Gen)   Result Value Ref Range    HIV 1/2 Ag/Ab Negative Negative   Hepatitis C Antibody   Result Value Ref Range    Hepatitis C Ab Negative Negative   HCV Virus Hold Specimen   Result Value Ref Range    HEP C Virus Hold Specimen Hold for HCV sendout    CBC W/ AUTO DIFFERENTIAL   Result Value Ref Range    WBC 5.74 3.90 - 12.70 K/uL    RBC 4.24 4.00 - 5.40 M/uL    Hemoglobin 13.6 12.0 - 16.0 g/dL    Hematocrit 39.6 37.0 - 48.5 %    MCV 93 82 - 98 fL    MCH 32.1 (H) 27.0 - 31.0 pg    MCHC 34.3 32.0 - 36.0 g/dL    RDW 12.8 11.5 - 14.5 %    Platelets 262 150 - 450 K/uL    MPV 10.7 9.2 - 12.9 fL    Immature Granulocytes 0.2 0.0 - 0.5 %    Gran # (ANC) 3.1 1.8 - 7.7 K/uL    Immature Grans  (Abs) 0.01 0.00 - 0.04 K/uL    Lymph # 2.2 1.0 - 4.8 K/uL    Mono # 0.3 0.3 - 1.0 K/uL    Eos # 0.1 0.0 - 0.5 K/uL    Baso # 0.05 0.00 - 0.20 K/uL    nRBC 0 0 /100 WBC    Gran % 53.3 38.0 - 73.0 %    Lymph % 38.3 18.0 - 48.0 %    Mono % 5.6 4.0 - 15.0 %    Eosinophil % 1.7 0.0 - 8.0 %    Basophil % 0.9 0.0 - 1.9 %    Differential Method Automated    Comp. Metabolic Panel   Result Value Ref Range    Sodium 142 136 - 145 mmol/L    Potassium 3.4 (L) 3.5 - 5.1 mmol/L    Chloride 105 95 - 110 mmol/L    CO2 24 23 - 29 mmol/L    Glucose 86 70 - 110 mg/dL    BUN 10 6 - 20 mg/dL    Creatinine 0.7 0.5 - 1.4 mg/dL    Calcium 9.7 8.7 - 10.5 mg/dL    Total Protein 7.1 6.0 - 8.4 g/dL    Albumin 4.3 3.5 - 5.2 g/dL    Total Bilirubin 1.4 (H) 0.1 - 1.0 mg/dL    Alkaline Phosphatase 76 55 - 135 U/L    AST 22 10 - 40 U/L    ALT 18 10 - 44 U/L    Anion Gap 13 8 - 16 mmol/L    eGFR >60 >60 mL/min/1.73 m^2   Lipase   Result Value Ref Range    Lipase 27 4 - 60 U/L   Urinalysis, Reflex to Urine Culture Urine, Clean Catch    Specimen: Urine   Result Value Ref Range    Specimen UA Urine, Clean Catch     Color, UA Colorless (A) Yellow, Straw, Hilda    Appearance, UA Clear Clear    pH, UA 7.0 5.0 - 8.0    Specific Gravity, UA >1.030 (A) 1.005 - 1.030    Protein, UA Negative Negative    Glucose, UA Negative Negative    Ketones, UA Negative Negative    Bilirubin (UA) Negative Negative    Occult Blood UA Negative Negative    Nitrite, UA Negative Negative    Urobilinogen, UA Negative <2.0 EU/dL    Leukocytes, UA Negative Negative         Imaging Results:  Imaging Results              CT Abdomen Pelvis With Contrast (Final result)  Result time 09/24/22 12:57:23      Final result by REBA Carranza Sr., MD (09/24/22 12:57:23)                   Impression:      1. There has been an interval right hemicolectomy. The appendix is absent. The proximal portion of the ascending colon is not well distended. The distal aspect of the ileum is dilated.  Proximal to the anastomosis of the ileum with the ascending colon the ileum has a diameter 4.5 cm.  There is no extraluminal contrast material visualized.  2. There is no free fluid within the abdomen or pelvis. There is no pneumoperitoneum.  3. There is a mild amount of levoconvex curvature of the lumbar spine.  All CT scans at this facility use dose modulation, iterative reconstruction, and/or weight base dosing when appropriate to reduce radiation dose when appropriate to reduce radiation dose to as low as reasonably achievable.      Electronically signed by: Jerome Carranza MD  Date:    09/24/2022  Time:    12:57               Narrative:    EXAMINATION:  CT ABDOMEN PELVIS WITH CONTRAST    CLINICAL HISTORY:  Abdominal pain, acute, nonlocalized;    TECHNIQUE:  Standard abdomen and pelvis CT protocol with oral and IV contrast was performed.    COMPARISON:  07/01/2022    FINDINGS:  Finding: The size of the heart is within normal limits.  There are mild dependent atelectatic changes in both lungs.  There is no pneumothorax or pleural effusion.    The gallbladder is absent.  There are surgical clips in the gallbladder fossa.  The liver, pancreas, spleen, adrenals, and kidneys are normal in appearance. The ureters and the urinary bladder are normal in appearance.  The uterus is absent.  There has been an interval right hemicolectomy.  The appendix is absent.  The proximal portion of the ascending colon is not well distended.  The distal aspect of the ileum is dilated.  Proximal to the anastomosis of the ileum with the ascending colon the ileum has a diameter 4.5 cm.  There is no extraluminal contrast material visualized.  There is no free fluid within the abdomen or pelvis. There is no pneumoperitoneum.  There is a mild amount of levoconvex curvature of the lumbar spine.                                                The Emergency Provider reviewed the vital signs and test results, which are outlined above.     ED  Discussion       1:42 PM: Reassessed pt at this time. Discussed with pt all pertinent ED information and results. Discussed pt dx and plan of tx. Gave pt all f/u and return to the ED instructions. All questions and concerns were addressed at this time. Pt expresses understanding of information and instructions, and is comfortable with plan to discharge. Pt is stable for discharge.         Medical Decision Making:   Clinical Tests:   Lab Tests: Ordered and Reviewed  Radiological Study: Ordered and Reviewed         ED Medication(s):  Medications   sodium chloride 0.9% bolus 1,000 mL (0 mLs Intravenous Stopped 9/24/22 1159)   HYDROmorphone injection 1 mg (1 mg Intravenous Given 9/24/22 0904)   promethazine (PHENERGAN) 12.5 mg in dextrose 5 % 50 mL IVPB (0 mg Intravenous Stopped 9/24/22 1005)   iohexoL (OMNIPAQUE 350) injection 100 mL (100 mLs Intravenous Given 9/24/22 0930)   iohexoL (OMNIPAQUE 9) oral solution 1,000 mL (1,000 mLs Oral Given 9/24/22 0931)       Discharge Medication List as of 9/24/2022  1:23 PM           Follow-up Information       Jo Manzano,  In 2 days.    Specialty: General Surgery  Contact information:  [address]  [place] [address] [phone]               [facility] - Emergency Dept..    Specialty: Emergency Medicine  Why: As needed, If symptoms worsen  Contact information:  [address] [facility] [address] [phone]                               Scribe Attestation:   Scribe #1: I performed the above scribed service and the documentation accurately describes the services I performed. I attest to the accuracy of the note.     Attending:   Physician Attestation Statement for Scribe #1: I, Carmine Whalen MD, personally performed the services described in this documentation, as scribed by Dat Lew, in my presence, and it is both accurate and complete.           Clinical Impression       ICD-10-CM ICD-9-CM   1. Generalized abdominal pain  R10.84  789.07       Disposition:   Disposition: Discharged  Condition: Stable       Carmine Whalen MD  09/25/22 0635

## 2022-09-24 NOTE — ED NOTES
Patient unable to give urine at this time. Will check back with patient to collect sample.    
cognitive dysfunction

## 2022-09-26 ENCOUNTER — PATIENT MESSAGE (OUTPATIENT)
Dept: INTERNAL MEDICINE | Facility: CLINIC | Age: 57
End: 2022-09-26
Payer: MEDICARE

## 2022-09-26 ENCOUNTER — PATIENT MESSAGE (OUTPATIENT)
Dept: SURGERY | Facility: CLINIC | Age: 57
End: 2022-09-26
Payer: MEDICARE

## 2022-09-26 DIAGNOSIS — F41.9 ANXIETY: ICD-10-CM

## 2022-09-26 RX ORDER — DIAZEPAM 10 MG/1
10 TABLET ORAL DAILY PRN
Qty: 90 TABLET | Refills: 1 | OUTPATIENT
Start: 2022-09-26

## 2022-09-27 ENCOUNTER — PATIENT MESSAGE (OUTPATIENT)
Dept: SURGERY | Facility: CLINIC | Age: 57
End: 2022-09-27
Payer: MEDICARE

## 2022-09-28 ENCOUNTER — PATIENT MESSAGE (OUTPATIENT)
Dept: INTERNAL MEDICINE | Facility: CLINIC | Age: 57
End: 2022-09-28
Payer: MEDICARE

## 2022-10-03 ENCOUNTER — TELEPHONE (OUTPATIENT)
Dept: ADMINISTRATIVE | Facility: HOSPITAL | Age: 57
End: 2022-10-03
Payer: MEDICARE

## 2022-10-03 ENCOUNTER — OFFICE VISIT (OUTPATIENT)
Dept: INTERNAL MEDICINE | Facility: CLINIC | Age: 57
End: 2022-10-03
Payer: MEDICARE

## 2022-10-03 VITALS
HEART RATE: 91 BPM | WEIGHT: 155.88 LBS | DIASTOLIC BLOOD PRESSURE: 74 MMHG | OXYGEN SATURATION: 97 % | SYSTOLIC BLOOD PRESSURE: 120 MMHG | HEIGHT: 67 IN | BODY MASS INDEX: 24.47 KG/M2

## 2022-10-03 DIAGNOSIS — F41.9 ANXIETY: ICD-10-CM

## 2022-10-03 DIAGNOSIS — F41.1 GAD (GENERALIZED ANXIETY DISORDER): ICD-10-CM

## 2022-10-03 DIAGNOSIS — G43.909 MIGRAINE WITHOUT STATUS MIGRAINOSUS, NOT INTRACTABLE, UNSPECIFIED MIGRAINE TYPE: Primary | ICD-10-CM

## 2022-10-03 PROCEDURE — 99214 OFFICE O/P EST MOD 30 MIN: CPT | Mod: S$PBB,,, | Performed by: INTERNAL MEDICINE

## 2022-10-03 PROCEDURE — 99213 OFFICE O/P EST LOW 20 MIN: CPT | Mod: PBBFAC,PO | Performed by: INTERNAL MEDICINE

## 2022-10-03 PROCEDURE — 99999 PR PBB SHADOW E&M-EST. PATIENT-LVL III: ICD-10-PCS | Mod: PBBFAC,,, | Performed by: INTERNAL MEDICINE

## 2022-10-03 PROCEDURE — 99214 PR OFFICE/OUTPT VISIT, EST, LEVL IV, 30-39 MIN: ICD-10-PCS | Mod: S$PBB,,, | Performed by: INTERNAL MEDICINE

## 2022-10-03 PROCEDURE — 99999 PR PBB SHADOW E&M-EST. PATIENT-LVL III: CPT | Mod: PBBFAC,,, | Performed by: INTERNAL MEDICINE

## 2022-10-03 RX ORDER — DOXEPIN HYDROCHLORIDE 25 MG/1
25 CAPSULE ORAL NIGHTLY
Qty: 90 CAPSULE | Refills: 3 | Status: SHIPPED | OUTPATIENT
Start: 2022-10-03 | End: 2022-11-15

## 2022-10-03 RX ORDER — DIAZEPAM 10 MG/1
TABLET ORAL
Qty: 90 TABLET | Refills: 1 | Status: SHIPPED | OUTPATIENT
Start: 2022-10-03 | End: 2022-12-30 | Stop reason: SDUPTHER

## 2022-10-03 NOTE — PROGRESS NOTES
HPI:  Patient is a 57-year-old female in today for her annual physical.  I have reviewed all of her hospital records for the last 6 months.  She has been in hospital multiple times for small bowel obstruction.  Patient underwent exploratory laparotomy and August for small-bowel obstructions.  Since then she has done fairly well except for 1 episode of abdominal pain for which she went back to the ER.  Workup at that time was negative for any bowel obstruction.  She has no other problems or complaints at this time  Current MEDS: medcard review, verified and update  Allergies: Per the electronic medical record    Past Medical History:   Diagnosis Date    Encounter for blood transfusion     KENN (generalized anxiety disorder)     Takes 5-10 mg of valium qd prn anxiety (prescriptions for both on file, one from [facility] & other from )    Insomnia     Takes 5-10 mg of valium qhs prn insomnia (prescriptions for both on file, one from [facility] & other from )    Migraine headache     Nephrolithiasis 08/03/2019    Left ureteral stone -> UTI c/b pyelonephritis and urosepsis w/ hypokalemia -> transfered to [facility] urology service from [facility] after CT abn dx, s/p 2 stents to allow infx to drain, IV Vanc/Rocephin, fever free since yesterday    Reflex sympathetic dystrophy     UTI (urinary tract infection)     Vertigo        Past Surgical History:   Procedure Laterality Date    BLADDER SURGERY      CHOLECYSTECTOMY      COLONOSCOPY N/A 11/10/2021    Procedure: COLONOSCOPY;  Surgeon: Eryn Rothman MD;  Location: [facility] ENDO;  Service: Endoscopy;  Laterality: N/A;    CYSTOSCOPY WITH URETEROSCOPY, RETROGRADE PYELOGRAPHY, AND INSERTION OF STENT Right 9/30/2021    Procedure: CYSTOSCOPY, WITH RETROGRADE PYELOGRAM AND URETERAL STENT INSERTION;  Surgeon: Annita Gamino MD;  Location: [facility] OR;  Service: Urology;  Laterality: Right;    DIAGNOSTIC LAPAROSCOPY N/A 11/11/2020    Procedure: LAPAROSCOPY, DIAGNOSTIC;  Surgeon: Tee  MD Colton;  Location: [facility] OR;  Service: General;  Laterality: N/A;  CONVERTED TO OPEN    DIAGNOSTIC LAPAROSCOPY N/A 7/25/2022    Procedure: LAPAROSCOPY, DIAGNOSTIC;  Surgeon: Jo Manzano DO;  Location: [facility] OR;  Service: General;  Laterality: N/A;  convert to open at 0739    ESOPHAGOGASTRODUODENOSCOPY N/A 11/10/2021    Procedure: EGD (ESOPHAGOGASTRODUODENOSCOPY);  Surgeon: Eryn Rothman MD;  Location: [facility] ENDO;  Service: Endoscopy;  Laterality: N/A;    facet injections  02/14/2017    L3, L4, L5, S1 Done by Dr. Lara    HYSTERECTOMY      INJECTION OF ANESTHETIC AGENT INTO TISSUE PLANE DEFINED BY TRANSVERSUS ABDOMINIS MUSCLE N/A 11/11/2020    Procedure: BLOCK, TRANSVERSUS ABDOMINIS PLANE;  Surgeon: Tee Segura MD;  Location: [facility] OR;  Service: General;  Laterality: N/A;    INJECTION OF ANESTHETIC AGENT INTO TISSUE PLANE DEFINED BY TRANSVERSUS ABDOMINIS MUSCLE N/A 7/25/2022    Procedure: BLOCK, TRANSVERSUS ABDOMINIS PLANE;  Surgeon: Jo Manzano DO;  Location: [facility] OR;  Service: General;  Laterality: N/A;    KNEE SURGERY      LAPAROSCOPIC LYSIS OF ADHESIONS N/A 11/11/2020    Procedure: LYSIS, ADHESIONS, LAPAROSCOPIC;  Surgeon: Tee Segura MD;  Location: [facility] OR;  Service: General;  Laterality: N/A;  CONVERTED TO OPEN    LAPAROTOMY N/A 11/20/2020    Procedure: LAPAROTOMY;  Surgeon: Tee Segura MD;  Location: [facility] OR;  Service: General;  Laterality: N/A;    LASER LITHOTRIPSY Left 2/8/2021    Procedure: LITHOTRIPSY, USING LASER;  Surgeon: Irving Kidd MD;  Location: [facility] OR;  Service: Urology;  Laterality: Left;    LASER LITHOTRIPSY Right 10/13/2021    Procedure: LITHOTRIPSY, USING LASER;  Surgeon: Annita Gamino MD;  Location: [facility] OR;  Service: Urology;  Laterality: Right;    LYSIS OF ADHESIONS N/A 11/11/2020    Procedure: LYSIS, ADHESIONS;  Surgeon: Tee Segura MD;  Location: [facility] OR;  Service: General;  Laterality: N/A;    LYSIS OF ADHESIONS N/A  "11/20/2020    Procedure: LYSIS, ADHESIONS;  Surgeon: Tee Segura MD;  Location: [facility] OR;  Service: General;  Laterality: N/A;    LYSIS OF ADHESIONS N/A 7/25/2022    Procedure: LYSIS, ADHESIONS;  Surgeon: Jo Manzano DO;  Location: [facility] OR;  Service: General;  Laterality: N/A;    SURGICAL REMOVAL OF ILEUM WITH CECUM  7/25/2022    Procedure: EXCISION, CECUM WITH ILEUM;  Surgeon: Jo Manzano DO;  Location: [facility] OR;  Service: General;;    TONSILLECTOMY      URETEROSCOPY Left 2/8/2021    Procedure: URETEROSCOPY;  Surgeon: Irving Kidd MD;  Location: [facility] OR;  Service: Urology;  Laterality: Left;  stent placement    URETEROSCOPY Right 10/13/2021    Procedure: URETEROSCOPY;  Surgeon: Annita Gamino MD;  Location: [facility] OR;  Service: Urology;  Laterality: Right;       SHx: per the electronic medical record    FHx: recorded in the electronic medical record    ROS:    denies any chest pains or shortness of breath. Denies any nausea, vomiting or diarrhea. Denies any fever, chills or sweats. Denies any change in weight, voice, stool, skin or hair. Denies any dysuria, dyspepsia or dysphagia. Denies any change in vision, hearing or headaches. Denies any swollen lymph nodes or loss of memory.    PE:  /74 (BP Location: Right arm)   Pulse 91   Ht 5' 7" (1.702 m)   Wt 70.7 kg (155 lb 13.8 oz)   SpO2 97%   BMI 24.41 kg/m²   Gen: Well-developed, well-nourished, female, in no acute distress, oriented x3  HEENT: neck is supple, no adenopathy, carotids 2+ equal without bruits, thyroid exam normal size without nodules.  CHEST: clear to auscultation and percussion  CVS: regular rate and rhythm without significant murmur, gallop, or rubs  ABD: soft, benign, no rebound no guarding, no distention. Bowel sounds are normal.     Nontender,  no palpable masses, no organomegaly and no audible bruits.  BREAST: no masses.  No nipple inversion, retraction, or deviation.  EXT: no clubbing, cyanosis, or " edema  LYMPH: no cervical, inguinal, or axillary adenopathy  FEET: no loss of sensation.  No ulcers or pressure sores.  NEURO: gait normal.  Cranial nerves II- XII intact. No nystagmus.  Speech normal.   Gross motor and sensory unremarkable.  PELVIC: deferred    Lab Results   Component Value Date    WBC 5.74 09/24/2022    HGB 13.6 09/24/2022    HCT 39.6 09/24/2022     09/24/2022    CHOL 140 10/20/2020    TRIG 103 10/20/2020    HDL 55 10/20/2020    ALT 18 09/24/2022    AST 22 09/24/2022     09/24/2022    K 3.4 (L) 09/24/2022     09/24/2022    CREATININE 0.7 09/24/2022    BUN 10 09/24/2022    CO2 24 09/24/2022    TSH 0.517 08/29/2022    INR 1.0 05/09/2022    HGBA1C 5.8 03/07/2017       Impression:  Stable problems below  Patient Active Problem List   Diagnosis    Anxiety    Bilateral low back pain with right-sided sciatica    Vertigo    Right nephrolithiasis    Ureteral stone    KENN (generalized anxiety disorder)    Migraine headache    Hydronephrosis    SBO (small bowel obstruction)       Plan:   Orders Placed This Encounter    diazePAM (VALIUM) 10 MG Tab    doxepin (SINEQUAN) 25 MG capsule     She was given refills of her Valium.  She was increased on the doxepin to take 25 mg at bedtime for her insomnia.    This note is generated with speech recognition software and is subject to transcription error and sound alike phrases that may be missed by proofreading.

## 2022-10-07 ENCOUNTER — PATIENT MESSAGE (OUTPATIENT)
Dept: GASTROENTEROLOGY | Facility: CLINIC | Age: 57
End: 2022-10-07
Payer: MEDICARE

## 2022-10-11 ENCOUNTER — PATIENT MESSAGE (OUTPATIENT)
Dept: SURGERY | Facility: CLINIC | Age: 57
End: 2022-10-11
Payer: MEDICARE

## 2022-10-11 ENCOUNTER — PATIENT MESSAGE (OUTPATIENT)
Dept: INTERNAL MEDICINE | Facility: CLINIC | Age: 57
End: 2022-10-11
Payer: MEDICARE

## 2022-10-11 ENCOUNTER — PATIENT MESSAGE (OUTPATIENT)
Dept: UROLOGY | Facility: CLINIC | Age: 57
End: 2022-10-11
Payer: MEDICARE

## 2022-10-13 ENCOUNTER — HOSPITAL ENCOUNTER (OUTPATIENT)
Dept: RADIOLOGY | Facility: HOSPITAL | Age: 57
Discharge: HOME | End: 2022-10-13
Attending: INTERNAL MEDICINE
Payer: MEDICARE

## 2022-10-13 ENCOUNTER — PATIENT MESSAGE (OUTPATIENT)
Dept: INTERNAL MEDICINE | Facility: CLINIC | Age: 57
End: 2022-10-13
Payer: MEDICARE

## 2022-10-13 DIAGNOSIS — Z12.31 OTHER SCREENING MAMMOGRAM: ICD-10-CM

## 2022-10-13 DIAGNOSIS — Z01.419 ENCOUNTER FOR GYNECOLOGICAL EXAMINATION WITHOUT ABNORMAL FINDING: Primary | ICD-10-CM

## 2022-10-13 PROCEDURE — 77063 BREAST TOMOSYNTHESIS BI: CPT | Mod: 26,,, | Performed by: RADIOLOGY

## 2022-10-13 PROCEDURE — 77067 MAMMO DIGITAL SCREENING BILAT WITH TOMO: ICD-10-PCS | Mod: 26,,, | Performed by: RADIOLOGY

## 2022-10-13 PROCEDURE — 77067 SCR MAMMO BI INCL CAD: CPT | Mod: TC

## 2022-10-13 PROCEDURE — 77063 MAMMO DIGITAL SCREENING BILAT WITH TOMO: ICD-10-PCS | Mod: 26,,, | Performed by: RADIOLOGY

## 2022-10-13 PROCEDURE — 77067 SCR MAMMO BI INCL CAD: CPT | Mod: 26,,, | Performed by: RADIOLOGY

## 2022-10-13 PROCEDURE — 77063 BREAST TOMOSYNTHESIS BI: CPT | Mod: TC

## 2022-10-14 ENCOUNTER — PATIENT MESSAGE (OUTPATIENT)
Dept: INTERNAL MEDICINE | Facility: CLINIC | Age: 57
End: 2022-10-14
Payer: MEDICARE

## 2022-10-18 ENCOUNTER — TELEPHONE (OUTPATIENT)
Dept: UROLOGY | Facility: CLINIC | Age: 57
End: 2022-10-18
Payer: MEDICARE

## 2022-10-18 ENCOUNTER — PATIENT MESSAGE (OUTPATIENT)
Dept: UROLOGY | Facility: CLINIC | Age: 57
End: 2022-10-18
Payer: MEDICARE

## 2022-10-18 NOTE — TELEPHONE ENCOUNTER
6 months f/u appt scheduled, pt was advised through [facility].    ----- Message from Irving Kidd MD sent at 10/17/2022  5:22 PM CDT -----  F/u 6 months, thanks

## 2022-10-19 ENCOUNTER — PATIENT MESSAGE (OUTPATIENT)
Dept: UROLOGY | Facility: CLINIC | Age: 57
End: 2022-10-19
Payer: MEDICARE

## 2022-10-20 ENCOUNTER — PATIENT MESSAGE (OUTPATIENT)
Dept: INTERNAL MEDICINE | Facility: CLINIC | Age: 57
End: 2022-10-20
Payer: MEDICARE

## 2022-10-21 ENCOUNTER — OFFICE VISIT (OUTPATIENT)
Dept: URGENT CARE | Facility: CLINIC | Age: 57
End: 2022-10-21
Payer: MEDICARE

## 2022-10-21 VITALS
TEMPERATURE: 99 F | DIASTOLIC BLOOD PRESSURE: 78 MMHG | WEIGHT: 155 LBS | SYSTOLIC BLOOD PRESSURE: 111 MMHG | RESPIRATION RATE: 15 BRPM | BODY MASS INDEX: 24.33 KG/M2 | HEART RATE: 81 BPM | OXYGEN SATURATION: 95 % | HEIGHT: 67 IN

## 2022-10-21 DIAGNOSIS — N30.01 ACUTE CYSTITIS WITH HEMATURIA: Primary | ICD-10-CM

## 2022-10-21 DIAGNOSIS — R30.9 PAINFUL URINATION: ICD-10-CM

## 2022-10-21 LAB
BILIRUB UR QL STRIP: POSITIVE
GLUCOSE UR QL STRIP: POSITIVE
KETONES UR QL STRIP: NEGATIVE
LEUKOCYTE ESTERASE UR QL STRIP: POSITIVE
PH, POC UA: 5.5
POC BLOOD, URINE: POSITIVE
POC NITRATES, URINE: POSITIVE
PROT UR QL STRIP: POSITIVE
SP GR UR STRIP: 1.02 (ref 1–1.03)
UROBILINOGEN UR STRIP-ACNC: 12 (ref 0.1–1.1)

## 2022-10-21 PROCEDURE — 99214 OFFICE O/P EST MOD 30 MIN: CPT | Mod: S$GLB,,, | Performed by: PHYSICIAN ASSISTANT

## 2022-10-21 PROCEDURE — 81003 URINALYSIS AUTO W/O SCOPE: CPT | Mod: QW,S$GLB,, | Performed by: PHYSICIAN ASSISTANT

## 2022-10-21 PROCEDURE — 87186 SC STD MICRODIL/AGAR DIL: CPT | Performed by: PHYSICIAN ASSISTANT

## 2022-10-21 PROCEDURE — 81003 POCT URINALYSIS, DIPSTICK, AUTOMATED, W/O SCOPE: ICD-10-PCS | Mod: QW,S$GLB,, | Performed by: PHYSICIAN ASSISTANT

## 2022-10-21 PROCEDURE — 87077 CULTURE AEROBIC IDENTIFY: CPT | Performed by: PHYSICIAN ASSISTANT

## 2022-10-21 PROCEDURE — 99214 PR OFFICE/OUTPT VISIT, EST, LEVL IV, 30-39 MIN: ICD-10-PCS | Mod: S$GLB,,, | Performed by: PHYSICIAN ASSISTANT

## 2022-10-21 PROCEDURE — 87086 URINE CULTURE/COLONY COUNT: CPT | Performed by: PHYSICIAN ASSISTANT

## 2022-10-21 PROCEDURE — 87088 URINE BACTERIA CULTURE: CPT | Performed by: PHYSICIAN ASSISTANT

## 2022-10-21 RX ORDER — NITROFURANTOIN 25; 75 MG/1; MG/1
100 CAPSULE ORAL 2 TIMES DAILY
Qty: 14 CAPSULE | Refills: 0 | Status: SHIPPED | OUTPATIENT
Start: 2022-10-21 | End: 2022-10-28

## 2022-10-21 RX ORDER — PHENAZOPYRIDINE HYDROCHLORIDE 100 MG/1
100 TABLET, FILM COATED ORAL 3 TIMES DAILY PRN
Qty: 15 TABLET | Refills: 0 | Status: SHIPPED | OUTPATIENT
Start: 2022-10-21 | End: 2022-10-26

## 2022-10-21 NOTE — PROGRESS NOTES
"Subjective:       Patient ID: Genny Calixto is a 57 y.o. female.    Vitals:  height is 5' 7" (1.702 m) and weight is 70.3 kg (155 lb). Her temperature is 98.5 °F (36.9 °C). Her blood pressure is 111/78 and her pulse is 81. Her respiration is 15 and oxygen saturation is 95%.     Chief Complaint: Urinary Tract Infection    Painful urination that started on Tuesday. Pt describes it as feeling like shards of glass. Burning sensations as well as a foul odor. Pt. Unable to drink caffeine due to it aggravating sx. Fever has been coming and going.  Denies back or CVA tenderness.  No nausea or vomiting.  States she has a history of UTIs and has had kidney stones in the past.  She has tried Azos and Cranberry pills with no relief.    Urinary Tract Infection   This is a new problem. The current episode started acute onset (Thursday onset). The problem has been gradually worsening. The quality of the pain is described as burning. The pain is at a severity of 4/10. The pain is moderate. The maximum temperature recorded prior to her arrival was 100 - 100.9 F. The fever has been present for Less than 1 day. She is Sexually active. There is A history of pyelonephritis. Associated symptoms include chills, frequency, urgency and bubble bath use. Pertinent negatives include no behavior changes, discharge, flank pain, hematuria, hesitancy, nausea, possible pregnancy, sweats, vomiting, constipation, rash or withholding. Treatments tried: Ibuprofen. The treatment provided mild relief. Her past medical history is significant for kidney stones.     Constitution: Positive for chills.   Gastrointestinal:  Negative for nausea, vomiting and constipation.   Genitourinary:  Positive for dysuria, frequency and urgency. Negative for flank pain and hematuria.   Skin:  Negative for rash.     Objective:      Physical Exam   Constitutional: She is oriented to person, place, and time. She appears well-developed.   HENT:   Head: Normocephalic " and atraumatic.   Ears:   Right Ear: External ear normal.   Left Ear: External ear normal.   Nose: Nose normal. No nasal deformity. No epistaxis.   Mouth/Throat: Oropharynx is clear and moist and mucous membranes are normal.   Eyes: Lids are normal.   Neck: Trachea normal and phonation normal. Neck supple.   Cardiovascular: Normal pulses.   Pulmonary/Chest: Effort normal.   Abdominal: Normal appearance and bowel sounds are normal. She exhibits no distension. Soft. There is no abdominal tenderness. There is no left CVA tenderness and no right CVA tenderness.   Neurological: She is alert and oriented to person, place, and time.   Skin: Skin is warm, dry and intact.   Psychiatric: Her speech is normal and behavior is normal.   Nursing note and vitals reviewed.      Assessment:       1. Acute cystitis with hematuria    2. Painful urination          Plan:         Acute cystitis with hematuria  -     nitrofurantoin, macrocrystal-monohydrate, (MACROBID) 100 MG capsule; Take 1 capsule (100 mg total) by mouth 2 (two) times daily. for 7 days  Dispense: 14 capsule; Refill: 0  -     phenazopyridine (PYRIDIUM) 100 MG tablet; Take 1 tablet (100 mg total) by mouth 3 (three) times daily as needed for Pain.  Dispense: 15 tablet; Refill: 0    Painful urination  -     POCT Urinalysis, Dipstick, Automated, W/O Scope  -     Urine culture       Results for orders placed or performed in visit on 10/21/22   POCT Urinalysis, Dipstick, Automated, W/O Scope   Result Value Ref Range    POC Blood, Urine Positive (A) Negative    POC Bilirubin, Urine Positive (A) Negative    POC Urobilinogen, Urine 12.0 (A) 0.1 - 1.1    POC Ketones, Urine Negative Negative    POC Protein, Urine Positive (A) Negative    POC Nitrates, Urine Positive (A) Negative    POC Glucose, Urine Positive (A) Negative    pH, UA 5.5     POC Specific Gravity, Urine 1.025 1.003 - 1.029    POC Leukocytes, Urine Positive (A) Negative       Will start on Macrobid and Pyridium.  Will  send a urine culture and update antibiotics if needed.  Patient to be notified of results.    Advised to increase fluid intake.  Follow up with urologist.  RTC or go to the ER with new or worsening symptoms.

## 2022-10-23 LAB — BACTERIA UR CULT: ABNORMAL

## 2022-10-24 ENCOUNTER — TELEPHONE (OUTPATIENT)
Dept: URGENT CARE | Facility: CLINIC | Age: 57
End: 2022-10-24
Payer: MEDICARE

## 2022-10-24 ENCOUNTER — TELEPHONE (OUTPATIENT)
Dept: SURGERY | Facility: CLINIC | Age: 57
End: 2022-10-24
Payer: MEDICARE

## 2022-10-24 ENCOUNTER — PATIENT MESSAGE (OUTPATIENT)
Dept: UROLOGY | Facility: CLINIC | Age: 57
End: 2022-10-24
Payer: MEDICARE

## 2022-10-24 ENCOUNTER — PATIENT MESSAGE (OUTPATIENT)
Dept: SURGERY | Facility: CLINIC | Age: 57
End: 2022-10-24
Payer: MEDICARE

## 2022-10-24 NOTE — TELEPHONE ENCOUNTER
Returned patient's message to Dr. Manzano. Patient states that she went to an [facility] urgent care on 10/21 where she was told she had severe UTI caused by E. Coli. The provider there recommended she takes Augmentin. Patient states she does not feel comfortable taking a strong antibiotic given her medical history of taking many antibiotic rounds. She states Augmentin will cause her to have severe diarrhea and discomfort. Patient states provider did not fully explain her test results fully and did not trust recommendation. Wanted second opinion for Dr. Manzano and Dr. Kidd as they understood her history more. She sent a message to Dr. Kidd as well. Notified patient that Dr. Kidd will be able to assist her especially if it involves UTI. Will send another message to his staff. Patient verbalized understanding.

## 2022-10-24 NOTE — TELEPHONE ENCOUNTER
Spoke with patient regarding positive E coli cultures.  Patient on Macrobid at this time.  Patient having improvement.  Sensitivities show Macrobid is inappropriate to antibiotic choice at this time.  Patient with history of recurrent UTIs.  Patient instructed to inform Urology treatment at this time.  Patient verbalizes understanding and agrees with plan.  Patient struck to return to clinic if symptoms do not improve or worsen.  Patient's questions answered.

## 2022-10-24 NOTE — TELEPHONE ENCOUNTER
Pt. Called about some questions regarding her Urine results. Pt would like a call back due to some more questions she has.

## 2022-10-25 NOTE — TELEPHONE ENCOUNTER
Spoke with patient today in regards to positive urine culture.  Patient had questions given our conversation this morning about E coli infection and proper antibiotic choices.  Patient is concerned given multiple antibiotic doses throughout the last year.  Discussed benefits versus risks of not taking antibiotics.  Discussed probiotics and pre biotics.  Discussed increase yogurt intake.  Discussed follow-up with Urology for continued care.  Patient verbalized understanding and agrees with plan.

## 2022-11-03 ENCOUNTER — PATIENT MESSAGE (OUTPATIENT)
Dept: GASTROENTEROLOGY | Facility: CLINIC | Age: 57
End: 2022-11-03
Payer: MEDICARE

## 2022-11-07 ENCOUNTER — PATIENT MESSAGE (OUTPATIENT)
Dept: SURGERY | Facility: CLINIC | Age: 57
End: 2022-11-07
Payer: MEDICARE

## 2022-11-08 ENCOUNTER — PATIENT MESSAGE (OUTPATIENT)
Dept: OBSTETRICS AND GYNECOLOGY | Facility: CLINIC | Age: 57
End: 2022-11-08
Payer: MEDICARE

## 2022-11-14 ENCOUNTER — PATIENT MESSAGE (OUTPATIENT)
Dept: INTERNAL MEDICINE | Facility: CLINIC | Age: 57
End: 2022-11-14
Payer: MEDICARE

## 2022-11-15 ENCOUNTER — PATIENT MESSAGE (OUTPATIENT)
Dept: INTERNAL MEDICINE | Facility: CLINIC | Age: 57
End: 2022-11-15
Payer: MEDICARE

## 2022-11-15 RX ORDER — DOXEPIN HYDROCHLORIDE 100 MG/1
100 CAPSULE ORAL NIGHTLY
Qty: 90 CAPSULE | Refills: 3 | Status: SHIPPED | OUTPATIENT
Start: 2022-11-15 | End: 2023-06-30 | Stop reason: SDUPTHER

## 2022-11-15 NOTE — TELEPHONE ENCOUNTER
No new care gaps identified.  [facility] Embedded Care Gaps. Reference number: [id number]. 11/15/2022   2:26:56 PM CST

## 2022-11-16 RX ORDER — SUMATRIPTAN SUCCINATE 100 MG/1
TABLET ORAL
Qty: 10 TABLET | Refills: 0 | OUTPATIENT
Start: 2022-11-16

## 2022-11-16 NOTE — TELEPHONE ENCOUNTER
[place] DC. Request already responded to by other means (e.g. phone or fax)   Refill Authorization Note   Genny Calixto  is requesting a refill authorization.  Brief Assessment and Rationale for Refill:  Quick Discontinue  Medication Therapy Plan:  Signed 11/15/22    Medication Reconciliation Completed:  No      Comments:     Note composed:11:07 AM 11/16/2022

## 2022-11-27 NOTE — PROGRESS NOTES
Presents for wound recheck. Went over lab results. Her incision is healing well without signs of infection. She is doing packing changes. Denies fevers. Tolerating a diet. Bowel function seems to be normalizing. Follow up in 2 weeks.

## 2022-11-27 NOTE — PROGRESS NOTES
Patient presents for wound recheck but also reports issues with diarrhea for which she had C diff testing performed.  C diff returned negative.  The stools are improving.  Diarrhea was most likely due to antibiotics.  She denies fevers.  She states that she is feeling better every day.  The wound is draining some serous fluid but there are no signs of infection.  Packing was inserted.  Patient was instructed how to do wet-to-dry dressings.  Follow-up in 10 days.

## 2022-12-06 ENCOUNTER — PATIENT MESSAGE (OUTPATIENT)
Dept: INTERNAL MEDICINE | Facility: CLINIC | Age: 57
End: 2022-12-06
Payer: MEDICARE

## 2023-01-08 ENCOUNTER — PATIENT MESSAGE (OUTPATIENT)
Dept: INTERNAL MEDICINE | Facility: CLINIC | Age: 58
End: 2023-01-08
Payer: MEDICARE

## 2023-01-10 ENCOUNTER — PATIENT MESSAGE (OUTPATIENT)
Dept: INTERNAL MEDICINE | Facility: CLINIC | Age: 58
End: 2023-01-10
Payer: MEDICARE

## 2023-01-10 RX ORDER — AMOXICILLIN AND CLAVULANATE POTASSIUM 875; 125 MG/1; MG/1
1 TABLET, FILM COATED ORAL EVERY 12 HOURS
Qty: 20 TABLET | Refills: 0 | Status: SHIPPED | OUTPATIENT
Start: 2023-01-10 | End: 2023-04-05

## 2023-01-17 ENCOUNTER — PATIENT MESSAGE (OUTPATIENT)
Dept: GASTROENTEROLOGY | Facility: CLINIC | Age: 58
End: 2023-01-17
Payer: MEDICARE

## 2023-01-17 ENCOUNTER — PATIENT MESSAGE (OUTPATIENT)
Dept: SURGERY | Facility: CLINIC | Age: 58
End: 2023-01-17
Payer: MEDICARE

## 2023-02-01 ENCOUNTER — HOSPITAL ENCOUNTER (EMERGENCY)
Facility: HOSPITAL | Age: 58
Discharge: HOME | End: 2023-02-01
Attending: EMERGENCY MEDICINE
Payer: MEDICARE

## 2023-02-01 VITALS
WEIGHT: 166.31 LBS | RESPIRATION RATE: 16 BRPM | TEMPERATURE: 100 F | BODY MASS INDEX: 26.05 KG/M2 | SYSTOLIC BLOOD PRESSURE: 133 MMHG | DIASTOLIC BLOOD PRESSURE: 74 MMHG | HEART RATE: 108 BPM | OXYGEN SATURATION: 98 %

## 2023-02-01 DIAGNOSIS — R25.2 MUSCLE CRAMPS: Primary | ICD-10-CM

## 2023-02-01 LAB
ALBUMIN SERPL BCP-MCNC: 4.2 G/DL (ref 3.5–5.2)
ALP SERPL-CCNC: 92 U/L (ref 55–135)
ALT SERPL W/O P-5'-P-CCNC: 22 U/L (ref 10–44)
ANION GAP SERPL CALC-SCNC: 10 MMOL/L (ref 8–16)
AST SERPL-CCNC: 27 U/L (ref 10–40)
BASOPHILS # BLD AUTO: 0.04 K/UL (ref 0–0.2)
BASOPHILS NFR BLD: 0.6 % (ref 0–1.9)
BILIRUB SERPL-MCNC: 1.4 MG/DL (ref 0.1–1)
BILIRUB UR QL STRIP: NEGATIVE
BUN SERPL-MCNC: 18 MG/DL (ref 6–20)
CALCIUM SERPL-MCNC: 9.9 MG/DL (ref 8.7–10.5)
CHLORIDE SERPL-SCNC: 102 MMOL/L (ref 95–110)
CLARITY UR: CLEAR
CO2 SERPL-SCNC: 25 MMOL/L (ref 23–29)
COLOR UR: COLORLESS
CREAT SERPL-MCNC: 0.8 MG/DL (ref 0.5–1.4)
DIFFERENTIAL METHOD: ABNORMAL
EOSINOPHIL # BLD AUTO: 0.1 K/UL (ref 0–0.5)
EOSINOPHIL NFR BLD: 1.9 % (ref 0–8)
ERYTHROCYTE [DISTWIDTH] IN BLOOD BY AUTOMATED COUNT: 12 % (ref 11.5–14.5)
EST. GFR  (NO RACE VARIABLE): >60 ML/MIN/1.73 M^2
GLUCOSE SERPL-MCNC: 84 MG/DL (ref 70–110)
GLUCOSE UR QL STRIP: NEGATIVE
HCT VFR BLD AUTO: 44.4 % (ref 37–48.5)
HGB BLD-MCNC: 15.3 G/DL (ref 12–16)
HGB UR QL STRIP: NEGATIVE
IMM GRANULOCYTES # BLD AUTO: 0.02 K/UL (ref 0–0.04)
IMM GRANULOCYTES NFR BLD AUTO: 0.3 % (ref 0–0.5)
KETONES UR QL STRIP: NEGATIVE
LEUKOCYTE ESTERASE UR QL STRIP: NEGATIVE
LIPASE SERPL-CCNC: 30 U/L (ref 4–60)
LYMPHOCYTES # BLD AUTO: 1.8 K/UL (ref 1–4.8)
LYMPHOCYTES NFR BLD: 26.1 % (ref 18–48)
MCH RBC QN AUTO: 33.2 PG (ref 27–31)
MCHC RBC AUTO-ENTMCNC: 34.5 G/DL (ref 32–36)
MCV RBC AUTO: 96 FL (ref 82–98)
MONOCYTES # BLD AUTO: 0.5 K/UL (ref 0.3–1)
MONOCYTES NFR BLD: 7 % (ref 4–15)
NEUTROPHILS # BLD AUTO: 4.3 K/UL (ref 1.8–7.7)
NEUTROPHILS NFR BLD: 64.1 % (ref 38–73)
NITRITE UR QL STRIP: NEGATIVE
NRBC BLD-RTO: 0 /100 WBC
PH UR STRIP: 6 [PH] (ref 5–8)
PLATELET # BLD AUTO: 266 K/UL (ref 150–450)
PMV BLD AUTO: 10.7 FL (ref 9.2–12.9)
POTASSIUM SERPL-SCNC: 5.1 MMOL/L (ref 3.5–5.1)
PROT SERPL-MCNC: 7.6 G/DL (ref 6–8.4)
PROT UR QL STRIP: NEGATIVE
RBC # BLD AUTO: 4.61 M/UL (ref 4–5.4)
SODIUM SERPL-SCNC: 137 MMOL/L (ref 136–145)
SP GR UR STRIP: 1.01 (ref 1–1.03)
URN SPEC COLLECT METH UR: ABNORMAL
UROBILINOGEN UR STRIP-ACNC: NEGATIVE EU/DL
WBC # BLD AUTO: 6.71 K/UL (ref 3.9–12.7)

## 2023-02-01 PROCEDURE — 96374 THER/PROPH/DIAG INJ IV PUSH: CPT

## 2023-02-01 PROCEDURE — 96361 HYDRATE IV INFUSION ADD-ON: CPT

## 2023-02-01 PROCEDURE — 83690 ASSAY OF LIPASE: CPT | Performed by: NURSE PRACTITIONER

## 2023-02-01 PROCEDURE — 25000003 PHARM REV CODE 250: Performed by: NURSE PRACTITIONER

## 2023-02-01 PROCEDURE — 99284 EMERGENCY DEPT VISIT MOD MDM: CPT | Mod: 25

## 2023-02-01 PROCEDURE — 63600175 PHARM REV CODE 636 W HCPCS: Performed by: NURSE PRACTITIONER

## 2023-02-01 PROCEDURE — 80053 COMPREHEN METABOLIC PANEL: CPT | Performed by: NURSE PRACTITIONER

## 2023-02-01 PROCEDURE — 81003 URINALYSIS AUTO W/O SCOPE: CPT | Performed by: NURSE PRACTITIONER

## 2023-02-01 PROCEDURE — 85025 COMPLETE CBC W/AUTO DIFF WBC: CPT | Performed by: NURSE PRACTITIONER

## 2023-02-01 RX ORDER — CYCLOBENZAPRINE HCL 5 MG
10 TABLET ORAL
Status: COMPLETED | OUTPATIENT
Start: 2023-02-01 | End: 2023-02-01

## 2023-02-01 RX ORDER — HYDROCODONE BITARTRATE AND ACETAMINOPHEN 5; 325 MG/1; MG/1
1 TABLET ORAL EVERY 6 HOURS PRN
Qty: 8 TABLET | Refills: 0 | Status: SHIPPED | OUTPATIENT
Start: 2023-02-01 | End: 2023-04-05

## 2023-02-01 RX ORDER — CYCLOBENZAPRINE HCL 10 MG
10 TABLET ORAL 3 TIMES DAILY PRN
Qty: 15 TABLET | Refills: 0 | Status: SHIPPED | OUTPATIENT
Start: 2023-02-01 | End: 2023-02-06

## 2023-02-01 RX ORDER — ONDANSETRON 2 MG/ML
4 INJECTION INTRAMUSCULAR; INTRAVENOUS
Status: COMPLETED | OUTPATIENT
Start: 2023-02-01 | End: 2023-02-01

## 2023-02-01 RX ORDER — ACETAMINOPHEN 500 MG
1000 TABLET ORAL
Status: COMPLETED | OUTPATIENT
Start: 2023-02-01 | End: 2023-02-01

## 2023-02-01 RX ADMIN — ONDANSETRON 4 MG: 2 INJECTION INTRAMUSCULAR; INTRAVENOUS at 07:02

## 2023-02-01 RX ADMIN — ACETAMINOPHEN 1000 MG: 500 TABLET ORAL at 08:02

## 2023-02-01 RX ADMIN — SODIUM CHLORIDE 1000 ML: 9 INJECTION, SOLUTION INTRAVENOUS at 07:02

## 2023-02-01 RX ADMIN — CYCLOBENZAPRINE HYDROCHLORIDE 10 MG: 5 TABLET, FILM COATED ORAL at 08:02

## 2023-02-01 NOTE — FIRST PROVIDER EVALUATION
Medical screening examination initiated.  I have conducted a focused provider triage encounter, findings are as follows:    Brief history of present illness:  Patient complains of foot cramps abdominal pain and nausea vomiting with headache.    Vitals:    02/01/23 1512   BP: 133/74   BP Location: Right arm   Patient Position: Sitting   Pulse: 108   Resp: 16   Temp: 99.5 °F (37.5 °C)   TempSrc: Oral   SpO2: 98%   Weight: 75.4 kg (166 lb 5.4 oz)       Pertinent physical exam:  No acute distress    Brief workup plan:  Labs fluids    Preliminary workup initiated; this workup will be continued and followed by the physician or advanced practice provider that is assigned to the patient when roomed.

## 2023-02-01 NOTE — Clinical Note
"Genny Leung" Marily was seen and treated in our emergency department on 2/1/2023.  She may return to work on 02/04/2023.       If you have any questions or concerns, please don't hesitate to call.      Brent Robles NP"

## 2023-02-02 NOTE — ED NOTES
Bed: TL 04  Expected date:   Expected time:   Means of arrival: Personal Transportation  Comments:

## 2023-02-03 NOTE — ED PROVIDER NOTES
"Encounter Date: 2/1/2023       History     Chief Complaint   Patient presents with    Foot Pain     Cramping in bilateral feet onset this AM. States pain was so bad began vomiting.       Patient complains of severe muscle cramps today.  Denies exacerbating or mitigating factors did not take anything      Review of patient's allergies indicates:   Allergen Reactions    Erythromycin Other (See Comments)     Stomach cramps    Morphine Nausea And Vomiting     "Projectile vomiting"     Past Medical History:   Diagnosis Date    Encounter for blood transfusion     KENN (generalized anxiety disorder)     Takes 5-10 mg of valium qd prn anxiety (prescriptions for both on file, one from [facility] & other from )    Insomnia     Takes 5-10 mg of valium qhs prn insomnia (prescriptions for both on file, one from [facility] & other from )    Migraine headache     Nephrolithiasis 08/03/2019    Left ureteral stone -> UTI c/b pyelonephritis and urosepsis w/ hypokalemia -> transfered to [facility] urology service from [facility] after CT abn dx, s/p 2 stents to allow infx to drain, IV Vanc/Rocephin, fever free since yesterday    Reflex sympathetic dystrophy     UTI (urinary tract infection)     Vertigo      Past Surgical History:   Procedure Laterality Date    BLADDER SURGERY      CHOLECYSTECTOMY      COLONOSCOPY N/A 11/10/2021    Procedure: COLONOSCOPY;  Surgeon: Eryn Rothman MD;  Location: [facility];  Service: Endoscopy;  Laterality: N/A;    CYSTOSCOPY WITH URETEROSCOPY, RETROGRADE PYELOGRAPHY, AND INSERTION OF STENT Right 9/30/2021    Procedure: CYSTOSCOPY, WITH RETROGRADE PYELOGRAM AND URETERAL STENT INSERTION;  Surgeon: Annita Gamino MD;  Location: [facility];  Service: Urology;  Laterality: Right;    DIAGNOSTIC LAPAROSCOPY N/A 11/11/2020    Procedure: LAPAROSCOPY, DIAGNOSTIC;  Surgeon: Tee Segura MD;  Location: [facility];  Service: General;  Laterality: N/A;  CONVERTED TO OPEN    DIAGNOSTIC LAPAROSCOPY N/A 7/25/2022    " Procedure: LAPAROSCOPY, DIAGNOSTIC;  Surgeon: Jo Manzano DO;  Location: [facility] OR;  Service: General;  Laterality: N/A;  convert to open at 0739    ESOPHAGOGASTRODUODENOSCOPY N/A 11/10/2021    Procedure: EGD (ESOPHAGOGASTRODUODENOSCOPY);  Surgeon: Eryn Rothman MD;  Location: [facility] ENDO;  Service: Endoscopy;  Laterality: N/A;    facet injections  02/14/2017    L3, L4, L5, S1 Done by Dr. Lara    HYSTERECTOMY      INJECTION OF ANESTHETIC AGENT INTO TISSUE PLANE DEFINED BY TRANSVERSUS ABDOMINIS MUSCLE N/A 11/11/2020    Procedure: BLOCK, TRANSVERSUS ABDOMINIS PLANE;  Surgeon: Tee Segura MD;  Location: [facility] OR;  Service: General;  Laterality: N/A;    INJECTION OF ANESTHETIC AGENT INTO TISSUE PLANE DEFINED BY TRANSVERSUS ABDOMINIS MUSCLE N/A 7/25/2022    Procedure: BLOCK, TRANSVERSUS ABDOMINIS PLANE;  Surgeon: Jo Manzano DO;  Location: [facility] OR;  Service: General;  Laterality: N/A;    KNEE SURGERY      LAPAROSCOPIC LYSIS OF ADHESIONS N/A 11/11/2020    Procedure: LYSIS, ADHESIONS, LAPAROSCOPIC;  Surgeon: Tee Segura MD;  Location: [facility] OR;  Service: General;  Laterality: N/A;  CONVERTED TO OPEN    LAPAROTOMY N/A 11/20/2020    Procedure: LAPAROTOMY;  Surgeon: Tee Segura MD;  Location: [facility] OR;  Service: General;  Laterality: N/A;    LASER LITHOTRIPSY Left 2/8/2021    Procedure: LITHOTRIPSY, USING LASER;  Surgeon: Irving Kidd MD;  Location: [facility] OR;  Service: Urology;  Laterality: Left;    LASER LITHOTRIPSY Right 10/13/2021    Procedure: LITHOTRIPSY, USING LASER;  Surgeon: Annita Gamino MD;  Location: [facility] OR;  Service: Urology;  Laterality: Right;    LYSIS OF ADHESIONS N/A 11/11/2020    Procedure: LYSIS, ADHESIONS;  Surgeon: Tee Segura MD;  Location: [facility] OR;  Service: General;  Laterality: N/A;    LYSIS OF ADHESIONS N/A 11/20/2020    Procedure: LYSIS, ADHESIONS;  Surgeon: Tee Segura MD;  Location: [facility] OR;  Service: General;  Laterality: N/A;     LYSIS OF ADHESIONS N/A 7/25/2022    Procedure: LYSIS, ADHESIONS;  Surgeon: Jo Manzano DO;  Location: [facility] OR;  Service: General;  Laterality: N/A;    SURGICAL REMOVAL OF ILEUM WITH CECUM  7/25/2022    Procedure: EXCISION, CECUM WITH ILEUM;  Surgeon: Jo Manzano DO;  Location: [facility] OR;  Service: General;;    TONSILLECTOMY      URETEROSCOPY Left 2/8/2021    Procedure: URETEROSCOPY;  Surgeon: Irving Kidd MD;  Location: [facility] OR;  Service: Urology;  Laterality: Left;  stent placement    URETEROSCOPY Right 10/13/2021    Procedure: URETEROSCOPY;  Surgeon: Annita Gamino MD;  Location: [facility] OR;  Service: Urology;  Laterality: Right;     Family History   Problem Relation Age of Onset    Stroke Mother     Hypertension Mother     COPD Father     Drug abuse Brother      Social History     Tobacco Use    Smoking status: Never    Smokeless tobacco: Never   Substance Use Topics    Alcohol use: Yes     Comment: rarely    Drug use: No     Review of Systems   Constitutional:  Negative for fever.   HENT:  Negative for sore throat.    Respiratory:  Negative for shortness of breath.    Cardiovascular:  Negative for chest pain.   Gastrointestinal:  Negative for nausea.   Genitourinary:  Negative for dysuria.   Musculoskeletal:  Negative for back pain.   Skin:  Negative for rash.   Neurological:  Negative for weakness.   Hematological:  Does not bruise/bleed easily.     Physical Exam     Initial Vitals [02/01/23 1512]   BP Pulse Resp Temp SpO2   133/74 108 16 99.5 °F (37.5 °C) 98 %      MAP       --         Physical Exam    Nursing note and vitals reviewed.  Constitutional: She appears well-developed and well-nourished. She is not diaphoretic. She is active.  Non-toxic appearance. No distress.   HENT:   Head: Normocephalic and atraumatic.   Eyes: Conjunctivae are normal. Right eye exhibits no discharge. Left eye exhibits no discharge. No scleral icterus.   Neck:   Normal range of motion.  Cardiovascular:   Normal rate, regular rhythm and intact distal pulses.           No murmur heard.  Bilateral dorsal pedal pulses +1, there is no skin breakdown skin is cool cap refill is less than 3 seconds. There is no calf edema or tenderness or redness   Pulmonary/Chest: Breath sounds normal. No respiratory distress. She has no wheezes.   Abdominal: She exhibits no distension.   Musculoskeletal:         General: No tenderness. Normal range of motion.      Cervical back: Normal range of motion.     Neurological: She is alert and oriented to person, place, and time. No cranial nerve deficit. GCS score is 15. GCS eye subscore is 4. GCS verbal subscore is 5. GCS motor subscore is 6.   Skin: Skin is warm and dry. Capillary refill takes less than 2 seconds. No rash noted.   Psychiatric: She has a normal mood and affect. Her behavior is normal. Judgment and thought content normal.       ED Course   Procedures  Labs Reviewed   CBC W/ AUTO DIFFERENTIAL - Abnormal; Notable for the following components:       Result Value    MCH 33.2 (*)     All other components within normal limits   COMPREHENSIVE METABOLIC PANEL - Abnormal; Notable for the following components:    Total Bilirubin 1.4 (*)     All other components within normal limits   URINALYSIS, REFLEX TO URINE CULTURE - Abnormal; Notable for the following components:    Color, UA Colorless (*)     All other components within normal limits    Narrative:     Specimen Source->Urine   LIPASE          Imaging Results    None          Medications   sodium chloride 0.9% bolus 1,000 mL 1,000 mL (0 mLs Intravenous Stopped 2/1/23 2055)   ondansetron injection 4 mg (4 mg Intravenous Given 2/1/23 1908)   acetaminophen tablet 1,000 mg (1,000 mg Oral Given 2/1/23 2043)   cyclobenzaprine tablet 10 mg (10 mg Oral Given 2/1/23 2043)                              Clinical Impression:   Final diagnoses:  [R25.2] Muscle cramps (Primary)        ED Disposition Condition    Discharge Stable          ED  Prescriptions       Medication Sig Dispense Start Date End Date Auth. Provider    cyclobenzaprine (FLEXERIL) 10 MG tablet Take 1 tablet (10 mg total) by mouth 3 (three) times daily as needed for Muscle spasms. 15 tablet 2/1/2023 2/6/2023 Brent Robles NP    HYDROcodone-acetaminophen (NORCO) 5-325 mg per tablet Take 1 tablet by mouth every 6 (six) hours as needed for Pain. 8 tablet 2/1/2023 -- Brent Robles NP          Follow-up Information       Follow up With Specialties Details Why Contact Info Additional Information    [facility] Neurology [facility] Neurology Schedule an appointment as soon as possible for a visit  As needed [id number] [facility] [id number] [phone] Please park on the [address] side and use the Clinic entrance. Check in on the 1st floor, to the right across from the café.             Brent Robles NP  02/02/23 1953

## 2023-02-15 ENCOUNTER — PATIENT MESSAGE (OUTPATIENT)
Dept: SURGERY | Facility: CLINIC | Age: 58
End: 2023-02-15
Payer: MEDICARE

## 2023-02-15 ENCOUNTER — PATIENT MESSAGE (OUTPATIENT)
Dept: UROLOGY | Facility: CLINIC | Age: 58
End: 2023-02-15
Payer: MEDICARE

## 2023-02-15 ENCOUNTER — PATIENT MESSAGE (OUTPATIENT)
Dept: GASTROENTEROLOGY | Facility: CLINIC | Age: 58
End: 2023-02-15
Payer: MEDICARE

## 2023-04-02 DIAGNOSIS — F41.9 ANXIETY: ICD-10-CM

## 2023-04-02 NOTE — TELEPHONE ENCOUNTER
No new care gaps identified.  [facility] Embedded Care Gaps. Reference number: [id number]. 4/02/2023   3:15:33 PM CDT

## 2023-04-03 RX ORDER — SPIRONOLACTONE 50 MG/1
50 TABLET, FILM COATED ORAL 2 TIMES DAILY
Qty: 180 TABLET | Refills: 1 | Status: SHIPPED | OUTPATIENT
Start: 2023-04-03 | End: 2023-08-07 | Stop reason: SDUPTHER

## 2023-04-03 RX ORDER — DIAZEPAM 10 MG/1
TABLET ORAL
Qty: 90 TABLET | Refills: 0 | OUTPATIENT
Start: 2023-04-03

## 2023-04-03 NOTE — TELEPHONE ENCOUNTER
Requested Prescriptions     Pending Prescriptions Disp Refills    spironolactone (ALDACTONE) 50 MG tablet 180 tablet 1     Sig: Take 1 tablet (50 mg total) by mouth 2 (two) times daily.    diazePAM (VALIUM) 10 MG Tab 90 tablet 0     Sig: TAKE 1 TABLET BY MOUTH EVERY DAY AS NEEDED Strength: 10 mg     LV 10/03/2022   NV none   LF 12/30/2022 and 12/15/2022

## 2023-04-04 ENCOUNTER — PATIENT MESSAGE (OUTPATIENT)
Dept: INTERNAL MEDICINE | Facility: CLINIC | Age: 58
End: 2023-04-04
Payer: MEDICARE

## 2023-04-05 ENCOUNTER — OFFICE VISIT (OUTPATIENT)
Dept: INTERNAL MEDICINE | Facility: CLINIC | Age: 58
End: 2023-04-05
Payer: MEDICARE

## 2023-04-05 VITALS
SYSTOLIC BLOOD PRESSURE: 122 MMHG | WEIGHT: 169.06 LBS | DIASTOLIC BLOOD PRESSURE: 80 MMHG | HEIGHT: 67 IN | OXYGEN SATURATION: 98 % | HEART RATE: 97 BPM | BODY MASS INDEX: 26.53 KG/M2

## 2023-04-05 DIAGNOSIS — G43.909 MIGRAINE WITHOUT STATUS MIGRAINOSUS, NOT INTRACTABLE, UNSPECIFIED MIGRAINE TYPE: Primary | ICD-10-CM

## 2023-04-05 DIAGNOSIS — F41.9 ANXIETY: ICD-10-CM

## 2023-04-05 DIAGNOSIS — F41.1 GAD (GENERALIZED ANXIETY DISORDER): ICD-10-CM

## 2023-04-05 DIAGNOSIS — Z12.31 SCREENING MAMMOGRAM FOR BREAST CANCER: ICD-10-CM

## 2023-04-05 PROCEDURE — 99999 PR PBB SHADOW E&M-EST. PATIENT-LVL IV: ICD-10-PCS | Mod: PBBFAC,,, | Performed by: INTERNAL MEDICINE

## 2023-04-05 PROCEDURE — 99213 PR OFFICE/OUTPT VISIT, EST, LEVL III, 20-29 MIN: ICD-10-PCS | Mod: S$PBB,,, | Performed by: INTERNAL MEDICINE

## 2023-04-05 PROCEDURE — 99999 PR PBB SHADOW E&M-EST. PATIENT-LVL IV: CPT | Mod: PBBFAC,,, | Performed by: INTERNAL MEDICINE

## 2023-04-05 PROCEDURE — 99214 OFFICE O/P EST MOD 30 MIN: CPT | Mod: PBBFAC,PO | Performed by: INTERNAL MEDICINE

## 2023-04-05 PROCEDURE — 99213 OFFICE O/P EST LOW 20 MIN: CPT | Mod: S$PBB,,, | Performed by: INTERNAL MEDICINE

## 2023-04-05 RX ORDER — NAPROXEN 500 MG/1
500 TABLET ORAL 2 TIMES DAILY
COMMUNITY
Start: 2022-12-06 | End: 2024-02-15

## 2023-04-05 RX ORDER — DIAZEPAM 10 MG/1
TABLET ORAL
Qty: 90 TABLET | Refills: 1 | Status: SHIPPED | OUTPATIENT
Start: 2023-04-05 | End: 2023-05-30 | Stop reason: SDUPTHER

## 2023-04-05 NOTE — PROGRESS NOTES
"HPI:  Pt comes for refills of her valium. She has been under a lot stress with family issues. She o/w has been doing well except for some muscle cramping in her LE. She has an apt to see a neurologist tomorrow for evaluation.    Current meds have been verified and updated per the EMR  Exam:/80 (BP Location: Right arm)   Pulse 97   Ht 5' 7" (1.702 m)   Wt 76.7 kg (169 lb 1.5 oz)   SpO2 98%   BMI 26.48 kg/m²   Exam deferred    Lab Results   Component Value Date    WBC 6.71 02/01/2023    HGB 15.3 02/01/2023    HCT 44.4 02/01/2023     02/01/2023    CHOL 140 10/20/2020    TRIG 103 10/20/2020    HDL 55 10/20/2020    ALT 22 02/01/2023    AST 27 02/01/2023     02/01/2023    K 5.1 02/01/2023     02/01/2023    CREATININE 0.8 02/01/2023    BUN 18 02/01/2023    CO2 25 02/01/2023    TSH 0.517 08/29/2022    INR 1.0 05/09/2022    HGBA1C 5.8 03/07/2017       Impression:  Stable problems below  Patient Active Problem List   Diagnosis    Anxiety    Bilateral low back pain with right-sided sciatica    Vertigo    Right nephrolithiasis    Ureteral stone    KENN (generalized anxiety disorder)    Migraine headache    Hydronephrosis    SBO (small bowel obstruction)       Plan:  Orders Placed This Encounter    Mammo Digital Screening Bilat w/ Scott    diazePAM (VALIUM) 10 MG Tab     Valium refilled, she will see me in six mths with mmg.     This note is generated with speech recognition software and is subject to transcription error and sound alike phrases that may be missed by proofreading.      "

## 2023-04-06 ENCOUNTER — OFFICE VISIT (OUTPATIENT)
Dept: NEUROSURGERY | Facility: CLINIC | Age: 58
End: 2023-04-06
Payer: MEDICARE

## 2023-04-06 VITALS — WEIGHT: 167.56 LBS | HEIGHT: 67 IN | RESPIRATION RATE: 18 BRPM | BODY MASS INDEX: 26.3 KG/M2

## 2023-04-06 DIAGNOSIS — M54.9 DORSALGIA, UNSPECIFIED: ICD-10-CM

## 2023-04-06 DIAGNOSIS — M48.062 LUMBAR STENOSIS WITH NEUROGENIC CLAUDICATION: ICD-10-CM

## 2023-04-06 DIAGNOSIS — M54.16 LUMBAR RADICULOPATHY: ICD-10-CM

## 2023-04-06 DIAGNOSIS — M51.36 DEGENERATIVE DISC DISEASE, LUMBAR: Primary | ICD-10-CM

## 2023-04-06 PROCEDURE — 99999 PR PBB SHADOW E&M-EST. PATIENT-LVL III: CPT | Mod: PBBFAC,,, | Performed by: NEUROLOGICAL SURGERY

## 2023-04-06 PROCEDURE — 99203 PR OFFICE/OUTPT VISIT, NEW, LEVL III, 30-44 MIN: ICD-10-PCS | Mod: S$PBB,,, | Performed by: NEUROLOGICAL SURGERY

## 2023-04-06 PROCEDURE — 99213 OFFICE O/P EST LOW 20 MIN: CPT | Mod: PBBFAC,PO | Performed by: NEUROLOGICAL SURGERY

## 2023-04-06 PROCEDURE — 99203 OFFICE O/P NEW LOW 30 MIN: CPT | Mod: S$PBB,,, | Performed by: NEUROLOGICAL SURGERY

## 2023-04-06 PROCEDURE — 99999 PR PBB SHADOW E&M-EST. PATIENT-LVL III: ICD-10-PCS | Mod: PBBFAC,,, | Performed by: NEUROLOGICAL SURGERY

## 2023-04-06 RX ORDER — DOXEPIN HYDROCHLORIDE 25 MG/1
25 CAPSULE ORAL NIGHTLY PRN
COMMUNITY
Start: 2023-04-05 | End: 2023-09-18

## 2023-04-06 NOTE — PROGRESS NOTES
Subjective:      Patient ID: Genny Calixto is a 57 y.o. female.    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   HENT:  Negative for hearing loss, sore throat and tinnitus.    Eyes:  Negative for pain, discharge and itching.   Cardiovascular:  Negative for chest pain.   Gastrointestinal:  Negative for abdominal pain.   Endocrine: Negative for cold intolerance and heat intolerance.   Genitourinary:  Negative for difficulty urinating and dysuria.   Musculoskeletal:  Positive for back pain and gait problem.   Allergic/Immunologic: Negative for environmental allergies.   Neurological:  Positive for weakness. Negative for dizziness, tremors, light-headedness and headaches.   Hematological:  Negative for adenopathy.   Psychiatric/Behavioral:  Negative for agitation, behavioral problems and confusion.        Objective:       Neurosurgery Physical Exam  Ortho/SPM Exam  Ortho Exam    Nursing note and vitals reviewed  Gen:Oriented to person, place, and time.             Appears stated age   Skin: no rashes or lesions identified   Head:Normocephalic and atraumatic.  Nose: Nose normal.    Eyes: EOM are normal. Pupils are equal, round, and  reactive to light.   Neck: Neck supple. No masses or lesions palpated  Cardiovascular: Intact distal pulses.    Abdominal: Soft.   Neurological: Alert and oriented to person, place, and time.  No cranial nerve deficit.  Coordination normal. Normal muscle tone  Psychiatric: Normal mood and affect. Behavior is normal.      Back:       None  Paraspinal muscle spasms   None  Pain with flexion and extention   WNL  Range of motion    Neg  Straight leg raise     Motor   Right Right Left Left  Level Group   5  5  L2 Hip flexor (Psoas)   5  5  L3 Leg extension (Quads)   5  5  L4 Dorsiflexion & foot inversion (Tibialis Anterior)   5  5  L5 Great toe extension ( EHL)   5  5  S1 Foot eversion (Gastroc, PL & PB)     Sensation  NL Decreased (R/L/BL) Level Sensation    X  L2 Anterio-medial thigh   X  L3 Medial thigh around knee   X  L4 Medial foot   X  L5 Dorsum foot   X  S1 Lateral foot     Reflex  2+  Patellar tendon (L4)   2+  Achilles tendon (S1)         Assessment:     1. Degenerative disc disease, lumbar    2. Dorsalgia, unspecified    3. Lumbar stenosis with neurogenic claudication    4. Lumbar radiculopathy      Plan:     Degenerative disc disease, lumbar    Dorsalgia, unspecified    Lumbar stenosis with neurogenic claudication    Lumbar radiculopathy    Patient with back and lower extremity symptoms   CT abdomen and pelvis in the past shows multiple level DDD lumbar spine   No MR imaging   Symptoms present for over 6 months   Will order MR lumbar spine without contrast   Follow up after imaging completed        Thank you for the referral   Please call with any questions    Billy Gee MD  Neurosurgery     Disclaimer: This note was prepared using a voice recognition system and is likely to have sound alike errors within the text.

## 2023-04-30 ENCOUNTER — OFFICE VISIT (OUTPATIENT)
Dept: URGENT CARE | Facility: CLINIC | Age: 58
End: 2023-04-30
Payer: MEDICARE

## 2023-04-30 VITALS
HEIGHT: 67 IN | BODY MASS INDEX: 26.21 KG/M2 | SYSTOLIC BLOOD PRESSURE: 122 MMHG | TEMPERATURE: 98 F | HEART RATE: 100 BPM | DIASTOLIC BLOOD PRESSURE: 67 MMHG | RESPIRATION RATE: 16 BRPM | OXYGEN SATURATION: 98 % | WEIGHT: 167 LBS

## 2023-04-30 DIAGNOSIS — R09.82 POST-NASAL DRIP: ICD-10-CM

## 2023-04-30 DIAGNOSIS — J01.40 ACUTE NON-RECURRENT PANSINUSITIS: Primary | ICD-10-CM

## 2023-04-30 DIAGNOSIS — R05.9 COUGH, UNSPECIFIED TYPE: ICD-10-CM

## 2023-04-30 LAB
CTP QC/QA: YES
SARS-COV-2 AG RESP QL IA.RAPID: NEGATIVE

## 2023-04-30 PROCEDURE — 99213 PR OFFICE/OUTPT VISIT, EST, LEVL III, 20-29 MIN: ICD-10-PCS | Mod: CR,S$GLB,, | Performed by: NURSE PRACTITIONER

## 2023-04-30 PROCEDURE — 99213 OFFICE O/P EST LOW 20 MIN: CPT | Mod: CR,S$GLB,, | Performed by: NURSE PRACTITIONER

## 2023-04-30 PROCEDURE — 87811 SARS-COV-2 COVID19 W/OPTIC: CPT | Mod: QW,CR,S$GLB, | Performed by: NURSE PRACTITIONER

## 2023-04-30 PROCEDURE — 87811 SARS CORONAVIRUS 2 ANTIGEN POCT, MANUAL READ: ICD-10-PCS | Mod: QW,CR,S$GLB, | Performed by: NURSE PRACTITIONER

## 2023-04-30 RX ORDER — FLUTICASONE PROPIONATE 50 MCG
1 SPRAY, SUSPENSION (ML) NASAL DAILY
Qty: 16 ML | Refills: 0 | Status: SHIPPED | OUTPATIENT
Start: 2023-04-30 | End: 2023-08-21

## 2023-04-30 RX ORDER — PREDNISONE 20 MG/1
20 TABLET ORAL DAILY
Qty: 3 TABLET | Refills: 0 | Status: SHIPPED | OUTPATIENT
Start: 2023-04-30 | End: 2023-05-03

## 2023-04-30 RX ORDER — LEVOCETIRIZINE DIHYDROCHLORIDE 5 MG/1
5 TABLET, FILM COATED ORAL NIGHTLY
Qty: 30 TABLET | Refills: 1 | Status: SHIPPED | OUTPATIENT
Start: 2023-04-30 | End: 2024-02-15

## 2023-04-30 RX ORDER — BENZONATATE 100 MG/1
100 CAPSULE ORAL 3 TIMES DAILY PRN
Qty: 30 CAPSULE | Refills: 0 | Status: SHIPPED | OUTPATIENT
Start: 2023-04-30 | End: 2023-05-10

## 2023-04-30 NOTE — PATIENT INSTRUCTIONS
PLEASE READ YOUR DISCHARGE INSTRUCTIONS ENTIRELY AS IT CONTAINS IMPORTANT INFORMATION.    Please drink plenty of fluids.    Please get plenty of rest.    Please return here or go to the Emergency Department for any concerns or worsening of condition.    You can try breathe right strips at night to help you breathe.  A cool mist humidifier in bedroom may help with cough and relieve stuffy nose.     Please take an over the counter antihistamine medication (Allegra/Claritin/Zyrtec) of your choice as directed for allergy symptoms and/or runny nose and postnasal drip.    Try an over the counter decongestant for sinus pressure/ear pressure, congestion symptoms like Mucinex D or Sudafed or Phenylephrine. You buy this behind the pharmacy counter.    If you do have Hypertension or palpitations, it is safe to take Coricidin HBP for relief of sinus symptoms.    Certain OTC cough and cold medications may raise your blood pressure. To keep your blood pressure regulated avoid over-the-counter decongestants and multisymptom cold remedies that contain decongestants -- such as pseudoephedrine, ephedrine, phenylephrine, naphazoline and oxymetazoline. Also, check the label for high sodium content, which can also raise blood pressure.       Tylenol or ibuprofen can also be used as directed for pain and fever unless you have an allergy to them or medical condition such as stomach ulcers, kidney or liver disease or blood thinners etc for which you should not be taking these type of medications.     Sore throat recommendations: Warm fluids, warm salt water gargles, throat lozenges, tea, honey, soup, rest, hydration.    Use  Flonase  one spray each nostril twice daily OR two sprays each nostril once daily until nares dry out, unless you have Glaucoma.   Can also supplement with nasal saline rinse.    Sinus rinses DO NOT USE TAP WATER, if you must, water must be at a rolling boil for 1 minute, let it cool, then use.  May use distilled  water, or over the counter nasal saline rinses.  Man's vapor rub in shower to help open nasal passages.  May use nasal gel to keep passages moisturized.  May use nasal saline sprays during the day for added relief of congestion.   For those who go to the gym, please do not use the sauna or steam room now to clear sinuses.    Cough     Rest and fluids are important  Can use honey with allison to soothe your throat    Robitussin or Delsyum for cough suppressant for dry cough.    Mucinex DM or products containing Guaifenesin or Dextromethorphan for expectorant (wet cough).    Take prescription cough meds (pills) as prescribed;   Please follow up with your primary care doctor or specialist in the next 48-72hrs as needed and if no improvement    If you smoke, please stop smoking.    Please return or see your primary care doctor if you develop new or worsening symptoms.     Lastly, good hand washing and cough hygiene (cough into your elbow) will help prevent the spread of the illness. A general rule is that you are no longer contagious once you have been without a fever for over 24 hours without requiring fever reducing medications.     Please arrange follow up with your primary medical clinic as soon as possible. You must understand that you've received an Urgent Care treatment only and that you may be released before all of your medical problems are known or treated. You, the patient, will arrange for follow up as instructed. If your symptoms worsen or fail to improve you should go to the Emergency Room.

## 2023-04-30 NOTE — PROGRESS NOTES
"Subjective:      Patient ID: Genny Calixto is a 57 y.o. female.    Vitals:  height is 5' 7" (1.702 m) and weight is 75.8 kg (167 lb). Her tympanic temperature is 98.3 °F (36.8 °C). Her blood pressure is 122/67 and her pulse is 100. Her respiration is 16 and oxygen saturation is 98%.     Chief Complaint: Cough    Genny Calixto is a 57 year old female whom presents to urgent care with complaints of  cough, sneezing, ears popping and sore throat.  Symptoms started 3 days ago.  Sudafed and Claritin taken with no relief. Patient denies any known sick contacts.     Cough  This is a new problem. The cough is Productive of sputum and productive of blood-tinged sputum. Associated symptoms include chills, ear congestion, a fever (100.2) and a sore throat. Pertinent negatives include no chest pain, ear pain, headaches, heartburn, hemoptysis, myalgias, nasal congestion, postnasal drip, rash, rhinorrhea, shortness of breath, sweats, weight loss or wheezing.     Constitution: Positive for chills and fever (100.2).   HENT:  Positive for sore throat. Negative for ear pain and postnasal drip.    Cardiovascular:  Negative for chest pain.   Respiratory:  Positive for cough. Negative for bloody sputum, shortness of breath and wheezing.    Gastrointestinal:  Negative for heartburn.   Musculoskeletal:  Negative for muscle ache.   Skin:  Negative for rash.   Neurological:  Negative for headaches.    Objective:     Physical Exam   Constitutional: She is oriented to person, place, and time. She appears well-developed. She is cooperative.  Non-toxic appearance. She does not appear ill. No distress.   HENT:   Head: Normocephalic and atraumatic.   Ears:   Right Ear: Hearing, tympanic membrane, external ear and ear canal normal.   Left Ear: Hearing, tympanic membrane, external ear and ear canal normal.   Nose: Congestion present. No mucosal edema, rhinorrhea or nasal deformity. No epistaxis. Right sinus exhibits maxillary " sinus tenderness and frontal sinus tenderness. Left sinus exhibits maxillary sinus tenderness and frontal sinus tenderness.   Mouth/Throat: Uvula is midline, oropharynx is clear and moist and mucous membranes are normal. Mucous membranes are moist. No trismus in the jaw. Normal dentition. No uvula swelling. No oropharyngeal exudate, posterior oropharyngeal edema or posterior oropharyngeal erythema. Oropharynx is clear.   Eyes: Conjunctivae and lids are normal. No scleral icterus.   Neck: Trachea normal and phonation normal. Neck supple. No edema present. No erythema present. No neck rigidity present.   Cardiovascular: Normal rate, regular rhythm, normal heart sounds and normal pulses.   Pulmonary/Chest: Effort normal and breath sounds normal. No respiratory distress. She has no decreased breath sounds. She has no rhonchi.   Abdominal: Normal appearance.   Musculoskeletal: Normal range of motion.         General: No deformity. Normal range of motion.   Neurological: She is alert and oriented to person, place, and time. She exhibits normal muscle tone. Coordination normal.   Skin: Skin is warm, dry, intact, not diaphoretic and not pale.   Psychiatric: Her speech is normal and behavior is normal. Judgment and thought content normal.   Nursing note and vitals reviewed.  Results for orders placed or performed in visit on 04/30/23   SARS Coronavirus 2 Antigen, POCT Manual Read   Result Value Ref Range    SARS Coronavirus 2 Antigen Negative Negative     Acceptable Yes        Assessment:     1. Acute non-recurrent pansinusitis    2. Cough, unspecified type    3. Post-nasal drip        Plan:       Acute non-recurrent pansinusitis  -     levocetirizine (XYZAL) 5 MG tablet; Take 1 tablet (5 mg total) by mouth every evening.  Dispense: 30 tablet; Refill: 1  -     fluticasone propionate (FLONASE) 50 mcg/actuation nasal spray; 1 spray (50 mcg total) by Each Nostril route once daily.  Dispense: 16 mL; Refill: 0  -      predniSONE (DELTASONE) 20 MG tablet; Take 1 tablet (20 mg total) by mouth once daily. for 3 days  Dispense: 3 tablet; Refill: 0    Cough, unspecified type  -     SARS Coronavirus 2 Antigen, POCT Manual Read  -     benzonatate (TESSALON) 100 MG capsule; Take 1 capsule (100 mg total) by mouth 3 (three) times daily as needed for Cough (Do not take more than 6 capsules in a 24 hour period.).  Dispense: 30 capsule; Refill: 0  -     predniSONE (DELTASONE) 20 MG tablet; Take 1 tablet (20 mg total) by mouth once daily. for 3 days  Dispense: 3 tablet; Refill: 0    Post-nasal drip  -     levocetirizine (XYZAL) 5 MG tablet; Take 1 tablet (5 mg total) by mouth every evening.  Dispense: 30 tablet; Refill: 1  -     fluticasone propionate (FLONASE) 50 mcg/actuation nasal spray; 1 spray (50 mcg total) by Each Nostril route once daily.  Dispense: 16 mL; Refill: 0          Medical Decision Making:   Differential Diagnosis:   Bronchitis,  Viral upper respiratory infection, Bacterial upper respiratory infection, Pneumonia, covid19, influenza,bacterial vs viral sinusitis.     Clinical Tests:   Lab Tests: Ordered and Reviewed       <> Summary of Lab: Covid negative  Urgent Care Management:  Previous encounters and labs were independently reviewed. Discussed negative results with patient. Patient verbalized understanding. Educated patient on viral vs bacterial sinus infection. Patient has been taking Claritin for a long time. Will switch patients antihistamine. Discussed resistance and the need to switch antihistamine with the patient. Additional plan of care as outlined above. Discussed symptomatic treatment such as an OTC decongestant, normal saline nasal spray. If symptoms do not resolve, follow up PCP for further evaluation. Report to the nearest Er with any new or concerning symptoms.  Patient vitals stable. Patient in no acute respiratory distress. Discussed discharge instructions with patient, she has no further questions at this  time. Patient exits exam room in no distress.        Patient Instructions   PLEASE READ YOUR DISCHARGE INSTRUCTIONS ENTIRELY AS IT CONTAINS IMPORTANT INFORMATION.    Please drink plenty of fluids.    Please get plenty of rest.    Please return here or go to the Emergency Department for any concerns or worsening of condition.    You can try breathe right strips at night to help you breathe.  A cool mist humidifier in bedroom may help with cough and relieve stuffy nose.     Please take an over the counter antihistamine medication (Allegra/Claritin/Zyrtec) of your choice as directed for allergy symptoms and/or runny nose and postnasal drip.    Try an over the counter decongestant for sinus pressure/ear pressure, congestion symptoms like Mucinex D or Sudafed or Phenylephrine. You buy this behind the pharmacy counter.    If you do have Hypertension or palpitations, it is safe to take Coricidin HBP for relief of sinus symptoms.    Certain OTC cough and cold medications may raise your blood pressure. To keep your blood pressure regulated avoid over-the-counter decongestants and multisymptom cold remedies that contain decongestants -- such as pseudoephedrine, ephedrine, phenylephrine, naphazoline and oxymetazoline. Also, check the label for high sodium content, which can also raise blood pressure.       Tylenol or ibuprofen can also be used as directed for pain and fever unless you have an allergy to them or medical condition such as stomach ulcers, kidney or liver disease or blood thinners etc for which you should not be taking these type of medications.     Sore throat recommendations: Warm fluids, warm salt water gargles, throat lozenges, tea, honey, soup, rest, hydration.    Use  Flonase  one spray each nostril twice daily OR two sprays each nostril once daily until nares dry out, unless you have Glaucoma.   Can also supplement with nasal saline rinse.    Sinus rinses DO NOT USE TAP WATER, if you must, water must be at  a rolling boil for 1 minute, let it cool, then use.  May use distilled water, or over the counter nasal saline rinses.  Man's vapor rub in shower to help open nasal passages.  May use nasal gel to keep passages moisturized.  May use nasal saline sprays during the day for added relief of congestion.   For those who go to the gym, please do not use the sauna or steam room now to clear sinuses.    Cough     Rest and fluids are important  Can use honey with allison to soothe your throat    Robitussin or Delsyum for cough suppressant for dry cough.    Mucinex DM or products containing Guaifenesin or Dextromethorphan for expectorant (wet cough).    Take prescription cough meds (pills) as prescribed;   Please follow up with your primary care doctor or specialist in the next 48-72hrs as needed and if no improvement    If you smoke, please stop smoking.    Please return or see your primary care doctor if you develop new or worsening symptoms.     Lastly, good hand washing and cough hygiene (cough into your elbow) will help prevent the spread of the illness. A general rule is that you are no longer contagious once you have been without a fever for over 24 hours without requiring fever reducing medications.     Please arrange follow up with your primary medical clinic as soon as possible. You must understand that you've received an Urgent Care treatment only and that you may be released before all of your medical problems are known or treated. You, the patient, will arrange for follow up as instructed. If your symptoms worsen or fail to improve you should go to the Emergency Room.

## 2023-05-01 ENCOUNTER — PATIENT MESSAGE (OUTPATIENT)
Dept: INTERNAL MEDICINE | Facility: CLINIC | Age: 58
End: 2023-05-01

## 2023-05-01 ENCOUNTER — OFFICE VISIT (OUTPATIENT)
Dept: INTERNAL MEDICINE | Facility: CLINIC | Age: 58
End: 2023-05-01
Payer: MEDICARE

## 2023-05-01 VITALS
SYSTOLIC BLOOD PRESSURE: 130 MMHG | HEART RATE: 96 BPM | WEIGHT: 175.69 LBS | HEIGHT: 67 IN | BODY MASS INDEX: 27.57 KG/M2 | DIASTOLIC BLOOD PRESSURE: 82 MMHG | OXYGEN SATURATION: 97 %

## 2023-05-01 DIAGNOSIS — J40 BRONCHITIS: Primary | ICD-10-CM

## 2023-05-01 PROCEDURE — 99213 PR OFFICE/OUTPT VISIT, EST, LEVL III, 20-29 MIN: ICD-10-PCS | Mod: S$PBB,,, | Performed by: INTERNAL MEDICINE

## 2023-05-01 PROCEDURE — 99999 PR PBB SHADOW E&M-EST. PATIENT-LVL IV: ICD-10-PCS | Mod: PBBFAC,,, | Performed by: INTERNAL MEDICINE

## 2023-05-01 PROCEDURE — 99999 PR PBB SHADOW E&M-EST. PATIENT-LVL IV: CPT | Mod: PBBFAC,,, | Performed by: INTERNAL MEDICINE

## 2023-05-01 PROCEDURE — 99213 OFFICE O/P EST LOW 20 MIN: CPT | Mod: S$PBB,,, | Performed by: INTERNAL MEDICINE

## 2023-05-01 PROCEDURE — 99214 OFFICE O/P EST MOD 30 MIN: CPT | Mod: PBBFAC,PO | Performed by: INTERNAL MEDICINE

## 2023-05-01 RX ORDER — DOXYCYCLINE HYCLATE 100 MG
100 TABLET ORAL 2 TIMES DAILY
Qty: 20 TABLET | Refills: 0 | Status: SHIPPED | OUTPATIENT
Start: 2023-05-01 | End: 2023-05-01

## 2023-05-01 RX ORDER — METHYLPREDNISOLONE 4 MG/1
TABLET ORAL
Qty: 1 EACH | Refills: 0 | Status: SHIPPED | OUTPATIENT
Start: 2023-05-01 | End: 2023-05-22

## 2023-05-01 RX ORDER — DOXYCYCLINE HYCLATE 100 MG
TABLET ORAL
Qty: 20 TABLET | Refills: 0 | Status: SHIPPED | OUTPATIENT
Start: 2023-05-01 | End: 2023-08-21

## 2023-05-01 NOTE — PROGRESS NOTES
"HPI:  Patient is a 57-year-old female who comes today for problems with upper respiratory illness with nasal congestion, nasal discharge (greenish in color) and associated epistaxis as well as coughing and chest congestion with thick yellow mucus for the last 4 days.  She states her temperature today was 100.4 home.  She was seen yesterday in urgent care and placed on Xyzal, Flonase, an oral prednisone 20 mg for 3 days    Current meds have been verified and updated per the EMR  Exam:/82 (BP Location: Right arm)   Pulse 96   Ht 5' 7" (1.702 m)   Wt 79.7 kg (175 lb 11.3 oz)   SpO2 97%   BMI 27.52 kg/m²   TMs are normal, nasal mucosa hyperemic and edematous.  She has evidence of fresh epistaxis on the left lateral nares.  Oropharynx normal  Chest clear    Lab Results   Component Value Date    WBC 6.71 02/01/2023    HGB 15.3 02/01/2023    HCT 44.4 02/01/2023     02/01/2023    CHOL 140 10/20/2020    TRIG 103 10/20/2020    HDL 55 10/20/2020    ALT 22 02/01/2023    AST 27 02/01/2023     02/01/2023    K 5.1 02/01/2023     02/01/2023    CREATININE 0.8 02/01/2023    BUN 18 02/01/2023    CO2 25 02/01/2023    TSH 0.517 08/29/2022    INR 1.0 05/09/2022    HGBA1C 5.8 03/07/2017       Impression:  Upper respiratory illness/bronchitis  Patient Active Problem List   Diagnosis    Anxiety    Bilateral low back pain with right-sided sciatica    Vertigo    Right nephrolithiasis    Ureteral stone    KENN (generalized anxiety disorder)    Migraine headache    Hydronephrosis    SBO (small bowel obstruction)       Plan:  Orders Placed This Encounter    doxycycline (VIBRA-TABS) 100 MG tablet    methylPREDNISolone (MEDROL DOSEPACK) 4 mg tablet     She was given Medrol Dosepak and doxycycline.  She should continue with the Xyzal Flonase and decongestants.    This note is generated with speech recognition software and is subject to transcription error and sound alike phrases that may be missed by proofreading.      "

## 2023-05-03 ENCOUNTER — TELEPHONE (OUTPATIENT)
Dept: URGENT CARE | Facility: CLINIC | Age: 58
End: 2023-05-03
Payer: MEDICARE

## 2023-05-03 ENCOUNTER — PATIENT MESSAGE (OUTPATIENT)
Dept: INTERNAL MEDICINE | Facility: CLINIC | Age: 58
End: 2023-05-03
Payer: MEDICARE

## 2023-05-03 RX ORDER — PROMETHAZINE HYDROCHLORIDE AND DEXTROMETHORPHAN HYDROBROMIDE 6.25; 15 MG/5ML; MG/5ML
5 SYRUP ORAL EVERY 4 HOURS PRN
Qty: 240 ML | Refills: 1 | Status: SHIPPED | OUTPATIENT
Start: 2023-05-03 | End: 2023-05-13

## 2023-05-09 ENCOUNTER — TELEPHONE (OUTPATIENT)
Dept: NEUROLOGY | Facility: CLINIC | Age: 58
End: 2023-05-09
Payer: MEDICARE

## 2023-05-09 ENCOUNTER — PATIENT MESSAGE (OUTPATIENT)
Dept: INTERNAL MEDICINE | Facility: CLINIC | Age: 58
End: 2023-05-09
Payer: MEDICARE

## 2023-05-09 NOTE — TELEPHONE ENCOUNTER
Pt did get an appt in  Neurology on June 2nd/  She is in beyond pain and cramping in her back/feet. She does not cramp when she is standing.. she can not lay down.Something is pinched somewhere.   She was happy to get the appt. So soon. .

## 2023-05-09 NOTE — TELEPHONE ENCOUNTER
----- Message from Lara Rangel sent at 5/9/2023 10:03 AM CDT -----  Regarding: pt call back  Name of Who is Calling:SARAY VILLASENOR [[id number]]           What is the request in detail: Pt needs to seen soon as possible she is in great pain            Can the clinic reply by [facility]:           What Number to Call Back if not in [facility]:[phone]

## 2023-05-09 NOTE — TELEPHONE ENCOUNTER
Spoke with patient in regards to scheduling appointment with neurology. Advised her of  next available next available. Patient did declined offer and stated she is going outside of us for something sooner. I did apologues for the inconvenience.

## 2023-05-15 ENCOUNTER — PATIENT MESSAGE (OUTPATIENT)
Dept: GASTROENTEROLOGY | Facility: CLINIC | Age: 58
End: 2023-05-15
Payer: MEDICARE

## 2023-05-15 ENCOUNTER — PATIENT MESSAGE (OUTPATIENT)
Dept: SURGERY | Facility: CLINIC | Age: 58
End: 2023-05-15
Payer: MEDICARE

## 2023-05-17 NOTE — TELEPHONE ENCOUNTER
Call returned to , pt scheduled on 06/29/23 and placed on waiting list; pt verbalized understanding

## 2023-05-28 DIAGNOSIS — F41.9 ANXIETY: ICD-10-CM

## 2023-05-29 ENCOUNTER — PATIENT MESSAGE (OUTPATIENT)
Dept: SURGERY | Facility: CLINIC | Age: 58
End: 2023-05-29
Payer: MEDICARE

## 2023-05-29 RX ORDER — DIAZEPAM 5 MG/1
TABLET ORAL
Qty: 20 TABLET | Refills: 5 | OUTPATIENT
Start: 2023-05-29

## 2023-05-30 ENCOUNTER — PATIENT MESSAGE (OUTPATIENT)
Dept: INTERNAL MEDICINE | Facility: CLINIC | Age: 58
End: 2023-05-30
Payer: MEDICARE

## 2023-05-30 DIAGNOSIS — F41.9 ANXIETY: ICD-10-CM

## 2023-05-30 DIAGNOSIS — R11.0 NAUSEA: ICD-10-CM

## 2023-05-30 RX ORDER — DIAZEPAM 5 MG/1
TABLET ORAL
Qty: 20 TABLET | Refills: 4 | Status: SHIPPED | OUTPATIENT
Start: 2023-05-30 | End: 2023-07-22 | Stop reason: SDUPTHER

## 2023-05-30 NOTE — TELEPHONE ENCOUNTER
No care due was identified.  [facility] Embedded Care Due Messages. Reference number: [id number].   5/30/2023 3:40:33 PM CDT  
Please see patient's [employer] message and advise.     Requested Prescriptions     Pending Prescriptions Disp Refills    diazePAM (VALIUM) 5 MG tablet 20 tablet 5     Sig: Take one with onset of migraine     LV 05/01/2023  NV none scheduled.   LF 09/06/2022 (5mg)     Filled 10mg on 04/05/2023          Dr. Duff,      I'm out of refills for the 5mg diazepam & that strength is what I take when I've got a migraine headache.....I've got a headache right now & I sure would appreciate it if you could renew this medication. ARIEL why or how it happened but the pharmacy had Dr. Manzano as the prescriber of this medicine & I'd like to get that cleared up.      Thank you,     Genny Calixto 1  
(4) excellent

## 2023-05-30 NOTE — TELEPHONE ENCOUNTER
No care due was identified.  [facility] Embedded Care Due Messages. Reference number: [id number].   5/30/2023 1:36:47 PM CDT

## 2023-05-31 RX ORDER — ONDANSETRON 4 MG/1
4 TABLET, FILM COATED ORAL 2 TIMES DAILY
Qty: 30 TABLET | Refills: 0 | Status: SHIPPED | OUTPATIENT
Start: 2023-05-31 | End: 2023-07-22 | Stop reason: SDUPTHER

## 2023-05-31 RX ORDER — DIAZEPAM 10 MG/1
TABLET ORAL
Qty: 90 TABLET | Refills: 1 | Status: SHIPPED | OUTPATIENT
Start: 2023-05-31 | End: 2023-10-01 | Stop reason: SDUPTHER

## 2023-06-20 ENCOUNTER — PATIENT MESSAGE (OUTPATIENT)
Dept: INTERNAL MEDICINE | Facility: CLINIC | Age: 58
End: 2023-06-20
Payer: MEDICARE

## 2023-06-22 ENCOUNTER — HOSPITAL ENCOUNTER (OUTPATIENT)
Dept: RADIOLOGY | Facility: HOSPITAL | Age: 58
Discharge: HOME | End: 2023-06-22
Attending: INTERNAL MEDICINE
Payer: MEDICARE

## 2023-06-22 ENCOUNTER — OFFICE VISIT (OUTPATIENT)
Dept: GASTROENTEROLOGY | Facility: CLINIC | Age: 58
End: 2023-06-22
Payer: MEDICARE

## 2023-06-22 VITALS
SYSTOLIC BLOOD PRESSURE: 116 MMHG | HEART RATE: 99 BPM | OXYGEN SATURATION: 97 % | HEIGHT: 67 IN | WEIGHT: 178.38 LBS | DIASTOLIC BLOOD PRESSURE: 78 MMHG | BODY MASS INDEX: 28 KG/M2

## 2023-06-22 DIAGNOSIS — K59.09 CHRONIC CONSTIPATION: ICD-10-CM

## 2023-06-22 DIAGNOSIS — R14.0 ABDOMINAL BLOATING: ICD-10-CM

## 2023-06-22 DIAGNOSIS — R10.9 ABDOMINAL PAIN, UNSPECIFIED ABDOMINAL LOCATION: Primary | ICD-10-CM

## 2023-06-22 DIAGNOSIS — R10.9 ABDOMINAL PAIN, UNSPECIFIED ABDOMINAL LOCATION: ICD-10-CM

## 2023-06-22 PROCEDURE — 74018 RADEX ABDOMEN 1 VIEW: CPT | Mod: TC

## 2023-06-22 PROCEDURE — 99214 OFFICE O/P EST MOD 30 MIN: CPT | Mod: S$PBB,,, | Performed by: INTERNAL MEDICINE

## 2023-06-22 PROCEDURE — 99999 PR PBB SHADOW E&M-EST. PATIENT-LVL IV: CPT | Mod: PBBFAC,,, | Performed by: INTERNAL MEDICINE

## 2023-06-22 PROCEDURE — 99214 OFFICE O/P EST MOD 30 MIN: CPT | Mod: PBBFAC | Performed by: INTERNAL MEDICINE

## 2023-06-22 PROCEDURE — 99999 PR PBB SHADOW E&M-EST. PATIENT-LVL IV: ICD-10-PCS | Mod: PBBFAC,,, | Performed by: INTERNAL MEDICINE

## 2023-06-22 PROCEDURE — 74018 XR ABDOMEN AP 1 VIEW: ICD-10-PCS | Mod: 26,,, | Performed by: RADIOLOGY

## 2023-06-22 PROCEDURE — 74018 RADEX ABDOMEN 1 VIEW: CPT | Mod: 26,,, | Performed by: RADIOLOGY

## 2023-06-22 PROCEDURE — 99214 PR OFFICE/OUTPT VISIT, EST, LEVL IV, 30-39 MIN: ICD-10-PCS | Mod: S$PBB,,, | Performed by: INTERNAL MEDICINE

## 2023-06-22 NOTE — PROGRESS NOTES
[facility]  Gastroenterology    PCP: Tay Duff MD    6/22/23    HPI       Follow-up     Additional comments: Pt present today with c/o nausea,bloating, abdominal cramping, constipation/diarrhea and medication refills          Last edited by Toni Milligan, MA on 6/22/2023  9:11 AM.        Reason for Visit: Follow-up, Medication review    Subjective:   Genny Calixto is a 58 y.o. female here for follow-up and medication review. +bloating, cramping in lower abdomen with abdominal discomfort. Constipation and foul smelling flatus. Gaining weight especially on abdomen despite eating healthy. It is affecting her ability to go to gym. Reports lots of chronic stress of late. Had another small bowel surgery last year.       Past Medical History:   Diagnosis Date    Encounter for blood transfusion     KENN (generalized anxiety disorder)     Takes 5-10 mg of valium qd prn anxiety (prescriptions for both on file, one from [facility] & other from )    Insomnia     Takes 5-10 mg of valium qhs prn insomnia (prescriptions for both on file, one from [facility] & other from )    Migraine headache     Nephrolithiasis 08/03/2019    Left ureteral stone -> UTI c/b pyelonephritis and urosepsis w/ hypokalemia -> transfered to [facility] urology service from [facility] after CT abn dx, s/p 2 stents to allow infx to drain, IV Vanc/Rocephin, fever free since yesterday    Reflex sympathetic dystrophy     UTI (urinary tract infection)     Vertigo        Past Surgical History:   Procedure Laterality Date    BLADDER SURGERY      CHOLECYSTECTOMY      COLONOSCOPY N/A 11/10/2021    Procedure: COLONOSCOPY;  Surgeon: Eryn Rothman MD;  Location: [facility] ENDO;  Service: Endoscopy;  Laterality: N/A;    CYSTOSCOPY WITH URETEROSCOPY, RETROGRADE PYELOGRAPHY, AND INSERTION OF STENT Right 9/30/2021    Procedure: CYSTOSCOPY, WITH RETROGRADE PYELOGRAM AND URETERAL STENT INSERTION;  Surgeon: Annita Gamino MD;  Location: [facility] OR;   Service: Urology;  Laterality: Right;    DIAGNOSTIC LAPAROSCOPY N/A 11/11/2020    Procedure: LAPAROSCOPY, DIAGNOSTIC;  Surgeon: Tee Segura MD;  Location: [facility] OR;  Service: General;  Laterality: N/A;  CONVERTED TO OPEN    DIAGNOSTIC LAPAROSCOPY N/A 7/25/2022    Procedure: LAPAROSCOPY, DIAGNOSTIC;  Surgeon: Jo Manzano DO;  Location: [facility] OR;  Service: General;  Laterality: N/A;  convert to open at 0739    ESOPHAGOGASTRODUODENOSCOPY N/A 11/10/2021    Procedure: EGD (ESOPHAGOGASTRODUODENOSCOPY);  Surgeon: Eryn Rothman MD;  Location: [facility] ENDO;  Service: Endoscopy;  Laterality: N/A;    facet injections  02/14/2017    L3, L4, L5, S1 Done by Dr. Lara    HYSTERECTOMY      INJECTION OF ANESTHETIC AGENT INTO TISSUE PLANE DEFINED BY TRANSVERSUS ABDOMINIS MUSCLE N/A 11/11/2020    Procedure: BLOCK, TRANSVERSUS ABDOMINIS PLANE;  Surgeon: Tee Segura MD;  Location: [facility] OR;  Service: General;  Laterality: N/A;    INJECTION OF ANESTHETIC AGENT INTO TISSUE PLANE DEFINED BY TRANSVERSUS ABDOMINIS MUSCLE N/A 7/25/2022    Procedure: BLOCK, TRANSVERSUS ABDOMINIS PLANE;  Surgeon: Jo Manzano DO;  Location: [facility] OR;  Service: General;  Laterality: N/A;    KNEE SURGERY      LAPAROSCOPIC LYSIS OF ADHESIONS N/A 11/11/2020    Procedure: LYSIS, ADHESIONS, LAPAROSCOPIC;  Surgeon: Tee Segura MD;  Location: [facility] OR;  Service: General;  Laterality: N/A;  CONVERTED TO OPEN    LAPAROTOMY N/A 11/20/2020    Procedure: LAPAROTOMY;  Surgeon: Tee Segura MD;  Location: [facility] OR;  Service: General;  Laterality: N/A;    LASER LITHOTRIPSY Left 2/8/2021    Procedure: LITHOTRIPSY, USING LASER;  Surgeon: Irving Kidd MD;  Location: [facility] OR;  Service: Urology;  Laterality: Left;    LASER LITHOTRIPSY Right 10/13/2021    Procedure: LITHOTRIPSY, USING LASER;  Surgeon: Annita Gamino MD;  Location: [facility] OR;  Service: Urology;  Laterality: Right;    LYSIS OF ADHESIONS N/A 11/11/2020    Procedure:  LYSIS, ADHESIONS;  Surgeon: Tee Segura MD;  Location: [facility] OR;  Service: General;  Laterality: N/A;    LYSIS OF ADHESIONS N/A 11/20/2020    Procedure: LYSIS, ADHESIONS;  Surgeon: Tee Segura MD;  Location: [facility] OR;  Service: General;  Laterality: N/A;    LYSIS OF ADHESIONS N/A 7/25/2022    Procedure: LYSIS, ADHESIONS;  Surgeon: Jo Manzano DO;  Location: [facility] OR;  Service: General;  Laterality: N/A;    SURGICAL REMOVAL OF ILEUM WITH CECUM  7/25/2022    Procedure: EXCISION, CECUM WITH ILEUM;  Surgeon: Jo Manzano DO;  Location: [facility] OR;  Service: General;;    TONSILLECTOMY      URETEROSCOPY Left 2/8/2021    Procedure: URETEROSCOPY;  Surgeon: Irving Kidd MD;  Location: [facility] OR;  Service: Urology;  Laterality: Left;  stent placement    URETEROSCOPY Right 10/13/2021    Procedure: URETEROSCOPY;  Surgeon: Annita Gamino MD;  Location: [facility];  Service: Urology;  Laterality: Right;       Current Outpatient Medications on File Prior to Visit   Medication Sig Dispense Refill    diazePAM (VALIUM) 10 MG Tab TAKE 1 TABLET BY MOUTH EVERY DAY AS NEEDED Strength: 10 mg 90 tablet 1    diazePAM (VALIUM) 5 MG tablet Take one with onset of migraine 20 tablet 4    diphenhydrAMINE (BENADRYL) 25 mg capsule Take 25 mg by mouth 2 (two) times a day.      docusate sodium (COLACE) 100 MG capsule Take 1 capsule (100 mg total) by mouth 2 (two) times daily. 60 capsule 1    doxepin (SINEQUAN) 100 MG capsule Take 1 capsule (100 mg total) by mouth every evening. 90 capsule 3    doxepin (SINEQUAN) 25 MG capsule Take 25 mg by mouth.      doxycycline (VIBRA-TABS) 100 MG tablet TAKE 1 TABLET BY MOUTH TWICE DAILY 20 tablet 0    fluticasone propionate (FLONASE) 50 mcg/actuation nasal spray 1 spray (50 mcg total) by Each Nostril route once daily. 16 mL 0    LACTOBACILLUS COMBO NO.6 (PROBIOTIC COMPLEX ORAL) Take by mouth once daily at 6am.      levocetirizine (XYZAL) 5 MG tablet Take 1 tablet (5 mg total) by  "mouth every evening. 30 tablet 1    LINZESS 290 mcg Cap capsule Take 290 mcg by mouth every morning.      multivitamin capsule Take 1 capsule by mouth once daily.      ondansetron (ZOFRAN) 4 MG tablet Take 1 tablet (4 mg total) by mouth 2 (two) times daily. 30 tablet 0    pantoprazole (PROTONIX) 40 MG tablet Take 1 tablet (40 mg total) by mouth once daily. 90 tablet 2    spironolactone (ALDACTONE) 50 MG tablet Take 1 tablet (50 mg total) by mouth 2 (two) times daily. 180 tablet 1    sumatriptan (IMITREX) 100 MG tablet Take one with onset of migraine, may repeat once two hours later if migraine persists 10 tablet 11    naproxen (NAPROSYN) 500 MG tablet Take 500 mg by mouth 2 (two) times daily.      promethazine (PHENERGAN) 25 MG tablet Take 1 tablet (25 mg total) by mouth every 4 (four) hours as needed for Nausea. (Patient not taking: Reported on 6/22/2023) 25 tablet 2     No current facility-administered medications on file prior to visit.       Review of patient's allergies indicates:   Allergen Reactions    Erythromycin Other (See Comments)     Stomach cramps    Morphine Nausea And Vomiting     "Projectile vomiting"       Social History     Socioeconomic History    Marital status:    Tobacco Use    Smoking status: Never    Smokeless tobacco: Never   Substance and Sexual Activity    Alcohol use: Yes     Comment: rarely    Drug use: No    Sexual activity: Never   Social History Narrative    Breakfast: Fruit & organic oatmeal; water or tea w/ lemon    Lunch: Salads: spinach leaves, iceberg lettuce, tomatoes, avaccado, light dressing or protein smoothie    Dinner: Grilled or broiled chicken or fish (no pork since 3/2017; red meat only 5 times since 3/2017)    Snacks: Fruit    Eats out: 1x/wk    Water: 96 oz/d    PA: 5-6 d/wk; basketball    Goal weight is 160 lb       Family History   Problem Relation Age of Onset    Stroke Mother     Hypertension Mother     COPD Father     Drug abuse Brother        Review of " Systems   Constitutional:  Positive for unexpected weight change. Negative for appetite change and fever.   HENT:  Negative for postnasal drip, rhinorrhea, sneezing, sore throat and trouble swallowing.    Eyes:  Negative for visual disturbance.   Respiratory:  Negative for cough, shortness of breath and wheezing.    Cardiovascular:  Negative for chest pain, palpitations and leg swelling.   Gastrointestinal:  Positive for abdominal distention, abdominal pain and constipation. Negative for blood in stool, diarrhea, nausea and vomiting.   Genitourinary:  Negative for dysuria.   Musculoskeletal:  Negative for arthralgias, joint swelling and myalgias.   Skin:  Negative for color change, pallor and rash.   Neurological:  Negative for weakness, light-headedness, numbness and headaches.   Hematological:  Negative for adenopathy. Does not bruise/bleed easily.   Psychiatric/Behavioral:  Negative for agitation.          Objective:   Vitals:   Vitals:    06/22/23 0909   BP: 116/78   Pulse: 99       Physical Exam  Vitals reviewed.   Constitutional:       General: She is not in acute distress.     Appearance: She is not diaphoretic.   HENT:      Head: Normocephalic and atraumatic.      Mouth/Throat:      Pharynx: No oropharyngeal exudate.   Eyes:      General: No scleral icterus.        Right eye: No discharge.         Left eye: No discharge.      Conjunctiva/sclera: Conjunctivae normal.      Pupils: Pupils are equal, round, and reactive to light.   Cardiovascular:      Rate and Rhythm: Normal rate and regular rhythm.      Heart sounds: Normal heart sounds. No murmur heard.    No friction rub. No gallop.   Pulmonary:      Effort: Pulmonary effort is normal. No respiratory distress.      Breath sounds: Normal breath sounds. No stridor. No wheezing or rales.   Abdominal:      General: Bowel sounds are normal. There is no distension.      Palpations: Abdomen is soft. There is no mass.      Tenderness: There is no abdominal  tenderness. There is no guarding.   Musculoskeletal:         General: Normal range of motion.      Cervical back: Normal range of motion.   Skin:     General: Skin is warm and dry.      Coloration: Skin is not pale.      Findings: No erythema or rash.   Neurological:      Mental Status: She is alert and oriented to person, place, and time.         IMPRESSION     Problem List Items Addressed This Visit    None  Visit Diagnoses       Abdominal pain, unspecified abdominal location    -  Primary    Relevant Orders    X-Ray Abdomen AP 1 View    Abdominal bloating        Relevant Orders    X-Ray Abdomen AP 1 View    Celiac Disease Panel    TSH    Chronic constipation        Relevant Orders    X-Ray Abdomen AP 1 View    TSH            PLANS:    - KUB  - Celiac Panel  - TSH  - Endoscopies 2021 reviewed and were relatively unremarkable  - Continue with PPI and Linzess for now    Abdominal pain, unspecified abdominal location  -     X-Ray Abdomen AP 1 View; Future; Expected date: 06/22/2023    Abdominal bloating  -     X-Ray Abdomen AP 1 View; Future; Expected date: 06/22/2023  -     Celiac Disease Panel; Future; Expected date: 06/22/2023  -     TSH; Future; Expected date: 06/22/2023    Chronic constipation  -     X-Ray Abdomen AP 1 View; Future; Expected date: 06/22/2023  -     TSH; Future; Expected date: 06/22/2023          Eryn Rothman MD  Gastroenterology

## 2023-06-26 ENCOUNTER — PATIENT MESSAGE (OUTPATIENT)
Dept: GASTROENTEROLOGY | Facility: CLINIC | Age: 58
End: 2023-06-26
Payer: MEDICARE

## 2023-06-27 ENCOUNTER — PATIENT MESSAGE (OUTPATIENT)
Dept: GASTROENTEROLOGY | Facility: CLINIC | Age: 58
End: 2023-06-27
Payer: MEDICARE

## 2023-06-27 DIAGNOSIS — R19.7 DIARRHEA, UNSPECIFIED TYPE: Primary | ICD-10-CM

## 2023-06-28 ENCOUNTER — LAB VISIT (OUTPATIENT)
Dept: LAB | Facility: HOSPITAL | Age: 58
End: 2023-06-28
Attending: INTERNAL MEDICINE
Payer: MEDICARE

## 2023-06-28 DIAGNOSIS — R19.7 DIARRHEA, UNSPECIFIED TYPE: ICD-10-CM

## 2023-06-28 PROCEDURE — 87449 NOS EACH ORGANISM AG IA: CPT | Performed by: INTERNAL MEDICINE

## 2023-06-28 PROCEDURE — 87449 NOS EACH ORGANISM AG IA: CPT | Mod: 91 | Performed by: INTERNAL MEDICINE

## 2023-06-28 PROCEDURE — 87329 GIARDIA AG IA: CPT | Performed by: INTERNAL MEDICINE

## 2023-06-28 PROCEDURE — 87427 SHIGA-LIKE TOXIN AG IA: CPT | Mod: 59 | Performed by: INTERNAL MEDICINE

## 2023-06-28 PROCEDURE — 87045 FECES CULTURE AEROBIC BACT: CPT | Performed by: INTERNAL MEDICINE

## 2023-06-28 PROCEDURE — 87046 STOOL CULTR AEROBIC BACT EA: CPT | Performed by: INTERNAL MEDICINE

## 2023-06-28 NOTE — TELEPHONE ENCOUNTER
Call returned to , pt states that a printed label and lab req  was not received with stool kit; pt instructed to return to [facility] on 4th floor where [employer] has printed labels and lab req. Pt verbalized understanding

## 2023-06-29 ENCOUNTER — PATIENT MESSAGE (OUTPATIENT)
Dept: GASTROENTEROLOGY | Facility: CLINIC | Age: 58
End: 2023-06-29
Payer: MEDICARE

## 2023-06-29 DIAGNOSIS — R10.9 ABDOMINAL PAIN, UNSPECIFIED ABDOMINAL LOCATION: Primary | ICD-10-CM

## 2023-06-29 LAB
C DIFF GDH STL QL: NEGATIVE
C DIFF TOX A+B STL QL IA: NEGATIVE

## 2023-06-30 LAB
CRYPTOSP AG STL QL IA: NEGATIVE
E COLI SXT1 STL QL IA: NEGATIVE
E COLI SXT2 STL QL IA: NEGATIVE
G LAMBLIA AG STL QL IA: NEGATIVE

## 2023-06-30 RX ORDER — DOXEPIN HYDROCHLORIDE 100 MG/1
100 CAPSULE ORAL NIGHTLY
Qty: 90 CAPSULE | Refills: 1 | Status: SHIPPED | OUTPATIENT
Start: 2023-06-30 | End: 2023-07-22 | Stop reason: SDUPTHER

## 2023-06-30 NOTE — TELEPHONE ENCOUNTER
No care due was identified.  [facility] Embedded Care Due Messages. Reference number: [id number].   6/30/2023 3:07:54 PM CDT

## 2023-07-01 LAB — BACTERIA STL CULT: NORMAL

## 2023-07-03 ENCOUNTER — HOSPITAL ENCOUNTER (OUTPATIENT)
Dept: RADIOLOGY | Facility: HOSPITAL | Age: 58
Discharge: HOME | End: 2023-07-03
Attending: INTERNAL MEDICINE
Payer: MEDICARE

## 2023-07-03 DIAGNOSIS — R10.9 ABDOMINAL PAIN, UNSPECIFIED ABDOMINAL LOCATION: ICD-10-CM

## 2023-07-03 PROCEDURE — 74177 CT ABD & PELVIS W/CONTRAST: CPT | Mod: TC

## 2023-07-03 PROCEDURE — 74177 CT ABDOMEN PELVIS WITH CONTRAST: ICD-10-PCS | Mod: 26,,, | Performed by: STUDENT IN AN ORGANIZED HEALTH CARE EDUCATION/TRAINING PROGRAM

## 2023-07-03 PROCEDURE — 74177 CT ABD & PELVIS W/CONTRAST: CPT | Mod: 26,,, | Performed by: STUDENT IN AN ORGANIZED HEALTH CARE EDUCATION/TRAINING PROGRAM

## 2023-07-03 PROCEDURE — A9698 NON-RAD CONTRAST MATERIALNOC: HCPCS | Performed by: INTERNAL MEDICINE

## 2023-07-03 PROCEDURE — 25500020 PHARM REV CODE 255: Performed by: INTERNAL MEDICINE

## 2023-07-03 RX ADMIN — IOHEXOL 100 ML: 350 INJECTION, SOLUTION INTRAVENOUS at 09:07

## 2023-07-03 RX ADMIN — IOHEXOL 1000 ML: 9 SOLUTION ORAL at 09:07

## 2023-07-05 ENCOUNTER — PATIENT MESSAGE (OUTPATIENT)
Dept: GASTROENTEROLOGY | Facility: CLINIC | Age: 58
End: 2023-07-05
Payer: MEDICARE

## 2023-07-10 LAB — O+P STL MICRO: NORMAL

## 2023-07-13 NOTE — FIRST PROVIDER EVALUATION
Medical screening exam completed.  I have conducted a focused provider triage encounter, findings are as follows:    Brief history of present illness:  Patient presents with generalized abdominal pain, nausea, vomiting and diarrhea.  Onset of symptoms was today.  History of peritonitis and small-bowel obstruction.    There were no vitals filed for this visit.    Pertinent physical exam:      Brief workup plan:      Preliminary workup initiated; this workup will be continued and followed by the physician or advanced practice provider that is assigned to the patient when roomed.  
What Type Of Note Output Would You Prefer (Optional)?: Standard Output
Is The Patient Presenting As Previously Scheduled?: Yes
How Severe Is Your Rash?: moderate
Is This A New Presentation, Or A Follow-Up?: Rash

## 2023-07-22 DIAGNOSIS — R11.0 NAUSEA: ICD-10-CM

## 2023-07-22 DIAGNOSIS — F41.9 ANXIETY: ICD-10-CM

## 2023-07-22 DIAGNOSIS — K56.609 SBO (SMALL BOWEL OBSTRUCTION): ICD-10-CM

## 2023-07-23 RX ORDER — PROMETHAZINE HYDROCHLORIDE 25 MG/1
25 TABLET ORAL EVERY 4 HOURS PRN
Qty: 25 TABLET | Refills: 2 | OUTPATIENT
Start: 2023-07-23

## 2023-07-24 RX ORDER — DOXEPIN HYDROCHLORIDE 100 MG/1
100 CAPSULE ORAL NIGHTLY
Qty: 90 CAPSULE | Refills: 1 | Status: SHIPPED | OUTPATIENT
Start: 2023-07-24 | End: 2023-10-01 | Stop reason: SDUPTHER

## 2023-07-24 RX ORDER — ONDANSETRON 4 MG/1
4 TABLET, FILM COATED ORAL 2 TIMES DAILY
Qty: 30 TABLET | Refills: 0 | Status: SHIPPED | OUTPATIENT
Start: 2023-07-24 | End: 2023-08-29 | Stop reason: SDUPTHER

## 2023-07-24 RX ORDER — DIAZEPAM 5 MG/1
TABLET ORAL
Qty: 20 TABLET | Refills: 1 | Status: SHIPPED | OUTPATIENT
Start: 2023-07-24 | End: 2023-09-07 | Stop reason: SDUPTHER

## 2023-07-24 RX ORDER — SUMATRIPTAN SUCCINATE 100 MG/1
TABLET ORAL
Qty: 10 TABLET | Refills: 11 | Status: SHIPPED | OUTPATIENT
Start: 2023-07-24 | End: 2024-01-31 | Stop reason: SDUPTHER

## 2023-07-30 ENCOUNTER — HOSPITAL ENCOUNTER (EMERGENCY)
Facility: HOSPITAL | Age: 58
Discharge: HOME | End: 2023-07-30
Attending: EMERGENCY MEDICINE
Payer: MEDICARE

## 2023-07-30 VITALS
DIASTOLIC BLOOD PRESSURE: 82 MMHG | WEIGHT: 181.75 LBS | SYSTOLIC BLOOD PRESSURE: 134 MMHG | TEMPERATURE: 98 F | HEIGHT: 67 IN | HEART RATE: 96 BPM | RESPIRATION RATE: 18 BRPM | OXYGEN SATURATION: 95 % | BODY MASS INDEX: 28.53 KG/M2

## 2023-07-30 DIAGNOSIS — S61.211A LACERATION OF LEFT INDEX FINGER WITHOUT FOREIGN BODY WITHOUT DAMAGE TO NAIL, INITIAL ENCOUNTER: Primary | ICD-10-CM

## 2023-07-30 PROCEDURE — 12001 RPR S/N/AX/GEN/TRNK 2.5CM/<: CPT

## 2023-07-30 PROCEDURE — 90471 IMMUNIZATION ADMIN: CPT | Performed by: NURSE PRACTITIONER

## 2023-07-30 PROCEDURE — 90715 TDAP VACCINE 7 YRS/> IM: CPT | Performed by: NURSE PRACTITIONER

## 2023-07-30 PROCEDURE — 99284 EMERGENCY DEPT VISIT MOD MDM: CPT

## 2023-07-30 PROCEDURE — 63600175 PHARM REV CODE 636 W HCPCS: Performed by: NURSE PRACTITIONER

## 2023-07-30 PROCEDURE — 25000003 PHARM REV CODE 250: Performed by: NURSE PRACTITIONER

## 2023-07-30 RX ORDER — LIDOCAINE HYDROCHLORIDE 10 MG/ML
10 INJECTION, SOLUTION EPIDURAL; INFILTRATION; INTRACAUDAL; PERINEURAL
Status: COMPLETED | OUTPATIENT
Start: 2023-07-30 | End: 2023-07-30

## 2023-07-30 RX ADMIN — BACITRACIN ZINC, NEOMYCIN, POLYMYXIN B: 400; 3.5; 5 OINTMENT TOPICAL at 10:07

## 2023-07-30 RX ADMIN — TETANUS TOXOID, REDUCED DIPHTHERIA TOXOID AND ACELLULAR PERTUSSIS VACCINE, ADSORBED 0.5 ML: 5; 2.5; 8; 8; 2.5 SUSPENSION INTRAMUSCULAR at 10:07

## 2023-07-30 RX ADMIN — LIDOCAINE HYDROCHLORIDE 100 MG: 10 INJECTION, SOLUTION EPIDURAL; INFILTRATION; INTRACAUDAL at 10:07

## 2023-07-31 NOTE — ED PROVIDER NOTES
"Encounter Date: 7/30/2023       History     Chief Complaint   Patient presents with    Laceration     Pt cut pointer finger on left hand with a knife while cutting salmon tonight. Tetanus not UTD.     The history is provided by the patient.   Laceration   The incident occurred just prior to arrival. Pain location: Left index finger. Size: 1.5cm. Injury mechanism: Kitchen knife. She reports no foreign bodies present. Her tetanus status is out of date (Per patient).     Review of patient's allergies indicates:   Allergen Reactions    Erythromycin Other (See Comments)     Stomach cramps    Morphine Nausea And Vomiting     "Projectile vomiting"     Past Medical History:   Diagnosis Date    Encounter for blood transfusion     KENN (generalized anxiety disorder)     Takes 5-10 mg of valium qd prn anxiety (prescriptions for both on file, one from [facility] & other from )    Insomnia     Takes 5-10 mg of valium qhs prn insomnia (prescriptions for both on file, one from [facility] & other from )    Migraine headache     Nephrolithiasis 08/03/2019    Left ureteral stone -> UTI c/b pyelonephritis and urosepsis w/ hypokalemia -> transfered to [facility] urology service from [facility] after CT abn dx, s/p 2 stents to allow infx to drain, IV Vanc/Rocephin, fever free since yesterday    Reflex sympathetic dystrophy     UTI (urinary tract infection)     Vertigo      Past Surgical History:   Procedure Laterality Date    BLADDER SURGERY      CHOLECYSTECTOMY      COLONOSCOPY N/A 11/10/2021    Procedure: COLONOSCOPY;  Surgeon: Eryn Rothman MD;  Location: [facility] ENDO;  Service: Endoscopy;  Laterality: N/A;    CYSTOSCOPY WITH URETEROSCOPY, RETROGRADE PYELOGRAPHY, AND INSERTION OF STENT Right 9/30/2021    Procedure: CYSTOSCOPY, WITH RETROGRADE PYELOGRAM AND URETERAL STENT INSERTION;  Surgeon: Annita Gamino MD;  Location: [facility] OR;  Service: Urology;  Laterality: Right;    DIAGNOSTIC LAPAROSCOPY N/A 11/11/2020    Procedure: LAPAROSCOPY, " DIAGNOSTIC;  Surgeon: Tee Segura MD;  Location: [facility] OR;  Service: General;  Laterality: N/A;  CONVERTED TO OPEN    DIAGNOSTIC LAPAROSCOPY N/A 7/25/2022    Procedure: LAPAROSCOPY, DIAGNOSTIC;  Surgeon: Jo Manzano DO;  Location: [facility] OR;  Service: General;  Laterality: N/A;  convert to open at 0739    ESOPHAGOGASTRODUODENOSCOPY N/A 11/10/2021    Procedure: EGD (ESOPHAGOGASTRODUODENOSCOPY);  Surgeon: Eryn Rothman MD;  Location: [facility] ENDO;  Service: Endoscopy;  Laterality: N/A;    facet injections  02/14/2017    L3, L4, L5, S1 Done by Dr. Lara    HYSTERECTOMY      INJECTION OF ANESTHETIC AGENT INTO TISSUE PLANE DEFINED BY TRANSVERSUS ABDOMINIS MUSCLE N/A 11/11/2020    Procedure: BLOCK, TRANSVERSUS ABDOMINIS PLANE;  Surgeon: Tee Segura MD;  Location: [facility] OR;  Service: General;  Laterality: N/A;    INJECTION OF ANESTHETIC AGENT INTO TISSUE PLANE DEFINED BY TRANSVERSUS ABDOMINIS MUSCLE N/A 7/25/2022    Procedure: BLOCK, TRANSVERSUS ABDOMINIS PLANE;  Surgeon: Jo Manzano DO;  Location: [facility] OR;  Service: General;  Laterality: N/A;    KNEE SURGERY      LAPAROSCOPIC LYSIS OF ADHESIONS N/A 11/11/2020    Procedure: LYSIS, ADHESIONS, LAPAROSCOPIC;  Surgeon: Tee Segura MD;  Location: [facility] OR;  Service: General;  Laterality: N/A;  CONVERTED TO OPEN    LAPAROTOMY N/A 11/20/2020    Procedure: LAPAROTOMY;  Surgeon: Tee Segura MD;  Location: [facility] OR;  Service: General;  Laterality: N/A;    LASER LITHOTRIPSY Left 2/8/2021    Procedure: LITHOTRIPSY, USING LASER;  Surgeon: Irving Kidd MD;  Location: [facility] OR;  Service: Urology;  Laterality: Left;    LASER LITHOTRIPSY Right 10/13/2021    Procedure: LITHOTRIPSY, USING LASER;  Surgeon: Annita Gamino MD;  Location: [facility] OR;  Service: Urology;  Laterality: Right;    LYSIS OF ADHESIONS N/A 11/11/2020    Procedure: LYSIS, ADHESIONS;  Surgeon: Tee Segura MD;  Location: [facility] OR;  Service: General;  Laterality: N/A;     LYSIS OF ADHESIONS N/A 11/20/2020    Procedure: LYSIS, ADHESIONS;  Surgeon: Tee Segura MD;  Location: [facility] OR;  Service: General;  Laterality: N/A;    LYSIS OF ADHESIONS N/A 7/25/2022    Procedure: LYSIS, ADHESIONS;  Surgeon: Jo Manzano DO;  Location: [facility] OR;  Service: General;  Laterality: N/A;    SURGICAL REMOVAL OF ILEUM WITH CECUM  7/25/2022    Procedure: EXCISION, CECUM WITH ILEUM;  Surgeon: Jo Manzano DO;  Location: [facility] OR;  Service: General;;    TONSILLECTOMY      URETEROSCOPY Left 2/8/2021    Procedure: URETEROSCOPY;  Surgeon: Irving Kidd MD;  Location: [facility] OR;  Service: Urology;  Laterality: Left;  stent placement    URETEROSCOPY Right 10/13/2021    Procedure: URETEROSCOPY;  Surgeon: Annita Gamino MD;  Location: [facility] OR;  Service: Urology;  Laterality: Right;     Family History   Problem Relation Age of Onset    Stroke Mother     Hypertension Mother     COPD Father     Drug abuse Brother      Social History     Tobacco Use    Smoking status: Never    Smokeless tobacco: Never   Substance Use Topics    Alcohol use: Yes     Comment: rarely    Drug use: No     Review of Systems   Constitutional:  Negative for chills, fatigue and fever.   Respiratory:  Negative for cough and shortness of breath.    Cardiovascular:  Negative for chest pain.   Gastrointestinal:  Negative for abdominal pain, nausea and vomiting.   Musculoskeletal:  Negative for back pain, myalgias and neck pain.   Skin:  Positive for wound. Negative for rash.   Neurological:  Negative for weakness and numbness.   All other systems reviewed and are negative.      Physical Exam     Initial Vitals [07/30/23 2158]   BP Pulse Resp Temp SpO2   134/82 96 18 98.3 °F (36.8 °C) 95 %      MAP       --         Physical Exam    Nursing note and vitals reviewed.  Constitutional: She appears well-developed and well-nourished. She is not diaphoretic. No distress.   HENT:   Head: Normocephalic and atraumatic.   Neck:  Neck supple.   Normal range of motion.  Cardiovascular:  Normal rate, regular rhythm and intact distal pulses.           Pulmonary/Chest: No respiratory distress.   Musculoskeletal:         General: Normal range of motion.      Left hand: Laceration present.        Hands:       Cervical back: Normal range of motion and neck supple.      Comments: + superficial appearing 1.5cm  laceration to dorsal aspect of proximal left index finger.  +active range of motion to affected finger.  Distal sensation is intact.  Cap refill less than 2 seconds.  No palpable or visible foreign body.     Neurological: She is alert and oriented to person, place, and time. She has normal strength. No sensory deficit. GCS score is 15. GCS eye subscore is 4. GCS verbal subscore is 5. GCS motor subscore is 6.   Skin: Skin is warm and dry. Capillary refill takes less than 2 seconds.         ED Course   Lac Repair    Date/Time: 7/30/2023 11:03 PM    Performed by: Pepe Randall NP  Authorized by: Renan Veras Jr., MD    Consent:     Consent obtained:  Verbal    Consent given by:  Patient    Risks, benefits, and alternatives were discussed: yes      Risks discussed:  Infection, pain, poor cosmetic result and poor wound healing    Alternatives discussed:  No treatment  Anesthesia:     Anesthesia method:  Nerve block    Block needle gauge:  27 G    Block anesthetic:  Lidocaine 1% w/o epi    Block technique:  Traditional digital block    Block injection procedure:  Anatomic landmarks identified, introduced needle, incremental injection, anatomic landmarks palpated and negative aspiration for blood    Block outcome:  Anesthesia achieved  Laceration details:     Location:  Finger    Finger location:  L index finger    Length (cm):  1.5  Pre-procedure details:     Preparation:  Patient was prepped and draped in usual sterile fashion  Exploration:     Hemostasis achieved with:  Direct pressure    Wound exploration: wound explored through full range of  motion and entire depth of wound visualized      Wound extent: no foreign bodies/material noted      Contaminated: no    Treatment:     Area cleansed with:  Povidone-iodine, chlorhexidine and saline    Amount of cleaning:  Standard    Irrigation solution:  Sterile saline  Skin repair:     Repair method:  Sutures    Suture size:  4-0    Suture material:  Nylon    Suture technique:  Simple interrupted    Number of sutures:  3  Approximation:     Approximation:  Close  Repair type:     Repair type:  Simple  Post-procedure details:     Dressing:  Antibiotic ointment and non-adherent dressing    Procedure completion:  Tolerated well, no immediate complications    Labs Reviewed - No data to display       Imaging Results    None          Medications   LIDOcaine (PF) 10 mg/ml (1%) injection 100 mg (100 mg Infiltration Given by Other 7/30/23 2228)   Tdap (BOOSTRIX) vaccine injection 0.5 mL (0.5 mLs Intramuscular Given 7/30/23 2227)   neomycin-bacitracin-polymyxin ointment ( Topical (Top) Given 7/30/23 2227)                  Regarding LACERATION CARE, patient was instructed to wash hands with soap and warm water before and after caring for wound; keep the wound dry for the first 24 to 48 hours and then gently clean the wound once or twice a day with cool water using soap to clean around the wound; avoid using alcohol or hydrogen peroxide to clean wound unless directed to; and use bandages to keep wound clean and protected and to prevent swelling.  Advised patient to contact primary healthcare provider if wound splits open; becomes extremely painful; appears to not be healing; has a foreign object present; develop a purulent discharge; or note the skin around the wound becoming numb, edematous, or erythematous.  Patient instructed to follow up with primary care provider for closure removal in 10 days.    I discussed wound care precautions with patient; specifically that all wounds have risk of infection despite efforts to  cleanse and debride the wound; and there is a risk of an occult foreign body (and thus increased risk of infection) despite a negative examination.  I discussed with patient need to return for any signs of infection, specifically redness, increased pain, fever, drainage of pus, or any concern, immediately.       I discussed with patient that evaluation in the ED does not suggest any emergent or life threatening medical conditions requiring immediate intervention beyond what was provided in the ED, and I believe patient is safe for discharge. Regardless, an unremarkable evaluation in the ED does not preclude the development or presence of a serious of life threatening condition. As such, patient was instructed to return immediately for any worsening or change in current symptoms.         Clinical Impression:   Final diagnoses:  [S6211A] Laceration of left index finger without foreign body without damage to nail, initial encounter (Primary)        ED Disposition Condition    Discharge Stable          ED Prescriptions       Medication Sig Dispense Start Date End Date Auth. Provider    neomycin-polymyxin-pramoxine (NEOSPORIN) 3.5-10,000-10 mg-unit-mg/gram Crea Apply topically 2 (two) times daily. for 10 days 1 each 7/30/2023 8/9/2023 Pepe Randall NP          Follow-up Information       Follow up With Specialties Details Why Contact Info    Tay Duff MD Internal Medicine In 10 days For suture removal [address]  [address]  [facility] [id number] [phone]      [facility] - Emergency Dept. Emergency Medicine  As needed, If symptoms worsen [address] [facility] [address] [phone]             Pepe Randall NP  07/31/23 0049

## 2023-08-07 RX ORDER — SPIRONOLACTONE 50 MG/1
50 TABLET, FILM COATED ORAL 2 TIMES DAILY
Qty: 180 TABLET | Refills: 1 | Status: SHIPPED | OUTPATIENT
Start: 2023-08-07

## 2023-08-07 NOTE — TELEPHONE ENCOUNTER
No care due was identified.  [facility] Embedded Care Due Messages. Reference number: [id number].   8/07/2023 1:39:36 PM CDT

## 2023-08-13 ENCOUNTER — OFFICE VISIT (OUTPATIENT)
Dept: URGENT CARE | Facility: CLINIC | Age: 58
End: 2023-08-13
Payer: MEDICARE

## 2023-08-13 VITALS
WEIGHT: 176 LBS | TEMPERATURE: 98 F | SYSTOLIC BLOOD PRESSURE: 122 MMHG | HEIGHT: 67 IN | BODY MASS INDEX: 27.62 KG/M2 | OXYGEN SATURATION: 96 % | RESPIRATION RATE: 18 BRPM | DIASTOLIC BLOOD PRESSURE: 67 MMHG | HEART RATE: 87 BPM

## 2023-08-13 DIAGNOSIS — R30.0 DYSURIA: ICD-10-CM

## 2023-08-13 DIAGNOSIS — R10.9 FLANK PAIN: Primary | ICD-10-CM

## 2023-08-13 DIAGNOSIS — Z87.442 HISTORY OF KIDNEY STONES: ICD-10-CM

## 2023-08-13 LAB
BILIRUB UR QL STRIP: NEGATIVE
GLUCOSE UR QL STRIP: NEGATIVE
KETONES UR QL STRIP: NEGATIVE
LEUKOCYTE ESTERASE UR QL STRIP: NEGATIVE
PH, POC UA: 5
POC BLOOD, URINE: NEGATIVE
POC NITRATES, URINE: NEGATIVE
PROT UR QL STRIP: NEGATIVE
SP GR UR STRIP: 1.01 (ref 1–1.03)
UROBILINOGEN UR STRIP-ACNC: NORMAL (ref 0.1–1.1)

## 2023-08-13 PROCEDURE — 96372 PR INJECTION,THERAP/PROPH/DIAG2ST, IM OR SUBCUT: ICD-10-PCS | Mod: S$GLB,,, | Performed by: EMERGENCY MEDICINE

## 2023-08-13 PROCEDURE — 96372 THER/PROPH/DIAG INJ SC/IM: CPT | Mod: S$GLB,,, | Performed by: EMERGENCY MEDICINE

## 2023-08-13 PROCEDURE — 81003 POCT URINALYSIS, DIPSTICK, AUTOMATED, W/O SCOPE: ICD-10-PCS | Mod: QW,S$GLB,, | Performed by: EMERGENCY MEDICINE

## 2023-08-13 PROCEDURE — 99214 OFFICE O/P EST MOD 30 MIN: CPT | Mod: 25,S$GLB,, | Performed by: EMERGENCY MEDICINE

## 2023-08-13 PROCEDURE — 81003 URINALYSIS AUTO W/O SCOPE: CPT | Mod: QW,S$GLB,, | Performed by: EMERGENCY MEDICINE

## 2023-08-13 PROCEDURE — 87086 URINE CULTURE/COLONY COUNT: CPT | Performed by: EMERGENCY MEDICINE

## 2023-08-13 PROCEDURE — 99214 PR OFFICE/OUTPT VISIT, EST, LEVL IV, 30-39 MIN: ICD-10-PCS | Mod: 25,S$GLB,, | Performed by: EMERGENCY MEDICINE

## 2023-08-13 RX ORDER — KETOROLAC TROMETHAMINE 10 MG/1
10 TABLET, FILM COATED ORAL 2 TIMES DAILY PRN
Qty: 10 TABLET | Refills: 0 | Status: SHIPPED | OUTPATIENT
Start: 2023-08-13 | End: 2023-08-23

## 2023-08-13 RX ORDER — KETOROLAC TROMETHAMINE 30 MG/ML
30 INJECTION, SOLUTION INTRAMUSCULAR; INTRAVENOUS
Status: COMPLETED | OUTPATIENT
Start: 2023-08-13 | End: 2023-08-13

## 2023-08-13 RX ADMIN — KETOROLAC TROMETHAMINE 30 MG: 30 INJECTION, SOLUTION INTRAMUSCULAR; INTRAVENOUS at 04:08

## 2023-08-13 NOTE — PROGRESS NOTES
"Subjective:      Patient ID: Genny Calixto is a 58 y.o. female.    Vitals:  height is 5' 7" (1.702 m) and weight is 79.8 kg (176 lb). Her tympanic temperature is 98.4 °F (36.9 °C). Her blood pressure is 122/67 and her pulse is 87. Her respiration is 18 and oxygen saturation is 96%.     Chief Complaint: Flank Pain    Pt c/o possible kidney stones, pt states she has a hx of kidney stones. Pt c/o pain mostly on left side of back but noting some pain on right back as well, x 3 days.  Patient states that this pain is worse with rising to standing but not painful with walking.  She has spikes of pain, mostly in the left flank.  States most of her kidney stones have been on the left.  Patient has extensive abdominal history with hemicolectomy in the past and subsequent small-bowel obstructions.  No vomiting with this episode.  She has had some dysuria but this is not the main symptom.  She also has noted some blood in her urine at home.    Flank Pain  This is a chronic problem. The current episode started in the past 7 days. The problem has been gradually worsening since onset. The pain is present in the lumbar spine. The quality of the pain is described as stabbing. The pain radiates to the right thigh. The pain is at a severity of 6/10. The pain is moderate. The pain is The same all the time. The symptoms are aggravated by standing. Stiffness is present All day. Associated symptoms include dysuria and pelvic pain. Pertinent negatives include no abdominal pain, bladder incontinence, bowel incontinence, chest pain, fever, headaches, leg pain, numbness, paresis, perianal numbness, tingling, weakness or weight loss. Risk factors include renal stones. She has tried NSAIDs and muscle relaxant for the symptoms. The treatment provided no relief.       Constitution: Negative for fatigue and fever.   Cardiovascular:  Negative for chest pain.   Gastrointestinal:  Negative for abdominal pain and bowel incontinence. "   Genitourinary:  Positive for dysuria, flank pain and pelvic pain. Negative for bladder incontinence.   Neurological:  Negative for headaches and numbness.      Objective:     Physical Exam   Constitutional: She is oriented to person, place, and time. She does not appear ill. No distress.   HENT:   Head: Normocephalic and atraumatic.   Mouth/Throat: Mucous membranes are moist.   Pulmonary/Chest: Effort normal.   Abdominal: Normal appearance. She exhibits no distension. Soft. There is no abdominal tenderness. There is no guarding, no left CVA tenderness and no right CVA tenderness.   Neurological: She is alert and oriented to person, place, and time.   Skin: Skin is warm and dry.   Psychiatric: Her behavior is normal.   Nursing note and vitals reviewed.      Assessment:     1. Flank pain    2. Dysuria    3. History of kidney stones        Plan:       Flank pain  -     CULTURE, URINE  -     ketorolac injection 30 mg  -     ketorolac (TORADOL) 10 mg tablet; Take 1 tablet (10 mg total) by mouth 2 (two) times daily as needed for Pain.  Dispense: 10 tablet; Refill: 0    Dysuria  -     POCT Urinalysis, Dipstick, Automated, W/O Scope  -     CULTURE, URINE    History of kidney stones  -     ketorolac (TORADOL) 10 mg tablet; Take 1 tablet (10 mg total) by mouth 2 (two) times daily as needed for Pain.  Dispense: 10 tablet; Refill: 0          Medical Decision Making:   History:   I obtained history from: someone other than patient.       <> Summary of History: Patient's  is present during the history and provide some information  Old Records Summarized: other records.       <> Summary of Records: Reviewed patient's ER records and her CAT scanned record from June of this year which did not show any nephrolithiasis.  Initial Assessment:   Flank pain  Differential Diagnosis:   Cystitis, pyelonephritis, kidney stone  Clinical Tests:   Lab Tests: Ordered and Reviewed       <> Summary of Lab: Urinalysis was clear  Urgent Care  Management:  Ordering a culture to rule out infection in this patient who has had some dysuria.  Patient states she is pretty sure this is a kidney stone.  She is a reliable historian.  She is had problems in the past taking NSAIDs due to some stomach problems.  States that she usually gets Norco 5 mg for her stone pain.  I advised her that we will not be doing narcotic pain medicine from urgent care.  She is amenable to getting a shot of Toradol.  I have also prescribed some oral Toradol for her to take with food on the stomach if necessary.  ER precautions given.  Patient has a relationship with a urologist and will contact them tomorrow.  She and her  verbalized understanding of and agreement with this plan

## 2023-08-13 NOTE — PATIENT INSTRUCTIONS
Toradol as prescribed for flank pain.  Take this with food on the stomach.  Consider taking Pepcid if you develop any heartburn or abdominal pain.  Go to the emergency room if you have spike in pain unrelieved with Toradol or if you have vomiting.  Call your urologist in the morning to schedule follow-up for possible kidney stone.

## 2023-08-14 ENCOUNTER — HOSPITAL ENCOUNTER (OUTPATIENT)
Dept: RADIOLOGY | Facility: HOSPITAL | Age: 58
Discharge: HOME | End: 2023-08-14
Attending: UROLOGY
Payer: MEDICARE

## 2023-08-14 ENCOUNTER — OFFICE VISIT (OUTPATIENT)
Dept: UROLOGY | Facility: CLINIC | Age: 58
End: 2023-08-14
Payer: MEDICARE

## 2023-08-14 VITALS — HEIGHT: 67 IN | WEIGHT: 176 LBS | BODY MASS INDEX: 27.62 KG/M2

## 2023-08-14 DIAGNOSIS — N22 CALCULUS OF URINARY TRACT IN DISEASES CLASSIFIED ELSEWHERE: ICD-10-CM

## 2023-08-14 DIAGNOSIS — R31.0 GROSS HEMATURIA: Primary | ICD-10-CM

## 2023-08-14 DIAGNOSIS — N28.89 CALIECTASIS: ICD-10-CM

## 2023-08-14 PROCEDURE — 99214 OFFICE O/P EST MOD 30 MIN: CPT | Mod: S$PBB,,, | Performed by: UROLOGY

## 2023-08-14 PROCEDURE — 99999 PR PBB SHADOW E&M-EST. PATIENT-LVL II: ICD-10-PCS | Mod: PBBFAC,,, | Performed by: UROLOGY

## 2023-08-14 PROCEDURE — 74176 CT ABD & PELVIS W/O CONTRAST: CPT | Mod: 26,,, | Performed by: RADIOLOGY

## 2023-08-14 PROCEDURE — 99999 PR PBB SHADOW E&M-EST. PATIENT-LVL II: CPT | Mod: PBBFAC,,, | Performed by: UROLOGY

## 2023-08-14 PROCEDURE — 99214 PR OFFICE/OUTPT VISIT, EST, LEVL IV, 30-39 MIN: ICD-10-PCS | Mod: S$PBB,,, | Performed by: UROLOGY

## 2023-08-14 PROCEDURE — 74176 CT ABD & PELVIS W/O CONTRAST: CPT | Mod: TC

## 2023-08-14 PROCEDURE — 74176 CT RENAL STONE STUDY ABD PELVIS WO: ICD-10-PCS | Mod: 26,,, | Performed by: RADIOLOGY

## 2023-08-14 PROCEDURE — 99212 OFFICE O/P EST SF 10 MIN: CPT | Mod: PBBFAC,25 | Performed by: UROLOGY

## 2023-08-14 RX ORDER — NITROFURANTOIN 25; 75 MG/1; MG/1
100 CAPSULE ORAL 2 TIMES DAILY
Qty: 14 CAPSULE | Refills: 0 | Status: SHIPPED | OUTPATIENT
Start: 2023-08-14 | End: 2024-02-15

## 2023-08-14 RX ORDER — HYDROCODONE BITARTRATE AND ACETAMINOPHEN 5; 325 MG/1; MG/1
1 TABLET ORAL EVERY 8 HOURS PRN
Qty: 10 TABLET | Refills: 0 | Status: ON HOLD | OUTPATIENT
Start: 2023-08-14 | End: 2023-08-24 | Stop reason: SDUPTHER

## 2023-08-14 NOTE — PROGRESS NOTES
"  Chief Complaint: kidney stones     HPI:     8/14/23-thinks she has a stone. Went to urgent care yesterday and was given toradol, which isn't helping. she reports having Left flank pain radiating around to her abdomen, but also slight right sided pain. She reports that the pain is 5-7/10 in severity. She does report a small amount of gross hematuria this am. She does have some dysuria. Culture is currently pending from yesterday.     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, states that she was doing fine post-op from hysterectomy when she had "like a pregnant woman's water breaking" a few weeks after, this was eventually repaired by Dr Rivas several years ago, is wondering if this has returned    04/19/2022 - returns today after an absence, notes issues with low back pain for the last 3-4 weeks "like I'm passing a big stone", notes that her pain is worse when she stands from a seated position, also notes recurrent gross hematuria and intermittent fevers from 100.6 - 101.4F as well as chills     10/13/2021 - right URS/LL for right ureteral stone  09/30/2021 - right stent placement for right ureteral stone  02/07/2021 - URS/LL for left ureteral stone  PMH:       Past Medical History:   Diagnosis Date    Encounter for blood transfusion      KENN (generalized anxiety disorder)       Takes 5-10 mg of valium qd prn anxiety (prescriptions for both on file, one from [facility] & other from )    Insomnia       Takes 5-10 mg of valium qhs prn insomnia (prescriptions for both on file, one from [facility] & other from )    Migraine headache      Nephrolithiasis 08/03/2019     Left ureteral stone -> UTI c/b pyelonephritis and urosepsis w/ hypokalemia -> transfered to [facility] urology service from [facility] after CT abn dx, s/p 2 stents to allow infx to drain, IV Vanc/Rocephin, fever free since yesterday    Reflex sympathetic " dystrophy      UTI (urinary tract infection)      Vertigo           PSH:        Past Surgical History:   Procedure Laterality Date    BLADDER SURGERY        CHOLECYSTECTOMY        COLONOSCOPY N/A 11/10/2021     Procedure: COLONOSCOPY;  Surgeon: Eryn Rothman MD;  Location: [facility];  Service: Endoscopy;  Laterality: N/A;    CYSTOSCOPY WITH URETEROSCOPY, RETROGRADE PYELOGRAPHY, AND INSERTION OF STENT Right 9/30/2021     Procedure: CYSTOSCOPY, WITH RETROGRADE PYELOGRAM AND URETERAL STENT INSERTION;  Surgeon: Annita Gamino MD;  Location: [facility];  Service: Urology;  Laterality: Right;    DIAGNOSTIC LAPAROSCOPY N/A 11/11/2020     Procedure: LAPAROSCOPY, DIAGNOSTIC;  Surgeon: Tee Segura MD;  Location: [facility] OR;  Service: General;  Laterality: N/A;  CONVERTED TO OPEN    ESOPHAGOGASTRODUODENOSCOPY N/A 11/10/2021     Procedure: EGD (ESOPHAGOGASTRODUODENOSCOPY);  Surgeon: Eryn Rothman MD;  Location: [facility];  Service: Endoscopy;  Laterality: N/A;    facet injections   02/14/2017     L3, L4, L5, S1 Done by Dr. Lara    HYSTERECTOMY        INJECTION OF ANESTHETIC AGENT INTO TISSUE PLANE DEFINED BY TRANSVERSUS ABDOMINIS MUSCLE N/A 11/11/2020     Procedure: BLOCK, TRANSVERSUS ABDOMINIS PLANE;  Surgeon: Tee Segura MD;  Location: [facility] OR;  Service: General;  Laterality: N/A;    KNEE SURGERY        LAPAROSCOPIC LYSIS OF ADHESIONS N/A 11/11/2020     Procedure: LYSIS, ADHESIONS, LAPAROSCOPIC;  Surgeon: Tee Segura MD;  Location: [facility] OR;  Service: General;  Laterality: N/A;  CONVERTED TO OPEN    LAPAROTOMY N/A 11/20/2020     Procedure: LAPAROTOMY;  Surgeon: Tee Segura MD;  Location: [facility] OR;  Service: General;  Laterality: N/A;    LASER LITHOTRIPSY Left 2/8/2021     Procedure: LITHOTRIPSY, USING LASER;  Surgeon: Irving Kidd MD;  Location: [facility];  Service: Urology;  Laterality: Left;    LASER LITHOTRIPSY Right 10/13/2021     Procedure: LITHOTRIPSY, USING LASER;  Surgeon:  Annita Gamino MD;  Location: [facility] OR;  Service: Urology;  Laterality: Right;    LYSIS OF ADHESIONS N/A 11/11/2020     Procedure: LYSIS, ADHESIONS;  Surgeon: Tee Segura MD;  Location: [facility] OR;  Service: General;  Laterality: N/A;    LYSIS OF ADHESIONS N/A 11/20/2020     Procedure: LYSIS, ADHESIONS;  Surgeon: Tee Segura MD;  Location: [facility] OR;  Service: General;  Laterality: N/A;    TONSILLECTOMY        URETEROSCOPY Left 2/8/2021     Procedure: URETEROSCOPY;  Surgeon: Irving Kidd MD;  Location: [facility] OR;  Service: Urology;  Laterality: Left;  stent placement    URETEROSCOPY Right 10/13/2021     Procedure: URETEROSCOPY;  Surgeon: Annita Gamino MD;  Location: [facility] OR;  Service: Urology;  Laterality: Right;         Family History:        Family History   Problem Relation Age of Onset    Stroke Mother      Hypertension Mother      COPD Father      Drug abuse Brother           Social History:  Social History            Tobacco Use    Smoking status: Never Smoker    Smokeless tobacco: Never Used   Substance Use Topics    Alcohol use: Yes       Comment: rarely    Drug use: No         Review of Systems:  General: No fever, chills  Skin: No rashes  Chest:  Denies cough and sputum production  Heart: Denies chest pain  Resp: Denies dyspnea  Abdomen: Denies diarrhea, abdominal pain, hematemesis, or blood in stool.  Musculoskeletal: No joint stiffness or swelling. Denies back pain.  : see HPI  Neuro: no dizziness or weakness     Allergies:  Erythromycin, Morphine, and Opioids - morphine analogues     Medications:     Current Outpatient Medications:     amoxicillin-clavulanate 875-125mg (AUGMENTIN) 875-125 mg per tablet, Take 1 tablet by mouth every 12 (twelve) hours., Disp: 20 tablet, Rfl: 0    diazePAM (VALIUM) 10 MG Tab, TAKE 1 TABLET BY MOUTH EVERY DAY AS NEEDED, Disp: 90 tablet, Rfl: 1    diazePAM (VALIUM) 5 MG tablet, Take one with onset of migraine, Disp: 20 tablet, Rfl: 5    dicyclomine  (BENTYL) 20 mg tablet, TAKE 1 TABLET BY MOUTH THREE TIMES A DAY, Disp: 270 tablet, Rfl: 3    doxepin (SINEQUAN) 10 MG capsule, TAKE 1 CAPSULE (10 MG TOTAL) BY MOUTH EVERY EVENING., Disp: 30 capsule, Rfl: 11    ergocalciferol (ERGOCALCIFEROL) 50,000 unit Cap, Take 1 capsule (50,000 Units total) by mouth every 7 days., Disp: 12 capsule, Rfl: 3    fluticasone propionate (FLONASE) 50 mcg/actuation nasal spray, 2 sprays (100 mcg total) by Each Nostril route once daily., Disp: 16 g, Rfl: 11    HYDROmorphone (DILAUDID) 2 MG tablet, Take 1 tablet (2 mg total) by mouth every 4 (four) hours as needed for Pain., Disp: 20 tablet, Rfl: 0    LACTOBACILLUS COMBO NO.6 (PROBIOTIC COMPLEX ORAL), Take by mouth once daily at 6am., Disp: , Rfl:     loratadine (CLARITIN) 10 mg tablet, Take 10 mg by mouth daily, Disp: , Rfl:     melatonin 5 mg Cap, Take by mouth., Disp: , Rfl:     multivitamin capsule, Take 1 capsule by mouth once daily., Disp: , Rfl:     ondansetron (ZOFRAN) 4 MG tablet, Take 1 tablet (4 mg total) by mouth every 6 (six) hours., Disp: 12 tablet, Rfl: 0    pantoprazole (PROTONIX) 40 MG tablet, Take 1 tablet (40 mg total) by mouth once daily., Disp: 30 tablet, Rfl: 6    spironolactone (ALDACTONE) 50 MG tablet, TAKE 1 TABLET BY MOUTH ONCE DAILY THEN INCREASE TO 2 TABLETS DAILY AS TOLERATED, Disp: 180 tablet, Rfl: 1    sumatriptan (IMITREX) 100 MG tablet, TAKE 1 TABLET BY MOUTH IF NEEDED, MAY REPEAT ONCE IN 1 HOUR. MAX OF 2 TABLETS IN 24 HOURS, Disp: 10 tablet, Rfl: 2    tamsulosin (FLOMAX) 0.4 mg Cap, Take 2 capsules (0.8 mg total) by mouth once daily., Disp: 60 capsule, Rfl: 3    methocarbamoL (ROBAXIN) 500 MG Tab, Take 1 tablet (500 mg total) by mouth 4 (four) times daily. for 10 days, Disp: 40 tablet, Rfl: 0     Physical Exam:      Vitals:     04/19/22 1309   BP: 135/86   Pulse: 86   Temp: 97.2 °F (36.2 °C)      Body mass index is 23.96 kg/m².  General: awake, alert, cooperative  Head: NC/AT  Ears: external ears  normal  Eyes: sclera normal  Lungs: normal inspiration, NAD  Heart: well-perfused  Back: midline spine, mild bilateral flank tenderness  Abdomen: soft, mild diffuse tenderness, no mass or rebound.   Skin: The skin is warm and dry  Ext: No c/c/e.  Neuro: grossly intact, AOx3     RADIOLOGY:  STAT CT scan from today personally reviewed, noting a punctate L lower pole renal stone. No ureteral stones. Right renal collecting system minimally dilated.      LABS:  I personally reviewed the following lab values:  BMP  Lab Results   Component Value Date     02/01/2023    K 5.1 02/01/2023     02/01/2023    CO2 25 02/01/2023    BUN 18 02/01/2023    CREATININE 0.8 02/01/2023    CALCIUM 9.9 02/01/2023    ANIONGAP 10 02/01/2023    EGFRNORACEVR >60 02/01/2023     Lab Results   Component Value Date    WBC 6.71 02/01/2023    HGB 15.3 02/01/2023    HCT 44.4 02/01/2023    MCV 96 02/01/2023     02/01/2023            Assessment/Plan:   Gross hematuria    Calculus of urinary tract in diseases classified elsewhere  -     CT Renal Stone Study ABD Pelvis WO; Future; Expected date: 08/14/2023    Caliectasis    Other orders  -     nitrofurantoin, macrocrystal-monohydrate, (MACROBID) 100 MG capsule; Take 1 capsule (100 mg total) by mouth 2 (two) times daily.  Dispense: 14 capsule; Refill: 0  -     HYDROcodone-acetaminophen (NORCO) 5-325 mg per tablet; Take 1 tablet by mouth every 8 (eight) hours as needed for Pain.  Dispense: 10 tablet; Refill: 0    Will follow up urine culture. If negative, will go to the OR for cysto/RPG.

## 2023-08-15 LAB — BACTERIA UR CULT: NO GROWTH

## 2023-08-16 ENCOUNTER — TELEPHONE (OUTPATIENT)
Dept: URGENT CARE | Facility: CLINIC | Age: 58
End: 2023-08-16
Payer: MEDICARE

## 2023-08-16 ENCOUNTER — PATIENT MESSAGE (OUTPATIENT)
Dept: UROLOGY | Facility: CLINIC | Age: 58
End: 2023-08-16
Payer: MEDICARE

## 2023-08-16 ENCOUNTER — TELEPHONE (OUTPATIENT)
Dept: URGENT CARE | Facility: CLINIC | Age: 58
End: 2023-08-16

## 2023-08-16 NOTE — TELEPHONE ENCOUNTER
Attempted to call regarding urine culture results. Family member answered and will give message to call back.

## 2023-08-16 NOTE — TELEPHONE ENCOUNTER
Patient called back for Urine culture results: no growth. She states her symptoms are the same but she followed up with urology yesterday and started on Macrobid. She states she had a CT that was reviewed by urology. She has follow up planned. All questions answered.   Nancy Alexis, NP

## 2023-08-17 DIAGNOSIS — R31.0 GROSS HEMATURIA: Primary | ICD-10-CM

## 2023-08-20 PROCEDURE — 99285 EMERGENCY DEPT VISIT HI MDM: CPT

## 2023-08-20 PROCEDURE — 51798 US URINE CAPACITY MEASURE: CPT

## 2023-08-20 RX ORDER — MEPERIDINE HYDROCHLORIDE 50 MG/ML
25 INJECTION INTRAMUSCULAR; INTRAVENOUS; SUBCUTANEOUS
Status: COMPLETED | OUTPATIENT
Start: 2023-08-21 | End: 2023-08-21

## 2023-08-21 ENCOUNTER — HOSPITAL ENCOUNTER (OUTPATIENT)
Facility: HOSPITAL | Age: 58
Discharge: HOME | End: 2023-08-24
Attending: EMERGENCY MEDICINE | Admitting: INTERNAL MEDICINE
Payer: MEDICARE

## 2023-08-21 ENCOUNTER — PATIENT MESSAGE (OUTPATIENT)
Dept: SURGERY | Facility: HOSPITAL | Age: 58
End: 2023-08-21
Payer: MEDICARE

## 2023-08-21 DIAGNOSIS — R10.9 LEFT FLANK PAIN: ICD-10-CM

## 2023-08-21 DIAGNOSIS — R07.9 CHEST PAIN: ICD-10-CM

## 2023-08-21 DIAGNOSIS — R52 INTRACTABLE PAIN: Primary | ICD-10-CM

## 2023-08-21 DIAGNOSIS — N20.1 URETERAL STONE: ICD-10-CM

## 2023-08-21 DIAGNOSIS — N13.30 HYDRONEPHROSIS, UNSPECIFIED HYDRONEPHROSIS TYPE: ICD-10-CM

## 2023-08-21 DIAGNOSIS — R31.0 GROSS HEMATURIA: ICD-10-CM

## 2023-08-21 PROBLEM — E87.6 HYPOKALEMIA: Status: ACTIVE | Noted: 2023-08-21

## 2023-08-21 LAB
ALBUMIN SERPL BCP-MCNC: 4 G/DL (ref 3.5–5.2)
ALP SERPL-CCNC: 93 U/L (ref 55–135)
ALT SERPL W/O P-5'-P-CCNC: 21 U/L (ref 10–44)
ANION GAP SERPL CALC-SCNC: 13 MMOL/L (ref 8–16)
AST SERPL-CCNC: 19 U/L (ref 10–40)
BASOPHILS # BLD AUTO: 0.07 K/UL (ref 0–0.2)
BASOPHILS NFR BLD: 0.9 % (ref 0–1.9)
BILIRUB SERPL-MCNC: 0.7 MG/DL (ref 0.1–1)
BILIRUB UR QL STRIP: NEGATIVE
BUN SERPL-MCNC: 13 MG/DL (ref 6–20)
CALCIUM SERPL-MCNC: 8.7 MG/DL (ref 8.7–10.5)
CHLORIDE SERPL-SCNC: 106 MMOL/L (ref 95–110)
CLARITY UR: CLEAR
CO2 SERPL-SCNC: 19 MMOL/L (ref 23–29)
COLOR UR: YELLOW
CREAT SERPL-MCNC: 0.9 MG/DL (ref 0.5–1.4)
DIFFERENTIAL METHOD: ABNORMAL
EOSINOPHIL # BLD AUTO: 0.2 K/UL (ref 0–0.5)
EOSINOPHIL NFR BLD: 2 % (ref 0–8)
ERYTHROCYTE [DISTWIDTH] IN BLOOD BY AUTOMATED COUNT: 12.4 % (ref 11.5–14.5)
EST. GFR  (NO RACE VARIABLE): >60 ML/MIN/1.73 M^2
GLUCOSE SERPL-MCNC: 121 MG/DL (ref 70–110)
GLUCOSE UR QL STRIP: NEGATIVE
HCT VFR BLD AUTO: 39.9 % (ref 37–48.5)
HGB BLD-MCNC: 13.4 G/DL (ref 12–16)
HGB UR QL STRIP: NEGATIVE
HYALINE CASTS #/AREA URNS LPF: 11 /LPF
IMM GRANULOCYTES # BLD AUTO: 0.02 K/UL (ref 0–0.04)
IMM GRANULOCYTES NFR BLD AUTO: 0.3 % (ref 0–0.5)
KETONES UR QL STRIP: NEGATIVE
LACTATE SERPL-SCNC: 1.8 MMOL/L (ref 0.5–2.2)
LACTATE SERPL-SCNC: 3.3 MMOL/L (ref 0.5–2.2)
LEUKOCYTE ESTERASE UR QL STRIP: ABNORMAL
LYMPHOCYTES # BLD AUTO: 2.4 K/UL (ref 1–4.8)
LYMPHOCYTES NFR BLD: 31.7 % (ref 18–48)
MAGNESIUM SERPL-MCNC: 1.5 MG/DL (ref 1.6–2.6)
MCH RBC QN AUTO: 33.3 PG (ref 27–31)
MCHC RBC AUTO-ENTMCNC: 33.6 G/DL (ref 32–36)
MCV RBC AUTO: 99 FL (ref 82–98)
MICROSCOPIC COMMENT: ABNORMAL
MONOCYTES # BLD AUTO: 0.5 K/UL (ref 0.3–1)
MONOCYTES NFR BLD: 6.4 % (ref 4–15)
NEUTROPHILS # BLD AUTO: 4.4 K/UL (ref 1.8–7.7)
NEUTROPHILS NFR BLD: 58.7 % (ref 38–73)
NITRITE UR QL STRIP: NEGATIVE
NRBC BLD-RTO: 0 /100 WBC
PH UR STRIP: 6 [PH] (ref 5–8)
PLATELET # BLD AUTO: 232 K/UL (ref 150–450)
PMV BLD AUTO: 11.1 FL (ref 9.2–12.9)
POTASSIUM SERPL-SCNC: 3.2 MMOL/L (ref 3.5–5.1)
PROT SERPL-MCNC: 6.8 G/DL (ref 6–8.4)
PROT UR QL STRIP: NEGATIVE
RBC # BLD AUTO: 4.02 M/UL (ref 4–5.4)
SODIUM SERPL-SCNC: 138 MMOL/L (ref 136–145)
SP GR UR STRIP: 1.01 (ref 1–1.03)
URN SPEC COLLECT METH UR: ABNORMAL
UROBILINOGEN UR STRIP-ACNC: NEGATIVE EU/DL
WBC # BLD AUTO: 7.5 K/UL (ref 3.9–12.7)
WBC #/AREA URNS HPF: 2 /HPF (ref 0–5)

## 2023-08-21 PROCEDURE — 96375 TX/PRO/DX INJ NEW DRUG ADDON: CPT

## 2023-08-21 PROCEDURE — 96376 TX/PRO/DX INJ SAME DRUG ADON: CPT

## 2023-08-21 PROCEDURE — 63600175 PHARM REV CODE 636 W HCPCS: Performed by: INTERNAL MEDICINE

## 2023-08-21 PROCEDURE — G0378 HOSPITAL OBSERVATION PER HR: HCPCS

## 2023-08-21 PROCEDURE — 25000003 PHARM REV CODE 250: Performed by: INTERNAL MEDICINE

## 2023-08-21 PROCEDURE — 96366 THER/PROPH/DIAG IV INF ADDON: CPT

## 2023-08-21 PROCEDURE — 63600175 PHARM REV CODE 636 W HCPCS: Performed by: NURSE PRACTITIONER

## 2023-08-21 PROCEDURE — 80053 COMPREHEN METABOLIC PANEL: CPT | Performed by: NURSE PRACTITIONER

## 2023-08-21 PROCEDURE — 25000003 PHARM REV CODE 250: Performed by: EMERGENCY MEDICINE

## 2023-08-21 PROCEDURE — 96365 THER/PROPH/DIAG IV INF INIT: CPT | Mod: 59

## 2023-08-21 PROCEDURE — 83605 ASSAY OF LACTIC ACID: CPT | Performed by: EMERGENCY MEDICINE

## 2023-08-21 PROCEDURE — 96361 HYDRATE IV INFUSION ADD-ON: CPT

## 2023-08-21 PROCEDURE — 81000 URINALYSIS NONAUTO W/SCOPE: CPT | Performed by: NURSE PRACTITIONER

## 2023-08-21 PROCEDURE — 85025 COMPLETE CBC W/AUTO DIFF WBC: CPT | Performed by: NURSE PRACTITIONER

## 2023-08-21 PROCEDURE — 99285 EMERGENCY DEPT VISIT HI MDM: CPT | Mod: 25

## 2023-08-21 PROCEDURE — 96374 THER/PROPH/DIAG INJ IV PUSH: CPT | Mod: 59

## 2023-08-21 PROCEDURE — 25000003 PHARM REV CODE 250: Performed by: NURSE PRACTITIONER

## 2023-08-21 PROCEDURE — 83735 ASSAY OF MAGNESIUM: CPT | Performed by: NURSE PRACTITIONER

## 2023-08-21 PROCEDURE — 63600175 PHARM REV CODE 636 W HCPCS: Performed by: EMERGENCY MEDICINE

## 2023-08-21 PROCEDURE — 96367 TX/PROPH/DG ADDL SEQ IV INF: CPT

## 2023-08-21 PROCEDURE — 51798 US URINE CAPACITY MEASURE: CPT

## 2023-08-21 RX ORDER — NALOXONE HCL 0.4 MG/ML
0.4 VIAL (ML) INJECTION
Status: DISCONTINUED | OUTPATIENT
Start: 2023-08-21 | End: 2023-08-24 | Stop reason: HOSPADM

## 2023-08-21 RX ORDER — GLUCAGON 1 MG
1 KIT INJECTION
Status: DISCONTINUED | OUTPATIENT
Start: 2023-08-21 | End: 2023-08-24 | Stop reason: HOSPADM

## 2023-08-21 RX ORDER — DIPHENHYDRAMINE HCL 25 MG
25 CAPSULE ORAL 2 TIMES DAILY PRN
Status: DISCONTINUED | OUTPATIENT
Start: 2023-08-21 | End: 2023-08-24 | Stop reason: HOSPADM

## 2023-08-21 RX ORDER — ORPHENADRINE CITRATE 30 MG/ML
30 INJECTION INTRAMUSCULAR; INTRAVENOUS
Status: COMPLETED | OUTPATIENT
Start: 2023-08-21 | End: 2023-08-21

## 2023-08-21 RX ORDER — DIAZEPAM 5 MG/1
5 TABLET ORAL
Status: COMPLETED | OUTPATIENT
Start: 2023-08-21 | End: 2023-08-21

## 2023-08-21 RX ORDER — SODIUM CHLORIDE 9 MG/ML
INJECTION, SOLUTION INTRAVENOUS CONTINUOUS
Status: DISCONTINUED | OUTPATIENT
Start: 2023-08-21 | End: 2023-08-23

## 2023-08-21 RX ORDER — SPIRONOLACTONE 25 MG/1
50 TABLET ORAL 2 TIMES DAILY
Status: DISCONTINUED | OUTPATIENT
Start: 2023-08-21 | End: 2023-08-24 | Stop reason: HOSPADM

## 2023-08-21 RX ORDER — HYDROMORPHONE HYDROCHLORIDE 2 MG/ML
1 INJECTION, SOLUTION INTRAMUSCULAR; INTRAVENOUS; SUBCUTANEOUS
Status: COMPLETED | OUTPATIENT
Start: 2023-08-21 | End: 2023-08-21

## 2023-08-21 RX ORDER — DIAZEPAM 5 MG/1
10 TABLET ORAL 2 TIMES DAILY PRN
Status: DISCONTINUED | OUTPATIENT
Start: 2023-08-21 | End: 2023-08-24 | Stop reason: HOSPADM

## 2023-08-21 RX ORDER — MAGNESIUM SULFATE HEPTAHYDRATE 40 MG/ML
2 INJECTION, SOLUTION INTRAVENOUS ONCE
Status: COMPLETED | OUTPATIENT
Start: 2023-08-21 | End: 2023-08-21

## 2023-08-21 RX ORDER — ENOXAPARIN SODIUM 100 MG/ML
40 INJECTION SUBCUTANEOUS EVERY 24 HOURS
Status: DISCONTINUED | OUTPATIENT
Start: 2023-08-21 | End: 2023-08-23

## 2023-08-21 RX ORDER — DOCUSATE SODIUM 100 MG/1
100 CAPSULE, LIQUID FILLED ORAL DAILY
Status: DISCONTINUED | OUTPATIENT
Start: 2023-08-21 | End: 2023-08-24 | Stop reason: HOSPADM

## 2023-08-21 RX ORDER — DOXEPIN HYDROCHLORIDE 25 MG/1
100 CAPSULE ORAL NIGHTLY
Status: DISCONTINUED | OUTPATIENT
Start: 2023-08-21 | End: 2023-08-24 | Stop reason: HOSPADM

## 2023-08-21 RX ORDER — DIAZEPAM 5 MG/1
10 TABLET ORAL DAILY PRN
Status: DISCONTINUED | OUTPATIENT
Start: 2023-08-21 | End: 2023-08-21

## 2023-08-21 RX ORDER — IBUPROFEN 200 MG
16 TABLET ORAL
Status: DISCONTINUED | OUTPATIENT
Start: 2023-08-21 | End: 2023-08-24 | Stop reason: HOSPADM

## 2023-08-21 RX ORDER — HYDROMORPHONE HYDROCHLORIDE 1 MG/ML
1 INJECTION, SOLUTION INTRAMUSCULAR; INTRAVENOUS; SUBCUTANEOUS EVERY 4 HOURS PRN
Status: DISCONTINUED | OUTPATIENT
Start: 2023-08-21 | End: 2023-08-22

## 2023-08-21 RX ORDER — MEPERIDINE HYDROCHLORIDE 50 MG/ML
25 INJECTION INTRAMUSCULAR; INTRAVENOUS; SUBCUTANEOUS
Status: COMPLETED | OUTPATIENT
Start: 2023-08-21 | End: 2023-08-21

## 2023-08-21 RX ORDER — SODIUM CHLORIDE 0.9 % (FLUSH) 0.9 %
10 SYRINGE (ML) INJECTION EVERY 12 HOURS PRN
Status: DISCONTINUED | OUTPATIENT
Start: 2023-08-21 | End: 2023-08-24 | Stop reason: HOSPADM

## 2023-08-21 RX ORDER — METHYLPREDNISOLONE SOD SUCC 125 MG
125 VIAL (EA) INJECTION
Status: COMPLETED | OUTPATIENT
Start: 2023-08-21 | End: 2023-08-21

## 2023-08-21 RX ORDER — METHOCARBAMOL 750 MG/1
750 TABLET, FILM COATED ORAL EVERY 6 HOURS PRN
Status: DISCONTINUED | OUTPATIENT
Start: 2023-08-21 | End: 2023-08-24 | Stop reason: HOSPADM

## 2023-08-21 RX ORDER — DIPHENHYDRAMINE HYDROCHLORIDE 50 MG/ML
50 INJECTION INTRAMUSCULAR; INTRAVENOUS
Status: COMPLETED | OUTPATIENT
Start: 2023-08-21 | End: 2023-08-21

## 2023-08-21 RX ORDER — ONDANSETRON 2 MG/ML
4 INJECTION INTRAMUSCULAR; INTRAVENOUS EVERY 6 HOURS PRN
Status: DISCONTINUED | OUTPATIENT
Start: 2023-08-21 | End: 2023-08-24 | Stop reason: HOSPADM

## 2023-08-21 RX ORDER — ACETAMINOPHEN 325 MG/1
650 TABLET ORAL EVERY 4 HOURS PRN
Status: DISCONTINUED | OUTPATIENT
Start: 2023-08-21 | End: 2023-08-24 | Stop reason: HOSPADM

## 2023-08-21 RX ORDER — HYDROMORPHONE HYDROCHLORIDE 2 MG/ML
1 INJECTION, SOLUTION INTRAMUSCULAR; INTRAVENOUS; SUBCUTANEOUS EVERY 4 HOURS PRN
Status: DISCONTINUED | OUTPATIENT
Start: 2023-08-21 | End: 2023-08-21

## 2023-08-21 RX ORDER — IBUPROFEN 200 MG
24 TABLET ORAL
Status: DISCONTINUED | OUTPATIENT
Start: 2023-08-21 | End: 2023-08-24 | Stop reason: HOSPADM

## 2023-08-21 RX ORDER — KETOROLAC TROMETHAMINE 30 MG/ML
15 INJECTION, SOLUTION INTRAMUSCULAR; INTRAVENOUS EVERY 6 HOURS
Status: DISCONTINUED | OUTPATIENT
Start: 2023-08-21 | End: 2023-08-23

## 2023-08-21 RX ADMIN — ONDANSETRON 4 MG: 2 INJECTION INTRAMUSCULAR; INTRAVENOUS at 09:08

## 2023-08-21 RX ADMIN — SPIRONOLACTONE 50 MG: 25 TABLET, FILM COATED ORAL at 10:08

## 2023-08-21 RX ADMIN — DIAZEPAM 5 MG: 5 TABLET ORAL at 11:08

## 2023-08-21 RX ADMIN — ONDANSETRON 4 MG: 2 INJECTION INTRAMUSCULAR; INTRAVENOUS at 11:08

## 2023-08-21 RX ADMIN — LINACLOTIDE 290 MCG: 145 CAPSULE, GELATIN COATED ORAL at 04:08

## 2023-08-21 RX ADMIN — HYDROMORPHONE HYDROCHLORIDE 1 MG: 1 INJECTION, SOLUTION INTRAMUSCULAR; INTRAVENOUS; SUBCUTANEOUS at 06:08

## 2023-08-21 RX ADMIN — MAGNESIUM SULFATE HEPTAHYDRATE 2 G: 40 INJECTION, SOLUTION INTRAVENOUS at 11:08

## 2023-08-21 RX ADMIN — MEPERIDINE HYDROCHLORIDE 25 MG: 50 INJECTION INTRAMUSCULAR; INTRAVENOUS; SUBCUTANEOUS at 06:08

## 2023-08-21 RX ADMIN — ORPHENADRINE CITRATE 30 MG: 60 INJECTION INTRAMUSCULAR; INTRAVENOUS at 05:08

## 2023-08-21 RX ADMIN — HYDROMORPHONE HYDROCHLORIDE 1 MG: 1 INJECTION, SOLUTION INTRAMUSCULAR; INTRAVENOUS; SUBCUTANEOUS at 02:08

## 2023-08-21 RX ADMIN — ONDANSETRON 4 MG: 2 INJECTION INTRAMUSCULAR; INTRAVENOUS at 06:08

## 2023-08-21 RX ADMIN — ONDANSETRON 4 MG: 2 INJECTION INTRAMUSCULAR; INTRAVENOUS at 02:08

## 2023-08-21 RX ADMIN — SPIRONOLACTONE 50 MG: 25 TABLET, FILM COATED ORAL at 09:08

## 2023-08-21 RX ADMIN — DOCUSATE SODIUM 100 MG: 100 CAPSULE, LIQUID FILLED ORAL at 10:08

## 2023-08-21 RX ADMIN — METHOCARBAMOL 750 MG: 750 TABLET ORAL at 06:08

## 2023-08-21 RX ADMIN — MEPERIDINE HYDROCHLORIDE 25 MG: 50 INJECTION INTRAMUSCULAR; INTRAVENOUS; SUBCUTANEOUS at 02:08

## 2023-08-21 RX ADMIN — SODIUM CHLORIDE 1848 ML: 9 INJECTION, SOLUTION INTRAVENOUS at 03:08

## 2023-08-21 RX ADMIN — HYDROMORPHONE HYDROCHLORIDE 1 MG: 2 INJECTION INTRAMUSCULAR; INTRAVENOUS; SUBCUTANEOUS at 09:08

## 2023-08-21 RX ADMIN — HYDROMORPHONE HYDROCHLORIDE 1 MG: 2 INJECTION INTRAMUSCULAR; INTRAVENOUS; SUBCUTANEOUS at 07:08

## 2023-08-21 RX ADMIN — DIAZEPAM 10 MG: 5 TABLET ORAL at 09:08

## 2023-08-21 RX ADMIN — PROMETHAZINE HYDROCHLORIDE 12.5 MG: 25 INJECTION INTRAMUSCULAR; INTRAVENOUS at 02:08

## 2023-08-21 RX ADMIN — SODIUM CHLORIDE: 9 INJECTION, SOLUTION INTRAVENOUS at 11:08

## 2023-08-21 RX ADMIN — DOXEPIN HYDROCHLORIDE 100 MG: 25 CAPSULE ORAL at 09:08

## 2023-08-21 RX ADMIN — METHYLPREDNISOLONE SODIUM SUCCINATE 125 MG: 125 INJECTION, POWDER, FOR SOLUTION INTRAMUSCULAR; INTRAVENOUS at 02:08

## 2023-08-21 RX ADMIN — PROMETHAZINE HYDROCHLORIDE 12.5 MG: 25 INJECTION INTRAMUSCULAR; INTRAVENOUS at 03:08

## 2023-08-21 RX ADMIN — HYDROMORPHONE HYDROCHLORIDE 1 MG: 2 INJECTION INTRAMUSCULAR; INTRAVENOUS; SUBCUTANEOUS at 03:08

## 2023-08-21 RX ADMIN — POTASSIUM BICARBONATE 25 MEQ: 978 TABLET, EFFERVESCENT ORAL at 10:08

## 2023-08-21 RX ADMIN — METHOCARBAMOL 750 MG: 750 TABLET ORAL at 11:08

## 2023-08-21 RX ADMIN — DIPHENHYDRAMINE HYDROCHLORIDE 50 MG: 50 INJECTION, SOLUTION INTRAMUSCULAR; INTRAVENOUS at 02:08

## 2023-08-21 RX ADMIN — DIAZEPAM 5 MG: 5 TABLET ORAL at 08:08

## 2023-08-21 RX ADMIN — HYDROMORPHONE HYDROCHLORIDE 1 MG: 1 INJECTION, SOLUTION INTRAMUSCULAR; INTRAVENOUS; SUBCUTANEOUS at 11:08

## 2023-08-21 NOTE — ASSESSMENT & PLAN NOTE
-intractable  -no stone on CT  -possibly MSK  -multimodal pain control  -IVFs  -prn antiemetics  -apply heat to area

## 2023-08-21 NOTE — FIRST PROVIDER EVALUATION
Medical screening examination initiated.  I have conducted a focused provider triage encounter, findings are as follows:    Brief history of present illness:  Worsening kidney stone pain.  Pain to right flank    Vitals:    08/20/23 2343   BP: 117/79   BP Location: Right arm   Patient Position: Sitting   Pulse: 87   Resp: 16   Temp: 97.8 °F (36.6 °C)   TempSrc: Oral   SpO2: 96%   Weight: 81.8 kg (180 lb 5.4 oz)       Pertinent physical exam:  Speaking in full sentences vital stable patient drinking fluids without difficulty    Brief workup plan:  Blood work pain medication    Preliminary workup initiated; this workup will be continued and followed by the physician or advanced practice provider that is assigned to the patient when roomed.

## 2023-08-21 NOTE — SUBJECTIVE & OBJECTIVE
Past Medical History:   Diagnosis Date    Encounter for blood transfusion     KENN (generalized anxiety disorder)     Takes 5-10 mg of valium qd prn anxiety (prescriptions for both on file, one from [facility] & other from )    Insomnia     Takes 5-10 mg of valium qhs prn insomnia (prescriptions for both on file, one from [facility] & other from )    Migraine headache     Nephrolithiasis 08/03/2019    Left ureteral stone -> UTI c/b pyelonephritis and urosepsis w/ hypokalemia -> transfered to [facility] urology service from [facility] after CT abn dx, s/p 2 stents to allow infx to drain, IV Vanc/Rocephin, fever free since yesterday    Reflex sympathetic dystrophy     UTI (urinary tract infection)     Vertigo        Past Surgical History:   Procedure Laterality Date    BLADDER SURGERY      CHOLECYSTECTOMY      COLONOSCOPY N/A 11/10/2021    Procedure: COLONOSCOPY;  Surgeon: Eryn Rothman MD;  Location: [facility];  Service: Endoscopy;  Laterality: N/A;    CYSTOSCOPY WITH URETEROSCOPY, RETROGRADE PYELOGRAPHY, AND INSERTION OF STENT Right 9/30/2021    Procedure: CYSTOSCOPY, WITH RETROGRADE PYELOGRAM AND URETERAL STENT INSERTION;  Surgeon: Annita Gamino MD;  Location: [facility];  Service: Urology;  Laterality: Right;    DIAGNOSTIC LAPAROSCOPY N/A 11/11/2020    Procedure: LAPAROSCOPY, DIAGNOSTIC;  Surgeon: Tee Segura MD;  Location: [facility];  Service: General;  Laterality: N/A;  CONVERTED TO OPEN    DIAGNOSTIC LAPAROSCOPY N/A 7/25/2022    Procedure: LAPAROSCOPY, DIAGNOSTIC;  Surgeon: Jo Manzano DO;  Location: [facility] OR;  Service: General;  Laterality: N/A;  convert to open at 0739    ESOPHAGOGASTRODUODENOSCOPY N/A 11/10/2021    Procedure: EGD (ESOPHAGOGASTRODUODENOSCOPY);  Surgeon: Eryn Rothman MD;  Location: [facility];  Service: Endoscopy;  Laterality: N/A;    facet injections  02/14/2017    L3, L4, L5, S1 Done by Dr. Lara    HYSTERECTOMY      INJECTION OF ANESTHETIC AGENT INTO TISSUE PLANE DEFINED BY  TRANSVERSUS ABDOMINIS MUSCLE N/A 11/11/2020    Procedure: BLOCK, TRANSVERSUS ABDOMINIS PLANE;  Surgeon: Tee Segura MD;  Location: [facility] OR;  Service: General;  Laterality: N/A;    INJECTION OF ANESTHETIC AGENT INTO TISSUE PLANE DEFINED BY TRANSVERSUS ABDOMINIS MUSCLE N/A 7/25/2022    Procedure: BLOCK, TRANSVERSUS ABDOMINIS PLANE;  Surgeon: Jo Manzano DO;  Location: [facility] OR;  Service: General;  Laterality: N/A;    KNEE SURGERY      LAPAROSCOPIC LYSIS OF ADHESIONS N/A 11/11/2020    Procedure: LYSIS, ADHESIONS, LAPAROSCOPIC;  Surgeon: Tee Segura MD;  Location: [facility] OR;  Service: General;  Laterality: N/A;  CONVERTED TO OPEN    LAPAROTOMY N/A 11/20/2020    Procedure: LAPAROTOMY;  Surgeon: Tee Segura MD;  Location: [facility] OR;  Service: General;  Laterality: N/A;    LASER LITHOTRIPSY Left 2/8/2021    Procedure: LITHOTRIPSY, USING LASER;  Surgeon: Irving Kidd MD;  Location: [facility] OR;  Service: Urology;  Laterality: Left;    LASER LITHOTRIPSY Right 10/13/2021    Procedure: LITHOTRIPSY, USING LASER;  Surgeon: Annita Gamino MD;  Location: [facility] OR;  Service: Urology;  Laterality: Right;    LYSIS OF ADHESIONS N/A 11/11/2020    Procedure: LYSIS, ADHESIONS;  Surgeon: Tee Segura MD;  Location: [facility] OR;  Service: General;  Laterality: N/A;    LYSIS OF ADHESIONS N/A 11/20/2020    Procedure: LYSIS, ADHESIONS;  Surgeon: Tee Segura MD;  Location: [facility] OR;  Service: General;  Laterality: N/A;    LYSIS OF ADHESIONS N/A 7/25/2022    Procedure: LYSIS, ADHESIONS;  Surgeon: Jo Manzano DO;  Location: [facility] OR;  Service: General;  Laterality: N/A;    SURGICAL REMOVAL OF ILEUM WITH CECUM  7/25/2022    Procedure: EXCISION, CECUM WITH ILEUM;  Surgeon: Jo Manzano DO;  Location: [facility] OR;  Service: General;;    TONSILLECTOMY      URETEROSCOPY Left 2/8/2021    Procedure: URETEROSCOPY;  Surgeon: Irving Kidd MD;  Location: [facility] OR;  Service: Urology;  Laterality: Left;   "stent placement    URETEROSCOPY Right 10/13/2021    Procedure: URETEROSCOPY;  Surgeon: Annita Gamino MD;  Location: [facility];  Service: Urology;  Laterality: Right;       Review of patient's allergies indicates:   Allergen Reactions    Erythromycin Other (See Comments)     Stomach cramps    Morphine Nausea And Vomiting     "Projectile vomiting"       No current facility-administered medications on file prior to encounter.     Current Outpatient Medications on File Prior to Encounter   Medication Sig    diazePAM (VALIUM) 10 MG Tab TAKE 1 TABLET BY MOUTH EVERY DAY AS NEEDED Strength: 10 mg    diazePAM (VALIUM) 5 MG tablet Take one with onset of migraine    diphenhydrAMINE (BENADRYL) 25 mg capsule Take 25 mg by mouth 2 (two) times daily as needed for Itching.    docusate sodium (COLACE) 100 MG capsule Take 1 capsule (100 mg total) by mouth 2 (two) times daily. (Patient taking differently: Take 100 mg by mouth once daily.)    doxepin (SINEQUAN) 100 MG capsule Take 1 capsule (100 mg total) by mouth every evening.    HYDROcodone-acetaminophen (NORCO) 5-325 mg per tablet Take 1 tablet by mouth every 8 (eight) hours as needed for Pain.    ketorolac (TORADOL) 10 mg tablet Take 1 tablet (10 mg total) by mouth 2 (two) times daily as needed for Pain.    levocetirizine (XYZAL) 5 MG tablet Take 1 tablet (5 mg total) by mouth every evening.    nitrofurantoin, macrocrystal-monohydrate, (MACROBID) 100 MG capsule Take 1 capsule (100 mg total) by mouth 2 (two) times daily.    ondansetron (ZOFRAN) 4 MG tablet Take 1 tablet (4 mg total) by mouth 2 (two) times daily.    spironolactone (ALDACTONE) 50 MG tablet Take 1 tablet (50 mg total) by mouth 2 (two) times daily.    sumatriptan (IMITREX) 100 MG tablet Take one with onset of migraine, may repeat once two hours later if migraine persists    doxepin (SINEQUAN) 25 MG capsule Take 25 mg by mouth nightly as needed.    LACTOBACILLUS COMBO NO.6 (PROBIOTIC COMPLEX ORAL) Take 1 capsule by " mouth once daily at 6am.    linaCLOtide (LINZESS) 290 mcg Cap capsule Take 290 mcg by mouth every morning.    multivitamin capsule Take 1 capsule by mouth once daily.    naproxen (NAPROSYN) 500 MG tablet Take 500 mg by mouth 2 (two) times daily.    pantoprazole (PROTONIX) 40 MG tablet Take 1 tablet (40 mg total) by mouth once daily. (Patient not taking: Reported on 8/13/2023)    [DISCONTINUED] doxycycline (VIBRA-TABS) 100 MG tablet TAKE 1 TABLET BY MOUTH TWICE DAILY (Patient not taking: Reported on 8/13/2023)    [DISCONTINUED] fluticasone propionate (FLONASE) 50 mcg/actuation nasal spray 1 spray (50 mcg total) by Each Nostril route once daily. (Patient not taking: Reported on 8/13/2023)    [DISCONTINUED] promethazine (PHENERGAN) 25 MG tablet Take 1 tablet (25 mg total) by mouth every 4 (four) hours as needed for Nausea. (Patient not taking: Reported on 6/22/2023)     Family History       Problem Relation (Age of Onset)    COPD Father    Drug abuse Brother    Hypertension Mother    Stroke Mother          Tobacco Use    Smoking status: Never    Smokeless tobacco: Never   Substance and Sexual Activity    Alcohol use: Yes     Comment: rarely    Drug use: No    Sexual activity: Never     Review of Systems   Constitutional:  Positive for activity change.   Gastrointestinal:  Positive for nausea. Negative for vomiting.   Genitourinary:  Positive for flank pain.   All other systems reviewed and are negative.    Objective:     Vital Signs (Most Recent):  Temp: 97.1 °F (36.2 °C) (08/21/23 0741)  Pulse: 92 (08/21/23 0741)  Resp: 18 (08/21/23 0741)  BP: 134/66 (08/21/23 0741)  SpO2: 98 % (08/21/23 0741) Vital Signs (24h Range):  Temp:  [97.1 °F (36.2 °C)-98 °F (36.7 °C)] 97.1 °F (36.2 °C)  Pulse:  [84-96] 92  Resp:  [15-18] 18  SpO2:  [96 %-99 %] 98 %  BP: (117-139)/(66-82) 134/66     Weight: 81.8 kg (180 lb 5.4 oz)  Body mass index is 28.24 kg/m².     Physical Exam  HENT:      Head: Normocephalic and atraumatic.    Cardiovascular:      Rate and Rhythm: Normal rate and regular rhythm.      Heart sounds: No murmur heard.  Pulmonary:      Effort: Pulmonary effort is normal. No respiratory distress.      Breath sounds: Normal breath sounds. No wheezing.   Abdominal:      General: Bowel sounds are normal. There is no distension.      Palpations: Abdomen is soft.      Tenderness: There is no abdominal tenderness. There is left CVA tenderness.   Musculoskeletal:         General: No swelling.   Skin:     General: Skin is warm and dry.   Neurological:      Mental Status: She is alert and oriented to person, place, and time. Mental status is at baseline.                Significant Labs: All pertinent labs within the past 24 hours have been reviewed.  CBC:   Recent Labs   Lab 08/21/23 0211   WBC 7.50   HGB 13.4   HCT 39.9        CMP:   Recent Labs   Lab 08/21/23 0211      K 3.2*      CO2 19*   *   BUN 13   CREATININE 0.9   CALCIUM 8.7   PROT 6.8   ALBUMIN 4.0   BILITOT 0.7   ALKPHOS 93   AST 19   ALT 21   ANIONGAP 13     Lactic Acid:   Recent Labs   Lab 08/21/23  0211 08/21/23  0550   LACTATE 3.3* 1.8     Urine Studies:   Recent Labs   Lab 08/21/23  0158   COLORU Yellow   APPEARANCEUA Clear   PHUR 6.0   SPECGRAV 1.010   PROTEINUA Negative   GLUCUA Negative   KETONESU Negative   BILIRUBINUA Negative   OCCULTUA Negative   NITRITE Negative   UROBILINOGEN Negative   LEUKOCYTESUR Trace*   WBCUA 2   HYALINECASTS 11*       Significant Imaging: I have reviewed all pertinent imaging results/findings within the past 24 hours.

## 2023-08-21 NOTE — HPI
The patient is a 57 yo female with past medical history of anxiety, kidney stones, migraines and hematuria who presented to the ED with sudden onset of left flank pain. She reports she was not doing anything out of the ordinary. The pain is intermittent and sharp. She states it feels like she is passing a kidney stone. She denies heavy lifting. She reports she sleeps on a good mattress.  Workup in the ED is unrevealing. Urology reviewed CT and do not feel that pain is urological.  Patient reports oral medication did not give her any relief. Dilaudid has helped. Hospital medicine consulted. Patient placed in observation.

## 2023-08-21 NOTE — ED PROVIDER NOTES
"SCRIBE #1 NOTE: I, Opal Choe, am scribing for, and in the presence of, Kristie Merino MD. I have scribed the HPI, ROS, and PEx.     SCRIBE #2 NOTE: I, Piyush Long, am scribing for, and in the presence of,  Nile Ramey MD. I have scribed the remaining portions of the note not scribed by Scribe #1.      History     Chief Complaint   Patient presents with    Flank Pain     Scheduled to have surgery Thursday for kidney stones on R side. Pt complaint of unbearable pain for 12hrs with no relief from rx hydrocodone      Review of patient's allergies indicates:   Allergen Reactions    Erythromycin Other (See Comments)     Stomach cramps    Morphine Nausea And Vomiting     "Projectile vomiting"         History of Present Illness     HPI    8/21/2023, 2:23 AM  History obtained from the patient      History of Present Illness: Genny Calixto is a 58 y.o. female patient with a PMHx of nephrolithiasis who presents to the Emergency Department for evaluation of left flank pain which onset 12 hours PTA. The pt gets frequent kidney stones and UTI's and is being followed by Dr. Gamino (Urology). She has an appointment on 8/24 for a cystoscopy. The pt has also been taking Macrobid for a week. The pt denies any heavy lifting. Symptoms are constant and moderate in severity. No mitigating or exacerbating factors reported. Associated sxs include fever, nausea, diarrhea, and hematuria. Patient denies any vomiting, dysuria, diarrhea, CP, SOB, and all other sxs at this time. Prior Tx includes Hydrocodone with no relief. No further complaints or concerns at this time.       Arrival mode: Personal vehicle    PCP: Tay Duff MD        Past Medical History:  Past Medical History:   Diagnosis Date    Encounter for blood transfusion     KENN (generalized anxiety disorder)     Takes 5-10 mg of valium qd prn anxiety (prescriptions for both on file, one from [facility] & other from )    Insomnia     Takes 5-10 mg of valium qhs " prn insomnia (prescriptions for both on file, one from [facility] & other from )    Migraine headache     Nephrolithiasis 08/03/2019    Left ureteral stone -> UTI c/b pyelonephritis and urosepsis w/ hypokalemia -> transfered to [facility] urology service from [facility] after CT abn dx, s/p 2 stents to allow infx to drain, IV Vanc/Rocephin, fever free since yesterday    Reflex sympathetic dystrophy     UTI (urinary tract infection)     Vertigo        Past Surgical History:  Past Surgical History:   Procedure Laterality Date    BLADDER SURGERY      CHOLECYSTECTOMY      COLONOSCOPY N/A 11/10/2021    Procedure: COLONOSCOPY;  Surgeon: Eryn Rothman MD;  Location: [facility];  Service: Endoscopy;  Laterality: N/A;    CYSTOSCOPY WITH URETEROSCOPY, RETROGRADE PYELOGRAPHY, AND INSERTION OF STENT Right 9/30/2021    Procedure: CYSTOSCOPY, WITH RETROGRADE PYELOGRAM AND URETERAL STENT INSERTION;  Surgeon: Annita Gamino MD;  Location: [facility];  Service: Urology;  Laterality: Right;    DIAGNOSTIC LAPAROSCOPY N/A 11/11/2020    Procedure: LAPAROSCOPY, DIAGNOSTIC;  Surgeon: Tee Segura MD;  Location: [facility];  Service: General;  Laterality: N/A;  CONVERTED TO OPEN    DIAGNOSTIC LAPAROSCOPY N/A 7/25/2022    Procedure: LAPAROSCOPY, DIAGNOSTIC;  Surgeon: Jo Manzano DO;  Location: [facility];  Service: General;  Laterality: N/A;  convert to open at 0739    ESOPHAGOGASTRODUODENOSCOPY N/A 11/10/2021    Procedure: EGD (ESOPHAGOGASTRODUODENOSCOPY);  Surgeon: Eryn Rothman MD;  Location: [facility];  Service: Endoscopy;  Laterality: N/A;    facet injections  02/14/2017    L3, L4, L5, S1 Done by Dr. Lara    HYSTERECTOMY      INJECTION OF ANESTHETIC AGENT INTO TISSUE PLANE DEFINED BY TRANSVERSUS ABDOMINIS MUSCLE N/A 11/11/2020    Procedure: BLOCK, TRANSVERSUS ABDOMINIS PLANE;  Surgeon: Tee Segura MD;  Location: [facility];  Service: General;  Laterality: N/A;    INJECTION OF ANESTHETIC AGENT INTO TISSUE PLANE  DEFINED BY TRANSVERSUS ABDOMINIS MUSCLE N/A 7/25/2022    Procedure: BLOCK, TRANSVERSUS ABDOMINIS PLANE;  Surgeon: Jo Manzano DO;  Location: [facility];  Service: General;  Laterality: N/A;    KNEE SURGERY      LAPAROSCOPIC LYSIS OF ADHESIONS N/A 11/11/2020    Procedure: LYSIS, ADHESIONS, LAPAROSCOPIC;  Surgeon: Tee Segura MD;  Location: [facility];  Service: General;  Laterality: N/A;  CONVERTED TO OPEN    LAPAROTOMY N/A 11/20/2020    Procedure: LAPAROTOMY;  Surgeon: Tee Segura MD;  Location: [facility];  Service: General;  Laterality: N/A;    LASER LITHOTRIPSY Left 2/8/2021    Procedure: LITHOTRIPSY, USING LASER;  Surgeon: Irving Kidd MD;  Location: [facility];  Service: Urology;  Laterality: Left;    LASER LITHOTRIPSY Right 10/13/2021    Procedure: LITHOTRIPSY, USING LASER;  Surgeon: Annita Gamino MD;  Location: [facility];  Service: Urology;  Laterality: Right;    LYSIS OF ADHESIONS N/A 11/11/2020    Procedure: LYSIS, ADHESIONS;  Surgeon: Tee Segura MD;  Location: [facility];  Service: General;  Laterality: N/A;    LYSIS OF ADHESIONS N/A 11/20/2020    Procedure: LYSIS, ADHESIONS;  Surgeon: Tee Segura MD;  Location: [facility];  Service: General;  Laterality: N/A;    LYSIS OF ADHESIONS N/A 7/25/2022    Procedure: LYSIS, ADHESIONS;  Surgeon: Jo Manzano DO;  Location: [facility];  Service: General;  Laterality: N/A;    SURGICAL REMOVAL OF ILEUM WITH CECUM  7/25/2022    Procedure: EXCISION, CECUM WITH ILEUM;  Surgeon: Jo Manzano DO;  Location: [facility];  Service: General;;    TONSILLECTOMY      URETEROSCOPY Left 2/8/2021    Procedure: URETEROSCOPY;  Surgeon: Irving Kidd MD;  Location: [facility];  Service: Urology;  Laterality: Left;  stent placement    URETEROSCOPY Right 10/13/2021    Procedure: URETEROSCOPY;  Surgeon: Annita Gamino MD;  Location: [facility];  Service: Urology;  Laterality: Right;         Family History:  Family History   Problem Relation Age  of Onset    Stroke Mother     Hypertension Mother     COPD Father     Drug abuse Brother        Social History:  Social History     Tobacco Use    Smoking status: Never    Smokeless tobacco: Never   Substance and Sexual Activity    Alcohol use: Yes     Comment: rarely    Drug use: No    Sexual activity: Never        Review of Systems     Review of Systems   Constitutional:  Positive for fever.   HENT:  Negative for sore throat.    Respiratory:  Negative for shortness of breath.    Cardiovascular:  Negative for chest pain.   Gastrointestinal:  Positive for nausea. Negative for diarrhea and vomiting.   Genitourinary:  Positive for flank pain (left) and hematuria. Negative for dysuria.   Musculoskeletal:  Negative for back pain.   Skin:  Negative for rash.   Neurological:  Negative for weakness.   Hematological:  Does not bruise/bleed easily.   All other systems reviewed and are negative.     Physical Exam     Initial Vitals [08/20/23 2343]   BP Pulse Resp Temp SpO2   117/79 87 16 97.8 °F (36.6 °C) 96 %      MAP       --          Physical Exam  Nursing Notes and Vital Signs Reviewed.  Constitutional: Patient is in no acute distress. Well-developed and well-nourished.  Head: Atraumatic. Normocephalic.  Eyes: PERRL. EOM intact. Conjunctivae are not pale. No scleral icterus.  ENT: Mucous membranes are moist. Oropharynx is clear and symmetric.    Neck: Supple. Full ROM. No lymphadenopathy.  Cardiovascular: Regular rate. Regular rhythm. No murmurs, rubs, or gallops. Distal pulses are 2+ and symmetric.  Pulmonary/Chest: No respiratory distress. Clear to auscultation bilaterally. No wheezing or rales.  Abdominal: Soft and non-distended.  There is no tenderness.  No rebound, guarding, or rigidity. Good bowel sounds.  Genitourinary: No CVA tenderness  Musculoskeletal: Moves all extremities. No obvious deformities. No edema. No calf tenderness. Large knot to her lateral lumbar muscles and it is tender on exam and swollen - no  cellulitis overlying the area or zoster or rash.  Skin: Warm and dry.    Neurological:  Alert, awake, and appropriate.  Normal speech.  No acute focal neurological deficits are appreciated.  Psychiatric: Normal affect. Good eye contact. Appropriate in content.     ED Course   Procedures  ED Vital Signs:  Vitals:    08/20/23 2343 08/21/23 0217 08/21/23 0341 08/21/23 0345   BP: 117/79   121/69   Pulse: 87   84   Resp: 16 17 16 15   Temp: 97.8 °F (36.6 °C)   98 °F (36.7 °C)   TempSrc: Oral   Oral   SpO2: 96%   99%   Weight: 81.8 kg (180 lb 5.4 oz)       08/21/23 0641 08/21/23 0645 08/21/23 0711 08/21/23 0741   BP:  139/82  134/66   Pulse:  96  92   Resp: 18 18 18 18   Temp:    97.1 °F (36.2 °C)   TempSrc:    Axillary   SpO2:  96%  98%   Weight:           Abnormal Lab Results:  Labs Reviewed   CBC W/ AUTO DIFFERENTIAL - Abnormal; Notable for the following components:       Result Value    MCV 99 (*)     MCH 33.3 (*)     All other components within normal limits   COMPREHENSIVE METABOLIC PANEL - Abnormal; Notable for the following components:    Potassium 3.2 (*)     CO2 19 (*)     Glucose 121 (*)     All other components within normal limits   URINALYSIS, REFLEX TO URINE CULTURE - Abnormal; Notable for the following components:    Leukocytes, UA Trace (*)     All other components within normal limits    Narrative:     Specimen Source->Urine   URINALYSIS MICROSCOPIC - Abnormal; Notable for the following components:    Hyaline Casts, UA 11 (*)     All other components within normal limits    Narrative:     Specimen Source->Urine   LACTIC ACID, PLASMA - Abnormal; Notable for the following components:    Lactate (Lactic Acid) 3.3 (*)     All other components within normal limits   LACTIC ACID, PLASMA        All Lab Results:  Results for orders placed or performed during the hospital encounter of 08/21/23   CBC auto differential   Result Value Ref Range    WBC 7.50 3.90 - 12.70 K/uL    RBC 4.02 4.00 - 5.40 M/uL    Hemoglobin  13.4 12.0 - 16.0 g/dL    Hematocrit 39.9 37.0 - 48.5 %    MCV 99 (H) 82 - 98 fL    MCH 33.3 (H) 27.0 - 31.0 pg    MCHC 33.6 32.0 - 36.0 g/dL    RDW 12.4 11.5 - 14.5 %    Platelets 232 150 - 450 K/uL    MPV 11.1 9.2 - 12.9 fL    Immature Granulocytes 0.3 0.0 - 0.5 %    Gran # (ANC) 4.4 1.8 - 7.7 K/uL    Immature Grans (Abs) 0.02 0.00 - 0.04 K/uL    Lymph # 2.4 1.0 - 4.8 K/uL    Mono # 0.5 0.3 - 1.0 K/uL    Eos # 0.2 0.0 - 0.5 K/uL    Baso # 0.07 0.00 - 0.20 K/uL    nRBC 0 0 /100 WBC    Gran % 58.7 38.0 - 73.0 %    Lymph % 31.7 18.0 - 48.0 %    Mono % 6.4 4.0 - 15.0 %    Eosinophil % 2.0 0.0 - 8.0 %    Basophil % 0.9 0.0 - 1.9 %    Differential Method Automated    Comprehensive metabolic panel   Result Value Ref Range    Sodium 138 136 - 145 mmol/L    Potassium 3.2 (L) 3.5 - 5.1 mmol/L    Chloride 106 95 - 110 mmol/L    CO2 19 (L) 23 - 29 mmol/L    Glucose 121 (H) 70 - 110 mg/dL    BUN 13 6 - 20 mg/dL    Creatinine 0.9 0.5 - 1.4 mg/dL    Calcium 8.7 8.7 - 10.5 mg/dL    Total Protein 6.8 6.0 - 8.4 g/dL    Albumin 4.0 3.5 - 5.2 g/dL    Total Bilirubin 0.7 0.1 - 1.0 mg/dL    Alkaline Phosphatase 93 55 - 135 U/L    AST 19 10 - 40 U/L    ALT 21 10 - 44 U/L    eGFR >60 >60 mL/min/1.73 m^2    Anion Gap 13 8 - 16 mmol/L   Urinalysis, Reflex to Urine Culture Urine, Clean Catch    Specimen: Urine   Result Value Ref Range    Specimen UA Urine, Clean Catch     Color, UA Yellow Yellow, Straw, Hilda    Appearance, UA Clear Clear    pH, UA 6.0 5.0 - 8.0    Specific Gravity, UA 1.010 1.005 - 1.030    Protein, UA Negative Negative    Glucose, UA Negative Negative    Ketones, UA Negative Negative    Bilirubin (UA) Negative Negative    Occult Blood UA Negative Negative    Nitrite, UA Negative Negative    Urobilinogen, UA Negative <2.0 EU/dL    Leukocytes, UA Trace (A) Negative   Urinalysis Microscopic   Result Value Ref Range    WBC, UA 2 0 - 5 /hpf    Hyaline Casts, UA 11 (A) 0-1/lpf /lpf    Microscopic Comment SEE COMMENT    Lactic  acid, plasma   Result Value Ref Range    Lactate (Lactic Acid) 3.3 (H) 0.5 - 2.2 mmol/L   Lactic acid, plasma   Result Value Ref Range    Lactate (Lactic Acid) 1.8 0.5 - 2.2 mmol/L         Imaging Results:  Imaging Results              X-Ray Chest AP Portable (Final result)  Result time 08/21/23 07:44:48      Final result by Lee Bach MD (08/21/23 07:44:48)                   Impression:      Persistent bibasilar scarring or atelectasis without new pulmonary infiltrate.      Electronically signed by: Lee Bach  Date:    08/21/2023  Time:    07:44               Narrative:    EXAMINATION:  XR CHEST AP PORTABLE    CLINICAL HISTORY:  cough;    TECHNIQUE:  Single frontal view of the chest was performed.    COMPARISON:  Chest radiograph 07/31/2022 with priors; CTA chest 07/29/2022    FINDINGS:  None overall movable of the enteric tube.  Cardiomediastinal silhouette is within normal limits and unchanged.  The trachea is midline.  Persistent bibasilar opacities noted.  No new pulmonary infiltrate or consolidation.  Unchanged minimal blunting of the costophrenic angles without evidence of a large pleural effusion.  No pneumothorax.  Visualized osseous structures appear intact.                                       CT Renal Stone Study ABD Pelvis WO (Final result)  Result time 08/21/23 07:40:46      Final result by Darius Ma MD (08/21/23 07:40:46)                   Impression:      No acute finding in the abdomen or pelvis.      Electronically signed by: Darius Ma  Date:    08/21/2023  Time:    07:40               Narrative:    EXAMINATION:  CT RENAL STONE STUDY ABD PELVIS WO    CLINICAL HISTORY:  Flank pain, kidney stone suspected;    COMPARISON:  None available.    TECHNIQUE:  Axial CT images were obtained of the abdomen and pelvis.  Iterative reconstruction technique was used. The CT exam was performed using one or more of the following dose reduction techniques- Automated exposure control,  "adjustment of the mA and/or kV according to patient size, and/or use of iterative reconstructed technique.    FINDINGS:  Heart: Normal in size. No pericardial effusion.    Lung Bases: Mild bibasilar atelectasis/scarring.    Liver: Unremarkable.    Gallbladder: Surgically absent.    Bile Ducts: No evidence of dilated ducts.    Pancreas: No mass or peripancreatic fat stranding.    Spleen: Unremarkable.    Adrenals: Unremarkable.    Kidneys/ Ureters: Unremarkable.    Bladder: No evidence of wall thickening.    Reproductive organs: The uterus is surgically absent.    GI Tract/Mesentery: Postsurgical changes in the right lower quadrant.  No acute finding involving the small or large bowel.  Free no finding to suggest acute appendicitis.    Peritoneal Space: Unremarkable.    Retroperitoneum: No adenopathy.    Abdominal wall: Fat containing supraumbilical ventral hernia without inflammatory stranding.    Vasculature: Mild atherosclerotic disease. No aortic aneurysm.    Bones: Multilevel degenerative changes.  No acute finding.                                                    The Emergency Provider reviewed the vital signs and test results, which are outlined above.     ED Discussion     5:15 AM: Discussed pt's case with Dr. Kidd (Urology) who says "based on exam, probably msk related. Her bladder was distended on ct, so I'd bladder scan her before sending her home".    6:00 AM: Dr. Merino transfers care of patient to Dr. Ramey pending lab results.    8:35 AM: Discussed case with Dr. Mirza (Hospital Medicine). Dr. Mirza agrees with current care and management of pt and accepts admission.   Admitting Service: Hospital Medicine  Admitting Physician: Dr. Mirza  Admit to: Obs    8:36 AM: Re-evaluated pt. I have discussed test results, shared treatment plan, and the need for admission with patient and family at bedside. Pt and family express understanding at this time and agree with all information. All questions " answered. Pt and family have no further questions or concerns at this time. Pt is ready for admit.       Medical Decision Making:   Initial Assessment:   Left flank pain not releived by home meds  Differential Diagnosis:   Gastroenteritis, Bowel obstruction, Colitis, Diverticulitis, Cholecystitis, Appendicitis, Perforated bowel, Herniation, Infectious etiology, UTI, Pyelonephritis, Inferior cardiac infarct, Biliary obstruction,   Clinical Tests:   Lab Tests: Ordered and Reviewed  Radiological Study: Ordered and Reviewed  Other:   I have discussed this case with another health care provider.       <> Summary of the Discussion: Case discussed with Dr. Kidd (urology) does not appear to be urologic source of pain  Case discussed with Dr. Mirza () will place in observation for pain control               ED Medication(s):  Medications   sodium chloride 0.9% flush 10 mL (has no administration in time range)   naloxone 0.4 mg/mL injection 0.4 mg (has no administration in time range)   glucose chewable tablet 16 g (has no administration in time range)   glucose chewable tablet 24 g (has no administration in time range)   glucagon (human recombinant) injection 1 mg (has no administration in time range)   enoxaparin injection 40 mg (has no administration in time range)   HYDROmorphone (PF) injection 1 mg (has no administration in time range)   ketorolac injection 15 mg (has no administration in time range)   acetaminophen tablet 650 mg (has no administration in time range)   ondansetron injection 4 mg (has no administration in time range)   dextrose 10% bolus 125 mL 125 mL (has no administration in time range)   dextrose 10% bolus 250 mL 250 mL (has no administration in time range)   0.9%  NaCl infusion (has no administration in time range)   meperidine injection 25 mg (25 mg Intravenous Given 8/21/23 0217)   promethazine (PHENERGAN) 12.5 mg in dextrose 5 % (D5W) 50 mL IVPB (0 mg Intravenous Stopped 8/21/23 0236)    diphenhydrAMINE injection 50 mg (50 mg Intravenous Given 8/21/23 0229)   methylPREDNISolone sodium succinate injection 125 mg (125 mg Intravenous Given 8/21/23 0229)   sodium chloride 0.9% bolus 1,848 mL 1,848 mL (0 mLs Intravenous Stopped 8/21/23 0618)   HYDROmorphone (PF) injection 1 mg (1 mg Intravenous Given 8/21/23 0341)   promethazine (PHENERGAN) 12.5 mg in dextrose 5 % (D5W) 50 mL IVPB (0 mg Intravenous Stopped 8/21/23 0454)   orphenadrine injection 30 mg (30 mg Intravenous Given 8/21/23 0548)   meperidine injection 25 mg (25 mg Intravenous Given 8/21/23 0641)   HYDROmorphone (PF) injection 1 mg (1 mg Intravenous Given 8/21/23 0711)   diazePAM tablet 5 mg (5 mg Oral Given 8/21/23 0807)       New Prescriptions    No medications on file               Scribe Attestation:   Scribe #1: I performed the above scribed service and the documentation accurately describes the services I performed. I attest to the accuracy of the note.     Attending:   Physician Attestation Statement for Scribe #1: I, Kristie Merino MD, personally performed the services described in this documentation, as scribed by Opal Choe, in my presence, and it is both accurate and complete.       Scribe Attestation:   Scribe #2: I performed the above scribed service and the documentation accurately describes the services I performed. I attest to the accuracy of the note.    Attending Attestation:           Physician Attestation for Scribe:    Physician Attestation Statement for Scribe #2: I, Nile Ramey MD, reviewed documentation, as scribed by Piyush Long in my presence, and it is both accurate and complete. I also acknowledge and confirm the content of the note done by Antonioibe #1.           Clinical Impression       ICD-10-CM ICD-9-CM   1. Intractable pain  R52 780.96   2. Left flank pain  R10.9 789.09   3. Chest pain  R07.9 786.50       Disposition:   Disposition: Placed in Observation  Condition: Nile Kidd,  MD  08/21/23 0849

## 2023-08-21 NOTE — PHARMACY MED REC
"Admission Medication History     The home medication history was taken by Dave Rangel.    You may go to "Admission" then "Reconcile Home Medications" tabs to review and/or act upon these items.     The home medication list has been updated by the Pharmacy department.   Please read ALL comments highlighted in yellow.   Please address this information as you see fit.    Feel free to contact us if you have any questions or require assistance.      The medications listed below were removed from the home medication list. Please reorder if appropriate:  Patient reports no longer taking the following medication(s):  PHENERGAN 25MG  FLONASE NS  VIBRA-TABS 100MG    Medications listed below were obtained from: Analytic software- [employer] and Medical records  (Not in a hospital admission)      Dave Rangel  [id number]      Current Outpatient Medications on File Prior to Encounter   Medication Sig Dispense Refill Last Dose    diazePAM (VALIUM) 10 MG Tab TAKE 1 TABLET BY MOUTH EVERY DAY AS NEEDED Strength: 10 mg 90 tablet 1 Past Week    diazePAM (VALIUM) 5 MG tablet Take one with onset of migraine 20 tablet 1 Past Week    diphenhydrAMINE (BENADRYL) 25 mg capsule Take 25 mg by mouth 2 (two) times daily as needed for Itching.   8/20/2023    docusate sodium (COLACE) 100 MG capsule Take 1 capsule (100 mg total) by mouth 2 (two) times daily. (Patient taking differently: Take 100 mg by mouth once daily.) 60 capsule 1 8/20/2023    doxepin (SINEQUAN) 100 MG capsule Take 1 capsule (100 mg total) by mouth every evening. 90 capsule 1 8/20/2023    HYDROcodone-acetaminophen (NORCO) 5-325 mg per tablet Take 1 tablet by mouth every 8 (eight) hours as needed for Pain. 10 tablet 0 8/20/2023    ketorolac (TORADOL) 10 mg tablet Take 1 tablet (10 mg total) by mouth 2 (two) times daily as needed for Pain. 10 tablet 0 Past Week    levocetirizine (XYZAL) 5 MG tablet Take 1 tablet (5 mg total) by mouth every evening. 30 tablet 1 8/20/2023    " nitrofurantoin, macrocrystal-monohydrate, (MACROBID) 100 MG capsule Take 1 capsule (100 mg total) by mouth 2 (two) times daily. 14 capsule 0 8/20/2023    ondansetron (ZOFRAN) 4 MG tablet Take 1 tablet (4 mg total) by mouth 2 (two) times daily. 30 tablet 0 8/20/2023    spironolactone (ALDACTONE) 50 MG tablet Take 1 tablet (50 mg total) by mouth 2 (two) times daily. 180 tablet 1 8/20/2023    sumatriptan (IMITREX) 100 MG tablet Take one with onset of migraine, may repeat once two hours later if migraine persists 10 tablet 11 Past Week    doxepin (SINEQUAN) 25 MG capsule Take 25 mg by mouth nightly as needed.       LACTOBACILLUS COMBO NO.6 (PROBIOTIC COMPLEX ORAL) Take 1 tablet by mouth once daily at 6am.       linaCLOtide (LINZESS) 290 mcg Cap capsule Take 290 mcg by mouth every morning.       multivitamin capsule Take 1 capsule by mouth once daily.       naproxen (NAPROSYN) 500 MG tablet Take 500 mg by mouth 2 (two) times daily.       pantoprazole (PROTONIX) 40 MG tablet Take 1 tablet (40 mg total) by mouth once daily. (Patient not taking: Reported on 8/13/2023) 90 tablet 2    .

## 2023-08-21 NOTE — H&P
[facility] - Emergency Dept.  [facility] Medicine  History & Physical    Patient Name: Genny Calixto  MRN: [id number]  Patient Class: OP- Observation  Admission Date: 8/21/2023  Attending Physician: David Mirza MD   Primary Care Provider: Tay Duff MD         Patient information was obtained from patient, past medical records and ER records.     Subjective:     Principal Problem:Left flank pain    Chief Complaint:   Chief Complaint   Patient presents with    Flank Pain     Scheduled to have surgery Thursday for kidney stones on R side. Pt complaint of unbearable pain for 12hrs with no relief from rx hydrocodone         HPI: The patient is a 59 yo female with past medical history of anxiety, kidney stones, migraines and hematuria who presented to the ED with sudden onset of left flank pain. She reports she was not doing anything out of the ordinary. The pain is intermittent and sharp. She states it feels like she is passing a kidney stone. She denies heavy lifting. She reports she sleeps on a good mattress.  Workup in the ED is unrevealing. Urology reviewed CT and do not feel that pain is urological.  Patient reports oral medication did not give her any relief. Dilaudid has helped. Hospital medicine consulted. Patient placed in observation.      Past Medical History:   Diagnosis Date    Encounter for blood transfusion     KENN (generalized anxiety disorder)     Takes 5-10 mg of valium qd prn anxiety (prescriptions for both on file, one from [facility] & other from )    Insomnia     Takes 5-10 mg of valium qhs prn insomnia (prescriptions for both on file, one from [facility] & other from )    Migraine headache     Nephrolithiasis 08/03/2019    Left ureteral stone -> UTI c/b pyelonephritis and urosepsis w/ hypokalemia -> transfered to [facility] urology service from [facility] after CT abn dx, s/p 2 stents to allow infx to drain, IV Vanc/Rocephin, fever free since yesterday    Reflex sympathetic dystrophy      UTI (urinary tract infection)     Vertigo        Past Surgical History:   Procedure Laterality Date    BLADDER SURGERY      CHOLECYSTECTOMY      COLONOSCOPY N/A 11/10/2021    Procedure: COLONOSCOPY;  Surgeon: Eryn Rothman MD;  Location: [facility];  Service: Endoscopy;  Laterality: N/A;    CYSTOSCOPY WITH URETEROSCOPY, RETROGRADE PYELOGRAPHY, AND INSERTION OF STENT Right 9/30/2021    Procedure: CYSTOSCOPY, WITH RETROGRADE PYELOGRAM AND URETERAL STENT INSERTION;  Surgeon: Annita Gamino MD;  Location: [facility];  Service: Urology;  Laterality: Right;    DIAGNOSTIC LAPAROSCOPY N/A 11/11/2020    Procedure: LAPAROSCOPY, DIAGNOSTIC;  Surgeon: Tee Segura MD;  Location: [facility];  Service: General;  Laterality: N/A;  CONVERTED TO OPEN    DIAGNOSTIC LAPAROSCOPY N/A 7/25/2022    Procedure: LAPAROSCOPY, DIAGNOSTIC;  Surgeon: Jo Manzano DO;  Location: [facility];  Service: General;  Laterality: N/A;  convert to open at 0739    ESOPHAGOGASTRODUODENOSCOPY N/A 11/10/2021    Procedure: EGD (ESOPHAGOGASTRODUODENOSCOPY);  Surgeon: Eryn Rothman MD;  Location: [facility];  Service: Endoscopy;  Laterality: N/A;    facet injections  02/14/2017    L3, L4, L5, S1 Done by Dr. Lara    HYSTERECTOMY      INJECTION OF ANESTHETIC AGENT INTO TISSUE PLANE DEFINED BY TRANSVERSUS ABDOMINIS MUSCLE N/A 11/11/2020    Procedure: BLOCK, TRANSVERSUS ABDOMINIS PLANE;  Surgeon: Tee Segura MD;  Location: [facility];  Service: General;  Laterality: N/A;    INJECTION OF ANESTHETIC AGENT INTO TISSUE PLANE DEFINED BY TRANSVERSUS ABDOMINIS MUSCLE N/A 7/25/2022    Procedure: BLOCK, TRANSVERSUS ABDOMINIS PLANE;  Surgeon: Jo Manzano DO;  Location: [facility];  Service: General;  Laterality: N/A;    KNEE SURGERY      LAPAROSCOPIC LYSIS OF ADHESIONS N/A 11/11/2020    Procedure: LYSIS, ADHESIONS, LAPAROSCOPIC;  Surgeon: Tee Segura MD;  Location: [facility];  Service: General;  Laterality: N/A;  CONVERTED  "TO OPEN    LAPAROTOMY N/A 11/20/2020    Procedure: LAPAROTOMY;  Surgeon: Tee Segura MD;  Location: [facility] OR;  Service: General;  Laterality: N/A;    LASER LITHOTRIPSY Left 2/8/2021    Procedure: LITHOTRIPSY, USING LASER;  Surgeon: Irving Kidd MD;  Location: [facility];  Service: Urology;  Laterality: Left;    LASER LITHOTRIPSY Right 10/13/2021    Procedure: LITHOTRIPSY, USING LASER;  Surgeon: Annita Gamino MD;  Location: [facility] OR;  Service: Urology;  Laterality: Right;    LYSIS OF ADHESIONS N/A 11/11/2020    Procedure: LYSIS, ADHESIONS;  Surgeon: Tee Segura MD;  Location: [facility];  Service: General;  Laterality: N/A;    LYSIS OF ADHESIONS N/A 11/20/2020    Procedure: LYSIS, ADHESIONS;  Surgeon: Tee Segura MD;  Location: [facility];  Service: General;  Laterality: N/A;    LYSIS OF ADHESIONS N/A 7/25/2022    Procedure: LYSIS, ADHESIONS;  Surgeon: Jo Manzano DO;  Location: [facility];  Service: General;  Laterality: N/A;    SURGICAL REMOVAL OF ILEUM WITH CECUM  7/25/2022    Procedure: EXCISION, CECUM WITH ILEUM;  Surgeon: Jo Manzano DO;  Location: [facility] OR;  Service: General;;    TONSILLECTOMY      URETEROSCOPY Left 2/8/2021    Procedure: URETEROSCOPY;  Surgeon: Irving Kidd MD;  Location: [facility];  Service: Urology;  Laterality: Left;  stent placement    URETEROSCOPY Right 10/13/2021    Procedure: URETEROSCOPY;  Surgeon: Annita Gamino MD;  Location: [facility];  Service: Urology;  Laterality: Right;       Review of patient's allergies indicates:   Allergen Reactions    Erythromycin Other (See Comments)     Stomach cramps    Morphine Nausea And Vomiting     "Projectile vomiting"       No current facility-administered medications on file prior to encounter.     Current Outpatient Medications on File Prior to Encounter   Medication Sig    diazePAM (VALIUM) 10 MG Tab TAKE 1 TABLET BY MOUTH EVERY DAY AS NEEDED Strength: 10 mg    diazePAM (VALIUM) 5 MG " tablet Take one with onset of migraine    diphenhydrAMINE (BENADRYL) 25 mg capsule Take 25 mg by mouth 2 (two) times daily as needed for Itching.    docusate sodium (COLACE) 100 MG capsule Take 1 capsule (100 mg total) by mouth 2 (two) times daily. (Patient taking differently: Take 100 mg by mouth once daily.)    doxepin (SINEQUAN) 100 MG capsule Take 1 capsule (100 mg total) by mouth every evening.    HYDROcodone-acetaminophen (NORCO) 5-325 mg per tablet Take 1 tablet by mouth every 8 (eight) hours as needed for Pain.    ketorolac (TORADOL) 10 mg tablet Take 1 tablet (10 mg total) by mouth 2 (two) times daily as needed for Pain.    levocetirizine (XYZAL) 5 MG tablet Take 1 tablet (5 mg total) by mouth every evening.    nitrofurantoin, macrocrystal-monohydrate, (MACROBID) 100 MG capsule Take 1 capsule (100 mg total) by mouth 2 (two) times daily.    ondansetron (ZOFRAN) 4 MG tablet Take 1 tablet (4 mg total) by mouth 2 (two) times daily.    spironolactone (ALDACTONE) 50 MG tablet Take 1 tablet (50 mg total) by mouth 2 (two) times daily.    sumatriptan (IMITREX) 100 MG tablet Take one with onset of migraine, may repeat once two hours later if migraine persists    doxepin (SINEQUAN) 25 MG capsule Take 25 mg by mouth nightly as needed.    LACTOBACILLUS COMBO NO.6 (PROBIOTIC COMPLEX ORAL) Take 1 capsule by mouth once daily at 6am.    linaCLOtide (LINZESS) 290 mcg Cap capsule Take 290 mcg by mouth every morning.    multivitamin capsule Take 1 capsule by mouth once daily.    naproxen (NAPROSYN) 500 MG tablet Take 500 mg by mouth 2 (two) times daily.    pantoprazole (PROTONIX) 40 MG tablet Take 1 tablet (40 mg total) by mouth once daily. (Patient not taking: Reported on 8/13/2023)    [DISCONTINUED] doxycycline (VIBRA-TABS) 100 MG tablet TAKE 1 TABLET BY MOUTH TWICE DAILY (Patient not taking: Reported on 8/13/2023)    [DISCONTINUED] fluticasone propionate (FLONASE) 50 mcg/actuation nasal spray 1 spray (50  mcg total) by Each Nostril route once daily. (Patient not taking: Reported on 8/13/2023)    [DISCONTINUED] promethazine (PHENERGAN) 25 MG tablet Take 1 tablet (25 mg total) by mouth every 4 (four) hours as needed for Nausea. (Patient not taking: Reported on 6/22/2023)     Family History       Problem Relation (Age of Onset)    COPD Father    Drug abuse Brother    Hypertension Mother    Stroke Mother          Tobacco Use    Smoking status: Never    Smokeless tobacco: Never   Substance and Sexual Activity    Alcohol use: Yes     Comment: rarely    Drug use: No    Sexual activity: Never     Review of Systems   Constitutional:  Positive for activity change.   Gastrointestinal:  Positive for nausea. Negative for vomiting.   Genitourinary:  Positive for flank pain.   All other systems reviewed and are negative.    Objective:     Vital Signs (Most Recent):  Temp: 97.1 °F (36.2 °C) (08/21/23 0741)  Pulse: 92 (08/21/23 0741)  Resp: 18 (08/21/23 0741)  BP: 134/66 (08/21/23 0741)  SpO2: 98 % (08/21/23 0741) Vital Signs (24h Range):  Temp:  [97.1 °F (36.2 °C)-98 °F (36.7 °C)] 97.1 °F (36.2 °C)  Pulse:  [84-96] 92  Resp:  [15-18] 18  SpO2:  [96 %-99 %] 98 %  BP: (117-139)/(66-82) 134/66     Weight: 81.8 kg (180 lb 5.4 oz)  Body mass index is 28.24 kg/m².     Physical Exam  HENT:      Head: Normocephalic and atraumatic.   Cardiovascular:      Rate and Rhythm: Normal rate and regular rhythm.      Heart sounds: No murmur heard.  Pulmonary:      Effort: Pulmonary effort is normal. No respiratory distress.      Breath sounds: Normal breath sounds. No wheezing.   Abdominal:      General: Bowel sounds are normal. There is no distension.      Palpations: Abdomen is soft.      Tenderness: There is no abdominal tenderness. There is left CVA tenderness.   Musculoskeletal:         General: No swelling.   Skin:     General: Skin is warm and dry.   Neurological:      Mental Status: She is alert and oriented to person, place, and time.  Mental status is at baseline.                Significant Labs: All pertinent labs within the past 24 hours have been reviewed.  CBC:   Recent Labs   Lab 08/21/23 0211   WBC 7.50   HGB 13.4   HCT 39.9        CMP:   Recent Labs   Lab 08/21/23 0211      K 3.2*      CO2 19*   *   BUN 13   CREATININE 0.9   CALCIUM 8.7   PROT 6.8   ALBUMIN 4.0   BILITOT 0.7   ALKPHOS 93   AST 19   ALT 21   ANIONGAP 13     Lactic Acid:   Recent Labs   Lab 08/21/23 0211 08/21/23  0550   LACTATE 3.3* 1.8     Urine Studies:   Recent Labs   Lab 08/21/23  0158   COLORU Yellow   APPEARANCEUA Clear   PHUR 6.0   SPECGRAV 1.010   PROTEINUA Negative   GLUCUA Negative   KETONESU Negative   BILIRUBINUA Negative   OCCULTUA Negative   NITRITE Negative   UROBILINOGEN Negative   LEUKOCYTESUR Trace*   WBCUA 2   HYALINECASTS 11*       Significant Imaging: I have reviewed all pertinent imaging results/findings within the past 24 hours.    Assessment/Plan:     * Left flank pain  -intractable  -no stone on CT  -possibly MSK  -multimodal pain control  -IVFs  -prn antiemetics  -apply heat to area    Hypokalemia  -replace orally  -check mag level and replete if low  -repeat labs in am      Anxiety  -continue home prn Valium        VTE Risk Mitigation (From admission, onward)         Ordered     enoxaparin injection 40 mg  Daily         08/21/23 0831     IP VTE HIGH RISK PATIENT  Once         08/21/23 0831     Place sequential compression device  Until discontinued         08/21/23 0831                   On 08/21/2023, patient should be placed in hospital observation services under my care.        David Mirza MD  Department of Hospital Medicine  [facility] - Emergency Dept.

## 2023-08-22 LAB
ALBUMIN SERPL BCP-MCNC: 3.3 G/DL (ref 3.5–5.2)
ALP SERPL-CCNC: 80 U/L (ref 55–135)
ALT SERPL W/O P-5'-P-CCNC: 13 U/L (ref 10–44)
ANION GAP SERPL CALC-SCNC: 8 MMOL/L (ref 8–16)
AST SERPL-CCNC: 16 U/L (ref 10–40)
BASOPHILS # BLD AUTO: 0.06 K/UL (ref 0–0.2)
BASOPHILS NFR BLD: 0.9 % (ref 0–1.9)
BILIRUB SERPL-MCNC: 0.6 MG/DL (ref 0.1–1)
BUN SERPL-MCNC: 14 MG/DL (ref 6–20)
CALCIUM SERPL-MCNC: 8 MG/DL (ref 8.7–10.5)
CHLORIDE SERPL-SCNC: 110 MMOL/L (ref 95–110)
CO2 SERPL-SCNC: 22 MMOL/L (ref 23–29)
CREAT SERPL-MCNC: 0.7 MG/DL (ref 0.5–1.4)
DIFFERENTIAL METHOD: ABNORMAL
EOSINOPHIL # BLD AUTO: 0.1 K/UL (ref 0–0.5)
EOSINOPHIL NFR BLD: 1.9 % (ref 0–8)
ERYTHROCYTE [DISTWIDTH] IN BLOOD BY AUTOMATED COUNT: 12.8 % (ref 11.5–14.5)
EST. GFR  (NO RACE VARIABLE): >60 ML/MIN/1.73 M^2
GLUCOSE SERPL-MCNC: 101 MG/DL (ref 70–110)
HCT VFR BLD AUTO: 37 % (ref 37–48.5)
HGB BLD-MCNC: 12.1 G/DL (ref 12–16)
IMM GRANULOCYTES # BLD AUTO: 0.04 K/UL (ref 0–0.04)
IMM GRANULOCYTES NFR BLD AUTO: 0.6 % (ref 0–0.5)
LYMPHOCYTES # BLD AUTO: 1.7 K/UL (ref 1–4.8)
LYMPHOCYTES NFR BLD: 24.8 % (ref 18–48)
MCH RBC QN AUTO: 33.4 PG (ref 27–31)
MCHC RBC AUTO-ENTMCNC: 32.7 G/DL (ref 32–36)
MCV RBC AUTO: 102 FL (ref 82–98)
MONOCYTES # BLD AUTO: 0.5 K/UL (ref 0.3–1)
MONOCYTES NFR BLD: 7.3 % (ref 4–15)
NEUTROPHILS # BLD AUTO: 4.5 K/UL (ref 1.8–7.7)
NEUTROPHILS NFR BLD: 64.5 % (ref 38–73)
NRBC BLD-RTO: 0 /100 WBC
PLATELET # BLD AUTO: 215 K/UL (ref 150–450)
PMV BLD AUTO: 11.3 FL (ref 9.2–12.9)
POTASSIUM SERPL-SCNC: 3.9 MMOL/L (ref 3.5–5.1)
PROT SERPL-MCNC: 5.7 G/DL (ref 6–8.4)
RBC # BLD AUTO: 3.62 M/UL (ref 4–5.4)
SODIUM SERPL-SCNC: 140 MMOL/L (ref 136–145)
WBC # BLD AUTO: 7.01 K/UL (ref 3.9–12.7)

## 2023-08-22 PROCEDURE — 87088 URINE BACTERIA CULTURE: CPT | Performed by: HOSPITALIST

## 2023-08-22 PROCEDURE — 96376 TX/PRO/DX INJ SAME DRUG ADON: CPT | Mod: 59

## 2023-08-22 PROCEDURE — 80053 COMPREHEN METABOLIC PANEL: CPT | Performed by: INTERNAL MEDICINE

## 2023-08-22 PROCEDURE — 87086 URINE CULTURE/COLONY COUNT: CPT | Performed by: HOSPITALIST

## 2023-08-22 PROCEDURE — 36415 COLL VENOUS BLD VENIPUNCTURE: CPT | Performed by: INTERNAL MEDICINE

## 2023-08-22 PROCEDURE — 96367 TX/PROPH/DG ADDL SEQ IV INF: CPT

## 2023-08-22 PROCEDURE — 99214 OFFICE O/P EST MOD 30 MIN: CPT | Mod: ,,, | Performed by: UROLOGY

## 2023-08-22 PROCEDURE — 63600175 PHARM REV CODE 636 W HCPCS: Performed by: INTERNAL MEDICINE

## 2023-08-22 PROCEDURE — 63600175 PHARM REV CODE 636 W HCPCS: Performed by: HOSPITALIST

## 2023-08-22 PROCEDURE — 99214 PR OFFICE/OUTPT VISIT, EST, LEVL IV, 30-39 MIN: ICD-10-PCS | Mod: ,,, | Performed by: UROLOGY

## 2023-08-22 PROCEDURE — 85025 COMPLETE CBC W/AUTO DIFF WBC: CPT | Performed by: INTERNAL MEDICINE

## 2023-08-22 PROCEDURE — G0378 HOSPITAL OBSERVATION PER HR: HCPCS

## 2023-08-22 PROCEDURE — 96361 HYDRATE IV INFUSION ADD-ON: CPT

## 2023-08-22 PROCEDURE — 25000003 PHARM REV CODE 250: Performed by: HOSPITALIST

## 2023-08-22 PROCEDURE — 25000003 PHARM REV CODE 250: Performed by: INTERNAL MEDICINE

## 2023-08-22 PROCEDURE — 96375 TX/PRO/DX INJ NEW DRUG ADDON: CPT | Mod: 59

## 2023-08-22 RX ORDER — OXYCODONE AND ACETAMINOPHEN 7.5; 325 MG/1; MG/1
1 TABLET ORAL EVERY 4 HOURS PRN
Status: DISCONTINUED | OUTPATIENT
Start: 2023-08-22 | End: 2023-08-24 | Stop reason: HOSPADM

## 2023-08-22 RX ORDER — HYDROMORPHONE HYDROCHLORIDE 1 MG/ML
0.5 INJECTION, SOLUTION INTRAMUSCULAR; INTRAVENOUS; SUBCUTANEOUS EVERY 8 HOURS PRN
Status: DISPENSED | OUTPATIENT
Start: 2023-08-22 | End: 2023-08-23

## 2023-08-22 RX ORDER — HYDROMORPHONE HYDROCHLORIDE 1 MG/ML
0.5 INJECTION, SOLUTION INTRAMUSCULAR; INTRAVENOUS; SUBCUTANEOUS EVERY 8 HOURS PRN
Status: DISCONTINUED | OUTPATIENT
Start: 2023-08-22 | End: 2023-08-22

## 2023-08-22 RX ADMIN — ONDANSETRON 4 MG: 2 INJECTION INTRAMUSCULAR; INTRAVENOUS at 08:08

## 2023-08-22 RX ADMIN — LINACLOTIDE 290 MCG: 145 CAPSULE, GELATIN COATED ORAL at 06:08

## 2023-08-22 RX ADMIN — KETOROLAC TROMETHAMINE 15 MG: 30 INJECTION, SOLUTION INTRAMUSCULAR; INTRAVENOUS at 09:08

## 2023-08-22 RX ADMIN — ONDANSETRON 4 MG: 2 INJECTION INTRAMUSCULAR; INTRAVENOUS at 02:08

## 2023-08-22 RX ADMIN — HYDROMORPHONE HYDROCHLORIDE 1 MG: 1 INJECTION, SOLUTION INTRAMUSCULAR; INTRAVENOUS; SUBCUTANEOUS at 08:08

## 2023-08-22 RX ADMIN — CEFTRIAXONE 1 G: 1 INJECTION, POWDER, FOR SOLUTION INTRAMUSCULAR; INTRAVENOUS at 12:08

## 2023-08-22 RX ADMIN — SPIRONOLACTONE 50 MG: 25 TABLET, FILM COATED ORAL at 09:08

## 2023-08-22 RX ADMIN — SODIUM CHLORIDE: 9 INJECTION, SOLUTION INTRAVENOUS at 02:08

## 2023-08-22 RX ADMIN — DIAZEPAM 10 MG: 5 TABLET ORAL at 08:08

## 2023-08-22 RX ADMIN — DIAZEPAM 10 MG: 5 TABLET ORAL at 02:08

## 2023-08-22 RX ADMIN — SPIRONOLACTONE 50 MG: 25 TABLET, FILM COATED ORAL at 08:08

## 2023-08-22 RX ADMIN — OXYCODONE AND ACETAMINOPHEN 1 TABLET: 7.5; 325 TABLET ORAL at 04:08

## 2023-08-22 RX ADMIN — METHOCARBAMOL 750 MG: 750 TABLET ORAL at 12:08

## 2023-08-22 RX ADMIN — SODIUM CHLORIDE: 9 INJECTION, SOLUTION INTRAVENOUS at 07:08

## 2023-08-22 RX ADMIN — KETOROLAC TROMETHAMINE 15 MG: 30 INJECTION, SOLUTION INTRAMUSCULAR; INTRAVENOUS at 02:08

## 2023-08-22 RX ADMIN — OXYCODONE AND ACETAMINOPHEN 1 TABLET: 7.5; 325 TABLET ORAL at 11:08

## 2023-08-22 RX ADMIN — DOXEPIN HYDROCHLORIDE 100 MG: 25 CAPSULE ORAL at 09:08

## 2023-08-22 RX ADMIN — HYDROMORPHONE HYDROCHLORIDE 0.5 MG: 1 INJECTION, SOLUTION INTRAMUSCULAR; INTRAVENOUS; SUBCUTANEOUS at 08:08

## 2023-08-22 RX ADMIN — HYDROMORPHONE HYDROCHLORIDE 1 MG: 1 INJECTION, SOLUTION INTRAMUSCULAR; INTRAVENOUS; SUBCUTANEOUS at 12:08

## 2023-08-22 NOTE — SUBJECTIVE & OBJECTIVE
Review of Systems   All other systems reviewed and are negative.    Objective:     Vital Signs (Most Recent):  Temp: 98 °F (36.7 °C) (08/22/23 1124)  Pulse: 89 (08/22/23 1154)  Resp: 18 (08/22/23 1617)  BP: 107/65 (08/22/23 1154)  SpO2: 95 % (08/22/23 1154) Vital Signs (24h Range):  Temp:  [98 °F (36.7 °C)-98.3 °F (36.8 °C)] 98 °F (36.7 °C)  Pulse:  [83-89] 89  Resp:  [16-20] 18  SpO2:  [91 %-98 %] 95 %  BP: ()/(50-85) 107/65     Weight: 81.6 kg (180 lb)  Body mass index is 28.19 kg/m².  No intake or output data in the 24 hours ending 08/22/23 1627      Physical Exam  Vitals and nursing note reviewed.   Constitutional:       General: She is not in acute distress.     Appearance: Normal appearance. She is normal weight.   Cardiovascular:      Rate and Rhythm: Normal rate and regular rhythm.      Heart sounds: No murmur heard.  Pulmonary:      Effort: Pulmonary effort is normal. No respiratory distress.      Breath sounds: No wheezing.   Neurological:      General: No focal deficit present.      Mental Status: She is alert and oriented to person, place, and time.   Psychiatric:         Mood and Affect: Mood normal.         Behavior: Behavior normal.             Significant Labs: All pertinent labs within the past 24 hours have been reviewed.  Recent Lab Results         08/22/23  0525        Albumin 3.3       Alkaline Phosphatase 80       ALT 13       Anion Gap 8       AST 16       Baso # 0.06       Basophil % 0.9       BILIRUBIN TOTAL 0.6  Comment: For infants and newborns, interpretation of results should be based  on gestational age, weight and in agreement with clinical  observations.    Premature Infant recommended reference ranges:  Up to 24 hours.............<8.0 mg/dL  Up to 48 hours............<12.0 mg/dL  3-5 days..................<15.0 mg/dL  6-29 days.................<15.0 mg/dL         BUN 14       Calcium 8.0       Chloride 110       CO2 22       Creatinine 0.7       Differential Method  Automated       eGFR >60       Eos # 0.1       Eosinophil % 1.9       Glucose 101       Gran # (ANC) 4.5       Gran % 64.5       Hematocrit 37.0       Hemoglobin 12.1       Immature Grans (Abs) 0.04  Comment: Mild elevation in immature granulocytes is non specific and   can be seen in a variety of conditions including stress response,   acute inflammation, trauma and pregnancy. Correlation with other   laboratory and clinical findings is essential.         Immature Granulocytes 0.6       Lymph # 1.7       Lymph % 24.8       MCH 33.4       MCHC 32.7              Mono # 0.5       Mono % 7.3       MPV 11.3       nRBC 0       Platelets 215       Potassium 3.9       PROTEIN TOTAL 5.7       RBC 3.62       RDW 12.8       Sodium 140       WBC 7.01               Significant Imaging: I have reviewed all pertinent imaging results/findings within the past 24 hours.    X-Ray Chest AP Portable   Final Result      Persistent bibasilar scarring or atelectasis without new pulmonary infiltrate.         Electronically signed by: Lee Bach   Date:    08/21/2023   Time:    07:44      CT Renal Stone Study ABD Pelvis WO   Final Result      No acute finding in the abdomen or pelvis.         Electronically signed by: Darius Ma   Date:    08/21/2023   Time:    07:40      NM Renogram With Lasix    (Results Pending)

## 2023-08-22 NOTE — PLAN OF CARE
[facility] - Telemetry (Hospital)  Initial Discharge Assessment       Primary Care Provider: Tay Duff MD    Admission Diagnosis: Left flank pain [R10.9]  Chest pain [R07.9]  Intractable pain [R52]    Admission Date: 8/21/2023  Expected Discharge Date:     Transition of Care Barriers: None    Payor: MEDICARE / Plan: MEDICARE PART A & B / Product Type: Government /     Extended Emergency Contact Information  Primary Emergency Contact: hta caceres  Mobile Phone: [phone]  Relation: Significant other  Preferred language: English   needed? No    Discharge Plan A: Home with family         [facility] [address] [place] [address], [place] - [address]  [address] [facility] [address]  Phone: [phone] Fax: [phone]    [id number] - [place], [place] - [address]  [address] [facility] [address]  Phone: [phone] Fax: [phone]      Initial Assessment (most recent)       Adult Discharge Assessment - 08/22/23 1001          Discharge Assessment    Assessment Type Discharge Planning Assessment     Confirmed/corrected address, phone number and insurance Yes     Confirmed Demographics Correct on Facesheet     Source of Information patient     When was your last doctors appointment? 08/14/23   Annita Gamino MD - Urology    Communicated MANAS with patient/caregiver Date not available/Unable to determine     Reason For Admission Left flank pain     People in Home alone     Facility Arrived From: home     Do you expect to return to your current living situation? Yes     Do you have help at home or someone to help you manage your care at home? Yes     Who are your caregiver(s) and their phone number(s)? Tha Caceres - boyfriend     Prior to hospitilization cognitive status: Alert/Oriented     Current cognitive status: Alert/Oriented     Walking or Climbing Stairs --   independent    Dressing/Bathing --   independent    Home Accessibility wheelchair  accessible     Home Layout Able to live on 1st floor     Equipment Currently Used at Home none     Readmission within 30 days? No     Patient currently being followed by outpatient case management? No     Do you currently have service(s) that help you manage your care at home? No     Do you take prescription medications? Yes     Do you have prescription coverage? Yes     Coverage Medicare A & B     Do you have any problems affording any of your prescribed medications? No     Is the patient taking medications as prescribed? yes     Who is going to help you get home at discharge? Tha Toussaint - boyfriend     How do you get to doctors appointments? car, drives self     Are you on dialysis? No     Do you take coumadin? No     DME Needed Upon Discharge  none     Discharge Plan discussed with: Patient     Transition of Care Barriers None     Discharge Plan A Home with family                   Anticipated DC dispo: home with family  Prior Level of Function: independent with ADLs  People in home: alone    Comments:  SW met with patient at bedside to introduce role and discuss discharge planning. Tha Toussaint, Patient's boyfriend, will be help at home and can provide transport at time of discharge. CM discharge needs depends on hospital progress. SW will continue following to assist with other needs.

## 2023-08-22 NOTE — PLAN OF CARE
Problem: Adult Inpatient Plan of Care  Goal: Plan of Care Review  Outcome: Ongoing, Progressing  Goal: Patient-Specific Goal (Individualized)  Outcome: Ongoing, Progressing  Goal: Absence of Hospital-Acquired Illness or Injury  Outcome: Ongoing, Progressing  Goal: Optimal Comfort and Wellbeing  Outcome: Ongoing, Progressing  Goal: Readiness for Transition of Care  Outcome: Ongoing, Progressing     Problem: Pain Acute  Goal: Acceptable Pain Control and Functional Ability  Outcome: Ongoing, Progressing     Problem: Fluid and Electrolyte Imbalance (Acute Kidney Injury/Impairment)  Goal: Fluid and Electrolyte Balance  Outcome: Ongoing, Progressing     Problem: Oral Intake Inadequate (Acute Kidney Injury/Impairment)  Goal: Optimal Nutrition Intake  Outcome: Ongoing, Progressing     Problem: Renal Function Impairment (Acute Kidney Injury/Impairment)  Goal: Effective Renal Function  Outcome: Ongoing, Progressing     Problem: Infection  Goal: Absence of Infection Signs and Symptoms  Outcome: Ongoing, Progressing

## 2023-08-22 NOTE — CONSULTS
Chief Complaint:  Right colic pain    HPI:   8/22/23-  58-year-old female well known to the service due to persistent nephrolithiasis.  Patient reported to the emergency department due to severe right flank pain.  Has an elective surgery scheduled with my partner Dr. Gamino this Thursday for cystoscopy and retrogrades.  Patient states pain is intermittent, seems to get better with rest.    Allergies:  Erythromycin and Morphine    Medications:  See MAR    Review of Systems:  General: No fever, chills, fatigability, or weight loss.  Skin: No rashes, itching, or changes in color or texture of skin.  Chest: Denies TINEO, cyanosis, wheezing, cough, and sputum production.  Abdomen: Appetite fine. No weight loss. Denies diarrhea, abdominal pain, hematemesis, or blood in stool.  Musculoskeletal: No joint stiffness or swelling. Denies back pain.  : As above.  All other review of systems negative.    PMH:   has a past medical history of Encounter for blood transfusion, KENN (generalized anxiety disorder), Insomnia, Migraine headache, Nephrolithiasis (08/03/2019), Reflex sympathetic dystrophy, UTI (urinary tract infection), and Vertigo.    PSH:   has a past surgical history that includes Cholecystectomy; Hysterectomy; Knee surgery; Bladder surgery; Tonsillectomy; facet injections (02/14/2017); Diagnostic laparoscopy (N/A, 11/11/2020); Laparoscopic lysis of adhesions (N/A, 11/11/2020); Lysis of adhesions (N/A, 11/11/2020); Injection of anesthetic agent into tissue plane defined by transversus abdominis muscle (N/A, 11/11/2020); Laparotomy (N/A, 11/20/2020); Lysis of adhesions (N/A, 11/20/2020); Laser lithotripsy (Left, 2/8/2021); Ureteroscopy (Left, 2/8/2021); Cystoscopy with ureteroscopy, retrograde pyelography, and insertion of stent (Right, 9/30/2021); Ureteroscopy (Right, 10/13/2021); Laser lithotripsy (Right, 10/13/2021); Esophagogastroduodenoscopy (N/A, 11/10/2021); Colonoscopy (N/A, 11/10/2021); Diagnostic laparoscopy  (N/A, 7/25/2022); Lysis of adhesions (N/A, 7/25/2022); Injection of anesthetic agent into tissue plane defined by transversus abdominis muscle (N/A, 7/25/2022); and Surgical removal of ileum with cecum (7/25/2022).    FamHx: family history includes COPD in her father; Drug abuse in her brother; Hypertension in her mother; Stroke in her mother.    SocHx:  reports that she has never smoked. She has never used smokeless tobacco. She reports current alcohol use. She reports that she does not use drugs.      Physical Exam:  Vitals:    08/22/23 1250   BP:    Pulse:    Resp: 18   Temp:      General: A&Ox3, no apparent distress, no deformities  Neck: No masses, normal ROM  Lungs: normal inspiration, no use of accessory muscles  Heart: normal pulse, no arrhythmias  Abdomen: Soft, NT, ND, no masses, no hernias, right off midline back pain, just at the level of the 11th rib.  Skin: The skin is warm and dry. No jaundice.  Ext: No c/c/e.    Labs/Studies:   CT non contrasted normal 8/23  Creatinine 0.78/23    Impression/Plan:     Right ureteral colic- based on labs and imaging this does not appear to be of urological source, most likely musculoskeletal.  Especially based on symptom relief from rest.  If concerns for physiologic obstruction could perform Lasix renogram, otherwise continue previously scheduled surgery with Dr. Gamino.  No further recommendations at this juncture.

## 2023-08-22 NOTE — PROGRESS NOTES
[facility] Emergency Medicine  Progress Note    Patient Name: Genny Calixto  MRN: [id number]  Patient Class: OP- Observation   Admission Date: 8/21/2023  Length of Stay: 0 days  Attending Physician: Rory Cruz MD  Primary Care Provider: Tay Duff MD        Subjective:     Principal Problem:Left flank pain        HPI:  The patient is a 57 yo female with past medical history of anxiety, kidney stones, migraines and hematuria who presented to the ED with sudden onset of left flank pain. She reports she was not doing anything out of the ordinary. The pain is intermittent and sharp. She states it feels like she is passing a kidney stone. She denies heavy lifting. She reports she sleeps on a good mattress.  Workup in the ED is unrevealing. Urology reviewed CT and do not feel that pain is urological.  Patient reports oral medication did not give her any relief. Dilaudid has helped. Hospital medicine consulted. Patient placed in observation.      Overview/Hospital Course:  8/22/23  DENISE, examined patient this AM and again this afternoon  Patient appears comfortable, discussed discharging home but patient reluctant  UA reviewed, trace LE noted, high risk urine culture ordered, started on empiric Ceftriaxone  States Dilaudid IV only medication to help, also states IV morphine will make her vomit along with any other PO meds  Patient has elective cystoscopy planned for 8/24/23 with Dr. Gamino   De-escalate IV narcotics, transition to oral regimen, plan for discharge tomorrow        Review of Systems   All other systems reviewed and are negative.    Objective:     Vital Signs (Most Recent):  Temp: 98 °F (36.7 °C) (08/22/23 1124)  Pulse: 89 (08/22/23 1154)  Resp: 18 (08/22/23 1617)  BP: 107/65 (08/22/23 1154)  SpO2: 95 % (08/22/23 1154) Vital Signs (24h Range):  Temp:  [98 °F (36.7 °C)-98.3 °F (36.8 °C)] 98 °F (36.7 °C)  Pulse:  [83-89] 89  Resp:  [16-20] 18  SpO2:  [91 %-98 %] 95 %  BP:  ()/(50-85) 107/65     Weight: 81.6 kg (180 lb)  Body mass index is 28.19 kg/m².  No intake or output data in the 24 hours ending 08/22/23 1627      Physical Exam  Vitals and nursing note reviewed.   Constitutional:       General: She is not in acute distress.     Appearance: Normal appearance. She is normal weight.   Cardiovascular:      Rate and Rhythm: Normal rate and regular rhythm.      Heart sounds: No murmur heard.  Pulmonary:      Effort: Pulmonary effort is normal. No respiratory distress.      Breath sounds: No wheezing.   Neurological:      General: No focal deficit present.      Mental Status: She is alert and oriented to person, place, and time.   Psychiatric:         Mood and Affect: Mood normal.         Behavior: Behavior normal.             Significant Labs: All pertinent labs within the past 24 hours have been reviewed.  Recent Lab Results         08/22/23  0525        Albumin 3.3       Alkaline Phosphatase 80       ALT 13       Anion Gap 8       AST 16       Baso # 0.06       Basophil % 0.9       BILIRUBIN TOTAL 0.6  Comment: For infants and newborns, interpretation of results should be based  on gestational age, weight and in agreement with clinical  observations.    Premature Infant recommended reference ranges:  Up to 24 hours.............<8.0 mg/dL  Up to 48 hours............<12.0 mg/dL  3-5 days..................<15.0 mg/dL  6-29 days.................<15.0 mg/dL         BUN 14       Calcium 8.0       Chloride 110       CO2 22       Creatinine 0.7       Differential Method Automated       eGFR >60       Eos # 0.1       Eosinophil % 1.9       Glucose 101       Gran # (ANC) 4.5       Gran % 64.5       Hematocrit 37.0       Hemoglobin 12.1       Immature Grans (Abs) 0.04  Comment: Mild elevation in immature granulocytes is non specific and   can be seen in a variety of conditions including stress response,   acute inflammation, trauma and pregnancy. Correlation with other   laboratory and  clinical findings is essential.         Immature Granulocytes 0.6       Lymph # 1.7       Lymph % 24.8       MCH 33.4       MCHC 32.7              Mono # 0.5       Mono % 7.3       MPV 11.3       nRBC 0       Platelets 215       Potassium 3.9       PROTEIN TOTAL 5.7       RBC 3.62       RDW 12.8       Sodium 140       WBC 7.01               Significant Imaging: I have reviewed all pertinent imaging results/findings within the past 24 hours.    X-Ray Chest AP Portable   Final Result      Persistent bibasilar scarring or atelectasis without new pulmonary infiltrate.         Electronically signed by: Lee Bach   Date:    08/21/2023   Time:    07:44      CT Renal Stone Study ABD Pelvis WO   Final Result      No acute finding in the abdomen or pelvis.         Electronically signed by: Darius Ma   Date:    08/21/2023   Time:    07:40      NM Renogram With Lasix    (Results Pending)           Assessment/Plan:      * Left flank pain  -intractable  -no stone on CT  -possibly MSK  -multimodal pain control  -IVFs  -prn antiemetics  -apply heat to area    Hypokalemia  -replace orally  -check mag level and replete if low  -repeat labs in am      Anxiety  -continue home prn Valium        VTE Risk Mitigation (From admission, onward)         Ordered     enoxaparin injection 40 mg  Daily         08/21/23 0831     IP VTE HIGH RISK PATIENT  Once         08/21/23 0831     Place sequential compression device  Until discontinued         08/21/23 0831                Discharge Planning   MANAS:      Code Status: Full Code   Is the patient medically ready for discharge?:     Reason for patient still in hospital (select all that apply): Patient trending condition, Laboratory test and Consult recommendations  Discharge Plan A: Home with family                  Rory Cruz MD  Department of Hospital Medicine   [facility] - Telemetry ([facility])

## 2023-08-22 NOTE — PLAN OF CARE
Aox4. Able to verbalize needs & follow commands.    POC reviewed with pt & spouse. Interventions implemented as appropriate.    NAEON.   VS stable; not on tele-monitor.  On RA.    PIV x2 patent. 22g R-hand & 18g LFA. Integrity maintained.    NS infusing at 100 mL/hr.  Regular diet.   Skin WDI. No new skin issues.    C/O L-flank pain. Stated  prn Dilaudid 0.5 mg ineffective. Requested stronger dose; denied by NP. Stated pt needed to take Toradol and Robaxin. Pt refused Toradol saying it made her stomach hurt.    Continent of b/b.    Lovenox for VTE.  Ambulatory. Activity ad tim.   Frequent position changes encouraged. Turn q2h. Able to reposition in bed independently.  Educated on s/sx of pressure injury;  verbalized understanding.  NADN. Resting quietly in bed.   Free of falls. Hourly rounding complete.   All safety measures remain in place. SR up x2; bed low & locked. Call light w/in reach.   Will continue to monitor throughout shift.  Spouse at bedside actively participating in POC.  Chart check complete.

## 2023-08-22 NOTE — HOSPITAL COURSE
8/22/23  NAEON, examined patient this AM and again this afternoon  Patient appears comfortable, discussed discharging home but patient reluctant  UA reviewed, trace LE noted, high risk urine culture ordered, started on empiric Ceftriaxone  States Dilaudid IV only medication to help, also states IV morphine will make her vomit along with any other PO meds  Patient has elective cystoscopy planned for 8/24/23 with Dr. Gamino   De-escalate IV narcotics, transition to oral regimen, plan for discharge tomorrow    8/23/23  NAEON. Patient reported uncontrolled pain this morning  Restart IV pain medication this AM  No new issues per nursing staff  Patient seen this afternoon, appears comfortable  Discussed with Dr. Gamino, plan for cystoscopy tomorrow  NPO after midnight, hold Lovenox    8/24/23  Urine culture with group B strep, treated with ceftriaxone  S/p cystoscopy today, bladder with a cystic lesion at posterior medial trigone, s/p biopsy and fulguration  Stable for discharge home, f/u with Urology in 1-2 weeks for further management

## 2023-08-23 ENCOUNTER — PATIENT MESSAGE (OUTPATIENT)
Dept: SURGERY | Facility: HOSPITAL | Age: 58
End: 2023-08-23
Payer: MEDICARE

## 2023-08-23 LAB
ALBUMIN SERPL BCP-MCNC: 3.2 G/DL (ref 3.5–5.2)
ALP SERPL-CCNC: 76 U/L (ref 55–135)
ALT SERPL W/O P-5'-P-CCNC: 14 U/L (ref 10–44)
ANION GAP SERPL CALC-SCNC: 9 MMOL/L (ref 8–16)
AST SERPL-CCNC: 17 U/L (ref 10–40)
BASOPHILS # BLD AUTO: 0.08 K/UL (ref 0–0.2)
BASOPHILS NFR BLD: 1.8 % (ref 0–1.9)
BILIRUB SERPL-MCNC: 0.6 MG/DL (ref 0.1–1)
BUN SERPL-MCNC: 16 MG/DL (ref 6–20)
CALCIUM SERPL-MCNC: 7.9 MG/DL (ref 8.7–10.5)
CHLORIDE SERPL-SCNC: 115 MMOL/L (ref 95–110)
CO2 SERPL-SCNC: 14 MMOL/L (ref 23–29)
CREAT SERPL-MCNC: 0.7 MG/DL (ref 0.5–1.4)
DIFFERENTIAL METHOD: ABNORMAL
EOSINOPHIL # BLD AUTO: 0.1 K/UL (ref 0–0.5)
EOSINOPHIL NFR BLD: 3.2 % (ref 0–8)
ERYTHROCYTE [DISTWIDTH] IN BLOOD BY AUTOMATED COUNT: 12.9 % (ref 11.5–14.5)
EST. GFR  (NO RACE VARIABLE): >60 ML/MIN/1.73 M^2
GLUCOSE SERPL-MCNC: 72 MG/DL (ref 70–110)
HCT VFR BLD AUTO: 42.5 % (ref 37–48.5)
HGB BLD-MCNC: 12.7 G/DL (ref 12–16)
IMM GRANULOCYTES # BLD AUTO: 0.01 K/UL (ref 0–0.04)
IMM GRANULOCYTES NFR BLD AUTO: 0.2 % (ref 0–0.5)
LYMPHOCYTES # BLD AUTO: 1.6 K/UL (ref 1–4.8)
LYMPHOCYTES NFR BLD: 35.8 % (ref 18–48)
MCH RBC QN AUTO: 32.8 PG (ref 27–31)
MCHC RBC AUTO-ENTMCNC: 29.9 G/DL (ref 32–36)
MCV RBC AUTO: 110 FL (ref 82–98)
MONOCYTES # BLD AUTO: 0.3 K/UL (ref 0.3–1)
MONOCYTES NFR BLD: 7.4 % (ref 4–15)
NEUTROPHILS # BLD AUTO: 2.3 K/UL (ref 1.8–7.7)
NEUTROPHILS NFR BLD: 51.6 % (ref 38–73)
NRBC BLD-RTO: 0 /100 WBC
PLATELET # BLD AUTO: 124 K/UL (ref 150–450)
PLATELET BLD QL SMEAR: ABNORMAL
PMV BLD AUTO: 11 FL (ref 9.2–12.9)
POTASSIUM SERPL-SCNC: 3.9 MMOL/L (ref 3.5–5.1)
PROT SERPL-MCNC: 5.3 G/DL (ref 6–8.4)
RBC # BLD AUTO: 3.87 M/UL (ref 4–5.4)
SODIUM SERPL-SCNC: 138 MMOL/L (ref 136–145)
WBC # BLD AUTO: 4.44 K/UL (ref 3.9–12.7)

## 2023-08-23 PROCEDURE — 36415 COLL VENOUS BLD VENIPUNCTURE: CPT | Performed by: INTERNAL MEDICINE

## 2023-08-23 PROCEDURE — 25000003 PHARM REV CODE 250: Performed by: HOSPITALIST

## 2023-08-23 PROCEDURE — 96361 HYDRATE IV INFUSION ADD-ON: CPT

## 2023-08-23 PROCEDURE — G0378 HOSPITAL OBSERVATION PER HR: HCPCS

## 2023-08-23 PROCEDURE — 25000003 PHARM REV CODE 250: Performed by: INTERNAL MEDICINE

## 2023-08-23 PROCEDURE — 96376 TX/PRO/DX INJ SAME DRUG ADON: CPT

## 2023-08-23 PROCEDURE — 63600175 PHARM REV CODE 636 W HCPCS: Performed by: INTERNAL MEDICINE

## 2023-08-23 PROCEDURE — 96366 THER/PROPH/DIAG IV INF ADDON: CPT

## 2023-08-23 PROCEDURE — 80053 COMPREHEN METABOLIC PANEL: CPT | Performed by: INTERNAL MEDICINE

## 2023-08-23 PROCEDURE — 63600175 PHARM REV CODE 636 W HCPCS: Performed by: HOSPITALIST

## 2023-08-23 PROCEDURE — 85025 COMPLETE CBC W/AUTO DIFF WBC: CPT | Performed by: INTERNAL MEDICINE

## 2023-08-23 RX ORDER — SODIUM CHLORIDE 9 MG/ML
INJECTION, SOLUTION INTRAVENOUS CONTINUOUS
Status: DISCONTINUED | OUTPATIENT
Start: 2023-08-23 | End: 2023-08-23

## 2023-08-23 RX ORDER — HYDROMORPHONE HYDROCHLORIDE 1 MG/ML
0.5 INJECTION, SOLUTION INTRAMUSCULAR; INTRAVENOUS; SUBCUTANEOUS EVERY 6 HOURS PRN
Status: DISCONTINUED | OUTPATIENT
Start: 2023-08-23 | End: 2023-08-24

## 2023-08-23 RX ORDER — KETOROLAC TROMETHAMINE 30 MG/ML
15 INJECTION, SOLUTION INTRAMUSCULAR; INTRAVENOUS EVERY 6 HOURS PRN
Status: DISPENSED | OUTPATIENT
Start: 2023-08-23 | End: 2023-08-24

## 2023-08-23 RX ADMIN — OXYCODONE AND ACETAMINOPHEN 1 TABLET: 7.5; 325 TABLET ORAL at 08:08

## 2023-08-23 RX ADMIN — HYDROMORPHONE HYDROCHLORIDE 0.5 MG: 1 INJECTION, SOLUTION INTRAMUSCULAR; INTRAVENOUS; SUBCUTANEOUS at 05:08

## 2023-08-23 RX ADMIN — DIAZEPAM 10 MG: 5 TABLET ORAL at 02:08

## 2023-08-23 RX ADMIN — LINACLOTIDE 290 MCG: 145 CAPSULE, GELATIN COATED ORAL at 06:08

## 2023-08-23 RX ADMIN — KETOROLAC TROMETHAMINE 15 MG: 30 INJECTION, SOLUTION INTRAMUSCULAR; INTRAVENOUS at 12:08

## 2023-08-23 RX ADMIN — HYDROMORPHONE HYDROCHLORIDE 0.5 MG: 1 INJECTION, SOLUTION INTRAMUSCULAR; INTRAVENOUS; SUBCUTANEOUS at 11:08

## 2023-08-23 RX ADMIN — SODIUM CHLORIDE: 9 INJECTION, SOLUTION INTRAVENOUS at 11:08

## 2023-08-23 RX ADMIN — CEFTRIAXONE 1 G: 1 INJECTION, POWDER, FOR SOLUTION INTRAMUSCULAR; INTRAVENOUS at 12:08

## 2023-08-23 RX ADMIN — ONDANSETRON 4 MG: 2 INJECTION INTRAMUSCULAR; INTRAVENOUS at 05:08

## 2023-08-23 RX ADMIN — SPIRONOLACTONE 50 MG: 25 TABLET, FILM COATED ORAL at 08:08

## 2023-08-23 RX ADMIN — DOXEPIN HYDROCHLORIDE 100 MG: 25 CAPSULE ORAL at 08:08

## 2023-08-23 RX ADMIN — ONDANSETRON 4 MG: 2 INJECTION INTRAMUSCULAR; INTRAVENOUS at 11:08

## 2023-08-23 RX ADMIN — SODIUM CHLORIDE: 9 INJECTION, SOLUTION INTRAVENOUS at 05:08

## 2023-08-23 RX ADMIN — KETOROLAC TROMETHAMINE 15 MG: 30 INJECTION, SOLUTION INTRAMUSCULAR; INTRAVENOUS at 06:08

## 2023-08-23 NOTE — SUBJECTIVE & OBJECTIVE
Review of Systems   All other systems reviewed and are negative.    Objective:     Vital Signs (Most Recent):  Temp: 99 °F (37.2 °C) (08/23/23 1538)  Pulse: 88 (08/23/23 1538)  Resp: 20 (08/23/23 1538)  BP: (!) 106/56 (08/23/23 1538)  SpO2: (!) 94 % (08/23/23 1538) Vital Signs (24h Range):  Temp:  [97.7 °F (36.5 °C)-99.6 °F (37.6 °C)] 99 °F (37.2 °C)  Pulse:  [65-92] 88  Resp:  [16-20] 20  SpO2:  [92 %-95 %] 94 %  BP: (100-150)/(54-76) 106/56     Weight: 81.6 kg (180 lb)  Body mass index is 28.19 kg/m².  No intake or output data in the 24 hours ending 08/23/23 1707      Physical Exam  Vitals and nursing note reviewed.   Constitutional:       General: She is not in acute distress.     Appearance: Normal appearance. She is normal weight.   Pulmonary:      Effort: Pulmonary effort is normal. No respiratory distress.   Neurological:      General: No focal deficit present.      Mental Status: She is alert and oriented to person, place, and time.   Psychiatric:         Mood and Affect: Mood normal.         Behavior: Behavior normal.             Significant Labs: All pertinent labs within the past 24 hours have been reviewed.  Recent Lab Results         08/23/23  0541   08/23/23  0540        Albumin   3.2       Alkaline Phosphatase   76       ALT   14       Anion Gap   9       AST   17       Baso # 0.08         Basophil % 1.8         BILIRUBIN TOTAL   0.6  Comment: For infants and newborns, interpretation of results should be based  on gestational age, weight and in agreement with clinical  observations.    Premature Infant recommended reference ranges:  Up to 24 hours.............<8.0 mg/dL  Up to 48 hours............<12.0 mg/dL  3-5 days..................<15.0 mg/dL  6-29 days.................<15.0 mg/dL         BUN   16       Calcium   7.9       Chloride   115       CO2   14       Creatinine   0.7       Differential Method Automated         eGFR   >60       Eos # 0.1         Eosinophil % 3.2         Glucose   72        Gran # (ANC) 2.3         Gran % 51.6         Hematocrit 42.5         Hemoglobin 12.7         Immature Grans (Abs) 0.01  Comment: Mild elevation in immature granulocytes is non specific and   can be seen in a variety of conditions including stress response,   acute inflammation, trauma and pregnancy. Correlation with other   laboratory and clinical findings is essential.           Immature Granulocytes 0.2         Lymph # 1.6         Lymph % 35.8         MCH 32.8         MCHC 29.9           Comment: Reviewed by Technologist.         Mono # 0.3         Mono % 7.4         MPV 11.0         nRBC 0         Platelet Estimate Decreased         Platelets 124  Comment: Reviewed by Technologist.         Potassium   3.9       PROTEIN TOTAL   5.3       RBC 3.87         RDW 12.9         Sodium   138       WBC 4.44                 Significant Imaging: I have reviewed all pertinent imaging results/findings within the past 24 hours.

## 2023-08-23 NOTE — PLAN OF CARE
[facility] - Telemetry (Hospital)  Discharge Final Note    Primary Care Provider: Tay Duff MD    Expected Discharge Date: 8/23/2023    Final Discharge Note (most recent)       Final Note - 08/23/23 1000          Final Note    Assessment Type Final Discharge Note (P)      Anticipated Discharge Disposition Home or Self Care (P)      Hospital Resources/Appts/Education Provided Appointments scheduled and added to AVS (P)         Post-Acute Status    Discharge Delays None known at this time (P)                      Important Message from Medicare             Contact Info       Annita Gamino MD   Specialty: Urology    [address] [place] [id number]   Phone: [phone]       Next Steps: Follow up on 8/24/2023    Instructions: Follow up for previously scheduled elective cystoscopy    Tay Duff MD   Specialty: Internal Medicine   Relationship: PCP - General    [address]   [address]  [facility] [address]   Phone: [phone]       Next Steps: Schedule an appointment as soon as possible for a visit in 1 week(s)    Instructions: Hospital discharge follow up

## 2023-08-23 NOTE — PLAN OF CARE
Ongoing (interventions implemented as appropriate)  Pt is alert and oriented.   VSS  Pt able to make needs known.  Pt remained afebrile throughout this shift.   Pt remained free of falls this shift.   Prn pain medication given.   Plan of care reviewed. Patient verbalizes understanding.   Pt moving/turing independent. Frequent weight shifting encouraged.  Bed low, side rails up x 2, wheels locked, call light in reach.   Hourly rounding completed.   Will continue to observe.

## 2023-08-23 NOTE — PROGRESS NOTES
[facility] Medicine  Progress Note    Patient Name: Genny Calixto  MRN: [id number]  Patient Class: OP- Observation   Admission Date: 8/21/2023  Length of Stay: 0 days  Attending Physician: Rory Cruz MD  Primary Care Provider: Tay Duff MD        Subjective:     Principal Problem:Left flank pain        HPI:  The patient is a 59 yo female with past medical history of anxiety, kidney stones, migraines and hematuria who presented to the ED with sudden onset of left flank pain. She reports she was not doing anything out of the ordinary. The pain is intermittent and sharp. She states it feels like she is passing a kidney stone. She denies heavy lifting. She reports she sleeps on a good mattress.  Workup in the ED is unrevealing. Urology reviewed CT and do not feel that pain is urological.  Patient reports oral medication did not give her any relief. Dilaudid has helped. Hospital medicine consulted. Patient placed in observation.      Overview/Hospital Course:  8/22/23  NAEON, examined patient this AM and again this afternoon  Patient appears comfortable, discussed discharging home but patient reluctant  UA reviewed, trace LE noted, high risk urine culture ordered, started on empiric Ceftriaxone  States Dilaudid IV only medication to help, also states IV morphine will make her vomit along with any other PO meds  Patient has elective cystoscopy planned for 8/24/23 with Dr. Gamino   De-escalate IV narcotics, transition to oral regimen, plan for discharge tomorrow    8/23/23  NAEON. Patient reported uncontrolled pain this morning  Restart IV pain medication this AM  No new issues per nursing staff  Patient seen this afternoon, appears comfortable  Discussed with Dr. Gamino, plan for cystoscopy tomorrow  NPO after midnight, hold Lovenox        Review of Systems   All other systems reviewed and are negative.    Objective:     Vital Signs (Most Recent):  Temp: 99 °F (37.2 °C)  (08/23/23 1538)  Pulse: 88 (08/23/23 1538)  Resp: 20 (08/23/23 1538)  BP: (!) 106/56 (08/23/23 1538)  SpO2: (!) 94 % (08/23/23 1538) Vital Signs (24h Range):  Temp:  [97.7 °F (36.5 °C)-99.6 °F (37.6 °C)] 99 °F (37.2 °C)  Pulse:  [65-92] 88  Resp:  [16-20] 20  SpO2:  [92 %-95 %] 94 %  BP: (100-150)/(54-76) 106/56     Weight: 81.6 kg (180 lb)  Body mass index is 28.19 kg/m².  No intake or output data in the 24 hours ending 08/23/23 1707      Physical Exam  Vitals and nursing note reviewed.   Constitutional:       General: She is not in acute distress.     Appearance: Normal appearance. She is normal weight.   Pulmonary:      Effort: Pulmonary effort is normal. No respiratory distress.   Neurological:      General: No focal deficit present.      Mental Status: She is alert and oriented to person, place, and time.   Psychiatric:         Mood and Affect: Mood normal.         Behavior: Behavior normal.             Significant Labs: All pertinent labs within the past 24 hours have been reviewed.  Recent Lab Results         08/23/23  0541   08/23/23  0540        Albumin   3.2       Alkaline Phosphatase   76       ALT   14       Anion Gap   9       AST   17       Baso # 0.08         Basophil % 1.8         BILIRUBIN TOTAL   0.6  Comment: For infants and newborns, interpretation of results should be based  on gestational age, weight and in agreement with clinical  observations.    Premature Infant recommended reference ranges:  Up to 24 hours.............<8.0 mg/dL  Up to 48 hours............<12.0 mg/dL  3-5 days..................<15.0 mg/dL  6-29 days.................<15.0 mg/dL         BUN   16       Calcium   7.9       Chloride   115       CO2   14       Creatinine   0.7       Differential Method Automated         eGFR   >60       Eos # 0.1         Eosinophil % 3.2         Glucose   72       Gran # (ANC) 2.3         Gran % 51.6         Hematocrit 42.5         Hemoglobin 12.7         Immature Grans (Abs) 0.01  Comment: Mild  elevation in immature granulocytes is non specific and   can be seen in a variety of conditions including stress response,   acute inflammation, trauma and pregnancy. Correlation with other   laboratory and clinical findings is essential.           Immature Granulocytes 0.2         Lymph # 1.6         Lymph % 35.8         MCH 32.8         MCHC 29.9           Comment: Reviewed by Technologist.         Mono # 0.3         Mono % 7.4         MPV 11.0         nRBC 0         Platelet Estimate Decreased         Platelets 124  Comment: Reviewed by Technologist.         Potassium   3.9       PROTEIN TOTAL   5.3       RBC 3.87         RDW 12.9         Sodium   138       WBC 4.44                 Significant Imaging: I have reviewed all pertinent imaging results/findings within the past 24 hours.      Assessment/Plan:      * Left flank pain  -intractable  -no stone on CT  -possibly MSK  -multimodal pain control  -IVFs  -prn antiemetics  -apply heat to area    Hypokalemia  -replace orally  -check mag level and replete if low  -repeat labs in am      Anxiety  -continue home prn Valium        VTE Risk Mitigation (From admission, onward)           Ordered     enoxaparin injection 40 mg  Daily         08/21/23 0831     IP VTE HIGH RISK PATIENT  Once         08/21/23 0831     Place sequential compression device  Until discontinued         08/21/23 0831                    Discharge Planning   MANAS: 8/23/2023     Code Status: Full Code   Is the patient medically ready for discharge?:     Reason for patient still in hospital (select all that apply): Patient trending condition and Consult recommendations  Discharge Plan A: Home with family   Discharge Delays: None known at this time              Rory Cruz MD  Department of Hospital Medicine   [facility] - Tele[facility] ([facility])

## 2023-08-24 ENCOUNTER — ANESTHESIA EVENT (OUTPATIENT)
Dept: SURGERY | Facility: HOSPITAL | Age: 58
End: 2023-08-24
Payer: MEDICARE

## 2023-08-24 ENCOUNTER — ANESTHESIA (OUTPATIENT)
Dept: SURGERY | Facility: HOSPITAL | Age: 58
End: 2023-08-24
Payer: MEDICARE

## 2023-08-24 VITALS
DIASTOLIC BLOOD PRESSURE: 62 MMHG | HEIGHT: 67 IN | RESPIRATION RATE: 18 BRPM | OXYGEN SATURATION: 91 % | TEMPERATURE: 98 F | WEIGHT: 180 LBS | HEART RATE: 95 BPM | SYSTOLIC BLOOD PRESSURE: 127 MMHG | BODY MASS INDEX: 28.25 KG/M2

## 2023-08-24 LAB — BACTERIA UR CULT: ABNORMAL

## 2023-08-24 PROCEDURE — 63600175 PHARM REV CODE 636 W HCPCS: Performed by: ANESTHESIOLOGY

## 2023-08-24 PROCEDURE — 52204 CYSTOSCOPY W/BIOPSY(S): CPT | Mod: ,,, | Performed by: UROLOGY

## 2023-08-24 PROCEDURE — 37000008 HC ANESTHESIA 1ST 15 MINUTES: Performed by: UROLOGY

## 2023-08-24 PROCEDURE — 71000039 HC RECOVERY, EACH ADD'L HOUR: Performed by: UROLOGY

## 2023-08-24 PROCEDURE — 25500020 PHARM REV CODE 255: Performed by: UROLOGY

## 2023-08-24 PROCEDURE — 37000009 HC ANESTHESIA EA ADD 15 MINS: Performed by: UROLOGY

## 2023-08-24 PROCEDURE — 36000706: Performed by: UROLOGY

## 2023-08-24 PROCEDURE — 25000003 PHARM REV CODE 250: Performed by: UROLOGY

## 2023-08-24 PROCEDURE — 88305 TISSUE EXAM BY PATHOLOGIST: ICD-10-PCS | Mod: 26,,, | Performed by: PATHOLOGY

## 2023-08-24 PROCEDURE — 52204 PR CYSTOURETHROSCOPY,BIOPSY: ICD-10-PCS | Mod: ,,, | Performed by: UROLOGY

## 2023-08-24 PROCEDURE — 25000003 PHARM REV CODE 250: Performed by: ANESTHESIOLOGY

## 2023-08-24 PROCEDURE — 63600175 PHARM REV CODE 636 W HCPCS: Performed by: INTERNAL MEDICINE

## 2023-08-24 PROCEDURE — 88305 TISSUE EXAM BY PATHOLOGIST: CPT | Mod: 26,,, | Performed by: PATHOLOGY

## 2023-08-24 PROCEDURE — 96376 TX/PRO/DX INJ SAME DRUG ADON: CPT | Mod: 59

## 2023-08-24 PROCEDURE — 88305 TISSUE EXAM BY PATHOLOGIST: CPT | Performed by: PATHOLOGY

## 2023-08-24 PROCEDURE — 63600175 PHARM REV CODE 636 W HCPCS: Performed by: HOSPITALIST

## 2023-08-24 PROCEDURE — 71000033 HC RECOVERY, INTIAL HOUR: Performed by: UROLOGY

## 2023-08-24 PROCEDURE — C1758 CATHETER, URETERAL: HCPCS | Performed by: UROLOGY

## 2023-08-24 PROCEDURE — 63600175 PHARM REV CODE 636 W HCPCS: Performed by: UROLOGY

## 2023-08-24 PROCEDURE — 63600175 PHARM REV CODE 636 W HCPCS: Performed by: NURSE ANESTHETIST, CERTIFIED REGISTERED

## 2023-08-24 PROCEDURE — 25000003 PHARM REV CODE 250: Performed by: NURSE ANESTHETIST, CERTIFIED REGISTERED

## 2023-08-24 PROCEDURE — 74420 UROGRAPHY RTRGR +-KUB: CPT | Mod: 26,,, | Performed by: UROLOGY

## 2023-08-24 PROCEDURE — 74420 PR  X-RAY RETROGRADE PYELOGRAM: ICD-10-PCS | Mod: 26,,, | Performed by: UROLOGY

## 2023-08-24 PROCEDURE — G0378 HOSPITAL OBSERVATION PER HR: HCPCS

## 2023-08-24 PROCEDURE — 36000707: Performed by: UROLOGY

## 2023-08-24 RX ORDER — CEFAZOLIN SODIUM 2 G/50ML
2 SOLUTION INTRAVENOUS
Status: COMPLETED | OUTPATIENT
Start: 2023-08-24 | End: 2023-08-24

## 2023-08-24 RX ORDER — HYDROMORPHONE HYDROCHLORIDE 2 MG/ML
0.2 INJECTION, SOLUTION INTRAMUSCULAR; INTRAVENOUS; SUBCUTANEOUS EVERY 5 MIN PRN
Status: COMPLETED | OUTPATIENT
Start: 2023-08-24 | End: 2023-08-24

## 2023-08-24 RX ORDER — ONDANSETRON 2 MG/ML
4 INJECTION INTRAMUSCULAR; INTRAVENOUS DAILY PRN
Status: DISCONTINUED | OUTPATIENT
Start: 2023-08-24 | End: 2023-08-24

## 2023-08-24 RX ORDER — ALBUTEROL SULFATE 0.83 MG/ML
2.5 SOLUTION RESPIRATORY (INHALATION) EVERY 4 HOURS PRN
Status: DISCONTINUED | OUTPATIENT
Start: 2023-08-24 | End: 2023-08-24

## 2023-08-24 RX ORDER — HYDROCODONE BITARTRATE AND ACETAMINOPHEN 5; 325 MG/1; MG/1
1 TABLET ORAL EVERY 8 HOURS PRN
Qty: 10 TABLET | Refills: 0 | Status: SHIPPED | OUTPATIENT
Start: 2023-08-24 | End: 2024-02-15

## 2023-08-24 RX ORDER — PROPOFOL 10 MG/ML
VIAL (ML) INTRAVENOUS
Status: DISCONTINUED | OUTPATIENT
Start: 2023-08-24 | End: 2023-08-24

## 2023-08-24 RX ORDER — KETOROLAC TROMETHAMINE 30 MG/ML
15 INJECTION, SOLUTION INTRAMUSCULAR; INTRAVENOUS EVERY 8 HOURS PRN
Status: DISCONTINUED | OUTPATIENT
Start: 2023-08-24 | End: 2023-08-24

## 2023-08-24 RX ORDER — PHENAZOPYRIDINE HYDROCHLORIDE 100 MG/1
100 TABLET, FILM COATED ORAL
Status: DISCONTINUED | OUTPATIENT
Start: 2023-08-24 | End: 2023-08-24 | Stop reason: HOSPADM

## 2023-08-24 RX ORDER — LIDOCAINE HYDROCHLORIDE 20 MG/ML
INJECTION INTRAVENOUS
Status: DISCONTINUED | OUTPATIENT
Start: 2023-08-24 | End: 2023-08-24

## 2023-08-24 RX ORDER — DEXAMETHASONE SODIUM PHOSPHATE 4 MG/ML
INJECTION, SOLUTION INTRA-ARTICULAR; INTRALESIONAL; INTRAMUSCULAR; INTRAVENOUS; SOFT TISSUE
Status: DISCONTINUED | OUTPATIENT
Start: 2023-08-24 | End: 2023-08-24

## 2023-08-24 RX ORDER — SCOLOPAMINE TRANSDERMAL SYSTEM 1 MG/1
1 PATCH, EXTENDED RELEASE TRANSDERMAL
Status: DISCONTINUED | OUTPATIENT
Start: 2023-08-24 | End: 2023-08-24 | Stop reason: HOSPADM

## 2023-08-24 RX ORDER — ALPRAZOLAM 0.5 MG/1
0.5 TABLET ORAL
Status: COMPLETED | OUTPATIENT
Start: 2023-08-24 | End: 2023-08-24

## 2023-08-24 RX ORDER — SODIUM CHLORIDE 0.9 % (FLUSH) 0.9 %
3 SYRINGE (ML) INJECTION
Status: DISCONTINUED | OUTPATIENT
Start: 2023-08-24 | End: 2023-08-24 | Stop reason: HOSPADM

## 2023-08-24 RX ORDER — OXYCODONE HYDROCHLORIDE 5 MG/1
5 TABLET ORAL
Status: DISCONTINUED | OUTPATIENT
Start: 2023-08-24 | End: 2023-08-24

## 2023-08-24 RX ORDER — FENTANYL CITRATE 50 UG/ML
INJECTION, SOLUTION INTRAMUSCULAR; INTRAVENOUS
Status: DISCONTINUED | OUTPATIENT
Start: 2023-08-24 | End: 2023-08-24

## 2023-08-24 RX ORDER — DIPHENHYDRAMINE HYDROCHLORIDE 50 MG/ML
25 INJECTION INTRAMUSCULAR; INTRAVENOUS EVERY 6 HOURS PRN
Status: DISCONTINUED | OUTPATIENT
Start: 2023-08-24 | End: 2023-08-24

## 2023-08-24 RX ORDER — PROCHLORPERAZINE EDISYLATE 5 MG/ML
5 INJECTION INTRAMUSCULAR; INTRAVENOUS EVERY 30 MIN PRN
Status: DISCONTINUED | OUTPATIENT
Start: 2023-08-24 | End: 2023-08-24

## 2023-08-24 RX ADMIN — KETOROLAC TROMETHAMINE 15 MG: 30 INJECTION, SOLUTION INTRAMUSCULAR; INTRAVENOUS at 12:08

## 2023-08-24 RX ADMIN — PROPOFOL 150 MG: 10 INJECTION, EMULSION INTRAVENOUS at 11:08

## 2023-08-24 RX ADMIN — HYDROMORPHONE HYDROCHLORIDE 0.5 MG: 1 INJECTION, SOLUTION INTRAMUSCULAR; INTRAVENOUS; SUBCUTANEOUS at 08:08

## 2023-08-24 RX ADMIN — HYDROMORPHONE HYDROCHLORIDE 0.2 MG: 2 INJECTION INTRAMUSCULAR; INTRAVENOUS; SUBCUTANEOUS at 01:08

## 2023-08-24 RX ADMIN — LIDOCAINE HYDROCHLORIDE 100 MG: 20 INJECTION INTRAVENOUS at 11:08

## 2023-08-24 RX ADMIN — PHENAZOPYRIDINE 100 MG: 100 TABLET ORAL at 05:08

## 2023-08-24 RX ADMIN — ONDANSETRON 4 MG: 2 INJECTION INTRAMUSCULAR; INTRAVENOUS at 08:08

## 2023-08-24 RX ADMIN — LINACLOTIDE 290 MCG: 145 CAPSULE, GELATIN COATED ORAL at 02:08

## 2023-08-24 RX ADMIN — FENTANYL CITRATE 25 MCG: 50 INJECTION, SOLUTION INTRAMUSCULAR; INTRAVENOUS at 11:08

## 2023-08-24 RX ADMIN — SPIRONOLACTONE 50 MG: 25 TABLET, FILM COATED ORAL at 02:08

## 2023-08-24 RX ADMIN — SODIUM CHLORIDE, POTASSIUM CHLORIDE, SODIUM LACTATE AND CALCIUM CHLORIDE: 600; 310; 30; 20 INJECTION, SOLUTION INTRAVENOUS at 11:08

## 2023-08-24 RX ADMIN — HYDROMORPHONE HYDROCHLORIDE 0.2 MG: 2 INJECTION INTRAMUSCULAR; INTRAVENOUS; SUBCUTANEOUS at 12:08

## 2023-08-24 RX ADMIN — CEFAZOLIN SODIUM 2 G: 2 SOLUTION INTRAVENOUS at 11:08

## 2023-08-24 RX ADMIN — CEFTRIAXONE 1 G: 1 INJECTION, POWDER, FOR SOLUTION INTRAMUSCULAR; INTRAVENOUS at 02:08

## 2023-08-24 RX ADMIN — HYDROMORPHONE HYDROCHLORIDE 0.5 MG: 1 INJECTION, SOLUTION INTRAMUSCULAR; INTRAVENOUS; SUBCUTANEOUS at 12:08

## 2023-08-24 RX ADMIN — SCOPOLAMINE 1 PATCH: 1.5 PATCH, EXTENDED RELEASE TRANSDERMAL at 10:08

## 2023-08-24 RX ADMIN — DEXAMETHASONE SODIUM PHOSPHATE 4 MG: 4 INJECTION, SOLUTION INTRA-ARTICULAR; INTRALESIONAL; INTRAMUSCULAR; INTRAVENOUS; SOFT TISSUE at 11:08

## 2023-08-24 RX ADMIN — ONDANSETRON 4 MG: 2 INJECTION INTRAMUSCULAR; INTRAVENOUS at 12:08

## 2023-08-24 RX ADMIN — PROCHLORPERAZINE EDISYLATE 5 MG: 5 INJECTION INTRAMUSCULAR; INTRAVENOUS at 01:08

## 2023-08-24 RX ADMIN — ALPRAZOLAM 0.5 MG: 0.5 TABLET ORAL at 10:08

## 2023-08-24 NOTE — PLAN OF CARE
Ongoing (interventions implemented as appropriate)  Pt is alert and oriented.   VSS  Pt able to make needs known.  Pt remained afebrile throughout this shift.   Pt remained free of falls this shift.   Prn pain medication given.   Plan of care reviewed. Patient verbalizes understanding.   Pt moving/turing independent. Frequent weight shifting encouraged.  Patient normal sinus rhythm on monitor.   Bed low, side rails up x 2, wheels locked, call light in reach.   Hourly rounding completed.   Will continue to observe.

## 2023-08-24 NOTE — ANESTHESIA PROCEDURE NOTES
Intubation    Date/Time: 8/24/2023 11:43 AM    Performed by: Aleida Rangel CRNA  Authorized by: Shay Amaya MD    Intubation:     Induction:  Intravenous    Intubated:  Postinduction    Mask Ventilation:  Not attempted    Attempts:  1    Attempted By:  CRNA and student    Difficult Airway Encountered?: No      Complications:  None    Airway Device:  Supraglottic airway/LMA    Airway Device Size:  3.0    Style/Cuff Inflation:  Cuffed    Secured at:  The lips    Placement Verified By:  Capnometry    Complicating Factors:  None    Findings Post-Intubation:  BS equal bilateral

## 2023-08-24 NOTE — OP NOTE
Date: 08/24/2023      Procedure:   Cystoscopy w bladder biopsy and fulguration   Bilateral Retrograde Pyelograms      Surgeon: Annita Gamino MD    Pre-op Diagnosis  Gross hematuria      Post-op Diagnosis  Gross hematuria, bladder lesion    Estimated Blood Loss: 2cc    Drains: none    Complications: None    Specimens: No specimens collected during this procedure.    Implants: * No implants in log *    Disposition: PACU - hemodynamically stable.    Condition: stable    Indication for procedure:    57yo female, who initially presented with flank pain and gross hematuria. She underwent non-contrast CT scan, which showed no obstructing stones. Urine culture was negative. Risks/benefits were discussed and the pt elected to proceed with the above named procedure.     Procedure in detail:    Informed consent was obtained. The patient was premedicated and brought back to the operative suite. They were placed on the cystoscopy table in the supine position. Sequential compression devices were placed on her lower extremities bilaterally. The patient underwent anesthesia. They were then placed in the dorsal lithotomy position and all pressure points were padded. The genitalia was prepped and draped in the usual sterile fashion. A formal timeout was performed. I began the procedure by advancing the 21 [place] rigid cystoscope into the patient's urethra and bladder access was obtained. Systematic review was performed of the bladder revealing a cystic lesion at the posterior medial trigone.  There was extrinsic compression of the posterior bladder wall.  Bilateral ureteral orifices were visualized and noted to be effluxing clear urine. I advanced the [place] catheter into the left ureteral orifice, and shot a gentle retrograde pyelogram, noting a delicate ureter and renal collecting system without filling defect.  This was repeated on the right side with the same findings.  I utilized the cold cup biopsy forceps to biopsy the  cystic lesion at the posterior trigone. The tissue was passed off of the field. There was specimen sent for pathology. The bugbee was used for fulguration to obtain hemostasis. Once this was complete, the bladder as inspected and hemostasis was noted to be adequate. The bladder was then drained and the scope was removed. The patient tolerated the procedure well and awakened in good condition.

## 2023-08-24 NOTE — INTERVAL H&P NOTE
The patient has been examined and the H&P has been reviewed:    I concur with the findings and no changes have occurred since H&P was written.    Surgery risks, benefits and alternative options discussed and understood by patient/family.          Active Hospital Problems    Diagnosis  POA    *Left flank pain [R10.9]  Yes    Hypokalemia [E87.6]  Yes    Anxiety [F41.9]  Yes      Resolved Hospital Problems   No resolved problems to display.

## 2023-08-24 NOTE — TRANSFER OF CARE
"Anesthesia Transfer of Care Note    Patient: Genny Calixto    Procedure(s) Performed: Procedure(s) (LRB):  CYSTOSCOPY, WITH RETROGRADE PYELOGRAM (Bilateral)  CYSTOSCOPY, WITH BLADDER BIOPSY, WITH FULGURATION IF INDICATED (N/A)    Patient location: PACU    Anesthesia Type: general    Transport from OR: Transported from OR on room air with adequate spontaneous ventilation    Post pain: adequate analgesia    Post assessment: no apparent anesthetic complications    Post vital signs: stable    Level of consciousness: sedated    Nausea/Vomiting: no nausea/vomiting    Complications: none    Transfer of care protocol was followed      Last vitals:   Visit Vitals  BP (!) 100/55 (Patient Position: Lying)   Pulse 80   Temp 36.7 °C (98.1 °F) (Oral)   Resp 18   Ht 5' 7" (1.702 m)   Wt 81.6 kg (180 lb)   SpO2 (!) 92%   Breastfeeding No   BMI 28.19 kg/m²     "

## 2023-08-24 NOTE — PLAN OF CARE
[facility] - Tele[facility] (Hospital)  Discharge Final Note    Primary Care Provider: Tay Duff MD    Expected Discharge Date: 8/24/2023    Final Discharge Note (most recent)       Final Note - 08/24/23 1516          Final Note    Assessment Type Final Discharge Note (P)      Anticipated Discharge Disposition Home or Self Care (P)      Hospital Resources/Appts/Education Provided Appointments scheduled and added to AVS (P)         Post-Acute Status    Discharge Delays None known at this time (P)                      Important Message from Medicare             Contact Info       Annita Gamino MD   Specialty: Urology    [address] [place] [address]   Phone: [phone]       Next Steps: Follow up on 8/24/2023    Instructions: Follow up for previously scheduled elective cystoscopy    Tay Duff MD   Specialty: Internal Medicine   Relationship: PCP - General    [address]   [address]  [facility] [address]   Phone: [phone]       Next Steps: Schedule an appointment as soon as possible for a visit in 1 week(s)    Instructions: Hospital discharge follow up

## 2023-08-24 NOTE — ANESTHESIA POSTPROCEDURE EVALUATION
Anesthesia Post Evaluation    Patient: Genny Calixto    Procedure(s) Performed: Procedure(s) (LRB):  CYSTOSCOPY, WITH RETROGRADE PYELOGRAM (Bilateral)  CYSTOSCOPY, WITH BLADDER BIOPSY, WITH FULGURATION IF INDICATED (N/A)    Final Anesthesia Type: general      Patient location during evaluation: PACU  Patient participation: Yes- Able to Participate  Level of consciousness: awake  Post-procedure vital signs: reviewed and stable  Pain management: adequate  Airway patency: patent    PONV status at discharge: No PONV  Anesthetic complications: no      Cardiovascular status: hemodynamically stable  Respiratory status: unassisted  Hydration status: euvolemic  Follow-up not needed.          Vitals Value Taken Time   /75 08/24/23 1350   Temp 36.7 °C (98.1 °F) 08/24/23 1319   Pulse 93 08/24/23 1350   Resp 16 08/24/23 1350   SpO2 93 % 08/24/23 1350         Event Time   Out of Recovery 08/24/2023 13:38:56         Pain/Pat Score: Pain Rating Prior to Med Admin: 5 (8/24/2023  1:34 PM)  Pain Rating Post Med Admin: 4 (8/24/2023  9:15 AM)  Pat Score: 10 (8/24/2023  1:15 PM)

## 2023-08-24 NOTE — DISCHARGE SUMMARY
[facility] Medicine  Discharge Summary      Patient Name: Genny Calixto  MRN: [id number]  CLINTON: [id number]  Patient Class: OP- Outpatient Procedures  Admission Date: 8/21/2023  Hospital Length of Stay: 0 days  Discharge Date and Time: No discharge date for patient encounter.  Attending Physician: Rory Cruz MD   Discharging Provider: Rory Cruz MD  Primary Care Provider: Tay Duff MD    Primary Care Team: [facility] MEDICINE D    HPI:   The patient is a 59 yo female with past medical history of anxiety, kidney stones, migraines and hematuria who presented to the ED with sudden onset of left flank pain. She reports she was not doing anything out of the ordinary. The pain is intermittent and sharp. She states it feels like she is passing a kidney stone. She denies heavy lifting. She reports she sleeps on a good mattress.  Workup in the ED is unrevealing. Urology reviewed CT and do not feel that pain is urological.  Patient reports oral medication did not give her any relief. Dilaudid has helped. Hospital medicine consulted. Patient placed in observation.      Procedure(s) (LRB):  CYSTOSCOPY, WITH RETROGRADE PYELOGRAM (Bilateral)  CYSTOSCOPY, WITH BLADDER BIOPSY, WITH FULGURATION IF INDICATED (N/A)      Hospital Course:   8/22/23  NAEON, examined patient this AM and again this afternoon  Patient appears comfortable, discussed discharging home but patient reluctant  UA reviewed, trace LE noted, high risk urine culture ordered, started on empiric Ceftriaxone  States Dilaudid IV only medication to help, also states IV morphine will make her vomit along with any other PO meds  Patient has elective cystoscopy planned for 8/24/23 with Dr. Gamino   De-escalate IV narcotics, transition to oral regimen, plan for discharge tomorrow    8/23/23  NAEON. Patient reported uncontrolled pain this morning  Restart IV pain medication this AM  No new issues per nursing staff  Patient  seen this afternoon, appears comfortable  Discussed with Dr. Gamino, plan for cystoscopy tomorrow  NPO after midnight, hold Lovenox    8/24/23  Urine culture with group B strep, treated with ceftriaxone  S/p cystoscopy today, bladder with a cystic lesion at posterior medial trigone, s/p biopsy and fulguration  Stable for discharge home, f/u with Urology in 1-2 weeks for further management       Goals of Care Treatment Preferences:  Code Status: Full Code      Consults:   Consults (From admission, onward)          Status Ordering Provider     Inpatient consult to Urology  Once        Provider:  Jeet Ott MD    Completed FLAVIO DAVIS            No new Assessment & Plan notes have been filed under this hospital service since the last note was generated.  Service: Hospital Medicine    Final Active Diagnoses:    Diagnosis Date Noted POA    PRINCIPAL PROBLEM:  Left flank pain [R10.9] 08/21/2023 Yes    Hypokalemia [E87.6] 08/21/2023 Yes    Anxiety [F41.9] 02/24/2017 Yes      Problems Resolved During this Admission:       Discharged Condition: stable    Disposition: Home or Self Care    Follow Up:   Follow-up Information       Annita Gamino MD Follow up on 8/24/2023.    Specialty: Urology  Why: Follow up for previously scheduled elective cystoscopy  Contact information:  [address] [facility] [address] [phone]               Tay Duff MD. Schedule an appointment as soon as possible for a visit in 1 week(s).    Specialty: Internal Medicine  Why: Hospital discharge follow up  Contact information:  [address] [facility]  [address]  [facility] [address] [phone]                           Patient Instructions:      Activity as tolerated       Significant Diagnostic Studies: Labs: All labs within the past 24 hours have been reviewed    Pending Diagnostic Studies:       Procedure Component Value Units Date/Time    Specimen to Pathology, Surgery Urology [[id number]] Collected:  08/24/23 1211    Order Status: Sent Lab Status: In process Updated: 08/24/23 1342    Specimen: Tissue            Medications:  Reconciled Home Medications:      Medication List        CONTINUE taking these medications      * diazePAM 10 MG Tab  Commonly known as: VALIUM  TAKE 1 TABLET BY MOUTH EVERY DAY AS NEEDED Strength: 10 mg     * diazePAM 5 MG tablet  Commonly known as: VALIUM  Take one with onset of migraine     diphenhydrAMINE 25 mg capsule  Commonly known as: BENADRYL  Take 25 mg by mouth 2 (two) times daily as needed for Itching.     docusate sodium 100 MG capsule  Commonly known as: COLACE  Take 1 capsule (100 mg total) by mouth 2 (two) times daily.     * doxepin 25 MG capsule  Commonly known as: SINEQUAN  Take 25 mg by mouth nightly as needed.     * doxepin 100 MG capsule  Commonly known as: SINEQUAN  Take 1 capsule (100 mg total) by mouth every evening.     HYDROcodone-acetaminophen 5-325 mg per tablet  Commonly known as: NORCO  Take 1 tablet by mouth every 8 (eight) hours as needed for Pain.     levocetirizine 5 MG tablet  Commonly known as: XYZAL  Take 1 tablet (5 mg total) by mouth every evening.     linaCLOtide 290 mcg Cap capsule  Commonly known as: LINZESS  Take 290 mcg by mouth every morning.     multivitamin capsule  Take 1 capsule by mouth once daily.     naproxen 500 MG tablet  Commonly known as: NAPROSYN  Take 500 mg by mouth 2 (two) times daily.     nitrofurantoin (macrocrystal-monohydrate) 100 MG capsule  Commonly known as: MACROBID  Take 1 capsule (100 mg total) by mouth 2 (two) times daily.     ondansetron 4 MG tablet  Commonly known as: ZOFRAN  Take 1 tablet (4 mg total) by mouth 2 (two) times daily.     PROBIOTIC COMPLEX ORAL  Take 1 capsule by mouth once daily at 6am.     spironolactone 50 MG tablet  Commonly known as: ALDACTONE  Take 1 tablet (50 mg total) by mouth 2 (two) times daily.     sumatriptan 100 MG tablet  Commonly known as: IMITREX  Take one with onset of migraine, may  repeat once two hours later if migraine persists           * This list has 4 medication(s) that are the same as other medications prescribed for you. Read the directions carefully, and ask your doctor or other care provider to review them with you.                ASK your doctor about these medications      ketorolac 10 mg tablet  Commonly known as: TORADOL  Take 1 tablet (10 mg total) by mouth 2 (two) times daily as needed for Pain.  Ask about: Should I take this medication?     pantoprazole 40 MG tablet  Commonly known as: PROTONIX  Take 1 tablet (40 mg total) by mouth once daily.              Indwelling Lines/Drains at time of discharge:   Lines/Drains/Airways       None                   Time spent on the discharge of patient: 45 minutes         Rory Cruz MD  Department of Hospital Medicine  [facility] - Telemetry ([facility])

## 2023-08-24 NOTE — ANESTHESIA PREPROCEDURE EVALUATION
08/24/2023  Genny Calixto is a 58 y.o., female.    Patient Active Problem List   Diagnosis    Anxiety    Bilateral low back pain with right-sided sciatica    Vertigo    Right nephrolithiasis    Ureteral stone    KENN (generalized anxiety disorder)    Migraine headache    Hydronephrosis    SBO (small bowel obstruction)    Hypokalemia    Left flank pain     Pre-op Assessment    I have reviewed the Patient Summary Reports.     I have reviewed the Nursing Notes. I have reviewed the NPO Status.   I have reviewed the Medications.     Review of Systems  Anesthesia Hx:  Patient relates a history of high anesthetic dose requirement Denies Family Hx of Anesthesia complications.   Denies Personal Hx of Anesthesia complications.   Hematology/Oncology:  Hematology Normal   Oncology Normal     EENT/Dental:EENT/Dental Normal   Cardiovascular:  Cardiovascular Normal  ECG has been reviewed.    Pulmonary:  Pulmonary Normal    Renal/:   Chronic Renal Disease, renal hypertension    Hepatic/GI:  Hepatic/GI Normal    Musculoskeletal:  Musculoskeletal Normal    Neurological:   Headaches    Endocrine:  Endocrine Normal    Dermatological:  Skin Normal    Psych:   anxiety          Physical Exam  General: Alert and Oriented    Airway:  Mallampati: II   Mouth Opening: Normal  TM Distance: Normal  Tongue: Normal  Neck ROM: Normal ROM        Anesthesia Plan  Type of Anesthesia, risks & benefits discussed:    Anesthesia Type: Gen ETT, Gen Supraglottic Airway, MAC  Intra-op Monitoring Plan: Standard ASA Monitors  Informed Consent: Informed consent signed with the Patient and all parties understand the risks and agree with anesthesia plan.  All questions answered.   ASA Score: 2  Day of Surgery Review of History & Physical: I have interviewed and examined the patient. I have reviewed the patient's H&P dated:     Ready  For Surgery From Anesthesia Perspective.     .

## 2023-08-24 NOTE — PLAN OF CARE
Problem: Adult Inpatient Plan of Care  Goal: Plan of Care Review  8/24/2023 1805 by Elaina Ferris RN  Outcome: Met  8/24/2023 1716 by Elaina Ferris RN  Outcome: Ongoing, Progressing  Goal: Patient-Specific Goal (Individualized)  8/24/2023 1805 by Elaina Ferris RN  Outcome: Met  8/24/2023 1716 by Elaina Ferris RN  Outcome: Ongoing, Progressing  Goal: Absence of Hospital-Acquired Illness or Injury  8/24/2023 1805 by Elaina Ferris RN  Outcome: Met  8/24/2023 1716 by Elaina Ferris RN  Outcome: Ongoing, Progressing  Goal: Optimal Comfort and Wellbeing  8/24/2023 1805 by Elaina Ferris RN  Outcome: Met  8/24/2023 1716 by Elaina Ferris RN  Outcome: Ongoing, Progressing  Goal: Readiness for Transition of Care  8/24/2023 1805 by Elaina Ferris RN  Outcome: Met  8/24/2023 1716 by Elaina Ferris RN  Outcome: Ongoing, Progressing     Problem: Pain Acute  Goal: Acceptable Pain Control and Functional Ability  8/24/2023 1805 by Elaina Ferris RN  Outcome: Met  8/24/2023 1716 by Elaina Ferris RN  Outcome: Ongoing, Progressing     Problem: Fluid and Electrolyte Imbalance (Acute Kidney Injury/Impairment)  Goal: Fluid and Electrolyte Balance  8/24/2023 1805 by Elaina Ferris RN  Outcome: Met  8/24/2023 1716 by Elaina Ferris RN  Outcome: Ongoing, Progressing     Problem: Oral Intake Inadequate (Acute Kidney Injury/Impairment)  Goal: Optimal Nutrition Intake  8/24/2023 1805 by Elaina Ferris RN  Outcome: Met  8/24/2023 1716 by Elaina Ferris RN  Outcome: Ongoing, Progressing     Problem: Renal Function Impairment (Acute Kidney Injury/Impairment)  Goal: Effective Renal Function  8/24/2023 1805 by Elaina Ferris RN  Outcome: Met  8/24/2023 1716 by Elaina eFrris RN  Outcome: Ongoing, Progressing     Problem: Infection  Goal: Absence of Infection Signs and Symptoms  8/24/2023 1805 by Elaina Ferris RN  Outcome: Met  8/24/2023 1716 by Elaina Ferris, RN  Outcome: Ongoing, Progressing

## 2023-08-28 LAB
FINAL PATHOLOGIC DIAGNOSIS: NORMAL
GROSS: NORMAL
Lab: NORMAL

## 2023-08-29 DIAGNOSIS — R11.0 NAUSEA: ICD-10-CM

## 2023-08-29 RX ORDER — ONDANSETRON 4 MG/1
4 TABLET, FILM COATED ORAL 2 TIMES DAILY
Qty: 30 TABLET | Refills: 0 | Status: SHIPPED | OUTPATIENT
Start: 2023-08-29 | End: 2023-09-22 | Stop reason: SDUPTHER

## 2023-08-29 RX ORDER — ONDANSETRON 4 MG/1
4 TABLET, FILM COATED ORAL 2 TIMES DAILY
Qty: 30 TABLET | Refills: 0 | Status: SHIPPED | OUTPATIENT
Start: 2023-08-29 | End: 2024-02-15

## 2023-08-29 NOTE — TELEPHONE ENCOUNTER
No care due was identified.  [facility] Embedded Care Due Messages. Reference number: [id number].   8/29/2023 4:06:32 PM CDT

## 2023-08-29 NOTE — TELEPHONE ENCOUNTER
No care due was identified.  [facility] Embedded Care Due Messages. Reference number: [id number].   8/29/2023 2:39:49 PM CDT

## 2023-09-07 DIAGNOSIS — Z00.00 ROUTINE GENERAL MEDICAL EXAMINATION AT A HEALTH CARE FACILITY: Primary | ICD-10-CM

## 2023-09-07 DIAGNOSIS — E78.5 HYPERLIPIDEMIA, UNSPECIFIED HYPERLIPIDEMIA TYPE: ICD-10-CM

## 2023-09-07 DIAGNOSIS — F41.9 ANXIETY: ICD-10-CM

## 2023-09-07 RX ORDER — DIAZEPAM 5 MG/1
TABLET ORAL
Qty: 20 TABLET | Refills: 1 | Status: SHIPPED | OUTPATIENT
Start: 2023-09-07 | End: 2023-12-16 | Stop reason: SDUPTHER

## 2023-09-07 NOTE — TELEPHONE ENCOUNTER
No care due was identified.  [facility] Embedded Care Due Messages. Reference number: [id number].   9/07/2023 2:24:55 PM CDT

## 2023-09-08 NOTE — TELEPHONE ENCOUNTER
Spoke with pt verbalized understanding that approved script ( Valium ) have been sent to pharmacy by Dr. Duff

## 2023-09-17 DIAGNOSIS — F41.9 ANXIETY: ICD-10-CM

## 2023-09-17 NOTE — TELEPHONE ENCOUNTER
No care due was identified.  [facility] Embedded Care Due Messages. Reference number: [id number].   9/17/2023 2:21:07 PM CDT

## 2023-09-17 NOTE — TELEPHONE ENCOUNTER
No care due was identified.  [facility] Embedded Care Due Messages. Reference number: [id number].   9/17/2023 2:23:23 PM CDT

## 2023-09-18 RX ORDER — DIAZEPAM 10 MG/1
TABLET ORAL
Qty: 90 TABLET | Refills: 1 | OUTPATIENT
Start: 2023-09-18

## 2023-09-18 RX ORDER — DOXEPIN HYDROCHLORIDE 10 MG/1
10 CAPSULE ORAL NIGHTLY
Qty: 90 CAPSULE | Refills: 0 | Status: SHIPPED | OUTPATIENT
Start: 2023-09-18 | End: 2024-02-15

## 2023-09-18 RX ORDER — DOXEPIN HYDROCHLORIDE 25 MG/1
25 CAPSULE ORAL
Qty: 90 CAPSULE | Refills: 0 | Status: SHIPPED | OUTPATIENT
Start: 2023-09-18 | End: 2024-02-15

## 2023-09-22 DIAGNOSIS — R11.0 NAUSEA: ICD-10-CM

## 2023-09-22 RX ORDER — ONDANSETRON 4 MG/1
4 TABLET, FILM COATED ORAL 2 TIMES DAILY
Qty: 30 TABLET | Refills: 5 | Status: SHIPPED | OUTPATIENT
Start: 2023-09-22 | End: 2024-01-17 | Stop reason: SDUPTHER

## 2023-09-22 NOTE — TELEPHONE ENCOUNTER
No care due was identified.  [facility] Embedded Care Due Messages. Reference number: [id number].   9/22/2023 12:50:14 PM CDT

## 2023-09-26 ENCOUNTER — PATIENT MESSAGE (OUTPATIENT)
Dept: INTERNAL MEDICINE | Facility: CLINIC | Age: 58
End: 2023-09-26
Payer: MEDICARE

## 2023-09-29 ENCOUNTER — OFFICE VISIT (OUTPATIENT)
Dept: INTERNAL MEDICINE | Facility: CLINIC | Age: 58
End: 2023-09-29
Payer: MEDICARE

## 2023-09-29 VITALS
HEIGHT: 67 IN | SYSTOLIC BLOOD PRESSURE: 134 MMHG | OXYGEN SATURATION: 98 % | BODY MASS INDEX: 28.27 KG/M2 | DIASTOLIC BLOOD PRESSURE: 84 MMHG | WEIGHT: 180.13 LBS | HEART RATE: 89 BPM

## 2023-09-29 DIAGNOSIS — E66.8 MODERATE OBESITY: Primary | ICD-10-CM

## 2023-09-29 PROCEDURE — 99213 PR OFFICE/OUTPT VISIT, EST, LEVL III, 20-29 MIN: ICD-10-PCS | Mod: S$PBB,,, | Performed by: INTERNAL MEDICINE

## 2023-09-29 PROCEDURE — 99213 OFFICE O/P EST LOW 20 MIN: CPT | Mod: S$PBB,,, | Performed by: INTERNAL MEDICINE

## 2023-09-29 PROCEDURE — 99999 PR PBB SHADOW E&M-EST. PATIENT-LVL IV: ICD-10-PCS | Mod: PBBFAC,,, | Performed by: INTERNAL MEDICINE

## 2023-09-29 PROCEDURE — 99214 OFFICE O/P EST MOD 30 MIN: CPT | Mod: PBBFAC,PO | Performed by: INTERNAL MEDICINE

## 2023-09-29 PROCEDURE — 99999 PR PBB SHADOW E&M-EST. PATIENT-LVL IV: CPT | Mod: PBBFAC,,, | Performed by: INTERNAL MEDICINE

## 2023-09-29 NOTE — PROGRESS NOTES
"HPI:  Patient is a 58-year-old female who comes today out of concern that she is not been able to lose weight.  She is concerned that she may have some metabolic problem.  She had CMP/CBC and TSH all done within the last 3 months.  They were all unremarkable.  Patient states she is exercising 3 times a week.  She states she just does not eat much.  She is not been able to lose any weight    Current meds have been verified and updated per the EMR  Exam:/84   Pulse 89   Ht 5' 7" (1.702 m)   Wt 81.7 kg (180 lb 1.9 oz)   SpO2 98%   BMI 28.21 kg/m²   Exam deferred    Lab Results   Component Value Date    WBC 4.44 08/23/2023    HGB 12.7 08/23/2023    HCT 42.5 08/23/2023     (L) 08/23/2023    CHOL 140 10/20/2020    TRIG 103 10/20/2020    HDL 55 10/20/2020    ALT 14 08/23/2023    AST 17 08/23/2023     08/23/2023    K 3.9 08/23/2023     (H) 08/23/2023    CREATININE 0.7 08/23/2023    BUN 16 08/23/2023    CO2 14 (L) 08/23/2023    TSH 2.427 06/22/2023    INR 1.0 05/09/2022    HGBA1C 5.8 03/07/2017    BNP 18 07/29/2022    SEDRATE 28 (H) 05/21/2017    CRP 28.1 (H) 05/21/2017       Impression:  Moderately overweight  Patient Active Problem List   Diagnosis    Anxiety    Bilateral low back pain with right-sided sciatica    Vertigo    Right nephrolithiasis    Ureteral stone    KENN (generalized anxiety disorder)    Migraine headache    Hydronephrosis    SBO (small bowel obstruction)       Plan:     I used a BMR calculator with her and estimate that her BMR is 1480 calories per day.  I have asked her to download a calorie wild on her smart phone and to track her calories every day.  She should try to limit her calories to 1400 calories per day.  If she is exercising and leading are normal daily lifestyle she definitely will lose about 1 lb per week he will only eat 1400 calories per day    This note is generated with speech recognition software and is subject to transcription error and sound alike phrases " that may be missed by proofreading.

## 2023-10-01 ENCOUNTER — PATIENT MESSAGE (OUTPATIENT)
Dept: INTERNAL MEDICINE | Facility: CLINIC | Age: 58
End: 2023-10-01
Payer: MEDICARE

## 2023-10-01 DIAGNOSIS — F41.9 ANXIETY: ICD-10-CM

## 2023-10-01 NOTE — TELEPHONE ENCOUNTER
No care due was identified.  [facility] Embedded Care Due Messages. Reference number: [id number].   10/01/2023 1:35:09 PM CDT

## 2023-10-01 NOTE — TELEPHONE ENCOUNTER
No care due was identified.  [facility] Embedded Care Due Messages. Reference number: [id number].   10/01/2023 1:36:11 PM CDT

## 2023-10-02 RX ORDER — DIAZEPAM 10 MG/1
TABLET ORAL
Qty: 90 TABLET | Refills: 1 | Status: SHIPPED | OUTPATIENT
Start: 2023-10-02 | End: 2023-11-10 | Stop reason: SDUPTHER

## 2023-10-02 RX ORDER — DOXEPIN HYDROCHLORIDE 100 MG/1
100 CAPSULE ORAL NIGHTLY
Qty: 90 CAPSULE | Refills: 1 | Status: SHIPPED | OUTPATIENT
Start: 2023-10-02 | End: 2023-12-16 | Stop reason: SDUPTHER

## 2023-10-11 ENCOUNTER — PATIENT MESSAGE (OUTPATIENT)
Dept: INTERNAL MEDICINE | Facility: CLINIC | Age: 58
End: 2023-10-11
Payer: MEDICARE

## 2023-10-29 ENCOUNTER — OFFICE VISIT (OUTPATIENT)
Dept: URGENT CARE | Facility: CLINIC | Age: 58
End: 2023-10-29
Payer: MEDICARE

## 2023-10-29 VITALS
TEMPERATURE: 99 F | HEIGHT: 67 IN | HEART RATE: 92 BPM | WEIGHT: 179.38 LBS | RESPIRATION RATE: 18 BRPM | BODY MASS INDEX: 28.16 KG/M2 | DIASTOLIC BLOOD PRESSURE: 69 MMHG | SYSTOLIC BLOOD PRESSURE: 100 MMHG | OXYGEN SATURATION: 96 %

## 2023-10-29 DIAGNOSIS — J34.0 CELLULITIS OF NASAL TIP: Primary | ICD-10-CM

## 2023-10-29 PROCEDURE — 99213 OFFICE O/P EST LOW 20 MIN: CPT | Mod: S$GLB,,, | Performed by: NURSE PRACTITIONER

## 2023-10-29 PROCEDURE — 99213 PR OFFICE/OUTPT VISIT, EST, LEVL III, 20-29 MIN: ICD-10-PCS | Mod: S$GLB,,, | Performed by: NURSE PRACTITIONER

## 2023-10-29 RX ORDER — CEFDINIR 300 MG/1
300 CAPSULE ORAL 2 TIMES DAILY
Qty: 14 CAPSULE | Refills: 0 | Status: SHIPPED | OUTPATIENT
Start: 2023-10-29 | End: 2023-11-05

## 2023-10-29 RX ORDER — MUPIROCIN 20 MG/G
OINTMENT TOPICAL 3 TIMES DAILY
Qty: 30 G | Refills: 1 | Status: SHIPPED | OUTPATIENT
Start: 2023-10-29 | End: 2023-11-05

## 2023-10-29 NOTE — PROGRESS NOTES
"Subjective:      Patient ID: Genny Calixto is a 58 y.o. female.    Vitals:  height is 5' 7" (1.702 m) and weight is 81.4 kg (179 lb 5.5 oz). Her oral temperature is 99.3 °F (37.4 °C). Her blood pressure is 100/69 and her pulse is 92. Her respiration is 18 and oxygen saturation is 96%.     Chief Complaint: Sinus Problem    Pt presents today with Sinus Problems for appx 2 weeks.She states that she is experiencing ear pressure,Post nasal drip, and body aches  She states that she has taken OTC medication for her symptoms and she is in no pain today.    Sinus Problem  This is a new problem. The current episode started 1 to 4 weeks ago. The problem is unchanged. There has been no fever. Her pain is at a severity of 0/10. The pain is mild. Associated symptoms include congestion, ear pain and sinus pressure. Pertinent negatives include no chills, coughing, diaphoresis, headaches, hoarse voice, neck pain, shortness of breath, sneezing, sore throat or swollen glands. Past treatments include oral decongestants. The treatment provided no relief.     Constitution: Negative for chills and sweating.   HENT:  Positive for ear pain, congestion and sinus pressure. Negative for sore throat.    Neck: Negative for neck pain.   Respiratory:  Negative for cough and shortness of breath.    Allergic/Immunologic: Negative for sneezing.   Neurological:  Negative for headaches.      Objective:     Vitals:    10/29/23 1325   BP: 100/69   BP Location: Left arm   Patient Position: Sitting   BP Method: Small (Automatic)   Pulse: 92   Resp: 18   Temp: 99.3 °F (37.4 °C)   TempSrc: Oral   SpO2: 96%   Weight: 81.4 kg (179 lb 5.5 oz)   Height: 5' 7" (1.702 m)         Physical Exam   Constitutional: She is oriented to person, place, and time. She appears well-developed. She is cooperative.  Non-toxic appearance. She does not appear ill. No distress.   HENT:   Head: Normocephalic and atraumatic.   Ears:   Right Ear: Hearing, external ear and ear " canal normal. Tympanic membrane is injected and retracted.   Left Ear: Hearing, external ear and ear canal normal. Tympanic membrane is injected and retracted.   Nose: Mucosal edema, sinus tenderness and congestion present. No rhinorrhea or nasal deformity. No epistaxis. Right sinus exhibits no maxillary sinus tenderness and no frontal sinus tenderness. Left sinus exhibits no maxillary sinus tenderness.       Mouth/Throat: Uvula is midline and mucous membranes are normal. Mucous membranes are moist. No trismus in the jaw. Normal dentition. No uvula swelling. Posterior oropharyngeal erythema present. No oropharyngeal exudate or posterior oropharyngeal edema.   Distal R  upper vault nare + honey crusts noted with minute laceration surrounding erythema of turbinates       Comments: Distal R  upper vault nare + honey crusts noted with minute laceration surrounding erythema of turbinates   Eyes: Conjunctivae and lids are normal. Pupils are equal, round, and reactive to light. No scleral icterus. Extraocular movement intact   Neck: Trachea normal and phonation normal. Neck supple. No edema present. No erythema present. No neck rigidity present.   Cardiovascular: Normal rate, regular rhythm, normal heart sounds and normal pulses.   Pulmonary/Chest: Effort normal and breath sounds normal. No respiratory distress. She has no decreased breath sounds. She has no rhonchi.   Abdominal: Normal appearance.   Musculoskeletal: Normal range of motion.         General: No deformity. Normal range of motion.   Lymphadenopathy:     She has cervical adenopathy.   Neurological: She is alert and oriented to person, place, and time. She exhibits normal muscle tone. Coordination normal.   Skin: Skin is warm, dry, intact, not diaphoretic and not pale.   Psychiatric: Her speech is normal and behavior is normal. Judgment and thought content normal.   Nursing note and vitals reviewed.      Assessment:     1. Cellulitis of nasal tip        Plan:      Patient stable for discharge and home management of condition    Cellulitis of nasal tip  -     cefdinir (OMNICEF) 300 MG capsule; Take 1 capsule (300 mg total) by mouth 2 (two) times daily. Take with food for 7 days  Dispense: 14 capsule; Refill: 0  -     mupirocin (BACTROBAN) 2 % ointment; Apply topically 3 (three) times daily. Apply after creams for 7 days  Dispense: 30 g; Refill: 1      Patient Instructions   Avoid scratching rash area   Apply ointment   Oral antibiotics are prescribed tolerated   Monitor area if redness expands follow up to [facility] or PCP

## 2023-10-29 NOTE — PATIENT INSTRUCTIONS
Avoid scratching rash area   Apply ointment   Oral antibiotics are prescribed tolerated   Monitor area if redness expands follow up to [facility] or PCP

## 2023-11-01 ENCOUNTER — TELEPHONE (OUTPATIENT)
Dept: URGENT CARE | Facility: CLINIC | Age: 58
End: 2023-11-01
Payer: MEDICARE

## 2023-11-01 ENCOUNTER — PATIENT MESSAGE (OUTPATIENT)
Dept: INTERNAL MEDICINE | Facility: CLINIC | Age: 58
End: 2023-11-01
Payer: MEDICARE

## 2023-11-01 NOTE — TELEPHONE ENCOUNTER
Courtesy call made for 10/29 visit. Pt reports sxs have not improved. I let pt know she may come in for re-evaluation of sxs or she see PCP. Pt voiced understanding.

## 2023-11-06 ENCOUNTER — PATIENT MESSAGE (OUTPATIENT)
Dept: INTERNAL MEDICINE | Facility: CLINIC | Age: 58
End: 2023-11-06
Payer: MEDICARE

## 2023-11-08 ENCOUNTER — OFFICE VISIT (OUTPATIENT)
Dept: URGENT CARE | Facility: CLINIC | Age: 58
End: 2023-11-08
Payer: MEDICARE

## 2023-11-08 VITALS
HEART RATE: 95 BPM | WEIGHT: 179.44 LBS | SYSTOLIC BLOOD PRESSURE: 140 MMHG | BODY MASS INDEX: 28.16 KG/M2 | OXYGEN SATURATION: 96 % | RESPIRATION RATE: 18 BRPM | TEMPERATURE: 99 F | DIASTOLIC BLOOD PRESSURE: 81 MMHG | HEIGHT: 67 IN

## 2023-11-08 DIAGNOSIS — B96.89 ACUTE BACTERIAL SINUSITIS: Primary | ICD-10-CM

## 2023-11-08 DIAGNOSIS — J01.90 ACUTE BACTERIAL SINUSITIS: Primary | ICD-10-CM

## 2023-11-08 PROCEDURE — 99213 OFFICE O/P EST LOW 20 MIN: CPT | Mod: S$GLB,,, | Performed by: PHYSICIAN ASSISTANT

## 2023-11-08 PROCEDURE — 99213 PR OFFICE/OUTPT VISIT, EST, LEVL III, 20-29 MIN: ICD-10-PCS | Mod: S$GLB,,, | Performed by: PHYSICIAN ASSISTANT

## 2023-11-08 RX ORDER — PREDNISONE 20 MG/1
20 TABLET ORAL DAILY
Qty: 3 TABLET | Refills: 0 | Status: SHIPPED | OUTPATIENT
Start: 2023-11-08 | End: 2023-11-11

## 2023-11-08 NOTE — PROGRESS NOTES
"Subjective:      Patient ID: Genny Calixto is a 58 y.o. female.    Vitals:  height is 5' 7" (1.702 m) and weight is 81.4 kg (179 lb 7.3 oz). Her tympanic temperature is 98.5 °F (36.9 °C). Her blood pressure is 140/81 (abnormal) and her pulse is 95. Her respiration is 18 and oxygen saturation is 96%.     Chief Complaint: Sinus Problem    Patient presents with sinus congestion and dizziness.  Symptoms started 4 days ago.  She states that the room spins when she blows her nose.  She recently completed antibiotic from last visit.  She is requesting steroids.    Sinus Problem  This is a recurrent problem. The current episode started in the past 7 days (4). The problem has been gradually worsening since onset. There has been no fever (100.7). Associated symptoms include congestion, coughing, ear pain, headaches, shortness of breath, sinus pressure and a sore throat. Pertinent negatives include no chills, diaphoresis, neck pain or sneezing. Past treatments include oral decongestants (Sudafed and Claritin). The treatment provided no relief.       Constitution: Negative for chills and sweating.   HENT:  Positive for ear pain, congestion, sinus pressure and sore throat.    Neck: Negative for neck pain.   Respiratory:  Positive for cough and shortness of breath.    Allergic/Immunologic: Negative for sneezing.   Neurological:  Positive for headaches.      Objective:     Physical Exam   Constitutional: She is oriented to person, place, and time. She appears well-developed.   HENT:   Head: Normocephalic and atraumatic.   Ears:   Right Ear: Tympanic membrane, external ear and ear canal normal.   Left Ear: Tympanic membrane, external ear and ear canal normal.   Nose: Mucosal edema and rhinorrhea present.   Mouth/Throat: Oropharynx is clear and moist and mucous membranes are normal. Mucous membranes are moist.   Eyes: Conjunctivae and EOM are normal. Pupils are equal, round, and reactive to light.   Neck: Neck supple. "   Cardiovascular: Normal rate, regular rhythm, normal heart sounds and normal pulses.   Pulmonary/Chest: Effort normal and breath sounds normal.   Musculoskeletal: Normal range of motion.         General: Normal range of motion.   Neurological: She is alert and oriented to person, place, and time.   Skin: Skin is warm and dry.   Vitals reviewed.      Assessment:     1. Acute bacterial sinusitis        Plan:       Acute bacterial sinusitis  -     predniSONE (DELTASONE) 20 MG tablet; Take 1 tablet (20 mg total) by mouth once daily. for 3 days  Dispense: 3 tablet; Refill: 0    Patient is adamant about another short burst of steroids for her symptoms.  I discussed the risks of taking another round of steroids and patient is still requesting the steroids.  Advise increase p.o. fluids--at least 64 ounces of water/juice & rest.  Meds:  Recommend daily antihistamine and Flonase.    Normal saline nasal wash to irrigate sinuses and for congestion/runny nose.  Cool mist humidifier/vaporizer.  Practice good handwashing.  Mucinex for cough and chest congestion.  Tylenol or Ibuprofen for fever, headache and body aches.  Warm salt water gargles for throat comfort.  Chloraseptic spray or lozenges for throat comfort.  See PCP or go to ER if symptoms worsen or fail to improve with treatment.

## 2023-11-10 DIAGNOSIS — F41.9 ANXIETY: ICD-10-CM

## 2023-11-11 ENCOUNTER — TELEPHONE (OUTPATIENT)
Dept: URGENT CARE | Facility: CLINIC | Age: 58
End: 2023-11-11
Payer: MEDICARE

## 2023-11-11 NOTE — TELEPHONE ENCOUNTER
No care due was identified.  [facility] Embedded Care Due Messages. Reference number: [id number].   11/10/2023 11:07:24 PM CST

## 2023-11-11 NOTE — TELEPHONE ENCOUNTER
Courtesy call made.  Spoke with Patient(Self) and she states that she is not getting any better but also not worse. She just has a cough she cant get rid of and states that she may be back in tomorrow if she doesn't feel better.

## 2023-11-13 RX ORDER — DIAZEPAM 10 MG/1
TABLET ORAL
Qty: 90 TABLET | Refills: 0 | Status: ON HOLD | OUTPATIENT
Start: 2023-11-13 | End: 2024-02-17 | Stop reason: HOSPADM

## 2023-11-13 NOTE — TELEPHONE ENCOUNTER
Requested Prescriptions     Pending Prescriptions Disp Refills    diazePAM (VALIUM) 10 MG Tab 90 tablet 1     Sig: TAKE 1 TABLET BY MOUTH EVERY DAY AS NEEDED Strength: 10 mg     LV 09/29/2023   NV none scheduled  LF 10/02/2023

## 2023-11-14 ENCOUNTER — HOSPITAL ENCOUNTER (OUTPATIENT)
Dept: RADIOLOGY | Facility: CLINIC | Age: 58
Discharge: HOME | End: 2023-11-14
Attending: NURSE PRACTITIONER
Payer: MEDICARE

## 2023-11-14 ENCOUNTER — OFFICE VISIT (OUTPATIENT)
Dept: URGENT CARE | Facility: CLINIC | Age: 58
End: 2023-11-14
Payer: MEDICARE

## 2023-11-14 VITALS
TEMPERATURE: 98 F | HEART RATE: 99 BPM | BODY MASS INDEX: 28.09 KG/M2 | OXYGEN SATURATION: 99 % | SYSTOLIC BLOOD PRESSURE: 134 MMHG | HEIGHT: 67 IN | DIASTOLIC BLOOD PRESSURE: 75 MMHG | WEIGHT: 179 LBS | RESPIRATION RATE: 20 BRPM

## 2023-11-14 DIAGNOSIS — R05.9 COUGH, UNSPECIFIED TYPE: ICD-10-CM

## 2023-11-14 DIAGNOSIS — J06.9 UPPER RESPIRATORY TRACT INFECTION, UNSPECIFIED TYPE: ICD-10-CM

## 2023-11-14 DIAGNOSIS — L03.211 CELLULITIS OF FACE: ICD-10-CM

## 2023-11-14 DIAGNOSIS — J32.9 SINUSITIS, UNSPECIFIED CHRONICITY, UNSPECIFIED LOCATION: Primary | ICD-10-CM

## 2023-11-14 LAB
CTP QC/QA: YES
SARS-COV-2 AG RESP QL IA.RAPID: NEGATIVE

## 2023-11-14 PROCEDURE — 71046 XR CHEST PA AND LATERAL: ICD-10-PCS | Mod: S$GLB,,, | Performed by: RADIOLOGY

## 2023-11-14 PROCEDURE — 71046 X-RAY EXAM CHEST 2 VIEWS: CPT | Mod: S$GLB,,, | Performed by: RADIOLOGY

## 2023-11-14 PROCEDURE — 99214 PR OFFICE/OUTPT VISIT, EST, LEVL IV, 30-39 MIN: ICD-10-PCS | Mod: S$GLB,,, | Performed by: NURSE PRACTITIONER

## 2023-11-14 PROCEDURE — 87811 SARS-COV-2 COVID19 W/OPTIC: CPT | Mod: QW,S$GLB,, | Performed by: NURSE PRACTITIONER

## 2023-11-14 PROCEDURE — 99214 OFFICE O/P EST MOD 30 MIN: CPT | Mod: S$GLB,,, | Performed by: NURSE PRACTITIONER

## 2023-11-14 PROCEDURE — 87811 SARS CORONAVIRUS 2 ANTIGEN POCT, MANUAL READ: ICD-10-PCS | Mod: QW,S$GLB,, | Performed by: NURSE PRACTITIONER

## 2023-11-14 RX ORDER — FLUCONAZOLE 150 MG/1
150 TABLET ORAL ONCE AS NEEDED
Qty: 3 TABLET | Refills: 0 | Status: ON HOLD | OUTPATIENT
Start: 2023-11-14 | End: 2024-02-17 | Stop reason: HOSPADM

## 2023-11-14 RX ORDER — MUPIROCIN 20 MG/G
OINTMENT TOPICAL 3 TIMES DAILY
Qty: 30 G | Refills: 1 | Status: SHIPPED | OUTPATIENT
Start: 2023-11-14 | End: 2023-11-21

## 2023-11-14 RX ORDER — CEFDINIR 300 MG/1
300 CAPSULE ORAL 2 TIMES DAILY
Qty: 14 CAPSULE | Refills: 0 | Status: SHIPPED | OUTPATIENT
Start: 2023-11-14 | End: 2023-11-21

## 2023-11-14 RX ORDER — LACTOBACILLUS COMBINATION NO.4 3B CELL
1 CAPSULE ORAL DAILY
Qty: 30 CAPSULE | Refills: 0 | Status: SHIPPED | OUTPATIENT
Start: 2023-11-14

## 2023-11-14 RX ORDER — PROMETHAZINE HYDROCHLORIDE AND DEXTROMETHORPHAN HYDROBROMIDE 6.25; 15 MG/5ML; MG/5ML
5 SYRUP ORAL EVERY 6 HOURS PRN
Qty: 180 ML | Refills: 0 | Status: SHIPPED | OUTPATIENT
Start: 2023-11-14 | End: 2024-02-15

## 2023-11-14 NOTE — PROGRESS NOTES
"Subjective:      Patient ID: Genny Calixto is a 58 y.o. female.    Vitals:  height is 5' 7" (1.702 m) and weight is 81.2 kg (179 lb). Her tympanic temperature is 98 °F (36.7 °C). Her blood pressure is 134/75 and her pulse is 99. Her respiration is 20 and oxygen saturation is 99%.     Chief Complaint: Sinus Problem    Patient presents with complaint of  sinus problem coming back. Nasal   She finished cortisone last Friday.  She saw Lucas last Wednesday who she states told her she may need more antibiotics if it does not clear up.    Sinus Problem  This is a new problem. The current episode started 1 to 4 weeks ago. The problem is unchanged. There has been no fever. Her pain is at a severity of 4/10. The pain is mild. Associated symptoms include congestion, coughing, headaches and sinus pressure. Pertinent negatives include no chills, diaphoresis, ear pain, hoarse voice, neck pain, shortness of breath, sneezing, sore throat or swollen glands. Treatments tried: decongestants mucinex sudafed. The treatment provided mild relief.       Constitution: Negative for chills and sweating.   HENT:  Positive for congestion and sinus pressure. Negative for ear pain and sore throat.    Neck: Negative for neck pain.   Respiratory:  Positive for cough. Negative for shortness of breath.    Allergic/Immunologic: Negative for sneezing.   Neurological:  Positive for headaches.      Objective:     Vitals:    11/14/23 1438   BP: 134/75   BP Location: Right arm   Patient Position: Sitting   BP Method: Large (Automatic)   Pulse: 99   Resp: 20   Temp: 98 °F (36.7 °C)   TempSrc: Tympanic   SpO2: 99%   Weight: 81.2 kg (179 lb)   Height: 5' 7" (1.702 m)       Physical Exam   Constitutional: She is oriented to person, place, and time. She appears well-developed. She is cooperative.  Non-toxic appearance. She does not appear ill. No distress.   HENT:   Head: Normocephalic and atraumatic.   Ears:   Right Ear: Hearing, external ear and ear " canal normal. Tympanic membrane is injected and retracted.   Left Ear: Hearing, external ear and ear canal normal. Tympanic membrane is injected and retracted.   Nose: Congestion present. No mucosal edema, rhinorrhea or nasal deformity. No epistaxis. Right sinus exhibits frontal sinus tenderness. Right sinus exhibits no maxillary sinus tenderness. Left sinus exhibits frontal sinus tenderness. Left sinus exhibits no maxillary sinus tenderness.       Mouth/Throat: Uvula is midline and mucous membranes are normal. Mucous membranes are moist. No trismus in the jaw. Normal dentition. No uvula swelling. Posterior oropharyngeal erythema present. No oropharyngeal exudate or posterior oropharyngeal edema.   Honey crusting lesions to L anterior nare and turbinate with surrounding edema and erythema; tenderness to touch       Comments: Honey crusting lesions to L anterior nare and turbinate with surrounding edema and erythema; tenderness to touch   Eyes: Conjunctivae and lids are normal. Pupils are equal, round, and reactive to light. No scleral icterus. Extraocular movement intact   Neck: Trachea normal and phonation normal. Neck supple. No edema present. No erythema present. No neck rigidity present.   Cardiovascular: Normal rate, regular rhythm, normal heart sounds and normal pulses.   Pulmonary/Chest: Effort normal and breath sounds normal. No respiratory distress. She has no decreased breath sounds. She has no rhonchi.   Abdominal: Normal appearance.   Musculoskeletal: Normal range of motion.         General: No deformity. Normal range of motion.   Lymphadenopathy:     She has cervical adenopathy.   Neurological: She is alert and oriented to person, place, and time. She exhibits normal muscle tone. Coordination normal.   Skin: Skin is warm, dry, intact, not diaphoretic and not pale.   Psychiatric: Her speech is normal and behavior is normal. Judgment and thought content normal.   Nursing note and vitals  reviewed.      Assessment:     1. Sinusitis, unspecified chronicity, unspecified location    2. Cellulitis of face    3. Cough, unspecified type    4. Upper respiratory tract infection, unspecified type        Plan:   Patient stable for discharge and home management of condition    Noted recurrent nasal infection with lesions; ?suspect possible mrsa; patient is hesitant to take doxycycline or bactrim due to prior adverse symptoms while taking;   Will treat with cefdinir and topical bactroban.      Sinusitis, unspecified chronicity, unspecified location  -     SARS Coronavirus 2 Antigen, POCT Manual Read  -     cefdinir (OMNICEF) 300 MG capsule; Take 1 capsule (300 mg total) by mouth 2 (two) times daily. Take with food for 7 days  Dispense: 14 capsule; Refill: 0  -     mupirocin (BACTROBAN) 2 % ointment; Apply topically 3 (three) times daily. for 7 days  Dispense: 30 g; Refill: 1  -     promethazine-dextromethorphan (PROMETHAZINE-DM) 6.25-15 mg/5 mL Syrp; Take 5 mLs by mouth every 6 (six) hours as needed (cough/congestion).  Dispense: 180 mL; Refill: 0    Cellulitis of face  -     cefdinir (OMNICEF) 300 MG capsule; Take 1 capsule (300 mg total) by mouth 2 (two) times daily. Take with food for 7 days  Dispense: 14 capsule; Refill: 0  -     mupirocin (BACTROBAN) 2 % ointment; Apply topically 3 (three) times daily. for 7 days  Dispense: 30 g; Refill: 1    Cough, unspecified type  -     SARS Coronavirus 2 Antigen, POCT Manual Read  -     XR CHEST PA AND LATERAL; Future; Expected date: 11/14/2023  -     promethazine-dextromethorphan (PROMETHAZINE-DM) 6.25-15 mg/5 mL Syrp; Take 5 mLs by mouth every 6 (six) hours as needed (cough/congestion).  Dispense: 180 mL; Refill: 0    Upper respiratory tract infection, unspecified type    Other orders  -     lactobacillus combination no.4 (PROBIOTIC) 3 billion cell Cap; Take 1 capsule by mouth once daily.  Dispense: 30 capsule; Refill: 0  -     fluconazole (DIFLUCAN) 150 MG Tab; Take  1 tablet (150 mg total) by mouth 1 (one) time if needed (yeast vaginitis x 1 dose; may repeat in 3 or 7 days).  Dispense: 3 tablet; Refill: 0      Patient Instructions   Avoid scratching or picking area   Apply ointment to affected nare/s  Monitor area if redness expands  follow up to [facility] or PCP   Increase fluids   Rest activity ad tim   Complete antibiotic as prescribed   Cough suspension as prescribed   Supportive care measures   If symptoms persist or worsen follow up [facility] or PCP     Probiotic per prescription or per pkg directions   Diflucan as needed in case of yeast vaginitis occurrence

## 2023-11-14 NOTE — PATIENT INSTRUCTIONS
Avoid scratching or picking area   Apply ointment to affected nare/s  Monitor area if redness expands  follow up to [facility] or PCP   Increase fluids   Rest activity ad tim   Complete antibiotic as prescribed   Cough suspension as prescribed   Supportive care measures   If symptoms persist or worsen follow up [facility] or PCP     Probiotic per prescription or per pkg directions   Diflucan as needed in case of yeast vaginitis occurrence

## 2023-12-06 ENCOUNTER — HOSPITAL ENCOUNTER (EMERGENCY)
Facility: HOSPITAL | Age: 58
Discharge: HOME | End: 2023-12-07
Attending: EMERGENCY MEDICINE
Payer: MEDICARE

## 2023-12-06 DIAGNOSIS — R19.7 DIARRHEA OF PRESUMED INFECTIOUS ORIGIN: Primary | ICD-10-CM

## 2023-12-06 LAB
ALBUMIN SERPL BCP-MCNC: 4.2 G/DL (ref 3.5–5.2)
ALP SERPL-CCNC: 106 U/L (ref 55–135)
ALT SERPL W/O P-5'-P-CCNC: 30 U/L (ref 10–44)
ANION GAP SERPL CALC-SCNC: 11 MMOL/L (ref 8–16)
AST SERPL-CCNC: 30 U/L (ref 10–40)
BASOPHILS # BLD AUTO: 0.07 K/UL (ref 0–0.2)
BASOPHILS NFR BLD: 0.6 % (ref 0–1.9)
BILIRUB SERPL-MCNC: 0.9 MG/DL (ref 0.1–1)
BUN SERPL-MCNC: 8 MG/DL (ref 6–20)
CALCIUM SERPL-MCNC: 9.4 MG/DL (ref 8.7–10.5)
CHLORIDE SERPL-SCNC: 107 MMOL/L (ref 95–110)
CO2 SERPL-SCNC: 24 MMOL/L (ref 23–29)
CREAT SERPL-MCNC: 0.7 MG/DL (ref 0.5–1.4)
DIFFERENTIAL METHOD: ABNORMAL
EOSINOPHIL # BLD AUTO: 0.2 K/UL (ref 0–0.5)
EOSINOPHIL NFR BLD: 1.6 % (ref 0–8)
ERYTHROCYTE [DISTWIDTH] IN BLOOD BY AUTOMATED COUNT: 13.8 % (ref 11.5–14.5)
EST. GFR  (NO RACE VARIABLE): >60 ML/MIN/1.73 M^2
GLUCOSE SERPL-MCNC: 95 MG/DL (ref 70–110)
HCT VFR BLD AUTO: 45.3 % (ref 37–48.5)
HGB BLD-MCNC: 15.2 G/DL (ref 12–16)
IMM GRANULOCYTES # BLD AUTO: 0.05 K/UL (ref 0–0.04)
IMM GRANULOCYTES NFR BLD AUTO: 0.5 % (ref 0–0.5)
LIPASE SERPL-CCNC: 23 U/L (ref 4–60)
LYMPHOCYTES # BLD AUTO: 2.3 K/UL (ref 1–4.8)
LYMPHOCYTES NFR BLD: 21.6 % (ref 18–48)
MCH RBC QN AUTO: 33.6 PG (ref 27–31)
MCHC RBC AUTO-ENTMCNC: 33.6 G/DL (ref 32–36)
MCV RBC AUTO: 100 FL (ref 82–98)
MONOCYTES # BLD AUTO: 0.6 K/UL (ref 0.3–1)
MONOCYTES NFR BLD: 5.5 % (ref 4–15)
NEUTROPHILS # BLD AUTO: 7.6 K/UL (ref 1.8–7.7)
NEUTROPHILS NFR BLD: 70.2 % (ref 38–73)
NRBC BLD-RTO: 0 /100 WBC
PLATELET # BLD AUTO: 408 K/UL (ref 150–450)
PMV BLD AUTO: 10.9 FL (ref 9.2–12.9)
POTASSIUM SERPL-SCNC: 3.8 MMOL/L (ref 3.5–5.1)
PROT SERPL-MCNC: 7.9 G/DL (ref 6–8.4)
RBC # BLD AUTO: 4.52 M/UL (ref 4–5.4)
SODIUM SERPL-SCNC: 142 MMOL/L (ref 136–145)
WBC # BLD AUTO: 10.82 K/UL (ref 3.9–12.7)

## 2023-12-06 PROCEDURE — 96361 HYDRATE IV INFUSION ADD-ON: CPT

## 2023-12-06 PROCEDURE — 85025 COMPLETE CBC W/AUTO DIFF WBC: CPT | Performed by: NURSE PRACTITIONER

## 2023-12-06 PROCEDURE — 83690 ASSAY OF LIPASE: CPT | Performed by: NURSE PRACTITIONER

## 2023-12-06 PROCEDURE — 99285 EMERGENCY DEPT VISIT HI MDM: CPT | Mod: 25

## 2023-12-06 PROCEDURE — 80053 COMPREHEN METABOLIC PANEL: CPT | Performed by: NURSE PRACTITIONER

## 2023-12-06 PROCEDURE — 25000003 PHARM REV CODE 250: Performed by: NURSE PRACTITIONER

## 2023-12-06 RX ADMIN — SODIUM CHLORIDE 1000 ML: 9 INJECTION, SOLUTION INTRAVENOUS at 10:12

## 2023-12-07 VITALS
TEMPERATURE: 99 F | RESPIRATION RATE: 16 BRPM | BODY MASS INDEX: 29.45 KG/M2 | WEIGHT: 188.06 LBS | HEART RATE: 85 BPM | OXYGEN SATURATION: 94 % | DIASTOLIC BLOOD PRESSURE: 59 MMHG | SYSTOLIC BLOOD PRESSURE: 101 MMHG

## 2023-12-07 LAB
BILIRUB UR QL STRIP: NEGATIVE
CLARITY UR: CLEAR
COLOR UR: YELLOW
GLUCOSE UR QL STRIP: NEGATIVE
HGB UR QL STRIP: NEGATIVE
KETONES UR QL STRIP: NEGATIVE
LEUKOCYTE ESTERASE UR QL STRIP: NEGATIVE
NITRITE UR QL STRIP: NEGATIVE
PH UR STRIP: 6 [PH] (ref 5–8)
PROT UR QL STRIP: NEGATIVE
SP GR UR STRIP: 1.01 (ref 1–1.03)
URN SPEC COLLECT METH UR: NORMAL
UROBILINOGEN UR STRIP-ACNC: NEGATIVE EU/DL

## 2023-12-07 PROCEDURE — 96361 HYDRATE IV INFUSION ADD-ON: CPT

## 2023-12-07 PROCEDURE — 96375 TX/PRO/DX INJ NEW DRUG ADDON: CPT

## 2023-12-07 PROCEDURE — 81003 URINALYSIS AUTO W/O SCOPE: CPT | Performed by: EMERGENCY MEDICINE

## 2023-12-07 PROCEDURE — 63600175 PHARM REV CODE 636 W HCPCS: Performed by: EMERGENCY MEDICINE

## 2023-12-07 PROCEDURE — 96374 THER/PROPH/DIAG INJ IV PUSH: CPT

## 2023-12-07 RX ORDER — METOCLOPRAMIDE HYDROCHLORIDE 5 MG/ML
10 INJECTION INTRAMUSCULAR; INTRAVENOUS
Status: COMPLETED | OUTPATIENT
Start: 2023-12-07 | End: 2023-12-07

## 2023-12-07 RX ORDER — VANCOMYCIN HYDROCHLORIDE 125 MG/1
125 CAPSULE ORAL EVERY 6 HOURS
Qty: 40 CAPSULE | Refills: 0 | Status: SHIPPED | OUTPATIENT
Start: 2023-12-07 | End: 2023-12-17

## 2023-12-07 RX ORDER — MEPERIDINE HYDROCHLORIDE 25 MG/ML
25 INJECTION INTRAMUSCULAR; INTRAVENOUS; SUBCUTANEOUS
Status: COMPLETED | OUTPATIENT
Start: 2023-12-07 | End: 2023-12-07

## 2023-12-07 RX ADMIN — MEPERIDINE HYDROCHLORIDE 25 MG: 25 INJECTION INTRAMUSCULAR; INTRAVENOUS; SUBCUTANEOUS at 01:12

## 2023-12-07 RX ADMIN — METOCLOPRAMIDE 10 MG: 5 INJECTION, SOLUTION INTRAMUSCULAR; INTRAVENOUS at 01:12

## 2023-12-07 NOTE — FIRST PROVIDER EVALUATION
Medical screening examination initiated.  I have conducted a focused provider triage encounter, findings are as follows:    Brief history of present illness:  Patient complains of dehydration after numerous episodes of diarrhea.    There were no vitals filed for this visit.    Pertinent physical exam:  NAD    Brief workup plan:  fluids    Preliminary workup initiated; this workup will be continued and followed by the physician or advanced practice provider that is assigned to the patient when roomed.

## 2023-12-07 NOTE — ED PROVIDER NOTES
"SCRIBE #1 NOTE: I, Opal Choe, am scribing for, and in the presence of, Carmine Whalen MD. I have scribed the entire note.       History     Chief Complaint   Patient presents with    Diarrhea     Diarrhea x 4 days after finishing 2 round course of cefdinir for sinus infection. Denies NV. Reports extensive GI hx     Review of patient's allergies indicates:   Allergen Reactions    Erythromycin Other (See Comments)     Stomach cramps    Morphine Nausea And Vomiting     "Projectile vomiting"         History of Present Illness     HPI    12/7/2023, 12:24 AM  History obtained from the patient      History of Present Illness: Genny Calixto is a 58 y.o. female patient who presents to the Emergency Department for evaluation of diarrhea which onset 4 days PTA. The patient reports that she was diagnosed with sinusitis and bronchitis 1 month ago. She reports that she has completed 2 courses of Cefdinir and 1 course of Flagyl. The patient was Rx probiotics along with these antibiotics. Symptoms are episodic and moderate in severity. No mitigating or exacerbating factors reported. Associated sxs include fever, N/V, generalized abdominal pain, and hematochezia. Patient denies any dysuria, CP, SOB, weakness, numbness, and all other sxs at this time. No prior Tx reported. No further complaints or concerns at this time.       Arrival mode: Personal vehicle    PCP: Tay Duff MD        Past Medical History:  Past Medical History:   Diagnosis Date    Encounter for blood transfusion     KENN (generalized anxiety disorder)     Takes 5-10 mg of valium qd prn anxiety (prescriptions for both on file, one from [facility] & other from )    Insomnia     Takes 5-10 mg of valium qhs prn insomnia (prescriptions for both on file, one from [facility] & other from )    Migraine headache     Nephrolithiasis 08/03/2019    Left ureteral stone -> UTI c/b pyelonephritis and urosepsis w/ hypokalemia -> transfered to [facility] urology service from [facility] " hosp BR after CT abn dx, s/p 2 stents to allow infx to drain, IV Vanc/Rocephin, fever free since yesterday    Reflex sympathetic dystrophy     UTI (urinary tract infection)     Vertigo        Past Surgical History:  Past Surgical History:   Procedure Laterality Date    BLADDER SURGERY      CHOLECYSTECTOMY      COLONOSCOPY N/A 11/10/2021    Procedure: COLONOSCOPY;  Surgeon: Eryn Rothman MD;  Location: [facility];  Service: Endoscopy;  Laterality: N/A;    CYSTOSCOPY W/ RETROGRADES Bilateral 8/24/2023    Procedure: CYSTOSCOPY, WITH RETROGRADE PYELOGRAM;  Surgeon: Annita Gamino MD;  Location: [facility] OR;  Service: Urology;  Laterality: Bilateral;    CYSTOSCOPY WITH BIOPSY OF BLADDER N/A 8/24/2023    Procedure: CYSTOSCOPY, WITH BLADDER BIOPSY, WITH FULGURATION IF INDICATED;  Surgeon: Annita Gamino MD;  Location: [facility];  Service: Urology;  Laterality: N/A;    CYSTOSCOPY WITH URETEROSCOPY, RETROGRADE PYELOGRAPHY, AND INSERTION OF STENT Right 9/30/2021    Procedure: CYSTOSCOPY, WITH RETROGRADE PYELOGRAM AND URETERAL STENT INSERTION;  Surgeon: Annita Gamino MD;  Location: [facility] OR;  Service: Urology;  Laterality: Right;    DIAGNOSTIC LAPAROSCOPY N/A 11/11/2020    Procedure: LAPAROSCOPY, DIAGNOSTIC;  Surgeon: Tee Segura MD;  Location: [facility];  Service: General;  Laterality: N/A;  CONVERTED TO OPEN    DIAGNOSTIC LAPAROSCOPY N/A 7/25/2022    Procedure: LAPAROSCOPY, DIAGNOSTIC;  Surgeon: Jo Manzano DO;  Location: [facility] OR;  Service: General;  Laterality: N/A;  convert to open at 0739    ESOPHAGOGASTRODUODENOSCOPY N/A 11/10/2021    Procedure: EGD (ESOPHAGOGASTRODUODENOSCOPY);  Surgeon: Eryn Rothman MD;  Location: [facility];  Service: Endoscopy;  Laterality: N/A;    facet injections  02/14/2017    L3, L4, L5, S1 Done by Dr. Lara    HYSTERECTOMY      INJECTION OF ANESTHETIC AGENT INTO TISSUE PLANE DEFINED BY TRANSVERSUS ABDOMINIS MUSCLE N/A 11/11/2020    Procedure: BLOCK, TRANSVERSUS  ABDOMINIS PLANE;  Surgeon: Tee Segura MD;  Location: [facility] OR;  Service: General;  Laterality: N/A;    INJECTION OF ANESTHETIC AGENT INTO TISSUE PLANE DEFINED BY TRANSVERSUS ABDOMINIS MUSCLE N/A 7/25/2022    Procedure: BLOCK, TRANSVERSUS ABDOMINIS PLANE;  Surgeon: Jo Manzano DO;  Location: [facility] OR;  Service: General;  Laterality: N/A;    KNEE SURGERY      LAPAROSCOPIC LYSIS OF ADHESIONS N/A 11/11/2020    Procedure: LYSIS, ADHESIONS, LAPAROSCOPIC;  Surgeon: Tee Segura MD;  Location: [facility] OR;  Service: General;  Laterality: N/A;  CONVERTED TO OPEN    LAPAROTOMY N/A 11/20/2020    Procedure: LAPAROTOMY;  Surgeon: Tee Segura MD;  Location: [facility] OR;  Service: General;  Laterality: N/A;    LASER LITHOTRIPSY Left 2/8/2021    Procedure: LITHOTRIPSY, USING LASER;  Surgeon: Irving Kidd MD;  Location: [facility] OR;  Service: Urology;  Laterality: Left;    LASER LITHOTRIPSY Right 10/13/2021    Procedure: LITHOTRIPSY, USING LASER;  Surgeon: Annita Gamino MD;  Location: [facility] OR;  Service: Urology;  Laterality: Right;    LYSIS OF ADHESIONS N/A 11/11/2020    Procedure: LYSIS, ADHESIONS;  Surgeon: Tee Segura MD;  Location: [facility] OR;  Service: General;  Laterality: N/A;    LYSIS OF ADHESIONS N/A 11/20/2020    Procedure: LYSIS, ADHESIONS;  Surgeon: Tee Segura MD;  Location: [facility] OR;  Service: General;  Laterality: N/A;    LYSIS OF ADHESIONS N/A 7/25/2022    Procedure: LYSIS, ADHESIONS;  Surgeon: Jo Manzano DO;  Location: [facility] OR;  Service: General;  Laterality: N/A;    SURGICAL REMOVAL OF ILEUM WITH CECUM  7/25/2022    Procedure: EXCISION, CECUM WITH ILEUM;  Surgeon: Jo Manzano DO;  Location: [facility] OR;  Service: General;;    TONSILLECTOMY      URETEROSCOPY Left 2/8/2021    Procedure: URETEROSCOPY;  Surgeon: Irving Kidd MD;  Location: [facility] OR;  Service: Urology;  Laterality: Left;  stent placement    URETEROSCOPY Right 10/13/2021    Procedure: URETEROSCOPY;   Surgeon: Annita Gamino MD;  Location: [facility] OR;  Service: Urology;  Laterality: Right;         Family History:  Family History   Problem Relation Age of Onset    Stroke Mother     Hypertension Mother     COPD Father     Drug abuse Brother        Social History:  Social History     Tobacco Use    Smoking status: Never    Smokeless tobacco: Never   Substance and Sexual Activity    Alcohol use: Yes     Comment: rarely    Drug use: No    Sexual activity: Never        Review of Systems     Review of Systems   Constitutional:  Positive for fever.   HENT:  Negative for sore throat.    Respiratory:  Negative for shortness of breath.    Cardiovascular:  Negative for chest pain.   Gastrointestinal:  Positive for abdominal pain (generalized), blood in stool, nausea and vomiting.   Genitourinary:  Negative for dysuria.   Musculoskeletal:  Negative for back pain.   Skin:  Negative for rash.   Neurological:  Negative for weakness and numbness.   Hematological:  Does not bruise/bleed easily.   All other systems reviewed and are negative.     Physical Exam     Initial Vitals [12/06/23 2057]   BP Pulse Resp Temp SpO2   (!) 152/91 107 16 99.4 °F (37.4 °C) 96 %      MAP       --          Physical Exam  Nursing Notes and Vital Signs Reviewed.  Constitutional: Patient is in no acute distress. Well-developed and well-nourished.  Head: Atraumatic. Normocephalic.  Eyes: PERRL. EOM intact. Conjunctivae are not pale. No scleral icterus.  ENT: Mucous membranes are moist. Oropharynx is clear and symmetric.    Neck: Supple. Full ROM. No lymphadenopathy.  Cardiovascular: Regular rate. Regular rhythm. No murmurs, rubs, or gallops. Distal pulses are 2+ and symmetric.  Pulmonary/Chest: No respiratory distress. Clear to auscultation bilaterally. No wheezing or rales.  Abdominal: Soft and non-distended.  There is generalized tenderness.  No rebound, guarding, or rigidity. Good bowel sounds.  Genitourinary: No CVA tenderness  Musculoskeletal:  Moves all extremities. No obvious deformities. No edema. No calf tenderness.  Skin: Warm and dry.  Neurological:  Alert, awake, and appropriate.  Normal speech.  No acute focal neurological deficits are appreciated.  Psychiatric: Normal affect. Good eye contact. Appropriate in content.     ED Course   Procedures  ED Vital Signs:  Vitals:    12/06/23 2057 12/07/23 0032 12/07/23 0103 12/07/23 0150   BP: (!) 152/91 112/66 108/68 135/82   Pulse: 107 85 85 88   Resp: 16      Temp: 99.4 °F (37.4 °C)      TempSrc: Oral      SpO2: 96% 96% 99% 98%   Weight: 85.3 kg (188 lb 0.8 oz)       12/07/23 0152 12/07/23 0302 12/07/23 0332 12/07/23 0402   BP:  (!) 104/56 (!) 107/56 (!) 112/59   Pulse:  83 85 83   Resp: 18 17  16   Temp:       TempSrc:       SpO2:  95% (!) 94% (!) 93%   Weight:        12/07/23 0432 12/07/23 0502   BP: (!) 104/59 (!) 104/58   Pulse: 80 82   Resp:     Temp:     TempSrc:     SpO2: 95% (!) 94%   Weight:         Abnormal Lab Results:  Labs Reviewed   CBC W/ AUTO DIFFERENTIAL - Abnormal; Notable for the following components:       Result Value     (*)     MCH 33.6 (*)     Immature Grans (Abs) 0.05 (*)     All other components within normal limits   CLOSTRIDIUM DIFFICILE   CULTURE, STOOL   COMPREHENSIVE METABOLIC PANEL   LIPASE   URINALYSIS, REFLEX TO URINE CULTURE    Narrative:     Specimen Source->Urine   H. PYLORI ANTIGEN, STOOL   PANCREATIC ELASTASE, FECAL   FECAL FAT, QUALITATIVE   OCCULT BLOOD X 1, STOOL   WBC, STOOL   ROTAVIRUS ANTIGEN, STOOL   CALPROTECTIN, STOOL   GIARDIA/CRYPTOSPORIDIUM (EIA)   STOOL EXAM-OVA,CYSTS,PARASITES        All Lab Results:  Results for orders placed or performed during the hospital encounter of 12/06/23   CBC auto differential   Result Value Ref Range    WBC 10.82 3.90 - 12.70 K/uL    RBC 4.52 4.00 - 5.40 M/uL    Hemoglobin 15.2 12.0 - 16.0 g/dL    Hematocrit 45.3 37.0 - 48.5 %     (H) 82 - 98 fL    MCH 33.6 (H) 27.0 - 31.0 pg    MCHC 33.6 32.0 - 36.0 g/dL     RDW 13.8 11.5 - 14.5 %    Platelets 408 150 - 450 K/uL    MPV 10.9 9.2 - 12.9 fL    Immature Granulocytes 0.5 0.0 - 0.5 %    Gran # (ANC) 7.6 1.8 - 7.7 K/uL    Immature Grans (Abs) 0.05 (H) 0.00 - 0.04 K/uL    Lymph # 2.3 1.0 - 4.8 K/uL    Mono # 0.6 0.3 - 1.0 K/uL    Eos # 0.2 0.0 - 0.5 K/uL    Baso # 0.07 0.00 - 0.20 K/uL    nRBC 0 0 /100 WBC    Gran % 70.2 38.0 - 73.0 %    Lymph % 21.6 18.0 - 48.0 %    Mono % 5.5 4.0 - 15.0 %    Eosinophil % 1.6 0.0 - 8.0 %    Basophil % 0.6 0.0 - 1.9 %    Differential Method Automated    Comprehensive metabolic panel   Result Value Ref Range    Sodium 142 136 - 145 mmol/L    Potassium 3.8 3.5 - 5.1 mmol/L    Chloride 107 95 - 110 mmol/L    CO2 24 23 - 29 mmol/L    Glucose 95 70 - 110 mg/dL    BUN 8 6 - 20 mg/dL    Creatinine 0.7 0.5 - 1.4 mg/dL    Calcium 9.4 8.7 - 10.5 mg/dL    Total Protein 7.9 6.0 - 8.4 g/dL    Albumin 4.2 3.5 - 5.2 g/dL    Total Bilirubin 0.9 0.1 - 1.0 mg/dL    Alkaline Phosphatase 106 55 - 135 U/L    AST 30 10 - 40 U/L    ALT 30 10 - 44 U/L    eGFR >60 >60 mL/min/1.73 m^2    Anion Gap 11 8 - 16 mmol/L   Lipase   Result Value Ref Range    Lipase 23 4 - 60 U/L   Urinalysis, Reflex to Urine Culture Urine, Clean Catch    Specimen: Urine   Result Value Ref Range    Specimen UA Urine, Clean Catch     Color, UA Yellow Yellow, Straw, Hilda    Appearance, UA Clear Clear    pH, UA 6.0 5.0 - 8.0    Specific Gravity, UA 1.010 1.005 - 1.030    Protein, UA Negative Negative    Glucose, UA Negative Negative    Ketones, UA Negative Negative    Bilirubin (UA) Negative Negative    Occult Blood UA Negative Negative    Nitrite, UA Negative Negative    Urobilinogen, UA Negative <2.0 EU/dL    Leukocytes, UA Negative Negative         Imaging Results:  Imaging Results              CT Abdomen Pelvis  Without Contrast (In process)                    Type of Interpretation: Outside Written Report.  Radiology Procedure Done: Abdomen/Pelvis CT without Contrast.  Interpretation:    Evaluation of the solid and hollow viscera limited due to lack of oral and IV contrast. Within this constraint:   No renal calculi. No hydronephrosis.   No free air or free fluid.   Fluid-filled, non-distended large bowel, correlate for underlying diarrheal disease. Bowel sutures in the RLQ without evidence of obstruction.   Radiologist: Scott Garcia MD                   The Emergency Provider reviewed the vital signs and test results, which are outlined above.     ED Discussion     5:33 AM: Reassessed pt at this time.  Discussed with pt all pertinent ED information and results. Discussed pt dx and plan of tx. Gave pt all f/u and return to the ED instructions. All questions and concerns were addressed at this time. Pt expresses understanding of information and instructions, and is comfortable with plan to discharge. Pt is stable for discharge.    I discussed with patient and/or family/caretaker that evaluation in the ED does not suggest any emergent or life threatening medical conditions requiring immediate intervention beyond what was provided in the ED, and I believe patient is safe for discharge.  Regardless, an unremarkable evaluation in the ED does not preclude the development or presence of a serious of life threatening condition. As such, patient was instructed to return immediately for any worsening or change in current symptoms.         Medical Decision Making  Amount and/or Complexity of Data Reviewed  Labs: ordered. Decision-making details documented in ED Course.  Radiology: ordered. Decision-making details documented in ED Course.    Risk  Prescription drug management.                ED Medication(s):  Medications   sodium chloride 0.9% bolus 1,000 mL 1,000 mL (0 mLs Intravenous Stopped 12/7/23 0415)   meperidine (PF) injection 25 mg (25 mg Intravenous Given 12/7/23 0152)   metoclopramide HCl injection 10 mg (10 mg Intravenous Given 12/7/23 0151)       New Prescriptions    VANCOMYCIN (VANCOCIN)  125 MG CAPSULE    Take 1 capsule (125 mg total) by mouth every 6 (six) hours. for 10 days        Follow-up Information       Tay Duff MD In 4 days.    Specialty: Internal Medicine  Contact information:  [address]  [address]  [facility] [id number] [phone]               [facility]    Specialty: Emergency Medicine  Why: As needed, If symptoms worsen  Contact information:  [address]  [facility] [address] [phone]                               Scribe Attestation:   Scribe #1: I performed the above scribed service and the documentation accurately describes the services I performed. I attest to the accuracy of the note.     Attending:   Physician Attestation Statement for Scribe #1: I, Carmine Whalen MD, personally performed the services described in this documentation, as scribed by Opal Choe, in my presence, and it is both accurate and complete.           Clinical Impression       ICD-10-CM ICD-9-CM   1. Diarrhea of presumed infectious origin  R19.7 009.3       Disposition:   Disposition: Discharged  Condition: Stable         Carmine Whalen MD  12/07/23 0546

## 2023-12-16 DIAGNOSIS — F41.9 ANXIETY: ICD-10-CM

## 2023-12-18 RX ORDER — DIAZEPAM 5 MG/1
TABLET ORAL
Qty: 20 TABLET | Refills: 3 | Status: SHIPPED | OUTPATIENT
Start: 2023-12-18 | End: 2024-01-17 | Stop reason: SDUPTHER

## 2023-12-18 RX ORDER — DOXEPIN HYDROCHLORIDE 100 MG/1
100 CAPSULE ORAL NIGHTLY
Qty: 90 CAPSULE | Refills: 1 | Status: SHIPPED | OUTPATIENT
Start: 2023-12-18 | End: 2024-02-23 | Stop reason: SDUPTHER

## 2023-12-18 NOTE — TELEPHONE ENCOUNTER
Requested Prescriptions     Pending Prescriptions Disp Refills    doxepin (SINEQUAN) 100 MG capsule 90 capsule 1     Sig: Take 1 capsule (100 mg total) by mouth every evening.

## 2023-12-18 NOTE — TELEPHONE ENCOUNTER
Requested Prescriptions     Pending Prescriptions Disp Refills    diazePAM (VALIUM) 5 MG tablet 20 tablet 1     Sig: Take one with onset of migraine     LV 09/29/2023   NV none scheduled.   LF 09/07/2023

## 2024-01-17 DIAGNOSIS — F41.9 ANXIETY: ICD-10-CM

## 2024-01-17 DIAGNOSIS — R11.0 NAUSEA: ICD-10-CM

## 2024-01-17 RX ORDER — ONDANSETRON 4 MG/1
4 TABLET, FILM COATED ORAL 2 TIMES DAILY
Qty: 30 TABLET | Refills: 5 | Status: SHIPPED | OUTPATIENT
Start: 2024-01-17

## 2024-01-17 RX ORDER — DIAZEPAM 5 MG/1
TABLET ORAL
Qty: 20 TABLET | Refills: 2 | Status: SHIPPED | OUTPATIENT
Start: 2024-01-17 | End: 2024-04-25 | Stop reason: SDUPTHER

## 2024-01-29 RX ORDER — LINACLOTIDE 290 UG/1
290 CAPSULE, GELATIN COATED ORAL
Qty: 90 CAPSULE | Refills: 0 | Status: SHIPPED | OUTPATIENT
Start: 2024-01-29 | End: 2024-04-23

## 2024-01-29 NOTE — TELEPHONE ENCOUNTER
Refills requested on Linzess 290 mcg QAM; not originally prescribed by ; last office visit on 06/22/23

## 2024-01-31 RX ORDER — SUMATRIPTAN SUCCINATE 100 MG/1
TABLET ORAL
Qty: 10 TABLET | Refills: 11 | Status: SHIPPED | OUTPATIENT
Start: 2024-01-31 | End: 2024-04-25 | Stop reason: SDUPTHER

## 2024-01-31 NOTE — TELEPHONE ENCOUNTER
Requested Prescriptions     Pending Prescriptions Disp Refills    sumatriptan (IMITREX) 100 MG tablet 10 tablet 11     Sig: Take one with onset of migraine, may repeat once two hours later if migraine persists       LV 09/29/2023  NV none scheduled.   LF 07/24/2023

## 2024-02-15 ENCOUNTER — HOSPITAL ENCOUNTER (INPATIENT)
Facility: HOSPITAL | Age: 59
LOS: 2 days | Discharge: HOME | DRG: 389 | End: 2024-02-17
Attending: FAMILY MEDICINE | Admitting: HOSPITALIST
Payer: MEDICARE

## 2024-02-15 DIAGNOSIS — R07.9 CHEST PAIN: ICD-10-CM

## 2024-02-15 DIAGNOSIS — K56.609 SBO (SMALL BOWEL OBSTRUCTION): Primary | ICD-10-CM

## 2024-02-15 DIAGNOSIS — R10.9 ABDOMINAL PAIN: ICD-10-CM

## 2024-02-15 DIAGNOSIS — R33.9 INCOMPLETE EMPTYING OF BLADDER: ICD-10-CM

## 2024-02-15 LAB
ALBUMIN SERPL BCP-MCNC: 3.4 G/DL (ref 3.5–5.2)
ALP SERPL-CCNC: 73 U/L (ref 55–135)
ALT SERPL W/O P-5'-P-CCNC: 38 U/L (ref 10–44)
ANION GAP SERPL CALC-SCNC: 14 MMOL/L (ref 8–16)
AST SERPL-CCNC: 35 U/L (ref 10–40)
BACTERIA #/AREA URNS HPF: ABNORMAL /HPF
BASOPHILS # BLD AUTO: 0.02 K/UL (ref 0–0.2)
BASOPHILS NFR BLD: 0.3 % (ref 0–1.9)
BILIRUB SERPL-MCNC: 1.4 MG/DL (ref 0.1–1)
BILIRUB UR QL STRIP: NEGATIVE
BNP SERPL-MCNC: 12 PG/ML (ref 0–99)
BUN SERPL-MCNC: 18 MG/DL (ref 6–20)
CALCIUM SERPL-MCNC: 8.2 MG/DL (ref 8.7–10.5)
CHLORIDE SERPL-SCNC: 109 MMOL/L (ref 95–110)
CLARITY UR: CLEAR
CO2 SERPL-SCNC: 15 MMOL/L (ref 23–29)
COLOR UR: YELLOW
CREAT SERPL-MCNC: 0.8 MG/DL (ref 0.5–1.4)
DIFFERENTIAL METHOD BLD: ABNORMAL
EOSINOPHIL # BLD AUTO: 0 K/UL (ref 0–0.5)
EOSINOPHIL NFR BLD: 0.2 % (ref 0–8)
ERYTHROCYTE [DISTWIDTH] IN BLOOD BY AUTOMATED COUNT: 12.7 % (ref 11.5–14.5)
EST. GFR  (NO RACE VARIABLE): >60 ML/MIN/1.73 M^2
GLUCOSE SERPL-MCNC: 120 MG/DL (ref 70–110)
GLUCOSE UR QL STRIP: NEGATIVE
HCT VFR BLD AUTO: 43.7 % (ref 37–48.5)
HGB BLD-MCNC: 15 G/DL (ref 12–16)
HGB UR QL STRIP: NEGATIVE
HYALINE CASTS #/AREA URNS LPF: 9 /LPF
IMM GRANULOCYTES # BLD AUTO: 0.04 K/UL (ref 0–0.04)
IMM GRANULOCYTES NFR BLD AUTO: 0.7 % (ref 0–0.5)
INFLUENZA A, MOLECULAR: NEGATIVE
INFLUENZA B, MOLECULAR: NEGATIVE
KETONES UR QL STRIP: NEGATIVE
LACTATE SERPL-SCNC: 1.2 MMOL/L (ref 0.5–2.2)
LACTATE SERPL-SCNC: 2.4 MMOL/L (ref 0.5–2.2)
LEUKOCYTE ESTERASE UR QL STRIP: NEGATIVE
LYMPHOCYTES # BLD AUTO: 0.3 K/UL (ref 1–4.8)
LYMPHOCYTES NFR BLD: 4.5 % (ref 18–48)
MCH RBC QN AUTO: 33.6 PG (ref 27–31)
MCHC RBC AUTO-ENTMCNC: 34.3 G/DL (ref 32–36)
MCV RBC AUTO: 98 FL (ref 82–98)
MICROSCOPIC COMMENT: ABNORMAL
MONOCYTES # BLD AUTO: 0.2 K/UL (ref 0.3–1)
MONOCYTES NFR BLD: 3.8 % (ref 4–15)
NEUTROPHILS # BLD AUTO: 5.4 K/UL (ref 1.8–7.7)
NEUTROPHILS NFR BLD: 90.5 % (ref 38–73)
NITRITE UR QL STRIP: NEGATIVE
NRBC BLD-RTO: 0 /100 WBC
PH UR STRIP: 6 [PH] (ref 5–8)
PLATELET # BLD AUTO: 208 K/UL (ref 150–450)
PMV BLD AUTO: 10.5 FL (ref 9.2–12.9)
POTASSIUM SERPL-SCNC: 3.5 MMOL/L (ref 3.5–5.1)
PROCALCITONIN SERPL IA-MCNC: 2.35 NG/ML
PROT SERPL-MCNC: 6.3 G/DL (ref 6–8.4)
PROT UR QL STRIP: ABNORMAL
RBC # BLD AUTO: 4.47 M/UL (ref 4–5.4)
RBC #/AREA URNS HPF: 3 /HPF (ref 0–4)
SARS-COV-2 RDRP RESP QL NAA+PROBE: NEGATIVE
SODIUM SERPL-SCNC: 138 MMOL/L (ref 136–145)
SP GR UR STRIP: >1.03 (ref 1–1.03)
SPECIMEN SOURCE: NORMAL
SQUAMOUS #/AREA URNS HPF: 2 /HPF
TROPONIN I SERPL DL<=0.01 NG/ML-MCNC: <0.006 NG/ML (ref 0–0.03)
URN SPEC COLLECT METH UR: ABNORMAL
UROBILINOGEN UR STRIP-ACNC: NEGATIVE EU/DL
WBC # BLD AUTO: 5.99 K/UL (ref 3.9–12.7)
WBC #/AREA URNS HPF: 3 /HPF (ref 0–5)

## 2024-02-15 PROCEDURE — 25000003 PHARM REV CODE 250: Performed by: NURSE PRACTITIONER

## 2024-02-15 PROCEDURE — 51798 US URINE CAPACITY MEASURE: CPT

## 2024-02-15 PROCEDURE — 80053 COMPREHEN METABOLIC PANEL: CPT | Performed by: FAMILY MEDICINE

## 2024-02-15 PROCEDURE — 84145 PROCALCITONIN (PCT): CPT | Performed by: FAMILY MEDICINE

## 2024-02-15 PROCEDURE — 63600175 PHARM REV CODE 636 W HCPCS: Performed by: NURSE PRACTITIONER

## 2024-02-15 PROCEDURE — 87502 INFLUENZA DNA AMP PROBE: CPT

## 2024-02-15 PROCEDURE — 21400001 HC TELEMETRY ROOM

## 2024-02-15 PROCEDURE — 93005 ELECTROCARDIOGRAM TRACING: CPT

## 2024-02-15 PROCEDURE — 81000 URINALYSIS NONAUTO W/SCOPE: CPT | Performed by: FAMILY MEDICINE

## 2024-02-15 PROCEDURE — 99223 1ST HOSP IP/OBS HIGH 75: CPT | Mod: ,,, | Performed by: STUDENT IN AN ORGANIZED HEALTH CARE EDUCATION/TRAINING PROGRAM

## 2024-02-15 PROCEDURE — 63600175 PHARM REV CODE 636 W HCPCS: Performed by: FAMILY MEDICINE

## 2024-02-15 PROCEDURE — 83605 ASSAY OF LACTIC ACID: CPT | Mod: 91 | Performed by: FAMILY MEDICINE

## 2024-02-15 PROCEDURE — 93010 ELECTROCARDIOGRAM REPORT: CPT | Mod: ,,, | Performed by: INTERNAL MEDICINE

## 2024-02-15 PROCEDURE — 84484 ASSAY OF TROPONIN QUANT: CPT | Performed by: FAMILY MEDICINE

## 2024-02-15 PROCEDURE — 96365 THER/PROPH/DIAG IV INF INIT: CPT

## 2024-02-15 PROCEDURE — 87040 BLOOD CULTURE FOR BACTERIA: CPT | Performed by: FAMILY MEDICINE

## 2024-02-15 PROCEDURE — U0002 COVID-19 LAB TEST NON-CDC: HCPCS | Performed by: FAMILY MEDICINE

## 2024-02-15 PROCEDURE — 87502 INFLUENZA DNA AMP PROBE: CPT | Performed by: FAMILY MEDICINE

## 2024-02-15 PROCEDURE — 83880 ASSAY OF NATRIURETIC PEPTIDE: CPT | Performed by: FAMILY MEDICINE

## 2024-02-15 PROCEDURE — 96361 HYDRATE IV INFUSION ADD-ON: CPT

## 2024-02-15 PROCEDURE — 85025 COMPLETE CBC W/AUTO DIFF WBC: CPT | Performed by: FAMILY MEDICINE

## 2024-02-15 PROCEDURE — 63600175 PHARM REV CODE 636 W HCPCS: Performed by: INTERNAL MEDICINE

## 2024-02-15 PROCEDURE — 25000003 PHARM REV CODE 250: Performed by: FAMILY MEDICINE

## 2024-02-15 PROCEDURE — 99285 EMERGENCY DEPT VISIT HI MDM: CPT | Mod: 25

## 2024-02-15 RX ORDER — NALOXONE HCL 0.4 MG/ML
0.02 VIAL (ML) INJECTION
Status: DISCONTINUED | OUTPATIENT
Start: 2024-02-15 | End: 2024-02-17 | Stop reason: HOSPADM

## 2024-02-15 RX ORDER — ACETAMINOPHEN 650 MG/1
650 SUPPOSITORY RECTAL EVERY 4 HOURS PRN
Status: DISCONTINUED | OUTPATIENT
Start: 2024-02-15 | End: 2024-02-17 | Stop reason: HOSPADM

## 2024-02-15 RX ORDER — IBUPROFEN 200 MG
24 TABLET ORAL
Status: DISCONTINUED | OUTPATIENT
Start: 2024-02-15 | End: 2024-02-17 | Stop reason: HOSPADM

## 2024-02-15 RX ORDER — ACETAMINOPHEN 500 MG
1000 TABLET ORAL
Status: COMPLETED | OUTPATIENT
Start: 2024-02-15 | End: 2024-02-15

## 2024-02-15 RX ORDER — LORAZEPAM 2 MG/ML
1 INJECTION INTRAMUSCULAR ONCE
Status: COMPLETED | OUTPATIENT
Start: 2024-02-15 | End: 2024-02-15

## 2024-02-15 RX ORDER — ONDANSETRON HYDROCHLORIDE 2 MG/ML
4 INJECTION, SOLUTION INTRAVENOUS EVERY 8 HOURS PRN
Status: DISCONTINUED | OUTPATIENT
Start: 2024-02-15 | End: 2024-02-17 | Stop reason: HOSPADM

## 2024-02-15 RX ORDER — SODIUM CHLORIDE 0.9 % (FLUSH) 0.9 %
3 SYRINGE (ML) INJECTION EVERY 12 HOURS PRN
Status: DISCONTINUED | OUTPATIENT
Start: 2024-02-15 | End: 2024-02-17 | Stop reason: HOSPADM

## 2024-02-15 RX ORDER — SODIUM CHLORIDE AND POTASSIUM CHLORIDE 150; 450 MG/100ML; MG/100ML
INJECTION, SOLUTION INTRAVENOUS CONTINUOUS
Status: DISCONTINUED | OUTPATIENT
Start: 2024-02-15 | End: 2024-02-17 | Stop reason: HOSPADM

## 2024-02-15 RX ORDER — CYCLOBENZAPRINE HCL 10 MG
10 TABLET ORAL NIGHTLY
COMMUNITY
Start: 2024-02-08

## 2024-02-15 RX ORDER — SODIUM CHLORIDE 9 MG/ML
INJECTION, SOLUTION INTRAVENOUS CONTINUOUS
Status: DISCONTINUED | OUTPATIENT
Start: 2024-02-15 | End: 2024-02-15

## 2024-02-15 RX ORDER — IBUPROFEN 200 MG
16 TABLET ORAL
Status: DISCONTINUED | OUTPATIENT
Start: 2024-02-15 | End: 2024-02-16

## 2024-02-15 RX ORDER — GLUCAGON 1 MG
1 KIT INJECTION
Status: DISCONTINUED | OUTPATIENT
Start: 2024-02-15 | End: 2024-02-17 | Stop reason: HOSPADM

## 2024-02-15 RX ORDER — TRAMADOL HYDROCHLORIDE 50 MG/1
50 TABLET ORAL EVERY 6 HOURS
COMMUNITY
Start: 2024-02-08

## 2024-02-15 RX ADMIN — LORAZEPAM 1 MG: 2 INJECTION INTRAMUSCULAR; INTRAVENOUS at 02:02

## 2024-02-15 RX ADMIN — SODIUM CHLORIDE 1848 ML: 9 INJECTION, SOLUTION INTRAVENOUS at 12:02

## 2024-02-15 RX ADMIN — PIPERACILLIN SODIUM AND TAZOBACTAM SODIUM 4.5 G: 4; .5 INJECTION, POWDER, FOR SOLUTION INTRAVENOUS at 01:02

## 2024-02-15 RX ADMIN — ACETAMINOPHEN 1000 MG: 500 TABLET ORAL at 12:02

## 2024-02-15 RX ADMIN — PIPERACILLIN SODIUM AND TAZOBACTAM SODIUM 4.5 G: 4; .5 INJECTION, POWDER, FOR SOLUTION INTRAVENOUS at 06:02

## 2024-02-15 RX ADMIN — SODIUM CHLORIDE: 9 INJECTION, SOLUTION INTRAVENOUS at 12:02

## 2024-02-15 RX ADMIN — POTASSIUM CHLORIDE AND SODIUM CHLORIDE: 450; 150 INJECTION, SOLUTION INTRAVENOUS at 06:02

## 2024-02-15 RX ADMIN — BENZOCAINE, BUTAMBEN, AND TETRACAINE HYDROCHLORIDE 1 SPRAY: .028; .004; .004 AEROSOL, SPRAY TOPICAL at 02:02

## 2024-02-15 RX ADMIN — SODIUM CHLORIDE: 9 INJECTION, SOLUTION INTRAVENOUS at 02:02

## 2024-02-15 RX ADMIN — PIPERACILLIN SODIUM AND TAZOBACTAM SODIUM 4.5 G: 4; .5 INJECTION, POWDER, FOR SOLUTION INTRAVENOUS at 11:02

## 2024-02-15 RX ADMIN — ACETAMINOPHEN 650 MG: 650 SUPPOSITORY RECTAL at 09:02

## 2024-02-15 NOTE — PROGRESS NOTES
[facility] Medicine  Progress Note    Patient Name: Genny Calixto  MRN: [id number]  Patient Class: IP- Inpatient   Admission Date: 2/15/2024  Length of Stay: 0 days  Attending Physician: Aracely Michel MD  Primary Care Provider: Tay Duff MD        Subjective:     Principal Problem:SBO (small bowel obstruction)        HPI:  Genny Calixto is a 58 y.o. female with a PMH  has a past medical history of Encounter for blood transfusion, KENN (generalized anxiety disorder), Insomnia, Migraine headache, Nephrolithiasis (08/03/2019), Reflex sympathetic dystrophy, Small bowel obstruction, UTI (urinary tract infection), and Vertigo.  Presented to the ER for evaluation of diffuse abdominal pain with associated nausea and vomiting as well as subjective fever.  Patient reports his symptoms started last night.  Reports symptoms feel similar to previous small-bowel obstruction.  Denies any suspicious food intake or recent illnesses.  Denies constipation or diarrhea.  Patient received 2.5 mg of droperidol and 500 mL of normal saline prior to arrival via EMS.  Patient denies any blood in his vomitus.  Denies any chest pain, palpitations, shortness breath, headache, lightheadedness, dizziness, fever, chills, sweats, dysuria, hematuria, melena, hematochezia or any other symptoms at this time.    ER lab work mostly unremarkable with the exception to procalcitonin level of 2.35 and lactic acid of 2.4.  Flu/COVID negative.  CT abdomen and pelvis concerning for small bowel obstruction.  General surgery consulted with recommendation of NG tube placement, NPO, and evaluation in a.m..  Patient in agreement with treatment plan.  Hospital Medicine consulted for admission to hospital for small-bowel obstruction.  Patient will be admitted under inpatient status.    PCP: Tay Duff    Overview/Hospital Course:  Patient seen in consultation by surgery.  She was felt to have a recurrent  small-bowel obstruction.  NG-tube was placed, patient was placed NPO with bowel rest, IV resuscitation was begun.  And planned for small bowel follow-through in a.m..  Patient had significant postvoid residual urine and Rosales catheter was ordered.    Interval History:  Patient seen and examined while in emergency department.  Continues to complain of abdominal pain.  She continues to have some diarrhea.  NG-tube in place with minimal output.  Patient is unable to void.  Discussed extensively need for Rosales and patient agrees.  Nurse notified.    Review of Systems   Constitutional:  Positive for fatigue. Negative for fever.   Respiratory:  Negative for cough and shortness of breath.    Cardiovascular:  Negative for chest pain and leg swelling.   Gastrointestinal:  Positive for abdominal distention, abdominal pain, diarrhea and nausea. Negative for vomiting.   Genitourinary:  Positive for difficulty urinating.   Neurological:  Positive for weakness.   All other systems reviewed and are negative.    Objective:     Vital Signs (Most Recent):  Temp: 97.6 °F (36.4 °C) (02/15/24 1636)  Pulse: 96 (02/15/24 1636)  Resp: 18 (02/15/24 1636)  BP: 125/72 (02/15/24 1636)  SpO2: 97 % (02/15/24 1636) Vital Signs (24h Range):  Temp:  [97.6 °F (36.4 °C)-101.7 °F (38.7 °C)] 97.6 °F (36.4 °C)  Pulse:  [] 96  Resp:  [15-22] 18  SpO2:  [92 %-97 %] 97 %  BP: ()/(54-74) 125/72     Weight: 86.1 kg (189 lb 13.1 oz)  Body mass index is 29.73 kg/m².    Intake/Output Summary (Last 24 hours) at 2/15/2024 1639  Last data filed at 2/15/2024 1256  Gross per 24 hour   Intake 100 ml   Output 200 ml   Net -100 ml         Physical Exam  Vitals reviewed.   Constitutional:       Appearance: She is ill-appearing.   HENT:      Head: Normocephalic and atraumatic.      Nose:      Comments: NG-tube in place to low intermittent suction     Mouth/Throat:      Mouth: Mucous membranes are moist.      Pharynx: Oropharynx is clear.   Eyes:       "Extraocular Movements: Extraocular movements intact.      Conjunctiva/sclera: Conjunctivae normal.   Cardiovascular:      Rate and Rhythm: Normal rate and regular rhythm.      Pulses: Normal pulses.      Heart sounds: Normal heart sounds.   Pulmonary:      Effort: Pulmonary effort is normal.      Breath sounds: Normal breath sounds.   Abdominal:      General: Bowel sounds are normal. There is distension.      Palpations: Abdomen is soft.      Tenderness: There is abdominal tenderness. There is guarding. There is no rebound.   Musculoskeletal:         General: Normal range of motion.      Cervical back: Normal range of motion and neck supple.   Skin:     General: Skin is warm and dry.   Neurological:      General: No focal deficit present.      Mental Status: She is alert and oriented to person, place, and time. Mental status is at baseline.   Psychiatric:         Mood and Affect: Mood normal.         Behavior: Behavior normal.         Thought Content: Thought content normal.             Significant Labs: All pertinent labs within the past 24 hours have been reviewed.  Blood Culture: No results for input(s): "LABBLOO" in the last 48 hours.  CBC:   Recent Labs   Lab 02/15/24  0035   WBC 5.99   HGB 15.0   HCT 43.7        CMP:   Recent Labs   Lab 02/15/24  0035      K 3.5      CO2 15*   *   BUN 18   CREATININE 0.8   CALCIUM 8.2*   PROT 6.3   ALBUMIN 3.4*   BILITOT 1.4*   ALKPHOS 73   AST 35   ALT 38   ANIONGAP 14         Significant Imaging: I have reviewed all pertinent imaging results/findings within the past 24 hours.    Assessment/Plan:      * SBO (small bowel obstruction)  Plan:  -NPO  -NG tube to IWS  -IVFs  -antiemetics prn  -analgesics prn  -GenSurg consult  -empiric IV Zosyn after blood cultures obtained      Incomplete emptying of bladder  -recommended Rosales catheter patient agreeable      Anxiety  Plan:  -anxiolytics prn        VTE Risk Mitigation (From admission, onward)           " Ordered     IP VTE LOW RISK PATIENT  Once         02/15/24 0211     Place sequential compression device  Until discontinued         02/15/24 0211                    Discharge Planning   MANAS:      Code Status: Full Code   Is the patient medically ready for discharge?:     Reason for patient still in hospital (select all that apply): Patient trending condition, Laboratory test, Treatment, Imaging, Consult recommendations, and Pending disposition                     Aracely Garrido MD  Department of Hospital Medicine   [facility])

## 2024-02-15 NOTE — ASSESSMENT & PLAN NOTE
Plan:  -NPO  -NG tube to IWS  -IVFs  -antiemetics prn  -analgesics prn  -GenSurg consult  -empiric IV Zosyn after blood cultures obtained

## 2024-02-15 NOTE — ED NOTES
Pt up to BSC w/ standby assistance, tolerated well. Pt reports unable to urinate at this time. Primary nurse notified

## 2024-02-15 NOTE — H&P
[facility] - Emergency Dept.  [facility] Medicine  History & Physical    Patient Name: Genny Calixto  MRN: [id number]  Patient Class: IP- Inpatient  Admission Date: 2/15/2024  Attending Physician: Cisco Ribeiro MD   Primary Care Provider: Tay Duff MD         Patient information was obtained from patient, past medical records, and ER records.     Subjective:     Principal Problem:SBO (small bowel obstruction)    Chief Complaint:   Chief Complaint   Patient presents with    Diarrhea     Nausea and diarrhea starting 24 hours ago. Pt thought it was food poisoning but  didn't have symptoms. Tachycardic at 140s upon EMS arrival. Received 2.5 mg droperidol and 500 mL NS PTA.        HPI: Genny Calixto is a 58 y.o. female with a PMH  has a past medical history of Encounter for blood transfusion, KENN (generalized anxiety disorder), Insomnia, Migraine headache, Nephrolithiasis (08/03/2019), Reflex sympathetic dystrophy, Small bowel obstruction, UTI (urinary tract infection), and Vertigo.  Presented to the ER for evaluation of diffuse abdominal pain with associated nausea and vomiting as well as subjective fever.  Patient reports his symptoms started last night.  Reports symptoms feel similar to previous small-bowel obstruction.  Denies any suspicious food intake or recent illnesses.  Denies constipation or diarrhea.  Patient received 2.5 mg of droperidol and 500 mL of normal saline prior to arrival via EMS.  Patient denies any blood in his vomitus.  Denies any chest pain, palpitations, shortness breath, headache, lightheadedness, dizziness, fever, chills, sweats, dysuria, hematuria, melena, hematochezia or any other symptoms at this time.    ER lab work mostly unremarkable with the exception to procalcitonin level of 2.35 and lactic acid of 2.4.  Flu/COVID negative.  CT abdomen and pelvis concerning for small bowel obstruction.  General surgery consulted with recommendation of NG tube  placement, NPO, and evaluation in a.m..  Patient in agreement with treatment plan.  Hospital Medicine consulted for admission to hospital for small-bowel obstruction.  Patient will be admitted under inpatient status.    PCP: Tay Duff    Past Medical History:   Diagnosis Date    Encounter for blood transfusion     KENN (generalized anxiety disorder)     Takes 5-10 mg of valium qd prn anxiety (prescriptions for both on file, one from [facility] & other from )    Insomnia     Takes 5-10 mg of valium qhs prn insomnia (prescriptions for both on file, one from [facility] & other from )    Migraine headache     Nephrolithiasis 08/03/2019    Left ureteral stone -> UTI c/b pyelonephritis and urosepsis w/ hypokalemia -> transfered to [facility] urology service from [facility] after CT abn dx, s/p 2 stents to allow infx to drain, IV Vanc/Rocephin, fever free since yesterday    Reflex sympathetic dystrophy     Small bowel obstruction     UTI (urinary tract infection)     Vertigo        Past Surgical History:   Procedure Laterality Date    BLADDER SURGERY      CHOLECYSTECTOMY      COLONOSCOPY N/A 11/10/2021    Procedure: COLONOSCOPY;  Surgeon: Eryn Rothman MD;  Location: [facility];  Service: Endoscopy;  Laterality: N/A;    CYSTOSCOPY W/ RETROGRADES Bilateral 8/24/2023    Procedure: CYSTOSCOPY, WITH RETROGRADE PYELOGRAM;  Surgeon: Annita Gamino MD;  Location: [facility];  Service: Urology;  Laterality: Bilateral;    CYSTOSCOPY WITH BIOPSY OF BLADDER N/A 8/24/2023    Procedure: CYSTOSCOPY, WITH BLADDER BIOPSY, WITH FULGURATION IF INDICATED;  Surgeon: Annita Gamino MD;  Location: [facility];  Service: Urology;  Laterality: N/A;    CYSTOSCOPY WITH URETEROSCOPY, RETROGRADE PYELOGRAPHY, AND INSERTION OF STENT Right 9/30/2021    Procedure: CYSTOSCOPY, WITH RETROGRADE PYELOGRAM AND URETERAL STENT INSERTION;  Surgeon: Annita Gamino MD;  Location: [facility];  Service: Urology;  Laterality: Right;    DIAGNOSTIC LAPAROSCOPY N/A  11/11/2020    Procedure: LAPAROSCOPY, DIAGNOSTIC;  Surgeon: Tee Segura MD;  Location: [facility];  Service: General;  Laterality: N/A;  CONVERTED TO OPEN    DIAGNOSTIC LAPAROSCOPY N/A 7/25/2022    Procedure: LAPAROSCOPY, DIAGNOSTIC;  Surgeon: Jo Manzano DO;  Location: [facility];  Service: General;  Laterality: N/A;  convert to open at 0739    ESOPHAGOGASTRODUODENOSCOPY N/A 11/10/2021    Procedure: EGD (ESOPHAGOGASTRODUODENOSCOPY);  Surgeon: Eryn Rothman MD;  Location: [facility];  Service: Endoscopy;  Laterality: N/A;    facet injections  02/14/2017    L3, L4, L5, S1 Done by Dr. Lara    HYSTERECTOMY      INJECTION OF ANESTHETIC AGENT INTO TISSUE PLANE DEFINED BY TRANSVERSUS ABDOMINIS MUSCLE N/A 11/11/2020    Procedure: BLOCK, TRANSVERSUS ABDOMINIS PLANE;  Surgeon: Tee Segura MD;  Location: [facility];  Service: General;  Laterality: N/A;    INJECTION OF ANESTHETIC AGENT INTO TISSUE PLANE DEFINED BY TRANSVERSUS ABDOMINIS MUSCLE N/A 7/25/2022    Procedure: BLOCK, TRANSVERSUS ABDOMINIS PLANE;  Surgeon: Jo Manzano DO;  Location: [facility];  Service: General;  Laterality: N/A;    KNEE SURGERY      LAPAROSCOPIC LYSIS OF ADHESIONS N/A 11/11/2020    Procedure: LYSIS, ADHESIONS, LAPAROSCOPIC;  Surgeon: Tee Segura MD;  Location: [facility];  Service: General;  Laterality: N/A;  CONVERTED TO OPEN    LAPAROTOMY N/A 11/20/2020    Procedure: LAPAROTOMY;  Surgeon: Tee Segura MD;  Location: [facility];  Service: General;  Laterality: N/A;    LASER LITHOTRIPSY Left 2/8/2021    Procedure: LITHOTRIPSY, USING LASER;  Surgeon: Irvign Kidd MD;  Location: [facility];  Service: Urology;  Laterality: Left;    LASER LITHOTRIPSY Right 10/13/2021    Procedure: LITHOTRIPSY, USING LASER;  Surgeon: Annita Gamino MD;  Location: [facility];  Service: Urology;  Laterality: Right;    LYSIS OF ADHESIONS N/A 11/11/2020    Procedure: LYSIS, ADHESIONS;  Surgeon: Tee Segura MD;  Location: [facility];   "Service: General;  Laterality: N/A;    LYSIS OF ADHESIONS N/A 11/20/2020    Procedure: LYSIS, ADHESIONS;  Surgeon: Tee Segura MD;  Location: [facility];  Service: General;  Laterality: N/A;    LYSIS OF ADHESIONS N/A 7/25/2022    Procedure: LYSIS, ADHESIONS;  Surgeon: Jo Manzano DO;  Location: [facility] OR;  Service: General;  Laterality: N/A;    SURGICAL REMOVAL OF ILEUM WITH CECUM  7/25/2022    Procedure: EXCISION, CECUM WITH ILEUM;  Surgeon: Jo Manzano DO;  Location: [facility] OR;  Service: General;;    TONSILLECTOMY      URETEROSCOPY Left 2/8/2021    Procedure: URETEROSCOPY;  Surgeon: Irving Kidd MD;  Location: [facility] OR;  Service: Urology;  Laterality: Left;  stent placement    URETEROSCOPY Right 10/13/2021    Procedure: URETEROSCOPY;  Surgeon: Annita Gamino MD;  Location: [facility] OR;  Service: Urology;  Laterality: Right;       Review of patient's allergies indicates:   Allergen Reactions    Erythromycin Other (See Comments)     Stomach cramps    Morphine Nausea And Vomiting     "Projectile vomiting"       No current facility-administered medications on file prior to encounter.     Current Outpatient Medications on File Prior to Encounter   Medication Sig    diazePAM (VALIUM) 10 MG Tab TAKE 1 TABLET BY MOUTH EVERY DAY AS NEEDED Strength: 10 mg    diazePAM (VALIUM) 5 MG tablet Take one with onset of migraine    diphenhydrAMINE (BENADRYL) 25 mg capsule Take 25 mg by mouth 2 (two) times daily as needed for Itching.    docusate sodium (COLACE) 100 MG capsule Take 1 capsule (100 mg total) by mouth 2 (two) times daily.    doxepin (SINEQUAN) 10 MG capsule TAKE 1 CAPSULE BY MOUTH EVERY EVENING    doxepin (SINEQUAN) 100 MG capsule Take 1 capsule (100 mg total) by mouth every evening.    doxepin (SINEQUAN) 25 MG capsule TAKE 1 CAPSULE BY MOUTH IN THE EVENING    fluconazole (DIFLUCAN) 150 MG Tab Take 1 tablet (150 mg total) by mouth 1 (one) time if needed (yeast vaginitis x 1 dose; may repeat in 3 " or 7 days).    HYDROcodone-acetaminophen (NORCO) 5-325 mg per tablet Take 1 tablet by mouth every 8 (eight) hours as needed for Pain.    lactobacillus combination no.4 (PROBIOTIC) 3 billion cell Cap Take 1 capsule by mouth once daily.    LACTOBACILLUS COMBO NO.6 (PROBIOTIC COMPLEX ORAL) Take 1 capsule by mouth once daily at 6am.    levocetirizine (XYZAL) 5 MG tablet Take 1 tablet (5 mg total) by mouth every evening.    LINZESS 290 mcg Cap capsule TAKE 1 CAPSULE BY MOUTH DAILY BEFORE BREAKFAST    multivitamin capsule Take 1 capsule by mouth once daily.    naproxen (NAPROSYN) 500 MG tablet Take 500 mg by mouth 2 (two) times daily.    nitrofurantoin, macrocrystal-monohydrate, (MACROBID) 100 MG capsule Take 1 capsule (100 mg total) by mouth 2 (two) times daily.    ondansetron (ZOFRAN) 4 MG tablet Take 1 tablet by mouth twice daily    ondansetron (ZOFRAN) 4 MG tablet Take 1 tablet (4 mg total) by mouth 2 (two) times daily.    pantoprazole (PROTONIX) 40 MG tablet Take 1 tablet (40 mg total) by mouth once daily.    promethazine-dextromethorphan (PROMETHAZINE-DM) 6.25-15 mg/5 mL Syrp Take 5 mLs by mouth every 6 (six) hours as needed (cough/congestion).    spironolactone (ALDACTONE) 50 MG tablet Take 1 tablet (50 mg total) by mouth 2 (two) times daily.    sumatriptan (IMITREX) 100 MG tablet Take one with onset of migraine, may repeat once two hours later if migraine persists     Family History       Problem Relation (Age of Onset)    COPD Father    Drug abuse Brother    Hypertension Mother    Stroke Mother          Tobacco Use    Smoking status: Never    Smokeless tobacco: Never   Substance and Sexual Activity    Alcohol use: Yes     Comment: rarely    Drug use: No    Sexual activity: Never     Review of Systems   Gastrointestinal:  Positive for abdominal distention, abdominal pain, nausea and vomiting. Negative for diarrhea.   All other systems reviewed and are negative.    Objective:     Vital Signs (Most Recent):  Temp:  98.6 °F (37 °C) (02/15/24 0502)  Pulse: 99 (02/15/24 0502)  Resp: 18 (02/15/24 0502)  BP: (!) 101/57 (02/15/24 0502)  SpO2: (!) 94 % (02/15/24 0502) Vital Signs (24h Range):  Temp:  [98.6 °F (37 °C)-101.7 °F (38.7 °C)] 98.6 °F (37 °C)  Pulse:  [] 99  Resp:  [17-22] 18  SpO2:  [94 %-95 %] 94 %  BP: ()/(54-74) 101/57     Weight: 80.6 kg (177 lb 11.1 oz)  Body mass index is 27.83 kg/m².     Physical Exam  Vitals and nursing note reviewed.   Constitutional:       General: She is awake. She is not in acute distress.     Appearance: Normal appearance. She is well-developed and well-groomed. She is not ill-appearing, toxic-appearing or diaphoretic.   HENT:      Head: Normocephalic and atraumatic.      Nose:      Comments: NT tube in place.  Eyes:      Extraocular Movements: Extraocular movements intact.      Conjunctiva/sclera: Conjunctivae normal.   Cardiovascular:      Rate and Rhythm: Normal rate and regular rhythm.      Heart sounds: Normal heart sounds. No murmur heard.  Pulmonary:      Effort: Pulmonary effort is normal.      Breath sounds: Normal breath sounds.   Abdominal:      General: Bowel sounds are normal.      Palpations: Abdomen is soft.      Tenderness: There is generalized abdominal tenderness.   Musculoskeletal:      Cervical back: Normal range of motion and neck supple.      Comments: 5/5 strength throughout   Skin:     General: Skin is warm and dry.      Capillary Refill: Capillary refill takes less than 2 seconds.   Neurological:      General: No focal deficit present.      Mental Status: She is alert and oriented to person, place, and time. Mental status is at baseline.      GCS: GCS eye subscore is 4. GCS verbal subscore is 5. GCS motor subscore is 6.      Cranial Nerves: Cranial nerves 2-12 are intact.      Sensory: Sensation is intact.      Motor: Motor function is intact.   Psychiatric:         Mood and Affect: Mood normal.         Speech: Speech normal.         Behavior: Behavior  normal. Behavior is cooperative.        LABS:  Recent Results (from the past 24 hour(s))   Influenza A & B by Molecular    Collection Time: 02/15/24 12:23 AM    Specimen: Nasopharyngeal Swab   Result Value Ref Range    Influenza A, Molecular Negative Negative    Influenza B, Molecular Negative Negative    Flu A & B Source Nasal swab    CBC auto differential    Collection Time: 02/15/24 12:35 AM   Result Value Ref Range    WBC 5.99 3.90 - 12.70 K/uL    RBC 4.47 4.00 - 5.40 M/uL    Hemoglobin 15.0 12.0 - 16.0 g/dL    Hematocrit 43.7 37.0 - 48.5 %    MCV 98 82 - 98 fL    MCH 33.6 (H) 27.0 - 31.0 pg    MCHC 34.3 32.0 - 36.0 g/dL    RDW 12.7 11.5 - 14.5 %    Platelets 208 150 - 450 K/uL    MPV 10.5 9.2 - 12.9 fL    Immature Granulocytes 0.7 (H) 0.0 - 0.5 %    Gran # (ANC) 5.4 1.8 - 7.7 K/uL    Immature Grans (Abs) 0.04 0.00 - 0.04 K/uL    Lymph # 0.3 (L) 1.0 - 4.8 K/uL    Mono # 0.2 (L) 0.3 - 1.0 K/uL    Eos # 0.0 0.0 - 0.5 K/uL    Baso # 0.02 0.00 - 0.20 K/uL    nRBC 0 0 /100 WBC    Gran % 90.5 (H) 38.0 - 73.0 %    Lymph % 4.5 (L) 18.0 - 48.0 %    Mono % 3.8 (L) 4.0 - 15.0 %    Eosinophil % 0.2 0.0 - 8.0 %    Basophil % 0.3 0.0 - 1.9 %    Differential Method Automated    Comprehensive metabolic panel    Collection Time: 02/15/24 12:35 AM   Result Value Ref Range    Sodium 138 136 - 145 mmol/L    Potassium 3.5 3.5 - 5.1 mmol/L    Chloride 109 95 - 110 mmol/L    CO2 15 (L) 23 - 29 mmol/L    Glucose 120 (H) 70 - 110 mg/dL    BUN 18 6 - 20 mg/dL    Creatinine 0.8 0.5 - 1.4 mg/dL    Calcium 8.2 (L) 8.7 - 10.5 mg/dL    Total Protein 6.3 6.0 - 8.4 g/dL    Albumin 3.4 (L) 3.5 - 5.2 g/dL    Total Bilirubin 1.4 (H) 0.1 - 1.0 mg/dL    Alkaline Phosphatase 73 55 - 135 U/L    AST 35 10 - 40 U/L    ALT 38 10 - 44 U/L    eGFR >60 >60 mL/min/1.73 m^2    Anion Gap 14 8 - 16 mmol/L   Lactic acid, plasma #1    Collection Time: 02/15/24 12:35 AM   Result Value Ref Range    Lactate (Lactic Acid) 2.4 (H) 0.5 - 2.2 mmol/L   Brain natriuretic  peptide    Collection Time: 02/15/24 12:35 AM   Result Value Ref Range    BNP 12 0 - 99 pg/mL   Troponin I    Collection Time: 02/15/24 12:35 AM   Result Value Ref Range    Troponin I <0.006 0.000 - 0.026 ng/mL   Procalcitonin    Collection Time: 02/15/24 12:35 AM   Result Value Ref Range    Procalcitonin 2.35 (H) <0.25 ng/mL   COVID-19 Rapid Screening    Collection Time: 02/15/24 12:50 AM   Result Value Ref Range    SARS-CoV-2 RNA, Amplification, Qual Negative Negative   Lactic acid, plasma #2    Collection Time: 02/15/24  5:02 AM   Result Value Ref Range    Lactate (Lactic Acid) 1.2 0.5 - 2.2 mmol/L       RADIOLOGY  X-Ray Chest AP Portable    Result Date: 2/15/2024  EXAMINATION: XR CHEST AP PORTABLE CLINICAL HISTORY: Sepsis; TECHNIQUE: Single frontal view of the chest was performed. COMPARISON: 11/14/2023 FINDINGS: Lungs are clear. No focal consolidation. No pleural effusion. No pneumothorax. Normal heart size.     No acute findings. Electronically signed by: James Alvarado Date:    02/15/2024 Time:    01:23      EKG    MICROBIOLOGY    MDM     Amount and/or Complexity of Data Reviewed  Clinical lab tests: reviewed  Tests in the radiology section of CPT®: reviewed  Tests in the medicine section of CPT®: reviewed  Discussion of test results with the performing providers: yes  Decide to obtain previous medical records or to obtain history from someone other than the patient: yes  Obtain history from someone other than the patient: yes  Review and summarize past medical records: yes  Discuss the patient with other providers: yes  Independent visualization of images, tracings, or specimens: yes        Assessment/Plan:     * SBO (small bowel obstruction)  Plan:  -NPO  -NG tube to IWS  -IVFs  -antiemetics prn  -analgesics prn  -GenSurg consult      Anxiety  Plan:  -anxiolytics prn        VTE Risk Mitigation (From admission, onward)           Ordered     IP VTE LOW RISK PATIENT  Once         02/15/24 0211     Place  sequential compression device  Until discontinued         02/15/24 0211                     //Core Measures   -DVT proph: SCDs, withholding anticoagulation pending surgical intervention   -Code status Full    -Surrogate:spouse    Components of this note were documented using a voice recognition system and are subject to errors not corrected at the time the document was proof read. Please contact the author for any clarifications.          Efrani Ribeiro NP  Department of Hospital Medicine  [facility] [facility] - Emergency Dept.

## 2024-02-15 NOTE — SUBJECTIVE & OBJECTIVE
Interval History:  Patient seen and examined while in emergency department.  Continues to complain of abdominal pain.  She continues to have some diarrhea.  NG-tube in place with minimal output.  Patient is unable to void.  Discussed extensively need for Rosales and patient agrees.  Nurse notified.    Review of Systems   Constitutional:  Positive for fatigue. Negative for fever.   Respiratory:  Negative for cough and shortness of breath.    Cardiovascular:  Negative for chest pain and leg swelling.   Gastrointestinal:  Positive for abdominal distention, abdominal pain, diarrhea and nausea. Negative for vomiting.   Genitourinary:  Positive for difficulty urinating.   Neurological:  Positive for weakness.   All other systems reviewed and are negative.    Objective:     Vital Signs (Most Recent):  Temp: 97.6 °F (36.4 °C) (02/15/24 1636)  Pulse: 96 (02/15/24 1636)  Resp: 18 (02/15/24 1636)  BP: 125/72 (02/15/24 1636)  SpO2: 97 % (02/15/24 1636) Vital Signs (24h Range):  Temp:  [97.6 °F (36.4 °C)-101.7 °F (38.7 °C)] 97.6 °F (36.4 °C)  Pulse:  [] 96  Resp:  [15-22] 18  SpO2:  [92 %-97 %] 97 %  BP: ()/(54-74) 125/72     Weight: 86.1 kg (189 lb 13.1 oz)  Body mass index is 29.73 kg/m².    Intake/Output Summary (Last 24 hours) at 2/15/2024 1639  Last data filed at 2/15/2024 1256  Gross per 24 hour   Intake 100 ml   Output 200 ml   Net -100 ml         Physical Exam  Vitals reviewed.   Constitutional:       Appearance: She is ill-appearing.   HENT:      Head: Normocephalic and atraumatic.      Nose:      Comments: NG-tube in place to low intermittent suction     Mouth/Throat:      Mouth: Mucous membranes are moist.      Pharynx: Oropharynx is clear.   Eyes:      Extraocular Movements: Extraocular movements intact.      Conjunctiva/sclera: Conjunctivae normal.   Cardiovascular:      Rate and Rhythm: Normal rate and regular rhythm.      Pulses: Normal pulses.      Heart sounds: Normal heart sounds.   Pulmonary:       "Effort: Pulmonary effort is normal.      Breath sounds: Normal breath sounds.   Abdominal:      General: Bowel sounds are normal. There is distension.      Palpations: Abdomen is soft.      Tenderness: There is abdominal tenderness. There is guarding. There is no rebound.   Musculoskeletal:         General: Normal range of motion.      Cervical back: Normal range of motion and neck supple.   Skin:     General: Skin is warm and dry.   Neurological:      General: No focal deficit present.      Mental Status: She is alert and oriented to person, place, and time. Mental status is at baseline.   Psychiatric:         Mood and Affect: Mood normal.         Behavior: Behavior normal.         Thought Content: Thought content normal.             Significant Labs: All pertinent labs within the past 24 hours have been reviewed.  Blood Culture: No results for input(s): "LABBLOO" in the last 48 hours.  CBC:   Recent Labs   Lab 02/15/24  0035   WBC 5.99   HGB 15.0   HCT 43.7        CMP:   Recent Labs   Lab 02/15/24  0035      K 3.5      CO2 15*   *   BUN 18   CREATININE 0.8   CALCIUM 8.2*   PROT 6.3   ALBUMIN 3.4*   BILITOT 1.4*   ALKPHOS 73   AST 35   ALT 38   ANIONGAP 14         Significant Imaging: I have reviewed all pertinent imaging results/findings within the past 24 hours.  "

## 2024-02-15 NOTE — HOSPITAL COURSE
Patient seen in consultation by surgery.  She was felt to have a recurrent small-bowel obstruction.  NG-tube was placed, patient was placed NPO with bowel rest, IV resuscitation was begun.  And planned for small bowel follow-through in a.m..  Patient had significant postvoid residual urine and Rosales catheter was ordered.    Patient refused Rosales catheter overnight.  She still has incomplete emptying but much less volume about 100-200 cc.  Had BM with some formed stool today.  Patient was seen by surgery small bowel follow-through obtained with normal transit time to colon.  NG tube removed and patient started on clear liquids.    Patient tolerated clear liquid with more formed bowel movements.  She had some cramping and therefore she remained overnight.  Patient has had no complaints was seen and examined on day of discharge and stable for discharge home.  She has been instructed to continue her home meds to add MiraLax and to follow up with her urologist due to incomplete bladder emptying.

## 2024-02-15 NOTE — NURSING
Bladder scan showed 511 ml. Pt was able to void 350 ml. Post void bladder scan showed 156 ml. Pt refused cooper catheter at this time. Notified Dr. Garrido. Plan of care ongoing.

## 2024-02-15 NOTE — PHARMACY MED REC
"Admission Medication History     The home medication history was taken by Dave Rangel.    You may go to "Admission" then "Reconcile Home Medications" tabs to review and/or act upon these items.     The home medication list has been updated by the Pharmacy department.   Please read ALL comments highlighted in yellow.   Please address this information as you see fit.    Feel free to contact us if you have any questions or require assistance.      The medications listed below were removed from the home medication list. Please reorder if appropriate:  Patient reports no longer taking the following medication(s):  COLACE 100MG  DOXEPIN 10MG  DOXEPIN 25MG  NORCO 5-325MG  XYZAL 5MG  NAPROXEN 500MG  MACROBID 100MG  PROTONIX 40MG    Medications listed below were obtained from: Patient/family and Analytic software- [employer]  (Not in a hospital admission)        Dave Rangel  [id number]    Current Outpatient Medications on File Prior to Encounter   Medication Sig Dispense Refill Last Dose    cyclobenzaprine (FLEXERIL) 10 MG tablet Take 10 mg by mouth every evening.   2/14/2024    diazePAM (VALIUM) 10 MG Tab TAKE 1 TABLET BY MOUTH EVERY DAY AS NEEDED Strength: 10 mg 90 tablet 0 2/14/2024    diphenhydrAMINE (BENADRYL) 25 mg capsule Take 25 mg by mouth 2 (two) times daily as needed for Itching.   2/14/2024    doxepin (SINEQUAN) 100 MG capsule Take 1 capsule (100 mg total) by mouth every evening. 90 capsule 1 2/14/2024    lactobacillus combination no.4 (PROBIOTIC) 3 billion cell Cap Take 1 capsule by mouth once daily. 30 capsule 0 2/14/2024    LINZESS 290 mcg Cap capsule TAKE 1 CAPSULE BY MOUTH DAILY BEFORE BREAKFAST 90 capsule 0 2/14/2024    multivitamin capsule Take 1 capsule by mouth once daily.   2/14/2024    ondansetron (ZOFRAN) 4 MG tablet Take 1 tablet (4 mg total) by mouth 2 (two) times daily. 30 tablet 5 2/14/2024    spironolactone (ALDACTONE) 50 MG tablet Take 1 tablet (50 mg total) by mouth 2 (two) times " daily. 180 tablet 1 2/14/2024    sumatriptan (IMITREX) 100 MG tablet Take one with onset of migraine, may repeat once two hours later if migraine persists 10 tablet 11 2/14/2024    traMADoL (ULTRAM) 50 mg tablet Take 50 mg by mouth every 6 (six) hours.   2/14/2024    diazePAM (VALIUM) 5 MG tablet Take one with onset of migraine 20 tablet 2     fluconazole (DIFLUCAN) 150 MG Tab Take 1 tablet (150 mg total) by mouth 1 (one) time if needed (yeast vaginitis x 1 dose; may repeat in 3 or 7 days). 3 tablet 0     LACTOBACILLUS COMBO NO.6 (PROBIOTIC COMPLEX ORAL) Take 1 capsule by mouth once daily at 6am.        .

## 2024-02-15 NOTE — CONSULTS
'[facility] Telemetry [facility])  Colorectal Surgery  Consult Note    Patient Name: Genny Calixto  MRN: [id number]  Admission Date: 2/15/2024  Hospital Length of Stay: 0 days  Attending Physician: Aracely Michel MD  Primary Care Provider: Tay Duff MD    Inpatient consult to General Surgery  Consult performed by: Cathleen Sanon MD  Consult ordered by: Efrain Ribeiro NP        Subjective:     Chief Complaint/Reason for Admission: SBO    History of Present Illness:  Patient is a 50-year-old female who presents with a small-bowel obstruction.  She had a proximally 2 days' worth of diffuse abdominal pain cramping associated with nausea and vomiting that was similar to her previous bowel obstructions.  Bilious emesis.  She has not passing flatus or had a bowel movement in 24 hours.  This is similar to her prior bowel resections.  11/2020 she underwent exploratory laparotomy for internal hernia with small bowel ischemia and underwent a small bowel resection at that time.  This was complicated by early postoperative bowel obstruction with another small-bowel resection with anastomosis.  She did well until 07/2022 when she underwent a diagnostic converted to open exploration for recurrent bowel obstruction requiring additional small bowel resection and ileocecectomy.    Current Facility-Administered Medications   Medication    0.9%  NaCl infusion    acetaminophen suppository 650 mg    dextrose 10% bolus 125 mL 125 mL    dextrose 10% bolus 250 mL 250 mL    glucagon (human recombinant) injection 1 mg    glucose chewable tablet 16 g    glucose chewable tablet 24 g    naloxone 0.4 mg/mL injection 0.02 mg    ondansetron injection 4 mg    piperacillin-tazobactam (ZOSYN) 4.5 g in dextrose 5 % in water (D5W) 100 mL IVPB (MB+)    sodium chloride 0.9% flush 3 mL       Review of patient's allergies indicates:   Allergen Reactions    Erythromycin Other (See Comments)     Stomach cramps    Morphine Nausea And  "Vomiting     "Projectile vomiting"       Past Medical History:   Diagnosis Date    Encounter for blood transfusion     KENN (generalized anxiety disorder)     Takes 5-10 mg of valium qd prn anxiety (prescriptions for both on file, one from [facility] & other from )    Insomnia     Takes 5-10 mg of valium qhs prn insomnia (prescriptions for both on file, one from [facility] & other from )    Migraine headache     Nephrolithiasis 08/03/2019    Left ureteral stone -> UTI c/b pyelonephritis and urosepsis w/ hypokalemia -> transfered to [facility] urology service from [facility] after CT abn dx, s/p 2 stents to allow infx to drain, IV Vanc/Rocephin, fever free since yesterday    Reflex sympathetic dystrophy     Small bowel obstruction     UTI (urinary tract infection)     Vertigo      Past Surgical History:   Procedure Laterality Date    BLADDER SURGERY      CHOLECYSTECTOMY      COLONOSCOPY N/A 11/10/2021    Procedure: COLONOSCOPY;  Surgeon: Eryn Rothman MD;  Location: [facility];  Service: Endoscopy;  Laterality: N/A;    CYSTOSCOPY W/ RETROGRADES Bilateral 8/24/2023    Procedure: CYSTOSCOPY, WITH RETROGRADE PYELOGRAM;  Surgeon: Annita Gamino MD;  Location: [facility];  Service: Urology;  Laterality: Bilateral;    CYSTOSCOPY WITH BIOPSY OF BLADDER N/A 8/24/2023    Procedure: CYSTOSCOPY, WITH BLADDER BIOPSY, WITH FULGURATION IF INDICATED;  Surgeon: Annita Gamino MD;  Location: [facility];  Service: Urology;  Laterality: N/A;    CYSTOSCOPY WITH URETEROSCOPY, RETROGRADE PYELOGRAPHY, AND INSERTION OF STENT Right 9/30/2021    Procedure: CYSTOSCOPY, WITH RETROGRADE PYELOGRAM AND URETERAL STENT INSERTION;  Surgeon: Annita Gamino MD;  Location: [facility];  Service: Urology;  Laterality: Right;    DIAGNOSTIC LAPAROSCOPY N/A 11/11/2020    Procedure: LAPAROSCOPY, DIAGNOSTIC;  Surgeon: Tee Segura MD;  Location: [facility];  Service: General;  Laterality: N/A;  CONVERTED TO OPEN    DIAGNOSTIC LAPAROSCOPY N/A 7/25/2022    " Procedure: LAPAROSCOPY, DIAGNOSTIC;  Surgeon: Jo Manzano DO;  Location: [facility] OR;  Service: General;  Laterality: N/A;  convert to open at 0739    ESOPHAGOGASTRODUODENOSCOPY N/A 11/10/2021    Procedure: EGD (ESOPHAGOGASTRODUODENOSCOPY);  Surgeon: Eryn Rothman MD;  Location: [facility] ENDO;  Service: Endoscopy;  Laterality: N/A;    facet injections  02/14/2017    L3, L4, L5, S1 Done by Dr. Lara    HYSTERECTOMY      INJECTION OF ANESTHETIC AGENT INTO TISSUE PLANE DEFINED BY TRANSVERSUS ABDOMINIS MUSCLE N/A 11/11/2020    Procedure: BLOCK, TRANSVERSUS ABDOMINIS PLANE;  Surgeon: Tee Segura MD;  Location: [facility] OR;  Service: General;  Laterality: N/A;    INJECTION OF ANESTHETIC AGENT INTO TISSUE PLANE DEFINED BY TRANSVERSUS ABDOMINIS MUSCLE N/A 7/25/2022    Procedure: BLOCK, TRANSVERSUS ABDOMINIS PLANE;  Surgeon: Jo Manzano DO;  Location: [facility] OR;  Service: General;  Laterality: N/A;    KNEE SURGERY      LAPAROSCOPIC LYSIS OF ADHESIONS N/A 11/11/2020    Procedure: LYSIS, ADHESIONS, LAPAROSCOPIC;  Surgeon: Tee Segura MD;  Location: [facility] OR;  Service: General;  Laterality: N/A;  CONVERTED TO OPEN    LAPAROTOMY N/A 11/20/2020    Procedure: LAPAROTOMY;  Surgeon: Tee Segura MD;  Location: [facility] OR;  Service: General;  Laterality: N/A;    LASER LITHOTRIPSY Left 2/8/2021    Procedure: LITHOTRIPSY, USING LASER;  Surgeon: Irving Kidd MD;  Location: [facility] OR;  Service: Urology;  Laterality: Left;    LASER LITHOTRIPSY Right 10/13/2021    Procedure: LITHOTRIPSY, USING LASER;  Surgeon: Annita Gamino MD;  Location: [facility] OR;  Service: Urology;  Laterality: Right;    LYSIS OF ADHESIONS N/A 11/11/2020    Procedure: LYSIS, ADHESIONS;  Surgeon: Tee Segura MD;  Location: [facility] OR;  Service: General;  Laterality: N/A;    LYSIS OF ADHESIONS N/A 11/20/2020    Procedure: LYSIS, ADHESIONS;  Surgeon: Tee Segura MD;  Location: [facility] OR;  Service: General;  Laterality: N/A;     LYSIS OF ADHESIONS N/A 7/25/2022    Procedure: LYSIS, ADHESIONS;  Surgeon: Jo Manzano DO;  Location: [facility] OR;  Service: General;  Laterality: N/A;    SURGICAL REMOVAL OF ILEUM WITH CECUM  7/25/2022    Procedure: EXCISION, CECUM WITH ILEUM;  Surgeon: Jo Manzano DO;  Location: [facility] OR;  Service: General;;    TONSILLECTOMY      URETEROSCOPY Left 2/8/2021    Procedure: URETEROSCOPY;  Surgeon: Irving Kidd MD;  Location: [facility] OR;  Service: Urology;  Laterality: Left;  stent placement    URETEROSCOPY Right 10/13/2021    Procedure: URETEROSCOPY;  Surgeon: Annita Gamino MD;  Location: [facility] OR;  Service: Urology;  Laterality: Right;     Family History       Problem Relation (Age of Onset)    COPD Father    Drug abuse Brother    Hypertension Mother    Stroke Mother          Tobacco Use    Smoking status: Never    Smokeless tobacco: Never   Substance and Sexual Activity    Alcohol use: Yes     Comment: rarely    Drug use: No    Sexual activity: Never       Objective:     Vital Signs (Most Recent):  Temp: 98.8 °F (37.1 °C) (02/15/24 1420)  Pulse: 98 (02/15/24 1420)  Resp: 18 (02/15/24 1420)  BP: 127/63 (02/15/24 1420)  SpO2: 97 % (02/15/24 1420) Vital Signs (24h Range):  Temp:  [98.6 °F (37 °C)-101.7 °F (38.7 °C)] 98.8 °F (37.1 °C)  Pulse:  [] 98  Resp:  [15-22] 18  SpO2:  [92 %-97 %] 97 %  BP: ()/(54-74) 127/63     Weight: 86.1 kg (189 lb 13.1 oz)  Body mass index is 29.73 kg/m².    Physical Exam  Constitutional:       Appearance: She is well-developed.   HENT:      Head: Normocephalic and atraumatic.   Eyes:      Conjunctiva/sclera: Conjunctivae normal.      Pupils: Pupils are equal, round, and reactive to light.   Neck:      Thyroid: No thyromegaly.   Cardiovascular:      Rate and Rhythm: Normal rate and regular rhythm.   Pulmonary:      Effort: Pulmonary effort is normal. No respiratory distress.   Abdominal:      General: There is distension.      Palpations: Abdomen is soft.       Comments: Soft, distended, appropriately TTP, no rebound, guarding or peritonitis   Musculoskeletal:         General: No tenderness. Normal range of motion.      Cervical back: Normal range of motion.   Skin:     General: Skin is warm and dry.      Capillary Refill: Capillary refill takes less than 2 seconds.   Neurological:      General: No focal deficit present.      Mental Status: She is alert and oriented to person, place, and time.       Significant Labs:  CBC (Last 3 Results):   Recent Labs   Lab 02/15/24  0035   WBC 5.99   RBC 4.47   HGB 15.0   HCT 43.7      MCV 98   MCH 33.6*   MCHC 34.3     CMP (Last 3 Results):   Recent Labs   Lab 02/15/24  0035   *   CALCIUM 8.2*   ALBUMIN 3.4*   PROT 6.3      K 3.5   CO2 15*      BUN 18   CREATININE 0.8   ALKPHOS 73   ALT 38   AST 35   BILITOT 1.4*       Significant Diagnostics:  CT: I have reviewed all pertinent results/findings within the past 24 hours:   Stomach is unremarkable.  Proximal small bowel is decompressed.  Progressive increasing dilated small bowel loops are seen within the mid and right lower quadrant to a surgical anastomosis within the right lower quadrant concerning for partial distal small bowel obstruction.  Small bowel loops measure greater than 4 mm.  Suspect partial right hemicolectomy.  Diverticulosis within the descending and sigmoid colon    Assessment/Plan:     Active Diagnoses:    Diagnosis Date Noted POA    PRINCIPAL PROBLEM:  SBO (small bowel obstruction) [K56.609] 05/31/2022 Yes    Anxiety [F41.9] 02/24/2017 Yes      Problems Resolved During this Admission:       Patient with recurrent small-bowel obstruction.  Will attempt nonoperative management.    - NG tube and NPO with strict bowel rest.  - IV fluid resuscitation  - electrolyte replacement as needed.  Goal K/Mg/Phos > 4/3/2  - we will get small bowel follow-through tomorrow and assess for transit of contrast.    Thank you for your consult. I will follow-up  with patient. Please contact us if you have any additional questions.    Cathleen Sanon MD  Colorectal Surgery  [facility] Telemetry ([facility])

## 2024-02-15 NOTE — SUBJECTIVE & OBJECTIVE
Past Medical History:   Diagnosis Date    Encounter for blood transfusion     KENN (generalized anxiety disorder)     Takes 5-10 mg of valium qd prn anxiety (prescriptions for both on file, one from [facility] & other from )    Insomnia     Takes 5-10 mg of valium qhs prn insomnia (prescriptions for both on file, one from [facility] & other from )    Migraine headache     Nephrolithiasis 08/03/2019    Left ureteral stone -> UTI c/b pyelonephritis and urosepsis w/ hypokalemia -> transfered to [facility] urology service from [facility] after CT abn dx, s/p 2 stents to allow infx to drain, IV Vanc/Rocephin, fever free since yesterday    Reflex sympathetic dystrophy     Small bowel obstruction     UTI (urinary tract infection)     Vertigo        Past Surgical History:   Procedure Laterality Date    BLADDER SURGERY      CHOLECYSTECTOMY      COLONOSCOPY N/A 11/10/2021    Procedure: COLONOSCOPY;  Surgeon: Eryn Rothman MD;  Location: [facility];  Service: Endoscopy;  Laterality: N/A;    CYSTOSCOPY W/ RETROGRADES Bilateral 8/24/2023    Procedure: CYSTOSCOPY, WITH RETROGRADE PYELOGRAM;  Surgeon: Annita Gamino MD;  Location: [facility];  Service: Urology;  Laterality: Bilateral;    CYSTOSCOPY WITH BIOPSY OF BLADDER N/A 8/24/2023    Procedure: CYSTOSCOPY, WITH BLADDER BIOPSY, WITH FULGURATION IF INDICATED;  Surgeon: Annita Gamino MD;  Location: [facility];  Service: Urology;  Laterality: N/A;    CYSTOSCOPY WITH URETEROSCOPY, RETROGRADE PYELOGRAPHY, AND INSERTION OF STENT Right 9/30/2021    Procedure: CYSTOSCOPY, WITH RETROGRADE PYELOGRAM AND URETERAL STENT INSERTION;  Surgeon: Annita Gamino MD;  Location: [facility];  Service: Urology;  Laterality: Right;    DIAGNOSTIC LAPAROSCOPY N/A 11/11/2020    Procedure: LAPAROSCOPY, DIAGNOSTIC;  Surgeon: Tee Segura MD;  Location: [facility];  Service: General;  Laterality: N/A;  CONVERTED TO OPEN    DIAGNOSTIC LAPAROSCOPY N/A 7/25/2022    Procedure: LAPAROSCOPY, DIAGNOSTIC;   Surgeon: Jo Manzano DO;  Location: [facility] OR;  Service: General;  Laterality: N/A;  convert to open at 0739    ESOPHAGOGASTRODUODENOSCOPY N/A 11/10/2021    Procedure: EGD (ESOPHAGOGASTRODUODENOSCOPY);  Surgeon: Eryn Rothman MD;  Location: [facility] ENDO;  Service: Endoscopy;  Laterality: N/A;    facet injections  02/14/2017    L3, L4, L5, S1 Done by Dr. Lara    HYSTERECTOMY      INJECTION OF ANESTHETIC AGENT INTO TISSUE PLANE DEFINED BY TRANSVERSUS ABDOMINIS MUSCLE N/A 11/11/2020    Procedure: BLOCK, TRANSVERSUS ABDOMINIS PLANE;  Surgeon: Tee Segura MD;  Location: [facility] OR;  Service: General;  Laterality: N/A;    INJECTION OF ANESTHETIC AGENT INTO TISSUE PLANE DEFINED BY TRANSVERSUS ABDOMINIS MUSCLE N/A 7/25/2022    Procedure: BLOCK, TRANSVERSUS ABDOMINIS PLANE;  Surgeon: Jo Manzano DO;  Location: [facility] OR;  Service: General;  Laterality: N/A;    KNEE SURGERY      LAPAROSCOPIC LYSIS OF ADHESIONS N/A 11/11/2020    Procedure: LYSIS, ADHESIONS, LAPAROSCOPIC;  Surgeon: Tee Segura MD;  Location: [facility] OR;  Service: General;  Laterality: N/A;  CONVERTED TO OPEN    LAPAROTOMY N/A 11/20/2020    Procedure: LAPAROTOMY;  Surgeon: Tee Segura MD;  Location: [facility] OR;  Service: General;  Laterality: N/A;    LASER LITHOTRIPSY Left 2/8/2021    Procedure: LITHOTRIPSY, USING LASER;  Surgeon: Irving Kidd MD;  Location: [facility] OR;  Service: Urology;  Laterality: Left;    LASER LITHOTRIPSY Right 10/13/2021    Procedure: LITHOTRIPSY, USING LASER;  Surgeon: Annita Gamino MD;  Location: [facility] OR;  Service: Urology;  Laterality: Right;    LYSIS OF ADHESIONS N/A 11/11/2020    Procedure: LYSIS, ADHESIONS;  Surgeon: Tee Segura MD;  Location: [facility] OR;  Service: General;  Laterality: N/A;    LYSIS OF ADHESIONS N/A 11/20/2020    Procedure: LYSIS, ADHESIONS;  Surgeon: Tee Segura MD;  Location: [facility] OR;  Service: General;  Laterality: N/A;    LYSIS OF ADHESIONS N/A 7/25/2022     "Procedure: LYSIS, ADHESIONS;  Surgeon: Jo Manzano DO;  Location: [facility] OR;  Service: General;  Laterality: N/A;    SURGICAL REMOVAL OF ILEUM WITH CECUM  7/25/2022    Procedure: EXCISION, CECUM WITH ILEUM;  Surgeon: Jo Manzano DO;  Location: [facility] OR;  Service: General;;    TONSILLECTOMY      URETEROSCOPY Left 2/8/2021    Procedure: URETEROSCOPY;  Surgeon: Irving Kidd MD;  Location: [facility] OR;  Service: Urology;  Laterality: Left;  stent placement    URETEROSCOPY Right 10/13/2021    Procedure: URETEROSCOPY;  Surgeon: Annita Gamnio MD;  Location: [facility] OR;  Service: Urology;  Laterality: Right;       Review of patient's allergies indicates:   Allergen Reactions    Erythromycin Other (See Comments)     Stomach cramps    Morphine Nausea And Vomiting     "Projectile vomiting"       No current facility-administered medications on file prior to encounter.     Current Outpatient Medications on File Prior to Encounter   Medication Sig    diazePAM (VALIUM) 10 MG Tab TAKE 1 TABLET BY MOUTH EVERY DAY AS NEEDED Strength: 10 mg    diazePAM (VALIUM) 5 MG tablet Take one with onset of migraine    diphenhydrAMINE (BENADRYL) 25 mg capsule Take 25 mg by mouth 2 (two) times daily as needed for Itching.    docusate sodium (COLACE) 100 MG capsule Take 1 capsule (100 mg total) by mouth 2 (two) times daily.    doxepin (SINEQUAN) 10 MG capsule TAKE 1 CAPSULE BY MOUTH EVERY EVENING    doxepin (SINEQUAN) 100 MG capsule Take 1 capsule (100 mg total) by mouth every evening.    doxepin (SINEQUAN) 25 MG capsule TAKE 1 CAPSULE BY MOUTH IN THE EVENING    fluconazole (DIFLUCAN) 150 MG Tab Take 1 tablet (150 mg total) by mouth 1 (one) time if needed (yeast vaginitis x 1 dose; may repeat in 3 or 7 days).    HYDROcodone-acetaminophen (NORCO) 5-325 mg per tablet Take 1 tablet by mouth every 8 (eight) hours as needed for Pain.    lactobacillus combination no.4 (PROBIOTIC) 3 billion cell Cap Take 1 capsule by mouth once daily. "    LACTOBACILLUS COMBO NO.6 (PROBIOTIC COMPLEX ORAL) Take 1 capsule by mouth once daily at 6am.    levocetirizine (XYZAL) 5 MG tablet Take 1 tablet (5 mg total) by mouth every evening.    LINZESS 290 mcg Cap capsule TAKE 1 CAPSULE BY MOUTH DAILY BEFORE BREAKFAST    multivitamin capsule Take 1 capsule by mouth once daily.    naproxen (NAPROSYN) 500 MG tablet Take 500 mg by mouth 2 (two) times daily.    nitrofurantoin, macrocrystal-monohydrate, (MACROBID) 100 MG capsule Take 1 capsule (100 mg total) by mouth 2 (two) times daily.    ondansetron (ZOFRAN) 4 MG tablet Take 1 tablet by mouth twice daily    ondansetron (ZOFRAN) 4 MG tablet Take 1 tablet (4 mg total) by mouth 2 (two) times daily.    pantoprazole (PROTONIX) 40 MG tablet Take 1 tablet (40 mg total) by mouth once daily.    promethazine-dextromethorphan (PROMETHAZINE-DM) 6.25-15 mg/5 mL Syrp Take 5 mLs by mouth every 6 (six) hours as needed (cough/congestion).    spironolactone (ALDACTONE) 50 MG tablet Take 1 tablet (50 mg total) by mouth 2 (two) times daily.    sumatriptan (IMITREX) 100 MG tablet Take one with onset of migraine, may repeat once two hours later if migraine persists     Family History       Problem Relation (Age of Onset)    COPD Father    Drug abuse Brother    Hypertension Mother    Stroke Mother          Tobacco Use    Smoking status: Never    Smokeless tobacco: Never   Substance and Sexual Activity    Alcohol use: Yes     Comment: rarely    Drug use: No    Sexual activity: Never     Review of Systems   Gastrointestinal:  Positive for abdominal distention, abdominal pain, nausea and vomiting. Negative for diarrhea.   All other systems reviewed and are negative.    Objective:     Vital Signs (Most Recent):  Temp: 98.6 °F (37 °C) (02/15/24 0502)  Pulse: 99 (02/15/24 0502)  Resp: 18 (02/15/24 0502)  BP: (!) 101/57 (02/15/24 0502)  SpO2: (!) 94 % (02/15/24 0502) Vital Signs (24h Range):  Temp:  [98.6 °F (37 °C)-101.7 °F (38.7 °C)] 98.6 °F (37  °C)  Pulse:  [] 99  Resp:  [17-22] 18  SpO2:  [94 %-95 %] 94 %  BP: ()/(54-74) 101/57     Weight: 80.6 kg (177 lb 11.1 oz)  Body mass index is 27.83 kg/m².     Physical Exam  Vitals and nursing note reviewed.   Constitutional:       General: She is awake. She is not in acute distress.     Appearance: Normal appearance. She is well-developed and well-groomed. She is not ill-appearing, toxic-appearing or diaphoretic.   HENT:      Head: Normocephalic and atraumatic.      Nose:      Comments: NT tube in place.  Eyes:      Extraocular Movements: Extraocular movements intact.      Conjunctiva/sclera: Conjunctivae normal.   Cardiovascular:      Rate and Rhythm: Normal rate and regular rhythm.      Heart sounds: Normal heart sounds. No murmur heard.  Pulmonary:      Effort: Pulmonary effort is normal.      Breath sounds: Normal breath sounds.   Abdominal:      General: Bowel sounds are normal.      Palpations: Abdomen is soft.      Tenderness: There is generalized abdominal tenderness.   Musculoskeletal:      Cervical back: Normal range of motion and neck supple.      Comments: 5/5 strength throughout   Skin:     General: Skin is warm and dry.      Capillary Refill: Capillary refill takes less than 2 seconds.   Neurological:      General: No focal deficit present.      Mental Status: She is alert and oriented to person, place, and time. Mental status is at baseline.      GCS: GCS eye subscore is 4. GCS verbal subscore is 5. GCS motor subscore is 6.      Cranial Nerves: Cranial nerves 2-12 are intact.      Sensory: Sensation is intact.      Motor: Motor function is intact.   Psychiatric:         Mood and Affect: Mood normal.         Speech: Speech normal.         Behavior: Behavior normal. Behavior is cooperative.        LABS:  Recent Results (from the past 24 hour(s))   Influenza A & B by Molecular    Collection Time: 02/15/24 12:23 AM    Specimen: Nasopharyngeal Swab   Result Value Ref Range    Influenza A,  Molecular Negative Negative    Influenza B, Molecular Negative Negative    Flu A & B Source Nasal swab    CBC auto differential    Collection Time: 02/15/24 12:35 AM   Result Value Ref Range    WBC 5.99 3.90 - 12.70 K/uL    RBC 4.47 4.00 - 5.40 M/uL    Hemoglobin 15.0 12.0 - 16.0 g/dL    Hematocrit 43.7 37.0 - 48.5 %    MCV 98 82 - 98 fL    MCH 33.6 (H) 27.0 - 31.0 pg    MCHC 34.3 32.0 - 36.0 g/dL    RDW 12.7 11.5 - 14.5 %    Platelets 208 150 - 450 K/uL    MPV 10.5 9.2 - 12.9 fL    Immature Granulocytes 0.7 (H) 0.0 - 0.5 %    Gran # (ANC) 5.4 1.8 - 7.7 K/uL    Immature Grans (Abs) 0.04 0.00 - 0.04 K/uL    Lymph # 0.3 (L) 1.0 - 4.8 K/uL    Mono # 0.2 (L) 0.3 - 1.0 K/uL    Eos # 0.0 0.0 - 0.5 K/uL    Baso # 0.02 0.00 - 0.20 K/uL    nRBC 0 0 /100 WBC    Gran % 90.5 (H) 38.0 - 73.0 %    Lymph % 4.5 (L) 18.0 - 48.0 %    Mono % 3.8 (L) 4.0 - 15.0 %    Eosinophil % 0.2 0.0 - 8.0 %    Basophil % 0.3 0.0 - 1.9 %    Differential Method Automated    Comprehensive metabolic panel    Collection Time: 02/15/24 12:35 AM   Result Value Ref Range    Sodium 138 136 - 145 mmol/L    Potassium 3.5 3.5 - 5.1 mmol/L    Chloride 109 95 - 110 mmol/L    CO2 15 (L) 23 - 29 mmol/L    Glucose 120 (H) 70 - 110 mg/dL    BUN 18 6 - 20 mg/dL    Creatinine 0.8 0.5 - 1.4 mg/dL    Calcium 8.2 (L) 8.7 - 10.5 mg/dL    Total Protein 6.3 6.0 - 8.4 g/dL    Albumin 3.4 (L) 3.5 - 5.2 g/dL    Total Bilirubin 1.4 (H) 0.1 - 1.0 mg/dL    Alkaline Phosphatase 73 55 - 135 U/L    AST 35 10 - 40 U/L    ALT 38 10 - 44 U/L    eGFR >60 >60 mL/min/1.73 m^2    Anion Gap 14 8 - 16 mmol/L   Lactic acid, plasma #1    Collection Time: 02/15/24 12:35 AM   Result Value Ref Range    Lactate (Lactic Acid) 2.4 (H) 0.5 - 2.2 mmol/L   Brain natriuretic peptide    Collection Time: 02/15/24 12:35 AM   Result Value Ref Range    BNP 12 0 - 99 pg/mL   Troponin I    Collection Time: 02/15/24 12:35 AM   Result Value Ref Range    Troponin I <0.006 0.000 - 0.026 ng/mL   Procalcitonin     Collection Time: 02/15/24 12:35 AM   Result Value Ref Range    Procalcitonin 2.35 (H) <0.25 ng/mL   COVID-19 Rapid Screening    Collection Time: 02/15/24 12:50 AM   Result Value Ref Range    SARS-CoV-2 RNA, Amplification, Qual Negative Negative   Lactic acid, plasma #2    Collection Time: 02/15/24  5:02 AM   Result Value Ref Range    Lactate (Lactic Acid) 1.2 0.5 - 2.2 mmol/L       RADIOLOGY  X-Ray Chest AP Portable    Result Date: 2/15/2024  EXAMINATION: XR CHEST AP PORTABLE CLINICAL HISTORY: Sepsis; TECHNIQUE: Single frontal view of the chest was performed. COMPARISON: 11/14/2023 FINDINGS: Lungs are clear. No focal consolidation. No pleural effusion. No pneumothorax. Normal heart size.     No acute findings. Electronically signed by: James Alvarado Date:    02/15/2024 Time:    01:23      EKG    MICROBIOLOGY    MDM     Amount and/or Complexity of Data Reviewed  Clinical lab tests: reviewed  Tests in the radiology section of CPT®: reviewed  Tests in the medicine section of CPT®: reviewed  Discussion of test results with the performing providers: yes  Decide to obtain previous medical records or to obtain history from someone other than the patient: yes  Obtain history from someone other than the patient: yes  Review and summarize past medical records: yes  Discuss the patient with other providers: yes  Independent visualization of images, tracings, or specimens: yes

## 2024-02-15 NOTE — ED PROVIDER NOTES
"SCRIBE #1 NOTE: I, Agustin Echavarriawell, am scribing for, and in the presence of, Nancy Pederson MD. I have scribed the entire note.       History     Chief Complaint   Patient presents with    Diarrhea     Nausea and diarrhea starting 24 hours ago. Pt thought it was food poisoning but  didn't have symptoms. Tachycardic at 140s upon EMS arrival. Received 2.5 mg droperidol and 500 mL NS PTA.     Review of patient's allergies indicates:   Allergen Reactions    Erythromycin Other (See Comments)     Stomach cramps    Morphine Nausea And Vomiting     "Projectile vomiting"         History of Present Illness     HPI    2/15/2024, 1:06 AM  History obtained from the patient      History of Present Illness: Genny Calixto is a 58 y.o. female patient with a PMHx of insomnia who presents to the Emergency Department for evaluation of diarrhea which onset 24 hours ago. Believed to have food poisoning. Symptoms are constant and moderate in severity. No mitigating or exacerbating factors reported. Associated sxs include nausea and fever. Patient denies any chills, HA, SOB, sore throat, weakness, and all other sxs at this time. Prior Tx includes 2.5 droperidol and 500 mL NS PTA. No further complaints or concerns at this time.       Arrival mode: EMS    PCP: Tay Duff MD        Past Medical History:  Past Medical History:   Diagnosis Date    Encounter for blood transfusion     KENN (generalized anxiety disorder)     Takes 5-10 mg of valium qd prn anxiety (prescriptions for both on file, one from [facility] & other from )    Insomnia     Takes 5-10 mg of valium qhs prn insomnia (prescriptions for both on file, one from [facility] & other from )    Migraine headache     Nephrolithiasis 08/03/2019    Left ureteral stone -> UTI c/b pyelonephritis and urosepsis w/ hypokalemia -> transfered to [facility] urology service from [facility] after CT abn dx, s/p 2 stents to allow infx to drain, IV Vanc/Rocephin, fever free since " yesterday    Reflex sympathetic dystrophy     Small bowel obstruction     UTI (urinary tract infection)     Vertigo        Past Surgical History:  Past Surgical History:   Procedure Laterality Date    BLADDER SURGERY      CHOLECYSTECTOMY      COLONOSCOPY N/A 11/10/2021    Procedure: COLONOSCOPY;  Surgeon: Eryn Rothman MD;  Location: [facility];  Service: Endoscopy;  Laterality: N/A;    CYSTOSCOPY W/ RETROGRADES Bilateral 8/24/2023    Procedure: CYSTOSCOPY, WITH RETROGRADE PYELOGRAM;  Surgeon: Annita Gamino MD;  Location: [facility] OR;  Service: Urology;  Laterality: Bilateral;    CYSTOSCOPY WITH BIOPSY OF BLADDER N/A 8/24/2023    Procedure: CYSTOSCOPY, WITH BLADDER BIOPSY, WITH FULGURATION IF INDICATED;  Surgeon: Annita Gamino MD;  Location: [facility];  Service: Urology;  Laterality: N/A;    CYSTOSCOPY WITH URETEROSCOPY, RETROGRADE PYELOGRAPHY, AND INSERTION OF STENT Right 9/30/2021    Procedure: CYSTOSCOPY, WITH RETROGRADE PYELOGRAM AND URETERAL STENT INSERTION;  Surgeon: Annita Gamino MD;  Location: [facility];  Service: Urology;  Laterality: Right;    DIAGNOSTIC LAPAROSCOPY N/A 11/11/2020    Procedure: LAPAROSCOPY, DIAGNOSTIC;  Surgeon: Tee Segura MD;  Location: [facility] OR;  Service: General;  Laterality: N/A;  CONVERTED TO OPEN    DIAGNOSTIC LAPAROSCOPY N/A 7/25/2022    Procedure: LAPAROSCOPY, DIAGNOSTIC;  Surgeon: Jo Manzano DO;  Location: [facility] OR;  Service: General;  Laterality: N/A;  convert to open at 0739    ESOPHAGOGASTRODUODENOSCOPY N/A 11/10/2021    Procedure: EGD (ESOPHAGOGASTRODUODENOSCOPY);  Surgeon: Eryn Rothman MD;  Location: [facility];  Service: Endoscopy;  Laterality: N/A;    facet injections  02/14/2017    L3, L4, L5, S1 Done by Dr. Lara    HYSTERECTOMY      INJECTION OF ANESTHETIC AGENT INTO TISSUE PLANE DEFINED BY TRANSVERSUS ABDOMINIS MUSCLE N/A 11/11/2020    Procedure: BLOCK, TRANSVERSUS ABDOMINIS PLANE;  Surgeon: Tee Segura MD;  Location: [facility];   Service: General;  Laterality: N/A;    INJECTION OF ANESTHETIC AGENT INTO TISSUE PLANE DEFINED BY TRANSVERSUS ABDOMINIS MUSCLE N/A 7/25/2022    Procedure: BLOCK, TRANSVERSUS ABDOMINIS PLANE;  Surgeon: Jo Manzano DO;  Location: [facility] OR;  Service: General;  Laterality: N/A;    KNEE SURGERY      LAPAROSCOPIC LYSIS OF ADHESIONS N/A 11/11/2020    Procedure: LYSIS, ADHESIONS, LAPAROSCOPIC;  Surgeon: Tee Segura MD;  Location: [facility] OR;  Service: General;  Laterality: N/A;  CONVERTED TO OPEN    LAPAROTOMY N/A 11/20/2020    Procedure: LAPAROTOMY;  Surgeon: Tee Segura MD;  Location: [facility] OR;  Service: General;  Laterality: N/A;    LASER LITHOTRIPSY Left 2/8/2021    Procedure: LITHOTRIPSY, USING LASER;  Surgeon: Irving Kidd MD;  Location: [facility] OR;  Service: Urology;  Laterality: Left;    LASER LITHOTRIPSY Right 10/13/2021    Procedure: LITHOTRIPSY, USING LASER;  Surgeon: Annita Gamino MD;  Location: [facility] OR;  Service: Urology;  Laterality: Right;    LYSIS OF ADHESIONS N/A 11/11/2020    Procedure: LYSIS, ADHESIONS;  Surgeon: Tee Segura MD;  Location: [facility] OR;  Service: General;  Laterality: N/A;    LYSIS OF ADHESIONS N/A 11/20/2020    Procedure: LYSIS, ADHESIONS;  Surgeon: Tee Segura MD;  Location: [facility] OR;  Service: General;  Laterality: N/A;    LYSIS OF ADHESIONS N/A 7/25/2022    Procedure: LYSIS, ADHESIONS;  Surgeon: Jo Manzano DO;  Location: [facility] OR;  Service: General;  Laterality: N/A;    SURGICAL REMOVAL OF ILEUM WITH CECUM  7/25/2022    Procedure: EXCISION, CECUM WITH ILEUM;  Surgeon: Jo Manzano DO;  Location: [facility] OR;  Service: General;;    TONSILLECTOMY      URETEROSCOPY Left 2/8/2021    Procedure: URETEROSCOPY;  Surgeon: Irving Kidd MD;  Location: [facility] OR;  Service: Urology;  Laterality: Left;  stent placement    URETEROSCOPY Right 10/13/2021    Procedure: URETEROSCOPY;  Surgeon: Annita Gamino MD;  Location: [facility] OR;  Service:  Urology;  Laterality: Right;         Family History:  Family History   Problem Relation Age of Onset    Stroke Mother     Hypertension Mother     COPD Father     Drug abuse Brother        Social History:  Social History     Tobacco Use    Smoking status: Never    Smokeless tobacco: Never   Substance and Sexual Activity    Alcohol use: Yes     Comment: rarely    Drug use: No    Sexual activity: Never        Review of Systems     Review of Systems   Constitutional:  Positive for fever. Negative for chills.   HENT:  Negative for sore throat.    Respiratory:  Negative for shortness of breath.    Cardiovascular:  Negative for chest pain.   Gastrointestinal:  Positive for diarrhea and nausea.   Genitourinary:  Negative for dysuria.   Musculoskeletal:  Negative for back pain.   Skin:  Negative for rash.   Neurological:  Negative for weakness and headaches.   Hematological:  Does not bruise/bleed easily.   All other systems reviewed and are negative.       Physical Exam     Initial Vitals [02/14/24 2343]   BP Pulse Resp Temp SpO2   109/74 (!) 127 18 (!) 101.7 °F (38.7 °C) (!) 94 %      MAP       --          Physical Exam  Nursing Notes and Vital Signs Reviewed.  Constitutional:Febrile. Patient is in no acute distress. Palor and ill appearing.  Head: Atraumatic. Normocephalic.  Eyes: PERRL. EOM intact. Conjunctivae are not pale. No scleral icterus.  ENT: Mucous membranes are moist. Oropharynx is clear and symmetric.    Neck: Supple. Full ROM. No lymphadenopathy.  Cardiovascular: Tachycardic. Regular rhythm. No murmurs, rubs, or gallops. Distal pulses are 2+ and symmetric.  Pulmonary/Chest: No respiratory distress. Clear to auscultation bilaterally. No wheezing or rales.  Abdominal: Abdominal distention. BLQ tenderness.  No rebound, guarding, or rigidity. Good bowel sounds.  Genitourinary: No CVA tenderness  Musculoskeletal: Moves all extremities. No obvious deformities. No edema. No calf tenderness.  Skin: Warm and  dry.  Neurological:  Alert, awake, and appropriate.  Normal speech.  No acute focal neurological deficits are appreciated.  Psychiatric: Normal affect. Good eye contact. Appropriate in content.     ED Course   Critical Care    Date/Time: 2/15/2024 1:57 AM    Performed by: Nancy Pederson MD  Authorized by: Nancy Pederson MD  Direct patient critical care time: 10 minutes  Additional history critical care time: 5 minutes  Ordering / reviewing critical care time: 5 minutes  Documentation critical care time: 10 minutes  Consulting other physicians critical care time: 5 minutes  Total critical care time (exclusive of procedural time) : 35 minutes  Critical care was necessary to treat or prevent imminent or life-threatening deterioration of the following conditions: bowel obstruction.  Critical care was time spent personally by me on the following activities: blood draw for specimens, development of treatment plan with patient or surrogate, discussions with consultants, interpretation of cardiac output measurements, evaluation of patient's response to treatment, examination of patient, obtaining history from patient or surrogate, ordering and performing treatments and interventions, ordering and review of laboratory studies, ordering and review of radiographic studies, pulse oximetry, re-evaluation of patient's condition and review of old charts.        ED Vital Signs:  Vitals:    02/15/24 0031 02/15/24 0132 02/15/24 0153 02/15/24 0202   BP:  (!) 92/54  (!) 102/55   Pulse:  (!) 112 (!) 112 110   Resp:  17 20 (!) 22   Temp: (!) 101.7 °F (38.7 °C) 100.3 °F (37.9 °C)     TempSrc:  Oral     SpO2:  95% 95% 95%   Weight:       Height:        02/15/24 0302 02/15/24 0502 02/15/24 0700 02/15/24 0800   BP: (!) 102/59 (!) 101/57 (!) 113/56 (!) 110/56   Pulse: 102 99 93 99   Resp: 18 18 18 20   Temp:  98.6 °F (37 °C)     TempSrc:  Oral     SpO2: 95% (!) 94% (!) 92% 96%   Weight:       Height:        02/15/24 0900 02/15/24  "1000 02/15/24 1100 02/15/24 1300   BP: (!) 106/56 (!) 105/59 (!) 101/55 (!) 113/57   Pulse: 90 90 93 97   Resp: 15 15 17 20   Temp:       TempSrc:       SpO2: 96% 95% 95% 96%   Weight:       Height:        02/15/24 1420 02/15/24 1600 02/15/24 1636   BP: 127/63  125/72   Pulse: 98 94 96   Resp: 18  18   Temp: 98.8 °F (37.1 °C)  97.6 °F (36.4 °C)   TempSrc: Oral  Oral   SpO2: 97%  97%   Weight: 86.1 kg (189 lb 13.1 oz)     Height: 5' 7" (1.702 m)         Abnormal Lab Results:  Labs Reviewed   CBC W/ AUTO DIFFERENTIAL - Abnormal; Notable for the following components:       Result Value    MCH 33.6 (*)     Immature Granulocytes 0.7 (*)     Lymph # 0.3 (*)     Mono # 0.2 (*)     Gran % 90.5 (*)     Lymph % 4.5 (*)     Mono % 3.8 (*)     All other components within normal limits   COMPREHENSIVE METABOLIC PANEL - Abnormal; Notable for the following components:    CO2 15 (*)     Glucose 120 (*)     Calcium 8.2 (*)     Albumin 3.4 (*)     Total Bilirubin 1.4 (*)     All other components within normal limits   LACTIC ACID, PLASMA - Abnormal; Notable for the following components:    Lactate (Lactic Acid) 2.4 (*)     All other components within normal limits   URINALYSIS, REFLEX TO URINE CULTURE - Abnormal; Notable for the following components:    Specific Gravity, UA >1.030 (*)     Protein, UA 1+ (*)     All other components within normal limits    Narrative:     Specimen Source->Urine   PROCALCITONIN - Abnormal; Notable for the following components:    Procalcitonin 2.35 (*)     All other components within normal limits   URINALYSIS MICROSCOPIC - Abnormal; Notable for the following components:    Hyaline Casts, UA 9 (*)     All other components within normal limits    Narrative:     Specimen Source->Urine   INFLUENZA A & B BY MOLECULAR   CULTURE, BLOOD   CULTURE, BLOOD   B-TYPE NATRIURETIC PEPTIDE   TROPONIN I   SARS-COV-2 RNA AMPLIFICATION, QUAL   LACTIC ACID, PLASMA        All Lab Results:  Results for orders placed or " performed during the hospital encounter of 02/15/24   Influenza A & B by Molecular    Specimen: Nasopharyngeal Swab   Result Value Ref Range    Influenza A, Molecular Negative Negative    Influenza B, Molecular Negative Negative    Flu A & B Source Nasal swab    CBC auto differential   Result Value Ref Range    WBC 5.99 3.90 - 12.70 K/uL    RBC 4.47 4.00 - 5.40 M/uL    Hemoglobin 15.0 12.0 - 16.0 g/dL    Hematocrit 43.7 37.0 - 48.5 %    MCV 98 82 - 98 fL    MCH 33.6 (H) 27.0 - 31.0 pg    MCHC 34.3 32.0 - 36.0 g/dL    RDW 12.7 11.5 - 14.5 %    Platelets 208 150 - 450 K/uL    MPV 10.5 9.2 - 12.9 fL    Immature Granulocytes 0.7 (H) 0.0 - 0.5 %    Gran # (ANC) 5.4 1.8 - 7.7 K/uL    Immature Grans (Abs) 0.04 0.00 - 0.04 K/uL    Lymph # 0.3 (L) 1.0 - 4.8 K/uL    Mono # 0.2 (L) 0.3 - 1.0 K/uL    Eos # 0.0 0.0 - 0.5 K/uL    Baso # 0.02 0.00 - 0.20 K/uL    nRBC 0 0 /100 WBC    Gran % 90.5 (H) 38.0 - 73.0 %    Lymph % 4.5 (L) 18.0 - 48.0 %    Mono % 3.8 (L) 4.0 - 15.0 %    Eosinophil % 0.2 0.0 - 8.0 %    Basophil % 0.3 0.0 - 1.9 %    Differential Method Automated    Comprehensive metabolic panel   Result Value Ref Range    Sodium 138 136 - 145 mmol/L    Potassium 3.5 3.5 - 5.1 mmol/L    Chloride 109 95 - 110 mmol/L    CO2 15 (L) 23 - 29 mmol/L    Glucose 120 (H) 70 - 110 mg/dL    BUN 18 6 - 20 mg/dL    Creatinine 0.8 0.5 - 1.4 mg/dL    Calcium 8.2 (L) 8.7 - 10.5 mg/dL    Total Protein 6.3 6.0 - 8.4 g/dL    Albumin 3.4 (L) 3.5 - 5.2 g/dL    Total Bilirubin 1.4 (H) 0.1 - 1.0 mg/dL    Alkaline Phosphatase 73 55 - 135 U/L    AST 35 10 - 40 U/L    ALT 38 10 - 44 U/L    eGFR >60 >60 mL/min/1.73 m^2    Anion Gap 14 8 - 16 mmol/L   Lactic acid, plasma #1   Result Value Ref Range    Lactate (Lactic Acid) 2.4 (H) 0.5 - 2.2 mmol/L   Urinalysis, Reflex to Urine Culture Urine, Clean Catch    Specimen: Urine   Result Value Ref Range    Specimen UA Urine, Clean Catch     Color, UA Yellow Yellow, Straw, Hilda    Appearance, UA Clear Clear     pH, UA 6.0 5.0 - 8.0    Specific Gravity, UA >1.030 (A) 1.005 - 1.030    Protein, UA 1+ (A) Negative    Glucose, UA Negative Negative    Ketones, UA Negative Negative    Bilirubin (UA) Negative Negative    Occult Blood UA Negative Negative    Nitrite, UA Negative Negative    Urobilinogen, UA Negative <2.0 EU/dL    Leukocytes, UA Negative Negative   Brain natriuretic peptide   Result Value Ref Range    BNP 12 0 - 99 pg/mL   Troponin I   Result Value Ref Range    Troponin I <0.006 0.000 - 0.026 ng/mL   Procalcitonin   Result Value Ref Range    Procalcitonin 2.35 (H) <0.25 ng/mL   COVID-19 Rapid Screening   Result Value Ref Range    SARS-CoV-2 RNA, Amplification, Qual Negative Negative   Lactic acid, plasma #2   Result Value Ref Range    Lactate (Lactic Acid) 1.2 0.5 - 2.2 mmol/L   Urinalysis Microscopic   Result Value Ref Range    RBC, UA 3 0 - 4 /hpf    WBC, UA 3 0 - 5 /hpf    Bacteria Rare None-Occ /hpf    Squam Epithel, UA 2 /hpf    Hyaline Casts, UA 9 (A) 0-1/lpf /lpf    Microscopic Comment SEE COMMENT    EKG 12-lead   Result Value Ref Range    QRS Duration 84 ms    [facility] QTC Calculation 450 ms         Imaging Results:  Imaging Results              X-Ray Chest 1 View (Final result)  Result time 02/15/24 07:55:23   Procedure changed from XR Gastric tube check, non-radiologist performed     Final result by Jose Estrada MD (02/15/24 07:55:23)                   Impression:      No acute process seen.      Electronically signed by: Jose Estrada MD  Date:    02/15/2024  Time:    07:55               Narrative:    EXAMINATION:  XR CHEST 1 VIEW    CLINICAL HISTORY:  small bowel obstruction;    FINDINGS:  Single view of the chest.  Comparison 02/15/2024    Cardiac silhouette is normal.  The lungs demonstrate no evidence of active disease.  No evidence of pleural effusion or pneumothorax.  Bones appear intact.  Mild degenerative change.  Nasogastric tube projects within the stomach.                                        CT Abdomen Pelvis  Without Contrast (Final result)  Result time 02/15/24 06:43:14      Final result by Jose Estrada MD (02/15/24 06:43:14)                   Impression:      Progressive increasing dilated small bowel loops are seen within the mid and right lower quadrant to a surgical anastomosis within the right lower quadrant concerning for partial distal small bowel obstruction.  Recommend surgery consult.    Evaluation of solid organ and vascular pathology is limited due to lack of IV contrast.    All CT scans at this facility use dose modulation, iterative reconstruction, and/or weight based dosing when appropriate to reduce radiation dose to as low as reasonable achievable.      Electronically signed by: Jose Estrada MD  Date:    02/15/2024  Time:    06:43               Narrative:    EXAMINATION:  CT ABDOMEN PELVIS WITHOUT CONTRAST    CLINICAL HISTORY:  Abdominal pain, acute, nonlocalized;    TECHNIQUE:  Low dose axial images, sagittal and coronal reformations were obtained from the lung bases to the pubic symphysis.  Oral contrast was not administered.    COMPARISON:  12/07/2023    FINDINGS:  Heart: Normal size. No effusion.    Lung Bases: Clear.  Dependent changes noted.    Liver: Normal size and attenuation. No focal lesions.    Gallbladder: Gallbladder is surgically absent    Bile Ducts: No dilatation.    Pancreas: No obvious mass. No peripancreatic fat stranding.    Spleen: Normal.    Adrenals: Normal.    Kidneys/Ureters: No mass, hydroureteronephrosis, or nephroureterolithiasis.    Bladder: No wall thickening.    Reproductive organs: Normal.    GI Tract/Mesentery: Stomach is unremarkable.  Proximal small bowel is decompressed.  Progressive increasing dilated small bowel loops are seen within the mid and right lower quadrant to a surgical anastomosis within the right lower quadrant concerning for partial distal small bowel obstruction.  Small bowel loops measure greater than 4 mm.  Suspect partial  right hemicolectomy.  Diverticulosis within the descending and sigmoid colon.    Peritoneal Space: No ascites or free air.    Retroperitoneum: No significant adenopathy.    Abdominal wall: Midline scar.  Small fat containing umbilical hernia.    Vasculature: No aneurysm.    Bones: No acute fracture.  Moderate degenerative changes.  No suspicious lytic or sclerotic lesions.                                       X-Ray Chest AP Portable (Final result)  Result time 02/15/24 01:23:34      Final result by James Alvarado MD (02/15/24 01:23:34)                   Impression:      No acute findings.      Electronically signed by: James Alvarado  Date:    02/15/2024  Time:    01:23               Narrative:    EXAMINATION:  XR CHEST AP PORTABLE    CLINICAL HISTORY:  Sepsis;    TECHNIQUE:  Single frontal view of the chest was performed.    COMPARISON:  11/14/2023    FINDINGS:  Lungs are clear. No focal consolidation. No pleural effusion. No pneumothorax. Normal heart size.                                       The EKG was ordered, reviewed, and independently interpreted by the ED provider.  Interpretation time: 00:19  Rate: 125 BPM  Rhythm: sinus tachycardia  Interpretation:Possible Left atrial enlargement. Nonspecific T wave abnormality. No STEMI.    When compared with ECG of 30-JUN-2022 21:06, Vent. rate has increased BY 41 BPM T wave inversion now evident in Anterior lead         The Emergency Provider reviewed the vital signs and test results, which are outlined above.     ED Discussion       1:51 AM: Discussed pt's case with Dr. Sanon who suggests to consult HM and admit patient under NPO, NGT, resuscitate and that she will consult.    2:01 AM: Discussed case with Dr. Ribeiro ([facility]). Dr. Ribeiro agrees with current care and management of pt and accepts admission.   Admitting Service: Hospital Medicine  Admitting Physician: Dr. Ribeiro  Admit to: med/surg inpatient    2:01 AM: Re-evaluated pt. I  have discussed test results, shared treatment plan, and the need for admission with patient and family at bedside. Pt and family express understanding at this time and agree with all information. All questions answered. Pt and family have no further questions or concerns at this time. Pt is ready for admit.             Medical Decision Making  Amount and/or Complexity of Data Reviewed  Labs: ordered. Decision-making details documented in ED Course.  Radiology: ordered. Decision-making details documented in ED Course.  ECG/medicine tests: ordered and independent interpretation performed. Decision-making details documented in ED Course.    Risk  OTC drugs.  Prescription drug management.  Decision regarding hospitalization.                ED Medication(s):  Medications   piperacillin-tazobactam (ZOSYN) 4.5 g in dextrose 5 % in water (D5W) 100 mL IVPB (MB+) (4.5 g Intravenous New Bag 2/15/24 1146)   sodium chloride 0.9% flush 3 mL (has no administration in time range)   ondansetron injection 4 mg (has no administration in time range)   acetaminophen suppository 650 mg (has no administration in time range)   naloxone 0.4 mg/mL injection 0.02 mg (has no administration in time range)   glucose chewable tablet 16 g (has no administration in time range)   glucose chewable tablet 24 g (has no administration in time range)   glucagon (human recombinant) injection 1 mg (has no administration in time range)   0.9%  NaCl infusion ( Intravenous New Bag 2/15/24 1254)   dextrose 10% bolus 125 mL 125 mL (has no administration in time range)   dextrose 10% bolus 250 mL 250 mL (has no administration in time range)   sodium chloride 0.9% bolus 1,848 mL 1,848 mL (0 mLs Intravenous Stopped 2/15/24 0224)   acetaminophen tablet 1,000 mg (1,000 mg Oral Given 2/15/24 0031)   piperacillin-tazobactam (ZOSYN) 4.5 g in dextrose 5 % in water (D5W) 100 mL IVPB (MB+) (0 g Intravenous Stopped 2/15/24 0147)   butamben-TETRAcaine-benzocaine 2%-2%-14%  (200 mg/sec) spray 1 spray (1 spray Topical (Top) Given 2/15/24 0220)   LORazepam injection 1 mg (1 mg Intravenous Given 2/15/24 0014)       Current Discharge Medication List                  Scribe Attestation:   Scribe #1: I performed the above scribed service and the documentation accurately describes the services I performed. I attest to the accuracy of the note.     Attending:   Physician Attestation Statement for Scribe #1: I, Nancy Pederson MD, personally performed the services described in this documentation, as scribed by Agustin Wright, in my presence, and it is both accurate and complete.           Clinical Impression       ICD-10-CM ICD-9-CM   1. SBO (small bowel obstruction)  K56.609 560.9   2. Abdominal pain  R10.9 789.00   3. Chest pain  R07.9 786.50       Disposition:   Disposition: Admitted  Condition: Fair        Nancy Pederson MD  02/15/24 9902

## 2024-02-15 NOTE — ED NOTES
Received report on patient, assumed care. Patient is AAOX4, respirations even/unlabored, CM lead 2 NSR without ectopy. Patient denies any complaints at this time. Side rails up x one, bed in low position, call light in reach. Will continue to monitor.

## 2024-02-15 NOTE — HPI
Genny Calixto is a 58 y.o. female with a PMH  has a past medical history of Encounter for blood transfusion, KENN (generalized anxiety disorder), Insomnia, Migraine headache, Nephrolithiasis (08/03/2019), Reflex sympathetic dystrophy, Small bowel obstruction, UTI (urinary tract infection), and Vertigo.  Presented to the ER for evaluation of diffuse abdominal pain with associated nausea and vomiting as well as subjective fever.  Patient reports his symptoms started last night.  Reports symptoms feel similar to previous small-bowel obstruction.  Denies any suspicious food intake or recent illnesses.  Denies constipation or diarrhea.  Patient received 2.5 mg of droperidol and 500 mL of normal saline prior to arrival via EMS.  Patient denies any blood in his vomitus.  Denies any chest pain, palpitations, shortness breath, headache, lightheadedness, dizziness, fever, chills, sweats, dysuria, hematuria, melena, hematochezia or any other symptoms at this time.    ER lab work mostly unremarkable with the exception to procalcitonin level of 2.35 and lactic acid of 2.4.  Flu/COVID negative.  CT abdomen and pelvis concerning for small bowel obstruction.  General surgery consulted with recommendation of NG tube placement, NPO, and evaluation in a.m..  Patient in agreement with treatment plan.  Hospital Medicine consulted for admission to hospital for small-bowel obstruction.  Patient will be admitted under inpatient status.    PCP: Tay Duff

## 2024-02-16 LAB
ALBUMIN SERPL BCP-MCNC: 2.9 G/DL (ref 3.5–5.2)
ALP SERPL-CCNC: 69 U/L (ref 55–135)
ALT SERPL W/O P-5'-P-CCNC: 89 U/L (ref 10–44)
ANION GAP SERPL CALC-SCNC: 8 MMOL/L (ref 8–16)
AST SERPL-CCNC: 53 U/L (ref 10–40)
BASOPHILS # BLD AUTO: 0.03 K/UL (ref 0–0.2)
BASOPHILS NFR BLD: 0.7 % (ref 0–1.9)
BILIRUB SERPL-MCNC: 1.4 MG/DL (ref 0.1–1)
BUN SERPL-MCNC: 8 MG/DL (ref 6–20)
CALCIUM SERPL-MCNC: 7.8 MG/DL (ref 8.7–10.5)
CHLORIDE SERPL-SCNC: 110 MMOL/L (ref 95–110)
CO2 SERPL-SCNC: 19 MMOL/L (ref 23–29)
CREAT SERPL-MCNC: 0.6 MG/DL (ref 0.5–1.4)
DIFFERENTIAL METHOD BLD: ABNORMAL
EOSINOPHIL # BLD AUTO: 0.1 K/UL (ref 0–0.5)
EOSINOPHIL NFR BLD: 2.7 % (ref 0–8)
ERYTHROCYTE [DISTWIDTH] IN BLOOD BY AUTOMATED COUNT: 13 % (ref 11.5–14.5)
EST. GFR  (NO RACE VARIABLE): >60 ML/MIN/1.73 M^2
GLUCOSE SERPL-MCNC: 78 MG/DL (ref 70–110)
HCT VFR BLD AUTO: 37.4 % (ref 37–48.5)
HGB BLD-MCNC: 12.5 G/DL (ref 12–16)
IMM GRANULOCYTES # BLD AUTO: 0.02 K/UL (ref 0–0.04)
IMM GRANULOCYTES NFR BLD AUTO: 0.4 % (ref 0–0.5)
LYMPHOCYTES # BLD AUTO: 1 K/UL (ref 1–4.8)
LYMPHOCYTES NFR BLD: 22.9 % (ref 18–48)
MAGNESIUM SERPL-MCNC: 1.8 MG/DL (ref 1.6–2.6)
MCH RBC QN AUTO: 33.5 PG (ref 27–31)
MCHC RBC AUTO-ENTMCNC: 33.4 G/DL (ref 32–36)
MCV RBC AUTO: 100 FL (ref 82–98)
MONOCYTES # BLD AUTO: 0.3 K/UL (ref 0.3–1)
MONOCYTES NFR BLD: 7.6 % (ref 4–15)
NEUTROPHILS # BLD AUTO: 2.9 K/UL (ref 1.8–7.7)
NEUTROPHILS NFR BLD: 65.7 % (ref 38–73)
NRBC BLD-RTO: 0 /100 WBC
OHS QRS DURATION: 84 MS
OHS QTC CALCULATION: 450 MS
PHOSPHATE SERPL-MCNC: 1.8 MG/DL (ref 2.7–4.5)
PLATELET # BLD AUTO: 193 K/UL (ref 150–450)
PMV BLD AUTO: 10.9 FL (ref 9.2–12.9)
POTASSIUM SERPL-SCNC: 3.6 MMOL/L (ref 3.5–5.1)
PROT SERPL-MCNC: 5 G/DL (ref 6–8.4)
RBC # BLD AUTO: 3.73 M/UL (ref 4–5.4)
SODIUM SERPL-SCNC: 137 MMOL/L (ref 136–145)
WBC # BLD AUTO: 4.45 K/UL (ref 3.9–12.7)

## 2024-02-16 PROCEDURE — 25000003 PHARM REV CODE 250: Performed by: INTERNAL MEDICINE

## 2024-02-16 PROCEDURE — 99232 SBSQ HOSP IP/OBS MODERATE 35: CPT | Mod: ,,, | Performed by: STUDENT IN AN ORGANIZED HEALTH CARE EDUCATION/TRAINING PROGRAM

## 2024-02-16 PROCEDURE — 84100 ASSAY OF PHOSPHORUS: CPT | Performed by: INTERNAL MEDICINE

## 2024-02-16 PROCEDURE — 83735 ASSAY OF MAGNESIUM: CPT | Performed by: INTERNAL MEDICINE

## 2024-02-16 PROCEDURE — 21400001 HC TELEMETRY ROOM

## 2024-02-16 PROCEDURE — 25000003 PHARM REV CODE 250: Performed by: NURSE PRACTITIONER

## 2024-02-16 PROCEDURE — 25000003 PHARM REV CODE 250: Performed by: HOSPITALIST

## 2024-02-16 PROCEDURE — 36415 COLL VENOUS BLD VENIPUNCTURE: CPT | Performed by: INTERNAL MEDICINE

## 2024-02-16 PROCEDURE — 80053 COMPREHEN METABOLIC PANEL: CPT | Performed by: INTERNAL MEDICINE

## 2024-02-16 PROCEDURE — 85025 COMPLETE CBC W/AUTO DIFF WBC: CPT | Performed by: INTERNAL MEDICINE

## 2024-02-16 PROCEDURE — 63600175 PHARM REV CODE 636 W HCPCS: Performed by: NURSE PRACTITIONER

## 2024-02-16 PROCEDURE — 25500020 PHARM REV CODE 255: Performed by: INTERNAL MEDICINE

## 2024-02-16 RX ORDER — AMOXICILLIN 250 MG
2 CAPSULE ORAL 2 TIMES DAILY
Status: DISCONTINUED | OUTPATIENT
Start: 2024-02-16 | End: 2024-02-17 | Stop reason: HOSPADM

## 2024-02-16 RX ORDER — LANOLIN ALCOHOL/MO/W.PET/CERES
400 CREAM (GRAM) TOPICAL 2 TIMES DAILY
Status: COMPLETED | OUTPATIENT
Start: 2024-02-16 | End: 2024-02-17

## 2024-02-16 RX ORDER — PROMETHAZINE HYDROCHLORIDE 25 MG/1
25 TABLET ORAL EVERY 6 HOURS PRN
Status: DISCONTINUED | OUTPATIENT
Start: 2024-02-16 | End: 2024-02-17 | Stop reason: HOSPADM

## 2024-02-16 RX ADMIN — PIPERACILLIN SODIUM AND TAZOBACTAM SODIUM 4.5 G: 4; .5 INJECTION, POWDER, FOR SOLUTION INTRAVENOUS at 05:02

## 2024-02-16 RX ADMIN — PIPERACILLIN SODIUM AND TAZOBACTAM SODIUM 4.5 G: 4; .5 INJECTION, POWDER, FOR SOLUTION INTRAVENOUS at 09:02

## 2024-02-16 RX ADMIN — ACETAMINOPHEN 650 MG: 650 SUPPOSITORY RECTAL at 09:02

## 2024-02-16 RX ADMIN — POTASSIUM BICARBONATE 25 MEQ: 978 TABLET, EFFERVESCENT ORAL at 05:02

## 2024-02-16 RX ADMIN — Medication 400 MG: at 05:02

## 2024-02-16 RX ADMIN — PIPERACILLIN SODIUM AND TAZOBACTAM SODIUM 4.5 G: 4; .5 INJECTION, POWDER, FOR SOLUTION INTRAVENOUS at 03:02

## 2024-02-16 RX ADMIN — ONDANSETRON 4 MG: 2 INJECTION INTRAMUSCULAR; INTRAVENOUS at 05:02

## 2024-02-16 RX ADMIN — PROMETHAZINE HYDROCHLORIDE 25 MG: 25 TABLET ORAL at 10:02

## 2024-02-16 RX ADMIN — DIATRIZOATE MEGLUMINE AND DIATRIZOATE SODIUM 240 ML: 660; 100 LIQUID ORAL; RECTAL at 11:02

## 2024-02-16 RX ADMIN — POTASSIUM BICARBONATE 25 MEQ: 978 TABLET, EFFERVESCENT ORAL at 10:02

## 2024-02-16 NOTE — ASSESSMENT & PLAN NOTE
-recommended Rosales catheter patient refused  -much lower postvoid residual  -encourage patient to follow up with her urologist Dr. Gamino upon discharge

## 2024-02-16 NOTE — SUBJECTIVE & OBJECTIVE
Interval History:  Patient seen and examined twice today last time with  in room.  NG tube was DC but she has some crampy abdominal pain.    Review of Systems   Constitutional:  Positive for fatigue. Negative for fever.   Respiratory:  Negative for cough and shortness of breath.    Cardiovascular:  Negative for chest pain and leg swelling.   Gastrointestinal:  Positive for abdominal pain. Negative for abdominal distention, diarrhea, nausea and vomiting.   Neurological:  Positive for weakness.   All other systems reviewed and are negative.    Objective:     Vital Signs (Most Recent):  Temp: 97.4 °F (36.3 °C) (02/16/24 1130)  Pulse: 96 (02/16/24 1130)  Resp: 18 (02/16/24 1130)  BP: 124/69 (02/16/24 1130)  SpO2: 95 % (02/16/24 1130) Vital Signs (24h Range):  Temp:  [97.4 °F (36.3 °C)-99.6 °F (37.6 °C)] 97.4 °F (36.3 °C)  Pulse:  [90-97] 96  Resp:  [16-18] 18  SpO2:  [93 %-99 %] 95 %  BP: (120-135)/(65-77) 124/69     Weight: 86.1 kg (189 lb 13.1 oz)  Body mass index is 29.73 kg/m².    Intake/Output Summary (Last 24 hours) at 2/16/2024 1650  Last data filed at 2/15/2024 1725  Gross per 24 hour   Intake --   Output 350 ml   Net -350 ml         Physical Exam  Vitals reviewed.   Constitutional:       Appearance: She is ill-appearing.   HENT:      Head: Normocephalic and atraumatic.      Nose:      Comments: NG-tube in place to low intermittent suction     Mouth/Throat:      Mouth: Mucous membranes are moist.      Pharynx: Oropharynx is clear.   Eyes:      Extraocular Movements: Extraocular movements intact.      Conjunctiva/sclera: Conjunctivae normal.   Cardiovascular:      Rate and Rhythm: Normal rate and regular rhythm.      Pulses: Normal pulses.      Heart sounds: Normal heart sounds.   Pulmonary:      Effort: Pulmonary effort is normal.      Breath sounds: Normal breath sounds.   Abdominal:      General: Bowel sounds are normal. There is distension.      Palpations: Abdomen is soft.      Tenderness: There is no  guarding or rebound.   Musculoskeletal:         General: Normal range of motion.      Cervical back: Normal range of motion and neck supple.   Skin:     General: Skin is warm and dry.   Neurological:      General: No focal deficit present.      Mental Status: She is alert and oriented to person, place, and time. Mental status is at baseline.   Psychiatric:         Mood and Affect: Mood normal.         Behavior: Behavior normal.         Thought Content: Thought content normal.             Significant Labs: All pertinent labs within the past 24 hours have been reviewed.  CBC:   Recent Labs   Lab 02/15/24  0035 02/16/24 0612   WBC 5.99 4.45   HGB 15.0 12.5   HCT 43.7 37.4    193     CMP:   Recent Labs   Lab 02/15/24  0035 02/16/24  0612    137   K 3.5 3.6    110   CO2 15* 19*   * 78   BUN 18 8   CREATININE 0.8 0.6   CALCIUM 8.2* 7.8*   PROT 6.3 5.0*   ALBUMIN 3.4* 2.9*   BILITOT 1.4* 1.4*   ALKPHOS 73 69   AST 35 53*   ALT 38 89*   ANIONGAP 14 8     Magnesium:   Recent Labs   Lab 02/16/24 0612   MG 1.8       Significant Imaging: I have reviewed all pertinent imaging results/findings within the past 24 hours.

## 2024-02-16 NOTE — PLAN OF CARE
[facility] - Telemetry (Hospital)  Initial Discharge Assessment       Primary Care Provider: Tay Duff MD    Admission Diagnosis: SBO (small bowel obstruction) [K56.609]  Abdominal pain [R10.9]  Chest pain [R07.9]    Admission Date: 2/15/2024  Expected Discharge Date:     Transition of Care Barriers: (P) None    Payor: [employer] MANAGED MEDICARE / Plan: [employer] MEDICARE DUAL DSNP / Product Type: Medicare Advantage /     Extended Emergency Contact Information  Primary Emergency Contact: manny caceres  Mobile Phone: [phone]  Relation: Significant other  Preferred language: English   needed? No    Discharge Plan A: (P) Home         [facility] [address] [place], [place] - [address]  [address] [facility] [address]  Phone: [phone] Fax: [phone]    [id number] [place], [place] - [address]  [address] [facility] [address]  Phone: [phone] Fax: [phone]      Initial Assessment (most recent)       Adult Discharge Assessment - 02/16/24 1724          Discharge Assessment    Assessment Type Discharge Planning Assessment (P)      Confirmed/corrected address, phone number and insurance Yes (P)      Confirmed Demographics Correct on Facesheet (P)      Source of Information patient;family (P)      Communicated MANAS with patient/caregiver Yes (P)      Reason For Admission SBO (P)      People in Home alone (P)      Facility Arrived From: home (P)      Do you expect to return to your current living situation? Yes (P)      Do you have help at home or someone to help you manage your care at home? Yes (P)      Who are your caregiver(s) and their phone number(s)? Edjessica, significant other (P)      Prior to hospitilization cognitive status: Alert/Oriented (P)      Current cognitive status: Alert/Oriented (P)      Walking or Climbing Stairs Difficulty no (P)      Dressing/Bathing Difficulty no (P)      Equipment Currently Used at Home none (P)       Readmission within 30 days? No (P)      Patient currently being followed by outpatient case management? No (P)      Do you currently have service(s) that help you manage your care at home? No (P)      Do you take prescription medications? Yes (P)      Do you have prescription coverage? Yes (P)      Coverage [employer] (P)      Do you have any problems affording any of your prescribed medications? No (P)      Who is going to help you get home at discharge? Tha (P)      How do you get to doctors appointments? car, drives self (P)      Are you on dialysis? No (P)      Do you take coumadin? No (P)      Discharge Plan A Home (P)      DME Needed Upon Discharge  none (P)      Discharge Plan discussed with: Spouse/sig other;Patient (P)      Transition of Care Barriers None (P)                    Patient lives alone.  Significant other, Tha will stay with her and assist as needed.  Patient is independent with ADL's and uses not medical equipment.  Currently no needs.

## 2024-02-16 NOTE — SUBJECTIVE & OBJECTIVE
"Interval History: NGT placed, patient has had a bowel movement today. No n/v. Pending SBFT. Pain improved    Medications:  Continuous Infusions:   0.45 % NaCl with KCl 20 mEq 100 mL/hr at 02/15/24 1804     Scheduled Meds:   piperacillin-tazobactam (Zosyn) IV (PEDS and ADULTS) (extended infusion is not appropriate)  4.5 g Intravenous Q8H     PRN Meds:acetaminophen, dextrose 10%, dextrose 10%, glucagon (human recombinant), glucose, glucose, naloxone, ondansetron, sodium chloride 0.9%     Review of patient's allergies indicates:   Allergen Reactions    Erythromycin Other (See Comments)     Stomach cramps    Morphine Nausea And Vomiting     "Projectile vomiting"     Objective:     Vital Signs (Most Recent):  Temp: 99.2 °F (37.3 °C) (02/16/24 0832)  Pulse: 94 (02/16/24 0832)  Resp: 16 (02/16/24 0832)  BP: 121/77 (02/16/24 0832)  SpO2: (!) 93 % (02/16/24 0832) Vital Signs (24h Range):  Temp:  [97.6 °F (36.4 °C)-99.6 °F (37.6 °C)] 99.2 °F (37.3 °C)  Pulse:  [90-98] 94  Resp:  [16-20] 16  SpO2:  [93 %-99 %] 93 %  BP: (101-135)/(55-77) 121/77     Weight: 86.1 kg (189 lb 13.1 oz)  Body mass index is 29.73 kg/m².    Intake/Output - Last 3 Shifts         02/14 0700  02/15 0659 02/15 0700  02/16 0659 02/16 0700  02/17 0659    IV Piggyback 100      Total Intake(mL/kg) 100 (1.2)      Urine (mL/kg/hr)  550 (0.3)     Total Output  550     Net +100 -550            Urine Occurrence  2 x     Stool Occurrence  1 x              Physical Exam  Vitals and nursing note reviewed.   Constitutional:       General: She is not in acute distress.     Appearance: Normal appearance. She is well-developed. She is not ill-appearing or toxic-appearing.   HENT:      Head: Normocephalic and atraumatic.      Right Ear: External ear normal.      Left Ear: External ear normal.      Nose: Nose normal.      Comments: NG tube in place  Cardiovascular:      Rate and Rhythm: Normal rate.   Pulmonary:      Effort: Pulmonary effort is normal. No respiratory " distress.   Abdominal:      Tenderness: There is no guarding or rebound.      Comments: Soft, non-distended, appropriately TTP, no rebound, guarding or peritonitis    Musculoskeletal:         General: Normal range of motion.      Cervical back: Normal range of motion and neck supple.   Skin:     General: Skin is warm and dry.   Neurological:      Mental Status: She is alert and oriented to person, place, and time.   Psychiatric:         Mood and Affect: Mood normal.         Behavior: Behavior normal.          Significant Labs:  I have reviewed all pertinent lab results within the past 24 hours.  CBC:   Recent Labs   Lab 02/16/24  0612   WBC 4.45   RBC 3.73*   HGB 12.5   HCT 37.4      *   MCH 33.5*   MCHC 33.4     BMP:   Recent Labs   Lab 02/16/24  0612   GLU 78      K 3.6      CO2 19*   BUN 8   CREATININE 0.6   CALCIUM 7.8*   MG 1.8       Significant Diagnostics:  I have reviewed all pertinent imaging results/findings within the past 24 hours.

## 2024-02-16 NOTE — PROGRESS NOTES
[facility] Medicine  Progress Note    Patient Name: Genny Calixto  MRN: [id number]  Patient Class: IP- Inpatient   Admission Date: 2/15/2024  Length of Stay: 1 days  Attending Physician: Aracely Michel MD  Primary Care Provider: Tay Duff MD        Subjective:     Principal Problem:SBO (small bowel obstruction)        HPI:  Genny Calixto is a 58 y.o. female with a PMH  has a past medical history of Encounter for blood transfusion, KENN (generalized anxiety disorder), Insomnia, Migraine headache, Nephrolithiasis (08/03/2019), Reflex sympathetic dystrophy, Small bowel obstruction, UTI (urinary tract infection), and Vertigo.  Presented to the ER for evaluation of diffuse abdominal pain with associated nausea and vomiting as well as subjective fever.  Patient reports his symptoms started last night.  Reports symptoms feel similar to previous small-bowel obstruction.  Denies any suspicious food intake or recent illnesses.  Denies constipation or diarrhea.  Patient received 2.5 mg of droperidol and 500 mL of normal saline prior to arrival via EMS.  Patient denies any blood in his vomitus.  Denies any chest pain, palpitations, shortness breath, headache, lightheadedness, dizziness, fever, chills, sweats, dysuria, hematuria, melena, hematochezia or any other symptoms at this time.    ER lab work mostly unremarkable with the exception to procalcitonin level of 2.35 and lactic acid of 2.4.  Flu/COVID negative.  CT abdomen and pelvis concerning for small bowel obstruction.  General surgery consulted with recommendation of NG tube placement, NPO, and evaluation in a.m..  Patient in agreement with treatment plan.  Hospital Medicine consulted for admission to hospital for small-bowel obstruction.  Patient will be admitted under inpatient status.    PCP: Tay Duff    Overview/Hospital Course:  Patient seen in consultation by surgery.  She was felt to have a recurrent  small-bowel obstruction.  NG-tube was placed, patient was placed NPO with bowel rest, IV resuscitation was begun.  And planned for small bowel follow-through in a.m..  Patient had significant postvoid residual urine and Rosales catheter was ordered.    Patient refused Rosales catheter overnight.  She still has incomplete emptying but much less volume about 100-200 cc.  Had BM with some formed stool today.  Patient was seen by surgery small bowel follow-through obtained with normal transit time to colon.  NG tube removed and patient started on clear liquids.    Interval History:  Patient seen and examined twice today last time with  in room.  NG tube was DC but she has some crampy abdominal pain.    Review of Systems   Constitutional:  Positive for fatigue. Negative for fever.   Respiratory:  Negative for cough and shortness of breath.    Cardiovascular:  Negative for chest pain and leg swelling.   Gastrointestinal:  Positive for abdominal pain. Negative for abdominal distention, diarrhea, nausea and vomiting.   Neurological:  Positive for weakness.   All other systems reviewed and are negative.    Objective:     Vital Signs (Most Recent):  Temp: 97.4 °F (36.3 °C) (02/16/24 1130)  Pulse: 96 (02/16/24 1130)  Resp: 18 (02/16/24 1130)  BP: 124/69 (02/16/24 1130)  SpO2: 95 % (02/16/24 1130) Vital Signs (24h Range):  Temp:  [97.4 °F (36.3 °C)-99.6 °F (37.6 °C)] 97.4 °F (36.3 °C)  Pulse:  [90-97] 96  Resp:  [16-18] 18  SpO2:  [93 %-99 %] 95 %  BP: (120-135)/(65-77) 124/69     Weight: 86.1 kg (189 lb 13.1 oz)  Body mass index is 29.73 kg/m².    Intake/Output Summary (Last 24 hours) at 2/16/2024 1650  Last data filed at 2/15/2024 1725  Gross per 24 hour   Intake --   Output 350 ml   Net -350 ml         Physical Exam  Vitals reviewed.   Constitutional:       Appearance: She is ill-appearing.   HENT:      Head: Normocephalic and atraumatic.      Nose:      Comments: NG-tube in place to low intermittent suction      Mouth/Throat:      Mouth: Mucous membranes are moist.      Pharynx: Oropharynx is clear.   Eyes:      Extraocular Movements: Extraocular movements intact.      Conjunctiva/sclera: Conjunctivae normal.   Cardiovascular:      Rate and Rhythm: Normal rate and regular rhythm.      Pulses: Normal pulses.      Heart sounds: Normal heart sounds.   Pulmonary:      Effort: Pulmonary effort is normal.      Breath sounds: Normal breath sounds.   Abdominal:      General: Bowel sounds are normal. There is distension.      Palpations: Abdomen is soft.      Tenderness: There is no guarding or rebound.   Musculoskeletal:         General: Normal range of motion.      Cervical back: Normal range of motion and neck supple.   Skin:     General: Skin is warm and dry.   Neurological:      General: No focal deficit present.      Mental Status: She is alert and oriented to person, place, and time. Mental status is at baseline.   Psychiatric:         Mood and Affect: Mood normal.         Behavior: Behavior normal.         Thought Content: Thought content normal.             Significant Labs: All pertinent labs within the past 24 hours have been reviewed.  CBC:   Recent Labs   Lab 02/15/24  0035 02/16/24  0612   WBC 5.99 4.45   HGB 15.0 12.5   HCT 43.7 37.4    193     CMP:   Recent Labs   Lab 02/15/24  0035 02/16/24  0612    137   K 3.5 3.6    110   CO2 15* 19*   * 78   BUN 18 8   CREATININE 0.8 0.6   CALCIUM 8.2* 7.8*   PROT 6.3 5.0*   ALBUMIN 3.4* 2.9*   BILITOT 1.4* 1.4*   ALKPHOS 73 69   AST 35 53*   ALT 38 89*   ANIONGAP 14 8     Magnesium:   Recent Labs   Lab 02/16/24  0612   MG 1.8       Significant Imaging: I have reviewed all pertinent imaging results/findings within the past 24 hours.    Assessment/Plan:      * SBO (small bowel obstruction)  Plan:  -appreciate General surgery's recommendations  -small bowel follow-through unremarkable  -DC NG-tube  -start clear liquid diet      Incomplete emptying of  bladder  -recommended Rosales catheter patient refused  -much lower postvoid residual  -encourage patient to follow up with her urologist Dr. Gamino upon discharge      Anxiety  Plan:  -anxiolytics prn        VTE Risk Mitigation (From admission, onward)           Ordered     IP VTE LOW RISK PATIENT  Once         02/15/24 0211     Place sequential compression device  Until discontinued         02/15/24 0211                    Discharge Planning   MANAS:      Code Status: Full Code   Is the patient medically ready for discharge?: No    Reason for patient still in hospital (select all that apply): Patient trending condition, Treatment, and Consult recommendations                     Aracely Garrido MD  Department of Hospital Medicine   '[facility] - Telemetry ([facility])

## 2024-02-16 NOTE — ASSESSMENT & PLAN NOTE
Plan:  -appreciate General surgery's recommendations  -small bowel follow-through unremarkable  -DC NG-tube  -start clear liquid diet

## 2024-02-16 NOTE — HPI
Patient is a 50-year-old female who presents with a small-bowel obstruction.  She had a proximally 2 days' worth of diffuse abdominal pain cramping associated with nausea and vomiting that was similar to her previous bowel obstructions.  Bilious emesis.  She has not passing flatus or had a bowel movement in 24 hours.  This is similar to her prior bowel resections.  11/2020 she underwent exploratory laparotomy for internal hernia with small bowel ischemia and underwent a small bowel resection at that time.  This was complicated by early postoperative bowel obstruction with another small-bowel resection with anastomosis.  She did well until 07/2022 when she underwent a diagnostic converted to open exploration for recurrent bowel obstruction requiring additional small bowel resection and ileocecectomy.

## 2024-02-16 NOTE — ASSESSMENT & PLAN NOTE
Patient with recurrent small-bowel obstruction.  Will attempt nonoperative management.     - SBFT today as patient had a bowel movement to assess for transit of contrast - if normal, will consider removal NG and starting diet   - IV fluid resuscitation  - electrolyte replacement as needed.  Goal K/Mg/Phos > 4/3/2  -rest of care per primary team

## 2024-02-16 NOTE — PROGRESS NOTES
"[facility] - Telemetry ([facility])  General Surgery  Progress Note    Subjective:     Interval History: NGT placed, patient has had a bowel movement today. No n/v. Pending SBFT. Pain improved    Medications:  Continuous Infusions:   0.45 % NaCl with KCl 20 mEq 100 mL/hr at 02/15/24 1804     Scheduled Meds:   piperacillin-tazobactam (Zosyn) IV (PEDS and ADULTS) (extended infusion is not appropriate)  4.5 g Intravenous Q8H     PRN Meds:acetaminophen, dextrose 10%, dextrose 10%, glucagon (human recombinant), glucose, glucose, naloxone, ondansetron, sodium chloride 0.9%     Review of patient's allergies indicates:   Allergen Reactions    Erythromycin Other (See Comments)     Stomach cramps    Morphine Nausea And Vomiting     "Projectile vomiting"     Objective:     Vital Signs (Most Recent):  Temp: 99.2 °F (37.3 °C) (02/16/24 0832)  Pulse: 94 (02/16/24 0832)  Resp: 16 (02/16/24 0832)  BP: 121/77 (02/16/24 0832)  SpO2: (!) 93 % (02/16/24 0832) Vital Signs (24h Range):  Temp:  [97.6 °F (36.4 °C)-99.6 °F (37.6 °C)] 99.2 °F (37.3 °C)  Pulse:  [90-98] 94  Resp:  [16-20] 16  SpO2:  [93 %-99 %] 93 %  BP: (101-135)/(55-77) 121/77     Weight: 86.1 kg (189 lb 13.1 oz)  Body mass index is 29.73 kg/m².    Intake/Output - Last 3 Shifts         02/14 0700  02/15 0659 02/15 0700  02/16 0659 02/16 0700  02/17 0659    IV Piggyback 100      Total Intake(mL/kg) 100 (1.2)      Urine (mL/kg/hr)  550 (0.3)     Total Output  550     Net +100 -550            Urine Occurrence  2 x     Stool Occurrence  1 x              Physical Exam  Vitals and nursing note reviewed.   Constitutional:       General: She is not in acute distress.     Appearance: Normal appearance. She is well-developed. She is not ill-appearing or toxic-appearing.   HENT:      Head: Normocephalic and atraumatic.      Right Ear: External ear normal.      Left Ear: External ear normal.      Nose: Nose normal.      Comments: NG tube in place  Cardiovascular:      Rate and Rhythm: Normal " rate.   Pulmonary:      Effort: Pulmonary effort is normal. No respiratory distress.   Abdominal:      Tenderness: There is no guarding or rebound.      Comments: Soft, non-distended, appropriately TTP, no rebound, guarding or peritonitis    Musculoskeletal:         General: Normal range of motion.      Cervical back: Normal range of motion and neck supple.   Skin:     General: Skin is warm and dry.   Neurological:      Mental Status: She is alert and oriented to person, place, and time.   Psychiatric:         Mood and Affect: Mood normal.         Behavior: Behavior normal.          Significant Labs:  I have reviewed all pertinent lab results within the past 24 hours.  CBC:   Recent Labs   Lab 02/16/24 0612   WBC 4.45   RBC 3.73*   HGB 12.5   HCT 37.4      *   MCH 33.5*   MCHC 33.4     BMP:   Recent Labs   Lab 02/16/24 0612   GLU 78      K 3.6      CO2 19*   BUN 8   CREATININE 0.6   CALCIUM 7.8*   MG 1.8       Significant Diagnostics:  I have reviewed all pertinent imaging results/findings within the past 24 hours.  Assessment/Plan:     * SBO (small bowel obstruction)  Patient with recurrent small-bowel obstruction.  Will attempt nonoperative management.     - SBFT today as patient had a bowel movement to assess for transit of contrast - if normal, will consider removal NG and starting diet   - IV fluid resuscitation  - electrolyte replacement as needed.  Goal K/Mg/Phos > 4/3/2  -rest of care per primary team        Arabella Valenzuela PA-C  General Surgery  [facility] - Tele[facility] ([facility])

## 2024-02-17 VITALS
DIASTOLIC BLOOD PRESSURE: 66 MMHG | WEIGHT: 189.63 LBS | SYSTOLIC BLOOD PRESSURE: 120 MMHG | HEIGHT: 67 IN | TEMPERATURE: 98 F | OXYGEN SATURATION: 96 % | HEART RATE: 88 BPM | BODY MASS INDEX: 29.76 KG/M2 | RESPIRATION RATE: 18 BRPM

## 2024-02-17 PROCEDURE — 25000003 PHARM REV CODE 250: Performed by: INTERNAL MEDICINE

## 2024-02-17 PROCEDURE — 63600175 PHARM REV CODE 636 W HCPCS: Performed by: NURSE PRACTITIONER

## 2024-02-17 PROCEDURE — 25000003 PHARM REV CODE 250: Performed by: NURSE PRACTITIONER

## 2024-02-17 PROCEDURE — 99231 SBSQ HOSP IP/OBS SF/LOW 25: CPT | Mod: ,,, | Performed by: SURGERY

## 2024-02-17 RX ADMIN — Medication 400 MG: at 08:02

## 2024-02-17 RX ADMIN — PIPERACILLIN SODIUM AND TAZOBACTAM SODIUM 4.5 G: 4; .5 INJECTION, POWDER, FOR SOLUTION INTRAVENOUS at 02:02

## 2024-02-17 NOTE — PROGRESS NOTES
"[facility] ([facility])  General Surgery  Progress Note    Subjective:     History of Present Illness:  Patient is a 50-year-old female who presents with a small-bowel obstruction.  She had a proximally 2 days' worth of diffuse abdominal pain cramping associated with nausea and vomiting that was similar to her previous bowel obstructions.  Bilious emesis.  She has not passing flatus or had a bowel movement in 24 hours.  This is similar to her prior bowel resections.  11/2020 she underwent exploratory laparotomy for internal hernia with small bowel ischemia and underwent a small bowel resection at that time.  This was complicated by early postoperative bowel obstruction with another small-bowel resection with anastomosis.  She did well until 07/2022 when she underwent a diagnostic converted to open exploration for recurrent bowel obstruction requiring additional small bowel resection and ileocecectomy.       Post-Op Info:  * No surgery found *         Interval History: AFVSS.  RED.  Tolerating diet.  SBFT yesterday with normal transition time to colon     Medications:  Continuous Infusions:   0.45 % NaCl with KCl 20 mEq 100 mL/hr at 02/15/24 1804     Scheduled Meds:   piperacillin-tazobactam (Zosyn) IV (PEDS and ADULTS) (extended infusion is not appropriate)  4.5 g Intravenous Q8H    senna-docusate 8.6-50 mg  2 tablet Oral BID     PRN Meds:acetaminophen, dextrose 10%, dextrose 10%, glucagon (human recombinant), glucose, naloxone, ondansetron, promethazine, sodium chloride 0.9%     Review of patient's allergies indicates:   Allergen Reactions    Erythromycin Other (See Comments)     Stomach cramps    Morphine Nausea And Vomiting     "Projectile vomiting"     Objective:     Vital Signs (Most Recent):  Temp: 97.7 °F (36.5 °C) (02/17/24 1126)  Pulse: 88 (02/17/24 1126)  Resp: 18 (02/17/24 1126)  BP: 120/66 (02/17/24 1126)  SpO2: 96 % (02/17/24 1126) Vital Signs (24h Range):  Temp:  [97.2 °F (36.2 °C)-98.9 °F (37.2 " °C)] 97.7 °F (36.5 °C)  Pulse:  [] 88  Resp:  [16-18] 18  SpO2:  [93 %-96 %] 96 %  BP: (117-136)/(60-88) 120/66     Weight: 86 kg (189 lb 9.5 oz)  Body mass index is 29.69 kg/m².    Intake/Output - Last 3 Shifts         02/15 0700 02/16 0659 02/16 0700 02/17 0659 02/17 0700 02/18 0659    IV Piggyback 159.5      Total Intake(mL/kg) 159.5 (1.9)      Urine (mL/kg/hr) 550 (0.3)      Total Output 550      Net -390.5             Urine Occurrence 3 x      Stool Occurrence 2 x 1 x              Physical Exam     Significant Labs:  I have reviewed all pertinent lab results within the past 24 hours.  CBC:   Recent Labs   Lab 02/16/24  0612   WBC 4.45   RBC 3.73*   HGB 12.5   HCT 37.4      *   MCH 33.5*   MCHC 33.4     BMP:   Recent Labs   Lab 02/16/24  0612   GLU 78      K 3.6      CO2 19*   BUN 8   CREATININE 0.6   CALCIUM 7.8*   MG 1.8       Significant Diagnostics:  I have reviewed all pertinent imaging results/findings within the past 24 hours.  Assessment/Plan:     * SBO (small bowel obstruction)  Patient with recurrent small-bowel obstruction.  SBFT with normal transit time to colon.  Tolerated NGT removal and CLD yesterday.     -- ADAT   -- ok for d/c from surgery standpoint if she tolerates diet  -- remainder of care as per primary team         Monie Nation MD  General Surgery  [facility] ([facility])

## 2024-02-17 NOTE — ASSESSMENT & PLAN NOTE
Patient with recurrent small-bowel obstruction.  SBFT with normal transit time to colon.  Tolerated NGT removal and CLD yesterday.     -- ADAT   -- ok for d/c from surgery standpoint if she tolerates diet  -- remainder of care as per primary team

## 2024-02-17 NOTE — PROGRESS NOTES
"[facility] ([facility])  General Surgery  Progress Note    Subjective:     History of Present Illness:  Patient is a 50-year-old female who presents with a small-bowel obstruction.  She had a proximally 2 days' worth of diffuse abdominal pain cramping associated with nausea and vomiting that was similar to her previous bowel obstructions.  Bilious emesis.  She has not passing flatus or had a bowel movement in 24 hours.  This is similar to her prior bowel resections.  11/2020 she underwent exploratory laparotomy for internal hernia with small bowel ischemia and underwent a small bowel resection at that time.  This was complicated by early postoperative bowel obstruction with another small-bowel resection with anastomosis.  She did well until 07/2022 when she underwent a diagnostic converted to open exploration for recurrent bowel obstruction requiring additional small bowel resection and ileocecectomy.       Post-Op Info:  * No surgery found *         Interval History: AFVSS.  RED.  Tolerating diet.  SBFT yesterday with normal transition time to colon     Medications:  Continuous Infusions:   0.45 % NaCl with KCl 20 mEq 100 mL/hr at 02/15/24 1804     Scheduled Meds:   piperacillin-tazobactam (Zosyn) IV (PEDS and ADULTS) (extended infusion is not appropriate)  4.5 g Intravenous Q8H    senna-docusate 8.6-50 mg  2 tablet Oral BID     PRN Meds:acetaminophen, dextrose 10%, dextrose 10%, glucagon (human recombinant), glucose, naloxone, ondansetron, promethazine, sodium chloride 0.9%     Review of patient's allergies indicates:   Allergen Reactions    Erythromycin Other (See Comments)     Stomach cramps    Morphine Nausea And Vomiting     "Projectile vomiting"     Objective:     Vital Signs (Most Recent):  Temp: 97.7 °F (36.5 °C) (02/17/24 1126)  Pulse: 88 (02/17/24 1126)  Resp: 18 (02/17/24 1126)  BP: 120/66 (02/17/24 1126)  SpO2: 96 % (02/17/24 1126) Vital Signs (24h Range):  Temp:  [97.2 °F (36.2 °C)-98.9 °F (37.2 " °C)] 97.7 °F (36.5 °C)  Pulse:  [] 88  Resp:  [16-18] 18  SpO2:  [93 %-96 %] 96 %  BP: (117-136)/(60-88) 120/66     Weight: 86 kg (189 lb 9.5 oz)  Body mass index is 29.69 kg/m².    Intake/Output - Last 3 Shifts         02/15 0700 02/16 0659 02/16 0700 02/17 0659 02/17 0700 02/18 0659    IV Piggyback 159.5      Total Intake(mL/kg) 159.5 (1.9)      Urine (mL/kg/hr) 550 (0.3)      Total Output 550      Net -390.5             Urine Occurrence 3 x      Stool Occurrence 2 x 1 x              Physical Exam  Vitals and nursing note reviewed.   Constitutional:       General: She is not in acute distress.     Appearance: Normal appearance. She is well-developed. She is not ill-appearing or toxic-appearing.   HENT:      Head: Normocephalic and atraumatic.      Right Ear: External ear normal.      Left Ear: External ear normal.      Nose: Nose normal.   Cardiovascular:      Rate and Rhythm: Normal rate.   Pulmonary:      Effort: Pulmonary effort is normal. No respiratory distress.   Abdominal:      Tenderness: There is no guarding or rebound.      Comments: Soft, non-distended, appropriately TTP, no rebound, guarding or peritonitis    Musculoskeletal:         General: Normal range of motion.      Cervical back: Normal range of motion and neck supple.   Skin:     General: Skin is warm and dry.   Neurological:      Mental Status: She is alert and oriented to person, place, and time.   Psychiatric:         Mood and Affect: Mood normal.         Behavior: Behavior normal.          Significant Labs:  I have reviewed all pertinent lab results within the past 24 hours.  CBC:   Recent Labs   Lab 02/16/24  0612   WBC 4.45   RBC 3.73*   HGB 12.5   HCT 37.4      *   MCH 33.5*   MCHC 33.4     BMP:   Recent Labs   Lab 02/16/24  0612   GLU 78      K 3.6      CO2 19*   BUN 8   CREATININE 0.6   CALCIUM 7.8*   MG 1.8       Significant Diagnostics:  I have reviewed all pertinent imaging results/findings within  the past 24 hours.  Assessment/Plan:     * SBO (small bowel obstruction)  Patient with recurrent small-bowel obstruction.  SBFT with normal transit time to colon.  Tolerated NGT removal and CLD yesterday.     -- ADAT   -- ok for d/c from surgery standpoint if she tolerates diet  -- remainder of care as per primary team     Thank you for your consult.  I will sign off.  Please contact us if you have any additional questions.      Monie Nation MD  General Surgery  [facility] ([facility])

## 2024-02-17 NOTE — SUBJECTIVE & OBJECTIVE
"Interval History: AFVSS.  RED.  Tolerating diet.  SBFT yesterday with normal transition time to colon     Medications:  Continuous Infusions:   0.45 % NaCl with KCl 20 mEq 100 mL/hr at 02/15/24 1804     Scheduled Meds:   piperacillin-tazobactam (Zosyn) IV (PEDS and ADULTS) (extended infusion is not appropriate)  4.5 g Intravenous Q8H    senna-docusate 8.6-50 mg  2 tablet Oral BID     PRN Meds:acetaminophen, dextrose 10%, dextrose 10%, glucagon (human recombinant), glucose, naloxone, ondansetron, promethazine, sodium chloride 0.9%     Review of patient's allergies indicates:   Allergen Reactions    Erythromycin Other (See Comments)     Stomach cramps    Morphine Nausea And Vomiting     "Projectile vomiting"     Objective:     Vital Signs (Most Recent):  Temp: 97.7 °F (36.5 °C) (02/17/24 1126)  Pulse: 88 (02/17/24 1126)  Resp: 18 (02/17/24 1126)  BP: 120/66 (02/17/24 1126)  SpO2: 96 % (02/17/24 1126) Vital Signs (24h Range):  Temp:  [97.2 °F (36.2 °C)-98.9 °F (37.2 °C)] 97.7 °F (36.5 °C)  Pulse:  [] 88  Resp:  [16-18] 18  SpO2:  [93 %-96 %] 96 %  BP: (117-136)/(60-88) 120/66     Weight: 86 kg (189 lb 9.5 oz)  Body mass index is 29.69 kg/m².    Intake/Output - Last 3 Shifts         02/15 0700  02/16 0659 02/16 0700  02/17 0659 02/17 0700  02/18 0659    IV Piggyback 159.5      Total Intake(mL/kg) 159.5 (1.9)      Urine (mL/kg/hr) 550 (0.3)      Total Output 550      Net -390.5             Urine Occurrence 3 x      Stool Occurrence 2 x 1 x              Physical Exam  Vitals and nursing note reviewed.   Constitutional:       General: She is not in acute distress.     Appearance: Normal appearance. She is well-developed. She is not ill-appearing or toxic-appearing.   HENT:      Head: Normocephalic and atraumatic.      Right Ear: External ear normal.      Left Ear: External ear normal.      Nose: Nose normal.   Cardiovascular:      Rate and Rhythm: Normal rate.   Pulmonary:      Effort: Pulmonary effort is normal. No " respiratory distress.   Abdominal:      Tenderness: There is no guarding or rebound.      Comments: Soft, non-distended, appropriately TTP, no rebound, guarding or peritonitis    Musculoskeletal:         General: Normal range of motion.      Cervical back: Normal range of motion and neck supple.   Skin:     General: Skin is warm and dry.   Neurological:      Mental Status: She is alert and oriented to person, place, and time.   Psychiatric:         Mood and Affect: Mood normal.         Behavior: Behavior normal.          Significant Labs:  I have reviewed all pertinent lab results within the past 24 hours.  CBC:   Recent Labs   Lab 02/16/24  0612   WBC 4.45   RBC 3.73*   HGB 12.5   HCT 37.4      *   MCH 33.5*   MCHC 33.4     BMP:   Recent Labs   Lab 02/16/24  0612   GLU 78      K 3.6      CO2 19*   BUN 8   CREATININE 0.6   CALCIUM 7.8*   MG 1.8       Significant Diagnostics:  I have reviewed all pertinent imaging results/findings within the past 24 hours.

## 2024-02-17 NOTE — PLAN OF CARE
Patient admitted to unit d/t SBO.  Patient is AAO x 4 and is able to make needs known to staff.  NG tube to left nare connected to LIWS with no drainage in canister but minimal drainage noted in tubing.  Patient with small bowel movement and positive bowel sounds x 4 quads.  Patient with c/o headache and was medicated as ordered with full relief obtained. One PIV removed d/t patient c/o burning.  Patient currently receiving IVFs with 20meq K+ added to the bag.  Explained this could be the reason for the discomfort as there is no visible signs of infiltration or other dysfunction noted.  PIV removed anyway d/t patient request. Patient with three different nurses attempting to restart another PIV d/t patient has order for Zosyn IV Q 8 hrs. With no success. Patient then stated her NG tube was not working because she had no drainage in canister.  States she has had 4 in the past and those times there was stuff filling the canister up.  Educated patient on the fact that she has bowel sounds and she is also having bowel movements it is more than likely she does not have anything building up because her body is able to move whatever on its own.  NG placement checked and confirmed. Patient finally did fall asleep without any other complaints or issues.  Safety maintained.  Will continue to follow current plan of care.

## 2024-02-17 NOTE — PLAN OF CARE
[facility] ([facility])  Discharge Final Note    Primary Care Provider: Tay Duff MD    Expected Discharge Date: 2/17/2024    Final Discharge Note (most recent)       Final Note - 02/17/24 0858          Final Note    Assessment Type Final Discharge Note     Anticipated Discharge Disposition Home or Self Care        Post-Acute Status    Discharge Delays None known at this time                     Important Message from Medicare             Contact Info       Tay Duff MD   Specialty: Internal Medicine   Relationship: PCP - General    [address]   [address]  [facility] [address]   Phone: [phone]       Next Steps: Follow up in 3 day(s)    Annita Gamino MD   Specialty: Urology    [address]  [place] [address]   Phone: [phone]       Next Steps: Follow up in 2 week(s)

## 2024-02-17 NOTE — PLAN OF CARE
Patient remained free from falls throughout shift, call bell within reach. Tolerating clear liquid diet. Refusing IVF with KCL, states it burns. Replacing K with PO. PO phenergan given for nausea. Zosyn continued. Vitals stable.

## 2024-02-19 NOTE — DISCHARGE SUMMARY
[facility] - Telemetry ([facility] [place] Medicine  Discharge Summary      Patient Name: Genny Calixto  MRN: [id number]  CLINTON: [id number]  Patient Class: IP- Inpatient  Admission Date: 2/15/2024  Hospital Length of Stay: 2 days  Discharge Date and Time: 2/17/2024  4:33 PM  Attending Physician: No att. providers found   Discharging Provider: Aracely Garrido MD  Primary Care Provider: Tay Duff MD    Primary Care Team: Networked reference to record PCT     HPI:   Genny Calixto is a 58 y.o. female with a PMH  has a past medical history of Encounter for blood transfusion, KENN (generalized anxiety disorder), Insomnia, Migraine headache, Nephrolithiasis (08/03/2019), Reflex sympathetic dystrophy, Small bowel obstruction, UTI (urinary tract infection), and Vertigo.  Presented to the ER for evaluation of diffuse abdominal pain with associated nausea and vomiting as well as subjective fever.  Patient reports his symptoms started last night.  Reports symptoms feel similar to previous small-bowel obstruction.  Denies any suspicious food intake or recent illnesses.  Denies constipation or diarrhea.  Patient received 2.5 mg of droperidol and 500 mL of normal saline prior to arrival via EMS.  Patient denies any blood in his vomitus.  Denies any chest pain, palpitations, shortness breath, headache, lightheadedness, dizziness, fever, chills, sweats, dysuria, hematuria, melena, hematochezia or any other symptoms at this time.    ER lab work mostly unremarkable with the exception to procalcitonin level of 2.35 and lactic acid of 2.4.  Flu/COVID negative.  CT abdomen and pelvis concerning for small bowel obstruction.  General surgery consulted with recommendation of NG tube placement, NPO, and evaluation in a.m..  Patient in agreement with treatment plan.  Hospital Medicine consulted for admission to hospital for small-bowel obstruction.  Patient will be admitted under inpatient status.    PCP: Tay Duff  A.    * No surgery found *      Hospital Course:   Patient seen in consultation by surgery.  She was felt to have a recurrent small-bowel obstruction.  NG-tube was placed, patient was placed NPO with bowel rest, IV resuscitation was begun.  And planned for small bowel follow-through in a.m..  Patient had significant postvoid residual urine and Rosales catheter was ordered.    Patient refused Rosales catheter overnight.  She still has incomplete emptying but much less volume about 100-200 cc.  Had BM with some formed stool today.  Patient was seen by surgery small bowel follow-through obtained with normal transit time to colon.  NG tube removed and patient started on clear liquids.    Patient tolerated clear liquid with more formed bowel movements.  She had some cramping and therefore she remained overnight.  Patient has had no complaints was seen and examined on day of discharge and stable for discharge home.  She has been instructed to continue her home meds to add MiraLax and to follow up with her urologist due to incomplete bladder emptying.     Goals of Care Treatment Preferences:  Code Status: Full Code      Consults:   Consults (From admission, onward)          Status Ordering Provider     Inpatient consult to General Surgery  Once        Provider:  Cathleen Sanon MD    Completed POOL ELDER            Renal/  Incomplete emptying of bladder  -recommended Rosales catheter patient refused  -much lower postvoid residual  -encourage patient to follow up with her urologist Dr. Gamino upon discharge      GI  * SBO (small bowel obstruction)  Plan:  -appreciate General surgery's recommendations  -small bowel follow-through unremarkable  -DC NG-tube  -start clear liquid diet  Told to slowly advance diet      Other  Anxiety  Plan:  -anxiolytics prn        Final Active Diagnoses:    Diagnosis Date Noted POA    PRINCIPAL PROBLEM:  SBO (small bowel obstruction) [K56.609] 05/31/2022 Yes    Incomplete emptying of  bladder [R33.9] 02/15/2024 Yes    Anxiety [F41.9] 02/24/2017 Yes      Problems Resolved During this Admission:       Discharged Condition: fair    Disposition: Home or Self Care    Follow Up:   Follow-up Information       Tay Duff MD Follow up in 3 day(s).    Specialty: Internal Medicine  Contact information:  [address]  [address]  [place] [address]  [phone]               Annita Gamino MD Follow up in 2 week(s).    Specialty: Urology  Contact information:  [address] [facility]  [place] [address] [phone]                           Patient Instructions:      Ambulatory referral/consult to Outpatient Case Management   Referral Priority: Routine Referral Type: Consultation   Referral Reason: Specialty Services Required   Number of Visits Requested: 1     Diet Adult Regular   Order Comments: Soft mild foods to begin and advance as tolerated     Notify your health care provider if you experience any of the following:  temperature >100.4     Notify your health care provider if you experience any of the following:  persistent nausea and vomiting or diarrhea     Notify your health care provider if you experience any of the following:  severe uncontrolled pain     Activity as tolerated       Significant Diagnostic Studies: Labs: All labs within the past 24 hours have been reviewed  Imaging Results              X-Ray Chest 1 View (Final result)  Result time 02/15/24 07:55:23   Procedure changed from XR Gastric tube check, non-radiologist performed     Final result by Jose Estrada MD (02/15/24 07:55:23)                   Impression:      No acute process seen.      Electronically signed by: Jose Estrada MD  Date:    02/15/2024  Time:    07:55               Narrative:    EXAMINATION:  XR CHEST 1 VIEW    CLINICAL HISTORY:  small bowel obstruction;    FINDINGS:  Single view of the chest.  Comparison 02/15/2024    Cardiac silhouette is normal.  The lungs demonstrate no evidence of active  disease.  No evidence of pleural effusion or pneumothorax.  Bones appear intact.  Mild degenerative change.  Nasogastric tube projects within the stomach.                                       CT Abdomen Pelvis  Without Contrast (Final result)  Result time 02/15/24 06:43:14      Final result by Jose Estrada MD (02/15/24 06:43:14)                   Impression:      Progressive increasing dilated small bowel loops are seen within the mid and right lower quadrant to a surgical anastomosis within the right lower quadrant concerning for partial distal small bowel obstruction.  Recommend surgery consult.    Evaluation of solid organ and vascular pathology is limited due to lack of IV contrast.    All CT scans at this facility use dose modulation, iterative reconstruction, and/or weight based dosing when appropriate to reduce radiation dose to as low as reasonable achievable.      Electronically signed by: Jose Estrada MD  Date:    02/15/2024  Time:    06:43               Narrative:    EXAMINATION:  CT ABDOMEN PELVIS WITHOUT CONTRAST    CLINICAL HISTORY:  Abdominal pain, acute, nonlocalized;    TECHNIQUE:  Low dose axial images, sagittal and coronal reformations were obtained from the lung bases to the pubic symphysis.  Oral contrast was not administered.    COMPARISON:  12/07/2023    FINDINGS:  Heart: Normal size. No effusion.    Lung Bases: Clear.  Dependent changes noted.    Liver: Normal size and attenuation. No focal lesions.    Gallbladder: Gallbladder is surgically absent    Bile Ducts: No dilatation.    Pancreas: No obvious mass. No peripancreatic fat stranding.    Spleen: Normal.    Adrenals: Normal.    Kidneys/Ureters: No mass, hydroureteronephrosis, or nephroureterolithiasis.    Bladder: No wall thickening.    Reproductive organs: Normal.    GI Tract/Mesentery: Stomach is unremarkable.  Proximal small bowel is decompressed.  Progressive increasing dilated small bowel loops are seen within the mid and right  lower quadrant to a surgical anastomosis within the right lower quadrant concerning for partial distal small bowel obstruction.  Small bowel loops measure greater than 4 mm.  Suspect partial right hemicolectomy.  Diverticulosis within the descending and sigmoid colon.    Peritoneal Space: No ascites or free air.    Retroperitoneum: No significant adenopathy.    Abdominal wall: Midline scar.  Small fat containing umbilical hernia.    Vasculature: No aneurysm.    Bones: No acute fracture.  Moderate degenerative changes.  No suspicious lytic or sclerotic lesions.                                       X-Ray Chest AP Portable (Final result)  Result time 02/15/24 01:23:34      Final result by James Alvarado MD (02/15/24 01:23:34)                   Impression:      No acute findings.      Electronically signed by: James Alvarado  Date:    02/15/2024  Time:    01:23               Narrative:    EXAMINATION:  XR CHEST AP PORTABLE    CLINICAL HISTORY:  Sepsis;    TECHNIQUE:  Single frontal view of the chest was performed.    COMPARISON:  11/14/2023    FINDINGS:  Lungs are clear. No focal consolidation. No pleural effusion. No pneumothorax. Normal heart size.                                      Pending Diagnostic Studies:       None           Medications:  Reconciled Home Medications:      Medication List        CHANGE how you take these medications      diazePAM 5 MG tablet  Commonly known as: VALIUM  Take one with onset of migraine  What changed: Another medication with the same name was removed. Continue taking this medication, and follow the directions you see here.            CONTINUE taking these medications      cyclobenzaprine 10 MG tablet  Commonly known as: FLEXERIL  Take 10 mg by mouth every evening.     diphenhydrAMINE 25 mg capsule  Commonly known as: BENADRYL  Take 25 mg by mouth 2 (two) times daily as needed for Itching.     doxepin 100 MG capsule  Commonly known as: SINEQUAN  Take 1 capsule (100 mg  total) by mouth every evening.     LINZESS 290 mcg Cap capsule  Generic drug: linaCLOtide  TAKE 1 CAPSULE BY MOUTH DAILY BEFORE BREAKFAST     multivitamin capsule  Take 1 capsule by mouth once daily.     ondansetron 4 MG tablet  Commonly known as: ZOFRAN  Take 1 tablet (4 mg total) by mouth 2 (two) times daily.     PROBIOTIC 3 billion cell Cap  Generic drug: lactobacillus combination no.4  Take 1 capsule by mouth once daily.     PROBIOTIC COMPLEX ORAL  Take 1 capsule by mouth once daily at 6am.     spironolactone 50 MG tablet  Commonly known as: ALDACTONE  Take 1 tablet (50 mg total) by mouth 2 (two) times daily.     sumatriptan 100 MG tablet  Commonly known as: IMITREX  Take one with onset of migraine, may repeat once two hours later if migraine persists     traMADoL 50 mg tablet  Commonly known as: ULTRAM  Take 50 mg by mouth every 6 (six) hours.            STOP taking these medications      fluconazole 150 MG Tab  Commonly known as: DIFLUCAN              Indwelling Lines/Drains at time of discharge:   Lines/Drains/Airways       None                   Time spent on the discharge of patient: 45 minutes         Aracely Garrido MD  Department of Hospital Medicine  [facility] - Telemetry ([facility])

## 2024-02-19 NOTE — ASSESSMENT & PLAN NOTE
Plan:  -appreciate General surgery's recommendations  -small bowel follow-through unremarkable  -DC NG-tube  -start clear liquid diet  Told to slowly advance diet

## 2024-02-20 LAB
BACTERIA BLD CULT: NORMAL
BACTERIA BLD CULT: NORMAL

## 2024-02-23 ENCOUNTER — PATIENT MESSAGE (OUTPATIENT)
Dept: INTERNAL MEDICINE | Facility: CLINIC | Age: 59
End: 2024-02-23
Payer: MEDICARE

## 2024-02-23 RX ORDER — DOXEPIN HYDROCHLORIDE 25 MG/1
25 CAPSULE ORAL
Qty: 90 CAPSULE | Refills: 0 | Status: SHIPPED | OUTPATIENT
Start: 2024-02-23 | End: 2024-05-28 | Stop reason: SDUPTHER

## 2024-02-23 RX ORDER — DOXEPIN HYDROCHLORIDE 100 MG/1
100 CAPSULE ORAL NIGHTLY
Qty: 90 CAPSULE | Refills: 1 | Status: SHIPPED | OUTPATIENT
Start: 2024-02-23 | End: 2025-02-22

## 2024-02-23 NOTE — TELEPHONE ENCOUNTER
Requested Prescriptions     Pending Prescriptions Disp Refills    doxepin (SINEQUAN) 25 MG capsule [Pharmacy Med Name: Doxepin HCl 25 MG Oral Capsule] 90 capsule 0     Sig: TAKE 1 CAPSULE BY MOUTH IN THE EVENING     LV 09/29/2023  NV 03/20/2024  LF  12/18/2023

## 2024-02-26 ENCOUNTER — OUTPATIENT CASE MANAGEMENT (OUTPATIENT)
Dept: ADMINISTRATIVE | Facility: OTHER | Age: 59
End: 2024-02-26
Payer: MEDICARE

## 2024-02-26 NOTE — PROGRESS NOTES
Outpatient Care Management  Patient Not Qualified    Patient: Genny Calixto  MRN:  [id number]  Date of Service:  2/26/2024  Completed by:  Rasheeda Vidales RN    Chief Complaint   Patient presents with    OPCM Enrollment Call    Case Closure       Patient Summary           Reason Not Qualified:  Followed by insurance        
done

## 2024-02-27 DIAGNOSIS — Z00.00 ENCOUNTER FOR MEDICARE ANNUAL WELLNESS EXAM: ICD-10-CM

## 2024-03-07 ENCOUNTER — PATIENT MESSAGE (OUTPATIENT)
Dept: INTERNAL MEDICINE | Facility: CLINIC | Age: 59
End: 2024-03-07
Payer: MEDICARE

## 2024-03-07 ENCOUNTER — PATIENT MESSAGE (OUTPATIENT)
Dept: GASTROENTEROLOGY | Facility: CLINIC | Age: 59
End: 2024-03-07
Payer: MEDICARE

## 2024-03-08 RX ORDER — PANTOPRAZOLE SODIUM 40 MG/1
40 TABLET, DELAYED RELEASE ORAL DAILY
Qty: 30 TABLET | Refills: 6 | Status: SHIPPED | OUTPATIENT
Start: 2024-03-08 | End: 2024-10-04

## 2024-03-08 RX ORDER — PANTOPRAZOLE SODIUM 40 MG/1
40 TABLET, DELAYED RELEASE ORAL DAILY
Qty: 30 TABLET | Refills: 6 | Status: SHIPPED | OUTPATIENT
Start: 2024-03-08 | End: 2024-03-08 | Stop reason: SDUPTHER

## 2024-03-11 ENCOUNTER — PATIENT MESSAGE (OUTPATIENT)
Dept: GASTROENTEROLOGY | Facility: CLINIC | Age: 59
End: 2024-03-11
Payer: MEDICARE

## 2024-03-11 ENCOUNTER — PATIENT MESSAGE (OUTPATIENT)
Dept: INTERNAL MEDICINE | Facility: CLINIC | Age: 59
End: 2024-03-11
Payer: MEDICARE

## 2024-03-22 PROBLEM — Z87.19 HISTORY OF SMALL BOWEL OBSTRUCTION: Status: ACTIVE | Noted: 2024-03-22

## 2024-04-18 NOTE — TELEPHONE ENCOUNTER
Refills requested on Linzess QA; last refill given on 01/29/24 90 tabs with 9 refills; last office visit on 06/22/23

## 2024-04-23 RX ORDER — LINACLOTIDE 290 UG/1
290 CAPSULE, GELATIN COATED ORAL
Qty: 90 CAPSULE | Refills: 0 | Status: SHIPPED | OUTPATIENT
Start: 2024-04-23

## 2024-04-24 ENCOUNTER — PATIENT MESSAGE (OUTPATIENT)
Dept: INTERNAL MEDICINE | Facility: CLINIC | Age: 59
End: 2024-04-24
Payer: MEDICARE

## 2024-04-25 ENCOUNTER — LAB VISIT (OUTPATIENT)
Dept: LAB | Facility: HOSPITAL | Age: 59
End: 2024-04-25
Attending: INTERNAL MEDICINE
Payer: MEDICARE

## 2024-04-25 ENCOUNTER — OFFICE VISIT (OUTPATIENT)
Dept: INTERNAL MEDICINE | Facility: CLINIC | Age: 59
End: 2024-04-25
Payer: MEDICARE

## 2024-04-25 VITALS
TEMPERATURE: 98 F | HEART RATE: 98 BPM | WEIGHT: 190.94 LBS | SYSTOLIC BLOOD PRESSURE: 118 MMHG | DIASTOLIC BLOOD PRESSURE: 68 MMHG | BODY MASS INDEX: 29.9 KG/M2 | OXYGEN SATURATION: 93 %

## 2024-04-25 DIAGNOSIS — F41.9 ANXIETY: ICD-10-CM

## 2024-04-25 DIAGNOSIS — E78.5 HYPERLIPIDEMIA, UNSPECIFIED HYPERLIPIDEMIA TYPE: ICD-10-CM

## 2024-04-25 DIAGNOSIS — Z01.818 PREOP EXAMINATION: Primary | ICD-10-CM

## 2024-04-25 DIAGNOSIS — Z01.818 PREOP EXAMINATION: ICD-10-CM

## 2024-04-25 LAB
ALBUMIN SERPL BCP-MCNC: 3.9 G/DL (ref 3.5–5.2)
ALP SERPL-CCNC: 82 U/L (ref 55–135)
ALT SERPL W/O P-5'-P-CCNC: 26 U/L (ref 10–44)
ANION GAP SERPL CALC-SCNC: 9 MMOL/L (ref 8–16)
APTT PPP: 26.7 SEC (ref 21–32)
AST SERPL-CCNC: 22 U/L (ref 10–40)
BASOPHILS # BLD AUTO: 0.04 K/UL (ref 0–0.2)
BASOPHILS NFR BLD: 0.7 % (ref 0–1.9)
BILIRUB SERPL-MCNC: 0.8 MG/DL (ref 0.1–1)
BUN SERPL-MCNC: 17 MG/DL (ref 6–20)
CALCIUM SERPL-MCNC: 10 MG/DL (ref 8.7–10.5)
CHLORIDE SERPL-SCNC: 106 MMOL/L (ref 95–110)
CO2 SERPL-SCNC: 26 MMOL/L (ref 23–29)
CREAT SERPL-MCNC: 0.8 MG/DL (ref 0.5–1.4)
DIFFERENTIAL METHOD BLD: ABNORMAL
EOSINOPHIL # BLD AUTO: 0.2 K/UL (ref 0–0.5)
EOSINOPHIL NFR BLD: 3.7 % (ref 0–8)
ERYTHROCYTE [DISTWIDTH] IN BLOOD BY AUTOMATED COUNT: 13.1 % (ref 11.5–14.5)
EST. GFR  (NO RACE VARIABLE): >60 ML/MIN/1.73 M^2
GLUCOSE SERPL-MCNC: 87 MG/DL (ref 70–110)
HCT VFR BLD AUTO: 40.2 % (ref 37–48.5)
HGB BLD-MCNC: 13.2 G/DL (ref 12–16)
IMM GRANULOCYTES # BLD AUTO: 0.02 K/UL (ref 0–0.04)
IMM GRANULOCYTES NFR BLD AUTO: 0.4 % (ref 0–0.5)
INR PPP: 0.9 (ref 0.8–1.2)
LYMPHOCYTES # BLD AUTO: 1.6 K/UL (ref 1–4.8)
LYMPHOCYTES NFR BLD: 27.7 % (ref 18–48)
MCH RBC QN AUTO: 34.3 PG (ref 27–31)
MCHC RBC AUTO-ENTMCNC: 32.8 G/DL (ref 32–36)
MCV RBC AUTO: 104 FL (ref 82–98)
MONOCYTES # BLD AUTO: 0.4 K/UL (ref 0.3–1)
MONOCYTES NFR BLD: 7.2 % (ref 4–15)
NEUTROPHILS # BLD AUTO: 3.4 K/UL (ref 1.8–7.7)
NEUTROPHILS NFR BLD: 60.3 % (ref 38–73)
NRBC BLD-RTO: 0 /100 WBC
PLATELET # BLD AUTO: 287 K/UL (ref 150–450)
PMV BLD AUTO: 10.9 FL (ref 9.2–12.9)
POTASSIUM SERPL-SCNC: 4.5 MMOL/L (ref 3.5–5.1)
PROT SERPL-MCNC: 6.5 G/DL (ref 6–8.4)
PROTHROMBIN TIME: 10.3 SEC (ref 9–12.5)
RBC # BLD AUTO: 3.85 M/UL (ref 4–5.4)
SODIUM SERPL-SCNC: 141 MMOL/L (ref 136–145)
WBC # BLD AUTO: 5.66 K/UL (ref 3.9–12.7)

## 2024-04-25 PROCEDURE — 99214 OFFICE O/P EST MOD 30 MIN: CPT | Mod: S$PBB,,, | Performed by: INTERNAL MEDICINE

## 2024-04-25 PROCEDURE — 99999 PR PBB SHADOW E&M-EST. PATIENT-LVL V: CPT | Mod: PBBFAC,,, | Performed by: INTERNAL MEDICINE

## 2024-04-25 PROCEDURE — 87081 CULTURE SCREEN ONLY: CPT | Performed by: INTERNAL MEDICINE

## 2024-04-25 PROCEDURE — 36415 COLL VENOUS BLD VENIPUNCTURE: CPT | Mod: PO | Performed by: INTERNAL MEDICINE

## 2024-04-25 PROCEDURE — 85610 PROTHROMBIN TIME: CPT | Mod: GA | Performed by: INTERNAL MEDICINE

## 2024-04-25 PROCEDURE — 99215 OFFICE O/P EST HI 40 MIN: CPT | Mod: PBBFAC,PO | Performed by: INTERNAL MEDICINE

## 2024-04-25 PROCEDURE — 80053 COMPREHEN METABOLIC PANEL: CPT | Performed by: INTERNAL MEDICINE

## 2024-04-25 PROCEDURE — 85025 COMPLETE CBC W/AUTO DIFF WBC: CPT | Performed by: INTERNAL MEDICINE

## 2024-04-25 PROCEDURE — 85730 THROMBOPLASTIN TIME PARTIAL: CPT | Mod: GA | Performed by: INTERNAL MEDICINE

## 2024-04-25 NOTE — PROGRESS NOTES
HPI:  Patient is a 58-year-old female who comes in today for preoperative clearance to have total knee replacement done on left knee.  Patient states she is otherwise doing well.  She has no other problems or complaints.  She has had no prior problems wit surgery.  She denies any chest pain or shortness a breath.    Current MEDS: medcard review, verified and update  Allergies: Per the electronic medical record    Past Medical History:   Diagnosis Date    Encounter for blood transfusion     KENN (generalized anxiety disorder)     Takes 5-10 mg of valium qd prn anxiety (prescriptions for both on file, one from [facility] & other from )    History of small bowel obstruction     Insomnia     Takes 5-10 mg of valium qhs prn insomnia (prescriptions for both on file, one from [facility] & other from )    Migraine headache     Nephrolithiasis 08/03/2019    Left ureteral stone -> UTI c/b pyelonephritis and urosepsis w/ hypokalemia -> transfered to [facility] urology service from [facility] after CT abn dx, s/p 2 stents to allow infx to drain, IV Vanc/Rocephin, fever free since yesterday    Reflex sympathetic dystrophy     UTI (urinary tract infection)     Vertigo        Past Surgical History:   Procedure Laterality Date    BLADDER SURGERY      CHOLECYSTECTOMY      COLONOSCOPY N/A 11/10/2021    Procedure: COLONOSCOPY;  Surgeon: Eryn Rothman MD;  Location: [facility];  Service: Endoscopy;  Laterality: N/A;    CYSTOSCOPY W/ RETROGRADES Bilateral 8/24/2023    Procedure: CYSTOSCOPY, WITH RETROGRADE PYELOGRAM;  Surgeon: Annita Gamino MD;  Location: [facility] OR;  Service: Urology;  Laterality: Bilateral;    CYSTOSCOPY WITH BIOPSY OF BLADDER N/A 8/24/2023    Procedure: CYSTOSCOPY, WITH BLADDER BIOPSY, WITH FULGURATION IF INDICATED;  Surgeon: Annita Gamino MD;  Location: [facility] OR;  Service: Urology;  Laterality: N/A;    CYSTOSCOPY WITH URETEROSCOPY, RETROGRADE PYELOGRAPHY, AND INSERTION OF STENT Right 9/30/2021    Procedure:  CYSTOSCOPY, WITH RETROGRADE PYELOGRAM AND URETERAL STENT INSERTION;  Surgeon: Annita Gamino MD;  Location: [facility] OR;  Service: Urology;  Laterality: Right;    DIAGNOSTIC LAPAROSCOPY N/A 11/11/2020    Procedure: LAPAROSCOPY, DIAGNOSTIC;  Surgeon: Tee Segura MD;  Location: [facility] OR;  Service: General;  Laterality: N/A;  CONVERTED TO OPEN    DIAGNOSTIC LAPAROSCOPY N/A 7/25/2022    Procedure: LAPAROSCOPY, DIAGNOSTIC;  Surgeon: Jo Manzano DO;  Location: [facility] OR;  Service: General;  Laterality: N/A;  convert to open at 0739    ESOPHAGOGASTRODUODENOSCOPY N/A 11/10/2021    Procedure: EGD (ESOPHAGOGASTRODUODENOSCOPY);  Surgeon: Eryn Rothman MD;  Location: [facility];  Service: Endoscopy;  Laterality: N/A;    facet injections  02/14/2017    L3, L4, L5, S1 Done by Dr. Lara    HYSTERECTOMY      INJECTION OF ANESTHETIC AGENT INTO TISSUE PLANE DEFINED BY TRANSVERSUS ABDOMINIS MUSCLE N/A 11/11/2020    Procedure: BLOCK, TRANSVERSUS ABDOMINIS PLANE;  Surgeon: Tee Segura MD;  Location: [facility];  Service: General;  Laterality: N/A;    INJECTION OF ANESTHETIC AGENT INTO TISSUE PLANE DEFINED BY TRANSVERSUS ABDOMINIS MUSCLE N/A 7/25/2022    Procedure: BLOCK, TRANSVERSUS ABDOMINIS PLANE;  Surgeon: Jo Manzano DO;  Location: [facility];  Service: General;  Laterality: N/A;    KNEE SURGERY      LAPAROSCOPIC LYSIS OF ADHESIONS N/A 11/11/2020    Procedure: LYSIS, ADHESIONS, LAPAROSCOPIC;  Surgeon: Tee Segura MD;  Location: [facility] OR;  Service: General;  Laterality: N/A;  CONVERTED TO OPEN    LAPAROTOMY N/A 11/20/2020    Procedure: LAPAROTOMY;  Surgeon: Tee Segura MD;  Location: [facility] OR;  Service: General;  Laterality: N/A;    LASER LITHOTRIPSY Left 2/8/2021    Procedure: LITHOTRIPSY, USING LASER;  Surgeon: Irving Kidd MD;  Location: [facility];  Service: Urology;  Laterality: Left;    LASER LITHOTRIPSY Right 10/13/2021    Procedure: LITHOTRIPSY, USING LASER;  Surgeon: Annita PEREZ  MD Stevenson;  Location: [facility] OR;  Service: Urology;  Laterality: Right;    LYSIS OF ADHESIONS N/A 11/11/2020    Procedure: LYSIS, ADHESIONS;  Surgeon: Tee Segura MD;  Location: [facility] OR;  Service: General;  Laterality: N/A;    LYSIS OF ADHESIONS N/A 11/20/2020    Procedure: LYSIS, ADHESIONS;  Surgeon: Tee Segura MD;  Location: [facility] OR;  Service: General;  Laterality: N/A;    LYSIS OF ADHESIONS N/A 7/25/2022    Procedure: LYSIS, ADHESIONS;  Surgeon: Jo Manzano DO;  Location: [facility] OR;  Service: General;  Laterality: N/A;    SURGICAL REMOVAL OF ILEUM WITH CECUM  7/25/2022    Procedure: EXCISION, CECUM WITH ILEUM;  Surgeon: Jo Manzano DO;  Location: [facility] OR;  Service: General;;    TONSILLECTOMY      URETEROSCOPY Left 2/8/2021    Procedure: URETEROSCOPY;  Surgeon: Irving Kidd MD;  Location: [facility];  Service: Urology;  Laterality: Left;  stent placement    URETEROSCOPY Right 10/13/2021    Procedure: URETEROSCOPY;  Surgeon: Annita Gamino MD;  Location: [facility];  Service: Urology;  Laterality: Right;       SHx: per the electronic medical record    FHx: recorded in the electronic medical record    ROS:    denies any chest pains or shortness of breath. Denies any nausea, vomiting or diarrhea. Denies any fever, chills or sweats. Denies any change in weight, voice, stool, skin or hair. Denies any dysuria, dyspepsia or dysphagia. Denies any change in vision, hearing or headaches. Denies any swollen lymph nodes or loss of memory.    PE:  /68 (BP Location: Left arm, Patient Position: Sitting, BP Method: Large (Manual))   Pulse 98   Temp 97.8 °F (36.6 °C) (Tympanic)   Wt 86.6 kg (190 lb 14.7 oz)   SpO2 (!) 93%   BMI 29.90 kg/m²   Gen: Well-developed, well-nourished, female, in no acute distress, oriented x3  HEENT: neck is supple, no adenopathy, carotids 2+ equal without bruits, thyroid exam normal size without nodules.  CHEST: clear to auscultation and percussion  CVS:  regular rate and rhythm without significant murmur, gallop, or rubs  ABD: soft, benign, no rebound no guarding, no distention. Bowel sounds are normal.     Nontender,  no palpable masses, no organomegaly and no audible bruits.    Lab Results   Component Value Date    WBC 5.66 04/25/2024    HGB 13.2 04/25/2024    HCT 40.2 04/25/2024     04/25/2024    CHOL 140 10/20/2020    TRIG 103 10/20/2020    HDL 55 10/20/2020    ALT 26 04/25/2024    AST 22 04/25/2024     04/25/2024    K 4.5 04/25/2024     04/25/2024    CREATININE 0.8 04/25/2024    BUN 17 04/25/2024    CO2 26 04/25/2024    TSH 2.427 06/22/2023    INR 0.9 04/25/2024    HGBA1C 5.8 03/07/2017    BNP 12 02/15/2024    SEDRATE 28 (H) 05/21/2017    CRP 28.1 (H) 05/21/2017       Impression:  Osteoarthritis of the left knee requiring total knee replacement  All of her other medical problems as identified below are maximally optimized.  Patient Active Problem List   Diagnosis    Anxiety    Bilateral low back pain with right-sided sciatica    Vertigo    Right nephrolithiasis    Ureteral stone    Migraine headache    Hydronephrosis    Incomplete emptying of bladder    History of small bowel obstruction       Plan:   Orders Placed This Encounter    Culture, MRSA    CBC Auto Differential    Comprehensive Metabolic Panel    PROTIME-INR    APTT    Urinalysis     Patient has no contraindications to planned surgical procedure.  Patient is at minimal risk.  She may take all her medications up through the perioperative.  She has been told not to take any anti-inflammatories 1 week prior to the procedure.  Copies of her lab work, EKG, chest x-ray in his note of all been sent to her surgeon.    This note is generated with speech recognition software and is subject to transcription error and sound alike phrases that may be missed by proofreading.

## 2024-04-26 RX ORDER — DIAZEPAM 5 MG/1
TABLET ORAL
Qty: 20 TABLET | Refills: 5 | Status: SHIPPED | OUTPATIENT
Start: 2024-04-26

## 2024-04-26 RX ORDER — SUMATRIPTAN SUCCINATE 100 MG/1
TABLET ORAL
Qty: 10 TABLET | Refills: 11 | Status: SHIPPED | OUTPATIENT
Start: 2024-04-26

## 2024-04-28 LAB — MRSA SPEC QL CULT: NORMAL

## 2024-05-02 ENCOUNTER — PATIENT MESSAGE (OUTPATIENT)
Dept: INTERNAL MEDICINE | Facility: CLINIC | Age: 59
End: 2024-05-02
Payer: MEDICARE

## 2024-05-02 DIAGNOSIS — F41.9 ANXIETY: ICD-10-CM

## 2024-05-03 NOTE — PROGRESS NOTES
CT reviewed.  No evidence of mesenteric vein thrombosis  No ischemia noted.  Labs WNL    No vascular source of epigastric pain identified.  Discussed with Dr. Moon of [facility] medicine.  
years

## 2024-05-06 NOTE — TELEPHONE ENCOUNTER
Call pt and tell her that I would prefer her to use the 5 mg dose of Valium. I find the 10 mg dose too much for the vast majority of patients.

## 2024-05-07 RX ORDER — DIAZEPAM 10 MG/1
10 TABLET ORAL DAILY
Qty: 90 TABLET | Refills: 1 | Status: SHIPPED | OUTPATIENT
Start: 2024-05-07

## 2024-05-25 ENCOUNTER — OFFICE VISIT (OUTPATIENT)
Dept: URGENT CARE | Facility: CLINIC | Age: 59
End: 2024-05-25
Payer: MEDICARE

## 2024-05-25 VITALS
WEIGHT: 190 LBS | OXYGEN SATURATION: 95 % | HEART RATE: 85 BPM | HEIGHT: 67 IN | RESPIRATION RATE: 17 BRPM | TEMPERATURE: 99 F | BODY MASS INDEX: 29.82 KG/M2 | SYSTOLIC BLOOD PRESSURE: 128 MMHG | DIASTOLIC BLOOD PRESSURE: 59 MMHG

## 2024-05-25 DIAGNOSIS — R21 RASH: ICD-10-CM

## 2024-05-25 DIAGNOSIS — Z96.652 STATUS POST TOTAL KNEE REPLACEMENT, LEFT: ICD-10-CM

## 2024-05-25 DIAGNOSIS — T14.8XXA WOUND DISCHARGE: Primary | ICD-10-CM

## 2024-05-25 PROCEDURE — 87075 CULTR BACTERIA EXCEPT BLOOD: CPT | Performed by: NURSE PRACTITIONER

## 2024-05-25 PROCEDURE — 87070 CULTURE OTHR SPECIMN AEROBIC: CPT | Performed by: NURSE PRACTITIONER

## 2024-05-25 PROCEDURE — 99213 OFFICE O/P EST LOW 20 MIN: CPT | Mod: S$GLB,,, | Performed by: NURSE PRACTITIONER

## 2024-05-25 RX ORDER — SULFAMETHOXAZOLE AND TRIMETHOPRIM 800; 160 MG/1; MG/1
1 TABLET ORAL 2 TIMES DAILY
Qty: 14 TABLET | Refills: 0 | Status: SHIPPED | OUTPATIENT
Start: 2024-05-25 | End: 2024-06-01

## 2024-05-25 RX ORDER — TRIAMCINOLONE ACETONIDE 1 MG/G
CREAM TOPICAL 2 TIMES DAILY PRN
Qty: 15 G | Refills: 0 | Status: SHIPPED | OUTPATIENT
Start: 2024-05-25 | End: 2024-06-01

## 2024-05-25 NOTE — PROGRESS NOTES
"Subjective:      Patient ID: Genny Calixto is a 58 y.o. female.    Vitals:  height is 5' 7" (1.702 m) and weight is 86.2 kg (190 lb). Her temperature is 98.9 °F (37.2 °C). Her blood pressure is 128/59 (abnormal) and her pulse is 85. Her respiration is 17 and oxygen saturation is 95%.     Chief Complaint: Wound Check    Genny Calixto is a 58 year old female whom presents to urgent care for evaluation of her left knee surgical incision. Patient reports she underwent a total  knee replacement on May 14th on her LFT knee. She states she is now concerned about the surgical site possible being infected due to it oozing out green stuff and feeling "feverish". Patient reports the affected area is itchy. She has been apply benadryl cream without any relief.     Wound Check  There has been colored discharge from the wound. The swelling has worsened. The pain has not changed. There is difficulty moving the extremity or digit due to pain.     ROS   Objective:     Physical Exam   Constitutional: She is oriented to person, place, and time. She appears well-developed. She is cooperative.   HENT:   Head: Normocephalic and atraumatic.   Ears:   Right Ear: Hearing, tympanic membrane, external ear and ear canal normal.   Left Ear: Hearing, tympanic membrane, external ear and ear canal normal.   Nose: Nose normal. No mucosal edema or nasal deformity. No epistaxis. Right sinus exhibits no maxillary sinus tenderness and no frontal sinus tenderness. Left sinus exhibits no maxillary sinus tenderness and no frontal sinus tenderness.   Mouth/Throat: Uvula is midline, oropharynx is clear and moist and mucous membranes are normal. No trismus in the jaw. Normal dentition. No uvula swelling.   Eyes: Conjunctivae and lids are normal.   Neck: Trachea normal and phonation normal. Neck supple.   Cardiovascular: Normal rate, regular rhythm, normal heart sounds and normal pulses.   Pulmonary/Chest: Effort normal and breath sounds " normal.   Abdominal: Normal appearance and bowel sounds are normal. Soft.   Musculoskeletal: Normal range of motion.         General: Swelling and tenderness present. Normal range of motion.   Neurological: She is alert and oriented to person, place, and time. She exhibits normal muscle tone.   Skin: Skin is warm, dry and intact.        Psychiatric: Her speech is normal and behavior is normal. Judgment and thought content normal.   Nursing note and vitals reviewed.      Assessment:     1. Wound discharge    2. Rash    3. Status post total knee replacement, left        Plan:       Wound discharge  -     CULTURE, AEROBIC  (SPECIFY SOURCE)  -     CULTURE, ANAEROBE  -     sulfamethoxazole-trimethoprim 800-160mg (BACTRIM DS) 800-160 mg Tab; Take 1 tablet by mouth 2 (two) times daily. for 7 days  Dispense: 14 tablet; Refill: 0    Rash  -     triamcinolone acetonide 0.1% (KENALOG) 0.1 % cream; Apply topically 2 (two) times daily as needed (itching, rash).  Dispense: 15 g; Refill: 0    Status post total knee replacement, left  -     sulfamethoxazole-trimethoprim 800-160mg (BACTRIM DS) 800-160 mg Tab; Take 1 tablet by mouth 2 (two) times daily. for 7 days  Dispense: 14 tablet; Refill: 0          Medical Decision Making:   Differential Diagnosis:   Contact dermatitis , cellulitis, wound infection.   Urgent Care Management:  Previous encounters and labs were independently reviewed. Discussed with patient  all pertinent information and results. Discussed patient diagnosis and plan of treatment. Additional plan of care as outlined above. Patient  was given all follow up and return instructions. All questions and concerns were addressed at this time. Patient  expresses understanding of information and instructions, and is comfortable with plan.    Patient was instructed to follow up with her Primary Care Provider or surgeon in 2-3 days for re-evaluation. She was instructed to go to the ED immediately for any worsening or change  in current symptoms. Treatment plan as well as options and alternatives reviewed and discussed with patient. All of the patients questions and concerns were addressed.The patient verbalized understanding and agrees with the discussed plan of care. Patient remained stable and was discharged in no acute distress.                 Patient Instructions   You have pending test results,Should these results come back positive a member of our staff will contact you either via phone or My Chart to discuss your plan of care.   Do not submerge affected area in water.   Wash daily with antibacterial soap. Follow up with surgeon for re-evaluation.   Please take all medications as prescribed.   Please complete your entire course of antibiotics as prescribed to ensure effectiveness.   Please start an OTC probiotic while taking antibiotics to replenish GI katherine affected by antibiotic use.     Please arrange follow up with your primary medical clinic as soon as possible. You must understand that you've received an Urgent Care treatment only and that you may be released before all of your medical problems are known or treated. You, the patient, will arrange for follow up as instructed. If your symptoms worsen or fail to improve you should go to the Emergency Room.

## 2024-05-25 NOTE — PATIENT INSTRUCTIONS
You have pending test results,Should these results come back positive a member of our staff will contact you either via phone or My Chart to discuss your plan of care.   Do not submerge affected area in water.   Wash daily with antibacterial soap. Follow up with surgeon for re-evaluation.   Please take all medications as prescribed.   Please complete your entire course of antibiotics as prescribed to ensure effectiveness.   Please start an OTC probiotic while taking antibiotics to replenish GI katherine affected by antibiotic use.     Please arrange follow up with your primary medical clinic as soon as possible. You must understand that you've received an Urgent Care treatment only and that you may be released before all of your medical problems are known or treated. You, the patient, will arrange for follow up as instructed. If your symptoms worsen or fail to improve you should go to the Emergency Room.

## 2024-05-27 ENCOUNTER — TELEPHONE (OUTPATIENT)
Dept: DERMATOLOGY | Facility: CLINIC | Age: 59
End: 2024-05-27
Payer: MEDICARE

## 2024-05-27 NOTE — TELEPHONE ENCOUNTER
Returned call. Offered patient appt today in [facility]. Pt reports she does not have a car. Pt reports she has been to ED, urgent care, and surgeon's partner has been reached out to. Encourage pt to send over pictures so Dr. Spring could advise further.  ----- Message from Alo Nj sent at 5/27/2024  9:23 AM CDT -----  Contact: Genny  .Type:  Same Day Appointment Request    Caller is requesting a same day appointment.  Caller declined first available appointment listed below.    Name of Caller: Genny   When is the first available appointment? 07/30  Symptoms: redness and irritation on left knee near a surgical site and spreading up and down leg  Best Call Back Number:  [phone]     Additional Information: Pt did go the the ER on 05/26, Urgent care on 05/25 and states she is still experiencing rash like symptoms.   Pt had a knee replacement on May 14      Thanks

## 2024-05-28 ENCOUNTER — TELEPHONE (OUTPATIENT)
Dept: DERMATOLOGY | Facility: CLINIC | Age: 59
End: 2024-05-28
Payer: MEDICARE

## 2024-05-28 ENCOUNTER — PATIENT MESSAGE (OUTPATIENT)
Dept: DERMATOLOGY | Facility: CLINIC | Age: 59
End: 2024-05-28
Payer: MEDICARE

## 2024-05-28 NOTE — TELEPHONE ENCOUNTER
Called pt regarding scheduling an appointment. Pt offered next available. Pt declined and stated she no longer needed an appointment. Pt verbalized understanding.     ----- Message from Gris Rizo sent at 5/28/2024  1:28 PM CDT -----  Type:  Sooner Appointment Request    Caller is requesting a sooner appointment.  Caller declined first available appointment listed below.  Caller will not accept being placed on the waitlist and is requesting a message be sent to doctor.    Name of Caller:  Pt    When is the first available appointment?  07/30/24    Symptoms:  had knee replacement and having a weird texture around knee and itching    Would the patient rather a call back or a response via [facility]?  Call back    Best Call Back Number:  [phone]    Additional Information:   Pt was last seen in 2021 for Hair loss by Dr Spring and is trying to get in as soon as possible.  Please call back to advise. Thanks!

## 2024-05-29 LAB — BACTERIA SPEC AEROBE CULT: NO GROWTH

## 2024-05-29 RX ORDER — DOXEPIN HYDROCHLORIDE 25 MG/1
25 CAPSULE ORAL NIGHTLY
Qty: 90 CAPSULE | Refills: 3 | Status: SHIPPED | OUTPATIENT
Start: 2024-05-29

## 2024-05-30 ENCOUNTER — TELEPHONE (OUTPATIENT)
Dept: URGENT CARE | Facility: CLINIC | Age: 59
End: 2024-05-30
Payer: MEDICARE

## 2024-05-30 LAB — BACTERIA SPEC ANAEROBE CULT: NORMAL

## 2024-05-31 NOTE — TELEPHONE ENCOUNTER
Reviewed lab with patient. Culture noted no growth;  Discussed possibility of sensitivity to agent used to close the wound; Advised close monitoring and to follow up with surgeon office; continue to take nightly xyzal. Finish abx

## 2024-06-19 ENCOUNTER — PATIENT MESSAGE (OUTPATIENT)
Dept: INTERNAL MEDICINE | Facility: CLINIC | Age: 59
End: 2024-06-19
Payer: MEDICARE

## 2024-06-19 DIAGNOSIS — Z98.890 STATUS POST SURGERY: Primary | ICD-10-CM

## 2024-06-24 ENCOUNTER — LAB VISIT (OUTPATIENT)
Dept: LAB | Facility: HOSPITAL | Age: 59
End: 2024-06-24
Attending: FAMILY MEDICINE
Payer: MEDICARE

## 2024-06-24 DIAGNOSIS — Z98.890 STATUS POST SURGERY: ICD-10-CM

## 2024-06-24 PROCEDURE — 87449 NOS EACH ORGANISM AG IA: CPT | Performed by: FAMILY MEDICINE

## 2024-06-24 PROCEDURE — 87324 CLOSTRIDIUM AG IA: CPT | Performed by: FAMILY MEDICINE

## 2024-06-25 DIAGNOSIS — F41.9 ANXIETY: ICD-10-CM

## 2024-06-25 LAB
C DIFF GDH STL QL: NEGATIVE
C DIFF TOX A+B STL QL IA: NEGATIVE

## 2024-06-26 RX ORDER — DOXEPIN HYDROCHLORIDE 100 MG/1
100 CAPSULE ORAL NIGHTLY
Qty: 90 CAPSULE | Refills: 1 | Status: SHIPPED | OUTPATIENT
Start: 2024-06-26 | End: 2025-06-26

## 2024-06-26 RX ORDER — DIAZEPAM 5 MG/1
TABLET ORAL
Qty: 20 TABLET | Refills: 5 | Status: SHIPPED | OUTPATIENT
Start: 2024-06-26

## 2024-06-26 RX ORDER — SUMATRIPTAN SUCCINATE 100 MG/1
TABLET ORAL
Qty: 10 TABLET | Refills: 11 | Status: SHIPPED | OUTPATIENT
Start: 2024-06-26

## 2024-07-22 RX ORDER — DOXEPIN HYDROCHLORIDE 25 MG/1
25 CAPSULE ORAL NIGHTLY
Qty: 90 CAPSULE | Refills: 3 | Status: SHIPPED | OUTPATIENT
Start: 2024-07-22

## 2024-07-22 RX ORDER — PANTOPRAZOLE SODIUM 40 MG/1
40 TABLET, DELAYED RELEASE ORAL DAILY
Qty: 30 TABLET | Refills: 6 | Status: CANCELLED | OUTPATIENT
Start: 2024-07-22 | End: 2025-02-17

## 2024-07-22 RX ORDER — PANTOPRAZOLE SODIUM 40 MG/1
40 TABLET, DELAYED RELEASE ORAL DAILY
Qty: 30 TABLET | Refills: 6 | Status: SHIPPED | OUTPATIENT
Start: 2024-07-22 | End: 2025-02-17

## 2024-07-22 NOTE — TELEPHONE ENCOUNTER
Requested Prescriptions     Pending Prescriptions Disp Refills    doxepin (SINEQUAN) 25 MG capsule 90 capsule 3     Sig: Take 1 capsule (25 mg total) by mouth every evening.    pantoprazole (PROTONIX) 40 MG tablet 30 tablet 6     Sig: Take 1 tablet (40 mg total) by mouth once daily.

## 2024-08-02 DIAGNOSIS — R11.0 NAUSEA: ICD-10-CM

## 2024-08-05 RX ORDER — DIAZEPAM 10 MG/1
10 TABLET ORAL DAILY
Qty: 90 TABLET | Refills: 0 | Status: SHIPPED | OUTPATIENT
Start: 2024-08-05

## 2024-08-05 RX ORDER — ONDANSETRON 4 MG/1
4 TABLET, FILM COATED ORAL 2 TIMES DAILY
Qty: 30 TABLET | Refills: 5 | Status: SHIPPED | OUTPATIENT
Start: 2024-08-05

## 2024-08-30 ENCOUNTER — OFFICE VISIT (OUTPATIENT)
Dept: GASTROENTEROLOGY | Facility: CLINIC | Age: 59
End: 2024-08-30
Payer: MEDICARE

## 2024-08-30 ENCOUNTER — PATIENT MESSAGE (OUTPATIENT)
Dept: GASTROENTEROLOGY | Facility: CLINIC | Age: 59
End: 2024-08-30

## 2024-08-30 VITALS
HEIGHT: 67 IN | DIASTOLIC BLOOD PRESSURE: 93 MMHG | SYSTOLIC BLOOD PRESSURE: 139 MMHG | HEART RATE: 98 BPM | WEIGHT: 191 LBS | BODY MASS INDEX: 29.98 KG/M2

## 2024-08-30 DIAGNOSIS — R14.0 ABDOMINAL BLOATING: ICD-10-CM

## 2024-08-30 DIAGNOSIS — Z87.19 HISTORY OF SMALL BOWEL OBSTRUCTION: ICD-10-CM

## 2024-08-30 DIAGNOSIS — K57.30 DIVERTICULOSIS OF LARGE INTESTINE WITHOUT HEMORRHAGE: ICD-10-CM

## 2024-08-30 DIAGNOSIS — R10.9 ABDOMINAL PAIN, UNSPECIFIED ABDOMINAL LOCATION: Primary | ICD-10-CM

## 2024-08-30 PROCEDURE — 99214 OFFICE O/P EST MOD 30 MIN: CPT | Mod: PBBFAC | Performed by: INTERNAL MEDICINE

## 2024-08-30 PROCEDURE — 99999 PR PBB SHADOW E&M-EST. PATIENT-LVL IV: CPT | Mod: PBBFAC,,, | Performed by: INTERNAL MEDICINE

## 2024-08-30 RX ORDER — MELOXICAM 15 MG/1
15 TABLET ORAL
COMMUNITY

## 2024-08-30 NOTE — PATIENT INSTRUCTIONS
08/30/2024    Dear Genny Calixto,    We are writing to express our heartfelt gratitude for choosing us as your healthcare provider and entrusting us with your well-being. Your health and satisfaction are our top priorities, and we are truly honored to have the opportunity to serve you.    At [facility], we strive to provide the best possible care and support for our patients. My assessment and recommendations are as follows:    Abdominal pain, unspecified abdominal location    Abdominal bloating    History of small bowel obstruction    Diverticulosis of large intestine without hemorrhage    A high fiber diet with plenty of fluids (up to 8 glasses of water daily) is suggested to relieve these symptoms. Benfiber, 1 tablespoon once or twice daily can be used to keep bowels regular if needed. We will follow up PRN.   Reassurance and literature shared.     We genuinely value your feedback and believe that it plays a crucial role in our pursuit of excellence. We kindly request you to take a few minutes of your time to share your experience with us by posting a Google review. Your honest thoughts and opinions can make a significant difference for others seeking healthcare services and will help us better understand how we can further enhance our patient care.    Your kind words and positive reviews will not only motivate our dedicated team but will also inspire confidence in potential patients who are looking for exceptional healthcare services.    Once again, we extend our sincere appreciation for giving us the opportunity to serve you. If there is anything else we can do to improve your experience or assist you further, please do not hesitate to reach out to us.    Thank you for being part of our [facility], and we look forward to continuing to provide you with the best care possible.    Thanks for trusting us with your healthcare needs and using [facility]. If you want to ask us a question,  "you can do so by replying to this message or by calling [phone].    Sincerely,    Aashish Peres M.D.    PS: At [facility] we offer virtual visits. Please use your [facility] wild to schedule a virtual visit.     To rate your experience with Dr. Peres please click on the link below:    [email or web address]    To post a review, simply follow these quick steps:  1. Visit [employer] or search for my name on Google.  2. Click on the "Write a review" button on the left-hand side of the page.  3. Rate your experience by choosing the number of stars that reflect your satisfaction.  4. Share your thoughts and insights about your visit - we'd love to hear your feedback!    "

## 2024-08-30 NOTE — PROGRESS NOTES
[facility]  Gastroenterology    Patient evaluated at the request of No referring provider defined for this encounter.    PCP: Tay Duff MD    8/30/24    HPI       Abdominal Cramping     Additional comments: With abd bloating/stool greenish in color/10-12 BM's daily          Last edited by Elsa Arteaga LPN on 8/30/2024 10:41 AM.          Subjective:     Patient comes to see us with abdominal cramping, bloating and altered bowels. Earlier this year she experienced approximally 2 days' worth of diffuse abdominal pain cramping associated with nausea and vomiting that was similar to her previous bowel obstructions. Bile colored vomiting. She is passing flatus regularly. This is similar to her prior bowel resections. In 11/2020 she underwent exploratory laparotomy for internal hernia with small bowel ischemia and underwent a small bowel resection at that time. This was complicated by early postoperative bowel obstruction with another small-bowel resection with anastomosis. She did well until 07/2022 when she underwent a diagnostic converted to open exploration for recurrent bowel obstruction requiring additional small bowel resection and ileocecectomy. Now is stable.     Past Medical History:   Diagnosis Date    Encounter for blood transfusion     KENN (generalized anxiety disorder)     Takes 5-10 mg of valium qd prn anxiety (prescriptions for both on file, one from [facility] & other from )    History of small bowel obstruction     Insomnia     Takes 5-10 mg of valium qhs prn insomnia (prescriptions for both on file, one from [facility] & other from )    Migraine headache     Nephrolithiasis 08/03/2019    Left ureteral stone -> UTI c/b pyelonephritis and urosepsis w/ hypokalemia -> transfered to [facility] urology service from [facility] after CT abn dx, s/p 2 stents to allow infx to drain, IV Vanc/Rocephin, fever free since yesterday    Reflex sympathetic dystrophy     UTI (urinary tract infection)      Vertigo        Past Surgical History:   Procedure Laterality Date    BLADDER SURGERY      CHOLECYSTECTOMY      COLONOSCOPY N/A 11/10/2021    Procedure: COLONOSCOPY;  Surgeon: Eryn Rothmna MD;  Location: [facility];  Service: Endoscopy;  Laterality: N/A;    CYSTOSCOPY W/ RETROGRADES Bilateral 8/24/2023    Procedure: CYSTOSCOPY, WITH RETROGRADE PYELOGRAM;  Surgeon: Annita Gamino MD;  Location: [facility] OR;  Service: Urology;  Laterality: Bilateral;    CYSTOSCOPY WITH BIOPSY OF BLADDER N/A 8/24/2023    Procedure: CYSTOSCOPY, WITH BLADDER BIOPSY, WITH FULGURATION IF INDICATED;  Surgeon: Annita Gamino MD;  Location: [facility] OR;  Service: Urology;  Laterality: N/A;    CYSTOSCOPY WITH URETEROSCOPY, RETROGRADE PYELOGRAPHY, AND INSERTION OF STENT Right 9/30/2021    Procedure: CYSTOSCOPY, WITH RETROGRADE PYELOGRAM AND URETERAL STENT INSERTION;  Surgeon: Annita Gamino MD;  Location: [facility];  Service: Urology;  Laterality: Right;    DIAGNOSTIC LAPAROSCOPY N/A 11/11/2020    Procedure: LAPAROSCOPY, DIAGNOSTIC;  Surgeon: Tee Segura MD;  Location: [facility];  Service: General;  Laterality: N/A;  CONVERTED TO OPEN    DIAGNOSTIC LAPAROSCOPY N/A 7/25/2022    Procedure: LAPAROSCOPY, DIAGNOSTIC;  Surgeon: Jo Manzano DO;  Location: [facility];  Service: General;  Laterality: N/A;  convert to open at 0739    ESOPHAGOGASTRODUODENOSCOPY N/A 11/10/2021    Procedure: EGD (ESOPHAGOGASTRODUODENOSCOPY);  Surgeon: Eryn Rothman MD;  Location: [facility];  Service: Endoscopy;  Laterality: N/A;    facet injections  02/14/2017    L3, L4, L5, S1 Done by Dr. Lara    HYSTERECTOMY      INJECTION OF ANESTHETIC AGENT INTO TISSUE PLANE DEFINED BY TRANSVERSUS ABDOMINIS MUSCLE N/A 11/11/2020    Procedure: BLOCK, TRANSVERSUS ABDOMINIS PLANE;  Surgeon: Tee Segura MD;  Location: [facility];  Service: General;  Laterality: N/A;    INJECTION OF ANESTHETIC AGENT INTO TISSUE PLANE DEFINED BY TRANSVERSUS ABDOMINIS MUSCLE  N/A 7/25/2022    Procedure: BLOCK, TRANSVERSUS ABDOMINIS PLANE;  Surgeon: Jo Manzano DO;  Location: [facility] OR;  Service: General;  Laterality: N/A;    KNEE SURGERY      LAPAROSCOPIC LYSIS OF ADHESIONS N/A 11/11/2020    Procedure: LYSIS, ADHESIONS, LAPAROSCOPIC;  Surgeon: Tee Segura MD;  Location: [facility] OR;  Service: General;  Laterality: N/A;  CONVERTED TO OPEN    LAPAROTOMY N/A 11/20/2020    Procedure: LAPAROTOMY;  Surgeon: Tee Segura MD;  Location: [facility] OR;  Service: General;  Laterality: N/A;    LASER LITHOTRIPSY Left 2/8/2021    Procedure: LITHOTRIPSY, USING LASER;  Surgeon: Irving Kidd MD;  Location: [facility] OR;  Service: Urology;  Laterality: Left;    LASER LITHOTRIPSY Right 10/13/2021    Procedure: LITHOTRIPSY, USING LASER;  Surgeon: Annita Gamino MD;  Location: [facility] OR;  Service: Urology;  Laterality: Right;    LYSIS OF ADHESIONS N/A 11/11/2020    Procedure: LYSIS, ADHESIONS;  Surgeon: Tee Segura MD;  Location: [facility] OR;  Service: General;  Laterality: N/A;    LYSIS OF ADHESIONS N/A 11/20/2020    Procedure: LYSIS, ADHESIONS;  Surgeon: Tee Segura MD;  Location: [facility] OR;  Service: General;  Laterality: N/A;    LYSIS OF ADHESIONS N/A 7/25/2022    Procedure: LYSIS, ADHESIONS;  Surgeon: Jo Manzano DO;  Location: [facility] OR;  Service: General;  Laterality: N/A;    SURGICAL REMOVAL OF ILEUM WITH CECUM  7/25/2022    Procedure: EXCISION, CECUM WITH ILEUM;  Surgeon: Jo Manzano DO;  Location: [facility] OR;  Service: General;;    TONSILLECTOMY      URETEROSCOPY Left 2/8/2021    Procedure: URETEROSCOPY;  Surgeon: Irving Kidd MD;  Location: [facility] OR;  Service: Urology;  Laterality: Left;  stent placement    URETEROSCOPY Right 10/13/2021    Procedure: URETEROSCOPY;  Surgeon: Annita Gamino MD;  Location: [facility] OR;  Service: Urology;  Laterality: Right;       Current Outpatient Medications on File Prior to Visit   Medication Sig Dispense Refill     cyclobenzaprine (FLEXERIL) 10 MG tablet Take 10 mg by mouth every evening.      diazePAM (VALIUM) 10 MG Tab Take 1 tablet (10 mg total) by mouth once daily. 90 tablet 0    diazePAM (VALIUM) 5 MG tablet Take one with onset of migraine 20 tablet 5    diphenhydrAMINE (BENADRYL) 25 mg capsule Take 25 mg by mouth 2 (two) times daily as needed for Itching.      doxepin (SINEQUAN) 100 MG capsule Take 1 capsule (100 mg total) by mouth every evening. 90 capsule 1    lactobacillus combination no.4 (PROBIOTIC) 3 billion cell Cap Take 1 capsule by mouth once daily. 30 capsule 0    LINZESS 290 mcg Cap capsule TAKE 1 CAPSULE BY MOUTH ONCE DAILY BEFORE BREAKFAST 90 capsule 0    multivitamin capsule Take 1 capsule by mouth once daily.      ondansetron (ZOFRAN) 4 MG tablet Take 1 tablet (4 mg total) by mouth 2 (two) times daily. 30 tablet 5    spironolactone (ALDACTONE) 50 MG tablet Take 1 tablet (50 mg total) by mouth 2 (two) times daily. 180 tablet 1    sumatriptan (IMITREX) 100 MG tablet Take one with onset of migraine, may repeat once two hours later if migraine persists 10 tablet 11    traMADoL (ULTRAM) 50 mg tablet Take 50 mg by mouth every 6 (six) hours.      LACTOBACILLUS COMBO NO.6 (PROBIOTIC COMPLEX ORAL) Take 1 capsule by mouth once daily at 6am.      meloxicam (MOBIC) 15 MG tablet Take 15 mg by mouth.      triamcinolone acetonide 0.1% (KENALOG) 0.1 % cream Apply topically 2 (two) times daily as needed (itching, rash). 15 g 0    [DISCONTINUED] doxepin (SINEQUAN) 25 MG capsule Take 1 capsule (25 mg total) by mouth every evening. (Patient not taking: Reported on 8/30/2024) 90 capsule 3    [DISCONTINUED] pantoprazole (PROTONIX) 40 MG tablet Take 1 tablet (40 mg total) by mouth once daily. (Patient not taking: Reported on 8/30/2024) 30 tablet 6     No current facility-administered medications on file prior to visit.       Review of patient's allergies indicates:   Allergen Reactions    Erythromycin Other (See Comments)      "Stomach cramps    Morphine Nausea And Vomiting     "Projectile vomiting"       Social History     Socioeconomic History    Marital status:    Tobacco Use    Smoking status: Never    Smokeless tobacco: Never   Substance and Sexual Activity    Alcohol use: Yes     Comment: rarely    Drug use: No    Sexual activity: Never   Social History Narrative    Breakfast: Fruit & organic oatmeal; water or tea w/ lemon    Lunch: Salads: spinach leaves, iceberg lettuce, tomatoes, avaccado, light dressing or protein smoothie    Dinner: Grilled or broiled chicken or fish (no pork since 3/2017; red meat only 5 times since 3/2017)    Snacks: Fruit    Eats out: 1x/wk    Water: 96 oz/d    PA: 5-6 d/wk; basketball    Goal weight is 160 lb       Family History   Problem Relation Name Age of Onset    Stroke Mother      Hypertension Mother      COPD Father      Drug abuse Brother         Objective:   Vitals:   Vitals:    08/30/24 1037   BP: (!) 139/93   Pulse: 98       Physical Exam  Constitutional:       Appearance: She is well-developed.   HENT:      Head: Normocephalic and atraumatic.   Eyes:      Pupils: Pupils are equal, round, and reactive to light.   Neck:      Thyroid: No thyromegaly.   Cardiovascular:      Rate and Rhythm: Normal rate and regular rhythm.      Heart sounds: Normal heart sounds. No murmur heard.  Pulmonary:      Effort: Pulmonary effort is normal. No respiratory distress.      Breath sounds: Normal breath sounds. No wheezing or rales.   Abdominal:      General: Bowel sounds are normal. There is no distension.      Palpations: Abdomen is soft. There is no mass.      Tenderness: There is no abdominal tenderness.   Musculoskeletal:         General: No tenderness. Normal range of motion.      Cervical back: Normal range of motion and neck supple.   Lymphadenopathy:      Cervical: No cervical adenopathy.   Skin:     General: Skin is warm and dry.      Findings: No erythema.   Neurological:      Mental Status: She " is alert and oriented to person, place, and time.      Cranial Nerves: No cranial nerve deficit.      Deep Tendon Reflexes: Reflexes are normal and symmetric.   Psychiatric:         Behavior: Behavior normal.       Lab Results   Component Value Date    WBC 5.66 04/25/2024    HGB 13.2 04/25/2024    HCT 40.2 04/25/2024     (H) 04/25/2024     04/25/2024     BMP  Lab Results   Component Value Date     04/25/2024    K 4.5 04/25/2024     04/25/2024    CO2 26 04/25/2024    BUN 17 04/25/2024    CREATININE 0.8 04/25/2024    CALCIUM 10.0 04/25/2024    ANIONGAP 9 04/25/2024    EGFRNORACEVR >60.0 04/25/2024     Lab Results   Component Value Date    ALT 26 04/25/2024    AST 22 04/25/2024    ALKPHOS 82 04/25/2024    BILITOT 0.8 04/25/2024         IMPRESSION     Problem List Items Addressed This Visit       History of small bowel obstruction     Other Visit Diagnoses       Abdominal pain, unspecified abdominal location    -  Primary    Abdominal bloating        Diverticulosis of large intestine without hemorrhage                PLANS:  A high fiber diet with plenty of fluids (up to 8 glasses of water daily) is suggested to relieve these symptoms. Benfiber, 1 tablespoon once or twice daily can be used to keep bowels regular if needed. We will follow up PRN.   Reassurance and literature shared.     Thanks for letting us participate in the care of your patient.    Aashish Peres M.D.

## 2024-09-03 ENCOUNTER — PATIENT MESSAGE (OUTPATIENT)
Dept: INTERNAL MEDICINE | Facility: CLINIC | Age: 59
End: 2024-09-03
Payer: MEDICARE

## 2024-09-03 RX ORDER — SUMATRIPTAN SUCCINATE 100 MG/1
TABLET ORAL
Qty: 10 TABLET | Refills: 11 | Status: SHIPPED | OUTPATIENT
Start: 2024-09-03

## 2024-09-05 ENCOUNTER — OFFICE VISIT (OUTPATIENT)
Dept: INTERNAL MEDICINE | Facility: CLINIC | Age: 59
End: 2024-09-05
Payer: MEDICARE

## 2024-09-05 VITALS
SYSTOLIC BLOOD PRESSURE: 134 MMHG | WEIGHT: 191.13 LBS | OXYGEN SATURATION: 97 % | HEART RATE: 65 BPM | DIASTOLIC BLOOD PRESSURE: 88 MMHG | TEMPERATURE: 98 F | BODY MASS INDEX: 29.94 KG/M2

## 2024-09-05 DIAGNOSIS — F33.0 MILD EPISODE OF RECURRENT MAJOR DEPRESSIVE DISORDER: Primary | ICD-10-CM

## 2024-09-05 DIAGNOSIS — F51.04 CHRONIC INSOMNIA: ICD-10-CM

## 2024-09-05 PROCEDURE — 99213 OFFICE O/P EST LOW 20 MIN: CPT | Mod: S$PBB,,, | Performed by: INTERNAL MEDICINE

## 2024-09-05 PROCEDURE — 99213 OFFICE O/P EST LOW 20 MIN: CPT | Mod: PBBFAC,PO | Performed by: INTERNAL MEDICINE

## 2024-09-05 PROCEDURE — 99999 PR PBB SHADOW E&M-EST. PATIENT-LVL III: CPT | Mod: PBBFAC,,, | Performed by: INTERNAL MEDICINE

## 2024-09-05 RX ORDER — ESZOPICLONE 2 MG/1
2 TABLET, FILM COATED ORAL NIGHTLY
Qty: 30 TABLET | Refills: 5 | Status: SHIPPED | OUTPATIENT
Start: 2024-09-05

## 2024-09-05 RX ORDER — ESCITALOPRAM OXALATE 10 MG/1
10 TABLET ORAL DAILY
Qty: 90 TABLET | Refills: 3 | Status: SHIPPED | OUTPATIENT
Start: 2024-09-05 | End: 2025-09-05

## 2024-09-05 NOTE — PROGRESS NOTES
HPI:  Patient is a 59-year-old female who comes in today for discussion about her insomnia.  She has had chronic problems with insomnia for quite some time.  She has been using doxepin regularly without significant benefit.  Patient in the past has tried temazepam and Ambien without improvement.  Patient also at this time is requesting to be treated for depression.  She has had a lot of stress in her life in the last year.  She thinks he would benefit from an antidepressant    Current meds have been verified and updated per the EMR  Exam:/88 (BP Location: Right arm, Patient Position: Sitting, BP Method: Medium (Manual))   Pulse 65   Temp 97.5 °F (36.4 °C) (Tympanic)   Wt 86.7 kg (191 lb 2.2 oz)   SpO2 97%   BMI 29.94 kg/m²   Exam deferred    Lab Results   Component Value Date    WBC 5.66 04/25/2024    HGB 13.2 04/25/2024    HCT 40.2 04/25/2024     04/25/2024    CHOL 140 10/20/2020    TRIG 103 10/20/2020    HDL 55 10/20/2020    ALT 26 04/25/2024    AST 22 04/25/2024     04/25/2024    K 4.5 04/25/2024     04/25/2024    CREATININE 0.8 04/25/2024    BUN 17 04/25/2024    CO2 26 04/25/2024    TSH 2.427 06/22/2023    INR 0.9 04/25/2024    HGBA1C 5.8 03/07/2017    BNP 12 02/15/2024    SEDRATE 28 (H) 05/21/2017    CRP 28.1 (H) 05/21/2017       Impression:  Chronic insomnia  Major depressive disorder  Patient Active Problem List   Diagnosis    Anxiety    Bilateral low back pain with right-sided sciatica    Vertigo    Right nephrolithiasis    Ureteral stone    Migraine headache    Hydronephrosis    Incomplete emptying of bladder    History of small bowel obstruction       Plan:  Orders Placed This Encounter    eszopiclone (LUNESTA) 2 MG Tab    EScitalopram oxalate (LEXAPRO) 10 MG tablet   Patient was started on Lunesta 2 mg at bedtime.  She was started on Lexapro 10 mg in the morning.      This note is generated with speech recognition software and is subject to transcription error and sound alike  phrases that may be missed by proofreading.

## 2024-09-19 DIAGNOSIS — F41.9 ANXIETY: ICD-10-CM

## 2024-09-19 RX ORDER — DIAZEPAM 5 MG/1
TABLET ORAL
Qty: 20 TABLET | Refills: 5 | Status: SHIPPED | OUTPATIENT
Start: 2024-09-19

## 2024-10-01 ENCOUNTER — PATIENT MESSAGE (OUTPATIENT)
Dept: INTERNAL MEDICINE | Facility: CLINIC | Age: 59
End: 2024-10-01
Payer: MEDICARE

## 2024-10-10 ENCOUNTER — PATIENT MESSAGE (OUTPATIENT)
Dept: INTERNAL MEDICINE | Facility: CLINIC | Age: 59
End: 2024-10-10
Payer: MEDICARE

## 2024-10-10 NOTE — TELEPHONE ENCOUNTER
Message sent.    
Received patient in room 13 c/o nausea, vomiting, abdominal pain x 2 days. Patient is A&OX4, airway patent, breathing unlabored and even, abdomen soft, tender, skin dry. Labs obtained, 20G IV placed on Rt. arm. Side rails up and safety maintained. Fall precaution in place.
Received patient in room 13 c/o nausea, vomiting, abdominal pain x 2 days. Patient is A&OX4, airway patent, breathing unlabored and even, abdomen soft, tender, skin dry. Labs obtained, 20G IV placed on Rt. arm. Patient on cardiac monitor ST noted w/O2 sat 98% on a room air. Side rails up and safety maintained. Fall precaution in place.
by RYANN Andrew/NIKKI hernandes

## 2024-10-15 ENCOUNTER — OFFICE VISIT (OUTPATIENT)
Dept: INTERNAL MEDICINE | Facility: CLINIC | Age: 59
End: 2024-10-15
Payer: MEDICARE

## 2024-10-15 ENCOUNTER — LAB VISIT (OUTPATIENT)
Dept: LAB | Facility: HOSPITAL | Age: 59
End: 2024-10-15
Attending: INTERNAL MEDICINE
Payer: MEDICARE

## 2024-10-15 VITALS
HEART RATE: 105 BPM | WEIGHT: 193.13 LBS | BODY MASS INDEX: 30.25 KG/M2 | SYSTOLIC BLOOD PRESSURE: 126 MMHG | DIASTOLIC BLOOD PRESSURE: 88 MMHG | OXYGEN SATURATION: 97 % | TEMPERATURE: 97 F

## 2024-10-15 DIAGNOSIS — F41.9 ANXIETY: ICD-10-CM

## 2024-10-15 DIAGNOSIS — G43.909 MIGRAINE WITHOUT STATUS MIGRAINOSUS, NOT INTRACTABLE, UNSPECIFIED MIGRAINE TYPE: ICD-10-CM

## 2024-10-15 DIAGNOSIS — Z87.19 HISTORY OF SMALL BOWEL OBSTRUCTION: ICD-10-CM

## 2024-10-15 DIAGNOSIS — Z01.818 PREOP EXAM FOR INTERNAL MEDICINE: ICD-10-CM

## 2024-10-15 DIAGNOSIS — F41.1 GAD (GENERALIZED ANXIETY DISORDER): ICD-10-CM

## 2024-10-15 DIAGNOSIS — Z00.00 ROUTINE GENERAL MEDICAL EXAMINATION AT A HEALTH CARE FACILITY: Primary | ICD-10-CM

## 2024-10-15 LAB
BASOPHILS # BLD AUTO: 0.05 K/UL (ref 0–0.2)
BASOPHILS NFR BLD: 1.1 % (ref 0–1.9)
DIFFERENTIAL METHOD BLD: ABNORMAL
EOSINOPHIL # BLD AUTO: 0.1 K/UL (ref 0–0.5)
EOSINOPHIL NFR BLD: 2.9 % (ref 0–8)
ERYTHROCYTE [DISTWIDTH] IN BLOOD BY AUTOMATED COUNT: 15.1 % (ref 11.5–14.5)
HCT VFR BLD AUTO: 41 % (ref 37–48.5)
HGB BLD-MCNC: 13 G/DL (ref 12–16)
IMM GRANULOCYTES # BLD AUTO: 0.03 K/UL (ref 0–0.04)
IMM GRANULOCYTES NFR BLD AUTO: 0.7 % (ref 0–0.5)
LYMPHOCYTES # BLD AUTO: 1.5 K/UL (ref 1–4.8)
LYMPHOCYTES NFR BLD: 33.4 % (ref 18–48)
MCH RBC QN AUTO: 32.7 PG (ref 27–31)
MCHC RBC AUTO-ENTMCNC: 31.7 G/DL (ref 32–36)
MCV RBC AUTO: 103 FL (ref 82–98)
MONOCYTES # BLD AUTO: 0.3 K/UL (ref 0.3–1)
MONOCYTES NFR BLD: 7.6 % (ref 4–15)
NEUTROPHILS # BLD AUTO: 2.4 K/UL (ref 1.8–7.7)
NEUTROPHILS NFR BLD: 54.3 % (ref 38–73)
NRBC BLD-RTO: 0 /100 WBC
OHS QRS DURATION: 94 MS
OHS QTC CALCULATION: 457 MS
PLATELET # BLD AUTO: 245 K/UL (ref 150–450)
PMV BLD AUTO: 11.8 FL (ref 9.2–12.9)
RBC # BLD AUTO: 3.97 M/UL (ref 4–5.4)
WBC # BLD AUTO: 4.46 K/UL (ref 3.9–12.7)

## 2024-10-15 PROCEDURE — 36415 COLL VENOUS BLD VENIPUNCTURE: CPT | Mod: PO | Performed by: INTERNAL MEDICINE

## 2024-10-15 PROCEDURE — 99999 PR PBB SHADOW E&M-EST. PATIENT-LVL IV: CPT | Mod: PBBFAC,,, | Performed by: INTERNAL MEDICINE

## 2024-10-15 PROCEDURE — 99215 OFFICE O/P EST HI 40 MIN: CPT | Mod: S$PBB,,, | Performed by: INTERNAL MEDICINE

## 2024-10-15 PROCEDURE — 99214 OFFICE O/P EST MOD 30 MIN: CPT | Mod: PBBFAC,25,PO | Performed by: INTERNAL MEDICINE

## 2024-10-15 PROCEDURE — 93010 ELECTROCARDIOGRAM REPORT: CPT | Mod: S$PBB,,, | Performed by: INTERNAL MEDICINE

## 2024-10-15 PROCEDURE — 85025 COMPLETE CBC W/AUTO DIFF WBC: CPT | Performed by: INTERNAL MEDICINE

## 2024-10-15 PROCEDURE — 93005 ELECTROCARDIOGRAM TRACING: CPT | Mod: PBBFAC,PO | Performed by: INTERNAL MEDICINE

## 2024-10-15 NOTE — PROGRESS NOTES
HPI:  Patient is a 59-year-old female who comes in today for her annual physical exam.  Patient also this time is needing preoperative clearance to have surgery on her knee.  Patient had total left knee replacement done 5 months ago and did very well.  She has now had arthroscopic surgery on the right knee.  Her general anxiety disorder and migraines have been doing well on current doses regimens.  He has had no significant problems.    Current MEDS: medcard review, verified and update  Allergies: Per the electronic medical record    Past Medical History:   Diagnosis Date    Encounter for blood transfusion     KENN (generalized anxiety disorder)     Takes 5-10 mg of valium qd prn anxiety (prescriptions for both on file, one from [facility] & other from )    History of small bowel obstruction     Insomnia     Takes 5-10 mg of valium qhs prn insomnia (prescriptions for both on file, one from [facility] & other from )    Migraine headache     Nephrolithiasis 08/03/2019    Left ureteral stone -> UTI c/b pyelonephritis and urosepsis w/ hypokalemia -> transfered to [facility] urology service from [facility] after CT abn dx, s/p 2 stents to allow infx to drain, IV Vanc/Rocephin, fever free since yesterday    Reflex sympathetic dystrophy     UTI (urinary tract infection)     Vertigo        Past Surgical History:   Procedure Laterality Date    BLADDER SURGERY      CHOLECYSTECTOMY      COLONOSCOPY N/A 11/10/2021    Procedure: COLONOSCOPY;  Surgeon: Eryn Rothman MD;  Location: [facility];  Service: Endoscopy;  Laterality: N/A;    CYSTOSCOPY W/ RETROGRADES Bilateral 8/24/2023    Procedure: CYSTOSCOPY, WITH RETROGRADE PYELOGRAM;  Surgeon: Annita Gamino MD;  Location: [facility] OR;  Service: Urology;  Laterality: Bilateral;    CYSTOSCOPY WITH BIOPSY OF BLADDER N/A 8/24/2023    Procedure: CYSTOSCOPY, WITH BLADDER BIOPSY, WITH FULGURATION IF INDICATED;  Surgeon: Annita Gamino MD;  Location: [facility];  Service: Urology;  Laterality:  N/A;    CYSTOSCOPY WITH URETEROSCOPY, RETROGRADE PYELOGRAPHY, AND INSERTION OF STENT Right 9/30/2021    Procedure: CYSTOSCOPY, WITH RETROGRADE PYELOGRAM AND URETERAL STENT INSERTION;  Surgeon: Annita Gamino MD;  Location: [facility] OR;  Service: Urology;  Laterality: Right;    DIAGNOSTIC LAPAROSCOPY N/A 11/11/2020    Procedure: LAPAROSCOPY, DIAGNOSTIC;  Surgeon: Tee Segura MD;  Location: [facility] OR;  Service: General;  Laterality: N/A;  CONVERTED TO OPEN    DIAGNOSTIC LAPAROSCOPY N/A 7/25/2022    Procedure: LAPAROSCOPY, DIAGNOSTIC;  Surgeon: Jo Manzano DO;  Location: [facility] OR;  Service: General;  Laterality: N/A;  convert to open at 0739    ESOPHAGOGASTRODUODENOSCOPY N/A 11/10/2021    Procedure: EGD (ESOPHAGOGASTRODUODENOSCOPY);  Surgeon: Eryn Rothman MD;  Location: [facility] ENDO;  Service: Endoscopy;  Laterality: N/A;    facet injections  02/14/2017    L3, L4, L5, S1 Done by Dr. Lara    HYSTERECTOMY      INJECTION OF ANESTHETIC AGENT INTO TISSUE PLANE DEFINED BY TRANSVERSUS ABDOMINIS MUSCLE N/A 11/11/2020    Procedure: BLOCK, TRANSVERSUS ABDOMINIS PLANE;  Surgeon: Tee Segura MD;  Location: [facility] OR;  Service: General;  Laterality: N/A;    INJECTION OF ANESTHETIC AGENT INTO TISSUE PLANE DEFINED BY TRANSVERSUS ABDOMINIS MUSCLE N/A 7/25/2022    Procedure: BLOCK, TRANSVERSUS ABDOMINIS PLANE;  Surgeon: Jo Manzano DO;  Location: [facility] OR;  Service: General;  Laterality: N/A;    KNEE SURGERY      LAPAROSCOPIC LYSIS OF ADHESIONS N/A 11/11/2020    Procedure: LYSIS, ADHESIONS, LAPAROSCOPIC;  Surgeon: Tee Segura MD;  Location: [facility] OR;  Service: General;  Laterality: N/A;  CONVERTED TO OPEN    LAPAROTOMY N/A 11/20/2020    Procedure: LAPAROTOMY;  Surgeon: Tee Segura MD;  Location: [facility] OR;  Service: General;  Laterality: N/A;    LASER LITHOTRIPSY Left 2/8/2021    Procedure: LITHOTRIPSY, USING LASER;  Surgeon: Irving Kidd MD;  Location: [facility] OR;  Service: Urology;   Laterality: Left;    LASER LITHOTRIPSY Right 10/13/2021    Procedure: LITHOTRIPSY, USING LASER;  Surgeon: Annita Gamino MD;  Location: [facility] OR;  Service: Urology;  Laterality: Right;    LYSIS OF ADHESIONS N/A 11/11/2020    Procedure: LYSIS, ADHESIONS;  Surgeon: Tee Segura MD;  Location: [facility] OR;  Service: General;  Laterality: N/A;    LYSIS OF ADHESIONS N/A 11/20/2020    Procedure: LYSIS, ADHESIONS;  Surgeon: Tee Segura MD;  Location: [facility] OR;  Service: General;  Laterality: N/A;    LYSIS OF ADHESIONS N/A 7/25/2022    Procedure: LYSIS, ADHESIONS;  Surgeon: Jo Manzano DO;  Location: [facility] OR;  Service: General;  Laterality: N/A;    SURGICAL REMOVAL OF ILEUM WITH CECUM  7/25/2022    Procedure: EXCISION, CECUM WITH ILEUM;  Surgeon: Jo Manzano DO;  Location: [facility] OR;  Service: General;;    TONSILLECTOMY      URETEROSCOPY Left 2/8/2021    Procedure: URETEROSCOPY;  Surgeon: Irving Kidd MD;  Location: [facility] OR;  Service: Urology;  Laterality: Left;  stent placement    URETEROSCOPY Right 10/13/2021    Procedure: URETEROSCOPY;  Surgeon: Annita Gamino MD;  Location: [facility] OR;  Service: Urology;  Laterality: Right;       SHx: per the electronic medical record    FHx: recorded in the electronic medical record    ROS:    denies any chest pains or shortness of breath. Denies any nausea, vomiting or diarrhea. Denies any fever, chills or sweats. Denies any change in weight, voice, stool, skin or hair. Denies any dysuria, dyspepsia or dysphagia. Denies any change in vision, hearing or headaches. Denies any swollen lymph nodes or loss of memory.    PE:  /88   Pulse 105   Temp 97.2 °F (36.2 °C) (Tympanic)   Wt 87.6 kg (193 lb 2 oz)   SpO2 97%   BMI 30.25 kg/m²   Gen: Well-developed, well-nourished, female, in no acute distress, oriented x3  HEENT: neck is supple, no adenopathy, carotids 2+ equal without bruits, thyroid exam normal size without nodules.  CHEST: clear to  auscultation and percussion  CVS: regular rate and rhythm without significant murmur, gallop, or rubs  ABD: soft, benign, no rebound no guarding, no distention. Bowel sounds are normal.     Nontender,  no palpable masses, no organomegaly and no audible bruits.  BREAST: no masses.  No nipple inversion, retraction, or deviation.  EXT: no clubbing, cyanosis, or edema  LYMPH: no cervical, inguinal, or axillary adenopathy  FEET: no loss of sensation.  No ulcers or pressure sores.  NEURO: gait normal.  Cranial nerves II- XII intact. No nystagmus.  Speech normal.   Gross motor and sensory unremarkable.  PELVIC: deferred    Lab Results   Component Value Date    WBC 4.46 10/15/2024    HGB 13.0 10/15/2024    HCT 41.0 10/15/2024     10/15/2024    CHOL 140 10/20/2020    TRIG 103 10/20/2020    HDL 55 10/20/2020    ALT 17 10/16/2024    AST 20 10/16/2024     10/16/2024    K 4.0 10/16/2024     10/16/2024    CREATININE 0.7 10/16/2024    BUN 14 10/16/2024    CO2 24 10/16/2024    TSH 2.427 06/22/2023    INR 0.9 04/25/2024    HGBA1C 5.8 03/07/2017    BNP 12 02/15/2024    SEDRATE 28 (H) 05/21/2017    CRP 28.1 (H) 05/21/2017       Impression:  General anxiety disorder well controlled  Migraines, controlled on current meds  Bilateral knee pain  Patient Active Problem List   Diagnosis    Anxiety    Bilateral low back pain with right-sided sciatica    Vertigo    Right nephrolithiasis    Ureteral stone    KENN (generalized anxiety disorder)    Migraine headache    Hydronephrosis    Incomplete emptying of bladder    History of small bowel obstruction       Plan:   Orders Placed This Encounter    CBC Auto Differential    Comprehensive Metabolic Panel    EKG 12-lead     Patient has no contraindications to the planned anesthesia or surgery.  She should stop the meloxicam 1 week prior to the surgery.  All other medications may be continued.  All of her lab work, EKG and preop note have been sent to her surgeon.    This note is  generated with speech recognition software and is subject to transcription error and sound alike phrases that may be missed by proofreading.

## 2024-10-16 ENCOUNTER — TELEPHONE (OUTPATIENT)
Dept: INTERNAL MEDICINE | Facility: CLINIC | Age: 59
End: 2024-10-16
Payer: MEDICARE

## 2024-10-16 ENCOUNTER — PATIENT MESSAGE (OUTPATIENT)
Dept: INTERNAL MEDICINE | Facility: CLINIC | Age: 59
End: 2024-10-16
Payer: MEDICARE

## 2024-10-16 ENCOUNTER — LAB VISIT (OUTPATIENT)
Dept: LAB | Facility: HOSPITAL | Age: 59
End: 2024-10-16
Attending: INTERNAL MEDICINE
Payer: MEDICARE

## 2024-10-16 DIAGNOSIS — Z01.818 PREOP EXAMINATION: Primary | ICD-10-CM

## 2024-10-16 DIAGNOSIS — Z01.818 PREOP EXAMINATION: ICD-10-CM

## 2024-10-16 LAB
ALBUMIN SERPL BCP-MCNC: 3.9 G/DL (ref 3.5–5.2)
ALP SERPL-CCNC: 87 U/L (ref 55–135)
ALT SERPL W/O P-5'-P-CCNC: 17 U/L (ref 10–44)
ANION GAP SERPL CALC-SCNC: 8 MMOL/L (ref 8–16)
AST SERPL-CCNC: 20 U/L (ref 10–40)
BILIRUB SERPL-MCNC: 1.2 MG/DL (ref 0.1–1)
BUN SERPL-MCNC: 14 MG/DL (ref 6–20)
CALCIUM SERPL-MCNC: 9 MG/DL (ref 8.7–10.5)
CHLORIDE SERPL-SCNC: 107 MMOL/L (ref 95–110)
CO2 SERPL-SCNC: 24 MMOL/L (ref 23–29)
CREAT SERPL-MCNC: 0.7 MG/DL (ref 0.5–1.4)
EST. GFR  (NO RACE VARIABLE): >60 ML/MIN/1.73 M^2
GLUCOSE SERPL-MCNC: 85 MG/DL (ref 70–110)
POTASSIUM SERPL-SCNC: 4 MMOL/L (ref 3.5–5.1)
PROT SERPL-MCNC: 6.7 G/DL (ref 6–8.4)
SODIUM SERPL-SCNC: 139 MMOL/L (ref 136–145)

## 2024-10-16 PROCEDURE — 80053 COMPREHEN METABOLIC PANEL: CPT | Performed by: INTERNAL MEDICINE

## 2024-10-16 PROCEDURE — 36415 COLL VENOUS BLD VENIPUNCTURE: CPT | Mod: PO | Performed by: INTERNAL MEDICINE

## 2024-10-16 NOTE — TELEPHONE ENCOUNTER
Call pt and tell her the CMP needs to be repeated as the specimen done yesterday was hemolyzed and could not be used. New order put in. Needs to be done today

## 2024-10-17 NOTE — NURSING
MENTAL HEALTH PSYCHIATRIC MEDICATION MANAGEMENT     Medical records were reviewed for 5 minutes to aid in diagnostic and treatment plan.    Patient has a history of mental health treatment for:   MDD (major depressive disorder), recurrent episode, moderate (CMD)  (primary encounter diagnosis)  MARCELLA (generalized anxiety disorder)  Insomnia due to mental disorder  Panic disorder    Additonally patient has the following medical problems:  Past Medical History:   Diagnosis Date    Allergy     Arthritis 08/06/2012    knees, hips, back    Asthma (CMD)     Atrial fibrillation  (CMD)     Blood transfusion without reported diagnosis     Breast cancer  (CMD)     Cervical osteoarthritis     Essential hypertension, benign     GERD (gastroesophageal reflux disease)     Heart murmur     Hiatal hernia     High cholesterol     Malignant neoplasm  (CMD)     Morbid obesity  (CMD)     s/p lap band    Osteoporosis     Urinary tract infection      Past Surgical History:   Procedure Laterality Date    Breast biopsy Right 09/14/2010    Cholecystectomy  03/09/2010    With Lap Band and Hiatal Hernia Repair    Esophagogastroduodenoscopy transoral flex w/bx single or mult  03/04/2012    EGD with Bx    Gynecologic cryosurgery      Hernia repair      Hysterectomy  06/30/2009    Joint replacement      Bilateral Knee Replacement 2004    Laparoscopic gastric banding  03/09/2010    with hiatal hernia repair    Lipectomy  08/14/2012    abdominal w/ subsequent    Mastectomy Bilateral 09/08/2022     Needle    Removal gallbladder  03/09/2010    Spine surgery      Tonsillectomy and adenoidectomy      Tubal ligation  1993       current medications:  Current Outpatient Medications   Medication Sig Dispense Refill    albuterol 108 (90 Base) MCG/ACT inhaler INHALE 2 PUFFS INTO THE LUNGS EVERY 4 HOURS AS NEEDED FOR SHORTNESS OF BREATH OR WHEEZING 8.5 g 1    sertraline (ZOLOFT) 100 MG tablet Take 2 tablets by mouth daily. 180 tablet 0    trazodone  Report called to PAULINA Caraballo on [facility].    (DESYREL) 150 MG tablet Take 1 tablet by mouth nightly. 90 tablet 0    anastrozole (ARIMIDEX) 1 MG tablet Take 1 tablet by mouth daily. 90 tablet 3    Xarelto 20 MG Tab Take 1 tablet by mouth daily. 90 tablet 3    losartan (COZAAR) 25 MG tablet TAKE 1 TABLET BY MOUTH DAILY 90 tablet 1    atorvastatin (LIPITOR) 20 MG tablet TAKE 1 TABLET BY MOUTH DAILY 90 tablet 1    pantoprazole (PROTONIX) 40 MG tablet TAKE 1 TABLET BY MOUTH DAILY 90 tablet 3    potassium CHLORIDE (KLOR-CON M) 20 MEQ josé ER tablet TAKE 1 TABLET BY MOUTH IN THE MORNING AND AT BEDTIME 180 tablet 1    metoPROLOL succinate (TOPROL-XL) 25 MG 24 hr tablet TAKE 1 TABLET BY MOUTH DAILY 90 tablet 1    Magnesium Gluconate (MAGNESIUM 27 PO) Take by mouth 2 (two) times a day.      Multiple Vitamin (MULTI VITAMIN DAILY PO)       Zinc Sulfate (ZINC 15 PO)       cyanocobalamin (Vitamin B-12) 250 MCG tablet Take 250 mcg by mouth daily.      fluticasone-salmeterol (Advair HFA) 45-21 MCG/ACT inhaler INHALE 2 PUFFS BY MOUTH TWICE DAILY       No current facility-administered medications for this visit.       primary care doctor:  Carter Welsh DO    Allergies:  ALLERGIES:   Allergen Reactions    Ace Inhibitors Cough    Hay Fever & [Chlorpheniramine-Ppa Cr] Cough     Stuffy nose, asthma triggering and cough from drainage.        67 year old female with a history of   MDD (major depressive disorder), recurrent episode, moderate (CMD)  (primary encounter diagnosis)  MARCELLA (generalized anxiety disorder)  Insomnia due to mental disorder  Panic disorder   who presents for follow up appointment    Today patient reports over the last month since last appointment:  Stressors:    Energy: Improving  Focus: Improving  Appetite: good  Suicidal/Homicidal Ideation: denies  Auditory/Visual Hallucinations: denies  Med Compliant: yes  Pain: denies  Medication Side Effects: none  Mood: \" Improving \"  Anxiety: Stable  Sleep/nightmares: Improving    Patient consents to phone visit      Patient's identity and physical location confirmed as patient identified name and date of birth and is currently in [place] and provider licensed in [place]    This visit was conducted over the telephone. This patient verbally consents to a telephone visit.      The telephone-only visit was being conducted for the purpose of providing treatment advice. The treatment advice that was being provided was based on what was reported by the patient. Without the patient being seen and evaluated in person, there would be some risk that the information and/or assessment provided by the [facility] provider might be incomplete or inaccurate.      MENTAL STATUS EXAM:  Speech: normal rate and tone, able to repeat phrases  Mood: \"Improving\"  Affect: mood congruent  Thought Process: Linear, logical, no flight of ideas, spontaneous thoughts  Associations: intact; no loose /tangential/ circumstantial associations  Thought content: no suicidal thoughts, homicidal thoughts or psychotic symptoms such as auditory/visual hallucinations, paranoia, delusions  Insight and judgement: good  Orientation: alert and oriented to person, place, time, and situation.  Behavior: calm, cooperative  Memory: fair, good fund of knowledge  Concentration: fair  Abstraction: able to abstract?  Neuro:  normal speech   AIMS score = 0   Pt feels that current treatment is: \"okay\"       Pt denies:  fever  weakness  m. stiffness or pain or spasms   tremors  fatigue  high Blood pressure  loss of consciousness  seizure disorder  memory loss  chest pain  tachycardia  shortness of breath  syncope  blurry vision  weight change  headache or migraines  nausea or vomiting  gastrointestinal problems or discomfort  genitourinary problems or discomfort  pain    LABS:  No results found for: \"HGBA1C\"  No components found for: \"GLU0T\"  Cholesterol (mg/dL)   Date Value   04/24/2024 160     HDL (mg/dL)   Date Value   04/24/2024 56     Cholesterol/ HDL Ratio (no units)   Date  Value   04/24/2024 2.9     Triglycerides (mg/dL)   Date Value   04/24/2024 108     LDL (mg/dL)   Date Value   04/24/2024 82     TSH (mcUnits/mL)   Date Value   04/24/2024 3.824     Results for orders placed or performed in visit on 05/30/24   Electrocardiogram 12-Lead    Narrative    Atrial fibrillation with rapid ventricular response   Left Axis Deviation   Abnormal ECG       Impression    Atrial fibrillation with rapid ventricular response   Left Axis Deviation   Abnormal ECG        WBC (K/mcL)   Date Value   09/03/2024 7.1     RBC (mil/mcL)   Date Value   09/03/2024 4.58     HCT (%)   Date Value   09/03/2024 42.9     HGB (g/dL)   Date Value   09/03/2024 13.8     PLT (K/mcL)   Date Value   09/03/2024 229     Sodium (mmol/L)   Date Value   09/03/2024 143     Potassium (mmol/L)   Date Value   09/03/2024 4.0     Chloride (mmol/L)   Date Value   09/03/2024 104     Glucose (mg/dL)   Date Value   09/03/2024 159 (H)     Calcium (mg/dL)   Date Value   09/03/2024 9.0     Carbon Dioxide (mmol/L)   Date Value   09/03/2024 30     BUN (mg/dL)   Date Value   09/03/2024 12     Creatinine (mg/dL)   Date Value   09/03/2024 0.92     GOT/AST (Units/L)   Date Value   09/03/2024 18     GPT/ALT (Units/L)   Date Value   09/03/2024 22     No results found for: \"GGTP\"  Alkaline Phosphatase (Units/L)   Date Value   09/03/2024 120 (H)     Bilirubin, Total (mg/dL)   Date Value   09/03/2024 0.7     Albumin (g/dL)   Date Value   09/03/2024 3.8     No results found for: \"TP\"  GFR Estimate, Non  (no units)   Date Value   01/21/2019 59     GFR Estimate,  (no units)   Date Value   01/21/2019 69        Comprehensive Past/Family/Social history which was performed during initial evaluation was reexamined and reviewed with patient. For further details, please refer to my initial evaluation note in this chart as well as below for updates.    VS:   There were no vitals filed for this visit.      There were  no vitals filed for this visit.    Living with: ex  son  Family Support: brother and sister in law  Hobbies: family, and 3 year old adopted son    FAMILY HISTORY   drugs and alcohol:  denies     mental illness: brother depression     Suicide: Denies    Sudden cardiac death: denies    PSYCHIATRIC HISTORY  Inpatient: denies  Outpatient: outpatient psychiatrist  Previous diagnoses: mdd gerardo   History of suicide attempts: denies  Weapons: denies  Past treatment for alcohol/drugs: Denies  Trauma/Abuse: daughter passed 30 yrs ago  Legal History: Denies    IMPRESSION:   67 year old, female with   MDD (major depressive disorder), recurrent episode, moderate (CMD)  (primary encounter diagnosis)  GERARDO (generalized anxiety disorder)  Insomnia due to mental disorder  Panic disorder   who presents for follow up.      Patient with improving depression and symptoms of Fatigue, Distractibility, Anhedonia, Guilt, and insomnia. Pt denies suicidal thoughts.    Patient with improving     generalized anxiety disorder and symptoms of worries, muscle tension, irritability, fatigue, and insomnia.     Patient with improving panic disorder and symptoms of shortness of breath, fear of dying, tremors, palpitations, and sweats with less frequency.     This patient is not suicidal, nor is pt homicidal. Pt is not gravely disabled. Inpatient admission is not appropriate at this time.    Past psych meds:   valium    RECOMMENDATIONS     -Meds:   zoloft 200 mg po qd     Trazodone 150 mg po at bedtime     Other provider:  gabapentin    - Individual Therapy: Radha completed  Patient is not currently engaged in individual psychotherapy but is interested (referral provided)      Substance use:  Tobacco- 1ppd, Motivational interviewing provided. Continue at follow-up visits    Alcohol- rare (denies history of withdrawal symptoms, blackouts, seizure)  Drugs- denies    Laboratory testing:   Reviewed CBC, CMP, TSH, Lipid profile, EKG border  Next  pending 12/21    Master treatment plan due: 9/25    Patient was informed about possible symptoms of vania such as distractibility, indiscretion, fast speech, decreased sleep for days, increased energy, and flight of ideas. Patient was informed that this could be a possible side effect of antidepressant and agrees to go to the emergency room/call 911/call crisis hotline/call a family member if he has any of these symptoms. Patient was also informed about increased risk of suicidality, depression, dementia, severe skin reactions, seizures, low sodium, SIADH, hepatotoxicity, priapism, extrapyramidal symptoms, and withdrawal symptoms with abrupt discontinuation of antidepressants.    d    Orders Placed This Encounter    sertraline (ZOLOFT) 100 MG tablet    trazodone (DESYREL) 150 MG tablet       -[place]: Reviewed 10/17/2024      Follow up: 3 months    advised to call this MD during clinic hours with any concerns prior to next appt.  Emergency Room/Urgent Care with Suicidal/Homicidal Ideation or worsening signs/symptoms.  Suicide hotline number provided.  Patient is in agreement with treatment plan.    Prepared to see patient by reviewing last progress note and medication changes, Reviewing for any patient calls in between appointments, reviewing Appropriate laboratory testing, and evaluating for any major events in between appointments. Total time includes obtaining and reviewing history, Performing exam and or evaluation, Counseling and educating patient and/or family, ordering laboratory test if necessary, Communicating with other healthcare providers if necessary, electronically dictating and documenting Clinical information, and Interpreting and communicating test results if necessary. When necessary, treatment plan/ conset for treatment/ consent for meds is also updated. Billing decision being made based on medical decision making complexities including multiple diagnostic review and  multiple medications management. If specific conversation with healthcare providers not documented here it was not necessary in this visit. Time spent with patient was 21 minutes and level of service determined based on MDM.    Suicide risk remains low; Violence risk remains low; No changes from last risk assessment. Please refer to initial evaluation/progress notes.     Safety measures and plan ( warning signs, coping strategies, earlier doctor appointment, go to ER or local urgent care, call crisis hotline, reach out to support system for help, etc ) were discussed.       For any symptoms such as heart palpitations, headaches, increase in blood pressure, patient should contact primary care physician for evaluation and treatment. Pt understands not to drive with any medications which have a sedative side effect as discussed in appointment. If pt is prescribed opiate pain medications they must inform writer of this as it can effect medical decision making. Patient understands that if patient is on a medication that requires tapering off as there can be severe side effects due to withdrawals, and if patient misses scheduled appointment according to treatment plan, patient needs to self taper off of medications (by half dose 1 week at a time) as we don't prescribe medications if patient misses appointments as there needs to be proper assessment appointment by appointment before further prescriptions. If patient is suddenly out of medications they need to go to the emergency room to be appropriately tapered off especially regarding controlled substances such as benzodiazepines, etc. Pt educated to call office or schedule an earlier appointment if any issues arise and pt in agreement. If pt  is planning pregnancy please inform writer.     Writer discussed with patient at length about patient's diagnosis, indications of treatment, risks and benefits of the treatment, medication side effects (low, moderate, and high  risk). Patient aware of alternative treatments available,  higher levels of care, and options of treatment vs non treatment as discussed in initial evaluation. Patient also educated about the potential benefits of the medications--bearing the risk and benefit in mind patient has agreed to take the aforementioned medications. Patient educated about the risks of death and severe adverse side effects in case of use of any psychotropic medications along with any potential amount of alcohol and patient verbalizes understanding of this risk. Patient counseled on need for medication compliance. Patient counseled on the signs and symptoms of serotonin syndrome such as hyperthermia, autonomic instability, rigidity, myoclonus, encephalopathy, and diaphoresis and to dial 911/ER if occur. Patient also verbalizes understanding of probable consequences of not receiving or not being compliant with the proposed treatment/medications (including but not limited to leading to a hospital visit, ER (Emergency Room) visit or death). Patient educated that the full benefit of the medication may not be seen if the patient is not taking it as prescribed. Patient informed that the duration of treatment is dependent on treatment response and that the desired outcome is complete remission from symptoms--patient also educated that in certain cases wherein symptom episodes recur for a given diagnosis and then remit, that life-long medication treatment is still recommended in this maintenance phase to prevent symptoms from recurring.   Pt understand Pt has the right to withdraw consent to take the prescribed medication at any time. Writer reinforced with the patient the need for compliance with care by reinforcing the importance of keeping regular appointments in order to accomplish therapy goals/safely monitor medications. Writer also emphasized the need for giving at least 24-hour cancellation notice explaining that another patient mat be able  to benefit from the vacant appointment time slot. Writer also assisted the patient in verbalizing a plan to maximize committment to treatment/ scheduled appointments by assessing the best time/day of week for appointments, mode of transportation, arranging for  if applicable, and assessing motivation for treatment. Pt was also given an opportunity to ask questions. Risk of dementia with antidepressants and antipsychotics, psychotropics dicussed.    Patient understands that if being prescribed control substances, random urine drug screens can take place and if the drug screen is positive for any substances other than those prescribed (even if it is an old prescription) AND/OR if patient is non-compliant with prescribed substances THEN controlled substances will be ceased. Also, if patient refuses random drug screen, provider may refuse to prescribe controlled substances if there is suspicion of use of other substances due to the risk of major side effects of mixing multiple substances and provider also may consider discharging patient for noncompliance with treatment plan.  If decision is made by provider to get back on controlled substances, this will not occur until negative urine drug screen is on file.. Patient also understands that if there is a history of substance dependence, and if a relapse occurs, patient is to complete an IOP/PHP dual diagnosis program for substance dependence/mental illness prior to returning back for a follow-up appointment and controlled substances would be ceased or tapered off in this scenario as well until IOP/PHP program is completed. This also holds true for any newly diagnosed substance use disorder.     Patient has capacity to make decisions and voiced understanding and consents to treatment. Medication reconciliation was completed within the realm of my speciality and pertaining to this encounter. I have obtained and reviewed medications and allergy information with  the patient. Patient was educated on the importance of maintaining and sharing this information with all healthcare providers.     Seth Torres MD

## 2024-10-22 ENCOUNTER — PATIENT MESSAGE (OUTPATIENT)
Dept: INTERNAL MEDICINE | Facility: CLINIC | Age: 59
End: 2024-10-22
Payer: MEDICARE

## 2024-10-22 RX ORDER — DIAZEPAM 10 MG/1
10 TABLET ORAL DAILY PRN
Qty: 90 TABLET | Refills: 1 | Status: SHIPPED | OUTPATIENT
Start: 2024-10-22

## 2024-11-14 DIAGNOSIS — R11.0 NAUSEA: ICD-10-CM

## 2024-11-14 RX ORDER — ONDANSETRON 4 MG/1
4 TABLET, FILM COATED ORAL 2 TIMES DAILY
Qty: 30 TABLET | Refills: 5 | Status: SHIPPED | OUTPATIENT
Start: 2024-11-14

## 2024-11-15 ENCOUNTER — TELEPHONE (OUTPATIENT)
Dept: PAIN MEDICINE | Facility: CLINIC | Age: 59
End: 2024-11-15
Payer: MEDICARE

## 2024-11-15 NOTE — TELEPHONE ENCOUNTER
----- Message from Harjinder Mckeon MD sent at 11/15/2024 11:09 AM CST -----  Can you call this patient to confirm their appointment and discuss how we practice? Thanks!

## 2024-11-15 NOTE — TELEPHONE ENCOUNTER
Patient called and confirmed time and location for appointment. Patient given instructions about how our Interventional Pain Department practices. Patient advised to bring any prior images to the appointment.   Spoke with patient, she has had 2 knee surgeries with [facility]  (Dr. Slater) and has issues with Fibromyalgia with increased pain.  Explained how IPM works and what we offer.  Patient verbalized understanding

## 2024-11-17 ENCOUNTER — OFFICE VISIT (OUTPATIENT)
Dept: URGENT CARE | Facility: CLINIC | Age: 59
End: 2024-11-17
Payer: MEDICARE

## 2024-11-17 VITALS
HEIGHT: 67 IN | BODY MASS INDEX: 31.4 KG/M2 | HEART RATE: 92 BPM | WEIGHT: 200.06 LBS | RESPIRATION RATE: 18 BRPM | DIASTOLIC BLOOD PRESSURE: 74 MMHG | SYSTOLIC BLOOD PRESSURE: 142 MMHG | OXYGEN SATURATION: 96 % | TEMPERATURE: 98 F

## 2024-11-17 DIAGNOSIS — R30.9 PAINFUL URINATION: Primary | ICD-10-CM

## 2024-11-17 LAB
BILIRUBIN, UA POC OHS: NEGATIVE
BLOOD, UA POC OHS: NEGATIVE
CLARITY, UA POC OHS: CLEAR
COLOR, UA POC OHS: YELLOW
GLUCOSE, UA POC OHS: NEGATIVE
KETONES, UA POC OHS: NEGATIVE
LEUKOCYTES, UA POC OHS: NEGATIVE
NITRITE, UA POC OHS: NEGATIVE
PH, UA POC OHS: 5.5
PROTEIN, UA POC OHS: NEGATIVE
SPECIFIC GRAVITY, UA POC OHS: 1.01
UROBILINOGEN, UA POC OHS: 0.2

## 2024-11-17 PROCEDURE — 87086 URINE CULTURE/COLONY COUNT: CPT | Performed by: NURSE PRACTITIONER

## 2024-11-17 PROCEDURE — 99213 OFFICE O/P EST LOW 20 MIN: CPT | Mod: S$GLB,,, | Performed by: NURSE PRACTITIONER

## 2024-11-17 PROCEDURE — 81003 URINALYSIS AUTO W/O SCOPE: CPT | Mod: QW,S$GLB,, | Performed by: NURSE PRACTITIONER

## 2024-11-17 RX ORDER — PHENAZOPYRIDINE HYDROCHLORIDE 100 MG/1
100 TABLET, FILM COATED ORAL 3 TIMES DAILY PRN
Qty: 20 TABLET | Refills: 0 | Status: SHIPPED | OUTPATIENT
Start: 2024-11-17 | End: 2024-11-27

## 2024-11-17 RX ORDER — NITROFURANTOIN 25; 75 MG/1; MG/1
100 CAPSULE ORAL 2 TIMES DAILY
Qty: 14 CAPSULE | Refills: 0 | Status: SHIPPED | OUTPATIENT
Start: 2024-11-17 | End: 2024-11-24

## 2024-11-17 NOTE — PROGRESS NOTES
"Subjective:      Patient ID: Genny Calixto is a 59 y.o. female.    Vitals:  height is 5' 7" (1.702 m) and weight is 90.8 kg (200 lb 1.1 oz). Her tympanic temperature is 98.4 °F (36.9 °C). Her blood pressure is 142/74 (abnormal) and her pulse is 92. Her respiration is 18 and oxygen saturation is 96%.     Chief Complaint: painful urination    Patient presents to the clinic with painful, frequent urination. She states that her urine has an odor and gives her a burning sensation.  She has taken Azo with no relief. Recent knee surgery with cooper inserted. Has been having symptoms since. States running intermittent fevers. Has fever 101 last night.       Constitution: Positive for fever.   HENT:  Negative for sore throat.    Respiratory:  Negative for cough.    Gastrointestinal:  Negative for abdominal pain, nausea and vomiting.   Genitourinary:  Positive for dysuria, frequency and urgency. Negative for urine decreased, flank pain, hematuria and vaginal discharge.      Objective:     Physical Exam   Constitutional: She is oriented to person, place, and time. She appears well-developed.   HENT:   Head: Normocephalic and atraumatic.   Ears:   Right Ear: External ear normal.   Left Ear: External ear normal.   Nose: Nose normal.   Mouth/Throat: Mucous membranes are normal.   Eyes: Conjunctivae and lids are normal.   Neck: Trachea normal. Neck supple.   Cardiovascular: Normal rate, regular rhythm and normal heart sounds.   Pulmonary/Chest: Effort normal and breath sounds normal. No respiratory distress.   Abdominal: Normal appearance and bowel sounds are normal. She exhibits no distension and no mass. Soft. There is no abdominal tenderness. There is no left CVA tenderness and no right CVA tenderness.   Musculoskeletal: Normal range of motion.         General: Normal range of motion.   Neurological: She is alert and oriented to person, place, and time. She has normal strength.   Skin: Skin is warm, dry, intact, not " diaphoretic and not pale.   Psychiatric: Her speech is normal and behavior is normal. Judgment and thought content normal.   Nursing note and vitals reviewed.      Assessment:     1. Painful urination        Plan:       Painful urination  -     POCT Urinalysis(Instrument)  -     Urine culture  -     nitrofurantoin, macrocrystal-monohydrate, (MACROBID) 100 MG capsule; Take 1 capsule (100 mg total) by mouth 2 (two) times daily. for 7 days  Dispense: 14 capsule; Refill: 0  -     phenazopyridine (PYRIDIUM) 100 MG tablet; Take 1 tablet (100 mg total) by mouth 3 (three) times daily as needed for Pain.  Dispense: 20 tablet; Refill: 0        - Increase fluid intake.  Drink at least 64 ounces of water each day.  - Do not hold urine in the bladder.  Empty bladder with first urge to urinate.  - Avoid wiping from back to front.  - Empty bladder before and after intercourse  - Complete all antibiotics to help prevent recurrence of infection and resistant bacteria.  - Pyridium as needed for burning on urination. This medication will turn your urine orange  - Follow up with PCP if no improvement in 3-5 days.   - To ER for worsening symptoms or persistent fever

## 2024-11-20 LAB — BACTERIA UR CULT: NORMAL

## 2024-12-01 ENCOUNTER — HOSPITAL ENCOUNTER (EMERGENCY)
Facility: HOSPITAL | Age: 59
Discharge: HOME | End: 2024-12-01
Attending: EMERGENCY MEDICINE
Payer: MEDICARE

## 2024-12-01 VITALS
SYSTOLIC BLOOD PRESSURE: 111 MMHG | BODY MASS INDEX: 29.73 KG/M2 | HEART RATE: 101 BPM | WEIGHT: 189.81 LBS | RESPIRATION RATE: 18 BRPM | OXYGEN SATURATION: 96 % | DIASTOLIC BLOOD PRESSURE: 59 MMHG | TEMPERATURE: 99 F

## 2024-12-01 DIAGNOSIS — R11.10 VOMITING AND DIARRHEA: Primary | ICD-10-CM

## 2024-12-01 DIAGNOSIS — R19.7 VOMITING AND DIARRHEA: Primary | ICD-10-CM

## 2024-12-01 LAB
ALBUMIN SERPL BCP-MCNC: 4.2 G/DL (ref 3.5–5.2)
ALP SERPL-CCNC: 92 U/L (ref 40–150)
ALT SERPL W/O P-5'-P-CCNC: 15 U/L (ref 10–44)
ANION GAP SERPL CALC-SCNC: 11 MMOL/L (ref 8–16)
AST SERPL-CCNC: 17 U/L (ref 10–40)
BACTERIA #/AREA URNS HPF: NORMAL /HPF
BASOPHILS # BLD AUTO: 0.03 K/UL (ref 0–0.2)
BASOPHILS NFR BLD: 0.4 % (ref 0–1.9)
BILIRUB SERPL-MCNC: 1.5 MG/DL (ref 0.1–1)
BILIRUB UR QL STRIP: NEGATIVE
BUN SERPL-MCNC: 18 MG/DL (ref 6–20)
CALCIUM SERPL-MCNC: 9.4 MG/DL (ref 8.7–10.5)
CHLORIDE SERPL-SCNC: 108 MMOL/L (ref 95–110)
CLARITY UR: CLEAR
CO2 SERPL-SCNC: 22 MMOL/L (ref 23–29)
COLOR UR: YELLOW
CREAT SERPL-MCNC: 0.7 MG/DL (ref 0.5–1.4)
DIFFERENTIAL METHOD BLD: ABNORMAL
EOSINOPHIL # BLD AUTO: 0 K/UL (ref 0–0.5)
EOSINOPHIL NFR BLD: 0.5 % (ref 0–8)
ERYTHROCYTE [DISTWIDTH] IN BLOOD BY AUTOMATED COUNT: 13.9 % (ref 11.5–14.5)
EST. GFR  (NO RACE VARIABLE): >60 ML/MIN/1.73 M^2
GLUCOSE SERPL-MCNC: 116 MG/DL (ref 70–110)
GLUCOSE UR QL STRIP: NEGATIVE
HCT VFR BLD AUTO: 46.5 % (ref 37–48.5)
HGB BLD-MCNC: 15.9 G/DL (ref 12–16)
HGB UR QL STRIP: NEGATIVE
HYALINE CASTS #/AREA URNS LPF: 1 /LPF
IMM GRANULOCYTES # BLD AUTO: 0.03 K/UL (ref 0–0.04)
IMM GRANULOCYTES NFR BLD AUTO: 0.4 % (ref 0–0.5)
KETONES UR QL STRIP: NEGATIVE
LEUKOCYTE ESTERASE UR QL STRIP: NEGATIVE
LIPASE SERPL-CCNC: 23 U/L (ref 4–60)
LYMPHOCYTES # BLD AUTO: 0.5 K/UL (ref 1–4.8)
LYMPHOCYTES NFR BLD: 5.5 % (ref 18–48)
MCH RBC QN AUTO: 34.1 PG (ref 27–31)
MCHC RBC AUTO-ENTMCNC: 34.2 G/DL (ref 32–36)
MCV RBC AUTO: 100 FL (ref 82–98)
MICROSCOPIC COMMENT: NORMAL
MONOCYTES # BLD AUTO: 0.3 K/UL (ref 0.3–1)
MONOCYTES NFR BLD: 3.4 % (ref 4–15)
NEUTROPHILS # BLD AUTO: 7.4 K/UL (ref 1.8–7.7)
NEUTROPHILS NFR BLD: 89.8 % (ref 38–73)
NITRITE UR QL STRIP: NEGATIVE
NRBC BLD-RTO: 0 /100 WBC
PH UR STRIP: 6 [PH] (ref 5–8)
PLATELET # BLD AUTO: 236 K/UL (ref 150–450)
PMV BLD AUTO: 10.4 FL (ref 9.2–12.9)
POTASSIUM SERPL-SCNC: 4 MMOL/L (ref 3.5–5.1)
PROT SERPL-MCNC: 7.5 G/DL (ref 6–8.4)
PROT UR QL STRIP: ABNORMAL
RBC # BLD AUTO: 4.66 M/UL (ref 4–5.4)
RBC #/AREA URNS HPF: 0 /HPF (ref 0–4)
SODIUM SERPL-SCNC: 141 MMOL/L (ref 136–145)
SP GR UR STRIP: >1.03 (ref 1–1.03)
URN SPEC COLLECT METH UR: ABNORMAL
UROBILINOGEN UR STRIP-ACNC: NEGATIVE EU/DL
WBC # BLD AUTO: 8.21 K/UL (ref 3.9–12.7)
WBC #/AREA URNS HPF: 2 /HPF (ref 0–5)

## 2024-12-01 PROCEDURE — 25000003 PHARM REV CODE 250: Performed by: NURSE PRACTITIONER

## 2024-12-01 PROCEDURE — 81000 URINALYSIS NONAUTO W/SCOPE: CPT | Performed by: PHYSICIAN ASSISTANT

## 2024-12-01 PROCEDURE — 25500020 PHARM REV CODE 255: Performed by: EMERGENCY MEDICINE

## 2024-12-01 PROCEDURE — 63600175 PHARM REV CODE 636 W HCPCS: Performed by: NURSE PRACTITIONER

## 2024-12-01 PROCEDURE — 85025 COMPLETE CBC W/AUTO DIFF WBC: CPT | Performed by: PHYSICIAN ASSISTANT

## 2024-12-01 PROCEDURE — 80053 COMPREHEN METABOLIC PANEL: CPT | Performed by: PHYSICIAN ASSISTANT

## 2024-12-01 PROCEDURE — 25000003 PHARM REV CODE 250: Performed by: EMERGENCY MEDICINE

## 2024-12-01 PROCEDURE — 63600175 PHARM REV CODE 636 W HCPCS: Performed by: PHYSICIAN ASSISTANT

## 2024-12-01 PROCEDURE — 96375 TX/PRO/DX INJ NEW DRUG ADDON: CPT

## 2024-12-01 PROCEDURE — 63600175 PHARM REV CODE 636 W HCPCS: Performed by: EMERGENCY MEDICINE

## 2024-12-01 PROCEDURE — A9698 NON-RAD CONTRAST MATERIALNOC: HCPCS | Performed by: EMERGENCY MEDICINE

## 2024-12-01 PROCEDURE — 83690 ASSAY OF LIPASE: CPT | Performed by: PHYSICIAN ASSISTANT

## 2024-12-01 PROCEDURE — 99285 EMERGENCY DEPT VISIT HI MDM: CPT | Mod: 25

## 2024-12-01 PROCEDURE — 25000003 PHARM REV CODE 250: Performed by: PHYSICIAN ASSISTANT

## 2024-12-01 PROCEDURE — 96365 THER/PROPH/DIAG IV INF INIT: CPT

## 2024-12-01 RX ORDER — OXYCODONE AND ACETAMINOPHEN 7.5; 325 MG/1; MG/1
TABLET ORAL
COMMUNITY
Start: 2024-06-24

## 2024-12-01 RX ORDER — ONDANSETRON HYDROCHLORIDE 2 MG/ML
4 INJECTION, SOLUTION INTRAVENOUS
Status: COMPLETED | OUTPATIENT
Start: 2024-12-01 | End: 2024-12-01

## 2024-12-01 RX ORDER — CELECOXIB 200 MG/1
200 CAPSULE ORAL 2 TIMES DAILY
COMMUNITY
Start: 2024-11-07

## 2024-12-01 RX ORDER — ACETAMINOPHEN 325 MG/1
650 TABLET ORAL
Status: COMPLETED | OUTPATIENT
Start: 2024-12-01 | End: 2024-12-01

## 2024-12-01 RX ORDER — PROMETHAZINE HYDROCHLORIDE 25 MG/1
25 TABLET ORAL EVERY 6 HOURS PRN
Qty: 12 TABLET | Refills: 0 | Status: SHIPPED | OUTPATIENT
Start: 2024-12-01

## 2024-12-01 RX ORDER — HYDROCODONE BITARTRATE AND ACETAMINOPHEN 10; 325 MG/1; MG/1
TABLET ORAL
COMMUNITY

## 2024-12-01 RX ORDER — MECLIZINE HCL 12.5 MG 12.5 MG/1
12.5 TABLET ORAL
COMMUNITY

## 2024-12-01 RX ORDER — OXYCODONE AND ACETAMINOPHEN 10; 325 MG/1; MG/1
TABLET ORAL
COMMUNITY
Start: 2024-06-14

## 2024-12-01 RX ORDER — PROCHLORPERAZINE EDISYLATE 5 MG/ML
10 INJECTION INTRAMUSCULAR; INTRAVENOUS ONCE
Status: DISCONTINUED | OUTPATIENT
Start: 2024-12-01 | End: 2024-12-01

## 2024-12-01 RX ORDER — METHOCARBAMOL 750 MG/1
750 TABLET, FILM COATED ORAL
COMMUNITY
Start: 2024-10-29

## 2024-12-01 RX ORDER — DIPHENHYDRAMINE HYDROCHLORIDE 50 MG/ML
25 INJECTION INTRAMUSCULAR; INTRAVENOUS
Status: DISCONTINUED | OUTPATIENT
Start: 2024-12-01 | End: 2024-12-01

## 2024-12-01 RX ORDER — HYDROCODONE BITARTRATE AND ACETAMINOPHEN 7.5; 325 MG/1; MG/1
1 TABLET ORAL EVERY 6 HOURS PRN
COMMUNITY
Start: 2024-10-18

## 2024-12-01 RX ORDER — PROMETHAZINE HYDROCHLORIDE 25 MG/1
25 TABLET ORAL EVERY 6 HOURS PRN
COMMUNITY
Start: 2024-11-14 | End: 2024-12-01

## 2024-12-01 RX ORDER — HYDROCODONE BITARTRATE AND ACETAMINOPHEN 5; 325 MG/1; MG/1
1 TABLET ORAL
COMMUNITY
Start: 2024-09-09

## 2024-12-01 RX ORDER — METOCLOPRAMIDE HYDROCHLORIDE 5 MG/ML
10 INJECTION INTRAMUSCULAR; INTRAVENOUS
Status: COMPLETED | OUTPATIENT
Start: 2024-12-01 | End: 2024-12-01

## 2024-12-01 RX ADMIN — ACETAMINOPHEN 650 MG: 325 TABLET ORAL at 06:12

## 2024-12-01 RX ADMIN — IOHEXOL 1000 ML: 12 SOLUTION ORAL at 05:12

## 2024-12-01 RX ADMIN — METOCLOPRAMIDE 10 MG: 5 INJECTION, SOLUTION INTRAMUSCULAR; INTRAVENOUS at 07:12

## 2024-12-01 RX ADMIN — ONDANSETRON 4 MG: 2 INJECTION INTRAMUSCULAR; INTRAVENOUS at 03:12

## 2024-12-01 RX ADMIN — IOHEXOL 100 ML: 350 INJECTION, SOLUTION INTRAVENOUS at 06:12

## 2024-12-01 RX ADMIN — PROMETHAZINE HYDROCHLORIDE 25 MG: 25 INJECTION INTRAMUSCULAR; INTRAVENOUS at 05:12

## 2024-12-01 RX ADMIN — DROPERIDOL 1.25 MG: 2.5 INJECTION, SOLUTION INTRAMUSCULAR; INTRAVENOUS at 09:12

## 2024-12-01 NOTE — ED PROVIDER NOTES
"SCRIBE #1 NOTE: I, Richie Ibrahim, am scribing for, and in the presence of, Marie Gutierrez DO. I have scribed the HPI, ROS, and PEx.     SCRIBE #2 NOTE: I, Jose G Thuy, am scribing for, and in the presence of,  ALESHIA Whalen MD. I have scribed the remaining portions of the note not scribed by Scribe #1.      History     Chief Complaint   Patient presents with    Vomiting     Vomiting and diarrhea since 0400; hx of IBS and bowel obstructions; pt reports 2 episodes of vomiting and over 15 BMs since 0400. Takes Linzess every other day      Review of patient's allergies indicates:   Allergen Reactions    Erythromycin Other (See Comments)     Stomach cramps    Morphine Nausea And Vomiting     "Projectile vomiting"         History of Present Illness     HPI    12/1/2024, 5:34 PM  History obtained from the patient      History of Present Illness: Genny Calixto is a 59 y.o. female patient with a PMHx of reflex sympathetic dystrophy, vertigo, insomnia, nephrolithiasis, UTI, generalized anxiety disorder, migraine headache, and small bowel obstruction who presents to the Emergency Department for evaluation of N/V/D which onset gradually at 0400 today. Pt states the food she ate around 2000 appeared undigested when she vomited around 0400. Pt reports LLQ abdominal pain which she describes as stabbing. Symptoms are constant and moderate in severity. No mitigating or exacerbating factors reported. Associated sxs include fever (102.5), urine frequency, dysuria, headache, and myalgias. Pt reports the fever onset around 1030 today. Patient denies any cough, SOB, CP, and rashes. No prior Tx reported. No further complaints or concerns at this time.       Arrival mode: Personal vehicle    PCP: Tay Duff MD        Past Medical History:  Past Medical History:   Diagnosis Date    Encounter for blood transfusion     KENN (generalized anxiety disorder)     Takes 5-10 mg of valium qd prn anxiety (prescriptions for both on " file, one from [facility] & other from )    History of small bowel obstruction     Insomnia     Takes 5-10 mg of valium qhs prn insomnia (prescriptions for both on file, one from [facility] & other from )    Migraine headache     Nephrolithiasis 08/03/2019    Left ureteral stone -> UTI c/b pyelonephritis and urosepsis w/ hypokalemia -> transfered to [facility] urology service from [facility] after CT abn dx, s/p 2 stents to allow infx to drain, IV Vanc/Rocephin, fever free since yesterday    Reflex sympathetic dystrophy     UTI (urinary tract infection)     Vertigo        Past Surgical History:  Past Surgical History:   Procedure Laterality Date    BLADDER SURGERY      CHOLECYSTECTOMY      COLONOSCOPY N/A 11/10/2021    Procedure: COLONOSCOPY;  Surgeon: Eryn Rothman MD;  Location: [facility];  Service: Endoscopy;  Laterality: N/A;    CYSTOSCOPY W/ RETROGRADES Bilateral 8/24/2023    Procedure: CYSTOSCOPY, WITH RETROGRADE PYELOGRAM;  Surgeon: Annita Gamino MD;  Location: [facility];  Service: Urology;  Laterality: Bilateral;    CYSTOSCOPY WITH BIOPSY OF BLADDER N/A 8/24/2023    Procedure: CYSTOSCOPY, WITH BLADDER BIOPSY, WITH FULGURATION IF INDICATED;  Surgeon: Annita Gamino MD;  Location: [facility];  Service: Urology;  Laterality: N/A;    CYSTOSCOPY WITH URETEROSCOPY, RETROGRADE PYELOGRAPHY, AND INSERTION OF STENT Right 9/30/2021    Procedure: CYSTOSCOPY, WITH RETROGRADE PYELOGRAM AND URETERAL STENT INSERTION;  Surgeon: Annita Gamino MD;  Location: [facility];  Service: Urology;  Laterality: Right;    DIAGNOSTIC LAPAROSCOPY N/A 11/11/2020    Procedure: LAPAROSCOPY, DIAGNOSTIC;  Surgeon: Tee Segura MD;  Location: [facility];  Service: General;  Laterality: N/A;  CONVERTED TO OPEN    DIAGNOSTIC LAPAROSCOPY N/A 7/25/2022    Procedure: LAPAROSCOPY, DIAGNOSTIC;  Surgeon: Jo Manzano DO;  Location: [facility];  Service: General;  Laterality: N/A;  convert to open at 0739    ESOPHAGOGASTRODUODENOSCOPY N/A  11/10/2021    Procedure: EGD (ESOPHAGOGASTRODUODENOSCOPY);  Surgeon: Eryn Rothman MD;  Location: [facility] END[facility];  Service: Endoscopy;  Laterality: N/A;    facet injections  02/14/2017    L3, L4, L5, S1 Done by Dr. Lara    HYSTERECTOMY      INJECTION OF ANESTHETIC AGENT INTO TISSUE PLANE DEFINED BY TRANSVERSUS ABDOMINIS MUSCLE N/A 11/11/2020    Procedure: BLOCK, TRANSVERSUS ABDOMINIS PLANE;  Surgeon: Tee Segura MD;  Location: [facility] OR;  Service: General;  Laterality: N/A;    INJECTION OF ANESTHETIC AGENT INTO TISSUE PLANE DEFINED BY TRANSVERSUS ABDOMINIS MUSCLE N/A 7/25/2022    Procedure: BLOCK, TRANSVERSUS ABDOMINIS PLANE;  Surgeon: Jo Manzano DO;  Location: [facility] OR;  Service: General;  Laterality: N/A;    KNEE SURGERY      LAPAROSCOPIC LYSIS OF ADHESIONS N/A 11/11/2020    Procedure: LYSIS, ADHESIONS, LAPAROSCOPIC;  Surgeon: Tee Segura MD;  Location: [facility] OR;  Service: General;  Laterality: N/A;  CONVERTED TO OPEN    LAPAROTOMY N/A 11/20/2020    Procedure: LAPAROTOMY;  Surgeon: Tee Segura MD;  Location: [facility] OR;  Service: General;  Laterality: N/A;    LASER LITHOTRIPSY Left 2/8/2021    Procedure: LITHOTRIPSY, USING LASER;  Surgeon: Irving Kidd MD;  Location: [facility] OR;  Service: Urology;  Laterality: Left;    LASER LITHOTRIPSY Right 10/13/2021    Procedure: LITHOTRIPSY, USING LASER;  Surgeon: Annita Gamino MD;  Location: [facility] OR;  Service: Urology;  Laterality: Right;    LYSIS OF ADHESIONS N/A 11/11/2020    Procedure: LYSIS, ADHESIONS;  Surgeon: Tee Segura MD;  Location: [facility] OR;  Service: General;  Laterality: N/A;    LYSIS OF ADHESIONS N/A 11/20/2020    Procedure: LYSIS, ADHESIONS;  Surgeon: Tee Segura MD;  Location: [facility] OR;  Service: General;  Laterality: N/A;    LYSIS OF ADHESIONS N/A 7/25/2022    Procedure: LYSIS, ADHESIONS;  Surgeon: Jo Manzano DO;  Location: [facility] OR;  Service: General;  Laterality: N/A;    SURGICAL REMOVAL OF ILEUM WITH  CECUM  7/25/2022    Procedure: EXCISION, CECUM WITH ILEUM;  Surgeon: Jo Manzano DO;  Location: [facility] OR;  Service: General;;    TONSILLECTOMY      URETEROSCOPY Left 2/8/2021    Procedure: URETEROSCOPY;  Surgeon: Irving Kidd MD;  Location: [facility] OR;  Service: Urology;  Laterality: Left;  stent placement    URETEROSCOPY Right 10/13/2021    Procedure: URETEROSCOPY;  Surgeon: Annita Gamino MD;  Location: [facility] OR;  Service: Urology;  Laterality: Right;         Family History:  Family History   Problem Relation Name Age of Onset    Stroke Mother      Hypertension Mother      COPD Father      Drug abuse Brother         Social History:  Social History     Tobacco Use    Smoking status: Never    Smokeless tobacco: Never   Substance and Sexual Activity    Alcohol use: Yes     Comment: rarely    Drug use: No    Sexual activity: Never        Review of Systems     Review of Systems   Constitutional:  Positive for fever (102.5).   Respiratory:  Negative for cough and shortness of breath.    Cardiovascular:  Negative for chest pain.   Gastrointestinal:  Positive for abdominal pain (LUQ), diarrhea, nausea and vomiting.   Genitourinary:  Positive for dysuria and frequency.   Musculoskeletal:  Positive for myalgias.   Skin:  Negative for rash.   Neurological:  Positive for headaches.      Physical Exam     Initial Vitals [12/01/24 1408]   BP Pulse Resp Temp SpO2   136/84 (!) 111 17 99.1 °F (37.3 °C) 96 %      MAP       --          Physical Exam  Nursing Notes and Vital Signs Reviewed.  Constitutional: Patient is in no apparent distress. Well-developed and well-nourished.  Head: Atraumatic. Normocephalic.  Eyes: PERRL. EOM intact. No scleral icterus.  ENT: Mucous membranes are moist.  Edentulous.  Neck: Supple. Full ROM.  Cardiovascular: Tachycardic. Regular rhythm. No murmurs, rubs, or gallops.   Pulmonary/Chest: No respiratory distress. Clear to auscultation bilaterally. No wheezing or rales.  Abdominal: Soft  and non-distended. Abdomen diffusely tender.  No rebound, guarding, or rigidity. Diminished bowel sounds.  Musculoskeletal: Moves all extremities. No obvious deformities. No edema. No calf tenderness.  Skin: Warm and dry.  No jaundice.  Neurological:  Alert, awake, and appropriate.  Normal speech.  No acute focal neurological deficits are appreciated.  Psychiatric: Normal affect. Good eye contact. Appropriate in content.     ED Course   Procedures  ED Vital Signs:  Vitals:    12/01/24 1408 12/01/24 1727 12/01/24 1733 12/01/24 1933   BP: 136/84  (!) 142/74 136/66   Pulse: (!) 111 102 107 101   Resp: 17  20 18   Temp: 99.1 °F (37.3 °C)  99.1 °F (37.3 °C) 99 °F (37.2 °C)   TempSrc: Oral  Oral    SpO2: 96%  96% 96%   Weight: 86.1 kg (189 lb 13.1 oz)       12/01/24 2033 12/01/24 2129   BP: 112/65 (!) 111/59   Pulse: 102 101   Resp: 18 18   Temp:  98.9 °F (37.2 °C)   TempSrc:  Oral   SpO2: 96% 96%   Weight:         Abnormal Lab Results:  Labs Reviewed   CBC W/ AUTO DIFFERENTIAL - Abnormal       Result Value    WBC 8.21      RBC 4.66      Hemoglobin 15.9      Hematocrit 46.5       (*)     MCH 34.1 (*)     MCHC 34.2      RDW 13.9      Platelets 236      MPV 10.4      Immature Granulocytes 0.4      Gran # (ANC) 7.4      Immature Grans (Abs) 0.03      Lymph # 0.5 (*)     Mono # 0.3      Eos # 0.0      Baso # 0.03      nRBC 0      Gran % 89.8 (*)     Lymph % 5.5 (*)     Mono % 3.4 (*)     Eosinophil % 0.5      Basophil % 0.4      Differential Method Automated     COMPREHENSIVE METABOLIC PANEL - Abnormal    Sodium 141      Potassium 4.0      Chloride 108      CO2 22 (*)     Glucose 116 (*)     BUN 18      Creatinine 0.7      Calcium 9.4      Total Protein 7.5      Albumin 4.2      Total Bilirubin 1.5 (*)     Alkaline Phosphatase 92      AST 17      ALT 15      eGFR >60      Anion Gap 11     URINALYSIS, REFLEX TO URINE CULTURE - Abnormal    Specimen UA Urine, Clean Catch      Color, UA Yellow      Appearance, UA Clear       pH, UA 6.0      Specific Gravity, UA >1.030 (*)     Protein, UA 1+ (*)     Glucose, UA Negative      Ketones, UA Negative      Bilirubin (UA) Negative      Occult Blood UA Negative      Nitrite, UA Negative      Urobilinogen, UA Negative      Leukocytes, UA Negative      Narrative:     Specimen Source->Urine   LIPASE    Lipase 23     URINALYSIS MICROSCOPIC    RBC, UA 0      WBC, UA 2      Bacteria None      Hyaline Casts, UA 1      Microscopic Comment SEE COMMENT      Narrative:     Specimen Source->Urine        All Lab Results:  Results for orders placed or performed during the hospital encounter of 12/01/24   CBC W/ AUTO DIFFERENTIAL    Collection Time: 12/01/24  3:37 PM   Result Value Ref Range    WBC 8.21 3.90 - 12.70 K/uL    RBC 4.66 4.00 - 5.40 M/uL    Hemoglobin 15.9 12.0 - 16.0 g/dL    Hematocrit 46.5 37.0 - 48.5 %     (H) 82 - 98 fL    MCH 34.1 (H) 27.0 - 31.0 pg    MCHC 34.2 32.0 - 36.0 g/dL    RDW 13.9 11.5 - 14.5 %    Platelets 236 150 - 450 K/uL    MPV 10.4 9.2 - 12.9 fL    Immature Granulocytes 0.4 0.0 - 0.5 %    Gran # (ANC) 7.4 1.8 - 7.7 K/uL    Immature Grans (Abs) 0.03 0.00 - 0.04 K/uL    Lymph # 0.5 (L) 1.0 - 4.8 K/uL    Mono # 0.3 0.3 - 1.0 K/uL    Eos # 0.0 0.0 - 0.5 K/uL    Baso # 0.03 0.00 - 0.20 K/uL    nRBC 0 0 /100 WBC    Gran % 89.8 (H) 38.0 - 73.0 %    Lymph % 5.5 (L) 18.0 - 48.0 %    Mono % 3.4 (L) 4.0 - 15.0 %    Eosinophil % 0.5 0.0 - 8.0 %    Basophil % 0.4 0.0 - 1.9 %    Differential Method Automated    Comp. Metabolic Panel    Collection Time: 12/01/24  3:37 PM   Result Value Ref Range    Sodium 141 136 - 145 mmol/L    Potassium 4.0 3.5 - 5.1 mmol/L    Chloride 108 95 - 110 mmol/L    CO2 22 (L) 23 - 29 mmol/L    Glucose 116 (H) 70 - 110 mg/dL    BUN 18 6 - 20 mg/dL    Creatinine 0.7 0.5 - 1.4 mg/dL    Calcium 9.4 8.7 - 10.5 mg/dL    Total Protein 7.5 6.0 - 8.4 g/dL    Albumin 4.2 3.5 - 5.2 g/dL    Total Bilirubin 1.5 (H) 0.1 - 1.0 mg/dL    Alkaline Phosphatase 92 40 - 150  U/L    AST 17 10 - 40 U/L    ALT 15 10 - 44 U/L    eGFR >60 >60 mL/min/1.73 m^2    Anion Gap 11 8 - 16 mmol/L   Lipase    Collection Time: 12/01/24  3:37 PM   Result Value Ref Range    Lipase 23 4 - 60 U/L   Urinalysis, Reflex to Urine Culture Urine, Clean Catch    Collection Time: 12/01/24  3:47 PM    Specimen: Urine   Result Value Ref Range    Specimen UA Urine, Clean Catch     Color, UA Yellow Yellow, Straw, Hilda    Appearance, UA Clear Clear    pH, UA 6.0 5.0 - 8.0    Specific Gravity, UA >1.030 (A) 1.005 - 1.030    Protein, UA 1+ (A) Negative    Glucose, UA Negative Negative    Ketones, UA Negative Negative    Bilirubin (UA) Negative Negative    Occult Blood UA Negative Negative    Nitrite, UA Negative Negative    Urobilinogen, UA Negative <2.0 EU/dL    Leukocytes, UA Negative Negative   Urinalysis Microscopic    Collection Time: 12/01/24  3:47 PM   Result Value Ref Range    RBC, UA 0 0 - 4 /hpf    WBC, UA 2 0 - 5 /hpf    Bacteria None None-Occ /hpf    Hyaline Casts, UA 1 0-1/lpf /lpf    Microscopic Comment SEE COMMENT      *Note: Due to a large number of results and/or encounters for the requested time period, some results have not been displayed. A complete set of results can be found in Results Review.         Imaging Results:  Imaging Results              CT Abdomen Pelvis With IV Contrast Routine Oral Contrast (Final result)  Result time 12/01/24 19:54:52      Final result by Pascual Cummings MD (12/01/24 19:54:52)                   Impression:      Possible ongoing diarrheal illness.    Otherwise no definite acute abnormality identified.    All CT scans at this facility are performed  using dose modulation techniques as appropriate to performed exam including the following:  automated exposure control; adjustment of mA and/or kV according to the patients size (this includes techniques or standardized protocols for targeted exams where dose is matched to indication/reason for exam: i.e. extremities or  head);  iterative reconstruction technique.      Electronically signed by: Pascual Cummings  Date:    12/01/2024  Time:    19:54               Narrative:    EXAMINATION:  CT ABDOMEN PELVIS WITH IV CONTRAST    CLINICAL HISTORY:  Bowel obstruction suspected;    TECHNIQUE:  Low dose axial images, sagittal and coronal reformations were obtained from the lung bases to the pubic symphysis.  Contrast was administered.  Images acquired after the administration 100 mL Omnipaque 350 IV contrast.    COMPARISON:  Multiple    FINDINGS:  Heart: Normal in size. No pericardial effusion.    Lung Bases: Well aerated, without consolidation or pleural fluid.    Liver: Normal in size and attenuation, with no focal hepatic lesions.    Gallbladder: Gallbladder surgically absent.    Bile Ducts: No evidence of dilated ducts.    Pancreas: No mass or peripancreatic fat stranding.    Spleen: Unremarkable.    Adrenals: Unremarkable.    Kidneys/ Ureters: No hydronephrosis.  Left renal simple renal cyst which requires no dedicated follow-up.    Bladder: No evidence of wall thickening.    Reproductive organs: Unremarkable.    GI Tract/Mesentery: No evidence of bowel obstruction or inflammation.  Prior right colonic anastomosis.  Fluid-filled colon concerning for diarrheal illness.  Contrast opacifies the proximal colon.  No findings to suggest bowel obstruction.    Peritoneal Space: No ascites. No free air.    Retroperitoneum: No significant adenopathy.    Abdominal wall: Unremarkable.    Vasculature: No significant atherosclerosis or aneurysm.    Bones: No acute fracture.  Osseous degenerative changes.                                       The EKG was not ordered.           The Emergency Provider reviewed the vital signs and test results, which are outlined above.     ED Discussion     8:00 PM: Dr. Gutierrez transfers care of patient to Dr. Whalen pending \[facility] [place] [facility]\"" medicine consult.    8:45 PM: Reassessed pt at this time. Discussed with pt all pertinent  ED information and results. Discussed pt dx and plan of tx. Gave pt all f/u and return to the ED instructions. All questions and concerns were addressed at this time. Pt expresses understanding of information and instructions, and is comfortable with plan to discharge. Pt is stable for discharge.    I discussed with patient and/or family/caretaker that evaluation in the ED does not suggest any emergent or life threatening medical conditions requiring immediate intervention beyond what was provided in the ED, and I believe patient is safe for discharge.  Regardless, an unremarkable evaluation in the ED does not preclude the development or presence of a serious of life threatening condition. As such, patient was instructed to return immediately for any worsening or change in current symptoms.      ED Course as of 12/02/24 0541   Sun Dec 01, 2024   2010 59-year-old female presents with nausea and vomiting despite Zofran, Phenergan, and Reglan.  CT and pelvis with IV and p.o. contrast show no small-bowel obstruction or other abnormal findings other than diarrheal illness.  Lipase is negative.  She was afebrile with no leukocytosis.  Normal renal function.  UA is unremarkable.  Hospital medicine consulted for intractable nausea and vomiting.  Compazine and Benadryl ordered.   [NF]   2010 Ddx:  Small-bowel obstruction, pancreatitis, gastroenteritis, UTI [NF]      ED Course User Index  [NF] Marie Gutierrez, DO       Medical Decision Making  Amount and/or Complexity of Data Reviewed  Labs: ordered. Decision-making details documented in ED Course.  Radiology: ordered. Decision-making details documented in ED Course.  Discussion of management or test interpretation with external provider(s):     8:43 PM: Discussed pt's case with Efrain Ribeiro NP (Hospital Medicine) who recommends giving droperidol and discharge.    Risk  OTC drugs.  Prescription drug management.                ED Medication(s):  Medications   ondansetron  injection 4 mg (4 mg Intravenous Given 12/1/24 1536)   promethazine (PHENERGAN) 25 mg in 0.9% NaCl 50 mL IVPB (0 mg Intravenous Stopped 12/1/24 1746)   acetaminophen tablet 650 mg (650 mg Oral Given 12/1/24 1828)   iohexoL (OMNIPAQUE 350) injection 100 mL (100 mLs Intravenous Given 12/1/24 1838)   iohexoL (OMNIPAQUE 12) oral solution 1,000 mL (1,000 mLs Oral Given 12/1/24 1730)   metoclopramide injection 10 mg (10 mg Intravenous Given 12/1/24 1952)   droPERidol (INAPSINE) 1.25 mg in D5W 50 mL IVPB (1.25 mg Intravenous Given 12/1/24 2107)       Discharge Medication List as of 12/1/2024  8:44 PM           Follow-up Information       Tay Duff MD In 2 days.    Specialty: Internal Medicine  Contact information:  [address]  [address]  [place] [address]  [phone]               [facility] - Emergency Dept..    Specialty: Emergency Medicine  Why: As needed, If symptoms worsen  Contact information:  [address]  [facility] [facility] [address] [phone]                               Scribe Attestation:   Scribe #1: I performed the above scribed service and the documentation accurately describes the services I performed. I attest to the accuracy of the note.     Attending:   Physician Attestation Statement for Scribe #1: I, Marie Gutierrez DO, personally performed the services described in this documentation, as scribed by Richie Ibrahim, in my presence, and it is both accurate and complete.       Scribe Attestation:   Scribe #2: I performed the above scribed service and the documentation accurately describes the services I performed. I attest to the accuracy of the note.    Attending Attestation:           Physician Attestation for Scribe:    Physician Attestation Statement for Scribe #2: I, ALESHIA Whalen MD, reviewed documentation, as scribed by Jose G Daley in my presence, and it is both accurate and complete. I also acknowledge and confirm the content of the note done by Chelsea  #1.           Clinical Impression       ICD-10-CM ICD-9-CM   1. Vomiting and diarrhea  R11.10 787.03    R19.7 787.91       Disposition:   Disposition: Discharged  Condition: Stable         Carmine Whalen MD  12/02/24 0541

## 2024-12-01 NOTE — ED NOTES
Bed: 21  Expected date:   Expected time:   Means of arrival: Personal Transportation  Comments:  When clean

## 2024-12-01 NOTE — FIRST PROVIDER EVALUATION
Medical screening examination initiated.  I have conducted a focused provider triage encounter, findings are as follows:    Brief history of present illness:  N/V/D with hx of SBO    Vitals:    12/01/24 1408   BP: 136/84   Pulse: (!) 111   Resp: 17   Temp: 99.1 °F (37.3 °C)   TempSrc: Oral   SpO2: 96%   Weight: 86.1 kg (189 lb 13.1 oz)       Pertinent physical exam:  nad, alert    Brief workup plan:  initiate labs    Preliminary workup initiated; this workup will be continued and followed by the physician or advanced practice provider that is assigned to the patient when roomed.

## 2024-12-02 DIAGNOSIS — G47.00 INSOMNIA, UNSPECIFIED TYPE: Primary | ICD-10-CM

## 2024-12-02 RX ORDER — ESZOPICLONE 2 MG/1
2 TABLET, FILM COATED ORAL NIGHTLY
Qty: 30 TABLET | Refills: 5 | Status: SHIPPED | OUTPATIENT
Start: 2024-12-02

## 2025-01-06 ENCOUNTER — OFFICE VISIT (OUTPATIENT)
Dept: INTERNAL MEDICINE | Facility: CLINIC | Age: 60
End: 2025-01-06
Payer: MEDICARE

## 2025-01-06 VITALS
DIASTOLIC BLOOD PRESSURE: 84 MMHG | TEMPERATURE: 98 F | SYSTOLIC BLOOD PRESSURE: 136 MMHG | BODY MASS INDEX: 29.83 KG/M2 | OXYGEN SATURATION: 92 % | HEART RATE: 113 BPM | WEIGHT: 190.5 LBS

## 2025-01-06 DIAGNOSIS — J06.9 VIRAL URI WITH COUGH: Primary | ICD-10-CM

## 2025-01-06 PROCEDURE — 96372 THER/PROPH/DIAG INJ SC/IM: CPT | Mod: PBBFAC,PO

## 2025-01-06 PROCEDURE — 99213 OFFICE O/P EST LOW 20 MIN: CPT | Mod: 25,S$PBB,, | Performed by: INTERNAL MEDICINE

## 2025-01-06 PROCEDURE — 99999 PR PBB SHADOW E&M-EST. PATIENT-LVL V: CPT | Mod: PBBFAC,,, | Performed by: INTERNAL MEDICINE

## 2025-01-06 PROCEDURE — 99999PBSHW PR PBB SHADOW TECHNICAL ONLY FILED TO HB: Mod: PBBFAC,,,

## 2025-01-06 PROCEDURE — 99215 OFFICE O/P EST HI 40 MIN: CPT | Mod: PBBFAC,PO | Performed by: INTERNAL MEDICINE

## 2025-01-06 RX ORDER — PROMETHAZINE HYDROCHLORIDE AND DEXTROMETHORPHAN HYDROBROMIDE 6.25; 15 MG/5ML; MG/5ML
5 SYRUP ORAL EVERY 4 HOURS PRN
Qty: 240 ML | Refills: 1 | Status: SHIPPED | OUTPATIENT
Start: 2025-01-06 | End: 2025-01-16

## 2025-01-06 RX ORDER — BENZONATATE 200 MG/1
200 CAPSULE ORAL 3 TIMES DAILY PRN
Qty: 40 CAPSULE | Refills: 1 | Status: SHIPPED | OUTPATIENT
Start: 2025-01-06

## 2025-01-06 RX ORDER — METHYLPREDNISOLONE ACETATE 80 MG/ML
80 INJECTION, SUSPENSION INTRA-ARTICULAR; INTRALESIONAL; INTRAMUSCULAR; SOFT TISSUE
Status: COMPLETED | OUTPATIENT
Start: 2025-01-06 | End: 2025-01-06

## 2025-01-06 RX ADMIN — METHYLPREDNISOLONE ACETATE 80 MG: 80 INJECTION, SUSPENSION INTRALESIONAL; INTRAMUSCULAR; INTRASYNOVIAL; SOFT TISSUE at 03:01

## 2025-01-06 NOTE — PROGRESS NOTES
HPI:  Patient is a 59-year-old female who comes in today with complaints of 8-10 days of head and chest congestion with dry cough.  She has been use over-the-counter medications without much benefit.  She has a lot of nasal running this as well.  She has had a intermittent scratchy throat.  She had times has felt like she had fluid in her ears    Current meds have been verified and updated per the EMR  Exam:/84 (BP Location: Left arm, Patient Position: Sitting)   Pulse (!) 113   Temp 98.1 °F (36.7 °C) (Tympanic)   Wt 86.4 kg (190 lb 7.6 oz)   SpO2 (!) 92%   BMI 29.83 kg/m²   TMs are normal, neck is supple without adenopathy, oropharynx is unremarkable  Chest clear    Lab Results   Component Value Date    WBC 8.21 12/01/2024    HGB 15.9 12/01/2024    HCT 46.5 12/01/2024     12/01/2024    CHOL 140 10/20/2020    TRIG 103 10/20/2020    HDL 55 10/20/2020    ALT 15 12/01/2024    AST 17 12/01/2024     12/01/2024    K 4.0 12/01/2024     12/01/2024    CREATININE 0.7 12/01/2024    BUN 18 12/01/2024    CO2 22 (L) 12/01/2024    TSH 2.427 06/22/2023    INR 0.9 04/25/2024    HGBA1C 5.8 03/07/2017    BNP 12 02/15/2024    SEDRATE 28 (H) 05/21/2017    CRP 28.1 (H) 05/21/2017       Impression:  Viral URI with cough  Patient Active Problem List   Diagnosis    Bilateral low back pain with right-sided sciatica    Vertigo    Right nephrolithiasis    Ureteral stone    KENN (generalized anxiety disorder)    Migraine headache    Hydronephrosis    Incomplete emptying of bladder    History of small bowel obstruction       Plan:  Orders Placed This Encounter    benzonatate (TESSALON) 200 MG capsule    promethazine-dextromethorphan (PROMETHAZINE-DM) 6.25-15 mg/5 mL Syrp    methylPREDNISolone acetate injection 80 mg     Patient was given 1 cc Depo-Medrol 80 and placed on the above meds    This note is generated with speech recognition software and is subject to transcription error and sound alike phrases that may  be missed by proofreading.

## 2025-01-27 ENCOUNTER — PATIENT MESSAGE (OUTPATIENT)
Dept: INTERNAL MEDICINE | Facility: CLINIC | Age: 60
End: 2025-01-27
Payer: MEDICARE

## 2025-01-27 DIAGNOSIS — F41.1 GAD (GENERALIZED ANXIETY DISORDER): Primary | ICD-10-CM

## 2025-01-28 RX ORDER — DIAZEPAM 10 MG/1
10 TABLET ORAL DAILY PRN
Qty: 90 TABLET | Refills: 1 | Status: SHIPPED | OUTPATIENT
Start: 2025-01-28

## 2025-03-24 ENCOUNTER — TELEPHONE (OUTPATIENT)
Dept: INTERNAL MEDICINE | Facility: CLINIC | Age: 60
End: 2025-03-24
Payer: MEDICARE

## 2025-03-24 NOTE — TELEPHONE ENCOUNTER
----- Message from Kalie sent at 3/24/2025  1:14 PM CDT -----  .Type: Patient Call BackWho called:Cristian is the request in detail:  calling concerning needing to speak to someone regarding medication that was prescribed by dr gonzalez. Patient stated she still have refill but pharmacy advised patient that she will need a new prescription type up with current new provider to get prescription refilled[address] [address] [address] [address]Fqync: [phone] Fax: [phone] Can the clinic reply by [facility]?   Would the patient rather a call back or a response via [facility] call back number:  .[phone]

## 2025-03-24 NOTE — TELEPHONE ENCOUNTER
Patient called with concerns of her medication refill. She stated she had it cleared out at the pharmacy.  Patient also scheduled an appt to [facility] care with Dr Farley.

## 2025-03-26 ENCOUNTER — OFFICE VISIT (OUTPATIENT)
Dept: INTERNAL MEDICINE | Facility: CLINIC | Age: 60
End: 2025-03-26
Payer: MEDICARE

## 2025-03-26 VITALS
SYSTOLIC BLOOD PRESSURE: 122 MMHG | TEMPERATURE: 98 F | OXYGEN SATURATION: 95 % | BODY MASS INDEX: 30.21 KG/M2 | HEART RATE: 103 BPM | WEIGHT: 192.88 LBS | DIASTOLIC BLOOD PRESSURE: 82 MMHG

## 2025-03-26 DIAGNOSIS — F41.1 GAD (GENERALIZED ANXIETY DISORDER): Primary | ICD-10-CM

## 2025-03-26 DIAGNOSIS — J06.9 UPPER RESPIRATORY TRACT INFECTION, UNSPECIFIED TYPE: ICD-10-CM

## 2025-03-26 PROCEDURE — 99999 PR PBB SHADOW E&M-EST. PATIENT-LVL IV: CPT | Mod: PBBFAC,,,

## 2025-03-26 PROCEDURE — 99214 OFFICE O/P EST MOD 30 MIN: CPT | Mod: S$PBB,,,

## 2025-03-26 PROCEDURE — 96372 THER/PROPH/DIAG INJ SC/IM: CPT | Mod: PBBFAC,PO

## 2025-03-26 PROCEDURE — G2211 COMPLEX E/M VISIT ADD ON: HCPCS | Mod: S$PBB,,,

## 2025-03-26 PROCEDURE — 99999PBSHW PR PBB SHADOW TECHNICAL ONLY FILED TO HB: Mod: PBBFAC,,,

## 2025-03-26 PROCEDURE — 99214 OFFICE O/P EST MOD 30 MIN: CPT | Mod: PBBFAC,PO

## 2025-03-26 RX ORDER — DIAZEPAM 10 MG/1
10 TABLET ORAL DAILY PRN
Qty: 90 TABLET | Refills: 1 | Status: SHIPPED | OUTPATIENT
Start: 2025-03-26

## 2025-03-26 RX ORDER — METHYLPREDNISOLONE ACETATE 80 MG/ML
80 INJECTION, SUSPENSION INTRA-ARTICULAR; INTRALESIONAL; INTRAMUSCULAR; SOFT TISSUE ONCE
Status: COMPLETED | OUTPATIENT
Start: 2025-03-26 | End: 2025-03-26

## 2025-03-26 RX ORDER — DOXEPIN HYDROCHLORIDE 25 MG/1
25 CAPSULE ORAL DAILY PRN
COMMUNITY
Start: 2025-01-28

## 2025-03-26 RX ADMIN — METHYLPREDNISOLONE ACETATE 80 MG: 80 INJECTION, SUSPENSION INTRALESIONAL; INTRAMUSCULAR; INTRASYNOVIAL; SOFT TISSUE at 02:03

## 2025-03-26 NOTE — PROGRESS NOTES
Patient tolerated Depo-methylprenisolone  injection well with no complaints. Patient instructed to wait 15 following injection to watch for possible reaction.

## 2025-03-27 NOTE — PROGRESS NOTES
Patient ID: Genny Calixto is a 59 y.o. female.    Chief Complaint: Establish Care    History of Present Illness    CHIEF COMPLAINT:  - Ms. Calixto presents for medication review, general health screening as a new patient, and to inform the doctor about an upcoming knee surgery.    HPI:  Ms. Calixto reports three main reasons for the visit: medication review, particularly diazepam and doxepin; general health screening as a new patient; and informing the doctor about an upcoming knee surgery.    Ms. Calixto had a knee replacement in May of last year. She was doing well for several months post-surgery, but then the knee became problematic again. Some components of the knee replacement are now extremely loose. She anticipates another knee operation, likely around Memorial Day or slightly before.    Ms. Calixto reports a sinus issue for the past 10 days, with symptoms including congestion, post-nasal drip, and a productive cough. She has had a low-grade fever, rarely exceeding 100.5°F.    Ms. Calixto reports migraines, for which she takes a combination of medications: generic Imitrex, 5 mg diazepam, and generic Zofran. She also reports anxiety, for which she takes 10 mg diazepam.    Ms. Calixto has a history of hair loss following surgery, for which she takes spironolactone as prescribed by a dermatologist at [facility]. She reports that this medication has been effective.    Ms. Calixto mentions a history of renal stones in the past.    Ms. Calixto denies having any other major medical problems apart from migraines, anxiety, and a history of renal stones.      ROS:  General: +fever, -chills, -fatigue, -weight gain, -weight loss  Eyes: -vision changes, -redness, -discharge  ENT: -ear pain, +nasal congestion, -sore throat, +post nasal drip  Cardiovascular: -chest pain, -palpitations, -lower extremity edema  Respiratory: -cough, -shortness of breath, +productive cough  Gastrointestinal: -abdominal pain,  -nausea, -vomiting, -diarrhea, -constipation, -blood in stool  Genitourinary: -dysuria, -hematuria, -frequency  Musculoskeletal: +joint pain, -muscle pain, +pain with movement  Skin: -rash, -lesion, +abnormal hair loss  Neurological: -headache, -dizziness, -numbness, -tingling, +migraines  Psychiatric: +anxiety, -depression, -sleep difficulty         Pmh, Psh, Family Hx, Social Hx updated in Epic Tabs today.         1/6/2025     2:40 PM 4/25/2024    11:07 AM 4/5/2023     9:46 AM 1/24/2019     2:18 PM 2/24/2017    11:00 AM   Depression Patient Health Questionnaire   Over the last two weeks how often have you been bothered by little interest or pleasure in doing things Not at all Not at all Not at all  Not at all  Not at all    Over the last two weeks how often have you been bothered by feeling down, depressed or hopeless Not at all Not at all Not at all Not at all  Not at all    PHQ-2 Total Score 0 0 0 0 0       Data saved with a previous flowsheet row definition       Active Problem List with Overview Notes    Diagnosis Date Noted    History of small bowel obstruction 03/22/2024    Incomplete emptying of bladder 02/15/2024    Hydronephrosis 05/09/2022    Migraine headache     KENN (generalized anxiety disorder)      Takes 5-10 mg of valium qd prn anxiety (prescriptions for both on file, one from [facility] & other from )      Ureteral stone 10/13/2021    Right nephrolithiasis 08/09/2019    Vertigo     Bilateral low back pain with right-sided sciatica 05/23/2016       Past Medical History:   Diagnosis Date    Encounter for blood transfusion     KENN (generalized anxiety disorder)     Takes 5-10 mg of valium qd prn anxiety (prescriptions for both on file, one from [facility] & other from )    History of small bowel obstruction     Insomnia     Takes 5-10 mg of valium qhs prn insomnia (prescriptions for both on file, one from [facility] & other from )    Migraine headache     Nephrolithiasis 08/03/2019    Left ureteral stone -> UTI c/b  pyelonephritis and urosepsis w/ hypokalemia -> transfered to [facility] urology service from [facility] after CT abn dx, s/p 2 stents to allow infx to drain, IV Vanc/Rocephin, fever free since yesterday    Reflex sympathetic dystrophy     UTI (urinary tract infection)     Vertigo        Past Surgical History:   Procedure Laterality Date    BLADDER SURGERY      CHOLECYSTECTOMY      COLONOSCOPY N/A 11/10/2021    Procedure: COLONOSCOPY;  Surgeon: Eryn Rothman MD;  Location: [facility];  Service: Endoscopy;  Laterality: N/A;    CYSTOSCOPY W/ RETROGRADES Bilateral 8/24/2023    Procedure: CYSTOSCOPY, WITH RETROGRADE PYELOGRAM;  Surgeon: Annita Gamino MD;  Location: [facility] OR;  Service: Urology;  Laterality: Bilateral;    CYSTOSCOPY WITH BIOPSY OF BLADDER N/A 8/24/2023    Procedure: CYSTOSCOPY, WITH BLADDER BIOPSY, WITH FULGURATION IF INDICATED;  Surgeon: Annita Gamino MD;  Location: [facility] OR;  Service: Urology;  Laterality: N/A;    CYSTOSCOPY WITH URETEROSCOPY, RETROGRADE PYELOGRAPHY, AND INSERTION OF STENT Right 9/30/2021    Procedure: CYSTOSCOPY, WITH RETROGRADE PYELOGRAM AND URETERAL STENT INSERTION;  Surgeon: Annita Gamino MD;  Location: [facility] OR;  Service: Urology;  Laterality: Right;    DIAGNOSTIC LAPAROSCOPY N/A 11/11/2020    Procedure: LAPAROSCOPY, DIAGNOSTIC;  Surgeon: Tee Segura MD;  Location: [facility];  Service: General;  Laterality: N/A;  CONVERTED TO OPEN    DIAGNOSTIC LAPAROSCOPY N/A 7/25/2022    Procedure: LAPAROSCOPY, DIAGNOSTIC;  Surgeon: Jo Manzano DO;  Location: [facility] OR;  Service: General;  Laterality: N/A;  convert to open at 0739    ESOPHAGOGASTRODUODENOSCOPY N/A 11/10/2021    Procedure: EGD (ESOPHAGOGASTRODUODENOSCOPY);  Surgeon: Eryn Rothman MD;  Location: [facility];  Service: Endoscopy;  Laterality: N/A;    facet injections  02/14/2017    L3, L4, L5, S1 Done by Dr. Lara    HYSTERECTOMY      INJECTION OF ANESTHETIC AGENT INTO TISSUE PLANE DEFINED BY  TRANSVERSUS ABDOMINIS MUSCLE N/A 11/11/2020    Procedure: BLOCK, TRANSVERSUS ABDOMINIS PLANE;  Surgeon: Tee Segura MD;  Location: [facility] OR;  Service: General;  Laterality: N/A;    INJECTION OF ANESTHETIC AGENT INTO TISSUE PLANE DEFINED BY TRANSVERSUS ABDOMINIS MUSCLE N/A 7/25/2022    Procedure: BLOCK, TRANSVERSUS ABDOMINIS PLANE;  Surgeon: Jo Manzano DO;  Location: [facility] OR;  Service: General;  Laterality: N/A;    KNEE SURGERY      LAPAROSCOPIC LYSIS OF ADHESIONS N/A 11/11/2020    Procedure: LYSIS, ADHESIONS, LAPAROSCOPIC;  Surgeon: Tee Segura MD;  Location: [facility] OR;  Service: General;  Laterality: N/A;  CONVERTED TO OPEN    LAPAROTOMY N/A 11/20/2020    Procedure: LAPAROTOMY;  Surgeon: Tee Segura MD;  Location: [facility] OR;  Service: General;  Laterality: N/A;    LASER LITHOTRIPSY Left 2/8/2021    Procedure: LITHOTRIPSY, USING LASER;  Surgeon: Irving Kidd MD;  Location: [facility] OR;  Service: Urology;  Laterality: Left;    LASER LITHOTRIPSY Right 10/13/2021    Procedure: LITHOTRIPSY, USING LASER;  Surgeon: Annita Gamino MD;  Location: [facility] OR;  Service: Urology;  Laterality: Right;    LYSIS OF ADHESIONS N/A 11/11/2020    Procedure: LYSIS, ADHESIONS;  Surgeon: Tee Segura MD;  Location: [facility] OR;  Service: General;  Laterality: N/A;    LYSIS OF ADHESIONS N/A 11/20/2020    Procedure: LYSIS, ADHESIONS;  Surgeon: Tee Segura MD;  Location: [facility] OR;  Service: General;  Laterality: N/A;    LYSIS OF ADHESIONS N/A 7/25/2022    Procedure: LYSIS, ADHESIONS;  Surgeon: Jo Manzano DO;  Location: [facility] OR;  Service: General;  Laterality: N/A;    SURGICAL REMOVAL OF ILEUM WITH CECUM  7/25/2022    Procedure: EXCISION, CECUM WITH ILEUM;  Surgeon: Jo Manzano DO;  Location: [facility] OR;  Service: General;;    TONSILLECTOMY      URETEROSCOPY Left 2/8/2021    Procedure: URETEROSCOPY;  Surgeon: Irving Kidd MD;  Location: [facility] OR;  Service: Urology;  Laterality: Left;   "stent placement    URETEROSCOPY Right 10/13/2021    Procedure: URETEROSCOPY;  Surgeon: Annita Gamino MD;  Location: [facility] OR;  Service: Urology;  Laterality: Right;       Family History   Problem Relation Name Age of Onset    Stroke Mother      Hypertension Mother      COPD Father      Drug abuse Brother         Social History     Socioeconomic History    Marital status:    Tobacco Use    Smoking status: Never    Smokeless tobacco: Never   Substance and Sexual Activity    Alcohol use: Yes     Comment: rarely    Drug use: No    Sexual activity: Never   Social History Narrative    Breakfast: Fruit & organic oatmeal; water or tea w/ lemon    Lunch: Salads: spinach leaves, iceberg lettuce, tomatoes, avaccado, light dressing or protein smoothie    Dinner: Grilled or broiled chicken or fish (no pork since 3/2017; red meat only 5 times since 3/2017)    Snacks: Fruit    Eats out: 1x/wk    Water: 96 oz/d    PA: 5-6 d/wk; basketball    Goal weight is 160 lb       Medications Ordered Prior to Encounter[1]    Review of patient's allergies indicates:   Allergen Reactions    Erythromycin Other (See Comments)     Stomach cramps    Morphine Nausea And Vomiting     "Projectile vomiting"       General - Well developed, alert and oriented in NAD  HEENT - normocephalic, no evidence of trauma, sclera white, EOMI  Neck - full range of motion  COR - regular rate and rhythm without murmurs or gallops  Lungs - Clear  Abdomen - soft, non-tender  Ext - no cyanosis or edema     Assessment:     1. KENN (generalized anxiety disorder)    2. Upper respiratory tract infection, unspecified type        Pertinent Labs:    Chemistry        Component Value Date/Time     12/01/2024 1537    K 4.0 12/01/2024 1537     12/01/2024 1537    CO2 22 (L) 12/01/2024 1537    BUN 18 12/01/2024 1537    CREATININE 0.7 12/01/2024 1537     (H) 12/01/2024 1537        Component Value Date/Time    CALCIUM 9.4 12/01/2024 1537    ALKPHOS 92 " "12/01/2024 1537    AST 17 12/01/2024 1537    ALT 15 12/01/2024 1537    BILITOT 1.5 (H) 12/01/2024 1537    ESTGFRAFRICA >60 07/31/2022 0731    EGFRNONAA >60 07/31/2022 0731          Lab Results   Component Value Date    WBC 8.21 12/01/2024    HGB 15.9 12/01/2024    HCT 46.5 12/01/2024     (H) 12/01/2024    MCH 34.1 (H) 12/01/2024    MCHC 34.2 12/01/2024    RDW 13.9 12/01/2024     12/01/2024    MPV 10.4 12/01/2024    PLTEST Decreased (A) 08/23/2023       Lab Results   Component Value Date    HGBA1C 5.8 03/07/2017     (H) 12/01/2024     Lab Results   Component Value Date    LDLCALC 64.4 10/20/2020     Lab Results   Component Value Date    TSH 2.427 06/22/2023     Lab Results   Component Value Date    CHOL 140 10/20/2020    CHOL 164 03/07/2017    CHOL 167 08/07/2009     Lab Results   Component Value Date    TRIG 103 10/20/2020    TRIG 107 03/07/2017    TRIG 211 (H) 08/07/2009     Lab Results   Component Value Date    HDL 55 10/20/2020    HDL 48 03/07/2017    HDL 35 (L) 08/07/2009     Lab Results   Component Value Date    LDLCALC 64.4 10/20/2020    LDLCALC 94.6 03/07/2017    LDLCALC 89.8 08/07/2009     No results found for: "NONHDLC"  Lab Results   Component Value Date    CHOLHDL 39.3 10/20/2020    CHOLHDL 29.3 03/07/2017    CHOLHDL 21.0 08/07/2009       The ASCVD Risk score (Shiva DK, et al., 2019) failed to calculate for the following reasons:    Cannot find a previous HDL lab    Cannot find a previous total cholesterol lab    Plan:     Assessment & Plan    Reviewed current medications, including diazepam and doxepin.  Assessed potential risks associated with long-term benzodiazepine use, noting concerns about addiction, cognitive effects, and functional brain damage.  Evaluated sinus problems and determined appropriate treatment approach.  Considered upcoming knee surgery in overall care plan.    RIGHT KNEE PROSTHESIS INSTABILITY:  - Scheduled follow-up for pre-operative evaluation before " upcoming knee surgery.  - Ms. Calixto reports need for another knee operation due to loosening components.  - Noted that the patient was doing well for several months after surgery before condition worsened.  - Planned surgery for around Memorial Day.  - Instructed the patient to come for pre-operative evaluation.  - Noted patient's history of right knee replacement in May of last year.    ACUTE SINUSITIS:  - Informed the patient about current high pollen levels and its impact on sinus congestion.  - Noted patient's report of sinus issue for 10 days with congestion, post-nasal drip, and productive cough.  - Prescribed Flonase nasal spray and recommended daily anti-allergy medication such as Zyrtec for sinus congestion.  - Administered steroid injection for sinus congestion.  - Explained benefits of steroids for long-term inflammation control.    FEVER:  - Noted patient's report of low-grade fever, rarely exceeding 100.5°F.    ANXIETY DISORDER:  - Continued diazepam 10 mg, provided new prescription for 90 tablets.  - Explained that benzodiazepine withdrawal can be more significant than pain medication withdrawal, potentially leading to seizures or respiratory depression.  - Advised caution regarding the risks associated with benzodiazepines.    MIGRAINE:  - Noted current medication regimen for migraines: 5mg diazepam, generic sumatriptan, and generic ondansetron.  - Continued 25mg doxepin for migraines that persist throughout the day.    HAIR LOSS:  - Noted patient's report of hair loss after surgery.  - Confirmed patient's use of spironolactone for hair loss.  - Continued spironolactone treatment, which has been effective.    MEDICAL HISTORY:  - Acknowledged patient's history of renal calculi.    FAMILY HISTORY:  - Noted patient's family history of addiction issues.  - Documented patient's report of losing two siblings to drug overdose.  - Documented patient's family history of tobacco use.    FOLLOW-UP:  - Follow  up in 6 months or sooner if needed.  - Complete lab work before next appointment.         1. KENN (generalized anxiety disorder)  - diazePAM (VALIUM) 10 MG Tab; Take 1 tablet (10 mg total) by mouth daily as needed.  Dispense: 90 tablet; Refill: 1    2. Upper respiratory tract infection, unspecified type  - methylPREDNISolone acetate injection 80 mg      Immunization History   Administered Date(s) Administered    Influenza - Quadrivalent - PF *Preferred* (6 months and older) 11/28/2017, 12/05/2018    Influenza - Trivalent - Afluria, Fluzone MDV 10/25/2007    Influenza Split 10/25/2007    Tdap 07/30/2023       No orders of the defined types were placed in this encounter.      Portions of this note were generated by [employer].    Each patient to whom medical services by telemedicine are provided:  (1) informed of the relationship between the physician and patient and the respective role of any other health care provider with respect to management of the patient; and (2) notified that he or she may decline to receive medical services by telemedicine and may withdraw from such care at any time.    I spent a total of 32 minutes face to face and non-face to face on the date of this visit.This includes time preparing to see the patient (eg, review of tests, notes), obtaining and/or reviewing additional history from an independent historian and/or outside medical records, documenting clinical information in the electronic health record, independently interpreting results and/or communicating results to the patient/family/caregiver, or care coordinator.  Visit today included increased complexity associated with the care of the episodic problem addressed and managing the longitudinal care of the patient due to the serious and/or complex managed problem(s).      This note was generated with the assistance of ambient listening technology. Verbal consent was obtained by the patient and accompanying visitor(s) for the recording of  patient appointment to facilitate this note. I attest to having reviewed and edited the generated note for accuracy, though some syntax or spelling errors may persist. Please contact the author of this note for any clarification.      Shaquille Farley MD         [1]   Current Outpatient Medications on File Prior to Visit   Medication Sig Dispense Refill    benzonatate (TESSALON) 200 MG capsule Take 1 capsule (200 mg total) by mouth 3 (three) times daily as needed for Cough. 40 capsule 1    celecoxib (CELEBREX) 200 MG capsule Take 200 mg by mouth 2 (two) times daily.      cyclobenzaprine (FLEXERIL) 10 MG tablet Take 10 mg by mouth every evening.      diazePAM (VALIUM) 5 MG tablet Take one with onset of migraine 20 tablet 5    diphenhydrAMINE (BENADRYL) 25 mg capsule Take 25 mg by mouth 2 (two) times daily as needed for Itching.      doxepin (SINEQUAN) 100 MG capsule Take 1 capsule (100 mg total) by mouth every evening. 90 capsule 1    doxepin (SINEQUAN) 25 MG capsule Take 25 mg by mouth daily as needed. As needed migraine      lactobacillus combination no.4 (PROBIOTIC) 3 billion cell Cap Take 1 capsule by mouth once daily. 30 capsule 0    LINZESS 290 mcg Cap capsule TAKE 1 CAPSULE BY MOUTH ONCE DAILY BEFORE BREAKFAST 90 capsule 0    multivitamin capsule Take 1 capsule by mouth once daily.      ondansetron (ZOFRAN) 4 MG tablet Take 1 tablet (4 mg total) by mouth 2 (two) times daily. 30 tablet 5    promethazine (PHENERGAN) 25 MG tablet Take 1 tablet (25 mg total) by mouth every 6 (six) hours as needed. 12 tablet 0    spironolactone (ALDACTONE) 50 MG tablet Take 1 tablet (50 mg total) by mouth 2 (two) times daily. 180 tablet 1    sumatriptan (IMITREX) 100 MG tablet Take one with onset of migraine, may repeat once two hours later if migraine persists 10 tablet 11    LACTOBACILLUS COMBO NO.6 (PROBIOTIC COMPLEX ORAL) Take 1 capsule by mouth once daily at 6am. (Patient not taking: Reported on 3/26/2025)      methocarbamoL  (ROBAXIN) 750 MG Tab Take 750 mg by mouth. (Patient not taking: Reported on 3/26/2025)      traMADoL (ULTRAM) 50 mg tablet Take 50 mg by mouth every 6 (six) hours. (Patient not taking: Reported on 3/26/2025)      triamcinolone acetonide 0.1% (KENALOG) 0.1 % cream Apply topically 2 (two) times daily as needed (itching, rash). 15 g 0     No current facility-administered medications on file prior to visit.

## 2025-04-30 ENCOUNTER — PATIENT MESSAGE (OUTPATIENT)
Dept: INTERNAL MEDICINE | Facility: CLINIC | Age: 60
End: 2025-04-30
Payer: MEDICARE

## 2025-04-30 ENCOUNTER — PATIENT MESSAGE (OUTPATIENT)
Dept: INTERNAL MEDICINE | Facility: CLINIC | Age: 60
End: 2025-04-30

## 2025-04-30 ENCOUNTER — OFFICE VISIT (OUTPATIENT)
Dept: INTERNAL MEDICINE | Facility: CLINIC | Age: 60
End: 2025-04-30
Payer: MEDICARE

## 2025-04-30 ENCOUNTER — LAB VISIT (OUTPATIENT)
Dept: LAB | Facility: HOSPITAL | Age: 60
End: 2025-04-30
Payer: MEDICARE

## 2025-04-30 VITALS
DIASTOLIC BLOOD PRESSURE: 84 MMHG | RESPIRATION RATE: 20 BRPM | BODY MASS INDEX: 33.94 KG/M2 | WEIGHT: 203.69 LBS | SYSTOLIC BLOOD PRESSURE: 140 MMHG | TEMPERATURE: 99 F | OXYGEN SATURATION: 98 % | HEIGHT: 65 IN | HEART RATE: 88 BPM

## 2025-04-30 DIAGNOSIS — Z12.31 ENCOUNTER FOR SCREENING MAMMOGRAM FOR MALIGNANT NEOPLASM OF BREAST: ICD-10-CM

## 2025-04-30 DIAGNOSIS — M25.562 PAIN AND SWELLING OF LEFT KNEE: ICD-10-CM

## 2025-04-30 DIAGNOSIS — Z79.899 OTHER LONG TERM (CURRENT) DRUG THERAPY: ICD-10-CM

## 2025-04-30 DIAGNOSIS — M25.462 PAIN AND SWELLING OF LEFT KNEE: ICD-10-CM

## 2025-04-30 DIAGNOSIS — R50.9 LOW GRADE FEVER: Primary | ICD-10-CM

## 2025-04-30 DIAGNOSIS — R50.9 LOW GRADE FEVER: ICD-10-CM

## 2025-04-30 LAB
ABSOLUTE EOSINOPHIL (OHS): 0.15 K/UL
ABSOLUTE MONOCYTE (OHS): 0.39 K/UL (ref 0.3–1)
ABSOLUTE NEUTROPHIL COUNT (OHS): 3.16 K/UL (ref 1.8–7.7)
ALBUMIN SERPL BCP-MCNC: 3.8 G/DL (ref 3.5–5.2)
ALP SERPL-CCNC: 89 UNIT/L (ref 40–150)
ALT SERPL W/O P-5'-P-CCNC: 21 UNIT/L (ref 10–44)
ANION GAP (OHS): 14 MMOL/L (ref 8–16)
AST SERPL-CCNC: 21 UNIT/L (ref 11–45)
BASOPHILS # BLD AUTO: 0.06 K/UL
BASOPHILS NFR BLD AUTO: 1.1 %
BILIRUB SERPL-MCNC: 0.9 MG/DL (ref 0.1–1)
BILIRUB UR QL STRIP.AUTO: NEGATIVE
BUN SERPL-MCNC: 15 MG/DL (ref 6–20)
CALCIUM SERPL-MCNC: 9.2 MG/DL (ref 8.7–10.5)
CHLORIDE SERPL-SCNC: 103 MMOL/L (ref 95–110)
CLARITY UR: CLEAR
CO2 SERPL-SCNC: 24 MMOL/L (ref 23–29)
COLOR UR AUTO: YELLOW
CREAT SERPL-MCNC: 0.7 MG/DL (ref 0.5–1.4)
EAG (OHS): 94 MG/DL (ref 68–131)
ERYTHROCYTE [DISTWIDTH] IN BLOOD BY AUTOMATED COUNT: 13.7 % (ref 11.5–14.5)
GFR SERPLBLD CREATININE-BSD FMLA CKD-EPI: >60 ML/MIN/1.73/M2
GLUCOSE SERPL-MCNC: 86 MG/DL (ref 70–110)
GLUCOSE UR QL STRIP: NEGATIVE
HBA1C MFR BLD: 4.9 % (ref 4–5.6)
HCT VFR BLD AUTO: 42.2 % (ref 37–48.5)
HGB BLD-MCNC: 13.6 GM/DL (ref 12–16)
HGB UR QL STRIP: NEGATIVE
HIV 1+2 AB+HIV1 P24 AG SERPL QL IA: NORMAL
HOLD SPECIMEN: NORMAL
IMM GRANULOCYTES # BLD AUTO: 0.03 K/UL (ref 0–0.04)
IMM GRANULOCYTES NFR BLD AUTO: 0.5 % (ref 0–0.5)
KETONES UR QL STRIP: NEGATIVE
LEUKOCYTE ESTERASE UR QL STRIP: NEGATIVE
LYMPHOCYTES # BLD AUTO: 1.83 K/UL (ref 1–4.8)
MCH RBC QN AUTO: 33.3 PG (ref 27–31)
MCHC RBC AUTO-ENTMCNC: 32.2 G/DL (ref 32–36)
MCV RBC AUTO: 103 FL (ref 82–98)
NITRITE UR QL STRIP: NEGATIVE
NUCLEATED RBC (/100WBC) (OHS): 0 /100 WBC
PH UR STRIP: 6 [PH]
PLATELET # BLD AUTO: 271 K/UL (ref 150–450)
PMV BLD AUTO: 10.9 FL (ref 9.2–12.9)
POTASSIUM SERPL-SCNC: 4.5 MMOL/L (ref 3.5–5.1)
PROT SERPL-MCNC: 7 GM/DL (ref 6–8.4)
PROT UR QL STRIP: NEGATIVE
RBC # BLD AUTO: 4.09 M/UL (ref 4–5.4)
RELATIVE EOSINOPHIL (OHS): 2.7 %
RELATIVE LYMPHOCYTE (OHS): 32.6 % (ref 18–48)
RELATIVE MONOCYTE (OHS): 6.9 % (ref 4–15)
RELATIVE NEUTROPHIL (OHS): 56.2 % (ref 38–73)
SODIUM SERPL-SCNC: 141 MMOL/L (ref 136–145)
SP GR UR STRIP: 1.01
UROBILINOGEN UR STRIP-ACNC: NEGATIVE EU/DL
WBC # BLD AUTO: 5.62 K/UL (ref 3.9–12.7)

## 2025-04-30 PROCEDURE — 99999 PR PBB SHADOW E&M-EST. PATIENT-LVL V: CPT | Mod: PBBFAC,,,

## 2025-04-30 PROCEDURE — 87389 HIV-1 AG W/HIV-1&-2 AB AG IA: CPT

## 2025-04-30 PROCEDURE — 83036 HEMOGLOBIN GLYCOSYLATED A1C: CPT

## 2025-04-30 PROCEDURE — 81003 URINALYSIS AUTO W/O SCOPE: CPT

## 2025-04-30 PROCEDURE — 99215 OFFICE O/P EST HI 40 MIN: CPT | Mod: PBBFAC,PO

## 2025-04-30 PROCEDURE — 99214 OFFICE O/P EST MOD 30 MIN: CPT | Mod: S$PBB,,,

## 2025-04-30 PROCEDURE — 80053 COMPREHEN METABOLIC PANEL: CPT

## 2025-04-30 PROCEDURE — 85025 COMPLETE CBC W/AUTO DIFF WBC: CPT

## 2025-04-30 PROCEDURE — 36415 COLL VENOUS BLD VENIPUNCTURE: CPT | Mod: PO

## 2025-04-30 NOTE — TELEPHONE ENCOUNTER
Spoke with pt via phone. Let her know she can come in earlier than the 220pm and we will do our best to get her back sooner than scheduled appt time. Pt verbalized understanding.

## 2025-05-01 ENCOUNTER — PATIENT MESSAGE (OUTPATIENT)
Dept: INTERNAL MEDICINE | Facility: CLINIC | Age: 60
End: 2025-05-01
Payer: MEDICARE

## 2025-05-01 ENCOUNTER — RESULTS FOLLOW-UP (OUTPATIENT)
Dept: INTERNAL MEDICINE | Facility: CLINIC | Age: 60
End: 2025-05-01

## 2025-05-01 NOTE — PROGRESS NOTES
Patient ID: Genny Calixto is a 59 y.o. female.    Chief Complaint: Fever (And lab request )    History of Present Illness    CHIEF COMPLAINT:  - Ms. Calixto presents with concerns about her left knee replacement, including extreme pain, swelling, and recurrent rashes, as well as intermittent low-grade fever.    HPI:  Ms. Calixto reports extreme difficulty with her left knee that was replaced in 2024. She initially started physical therapy and was doing exceptionally well, but then began having extreme pain and swelling in the knee. The pain is described as throbbing and severe enough to wake her up at night. She has noticed weakness in the knee, causing her to fall a few times, though not directly on the knee. Her gait has become abnormal, affecting her balance, which her family has noticed. These symptoms have worsened exponentially in the past 2 weeks.    She has recurrent rashes specifically in the knee area, with a sensation of heat rising when the rashes appear. The rashes are described as extremely red, with one particular instance resembling circular patterns. She provided photographic evidence of these rashes to the doctor.    She reports intermittent low-grade fevers, ranging from 100 to 100.5 Fahrenheit, coinciding with the redness in her knee.    She was recently evaluated by an orthopedic surgeon, Dr. Jasiel Andrea, who is not the original surgeon who performed her knee replacement. During this evaluation, which occurred the previous Friday, Dr. Andrea attempted to draw fluid from the knee. The doctor was concerned by the appearance of the fluid, describing it as red and resembling a thick, red liquid. Dr. Andrea ordered labs, including cultures, and a bone scan. The lab results showed an elevated white blood cell count of 920 in the knee fluid.    She was not tested for metal sensitivity before her knee replacement, which is now a concern. Dr. Andrea suggested the possibility of an allergic  reaction to the metal in the knee replacement, potentially explaining the rashes and other symptoms.    She emphasizes that she does not feel generally unwell, stating that she is in pain but otherwise feels fine.    She denies problems with urination, frequency, pain when passing urine, congestion, nausea, vomiting, loose stools, headache, confusion, or seizures.      ROS:  General: +fever, -chills, -fatigue, -weight gain, -weight loss, +falling  Eyes: -vision changes, -redness, -discharge  ENT: -ear pain, -nasal congestion, -sore throat  Cardiovascular: -chest pain, -palpitations, -lower extremity edema  Respiratory: -cough, -shortness of breath  Gastrointestinal: -abdominal pain, -nausea, -vomiting, -diarrhea, -constipation, -blood in stool  Genitourinary: -dysuria, -hematuria, -frequency  Musculoskeletal: -joint pain, -muscle pain, +pain with movement, +limb pain, +nightime pain, +joint swelling, +abnormal gait, +balance issues, +muscle weakness  Skin: +rash, -lesion, +skin redness  Neurological: -headache, -dizziness, -numbness, -tingling  Psychiatric: -anxiety, -depression, -sleep difficulty         Pmh, Psh, Family Hx, Social Hx updated in Epic Tabs today.         1/6/2025     2:40 PM 4/25/2024    11:07 AM 4/5/2023     9:46 AM 1/24/2019     2:18 PM 2/24/2017    11:00 AM   Depression Patient Health Questionnaire   Over the last two weeks how often have you been bothered by little interest or pleasure in doing things Not at all Not at all Not at all  Not at all  Not at all    Over the last two weeks how often have you been bothered by feeling down, depressed or hopeless Not at all Not at all Not at all Not at all  Not at all    PHQ-2 Total Score 0 0 0 0 0       Data saved with a previous flowsheet row definition       Active Problem List with Overview Notes    Diagnosis Date Noted    History of small bowel obstruction 03/22/2024    Incomplete emptying of bladder 02/15/2024    Hydronephrosis 05/09/2022     Migraine headache     KENN (generalized anxiety disorder)      Takes 5-10 mg of valium qd prn anxiety (prescriptions for both on file, one from [facility] & other from )      Ureteral stone 10/13/2021    Right nephrolithiasis 08/09/2019    Vertigo     Bilateral low back pain with right-sided sciatica 05/23/2016       Past Medical History:   Diagnosis Date    Encounter for blood transfusion     KENN (generalized anxiety disorder)     Takes 5-10 mg of valium qd prn anxiety (prescriptions for both on file, one from [facility] & other from )    History of small bowel obstruction     Insomnia     Takes 5-10 mg of valium qhs prn insomnia (prescriptions for both on file, one from [facility] & other from )    Migraine headache     Nephrolithiasis 08/03/2019    Left ureteral stone -> UTI c/b pyelonephritis and urosepsis w/ hypokalemia -> transfered to [facility] urology service from [facility] after CT abn dx, s/p 2 stents to allow infx to drain, IV Vanc/Rocephin, fever free since yesterday    Reflex sympathetic dystrophy     UTI (urinary tract infection)     Vertigo        Past Surgical History:   Procedure Laterality Date    BLADDER SURGERY      CHOLECYSTECTOMY      COLONOSCOPY N/A 11/10/2021    Procedure: COLONOSCOPY;  Surgeon: Eryn Rothman MD;  [facility];  Service: Endoscopy;  Laterality: N/A;    CYSTOSCOPY W/ RETROGRADES Bilateral 8/24/2023    Procedure: CYSTOSCOPY, WITH RETROGRADE PYELOGRAM;  Surgeon: Annita Gamino MD;  [facility];  Service: Urology;  Laterality: Bilateral;    CYSTOSCOPY WITH BIOPSY OF BLADDER N/A 8/24/2023    Procedure: CYSTOSCOPY, WITH BLADDER BIOPSY, WITH FULGURATION IF INDICATED;  Surgeon: Annita Gamino MD;  Location: [facility] OR;  Service: Urology;  Laterality: N/A;    CYSTOSCOPY WITH URETEROSCOPY, RETROGRADE PYELOGRAPHY, AND INSERTION OF STENT Right 9/30/2021    Procedure: CYSTOSCOPY, WITH RETROGRADE PYELOGRAM AND URETERAL STENT INSERTION;  Surgeon: Annita Gamino MD;  Location:  [facility] OR;  Service: Urology;  Laterality: Right;    DIAGNOSTIC LAPAROSCOPY N/A 11/11/2020    Procedure: LAPAROSCOPY, DIAGNOSTIC;  Surgeon: Tee Segura MD;  Location: [facility] OR;  Service: General;  Laterality: N/A;  CONVERTED TO OPEN    DIAGNOSTIC LAPAROSCOPY N/A 7/25/2022    Procedure: LAPAROSCOPY, DIAGNOSTIC;  Surgeon: Jo Manzano DO;  Location: [facility] OR;  Service: General;  Laterality: N/A;  convert to open at 0739    ESOPHAGOGASTRODUODENOSCOPY N/A 11/10/2021    Procedure: EGD (ESOPHAGOGASTRODUODENOSCOPY);  Surgeon: Eryn Rothman MD;  Location: [facility] ENDO;  Service: Endoscopy;  Laterality: N/A;    facet injections  02/14/2017    L3, L4, L5, S1 Done by Dr. Lara    HYSTERECTOMY      INJECTION OF ANESTHETIC AGENT INTO TISSUE PLANE DEFINED BY TRANSVERSUS ABDOMINIS MUSCLE N/A 11/11/2020    Procedure: BLOCK, TRANSVERSUS ABDOMINIS PLANE;  Surgeon: Tee Segura MD;  Location: [facility] OR;  Service: General;  Laterality: N/A;    INJECTION OF ANESTHETIC AGENT INTO TISSUE PLANE DEFINED BY TRANSVERSUS ABDOMINIS MUSCLE N/A 7/25/2022    Procedure: BLOCK, TRANSVERSUS ABDOMINIS PLANE;  Surgeon: Jo Manzano DO;  Location: [facility] OR;  Service: General;  Laterality: N/A;    KNEE SURGERY      LAPAROSCOPIC LYSIS OF ADHESIONS N/A 11/11/2020    Procedure: LYSIS, ADHESIONS, LAPAROSCOPIC;  Surgeon: Tee Segura MD;  Location: [facility] OR;  Service: General;  Laterality: N/A;  CONVERTED TO OPEN    LAPAROTOMY N/A 11/20/2020    Procedure: LAPAROTOMY;  Surgeon: Tee Segura MD;  Location: [facility] OR;  Service: General;  Laterality: N/A;    LASER LITHOTRIPSY Left 2/8/2021    Procedure: LITHOTRIPSY, USING LASER;  Surgeon: Irving Kidd MD;  Location: [facility] OR;  Service: Urology;  Laterality: Left;    LASER LITHOTRIPSY Right 10/13/2021    Procedure: LITHOTRIPSY, USING LASER;  Surgeon: Annita Gamino MD;  Location: [facility] OR;  Service: Urology;  Laterality: Right;    LYSIS OF ADHESIONS N/A 11/11/2020     Procedure: LYSIS, ADHESIONS;  Surgeon: Tee Segura MD;  Location: [facility] OR;  Service: General;  Laterality: N/A;    LYSIS OF ADHESIONS N/A 11/20/2020    Procedure: LYSIS, ADHESIONS;  Surgeon: Tee Segura MD;  Location: [facility] OR;  Service: General;  Laterality: N/A;    LYSIS OF ADHESIONS N/A 7/25/2022    Procedure: LYSIS, ADHESIONS;  Surgeon: Jo Manzano DO;  Location: [facility] OR;  Service: General;  Laterality: N/A;    SURGICAL REMOVAL OF ILEUM WITH CECUM  7/25/2022    Procedure: EXCISION, CECUM WITH ILEUM;  Surgeon: Jo Manzano DO;  Location: [facility] OR;  Service: General;;    TONSILLECTOMY      URETEROSCOPY Left 2/8/2021    Procedure: URETEROSCOPY;  Surgeon: Irving Kidd MD;  Location: [facility] OR;  Service: Urology;  Laterality: Left;  stent placement    URETEROSCOPY Right 10/13/2021    Procedure: URETEROSCOPY;  Surgeon: Annita Gamino MD;  Location: [facility] OR;  Service: Urology;  Laterality: Right;       Family History   Problem Relation Name Age of Onset    Stroke Mother      Hypertension Mother      COPD Father      Drug abuse Brother         Social History     Socioeconomic History    Marital status:    Tobacco Use    Smoking status: Never    Smokeless tobacco: Never   Substance and Sexual Activity    Alcohol use: Yes     Comment: rarely    Drug use: No    Sexual activity: Never   Social History Narrative    Breakfast: Fruit & organic oatmeal; water or tea w/ lemon    Lunch: Salads: spinach leaves, iceberg lettuce, tomatoes, avaccado, light dressing or protein smoothie    Dinner: Grilled or broiled chicken or fish (no pork since 3/2017; red meat only 5 times since 3/2017)    Snacks: Fruit    Eats out: 1x/wk    Water: 96 oz/d    PA: 5-6 d/wk; basketball    Goal weight is 160 lb       Medications Ordered Prior to Encounter[1]    Review of patient's allergies indicates:   Allergen Reactions    Erythromycin Other (See Comments)     Stomach cramps    Morphine Nausea And Vomiting  "    "Projectile vomiting"       General - Well developed, alert and oriented in NAD  HEENT - normocephalic, no evidence of trauma, sclera white, EOMI  Neck - full range of motion  COR - regular rate and rhythm without murmurs or gallops  Lungs - Clear  Abdomen - soft, non-tender  Ext - no cyanosis or edema     Assessment:     1. Low grade fever    2. Pain and swelling of left knee    3. Other long term (current) drug therapy    4. Encounter for screening mammogram for malignant neoplasm of breast        Pertinent Labs:    Chemistry        Component Value Date/Time     04/30/2025 1522     12/01/2024 1537    K 4.5 04/30/2025 1522    K 4.0 12/01/2024 1537     04/30/2025 1522     12/01/2024 1537    CO2 24 04/30/2025 1522    CO2 22 (L) 12/01/2024 1537    BUN 15 04/30/2025 1522    CREATININE 0.7 04/30/2025 1522    GLU 86 04/30/2025 1522     (H) 12/01/2024 1537        Component Value Date/Time    CALCIUM 9.2 04/30/2025 1522    CALCIUM 9.4 12/01/2024 1537    ALKPHOS 89 04/30/2025 1522    ALKPHOS 92 12/01/2024 1537    AST 21 04/30/2025 1522    AST 17 12/01/2024 1537    ALT 21 04/30/2025 1522    ALT 15 12/01/2024 1537    BILITOT 0.9 04/30/2025 1522    BILITOT 1.5 (H) 12/01/2024 1537    ESTGFRAFRICA >60 07/31/2022 0731    EGFRNONAA >60 07/31/2022 0731          Lab Results   Component Value Date    WBC 5.62 04/30/2025    HGB 13.6 04/30/2025    HCT 42.2 04/30/2025     (H) 04/30/2025    MCH 33.3 (H) 04/30/2025    MCHC 32.2 04/30/2025    RDW 13.7 04/30/2025     04/30/2025    MPV 10.9 04/30/2025    PLTEST Decreased (A) 08/23/2023       Lab Results   Component Value Date    HGBA1C 4.9 04/30/2025    HGBA1C 5.8 03/07/2017    GLU 86 04/30/2025     Lab Results   Component Value Date    LDLCALC 64.4 10/20/2020     Lab Results   Component Value Date    TSH 2.427 06/22/2023     Lab Results   Component Value Date    CHOL 140 10/20/2020    CHOL 164 03/07/2017    CHOL 167 08/07/2009     Lab Results " "  Component Value Date    TRIG 103 10/20/2020    TRIG 107 03/07/2017    TRIG 211 (H) 08/07/2009     Lab Results   Component Value Date    HDL 55 10/20/2020    HDL 48 03/07/2017    HDL 35 (L) 08/07/2009     Lab Results   Component Value Date    LDLCALC 64.4 10/20/2020    LDLCALC 94.6 03/07/2017    LDLCALC 89.8 08/07/2009     No results found for: "NONHDLC"  Lab Results   Component Value Date    CHOLHDL 39.3 10/20/2020    CHOLHDL 29.3 03/07/2017    CHOLHDL 21.0 08/07/2009       The ASCVD Risk score (Shiva DK, et al., 2019) failed to calculate for the following reasons:    Cannot find a previous HDL lab    Cannot find a previous total cholesterol lab    Plan:     Assessment & Plan    Evaluated left knee replacement from 2024 due to extreme pain, swelling, and redness.  Reviewed lab results showing elevated WBC count (920) in knee fluid aspirate.  Considered metal sensitivity as potential cause for symptoms and rash.  Assessed for other potential sources of low-grade fever, including respiratory, urinary, abdominal, and skin infections.  Determined knee likely primary source of infection/inflammation given symptoms and lab results.  Awaiting results of cultures and bone scan ordered by orthopedic surgeon Dr. Jasiel Andrea.    COMPLICATIONS OF LEFT KNEE PROSTHESIS:  - Ms. Calixto is experiencing extreme pain, swelling, and redness in left knee with replaced joint from 2024.  - Orthopedic surgeon aspirated fluid from the knee, which was red and resembled tomato sauce.  - Laboratory results show elevated white blood cell count (920) in knee fluid.  - Orthopedic surgeon ordered bone scan and cultures from knee fluid.    LEFT KNEE EFFUSION:  - Ms. Calixto is experiencing extreme swelling in left knee.    LEFT KNEE PAIN:  - Ms. Calixto is experiencing extreme pain in left knee, sometimes waking up at night due to throbbing.  - Restarted physical therapy as recommended by orthopedic surgeon.    ALLERGY OR METAL " SENSITIVITY:  - Suspect possible metal sensitivity as cause of complications.  - Ms. Calixto exhibits extreme red rashes only in the knee area, with visible heat.  - Unusual rash patterns were observed, with one described as resembling crop circles.    DIFFICULTY WALKING:  - Ms. Calixto reports difficulty walking, abnormal gait, and weakness in the left knee.    UNSTEADINESS AND BALANCE ISSUES:  - Ms. Calixto reports unsteadiness, balance issues, and abnormal gait.    HISTORY OF FALLING:  - Ms. Calixto reports multiple falls due to knee weakness, but has not fallen directly on the knee.    FEVER:  - Ms. Calixto experiences low-grade fever (100 to 100.5°F) when knee gets red.  - Knee is likely the source of fever.  - Ordered comprehensive testing to rule out other causes, including CBC, CMP, viral panel, routine labs, and urinalysis.         1. Low grade fever  - CBC Auto Differential; Future  - Comprehensive Metabolic Panel; Future  - Hemoglobin A1C; Future  - HIV 1/2 Ag/Ab (4th Gen); Future  - Urinalysis, Reflex to Urine Culture Urine, Clean Catch  - GREY TOP URINE HOLD    2. Pain and swelling of left knee    3. Other long term (current) drug therapy  - CBC Auto Differential; Future  - Comprehensive Metabolic Panel; Future  - Hemoglobin A1C; Future  - HIV 1/2 Ag/Ab (4th Gen); Future    4. Encounter for screening mammogram for malignant neoplasm of breast  - Mammo Digital Screening Bilat w/ Scott (XPD); Future    Advised to follow-up with orthopedics in view of increased pain and swelling in left knee, with history of knee replacement.    Immunization History   Administered Date(s) Administered    Influenza - Quadrivalent - PF *Preferred* (6 months and older) 11/28/2017, 12/05/2018    Influenza - Trivalent - Afluria, Fluzone MDV 10/25/2007    Influenza Split 10/25/2007    Tdap 07/30/2023       Orders Placed This Encounter   Procedures    Mammo Digital Screening Bilat w/ Scott (XPD)    CBC Auto Differential     Comprehensive Metabolic Panel    Hemoglobin A1C    HIV 1/2 Ag/Ab (4th Gen)    Urinalysis, Reflex to Urine Culture Urine, Clean Catch    GREY TOP URINE HOLD       Portions of this note were generated by [employer].    Each patient to whom medical services by telemedicine are provided:  (1) informed of the relationship between the physician and patient and the respective role of any other health care provider with respect to management of the patient; and (2) notified that he or she may decline to receive medical services by telemedicine and may withdraw from such care at any time.    I spent a total of 32 minutes face to face and non-face to face on the date of this visit.This includes time preparing to see the patient (eg, review of tests, notes), obtaining and/or reviewing additional history from an independent historian and/or outside medical records, documenting clinical information in the electronic health record, independently interpreting results and/or communicating results to the patient/family/caregiver, or care coordinator.  Visit today included increased complexity associated with the care of the episodic problem addressed and managing the longitudinal care of the patient due to the serious and/or complex managed problem(s).      This note was generated with the assistance of ambient listening technology. Verbal consent was obtained by the patient and accompanying visitor(s) for the recording of patient appointment to facilitate this note. I attest to having reviewed and edited the generated note for accuracy, though some syntax or spelling errors may persist. Please contact the author of this note for any clarification.      Shaquille Farley MD         [1]   Current Outpatient Medications on File Prior to Visit   Medication Sig Dispense Refill    benzonatate (TESSALON) 200 MG capsule Take 1 capsule (200 mg total) by mouth 3 (three) times daily as needed for Cough. 40 capsule 1    celecoxib (CELEBREX)  200 MG capsule Take 200 mg by mouth 2 (two) times daily.      cyclobenzaprine (FLEXERIL) 10 MG tablet Take 10 mg by mouth every evening.      diazePAM (VALIUM) 10 MG Tab Take 1 tablet (10 mg total) by mouth daily as needed. 90 tablet 1    diazePAM (VALIUM) 5 MG tablet Take one with onset of migraine 20 tablet 5    diphenhydrAMINE (BENADRYL) 25 mg capsule Take 25 mg by mouth 2 (two) times daily as needed for Itching.      doxepin (SINEQUAN) 100 MG capsule Take 1 capsule (100 mg total) by mouth every evening. 90 capsule 1    doxepin (SINEQUAN) 25 MG capsule Take 25 mg by mouth daily as needed. As needed migraine      lactobacillus combination no.4 (PROBIOTIC) 3 billion cell Cap Take 1 capsule by mouth once daily. 30 capsule 0    LACTOBACILLUS COMBO NO.6 (PROBIOTIC COMPLEX ORAL) Take 1 capsule by mouth once daily at 6am.      LINZESS 290 mcg Cap capsule TAKE 1 CAPSULE BY MOUTH ONCE DAILY BEFORE BREAKFAST 90 capsule 0    methocarbamoL (ROBAXIN) 750 MG Tab Take 750 mg by mouth.      multivitamin capsule Take 1 capsule by mouth once daily.      ondansetron (ZOFRAN) 4 MG tablet Take 1 tablet (4 mg total) by mouth 2 (two) times daily. 30 tablet 5    promethazine (PHENERGAN) 25 MG tablet Take 1 tablet (25 mg total) by mouth every 6 (six) hours as needed. 12 tablet 0    spironolactone (ALDACTONE) 50 MG tablet Take 1 tablet (50 mg total) by mouth 2 (two) times daily. 180 tablet 1    sumatriptan (IMITREX) 100 MG tablet Take one with onset of migraine, may repeat once two hours later if migraine persists 10 tablet 11    traMADoL (ULTRAM) 50 mg tablet Take 50 mg by mouth every 6 (six) hours.      triamcinolone acetonide 0.1% (KENALOG) 0.1 % cream Apply topically 2 (two) times daily as needed (itching, rash). 15 g 0     No current facility-administered medications on file prior to visit.

## 2025-05-08 NOTE — PROGRESS NOTES
Anesthesia Pre Eval Note    Anesthesia ROS/Med Hx    Overall Review:  EKG was reviewed     Anesthetic Complication History:    Patient does not have a history of anesthetic complications    Has had previous anesthetics    Pulmonary Review:  Patient does not have a pulmonary history      Neuro/Psych Review:       Positive for headaches    Cardiovascular Review:     Positive for hyperlipidemia    GI/HEPATIC/RENAL Review:   Comments: Chronic Cholecystitis, Cholelithiasis  Positive for GERD Positive for liver disease     End/Other Review:  Patient does not have an endo/other history    Additional Results:  EKG:  Encounter Date: 01/09/25  -Electrocardiogram 12-Lead:        Result                      Value                           Ventricular Rate EKG/M*     81                              Atrial Rate (BPM)           81                              NM-Interval (MSEC)          138                             QRS-Interval (MSEC)         82                              QT-Interval (MSEC)          370                             QTc                         429                             P Axis (Degrees)            55                              R Axis (Degrees)            95                              T Axis (Degrees)            29                              REPORT TEXT                                             Normal sinus rhythm   Rightward axis   Nonspecific ST abnormality   Abnormal ECG   No previous ECGs available   Confirmed by EVELIN VEGA MD ([id number]) on 1/9/2025 8:37:05 PM       ALLERGIES:  No Known Allergies   Last Labs        Component                Value               Date/Time                  WBC                      8.0                 01/09/2025 1855            RBC                      4.89                01/09/2025 1855            HGB                      14.2                01/09/2025 1855            HCT                      42.8                01/09/2025 1855            MCV                       re scheduling appt with Dr. Arellano  For annual and overdue HM. Appt scheduled 09/17/19. MMG order placed. Pt stated will schedule MMG after md ov. Spoke with pt re scheduling CRCS test. Pt is agreeable on doing fitkit and will  fit kit at ov.    87.5                2025            MCH                      29.0                2025            MCHC                     33.2                2025            RDW-CV                   13.3                2025            Sodium                   141                 2025            Potassium                4.0                 2025            Chloride                 105                 2025            Carbon Dioxide           27                  2025            Glucose                  99                  2025            BUN                      15                  2025            Creatinine               0.85                2025            Glomerular Filtrati*     82                  2025            Calcium                  9.7                 2025            PLT                      276                 2025        Past Medical History:  No date: Gastroesophageal reflux disease  No date: HPV (human papilloma virus) infection      Comment:  Before ; colpo; no procedures; normal since then  No date: Hyperlipidemia  No date: Liver disease  Past Surgical History:  No date:  delivery+postpartum care  2025: Cholecystectomy      Comment:  ROBOTIC-ASSISTED LAPAROSCOPIC CHOLECYSTECTOMY  No date: Colonoscopy diagnostic   Prior to Admission medications :  Medication MAGNESIUM OXIDE PO, Sig Take by mouth nightly., Start Date , End Date , Taking? Yes, Authorizing Provider Provider, Outside    Medication omeprazole (PriLOSEC) 40 MG capsule, Sig Take 1 capsule by mouth daily., Start Date 1/3/25, End Date , Taking? Yes, Authorizing Provider Penny Purvis, CNP    Medication atorvastatin (LIPITOR) 20 MG tablet, Sig Take 1 tablet by mouth daily.  Patient taking differently: Take 20 mg by mouth every evening., Start Date 24, End Date , Taking? Yes,  Authorizing Provider Carina Adan MD    Medication Cholecalciferol 50 mcg (2,000 units) capsule, Sig , Start Date , End Date , Taking? Yes, Authorizing Provider Provider, Outside    Medication Na Sulfate-K Sulfate-Mg Sulf (Suprep Bowel Prep Kit) 17.5-3.13-1.6 GM/177ML Solution, Sig As directed, Start Date 4/21/25, End Date 5/8/25, Taking? , Authorizing Provider Atul Olguin MD    Medication estradiol (ESTRACE) 0.1 MG/GM vaginal cream, Sig Place 1 g vaginally at bedtime. Use nightly for two weeks, then use twice weekly for maintenance., Start Date 9/19/24, End Date , Taking? , Authorizing Provider Carlotta Buckley, DO    Medication estradiol (Zo) 0.05 MG/24HR twice weekly patch, Sig Place 1 patch onto the skin 2 days a week., Start Date 9/19/24, End Date , Taking? , Authorizing Provider Carlotat Buckley, DO    Medication ondansetron (ZOFRAN) 8 MG tablet, Sig Take 1 tablet by mouth every 8 hours as needed for Nausea., Start Date 3/20/24, End Date , Taking? , Authorizing Provider Penny Purvis CNP         Patient Vitals for the past 24 hrs:   BP Temp Temp src Pulse Resp SpO2 Height Weight   05/08/25 1214 (!) 144/85 36 °C (96.8 °F) Temporal 73 16 99 % 5' 2\" (1.575 m) 64.9 kg (143 lb)       Social history reviewed:  Social History     Tobacco Use   Smoking Status Never   Smokeless Tobacco Never        E-Cigarette/Vaping Substances & Devices    E-Cigarette/Vaping Use Never Used     Nicotine No     THC No     CBD No     Flavoring No     Disposable No     Pre-filled or Refillable Cartridge No     Refillable Tank No     Pre-filled Pod No        Social History     Substance and Sexual Activity   Alcohol Use Yes    Comment: 1/week           Relevant Problems   No relevant active problems       Physical Exam     Airway   Mallampati: II  TM Distance: >3 FB  Neck ROM: Full  Neck: Non-tender and Able to place in sniff position  TMJ Mobility: Good    Cardiovascular  Cardiovascular exam normal  Cardio Rhythm:  Regular  Cardio Rate: Normal    Head Assessment  Head assessment: Normocephalic    General Assessment  General Assessment: Alert and oriented and No acute distress    Dental Exam  Dental exam normal  Patient has:  Denied broken/chipped/loose teeth    Pulmonary Exam  Pulmonary exam normal  Patient Demonstrates:  Non-labored Breathing    Abdominal Exam  Abdominal exam normal      Anesthesia Plan:    ASA Status: 2  Anesthesia Type: MAC    Induction: Intravenous  Preferred Airway Type: Nasal Cannula  Patient does not have a difficult airway or is not at risk of aspiration.   Appropriate airway precautions are in place.  Maintenance: TIVA  Premedication: None    Patient does not have an implantable electronic device requiring post procedure programming.     Post-op Pain Management: Per Surgeon      Checklist  Reviewed: NPO Status, Allergies, Medications, Problem list, Past Med History, Patient Summary, Lab Results, EKG, Consultations, Outside Records, Care Everywhere and Nursing Notes  Consent/Risks Discussed Statement:  The proposed anesthetic plan, including its risks and benefits, have been discussed with the Patient and Other Family Member (The pt and her niece) along with the risks and benefits of alternatives. Questions were encouraged and answered and the patient and/or representative understands and agrees to proceed.    I have discussed elements of the patient's history or examination, as noted above and/or as follows, that place the patient at higher risk of complications; liver disease.    I discussed with the patient (and/or patient's legal representative) the risks and benefits of the proposed anesthesia plan, MAC, which may include services performed by other anesthesia providers.    Alternative anesthesia plans, if available, were reviewed with the patient (and/or patient's legal representative). Discussion has been held with the patient (and/or patient's legal representative) regarding risks of  anesthesia, which include Intra-operative Awareness, allergic reaction, anxiety, aspiration, depressed breathing, nausea, conversion to general anesthesia and hypotension and emergent situations that may require change in anesthesia plan.    The patient (and/or patient's legal representative) has indicated understanding, his/her questions have been answered, and he/she wishes to proceed with the planned anesthetic.    Blood Products: Not Anticipated

## 2025-05-21 RX ORDER — DOXEPIN HYDROCHLORIDE 100 MG/1
100 CAPSULE ORAL NIGHTLY
Qty: 90 CAPSULE | Refills: 1 | Status: SHIPPED | OUTPATIENT
Start: 2025-05-21 | End: 2026-05-21

## 2025-05-21 NOTE — TELEPHONE ENCOUNTER
Requested Prescriptions     Pending Prescriptions Disp Refills    doxepin (SINEQUAN) 100 MG capsule 90 capsule 1     Sig: Take 1 capsule (100 mg total) by mouth every evening.     LV 04/30/2025 HS  NV 09/29/2025   LF 06/26/2024

## 2025-05-26 ENCOUNTER — PATIENT MESSAGE (OUTPATIENT)
Dept: INTERNAL MEDICINE | Facility: CLINIC | Age: 60
End: 2025-05-26
Payer: MEDICARE

## 2025-05-26 DIAGNOSIS — F41.1 GAD (GENERALIZED ANXIETY DISORDER): ICD-10-CM

## 2025-05-26 NOTE — TELEPHONE ENCOUNTER
Requested Prescriptions     Pending Prescriptions Disp Refills    diazePAM (VALIUM) 10 MG Tab 90 tablet 1     Sig: Take 1 tablet (10 mg total) by mouth daily as needed.     LV: 04/30/25 HS  NV: 09/29/25 HS  LF: 03/26/25 HS  
There are no Wet Read(s) to document.

## 2025-05-28 ENCOUNTER — PATIENT MESSAGE (OUTPATIENT)
Dept: INTERNAL MEDICINE | Facility: CLINIC | Age: 60
End: 2025-05-28
Payer: MEDICARE

## 2025-05-29 ENCOUNTER — PATIENT MESSAGE (OUTPATIENT)
Dept: INTERNAL MEDICINE | Facility: CLINIC | Age: 60
End: 2025-05-29
Payer: MEDICARE

## 2025-05-29 RX ORDER — DIAZEPAM 10 MG/1
10 TABLET ORAL DAILY PRN
Qty: 90 TABLET | Refills: 1 | Status: SHIPPED | OUTPATIENT
Start: 2025-05-29

## 2025-05-30 DIAGNOSIS — F41.1 GAD (GENERALIZED ANXIETY DISORDER): ICD-10-CM

## 2025-05-30 DIAGNOSIS — F41.9 ANXIETY: ICD-10-CM

## 2025-05-30 RX ORDER — DIAZEPAM 5 MG/1
TABLET ORAL
Qty: 30 TABLET | Refills: 5 | Status: SHIPPED | OUTPATIENT
Start: 2025-05-30

## 2025-06-03 ENCOUNTER — PATIENT MESSAGE (OUTPATIENT)
Dept: INTERNAL MEDICINE | Facility: CLINIC | Age: 60
End: 2025-06-03
Payer: MEDICARE

## 2025-06-04 ENCOUNTER — OFFICE VISIT (OUTPATIENT)
Dept: INTERNAL MEDICINE | Facility: CLINIC | Age: 60
End: 2025-06-04
Payer: MEDICARE

## 2025-06-04 DIAGNOSIS — G47.00 INSOMNIA, UNSPECIFIED TYPE: ICD-10-CM

## 2025-06-04 DIAGNOSIS — Z01.818 PREOP EXAMINATION: ICD-10-CM

## 2025-06-04 DIAGNOSIS — M25.562 PAIN AND SWELLING OF LEFT KNEE: Primary | ICD-10-CM

## 2025-06-04 DIAGNOSIS — M25.462 PAIN AND SWELLING OF LEFT KNEE: Primary | ICD-10-CM

## 2025-06-05 ENCOUNTER — PATIENT MESSAGE (OUTPATIENT)
Dept: INTERNAL MEDICINE | Facility: CLINIC | Age: 60
End: 2025-06-05
Payer: MEDICARE

## 2025-06-05 RX ORDER — ZOLPIDEM TARTRATE 5 MG/1
5 TABLET ORAL NIGHTLY PRN
Qty: 30 TABLET | Refills: 0 | Status: SHIPPED | OUTPATIENT
Start: 2025-06-05 | End: 2025-12-04

## 2025-06-23 DIAGNOSIS — Z00.00 ENCOUNTER FOR MEDICARE ANNUAL WELLNESS EXAM: ICD-10-CM

## 2025-06-24 DIAGNOSIS — F41.1 GAD (GENERALIZED ANXIETY DISORDER): ICD-10-CM

## 2025-06-24 RX ORDER — DIAZEPAM 10 MG/1
10 TABLET ORAL DAILY PRN
Qty: 90 TABLET | Refills: 1 | Status: SHIPPED | OUTPATIENT
Start: 2025-06-24

## 2025-06-24 NOTE — TELEPHONE ENCOUNTER
Requested Prescriptions     Pending Prescriptions Disp Refills    diazePAM (VALIUM) 10 MG Tab 90 tablet 1     Sig: Take 1 tablet (10 mg total) by mouth daily as needed.     Lv: 06/04/25 HS  Nv: 09/29/25 HS  Lf: 05/29/25 HS

## 2025-06-30 DIAGNOSIS — G47.00 INSOMNIA, UNSPECIFIED TYPE: ICD-10-CM

## 2025-06-30 RX ORDER — ZOLPIDEM TARTRATE 5 MG/1
5 TABLET ORAL NIGHTLY PRN
Qty: 30 TABLET | Refills: 0 | Status: SHIPPED | OUTPATIENT
Start: 2025-06-30 | End: 2025-12-29

## 2025-06-30 RX ORDER — DOXEPIN HYDROCHLORIDE 100 MG/1
100 CAPSULE ORAL NIGHTLY
Qty: 90 CAPSULE | Refills: 1 | Status: SHIPPED | OUTPATIENT
Start: 2025-06-30 | End: 2026-06-30

## 2025-08-01 DIAGNOSIS — G47.00 INSOMNIA, UNSPECIFIED TYPE: ICD-10-CM

## 2025-08-04 RX ORDER — ZOLPIDEM TARTRATE 5 MG/1
5 TABLET ORAL NIGHTLY PRN
Qty: 30 TABLET | Refills: 0 | Status: SHIPPED | OUTPATIENT
Start: 2025-08-04 | End: 2026-02-02

## 2025-08-13 ENCOUNTER — PATIENT MESSAGE (OUTPATIENT)
Dept: ADMINISTRATIVE | Facility: HOSPITAL | Age: 60
End: 2025-08-13
Payer: MEDICARE

## 2025-08-14 ENCOUNTER — LAB VISIT (OUTPATIENT)
Dept: LAB | Facility: HOSPITAL | Age: 60
End: 2025-08-14
Payer: MEDICARE

## 2025-08-14 ENCOUNTER — OFFICE VISIT (OUTPATIENT)
Dept: INTERNAL MEDICINE | Facility: CLINIC | Age: 60
End: 2025-08-14
Payer: MEDICARE

## 2025-08-14 VITALS
WEIGHT: 195.56 LBS | HEART RATE: 98 BPM | SYSTOLIC BLOOD PRESSURE: 128 MMHG | RESPIRATION RATE: 20 BRPM | OXYGEN SATURATION: 98 % | HEIGHT: 65 IN | BODY MASS INDEX: 32.58 KG/M2 | DIASTOLIC BLOOD PRESSURE: 80 MMHG | TEMPERATURE: 97 F

## 2025-08-14 DIAGNOSIS — R53.83 FATIGUE, UNSPECIFIED TYPE: Primary | ICD-10-CM

## 2025-08-14 DIAGNOSIS — M25.562 CHRONIC PAIN OF LEFT KNEE: ICD-10-CM

## 2025-08-14 DIAGNOSIS — G89.29 CHRONIC PAIN OF LEFT KNEE: ICD-10-CM

## 2025-08-14 DIAGNOSIS — R53.83 FATIGUE, UNSPECIFIED TYPE: ICD-10-CM

## 2025-08-14 LAB
ABSOLUTE EOSINOPHIL (OHS): 0.14 K/UL
ABSOLUTE MONOCYTE (OHS): 0.48 K/UL (ref 0.3–1)
ABSOLUTE NEUTROPHIL COUNT (OHS): 3.04 K/UL (ref 1.8–7.7)
ALBUMIN SERPL BCP-MCNC: 4.2 G/DL (ref 3.5–5.2)
ALP SERPL-CCNC: 83 UNIT/L (ref 40–150)
ALT SERPL W/O P-5'-P-CCNC: 12 UNIT/L (ref 0–55)
ANION GAP (OHS): 18 MMOL/L (ref 8–16)
AST SERPL-CCNC: 32 UNIT/L (ref 0–50)
BASOPHILS # BLD AUTO: 0.12 K/UL
BASOPHILS NFR BLD AUTO: 2.1 %
BILIRUB SERPL-MCNC: 1 MG/DL (ref 0.1–1)
BUN SERPL-MCNC: 14 MG/DL (ref 6–20)
CALCIUM SERPL-MCNC: 9.3 MG/DL (ref 8.7–10.5)
CHLORIDE SERPL-SCNC: 102 MMOL/L (ref 95–110)
CO2 SERPL-SCNC: 21 MMOL/L (ref 23–29)
CREAT SERPL-MCNC: 0.6 MG/DL (ref 0.5–1.4)
ERYTHROCYTE [DISTWIDTH] IN BLOOD BY AUTOMATED COUNT: 13.9 % (ref 11.5–14.5)
GFR SERPLBLD CREATININE-BSD FMLA CKD-EPI: >60 ML/MIN/1.73/M2
GLUCOSE SERPL-MCNC: 77 MG/DL (ref 70–110)
HCT VFR BLD AUTO: 38.5 % (ref 37–48.5)
HGB BLD-MCNC: 12.4 GM/DL (ref 12–16)
IMM GRANULOCYTES # BLD AUTO: 0.25 K/UL (ref 0–0.04)
IMM GRANULOCYTES NFR BLD AUTO: 4.3 % (ref 0–0.5)
LYMPHOCYTES # BLD AUTO: 1.78 K/UL (ref 1–4.8)
MCH RBC QN AUTO: 31.3 PG (ref 27–31)
MCHC RBC AUTO-ENTMCNC: 32.2 G/DL (ref 32–36)
MCV RBC AUTO: 97 FL (ref 82–98)
NUCLEATED RBC (/100WBC) (OHS): 0 /100 WBC
PLATELET # BLD AUTO: 306 K/UL (ref 150–450)
PMV BLD AUTO: 10.9 FL (ref 9.2–12.9)
POTASSIUM SERPL-SCNC: 3.1 MMOL/L (ref 3.5–5.1)
PROT SERPL-MCNC: 7.1 GM/DL (ref 6–8.4)
RBC # BLD AUTO: 3.96 M/UL (ref 4–5.4)
RELATIVE EOSINOPHIL (OHS): 2.4 %
RELATIVE LYMPHOCYTE (OHS): 30.6 % (ref 18–48)
RELATIVE MONOCYTE (OHS): 8.3 % (ref 4–15)
RELATIVE NEUTROPHIL (OHS): 52.3 % (ref 38–73)
SODIUM SERPL-SCNC: 141 MMOL/L (ref 136–145)
TSH SERPL-ACNC: 0.54 UIU/ML (ref 0.4–4)
WBC # BLD AUTO: 5.81 K/UL (ref 3.9–12.7)

## 2025-08-14 PROCEDURE — 80053 COMPREHEN METABOLIC PANEL: CPT

## 2025-08-14 PROCEDURE — 84443 ASSAY THYROID STIM HORMONE: CPT

## 2025-08-14 PROCEDURE — G2211 COMPLEX E/M VISIT ADD ON: HCPCS | Mod: ,,,

## 2025-08-14 PROCEDURE — 36415 COLL VENOUS BLD VENIPUNCTURE: CPT | Mod: PO

## 2025-08-14 PROCEDURE — 99999 PR PBB SHADOW E&M-EST. PATIENT-LVL IV: CPT | Mod: PBBFAC,,,

## 2025-08-14 PROCEDURE — 99214 OFFICE O/P EST MOD 30 MIN: CPT | Mod: PBBFAC,PO

## 2025-08-14 PROCEDURE — 99214 OFFICE O/P EST MOD 30 MIN: CPT | Mod: S$PBB,,,

## 2025-08-14 PROCEDURE — 85025 COMPLETE CBC W/AUTO DIFF WBC: CPT

## 2025-08-15 ENCOUNTER — PATIENT MESSAGE (OUTPATIENT)
Dept: INTERNAL MEDICINE | Facility: CLINIC | Age: 60
End: 2025-08-15
Payer: MEDICARE

## 2025-08-15 DIAGNOSIS — E87.6 HYPOKALEMIA: Primary | ICD-10-CM

## 2025-08-15 RX ORDER — POTASSIUM CHLORIDE 20 MEQ/1
20 TABLET, EXTENDED RELEASE ORAL 2 TIMES DAILY
Qty: 14 TABLET | Refills: 0 | Status: SHIPPED | OUTPATIENT
Start: 2025-08-15 | End: 2025-08-22

## 2025-08-21 ENCOUNTER — PATIENT MESSAGE (OUTPATIENT)
Dept: INTERNAL MEDICINE | Facility: CLINIC | Age: 60
End: 2025-08-21
Payer: MEDICARE

## 2025-08-21 DIAGNOSIS — G47.00 INSOMNIA, UNSPECIFIED TYPE: Primary | ICD-10-CM

## 2025-08-21 DIAGNOSIS — E87.6 HYPOKALEMIA: ICD-10-CM

## 2025-08-21 RX ORDER — DOXEPIN HYDROCHLORIDE 50 MG/1
50 CAPSULE ORAL NIGHTLY PRN
Qty: 30 CAPSULE | Refills: 11 | Status: SHIPPED | OUTPATIENT
Start: 2025-08-21 | End: 2026-08-21

## 2025-08-22 ENCOUNTER — PATIENT MESSAGE (OUTPATIENT)
Dept: INTERNAL MEDICINE | Facility: CLINIC | Age: 60
End: 2025-08-22
Payer: MEDICARE

## 2025-08-22 DIAGNOSIS — F41.9 ANXIETY: ICD-10-CM

## 2025-08-25 RX ORDER — DIAZEPAM 5 MG/1
TABLET ORAL
Qty: 30 TABLET | Refills: 5 | Status: SHIPPED | OUTPATIENT
Start: 2025-08-25

## (undated) DEVICE — SUT SILK 0 SUTUPAK SA86H

## (undated) DEVICE — WIRE GUIDE 0.038OLD

## (undated) DEVICE — SEE MEDLINE ITEM 152622

## (undated) DEVICE — SEE MEDLINE ITEM 157117

## (undated) DEVICE — COVER LIGHT HANDLE 80/CA

## (undated) DEVICE — DEVICE ENSEAL X1 LARGE JAW

## (undated) DEVICE — SEE MEDLINE ITEM 157185

## (undated) DEVICE — SET IRR URLGY 2LINE UNIV SPIKE

## (undated) DEVICE — GLOVE SURG BIOGEL LATEX SZ 7.5

## (undated) DEVICE — SET PNEUMOCLEAR HEAT HUM SE HF

## (undated) DEVICE — PACK BASIC SETUP SC BR

## (undated) DEVICE — SCISSOR 5MMX35CM DIRECT DRIVE

## (undated) DEVICE — SOL WATER STRL IRR 1000ML

## (undated) DEVICE — RELOAD PROXIMATE CUT BLU 100MM

## (undated) DEVICE — TROCAR ENDOPATH XCEL 5X100MM

## (undated) DEVICE — ADHESIVE MASTISOL VIAL 48/BX

## (undated) DEVICE — CANNULA ENDOPATH XCEL 5X100MM

## (undated) DEVICE — SEE MEDLINE ITEM 157148

## (undated) DEVICE — TOWEL OR DISP STRL BLUE 4/PK

## (undated) DEVICE — SOL IRR NACL .9% 3000ML

## (undated) DEVICE — ELECTRODE BLD EXT 6.50 ST DISP

## (undated) DEVICE — SYR 10CC LUER LOCK

## (undated) DEVICE — GAUZE SPONGE 4X4 12PLY

## (undated) DEVICE — SEE MEDLINE ITEM 152487

## (undated) DEVICE — FIBER HOLMIUM LASER RED 273UM

## (undated) DEVICE — GLOVE SURGICAL LATEX SZ 6

## (undated) DEVICE — SYR 30CC LUER LOCK

## (undated) DEVICE — GLOVE SURGICAL LATEX SZ 8

## (undated) DEVICE — SUPPORT ULNA NERVE PROTECTOR

## (undated) DEVICE — CUTTER PROXIMATE BLUE 100MM

## (undated) DEVICE — NDL PNEUMO INSUFFLATI 120MM

## (undated) DEVICE — APPLICATOR CHLORAPREP ORN 26ML

## (undated) DEVICE — MANIFOLD 4 PORT

## (undated) DEVICE — SYR ONLY LUER LOCK 20CC

## (undated) DEVICE — DRAPE ABDOMINAL TIBURON 14X11

## (undated) DEVICE — STAPLER SKIN PROXIMATE WIDE

## (undated) DEVICE — GOWN POLY REINF BRTH SLV XL

## (undated) DEVICE — FIBER LASER HOLMIUM 273 MICRON

## (undated) DEVICE — COVER OVERHEAD SURG LT BLUE

## (undated) DEVICE — TUBING MEDI-VAC 20FT .25IN

## (undated) DEVICE — SEE MEDLINE ITEM 154981

## (undated) DEVICE — GOWN SMARTGOWN LVL4 X-LONG XL

## (undated) DEVICE — TAPE MEDIPORE 4IN X 2YDS

## (undated) DEVICE — SOL IRRI STRL WATER 1000ML

## (undated) DEVICE — CONTAINER SPECIMEN STRL 4OZ

## (undated) DEVICE — TIP GRASPER FENESTRATED DISP

## (undated) DEVICE — SPONGE LAP 18X18 PREWASHED

## (undated) DEVICE — Device

## (undated) DEVICE — ELECTRODE REM PLYHSV RETURN 9

## (undated) DEVICE — SPONGE COTTON TRAY 4X4IN

## (undated) DEVICE — CORD LAP 10 DISP

## (undated) DEVICE — ADHESIVE DERMABOND ADVANCED

## (undated) DEVICE — SUT PDS II 1 TP-1 VIL

## (undated) DEVICE — CONTAINER SPECIMEN OR STER 4OZ

## (undated) DEVICE — BOWL STERILE LARGE 32OZ

## (undated) DEVICE — RELOAD PROXIMATE CUT BLUE 75MM

## (undated) DEVICE — TUBING HEATED INSUFFLATOR

## (undated) DEVICE — UROVIEW 2600/2800

## (undated) DEVICE — NDL SAFETY 25G X 1.5 ECLIPSE

## (undated) DEVICE — SEE MEDLINE ITEM 157027

## (undated) DEVICE — SYR 3CC LUER LOC

## (undated) DEVICE — SOL NS 1000CC

## (undated) DEVICE — SUT VICRYL CTD 2-0 GI 27 SH

## (undated) DEVICE — SKIN MARKER DEVON 160

## (undated) DEVICE — SUT CTD VICRYL 0 UND BR SUT

## (undated) DEVICE — SET IRRIGATION WARMING NORMAL

## (undated) DEVICE — SEALER LIGASURE LAP 37CM 5MM

## (undated) DEVICE — SUT VICRYL 3-0 27 SH

## (undated) DEVICE — CATH POLLACK OPEN-END FLEXI-TI

## (undated) DEVICE — TRAY CATH FOL SIL URIMTR 16FR

## (undated) DEVICE — DRESSING TELFA STRL 4X3 LF

## (undated) DEVICE — DRESSING XEROFORM FOIL PK 1X8

## (undated) DEVICE — SUT SILK 3-0 SH 18IN BLACK

## (undated) DEVICE — CLOSURE SKIN STERI STRIP 1/2X4

## (undated) DEVICE — CUTTER PROXIMATE BLUE 75MM

## (undated) DEVICE — SUT MONOCRYL 4.0 PS2 CP496G

## (undated) DEVICE — NDL SAFETY 22G X 1.5 ECLIPSE

## (undated) DEVICE — EXTRACTOR TIPLESS 2.4FRX1115CM

## (undated) DEVICE — GLOVE SURGICAL LATEX SZ 7

## (undated) DEVICE — SEE L#153236

## (undated) DEVICE — SUT STRATAFIX 1PDS CTX 18IN

## (undated) DEVICE — SEE MEDLINE ITEM 146345

## (undated) DEVICE — ADAPTER TUOHY BORST 6FR

## (undated) DEVICE — KIT ANTIFOG

## (undated) DEVICE — GUIDE WIRE MOTION .035 X 150CM

## (undated) DEVICE — UNDERGLOVES BIOGEL PI SIZE 8

## (undated) DEVICE — HANDLE PISTOL GRIP HAND CNTRL

## (undated) DEVICE — GOWN POLY REINF X-LONG XL

## (undated) DEVICE — TROCAR ENDOPATH XCEL 12X100MM

## (undated) DEVICE — TRAY SKIN SCRUB WET PREMIUM

## (undated) DEVICE — ELECTRODE BLADE E-Z CLEAN 4IN

## (undated) DEVICE — SOL 9P NACL IRR PIC IL

## (undated) DEVICE — WARMER DRAPE STERILE LF

## (undated) DEVICE — EXTRACTOR STONE 4WR NIT 2.2F 1

## (undated) DEVICE — DRAPE T CYSTOSCOPY STERILE

## (undated) DEVICE — ELECTRODE BLADE W/SLEEVE 2.75

## (undated) DEVICE — WIRE GD LUB STD 3CM .035 150CM

## (undated) DEVICE — PAD ABD 8X10 STERILE

## (undated) DEVICE — KIT ANTIFOG W/SPONG & FLUID